# Patient Record
Sex: FEMALE | Race: WHITE | NOT HISPANIC OR LATINO | Employment: OTHER | URBAN - METROPOLITAN AREA
[De-identification: names, ages, dates, MRNs, and addresses within clinical notes are randomized per-mention and may not be internally consistent; named-entity substitution may affect disease eponyms.]

---

## 2018-03-19 ENCOUNTER — OFFICE VISIT (OUTPATIENT)
Dept: NEUROSURGERY | Facility: CLINIC | Age: 68
End: 2018-03-19

## 2018-03-19 VITALS
BODY MASS INDEX: 32.6 KG/M2 | HEIGHT: 63 IN | WEIGHT: 184 LBS | DIASTOLIC BLOOD PRESSURE: 70 MMHG | TEMPERATURE: 97.3 F | SYSTOLIC BLOOD PRESSURE: 110 MMHG

## 2018-03-19 DIAGNOSIS — M48.062 SPINAL STENOSIS, LUMBAR REGION, WITH NEUROGENIC CLAUDICATION: Primary | ICD-10-CM

## 2018-03-19 PROCEDURE — 99213 OFFICE O/P EST LOW 20 MIN: CPT | Performed by: PHYSICIAN ASSISTANT

## 2018-03-19 RX ORDER — METHYLPREDNISOLONE 4 MG/1
TABLET ORAL
Qty: 1 EACH | Refills: 0 | Status: SHIPPED | OUTPATIENT
Start: 2018-03-19 | End: 2018-05-15

## 2018-03-19 NOTE — PROGRESS NOTES
Patient: Liz Borjas  : 1950    Primary Care Provider: ANITHA Trevizo      Chief Complaint: Low back, left hip and buttock pain    History of Present Illness:       Patient is well-known to the neurosurgical practice for undergoing a left-sided L3-L4 lamina foraminotomies in 2016.  Patient has done very well up until the last few months.  Patient states that she went to get up off the couch turned to walk and felt a pop in her back and then had exquisite pain in her left lower back.  This began to develop pain in her buttock and down the proximal portion of her lateral thigh on the left.  This has been constant and hasn't gotten any better over the last 2 months.  Patient has not done physical therapy or conservative medicines for this most recent bout.    She most recently lost her , some month ago and the symptoms preceded this.  Psychological trauma.  An have been the same.  So do not think that this is stress increased pain.    MRI of the lumbar spine was reviewed with Dr. Chris Son.  She does have a very bad looking back.  I am interested in seeing what her previous MRI looks like.  This is done in [place] and he did not have the old disc with him.  It does look like she has a fresh L1-2 disc herniation that is leftward in nature and is cephalad extruded.     She'll try physical therapy for 6 weeks as well as a Medrol Dosepak in hopes that this will mitigate her symptoms.        Review of Systems   Constitutional: Negative for activity change, appetite change, chills, diaphoresis, fatigue, fever and unexpected weight change.   HENT: Negative for congestion, dental problem, drooling, ear discharge, ear pain, facial swelling, hearing loss, mouth sores, nosebleeds, postnasal drip, rhinorrhea, sinus pressure, sneezing, sore throat, tinnitus, trouble swallowing and voice change.    Eyes: Negative for photophobia, pain, discharge, redness, itching and visual disturbance.    Respiratory: Negative for apnea, cough, choking, chest tightness, shortness of breath, wheezing and stridor.    Cardiovascular: Negative for chest pain, palpitations and leg swelling.   Gastrointestinal: Negative for abdominal distention, abdominal pain, anal bleeding, blood in stool, constipation, diarrhea, nausea, rectal pain and vomiting.   Endocrine: Negative for cold intolerance, heat intolerance, polydipsia, polyphagia and polyuria.   Genitourinary: Negative for decreased urine volume, difficulty urinating, dysuria, enuresis, flank pain, frequency, genital sores, hematuria and urgency.   Musculoskeletal: Positive for back pain. Negative for arthralgias, gait problem, joint swelling, myalgias, neck pain and neck stiffness.   Skin: Negative for color change, pallor, rash and wound.   Allergic/Immunologic: Negative for environmental allergies, food allergies and immunocompromised state.   Neurological: Negative for dizziness, tremors, seizures, syncope, facial asymmetry, speech difficulty, weakness, light-headedness, numbness and headaches.   Hematological: Negative for adenopathy. Does not bruise/bleed easily.   Psychiatric/Behavioral: Negative for agitation, behavioral problems, confusion, decreased concentration, dysphoric mood, hallucinations, self-injury, sleep disturbance and suicidal ideas. The patient is not nervous/anxious and is not hyperactive.        Past Medical History:     Past Medical History:   Diagnosis Date   • Arthritis    • GERD (gastroesophageal reflux disease)    • Hyperlipidemia    • Hypertension    • Osteoporosis    • Rheumatoid arthritis    • Wears dentures     upper   • Wears glasses     reading       Family History:   No family history on file.    Social History:    reports that she has quit smoking. Her smoking use included Cigarettes. She has never used smokeless tobacco. She reports that she does not drink alcohol or use drugs.   SMOKING STATUS: Nonsmoker    Surgical  "History:     Past Surgical History:   Procedure Laterality Date   • CHOLECYSTECTOMY     • COLONOSCOPY      5 years ago   • LUMBAR DISCECTOMY Left 10/12/2016    Procedure: lumbar MICROdiscectomy LEFT L3-5;  Surgeon: Chris Son MD;  Location: [facility];  Service:    • TOE AMPUTATION      right baby toe, left second toe       Allergies:   Penicillins    Physical Exam:    Vital Signs:Ht 160 cm (62.99\")    BMI: There is no height or weight on file to calculate BMI.    GENEREAL:   The patient is in no acute distress, and is able to answer all questions appropriately.  Skin:  The incision is well-healed and well approximated.  No signs of infection, bleeding, or erythema.  Musculoskeletal: The patient’s strength is intact in upper and lower extremities upon direct testing.  Dorsiflexion and plantarflexion are equal bilaterally @ 5/5. Hip flexion against resistance.  The patient’s gait is normal without antalgia.  Neurologic: The patient is alert and oriented by 3.  Recent memory, language, attention span, and fund of knowledge are all with within normal limits.   Sensation is equal bilaterally with no deficit.    Reflexes are 2+ at the patellar and Achilles tendon bilaterally.      Medical Decision Making    Data Review:   Lumbar MRI was reviewed and didn't show what looks like a new L1-2 disc herniation and diffuse spinal stenosis throughout the lumbar spine.  Aggressive degeneration of the disks throughout.    Diagnosis:   Possible new left-sided disc herniation.    Treatment Options:   Patient will follow up with us following physical therapy and a Medrol Dosepak.  Patient needs to have a flexion extension x-ray prior to return visit and we will discuss further at that point.  Patient also needs to bring her old film from [place].    It has been a pleasure providing neurosurgical care.    Alonzo Marie PA-C      No diagnosis found.  "

## 2018-03-28 ENCOUNTER — TELEPHONE (OUTPATIENT)
Dept: NEUROSURGERY | Facility: CLINIC | Age: 68
End: 2018-03-28

## 2018-03-28 NOTE — TELEPHONE ENCOUNTER
Provider: Huy    Caller: Raquel (Daughter)  Time of call:   1005am   Phone #:  [phone]  Surgery:  Lumbar Disc L3-4 Left  Surgery Date:  10/12/2016  Last visit:   03/19/18  Next visit: 04/26/18    Reason for call:       Raquel-Pt's daughter left a  requesting her mother's insurance be updated, and a new PT order to reflect this.     Routing to [facility] to update pt's insurance (Medicare)-Please let me know when this is completed and I will send updated PT referral. Pt's information is under media tab

## 2018-04-24 ENCOUNTER — TELEPHONE (OUTPATIENT)
Dept: NEUROSURGERY | Facility: CLINIC | Age: 68
End: 2018-04-24

## 2018-04-24 DIAGNOSIS — M43.10 ACQUIRED SPONDYLOLISTHESIS: Primary | ICD-10-CM

## 2018-04-24 NOTE — TELEPHONE ENCOUNTER
Provider:  Huy/Alonzo  Caller: patient  Time of call:  11:40   Phone #:  [phone]  Surgery:  Lumbar microdiscectomy left L3-L5  Surgery Date:  10/12/16  Last visit: 11/07/16  Next visit: 03/19/18    ROSEY:         Reason for call:     Patient called to let Alonzo know that she has not been to any kind of rehab, and she is not really sure if she needs it or not.  She has been in the hospital in [place].  She states she had a viral illness and she has diverticulitis.   It is still pretty bad in the morning.  She wasn't sure if she needed to keep her apt on Wed.  I advised her to come in and Alonzo could help her make the decision on what her needs are at this point.

## 2018-04-24 NOTE — TELEPHONE ENCOUNTER
Why is patient coming in to see me in?  Patient sounds that she needs to follow up with primary care to sort out the diverticulitis.  Patient should keep that appointment.

## 2018-04-24 NOTE — TELEPHONE ENCOUNTER
Patient needs to have a flexion extension x-ray prior to return visit and we will discuss further at that point.  Patient also needs to bring her old film from [place].

## 2018-04-24 NOTE — TELEPHONE ENCOUNTER
Patient's last visit was actually on 03/19/18 with you and you have her following up with xrays after PT and steroid pack.

## 2018-04-26 ENCOUNTER — OFFICE VISIT (OUTPATIENT)
Dept: NEUROSURGERY | Facility: CLINIC | Age: 68
End: 2018-04-26

## 2018-04-26 ENCOUNTER — HOSPITAL ENCOUNTER (OUTPATIENT)
Dept: GENERAL RADIOLOGY | Facility: HOSPITAL | Age: 68
Discharge: HOME | End: 2018-04-26

## 2018-04-26 VITALS
TEMPERATURE: 96.8 F | DIASTOLIC BLOOD PRESSURE: 64 MMHG | SYSTOLIC BLOOD PRESSURE: 112 MMHG | WEIGHT: 174 LBS | BODY MASS INDEX: 30.83 KG/M2 | HEIGHT: 63 IN

## 2018-04-26 DIAGNOSIS — M48.062 SPINAL STENOSIS OF LUMBAR REGION WITH NEUROGENIC CLAUDICATION: Primary | ICD-10-CM

## 2018-04-26 DIAGNOSIS — M43.10 ACQUIRED SPONDYLOLISTHESIS: ICD-10-CM

## 2018-04-26 PROCEDURE — 99214 OFFICE O/P EST MOD 30 MIN: CPT | Performed by: PHYSICIAN ASSISTANT

## 2018-04-26 PROCEDURE — 72120 X-RAY BEND ONLY L-S SPINE: CPT

## 2018-04-26 NOTE — PROGRESS NOTES
Patient: Liz Borjas  : 1950    Primary Care Provider: ANITHA Trevizo      Chief Complaint: left hip and groin pain    History of Present Illness:        Patient is well-known to the neurosurgical practice for undergoing a left-sided L3-L4 lamina foraminotomies in 2016.  Patient has done very well up until the last few months.  Patient states that she went to get up off the couch turned to walk and felt a pop in her back and then had exquisite pain in her left lower back.  This began to develop pain in her buttock and down the proximal portion of her lateral thigh on the left.  This has been constant and hasn't gotten any better over the last 2 months.  Patient has not done physical therapy or conservative medicines for this most recent bout.     She most recently lost her , some month ago and the symptoms preceded this.  Psychological trauma.  An have been the same.  So do not think that this is stress increased pain.     MRI of the lumbar spine was reviewed with Dr. Chris Son.  She does have a very bad looking back.  I am interested in seeing what her previous MRI looks like.  This is done in [place] and he did not have the old disc with him.  It does look like she has a fresh L1-2 disc herniation that is leftward in nature and is cephalad extruded.     Patient was sent for physical therapy and tried a Medrol Dosepak.  Patient is also seen back today with flexion-extension x-rays.  There is no acute fractures or spondylolisthesis.  Patient seems to be making her way and a little bit better.  Patient is able to do activities of daily living and has more of her complaint with the neurogenic claudication.  These symptoms were reviewed with the patient and son and with serum detail.  With neurogenic claudication.  This would require much more extensive procedure if patient had majority of left hip and groin pain.  This will be much easier flex and I would like to refrain from any  surgical intervention.  I recommended the patient that they try pain management injections and giving this a little bit more time.  Patient and son were in full agreement send understand reasons of necessitate a referral back to us in a more urgent fashion.    Review of Systems   Constitutional: Negative for activity change, appetite change, chills, diaphoresis, fatigue, fever and unexpected weight change.   HENT: Negative for congestion, dental problem, drooling, ear discharge, ear pain, facial swelling, hearing loss, mouth sores, nosebleeds, postnasal drip, rhinorrhea, sinus pressure, sneezing, sore throat, tinnitus, trouble swallowing and voice change.    Eyes: Negative for photophobia, pain, discharge, redness, itching and visual disturbance.   Respiratory: Negative for apnea, cough, choking, chest tightness, shortness of breath, wheezing and stridor.    Cardiovascular: Negative for chest pain, palpitations and leg swelling.   Gastrointestinal: Negative for abdominal distention, abdominal pain, anal bleeding, blood in stool, constipation, diarrhea, nausea, rectal pain and vomiting.   Endocrine: Negative for cold intolerance, heat intolerance, polydipsia, polyphagia and polyuria.   Genitourinary: Negative for decreased urine volume, difficulty urinating, dysuria, enuresis, flank pain, frequency, genital sores, hematuria and urgency.   Musculoskeletal: Positive for back pain. Negative for arthralgias, gait problem, joint swelling, myalgias, neck pain and neck stiffness.   Skin: Negative for color change, pallor, rash and wound.   Allergic/Immunologic: Negative for environmental allergies, food allergies and immunocompromised state.   Neurological: Negative for dizziness, tremors, seizures, syncope, facial asymmetry, speech difficulty, weakness, light-headedness, numbness and headaches.   Hematological: Negative for adenopathy. Does not bruise/bleed easily.   Psychiatric/Behavioral: Negative for agitation,  behavioral problems, confusion, decreased concentration, dysphoric mood, hallucinations, self-injury, sleep disturbance and suicidal ideas. The patient is not nervous/anxious and is not hyperactive.        Past Medical History:     Past Medical History:   Diagnosis Date   • Arthritis    • GERD (gastroesophageal reflux disease)    • Hyperlipidemia    • Hypertension    • Osteoporosis    • Rheumatoid arthritis    • Wears dentures     upper   • Wears glasses     reading       Family History:   No family history on file.    Social History:    reports that she has quit smoking. Her smoking use included Cigarettes. She has never used smokeless tobacco. She reports that she does not drink alcohol or use drugs.   SMOKING STATUS: nonsmoker    Surgical History:     Past Surgical History:   Procedure Laterality Date   • CHOLECYSTECTOMY     • COLONOSCOPY      5 years ago   • LUMBAR DISCECTOMY Left 10/12/2016    Procedure: lumbar MICROdiscectomy LEFT L3-5;  Surgeon: Chris Son MD;  Location: [facility];  Service:    • TOE AMPUTATION      right baby toe, left second toe       Allergies:   Penicillins    Physical Exam:    Vital Signs:There were no vitals taken for this visit.   BMI: There is no height or weight on file to calculate BMI.    GENEREAL:   The patient is in no acute distress, and is able to answer all questions appropriately.  Skin:  The incision is well-healed and well approximated.  No signs of infection, bleeding, or erythema.  Musculoskeletal: The patient’s strength is intact in upper and lower extremities upon direct testing.  Dorsiflexion and plantarflexion are equal bilaterally @ 5/5. Hip flexion against resistance.  The patient’s gait is normal without antalgia.  Neurologic: The patient is alert and oriented by 3.  Recent memory, language, attention span, and fund of knowledge are all with within normal limits.   Sensation is equal bilaterally with no deficit.    Reflexes are 2+ at the patellar and  Achilles tendon bilaterally.      Medical Decision Making    Data Review:   Flexion-extension x-rays reviewed and showed no signs of spondylolisthesis or acute fractures.    Diagnosis:   Lumbar spinal stenosis   Status post left-sided L3-L5 lamina foraminotomies and discectomy  New L1-2 disc herniation leftward in nature    Treatment Options:   Patient may get some improvement of her hip pain from a small procedure that would be easily tolerated, however, the symptoms of neurogenic claudication would not likely be improved with just a simple discectomy.  We would like to try to postpone this type of procedure, for as long as possible.  We will send her to pain management for injections and she seems very happy with this plan.     I went over with her very explicitly signs and symptoms to look for for more urgent return back to us.  She is very pleasant and wonderful Lady doing well with her 's recent passing.    t has been a pleasure providing neurosurgical care.    Alonzo Marie PA-C    No diagnosis found.

## 2018-05-01 ENCOUNTER — TELEPHONE (OUTPATIENT)
Dept: PAIN MEDICINE | Facility: CLINIC | Age: 68
End: 2018-05-01

## 2018-05-11 PROBLEM — F41.9 ANXIETY AND DEPRESSION: Status: ACTIVE | Noted: 2018-05-11

## 2018-05-11 PROBLEM — M51.16 LUMBAR DISC HERNIATION WITH RADICULOPATHY: Status: ACTIVE | Noted: 2018-05-11

## 2018-05-11 PROBLEM — M48.062 SPINAL STENOSIS OF LUMBAR REGION WITH NEUROGENIC CLAUDICATION: Status: ACTIVE | Noted: 2018-05-11

## 2018-05-11 PROBLEM — F32.A ANXIETY AND DEPRESSION: Status: ACTIVE | Noted: 2018-05-11

## 2018-05-11 PROBLEM — M05.9 RHEUMATOID ARTHRITIS WITH POSITIVE RHEUMATOID FACTOR: Status: ACTIVE | Noted: 2018-05-11

## 2018-05-11 NOTE — PROGRESS NOTES
"Chief Complaint: \"Pain in my lower back, left hip and left groin.\"    History of Present Illness:   Patient: Ms. Liz Borjas, 68 y.o. female   Referring physician: Alonzo Marie PA-C   Reason for referral: Consultation for intractable chronic left hip and left inguinal pain.   Pain history: Patient has a Hx left-sided L3-L4 lamina foraminotomies in 2016. Patient has done very well up until the last few months.  Patient states that she went to get up off the couch turned to walk and felt a pop in her back and then had exquisite pain in her left lower back. Soon, she developed pain in her left buttock and down the proximal portion of her left lateral thigh. This has been constant and has gotten worse over the last 2 months. She has developed severe neurogenic claudication.     Pain description: constant pain, described as sharp, shooting and stabbing sensation.   Radiation of pain: The pain radiates from the left side of the lower back into the left hip and groin area and radiating down the lateral aspect of the left thigh stopping above the left knee level.   Pain intensity today: 9/10  Average pain intensity last week: 8/10  Pain intensity ranges from: 1/10 to 10/10  Aggravating factors: Pain increases with twisting, bending, sitting longer than 5 minutes, standing longer than 5 minutes and ambulating more than 5 minutes.  Alleviating factors: Pain decreases with lying down and analgesics.   Associated symptoms:   Patient denies numbness or weakness in the lower extremities.   Patient denies any new bladder or bowel problems.   Patient reports difficulties with her balance but denies falls.     Review of previous therapies and additional medical records:  Liz Borjas has already failed the following measures, including:   Conservative measures: oral analgesics, topical analgesics, massage, physical therapy, ice, heat and chiropractic therapy   Interventional measures: None  Surgical measures: Liz underwent left " sided lumbar discectomy and left laminectomy with bilateral decompression in 2016 and did extremely well until a few months ago, as referenced above  Liz Borjas underwent follow-up neurosurgical consultation with Alonzo Marie on 04/26/2018, and was found to be a potential surgical candidate.  Liz Borjas presents with significant comorbidities including anxiety and depression, hypertension, rheumatoid arthritis, GERD, osteoporosis,   engaged in treatment.  In terms of current analgesics, Liz Borjas takes: doxepin, Prozac, Robaxin, sulindac  I have reviewed [facility] Report #[id number] consistent to medication reconciliation.    Global Pain Scale 5-15-18          Pain 25          Feelings 0          Clinical outcomes 4          Activities 8          GPS Total: 37            Review of Diagnostic Studies:    MRI Lumbar spine, 03/06/2018: Vertebral heights are well-maintained.  The STIR images best demonstrated discogenic marrow edema surrounding the progressively narrowed L1-L2 disc space.  The conus ends at the T12 level.  T10-T11: Broad-based disc bulge and ligamentum flavum hypertrophy contribute to subtle cord contour irregularity.   T11-T12: Broad based disc bulges   T12-L1: circumflex ventral disc bulg suggest fissuring of the T12-L1 annulus.  L1-L2: new large central disc herniation produces mass effect on the ventral thecal sac. At the inferior margin of this herniated disc, additional bulging disc material or herniated disc material is seen in the left neural foramen. This large disc herniation deforms the thecal sac. Facet joints are normal.  L2-L3: left lateral disc bulge narrows the left neural foramen. Hypertrophic Facet and ligamentum flavum findings. Ligamentum flavum hypertrophy appears greater on the left.  L3-L4: previously documented large inferior disc extrusion left paracentral disc herniation and a smaller consistent with desiccation of the disc. No operative findings are seen at this level.   Facet hypertrophic findings and ligamentum flavum hypertrophic findings contribute to a triangular spinal canal. At this level the degree of spinal stenosis has improved.  L4-L5: broad-based disc bulge greater left of midline. The degree of canal narrowing appears decreased since the comparison study. Hypertrophic facet findings   L5-S1: facet hypertrophic findings.  The disc height is relatively preserved. The L5 vertebra is partially sacralized on the left.  No disc herniation, canal or foraminal stenosis is seen in this level.  XR SPINE LUMBAR FLEX AND EXT - 04/26/2018: Advanced multilevel degenerative disc disease now including new and extensive degenerative changes at L1-L2 not present on previous exam. No compression deformity or significant focal subluxation is seen. There are similar lower thoracic degenerative changes. There is no obvious increased subluxation from flexion to extension.        Review of Systems   Musculoskeletal: Positive for back pain.   All other systems reviewed and are negative.        Patient Active Problem List   Diagnosis   • DDD (degenerative disc disease), lumbar   • Spinal stenosis, lumbar region, with neurogenic claudication   • Spinal stenosis of lumbar region with neurogenic claudication   • Lumbar disc herniation with radiculopathy   • Rheumatoid arthritis with positive rheumatoid factor   • Anxiety and depression   • Pulmonary emphysema   • Mild obesity   • Physical deconditioning       Past Medical History:   Diagnosis Date   • Arthritis    • GERD (gastroesophageal reflux disease)    • Hyperlipidemia    • Hypertension    • Osteoporosis    • Rheumatoid arthritis    • Wears dentures     upper   • Wears glasses     reading         Past Surgical History:   Procedure Laterality Date   • CHOLECYSTECTOMY     • COLONOSCOPY      5 years ago   • LUMBAR DISCECTOMY Left 10/12/2016    Procedure: lumbar MICROdiscectomy LEFT L3-5;  Surgeon: Chris Son MD;  Location: [facility] OR;   Service:    • TOE AMPUTATION      right baby toe, left second toe      History reviewed. No pertinent family history.      Social History     Social History   • Marital status:      Social History Main Topics   • Smoking status: Former Smoker     Types: Cigarettes   • Smokeless tobacco: Never Used      Comment: quit 30 years ago   • Alcohol use No   • Drug use: No   • Sexual activity: Defer     Other Topics Concern   • Not on file         Current Outpatient Prescriptions:   •  allopurinol (ZYLOPRIM) 300 MG tablet, Take 300 mg by mouth Daily., Disp: , Rfl:   •  amLODIPine (NORVASC) 10 MG tablet, Take 10 mg by mouth daily., Disp: , Rfl:   •  atenolol (TENORMIN) 100 MG tablet, Take 100 mg by mouth daily., Disp: , Rfl:   •  docusate sodium (COLACE) 250 MG capsule, Take 1 capsule by mouth Daily., Disp: 30 capsule, Rfl: 0  •  doxepin (SINEquan) 10 MG capsule, Take 40 mg by mouth Every Night., Disp: , Rfl:   •  FLUoxetine (PROzac) 40 MG capsule, Take 40 mg by mouth daily., Disp: , Rfl:   •  folic acid (FOLVITE) 1 MG tablet, Take 1 mg by mouth daily., Disp: , Rfl:   •  glycopyrrolate (ROBINUL) 2 MG tablet, Take 2 mg by mouth Daily., Disp: , Rfl:   •  hydrochlorothiazide (HYDRODIURIL) 12.5 MG tablet, Take 12.5 mg by mouth daily., Disp: , Rfl:   •  leflunomide (ARAVA) 20 MG tablet, Take 20 mg by mouth daily., Disp: , Rfl:   •  losartan (COZAAR) 100 MG tablet, Take 100 mg by mouth daily., Disp: , Rfl:   •  methocarbamol (ROBAXIN) 750 MG tablet, Take 1 tablet by mouth 3 (Three) Times a Day., Disp: 60 tablet, Rfl: 1  •  RABEprazole (ACIPHEX) 20 MG EC tablet, Take 20 mg by mouth daily., Disp: , Rfl:   •  sulindac (CLINORIL) 200 MG tablet, Take 200 mg by mouth 2 (two) times a day., Disp: , Rfl:   •  Tofacitinib Citrate (XELJANZ XR PO), Take 11 mg by mouth Daily., Disp: , Rfl:       Allergies   Allergen Reactions   • Penicillins Hives       /70 (BP Location: Right arm)   Pulse 70   Temp 97.3 °F (36.3 °C) (Temporal  "Artery )   Resp 18   Ht 160 cm (63\")   Wt 81.7 kg (180 lb 3.2 oz)   SpO2 92%   BMI 31.92 kg/m²       Physical Exam:  Constitutional: Patient is oriented to person, place, and time. Vital signs are normal. Patient appears well-developed and well-nourished.   HEENT: Head: Normocephalic and atraumatic. Eyes: Conjunctivae and lids are normal. Pupils: Equal, round, reactive to light.   Neck: Trachea normal. Neck supple. No JVD present.   Pulmonary Respiratory effort: No increased work of breathing or signs of respiratory distress. Auscultation of lungs: Clear to auscultation.   Cardiovascular Auscultation of heart: Normal rate and rhythm, normal S1 and S2, no murmurs.   Musculoskeletal   Gait and station: Gait evaluation demonstrated limping. Walks with a cane  Lumbar spine: The range of motion of the lumbar spine is limited secondary to pain. Extension, flexion, lateral flexion, rotation of the lumbar spine increased and reproduced pain. Lumbar facet joint loading maneuvers are positive.  Rod and Gaenslen's tests are negative   Piriformis maneuvers are negative   Palpation of the bilateral ischial tuberosities, unrevealing   Palpation of the bilateral greater trochanter, unrevealing   Examination of the Iliotibial band: unrevealing   Hip joints: The range of motion of the hip joints is full and without pain   Neurological: Patient is alert and oriented to person, place, and time. Speech: speech is normal. Cortical function: Normal mental status.   Cranial nerves: Cranial nerves 2-12 intact.   Reflex Scores: Right patellar: 1+ Left patellar: 1+ Right achilles: 1+ Left achilles: 1+  Motor strength: 5/5  Motor Tone: normal tone.   Involuntary movements: none.   Superficial/Primitive Reflexes: primitive reflexes were absent.   Right Chawla: absent  Left Chawla: absent  Right ankle clonus: absent  Left ankle clonus: absent   Negative long tract signs. Straight leg raising test is negative. Femoral stretch sign is " negative.   Sensation: No sensory loss. Sensory exam: intact to light touch, intact pain and temperature sensation, intact vibration sensation and normal proprioception.   Coordination: Normal finger to nose and heel to shin. Normal balance and negative. Romberg's sign negative.   Skin and subcutaneous tissue: Skin is warm and intact. No rash noted. No cyanosis.   Psychiatric: Judgment and insight: Normal. Orientation to person, place and time: Normal. Recent and remote memory: Intact. Mood and affect: Normal.     ASSESSMENT:   1. Lumbar disc herniation with radiculopathy    2. DDD (degenerative disc disease), lumbar    3. Spinal stenosis of lumbar region with neurogenic claudication    4. Rheumatoid arthritis with positive rheumatoid factor, involving unspecified site    5. Pulmonary emphysema, unspecified emphysema type    6. Mild obesity    7. Anxiety and depression    8. Physical deconditioning      PLAN/MEDICAL DECISION MAKING:  I had a lengthy conversation with Ms. Liz Borjas regarding her chronic pain condition and potential therapeutic options including risks, benefits, alternative therapies, to name a few. Patient has failed to obtain pain relief with conservative measures, as referenced above. Patient has a history of successful left lumbar decompression L3-L5. She developed sudden onset of severe left lower back pain radiating into the left hip and left thigh. MRI of the lumbar spine revealed a new disc herniation at L1-L2 with herniated disc material compressing the left nerve roots. There is clinical and radiological correlation. Flexion-extension x-rays showed no instability. I have reviewed all available patient's medical records as well as previous therapies as referenced above.  Therefore, I have proposed the following plan:  1. Pharmacological measures: Reviewed. Discussed. Patient takes doxepin, Prozac, Robaxin, sulindac. Patient has declined additional pharmacological therapies at this  time  2. Interventional pain management measures: Patient will be scheduled for diagnostic and therapeutic left L1-L2 and L2-L3 transforaminal epidural steroid injections. We may repeat another epidural depending on patient's outcome.  Patient will follow-up with Dr. Son thereafter.  3. Long-term rehabilitation efforts:  A. The patient does not have a history of falls. I did complete a risk assessment for falls.   B. Patient will start a comprehensive physical therapy program for water therapy, core strengthening, gait and balance training, neurodynamics, myofascial release, cupping and dry needling   C. Start an exercise program such as water therapy  D. Contrast therapy: Apply ice-packs for 15-20 minutes, followed by heating pads for 15-20 minutes to affected area   E. Patient has declined a referral to Dr. Milagro Osman for cognitive behavioral therapy at this time.  F. Referral to [facility]. I have explained the patient the relationship between increased BMI and back pain  4. The patient has been instructed to contact my office with any questions or difficulties. The patient understands the plan and agrees to proceed accordingly.       Patient Care Team:  ANITHA Chapman as PCP - General (Family Medicine)  Alonzo Marie PA-C as Physician Assistant (Neurosurgery)  Carlos Eduardo Figueredo MD as Consulting Physician (Pain Medicine)  Chris Son MD as Consulting Physician (Neurosurgery)     No orders of the defined types were placed in this encounter.        No future appointments.      Carlos Eduardo Figueredo MD     EMR Dragon/Transcription disclaimer:  Much of this encounter note is an electronic transcription of spoken language to printed text. Electronic transcription of spoken language may permit erroneous, or at times, nonsensical words or phrases to be inadvertently transcribed. Although I have reviewed the note for such errors, some may still exist.

## 2018-05-15 ENCOUNTER — OFFICE VISIT (OUTPATIENT)
Dept: PAIN MEDICINE | Facility: CLINIC | Age: 68
End: 2018-05-15

## 2018-05-15 VITALS
HEART RATE: 70 BPM | BODY MASS INDEX: 31.93 KG/M2 | WEIGHT: 180.2 LBS | DIASTOLIC BLOOD PRESSURE: 70 MMHG | OXYGEN SATURATION: 92 % | TEMPERATURE: 97.3 F | SYSTOLIC BLOOD PRESSURE: 110 MMHG | HEIGHT: 63 IN | RESPIRATION RATE: 18 BRPM

## 2018-05-15 DIAGNOSIS — J43.9 PULMONARY EMPHYSEMA, UNSPECIFIED EMPHYSEMA TYPE (HCC): ICD-10-CM

## 2018-05-15 DIAGNOSIS — F41.9 ANXIETY AND DEPRESSION: ICD-10-CM

## 2018-05-15 DIAGNOSIS — F32.A ANXIETY AND DEPRESSION: ICD-10-CM

## 2018-05-15 DIAGNOSIS — M51.16 LUMBAR DISC HERNIATION WITH RADICULOPATHY: ICD-10-CM

## 2018-05-15 DIAGNOSIS — M48.062 SPINAL STENOSIS OF LUMBAR REGION WITH NEUROGENIC CLAUDICATION: ICD-10-CM

## 2018-05-15 DIAGNOSIS — E66.9 MILD OBESITY: Primary | ICD-10-CM

## 2018-05-15 DIAGNOSIS — R53.81 PHYSICAL DECONDITIONING: ICD-10-CM

## 2018-05-15 DIAGNOSIS — M05.9 RHEUMATOID ARTHRITIS WITH POSITIVE RHEUMATOID FACTOR, INVOLVING UNSPECIFIED SITE (HCC): ICD-10-CM

## 2018-05-15 DIAGNOSIS — E66.9 MILD OBESITY: ICD-10-CM

## 2018-05-15 DIAGNOSIS — M51.36 DDD (DEGENERATIVE DISC DISEASE), LUMBAR: ICD-10-CM

## 2018-05-15 PROCEDURE — 99203 OFFICE O/P NEW LOW 30 MIN: CPT | Performed by: ANESTHESIOLOGY

## 2018-05-21 ENCOUNTER — OUTSIDE FACILITY SERVICE (OUTPATIENT)
Dept: PAIN MEDICINE | Facility: CLINIC | Age: 68
End: 2018-05-21

## 2018-05-21 PROCEDURE — 64483 NJX AA&/STRD TFRM EPI L/S 1: CPT | Performed by: ANESTHESIOLOGY

## 2018-05-21 PROCEDURE — 99152 MOD SED SAME PHYS/QHP 5/>YRS: CPT | Performed by: ANESTHESIOLOGY

## 2018-05-21 PROCEDURE — 64484 NJX AA&/STRD TFRM EPI L/S EA: CPT | Performed by: ANESTHESIOLOGY

## 2018-08-23 ENCOUNTER — TELEPHONE (OUTPATIENT)
Dept: PAIN MEDICINE | Facility: CLINIC | Age: 68
End: 2018-08-23

## 2018-09-12 ENCOUNTER — TELEPHONE (OUTPATIENT)
Dept: PAIN MEDICINE | Facility: CLINIC | Age: 68
End: 2018-09-12

## 2018-09-12 NOTE — TELEPHONE ENCOUNTER
I have tried to contact the patient many times, the phone number on file rings 5-6 times then the phone hangs up. I have no other numbers on file for the patient and her daughters number is not on file in her chart.    ----- Message -----   From: Marycruz Conner   Sent: 9/10/2018   9:46 AM   To: Li Cabral MA, *   Subject: RE: patient requesting appt with Vascello         I have tried to contact the patient for scheduling many times but have not heard back.   I will call her daughter back to schedule.     ----- Message -----   From: Li Cabral MA   Sent: 9/10/2018   9:22 AM   To: Christine Childress RN   Subject: patient requesting appt with Vascello             Patient's daughter called in, said they never heard back about getting the patient another appt with Dr. Figueredo. I think I transferred the message to Marycruz but wanted to send a inbasket message in case I messed it up

## 2018-09-21 NOTE — PROGRESS NOTES
"Chief Complaint: \"Pain in my lower back, left hip and left groin.\"    History of Present Illness:   Patient: Ms. Liz Borjas, 68 y.o. female   Pain history: Patient seen today for evaluation of chronic lower back, left hip, and left inguinal pain.    Reported onset of pain was in February of this year, which began after feeling a pop in her back after getting off of the couch.  Soon after she developed pain in her left buttock and down the proximal portion of her left lateral thigh.  She has developed severe neurogenic claudication.     Patient recently underwent left L1-L2 and left L2-L3 transforaminal epidural steroid injections on 05/21/2018 and experienced 80% pain relief and functional improvement that lasted for 3 months.  Pain has progressively recurred.  She did not participate in physical therapy after her procedure, as medically advised.    Pain description: constant pain, described as sharp, shooting and stabbing sensation.   Radiation of pain: The pain radiates from the left side of the lower back into the left hip and groin area and goes down the lateral aspect of the left thigh stopping above the left knee level.  Patient denies right-sided symptoms.  Pain intensity today: 9/10  Average pain intensity last week: 9/10  Pain intensity ranges from: 8/10 to 10/10  Aggravating factors: Pain increases immediately with ambulation.  Alleviating factors: Pain decreases with sitting.   Associated symptoms:   Patient denies numbness or weakness in the lower extremities.   Patient denies any new bladder or bowel problems.   Patient reports difficulties with her balance but denies falls.     Review of previous therapies and additional medical records:  Liz Borjas has already failed the following measures, including:   Conservative measures: oral analgesics, topical analgesics, massage, physical therapy, ice, heat and chiropractic therapy   Interventional measures: As referenced above.  Surgical measures: Liz" underwent left sided lumbar discectomy and left laminectomy with bilateral decompression in 2016 and did extremely well until a few months ago.  Liz Borjas underwent follow-up neurosurgical consultation with Alonzo Marie on 04/26/2018, and was found to be a potential surgical candidate.  Patient reports that she is scheduled for right knee arthroplasty in November with Dr. Josue.  Liz Borjas presents with significant comorbidities including anxiety and depression, hypertension, rheumatoid arthritis, GERD, osteoporosis,   engaged in treatment.  In terms of current analgesics, Liz Borjas takes: Norco  I have reviewed Rolando Report #[id number] consistent to medication reconciliation.    Global Pain Scale 5-15-18 09-         Pain 25 22         Feelings 0 0         Clinical outcomes 4 16         Activities 8 20         GPS Total: 37 58           Review of Diagnostic Studies:    MRI Lumbar spine, 03/06/2018: Vertebral heights are well-maintained.  The STIR images best demonstrated discogenic marrow edema surrounding the progressively narrowed L1-L2 disc space.  The conus ends at the T12 level.  T10-T11: Broad-based disc bulge and ligamentum flavum hypertrophy contribute to subtle cord contour irregularity.   T11-T12: Broad based disc bulges   T12-L1: circumflex ventral disc bulg suggest fissuring of the T12-L1 annulus.  L1-L2: new large central disc herniation produces mass effect on the ventral thecal sac. At the inferior margin of this herniated disc, additional bulging disc material or herniated disc material is seen in the left neural foramen. This large disc herniation deforms the thecal sac. Facet joints are normal.  L2-L3: left lateral disc bulge narrows the left neural foramen. Hypertrophic Facet and ligamentum flavum findings. Ligamentum flavum hypertrophy appears greater on the left.  L3-L4: previously documented large inferior disc extrusion left paracentral disc herniation and a smaller  consistent with desiccation of the disc. No operative findings are seen at this level.  Facet hypertrophic findings and ligamentum flavum hypertrophic findings contribute to a triangular spinal canal. At this level the degree of spinal stenosis has improved.  L4-L5: broad-based disc bulge greater left of midline. The degree of canal narrowing appears decreased since the comparison study. Hypertrophic facet findings   L5-S1: facet hypertrophic findings.  The disc height is relatively preserved. The L5 vertebra is partially sacralized on the left.  No disc herniation, canal or foraminal stenosis is seen in this level.  XR SPINE LUMBAR FLEX AND EXT - 04/26/2018: Advanced multilevel degenerative disc disease now including new and extensive degenerative changes at L1-L2 not present on previous exam. No compression deformity or significant focal subluxation is seen. There are similar lower thoracic degenerative changes. There is no obvious increased subluxation from flexion to extension.        Review of Systems   Gastrointestinal: Positive for diarrhea and nausea.   Musculoskeletal: Positive for arthralgias and back pain.   All other systems reviewed and are negative.        Patient Active Problem List   Diagnosis   • DDD (degenerative disc disease), lumbar   • Spinal stenosis, lumbar region, with neurogenic claudication   • Spinal stenosis of lumbar region with neurogenic claudication   • Lumbar disc herniation with radiculopathy   • Rheumatoid arthritis with positive rheumatoid factor (CMS/HCC)   • Anxiety and depression   • Pulmonary emphysema (CMS/HCC)   • Mild obesity   • Physical deconditioning       Past Medical History:   Diagnosis Date   • Arthritis    • GERD (gastroesophageal reflux disease)    • Hyperlipidemia    • Hypertension    • Osteoporosis    • Rheumatoid arthritis (CMS/HCC)    • Wears dentures     upper   • Wears glasses     reading         Past Surgical History:   Procedure Laterality Date   •  CHOLECYSTECTOMY     • COLONOSCOPY      5 years ago   • LUMBAR DISCECTOMY Left 10/12/2016    Procedure: lumbar MICROdiscectomy LEFT L3-5;  Surgeon: Chris Son MD;  Location: [facility];  Service:    • TOE AMPUTATION      right baby toe, left second toe      History reviewed. No pertinent family history.      Social History     Social History   • Marital status:      Social History Main Topics   • Smoking status: Former Smoker     Types: Cigarettes   • Smokeless tobacco: Never Used      Comment: quit 30 years ago   • Alcohol use No   • Drug use: No   • Sexual activity: Defer     Other Topics Concern   • Not on file         Current Outpatient Prescriptions:   •  allopurinol (ZYLOPRIM) 300 MG tablet, Take 300 mg by mouth Daily., Disp: , Rfl:   •  amLODIPine (NORVASC) 10 MG tablet, Take 10 mg by mouth daily., Disp: , Rfl:   •  atenolol (TENORMIN) 100 MG tablet, Take 100 mg by mouth daily., Disp: , Rfl:   •  docusate sodium (COLACE) 250 MG capsule, Take 1 capsule by mouth Daily., Disp: 30 capsule, Rfl: 0  •  doxepin (SINEquan) 10 MG capsule, Take 40 mg by mouth Every Night., Disp: , Rfl:   •  FLUoxetine (PROzac) 40 MG capsule, Take 40 mg by mouth daily., Disp: , Rfl:   •  folic acid (FOLVITE) 1 MG tablet, Take 1 mg by mouth daily., Disp: , Rfl:   •  hydrochlorothiazide (HYDRODIURIL) 12.5 MG tablet, Take 12.5 mg by mouth daily., Disp: , Rfl:   •  HYDROcodone-acetaminophen (NORCO) 5-325 MG per tablet, Take 1 tablet by mouth Every 6 (Six) Hours As Needed., Disp: , Rfl:   •  leflunomide (ARAVA) 20 MG tablet, Take 20 mg by mouth daily., Disp: , Rfl:   •  losartan (COZAAR) 100 MG tablet, Take 100 mg by mouth daily., Disp: , Rfl:   •  RABEprazole (ACIPHEX) 20 MG EC tablet, Take 20 mg by mouth daily., Disp: , Rfl:   •  Tofacitinib Citrate (XELJANZ XR PO), Take 11 mg by mouth Daily., Disp: , Rfl:       Allergies   Allergen Reactions   • Penicillins Hives   • Sulfa Antibiotics Rash     Also affects kidney function     "   /80   Pulse 78   Temp 97.6 °F (36.4 °C) (Temporal Artery )   Resp 18   Ht 160 cm (63\")   Wt 78.7 kg (173 lb 9.6 oz)   SpO2 98%   BMI 30.75 kg/m²       Physical Exam:  Constitutional: Patient is oriented to person, place, and time.  Patient appears well-developed and well-nourished.   HEENT: Head: Normocephalic and atraumatic. Eyes: Conjunctivae and lids are normal. Pupils: Equal, round, reactive to light.   Pulmonary Respiratory effort: No increased work of breathing or signs of respiratory distress. Auscultation of lungs: Clear to auscultation.   Cardiovascular Auscultation of heart: Normal rate and rhythm, normal S1 and S2, no murmurs.   Musculoskeletal   Gait and station: Gait evaluation demonstrated limping.   Lumbar spine: The range of motion of the lumbar spine is limited secondary to pain. Extension, flexion, lateral flexion, rotation of the lumbar spine increased and reproduced pain. Lumbar facet joint loading maneuvers are positive.  Rod and Gaenslen's tests are negative   Piriformis maneuvers are negative   Palpation of the bilateral ischial tuberosities, unrevealing   Palpation of the bilateral greater trochanter, unrevealing   Examination of the Iliotibial band: unrevealing   Hip joints: The range of motion of the hip joints is full and without pain   Neurological: Patient is alert and oriented to person, place, and time. Speech: speech is normal. Cortical function: Normal mental status.   Cranial nerves: Cranial nerves 2-12 intact.   Reflex Scores: Right patellar: 1+ Left patellar: 1+ Right achilles: 1+ Left achilles: 1+  Motor strength: 5/5  Motor Tone: normal tone.   Involuntary movements: none.   Superficial/Primitive Reflexes: primitive reflexes were absent.   Right Chawla: absent  Left Chawla: absent  Right ankle clonus: absent  Left ankle clonus: absent   Negative long tract signs. Straight leg raising test is negative. Femoral stretch sign is negative.   Sensation: No " sensory loss. Sensory exam: intact to light touch, intact pain and temperature sensation, intact vibration sensation and normal proprioception.   Coordination: Normal finger to nose and heel to shin. Normal balance and negative. Romberg's sign negative.   Skin and subcutaneous tissue: Skin is warm and intact. No rash noted. No cyanosis.   Psychiatric: Judgment and insight: Normal. Orientation to person, place and time: Normal. Recent and remote memory: Intact. Mood and affect: Normal.     ASSESSMENT:   1. Lumbar disc herniation with radiculopathy    2. DDD (degenerative disc disease), lumbar    3. Spinal stenosis, lumbar region, with neurogenic claudication    4. Rheumatoid arthritis with positive rheumatoid factor, involving unspecified site (CMS/HCC)    5. Pulmonary emphysema, unspecified emphysema type (CMS/HCC)    6. Mild obesity    7. Anxiety and depression    8. Physical deconditioning      PLAN/MEDICAL DECISION MAKING:  I have reviewed all available patient's medical records as well as previous therapies as referenced above, and discussed the patient with Dr. Figueredo.  1. Interventional pain management measures: Patient will be scheduled for left L1-L2 and L2-L3 transforaminal epidural steroid injections. We may repeat another epidural depending on patient's outcome.  Patient will follow-up with Dr. Son thereafter.  2. Pharmacological measures: Reviewed. Discussed. Patient takes doxepin, Prozac. Patient has declined additional pharmacological therapies at this time  3. Long-term rehabilitation efforts:  A. The patient does not have a history of falls. I did complete a risk assessment for falls.   B. Patient will start a comprehensive physical therapy program for water therapy, core strengthening, gait and balance training, neurodynamics, myofascial release, cupping and dry needling   C. Start an exercise program such as water therapy  D. Contrast therapy: Apply ice-packs for 15-20 minutes, followed by  heating pads for 15-20 minutes to affected area   E. Patient has declined a referral to Dr. Milagro Osman for cognitive behavioral therapy at this time.  F. Patient declined a referral to [facility]. I have explained the patient the relationship between increased BMI and back pain  4. The patient has been instructed to contact my office with any questions or difficulties. The patient understands the plan and agrees to proceed accordingly.       Patient Care Team:  Valerie Sesay APRN as PCP - General (Family Medicine)  Alonzo Marie PA-C as Physician Assistant (Neurosurgery)  Carlos Eduardo Figueredo MD as Consulting Physician (Pain Medicine)  Chris Son MD as Consulting Physician (Neurosurgery)  Keegan Tellez PA-C as Physician Assistant (Physician Assistant)     No orders of the defined types were placed in this encounter.        Future Appointments  Date Time Provider Department Center   10/10/2018 9:00 AM Carlos Eduardo Figueredo MD [facility] None         Keegan Tellez PA-C     EMR Dragon/Transcription disclaimer:  Much of this encounter note is an electronic transcription of spoken language to printed text. Electronic transcription of spoken language may permit erroneous, or at times, nonsensical words or phrases to be inadvertently transcribed. Although I have reviewed the note for such errors, some may still exist.

## 2018-09-24 ENCOUNTER — TELEPHONE (OUTPATIENT)
Dept: PAIN MEDICINE | Facility: CLINIC | Age: 68
End: 2018-09-24

## 2018-09-25 ENCOUNTER — OFFICE VISIT (OUTPATIENT)
Dept: PAIN MEDICINE | Facility: CLINIC | Age: 68
End: 2018-09-25

## 2018-09-25 VITALS
OXYGEN SATURATION: 98 % | HEART RATE: 78 BPM | WEIGHT: 173.6 LBS | DIASTOLIC BLOOD PRESSURE: 80 MMHG | BODY MASS INDEX: 30.76 KG/M2 | HEIGHT: 63 IN | RESPIRATION RATE: 18 BRPM | TEMPERATURE: 97.6 F | SYSTOLIC BLOOD PRESSURE: 118 MMHG

## 2018-09-25 DIAGNOSIS — M48.062 SPINAL STENOSIS, LUMBAR REGION, WITH NEUROGENIC CLAUDICATION: ICD-10-CM

## 2018-09-25 DIAGNOSIS — F32.A ANXIETY AND DEPRESSION: ICD-10-CM

## 2018-09-25 DIAGNOSIS — F41.9 ANXIETY AND DEPRESSION: ICD-10-CM

## 2018-09-25 DIAGNOSIS — E66.9 MILD OBESITY: ICD-10-CM

## 2018-09-25 DIAGNOSIS — R53.81 PHYSICAL DECONDITIONING: ICD-10-CM

## 2018-09-25 DIAGNOSIS — M05.9 RHEUMATOID ARTHRITIS WITH POSITIVE RHEUMATOID FACTOR, INVOLVING UNSPECIFIED SITE (HCC): ICD-10-CM

## 2018-09-25 DIAGNOSIS — M51.16 LUMBAR DISC HERNIATION WITH RADICULOPATHY: Primary | ICD-10-CM

## 2018-09-25 DIAGNOSIS — J43.9 PULMONARY EMPHYSEMA, UNSPECIFIED EMPHYSEMA TYPE (HCC): ICD-10-CM

## 2018-09-25 DIAGNOSIS — M51.36 DDD (DEGENERATIVE DISC DISEASE), LUMBAR: ICD-10-CM

## 2018-09-25 PROCEDURE — 99213 OFFICE O/P EST LOW 20 MIN: CPT | Performed by: PHYSICIAN ASSISTANT

## 2018-09-25 RX ORDER — HYDROCODONE BITARTRATE AND ACETAMINOPHEN 5; 325 MG/1; MG/1
1 TABLET ORAL EVERY 6 HOURS PRN
Status: ON HOLD | COMMUNITY
End: 2022-01-15

## 2018-10-10 ENCOUNTER — OUTSIDE FACILITY SERVICE (OUTPATIENT)
Dept: PAIN MEDICINE | Facility: CLINIC | Age: 68
End: 2018-10-10

## 2018-10-10 PROCEDURE — 64484 NJX AA&/STRD TFRM EPI L/S EA: CPT | Performed by: ANESTHESIOLOGY

## 2018-10-10 PROCEDURE — 64483 NJX AA&/STRD TFRM EPI L/S 1: CPT | Performed by: ANESTHESIOLOGY

## 2018-10-10 PROCEDURE — 99152 MOD SED SAME PHYS/QHP 5/>YRS: CPT | Performed by: ANESTHESIOLOGY

## 2018-10-11 ENCOUNTER — TELEPHONE (OUTPATIENT)
Dept: PAIN MEDICINE | Facility: CLINIC | Age: 68
End: 2018-10-11

## 2018-10-11 NOTE — TELEPHONE ENCOUNTER
Patient had left L1-L2 and left L2-L3 TFESI on 10/10/2018. Called patient to f/u post procedure. Reached voicemail. Left message for return call

## 2018-11-05 ENCOUNTER — OFFICE VISIT (OUTPATIENT)
Dept: PAIN MEDICINE | Facility: CLINIC | Age: 68
End: 2018-11-05

## 2018-11-05 VITALS
HEART RATE: 110 BPM | HEIGHT: 63 IN | SYSTOLIC BLOOD PRESSURE: 122 MMHG | TEMPERATURE: 97.6 F | DIASTOLIC BLOOD PRESSURE: 70 MMHG | WEIGHT: 170.8 LBS | OXYGEN SATURATION: 90 % | BODY MASS INDEX: 30.26 KG/M2 | RESPIRATION RATE: 22 BRPM

## 2018-11-05 DIAGNOSIS — R53.81 PHYSICAL DECONDITIONING: ICD-10-CM

## 2018-11-05 DIAGNOSIS — M51.16 LUMBAR DISC HERNIATION WITH RADICULOPATHY: Primary | ICD-10-CM

## 2018-11-05 DIAGNOSIS — M48.062 SPINAL STENOSIS, LUMBAR REGION, WITH NEUROGENIC CLAUDICATION: ICD-10-CM

## 2018-11-05 DIAGNOSIS — M05.9 RHEUMATOID ARTHRITIS WITH POSITIVE RHEUMATOID FACTOR, INVOLVING UNSPECIFIED SITE (HCC): ICD-10-CM

## 2018-11-05 DIAGNOSIS — M51.36 DDD (DEGENERATIVE DISC DISEASE), LUMBAR: ICD-10-CM

## 2018-11-05 PROCEDURE — 99213 OFFICE O/P EST LOW 20 MIN: CPT | Performed by: PHYSICIAN ASSISTANT

## 2018-11-12 ENCOUNTER — OFFICE VISIT (OUTPATIENT)
Dept: NEUROSURGERY | Facility: CLINIC | Age: 68
End: 2018-11-12

## 2018-11-12 VITALS
TEMPERATURE: 97.1 F | BODY MASS INDEX: 30.3 KG/M2 | SYSTOLIC BLOOD PRESSURE: 138 MMHG | HEIGHT: 63 IN | WEIGHT: 171 LBS | DIASTOLIC BLOOD PRESSURE: 72 MMHG

## 2018-11-12 DIAGNOSIS — R53.81 PHYSICAL DECONDITIONING: ICD-10-CM

## 2018-11-12 DIAGNOSIS — M48.062 SPINAL STENOSIS OF LUMBAR REGION WITH NEUROGENIC CLAUDICATION: ICD-10-CM

## 2018-11-12 DIAGNOSIS — M48.062 SPINAL STENOSIS, LUMBAR REGION, WITH NEUROGENIC CLAUDICATION: Primary | ICD-10-CM

## 2018-11-12 PROCEDURE — 99214 OFFICE O/P EST MOD 30 MIN: CPT | Performed by: PHYSICIAN ASSISTANT

## 2018-11-12 NOTE — PROGRESS NOTES
Patient: Liz Borjas  : 1950    Primary Care Provider: Valerie Sesay APRN      Chief Complaint: back pain    History of Present Illness:       Patient is well-known to the neurosurgical practice for having a L3 4 and L4 5 laminectomy and discectomy left-sided approach in .  Patient did well until 2018.  Patient then developed left hip and groin pain.  Patient also started develop pain in her buttock and her left lateral thigh.  Patient was sent for injections to temporize pain.  Patient states that her sciatica type symptoms have improved but she now has back pain that is keeping her from doing her activities of daily living.    Patient had previously been able to keep up with housework and the extra duties that were left behind by her late .  Patient is now unable to continue with her activities and this is becoming a more pressing matter.  Patient is tried and failed therapy as well as lumbar steroid injections.  Patient is also on conservative medications that her rheumatologist is providing.    I discussed the symptoms that the patient is having and she does have low back pain but it does go into bilateral buttock and is more present whenever she is standing and/or twisting.    Review of Systems   Musculoskeletal: Positive for arthralgias and back pain.   All other systems reviewed and are negative.      Past Medical History:     Past Medical History:   Diagnosis Date   • Arthritis    • GERD (gastroesophageal reflux disease)    • Hyperlipidemia    • Hypertension    • Osteoporosis    • Rheumatoid arthritis (CMS/[facility])    • Wears dentures     upper   • Wears glasses     reading       Family History:   No family history on file.    Social History:    reports that she has quit smoking. Her smoking use included cigarettes. she has never used smokeless tobacco. She reports that she does not drink alcohol or use drugs.   SMOKING STATUS: Nonsmoker    Surgical History:     Past Surgical  "History:   Procedure Laterality Date   • CHOLECYSTECTOMY     • COLONOSCOPY      5 years ago   • TOE AMPUTATION      right baby toe, left second toe       Allergies:   Penicillins and Sulfa antibiotics    Physical Exam:    Vital Signs:/72   Temp 97.1 °F (36.2 °C) (Temporal)   Ht 160 cm (63\")   Wt 77.6 kg (171 lb)   BMI 30.29 kg/m²    BMI: Body mass index is 30.29 kg/m².    GENERAL:   The patient is in no acute distress, and is able to answer all questions appropriately.  Skin:  The incision is well-healed and well approximated.  No signs of infection, bleeding, or erythema.  Musculoskeletal: Right proximal hip strength is decreased as compared to left.  Patient is 4 out 5 on right and 5 out of 5 on left.  Dorsiflexion and plantarflexion are equal bilaterally @ 4/5. Hip flexion against resistance.  The patient’s gait is antalgic.    Patient has right knee tenderness.   Neurologic: The patient is alert and oriented by 3.  Recent memory, language, attention span, and fund of knowledge are all with within normal limits.   Sensation is equal bilaterally with no deficit.    Reflexes are 2+ at the patellar and Achilles tendon bilaterally.      Medical Decision Making    Data Review:   No new films reviewed this visit    Diagnosis:   Postlaminectomy syndrome  Neurogenic claudication    Treatment Options:   I have ordered a MRI of the lumbar spine with and without contrast.  I'll also order a flexion extension x-ray lumbar spine.  These will help delineate surgical pathology if there are any options.    Patient is also scheduled with Dr. Josue in [place] for a right total knee arthroplasty at the end of the month.    It has been a pleasure providing neurosurgical care.    Alonzo Marie PA-C      No diagnosis found.  "

## 2018-11-19 ENCOUNTER — HOSPITAL ENCOUNTER (OUTPATIENT)
Dept: GENERAL RADIOLOGY | Facility: HOSPITAL | Age: 68
Discharge: HOME | End: 2018-11-19

## 2018-11-19 ENCOUNTER — HOSPITAL ENCOUNTER (OUTPATIENT)
Dept: MRI IMAGING | Facility: HOSPITAL | Age: 68
Discharge: HOME | End: 2018-11-19
Admitting: PHYSICIAN ASSISTANT

## 2018-11-19 DIAGNOSIS — M48.062 SPINAL STENOSIS, LUMBAR REGION, WITH NEUROGENIC CLAUDICATION: ICD-10-CM

## 2018-11-19 PROCEDURE — 72120 X-RAY BEND ONLY L-S SPINE: CPT

## 2021-10-17 ENCOUNTER — APPOINTMENT (OUTPATIENT)
Dept: GENERAL RADIOLOGY | Facility: HOSPITAL | Age: 71
End: 2021-10-17

## 2021-10-17 ENCOUNTER — HOSPITAL ENCOUNTER (INPATIENT)
Facility: HOSPITAL | Age: 71
LOS: 7 days | Discharge: HOME HEALTH | End: 2021-10-24
Attending: EMERGENCY MEDICINE | Admitting: INTERNAL MEDICINE

## 2021-10-17 DIAGNOSIS — J18.9 PNEUMONIA OF BOTH LOWER LOBES DUE TO INFECTIOUS ORGANISM: ICD-10-CM

## 2021-10-17 DIAGNOSIS — J96.01 ACUTE RESPIRATORY FAILURE WITH HYPOXIA (HCC): Primary | ICD-10-CM

## 2021-10-17 PROBLEM — D72.829 LEUKOCYTOSIS: Status: ACTIVE | Noted: 2021-10-17

## 2021-10-17 PROBLEM — E87.20 LACTIC ACIDOSIS: Status: ACTIVE | Noted: 2021-10-17

## 2021-10-17 PROBLEM — J44.1 COPD WITH ACUTE EXACERBATION: Status: ACTIVE | Noted: 2021-10-17

## 2021-10-17 PROBLEM — E87.1 HYPONATREMIA: Status: ACTIVE | Noted: 2021-10-17

## 2021-10-17 PROBLEM — A41.9 SEPSIS: Status: ACTIVE | Noted: 2021-10-17

## 2021-10-17 LAB
ALBUMIN SERPL-MCNC: 2.8 G/DL (ref 3.5–5.2)
ALBUMIN/GLOB SERPL: 0.7 G/DL
ALP SERPL-CCNC: 554 U/L (ref 39–117)
ALT SERPL W P-5'-P-CCNC: 26 U/L (ref 1–33)
ANION GAP SERPL CALCULATED.3IONS-SCNC: 13 MMOL/L (ref 5–15)
ARTERIAL PATENCY WRIST A: ABNORMAL
AST SERPL-CCNC: 39 U/L (ref 1–32)
ATMOSPHERIC PRESS: ABNORMAL MM[HG]
BASE EXCESS BLDA CALC-SCNC: -0.9 MMOL/L (ref 0–2)
BASOPHILS # BLD AUTO: 0.05 10*3/MM3 (ref 0–0.2)
BASOPHILS NFR BLD AUTO: 0.3 % (ref 0–1.5)
BDY SITE: ABNORMAL
BILIRUB SERPL-MCNC: 0.7 MG/DL (ref 0–1.2)
BODY TEMPERATURE: 37 C
BUN SERPL-MCNC: 15 MG/DL (ref 8–23)
BUN/CREAT SERPL: 12.7 (ref 7–25)
CALCIUM SPEC-SCNC: 8.5 MG/DL (ref 8.6–10.5)
CHLORIDE SERPL-SCNC: 95 MMOL/L (ref 98–107)
CO2 BLDA-SCNC: 23.7 MMOL/L (ref 22–33)
CO2 SERPL-SCNC: 21 MMOL/L (ref 22–29)
COHGB MFR BLD: 1.4 % (ref 0–2)
CREAT SERPL-MCNC: 1.18 MG/DL (ref 0.57–1)
D-LACTATE SERPL-SCNC: 2.7 MMOL/L (ref 0.5–2)
DEPRECATED RDW RBC AUTO: 49.6 FL (ref 37–54)
EOSINOPHIL # BLD AUTO: 0.02 10*3/MM3 (ref 0–0.4)
EOSINOPHIL NFR BLD AUTO: 0.1 % (ref 0.3–6.2)
EPAP: 0
ERYTHROCYTE [DISTWIDTH] IN BLOOD BY AUTOMATED COUNT: 15.6 % (ref 12.3–15.4)
GFR SERPL CREATININE-BSD FRML MDRD: 45 ML/MIN/1.73
GLOBULIN UR ELPH-MCNC: 3.9 GM/DL
GLUCOSE SERPL-MCNC: 169 MG/DL (ref 65–99)
HCO3 BLDA-SCNC: 22.7 MMOL/L (ref 20–26)
HCT VFR BLD AUTO: 34.4 % (ref 34–46.6)
HCT VFR BLD CALC: 29.5 %
HGB BLD-MCNC: 10.5 G/DL (ref 12–15.9)
HGB BLDA-MCNC: 9.6 G/DL (ref 14–18)
HOLD SPECIMEN: NORMAL
HOLD SPECIMEN: NORMAL
IMM GRANULOCYTES # BLD AUTO: 0.18 10*3/MM3 (ref 0–0.05)
IMM GRANULOCYTES NFR BLD AUTO: 1 % (ref 0–0.5)
INHALED O2 CONCENTRATION: 40 %
IPAP: 0
LYMPHOCYTES # BLD AUTO: 0.44 10*3/MM3 (ref 0.7–3.1)
LYMPHOCYTES NFR BLD AUTO: 2.4 % (ref 19.6–45.3)
MCH RBC QN AUTO: 26.2 PG (ref 26.6–33)
MCHC RBC AUTO-ENTMCNC: 30.5 G/DL (ref 31.5–35.7)
MCV RBC AUTO: 85.8 FL (ref 79–97)
METHGB BLD QL: 0.1 % (ref 0–1.5)
MODALITY: ABNORMAL
MONOCYTES # BLD AUTO: 1.33 10*3/MM3 (ref 0.1–0.9)
MONOCYTES NFR BLD AUTO: 7.1 % (ref 5–12)
NEUTROPHILS NFR BLD AUTO: 16.61 10*3/MM3 (ref 1.7–7)
NEUTROPHILS NFR BLD AUTO: 89.1 % (ref 42.7–76)
NOTE: ABNORMAL
NRBC BLD AUTO-RTO: 0 /100 WBC (ref 0–0.2)
NT-PROBNP SERPL-MCNC: 529.7 PG/ML (ref 0–900)
OXYHGB MFR BLDV: 93 % (ref 94–99)
PAW @ PEAK INSP FLOW SETTING VENT: 0 CMH2O
PCO2 BLDA: 32.9 MM HG (ref 35–45)
PCO2 TEMP ADJ BLD: 32.9 MM HG (ref 35–45)
PH BLDA: 7.45 PH UNITS (ref 7.35–7.45)
PH, TEMP CORRECTED: 7.45 PH UNITS
PLATELET # BLD AUTO: 278 10*3/MM3 (ref 140–450)
PMV BLD AUTO: 11.9 FL (ref 6–12)
PO2 BLDA: 68.6 MM HG (ref 83–108)
PO2 TEMP ADJ BLD: 68.6 MM HG (ref 83–108)
POTASSIUM SERPL-SCNC: 4 MMOL/L (ref 3.5–5.2)
PROT SERPL-MCNC: 6.7 G/DL (ref 6–8.5)
QT INTERVAL: 348 MS
QTC INTERVAL: 453 MS
RBC # BLD AUTO: 4.01 10*6/MM3 (ref 3.77–5.28)
SARS-COV-2 RDRP RESP QL NAA+PROBE: NORMAL
SODIUM SERPL-SCNC: 129 MMOL/L (ref 136–145)
TOTAL RATE: 0 BREATHS/MINUTE
TROPONIN T SERPL-MCNC: <0.01 NG/ML (ref 0–0.03)
WBC # BLD AUTO: 18.63 10*3/MM3 (ref 3.4–10.8)
WHOLE BLOOD HOLD SPECIMEN: NORMAL
WHOLE BLOOD HOLD SPECIMEN: NORMAL

## 2021-10-17 PROCEDURE — 99284 EMERGENCY DEPT VISIT MOD MDM: CPT

## 2021-10-17 PROCEDURE — 93005 ELECTROCARDIOGRAM TRACING: CPT

## 2021-10-17 PROCEDURE — 36600 WITHDRAWAL OF ARTERIAL BLOOD: CPT

## 2021-10-17 PROCEDURE — 83930 ASSAY OF BLOOD OSMOLALITY: CPT | Performed by: NURSE PRACTITIONER

## 2021-10-17 PROCEDURE — 93005 ELECTROCARDIOGRAM TRACING: CPT | Performed by: EMERGENCY MEDICINE

## 2021-10-17 PROCEDURE — 5A09357 ASSISTANCE WITH RESPIRATORY VENTILATION, LESS THAN 24 CONSECUTIVE HOURS, CONTINUOUS POSITIVE AIRWAY PRESSURE: ICD-10-PCS | Performed by: EMERGENCY MEDICINE

## 2021-10-17 PROCEDURE — 82805 BLOOD GASES W/O2 SATURATION: CPT

## 2021-10-17 PROCEDURE — 83605 ASSAY OF LACTIC ACID: CPT | Performed by: EMERGENCY MEDICINE

## 2021-10-17 PROCEDURE — 83880 ASSAY OF NATRIURETIC PEPTIDE: CPT | Performed by: EMERGENCY MEDICINE

## 2021-10-17 PROCEDURE — 87040 BLOOD CULTURE FOR BACTERIA: CPT | Performed by: EMERGENCY MEDICINE

## 2021-10-17 PROCEDURE — 94799 UNLISTED PULMONARY SVC/PX: CPT

## 2021-10-17 PROCEDURE — 36415 COLL VENOUS BLD VENIPUNCTURE: CPT

## 2021-10-17 PROCEDURE — 94660 CPAP INITIATION&MGMT: CPT

## 2021-10-17 PROCEDURE — 83050 HGB METHEMOGLOBIN QUAN: CPT

## 2021-10-17 PROCEDURE — 84145 PROCALCITONIN (PCT): CPT | Performed by: EMERGENCY MEDICINE

## 2021-10-17 PROCEDURE — 84484 ASSAY OF TROPONIN QUANT: CPT | Performed by: EMERGENCY MEDICINE

## 2021-10-17 PROCEDURE — 82375 ASSAY CARBOXYHB QUANT: CPT

## 2021-10-17 PROCEDURE — 80053 COMPREHEN METABOLIC PANEL: CPT | Performed by: EMERGENCY MEDICINE

## 2021-10-17 PROCEDURE — 71045 X-RAY EXAM CHEST 1 VIEW: CPT

## 2021-10-17 PROCEDURE — 85025 COMPLETE CBC W/AUTO DIFF WBC: CPT | Performed by: EMERGENCY MEDICINE

## 2021-10-17 PROCEDURE — 87635 SARS-COV-2 COVID-19 AMP PRB: CPT | Performed by: EMERGENCY MEDICINE

## 2021-10-17 RX ORDER — DICYCLOMINE HYDROCHLORIDE 10 MG/1
10 CAPSULE ORAL 2 TIMES DAILY
COMMUNITY

## 2021-10-17 RX ORDER — DOXYCYCLINE HYCLATE 50 MG/1
324 CAPSULE, GELATIN COATED ORAL
Status: ON HOLD | COMMUNITY
End: 2022-01-15

## 2021-10-17 RX ORDER — TRAMADOL HYDROCHLORIDE 50 MG/1
50 TABLET ORAL EVERY 6 HOURS PRN
COMMUNITY

## 2021-10-17 RX ORDER — SODIUM CHLORIDE 0.9 % (FLUSH) 0.9 %
10 SYRINGE (ML) INJECTION AS NEEDED
Status: DISCONTINUED | OUTPATIENT
Start: 2021-10-17 | End: 2021-10-24 | Stop reason: HOSPADM

## 2021-10-17 RX ORDER — ABATACEPT 125 MG/ML
125 INJECTION, SOLUTION SUBCUTANEOUS WEEKLY
COMMUNITY
End: 2022-01-11 | Stop reason: ALTCHOICE

## 2021-10-17 RX ORDER — FLECAINIDE ACETATE 50 MG/1
50 TABLET ORAL 2 TIMES DAILY
COMMUNITY

## 2021-10-17 RX ORDER — PREDNISONE 1 MG/1
5 TABLET ORAL DAILY
COMMUNITY
End: 2021-11-29 | Stop reason: HOSPADM

## 2021-10-17 RX ADMIN — SODIUM CHLORIDE 2040 ML: 9 INJECTION, SOLUTION INTRAVENOUS at 23:26

## 2021-10-17 RX ADMIN — DOXYCYCLINE 100 MG: 100 INJECTION, POWDER, LYOPHILIZED, FOR SOLUTION INTRAVENOUS at 23:23

## 2021-10-18 ENCOUNTER — APPOINTMENT (OUTPATIENT)
Dept: CT IMAGING | Facility: HOSPITAL | Age: 71
End: 2021-10-18

## 2021-10-18 PROBLEM — J44.9 COPD (CHRONIC OBSTRUCTIVE PULMONARY DISEASE): Status: ACTIVE | Noted: 2018-05-15

## 2021-10-18 LAB
ANION GAP SERPL CALCULATED.3IONS-SCNC: 11 MMOL/L (ref 5–15)
ANION GAP SERPL CALCULATED.3IONS-SCNC: 13 MMOL/L (ref 5–15)
ANION GAP SERPL CALCULATED.3IONS-SCNC: 9 MMOL/L (ref 5–15)
B PARAPERT DNA SPEC QL NAA+PROBE: NOT DETECTED
B PERT DNA SPEC QL NAA+PROBE: NOT DETECTED
BASOPHILS # BLD AUTO: 0.03 10*3/MM3 (ref 0–0.2)
BASOPHILS NFR BLD AUTO: 0.2 % (ref 0–1.5)
BILIRUB UR QL STRIP: NEGATIVE
BUN SERPL-MCNC: 13 MG/DL (ref 8–23)
BUN SERPL-MCNC: 14 MG/DL (ref 8–23)
BUN SERPL-MCNC: 14 MG/DL (ref 8–23)
BUN/CREAT SERPL: 14.9 (ref 7–25)
BUN/CREAT SERPL: 15.1 (ref 7–25)
BUN/CREAT SERPL: 15.5 (ref 7–25)
C PNEUM DNA NPH QL NAA+NON-PROBE: NOT DETECTED
CALCIUM SPEC-SCNC: 7.6 MG/DL (ref 8.6–10.5)
CALCIUM SPEC-SCNC: 7.6 MG/DL (ref 8.6–10.5)
CALCIUM SPEC-SCNC: 7.9 MG/DL (ref 8.6–10.5)
CHLORIDE SERPL-SCNC: 101 MMOL/L (ref 98–107)
CHLORIDE SERPL-SCNC: 103 MMOL/L (ref 98–107)
CHLORIDE SERPL-SCNC: 93 MMOL/L (ref 98–107)
CLARITY UR: CLEAR
CO2 SERPL-SCNC: 18 MMOL/L (ref 22–29)
CO2 SERPL-SCNC: 19 MMOL/L (ref 22–29)
CO2 SERPL-SCNC: 20 MMOL/L (ref 22–29)
COLOR UR: YELLOW
CREAT SERPL-MCNC: 0.84 MG/DL (ref 0.57–1)
CREAT SERPL-MCNC: 0.93 MG/DL (ref 0.57–1)
CREAT SERPL-MCNC: 0.94 MG/DL (ref 0.57–1)
D-LACTATE SERPL-SCNC: 0.8 MMOL/L (ref 0.5–2)
DEPRECATED RDW RBC AUTO: 49.1 FL (ref 37–54)
EOSINOPHIL # BLD AUTO: 0.04 10*3/MM3 (ref 0–0.4)
EOSINOPHIL NFR BLD AUTO: 0.3 % (ref 0.3–6.2)
ERYTHROCYTE [DISTWIDTH] IN BLOOD BY AUTOMATED COUNT: 15.5 % (ref 12.3–15.4)
FLUAV SUBTYP SPEC NAA+PROBE: NOT DETECTED
FLUBV RNA ISLT QL NAA+PROBE: NOT DETECTED
GFR SERPL CREATININE-BSD FRML MDRD: 59 ML/MIN/1.73
GFR SERPL CREATININE-BSD FRML MDRD: 60 ML/MIN/1.73
GFR SERPL CREATININE-BSD FRML MDRD: 67 ML/MIN/1.73
GLUCOSE SERPL-MCNC: 106 MG/DL (ref 65–99)
GLUCOSE SERPL-MCNC: 137 MG/DL (ref 65–99)
GLUCOSE SERPL-MCNC: 139 MG/DL (ref 65–99)
GLUCOSE UR STRIP-MCNC: NEGATIVE MG/DL
HADV DNA SPEC NAA+PROBE: NOT DETECTED
HCOV 229E RNA SPEC QL NAA+PROBE: NOT DETECTED
HCOV HKU1 RNA SPEC QL NAA+PROBE: NOT DETECTED
HCOV NL63 RNA SPEC QL NAA+PROBE: NOT DETECTED
HCOV OC43 RNA SPEC QL NAA+PROBE: NOT DETECTED
HCT VFR BLD AUTO: 28 % (ref 34–46.6)
HGB BLD-MCNC: 8.5 G/DL (ref 12–15.9)
HGB UR QL STRIP.AUTO: NEGATIVE
HMPV RNA NPH QL NAA+NON-PROBE: NOT DETECTED
HOLD SPECIMEN: NORMAL
HPIV1 RNA SPEC QL NAA+PROBE: NOT DETECTED
HPIV2 RNA SPEC QL NAA+PROBE: NOT DETECTED
HPIV3 RNA NPH QL NAA+PROBE: NOT DETECTED
HPIV4 P GENE NPH QL NAA+PROBE: NOT DETECTED
IMM GRANULOCYTES # BLD AUTO: 0.13 10*3/MM3 (ref 0–0.05)
IMM GRANULOCYTES NFR BLD AUTO: 1 % (ref 0–0.5)
KETONES UR QL STRIP: NEGATIVE
L PNEUMO1 AG UR QL IA: NEGATIVE
LEUKOCYTE ESTERASE UR QL STRIP.AUTO: NEGATIVE
LYMPHOCYTES # BLD AUTO: 0.39 10*3/MM3 (ref 0.7–3.1)
LYMPHOCYTES NFR BLD AUTO: 2.9 % (ref 19.6–45.3)
M PNEUMO IGG SER IA-ACNC: NOT DETECTED
MAGNESIUM SERPL-MCNC: 1.6 MG/DL (ref 1.6–2.4)
MCH RBC QN AUTO: 26.2 PG (ref 26.6–33)
MCHC RBC AUTO-ENTMCNC: 30.4 G/DL (ref 31.5–35.7)
MCV RBC AUTO: 86.2 FL (ref 79–97)
MONOCYTES # BLD AUTO: 0.82 10*3/MM3 (ref 0.1–0.9)
MONOCYTES NFR BLD AUTO: 6.2 % (ref 5–12)
NEUTROPHILS NFR BLD AUTO: 11.86 10*3/MM3 (ref 1.7–7)
NEUTROPHILS NFR BLD AUTO: 89.4 % (ref 42.7–76)
NITRITE UR QL STRIP: NEGATIVE
NRBC BLD AUTO-RTO: 0 /100 WBC (ref 0–0.2)
OSMOLALITY SERPL: 278 MOSM/KG (ref 275–295)
PH UR STRIP.AUTO: 6 [PH] (ref 5–8)
PLATELET # BLD AUTO: 199 10*3/MM3 (ref 140–450)
PMV BLD AUTO: 12.8 FL (ref 6–12)
POTASSIUM SERPL-SCNC: 3.6 MMOL/L (ref 3.5–5.2)
POTASSIUM SERPL-SCNC: 3.6 MMOL/L (ref 3.5–5.2)
POTASSIUM SERPL-SCNC: 3.9 MMOL/L (ref 3.5–5.2)
PROCALCITONIN SERPL-MCNC: 0.34 NG/ML (ref 0–0.25)
PROT UR QL STRIP: NEGATIVE
RBC # BLD AUTO: 3.25 10*6/MM3 (ref 3.77–5.28)
RHINOVIRUS RNA SPEC NAA+PROBE: NOT DETECTED
RSV RNA NPH QL NAA+NON-PROBE: NOT DETECTED
S PNEUM AG SPEC QL LA: NEGATIVE
SARS-COV-2 RNA NPH QL NAA+NON-PROBE: NOT DETECTED
SODIUM SERPL-SCNC: 121 MMOL/L (ref 136–145)
SODIUM SERPL-SCNC: 132 MMOL/L (ref 136–145)
SODIUM SERPL-SCNC: 134 MMOL/L (ref 136–145)
SP GR UR STRIP: 1.01 (ref 1–1.03)
UROBILINOGEN UR QL STRIP: NORMAL
WBC # BLD AUTO: 13.27 10*3/MM3 (ref 3.4–10.8)

## 2021-10-18 PROCEDURE — 97166 OT EVAL MOD COMPLEX 45 MIN: CPT

## 2021-10-18 PROCEDURE — 80048 BASIC METABOLIC PNL TOTAL CA: CPT | Performed by: NURSE PRACTITIONER

## 2021-10-18 PROCEDURE — 81003 URINALYSIS AUTO W/O SCOPE: CPT | Performed by: NURSE PRACTITIONER

## 2021-10-18 PROCEDURE — 99223 1ST HOSP IP/OBS HIGH 75: CPT | Performed by: FAMILY MEDICINE

## 2021-10-18 PROCEDURE — 97535 SELF CARE MNGMENT TRAINING: CPT

## 2021-10-18 PROCEDURE — 97116 GAIT TRAINING THERAPY: CPT | Performed by: PHYSICAL THERAPIST

## 2021-10-18 PROCEDURE — 97162 PT EVAL MOD COMPLEX 30 MIN: CPT | Performed by: PHYSICAL THERAPIST

## 2021-10-18 PROCEDURE — 63710000001 PREDNISONE PER 5 MG: Performed by: NURSE PRACTITIONER

## 2021-10-18 PROCEDURE — 94660 CPAP INITIATION&MGMT: CPT

## 2021-10-18 PROCEDURE — 25010000002 CEFTRIAXONE PER 250 MG: Performed by: INTERNAL MEDICINE

## 2021-10-18 PROCEDURE — 25010000002 VANCOMYCIN 10 G RECONSTITUTED SOLUTION: Performed by: EMERGENCY MEDICINE

## 2021-10-18 PROCEDURE — 87899 AGENT NOS ASSAY W/OPTIC: CPT | Performed by: INTERNAL MEDICINE

## 2021-10-18 PROCEDURE — 85025 COMPLETE CBC W/AUTO DIFF WBC: CPT | Performed by: NURSE PRACTITIONER

## 2021-10-18 PROCEDURE — 83605 ASSAY OF LACTIC ACID: CPT | Performed by: EMERGENCY MEDICINE

## 2021-10-18 PROCEDURE — 71250 CT THORAX DX C-: CPT

## 2021-10-18 PROCEDURE — 94799 UNLISTED PULMONARY SVC/PX: CPT

## 2021-10-18 PROCEDURE — 0202U NFCT DS 22 TRGT SARS-COV-2: CPT | Performed by: INTERNAL MEDICINE

## 2021-10-18 PROCEDURE — 83735 ASSAY OF MAGNESIUM: CPT | Performed by: NURSE PRACTITIONER

## 2021-10-18 RX ORDER — FERROUS SULFATE 325(65) MG
325 TABLET ORAL
Status: DISCONTINUED | OUTPATIENT
Start: 2021-10-18 | End: 2021-10-24 | Stop reason: HOSPADM

## 2021-10-18 RX ORDER — SODIUM CHLORIDE 0.9 % (FLUSH) 0.9 %
10 SYRINGE (ML) INJECTION EVERY 12 HOURS SCHEDULED
Status: DISCONTINUED | OUTPATIENT
Start: 2021-10-18 | End: 2021-10-24 | Stop reason: HOSPADM

## 2021-10-18 RX ORDER — PREDNISONE 1 MG/1
5 TABLET ORAL DAILY
Status: DISCONTINUED | OUTPATIENT
Start: 2021-10-18 | End: 2021-10-19

## 2021-10-18 RX ORDER — TRAMADOL HYDROCHLORIDE 50 MG/1
50 TABLET ORAL EVERY 6 HOURS PRN
Status: DISCONTINUED | OUTPATIENT
Start: 2021-10-18 | End: 2021-10-24 | Stop reason: HOSPADM

## 2021-10-18 RX ORDER — DICYCLOMINE HYDROCHLORIDE 10 MG/1
10 CAPSULE ORAL 2 TIMES DAILY
Status: DISCONTINUED | OUTPATIENT
Start: 2021-10-18 | End: 2021-10-24 | Stop reason: HOSPADM

## 2021-10-18 RX ORDER — ACETAMINOPHEN 160 MG/5ML
650 SOLUTION ORAL EVERY 4 HOURS PRN
Status: DISCONTINUED | OUTPATIENT
Start: 2021-10-18 | End: 2021-10-24 | Stop reason: HOSPADM

## 2021-10-18 RX ORDER — FLECAINIDE ACETATE 50 MG/1
50 TABLET ORAL 2 TIMES DAILY
Status: DISCONTINUED | OUTPATIENT
Start: 2021-10-18 | End: 2021-10-24 | Stop reason: HOSPADM

## 2021-10-18 RX ORDER — SODIUM CHLORIDE 0.9 % (FLUSH) 0.9 %
10 SYRINGE (ML) INJECTION AS NEEDED
Status: DISCONTINUED | OUTPATIENT
Start: 2021-10-18 | End: 2021-10-24 | Stop reason: HOSPADM

## 2021-10-18 RX ORDER — CASTOR OIL AND BALSAM, PERU 788; 87 MG/G; MG/G
1 OINTMENT TOPICAL EVERY 12 HOURS SCHEDULED
Status: DISCONTINUED | OUTPATIENT
Start: 2021-10-18 | End: 2021-10-24 | Stop reason: HOSPADM

## 2021-10-18 RX ORDER — PANTOPRAZOLE SODIUM 40 MG/1
40 TABLET, DELAYED RELEASE ORAL DAILY
Status: DISCONTINUED | OUTPATIENT
Start: 2021-10-18 | End: 2021-10-24 | Stop reason: HOSPADM

## 2021-10-18 RX ORDER — ACETAMINOPHEN 650 MG/1
650 SUPPOSITORY RECTAL EVERY 4 HOURS PRN
Status: DISCONTINUED | OUTPATIENT
Start: 2021-10-18 | End: 2021-10-24 | Stop reason: HOSPADM

## 2021-10-18 RX ORDER — LOSARTAN POTASSIUM 50 MG/1
100 TABLET ORAL DAILY
Status: DISCONTINUED | OUTPATIENT
Start: 2021-10-18 | End: 2021-10-24 | Stop reason: HOSPADM

## 2021-10-18 RX ORDER — IPRATROPIUM BROMIDE AND ALBUTEROL SULFATE 2.5; .5 MG/3ML; MG/3ML
3 SOLUTION RESPIRATORY (INHALATION) EVERY 4 HOURS PRN
Status: DISCONTINUED | OUTPATIENT
Start: 2021-10-18 | End: 2021-10-24 | Stop reason: HOSPADM

## 2021-10-18 RX ORDER — ACETAMINOPHEN 325 MG/1
650 TABLET ORAL EVERY 4 HOURS PRN
Status: DISCONTINUED | OUTPATIENT
Start: 2021-10-18 | End: 2021-10-24 | Stop reason: HOSPADM

## 2021-10-18 RX ORDER — AMLODIPINE BESYLATE 10 MG/1
10 TABLET ORAL DAILY
Status: DISCONTINUED | OUTPATIENT
Start: 2021-10-18 | End: 2021-10-24 | Stop reason: HOSPADM

## 2021-10-18 RX ORDER — DOXEPIN HYDROCHLORIDE 10 MG/1
40 CAPSULE ORAL NIGHTLY
Status: DISCONTINUED | OUTPATIENT
Start: 2021-10-18 | End: 2021-10-24 | Stop reason: HOSPADM

## 2021-10-18 RX ORDER — SODIUM CHLORIDE 9 MG/ML
50 INJECTION, SOLUTION INTRAVENOUS CONTINUOUS
Status: ACTIVE | OUTPATIENT
Start: 2021-10-18 | End: 2021-10-19

## 2021-10-18 RX ORDER — FLUOXETINE HYDROCHLORIDE 20 MG/1
40 CAPSULE ORAL DAILY
Status: DISCONTINUED | OUTPATIENT
Start: 2021-10-18 | End: 2021-10-24 | Stop reason: HOSPADM

## 2021-10-18 RX ADMIN — RIVAROXABAN 20 MG: 20 TABLET, FILM COATED ORAL at 18:12

## 2021-10-18 RX ADMIN — FLECAINIDE ACETATE 50 MG: 50 TABLET ORAL at 10:28

## 2021-10-18 RX ADMIN — DOXYCYCLINE 100 MG: 100 INJECTION, POWDER, LYOPHILIZED, FOR SOLUTION INTRAVENOUS at 12:00

## 2021-10-18 RX ADMIN — DOXEPIN HYDROCHLORIDE 40 MG: 10 CAPSULE ORAL at 02:48

## 2021-10-18 RX ADMIN — LOSARTAN POTASSIUM 100 MG: 50 TABLET, FILM COATED ORAL at 08:13

## 2021-10-18 RX ADMIN — DOXEPIN HYDROCHLORIDE 40 MG: 10 CAPSULE ORAL at 20:22

## 2021-10-18 RX ADMIN — FERROUS SULFATE TAB 325 MG (65 MG ELEMENTAL FE) 325 MG: 325 (65 FE) TAB at 08:13

## 2021-10-18 RX ADMIN — VANCOMYCIN HYDROCHLORIDE 1250 MG: 10 INJECTION, POWDER, LYOPHILIZED, FOR SOLUTION INTRAVENOUS at 00:24

## 2021-10-18 RX ADMIN — AMLODIPINE BESYLATE 10 MG: 10 TABLET ORAL at 08:13

## 2021-10-18 RX ADMIN — PANTOPRAZOLE SODIUM 40 MG: 40 TABLET, DELAYED RELEASE ORAL at 08:13

## 2021-10-18 RX ADMIN — DICYCLOMINE HYDROCHLORIDE 10 MG: 10 CAPSULE ORAL at 20:21

## 2021-10-18 RX ADMIN — SODIUM CHLORIDE, PRESERVATIVE FREE 10 ML: 5 INJECTION INTRAVENOUS at 20:23

## 2021-10-18 RX ADMIN — FLECAINIDE ACETATE 50 MG: 50 TABLET ORAL at 06:31

## 2021-10-18 RX ADMIN — CASTOR OIL AND BALSAM, PERU 1 APPLICATION: 788; 87 OINTMENT TOPICAL at 21:18

## 2021-10-18 RX ADMIN — SODIUM CHLORIDE 1 G: 900 INJECTION INTRAVENOUS at 16:14

## 2021-10-18 RX ADMIN — DICYCLOMINE HYDROCHLORIDE 10 MG: 10 CAPSULE ORAL at 16:14

## 2021-10-18 RX ADMIN — PREDNISONE 5 MG: 5 TABLET ORAL at 08:13

## 2021-10-18 RX ADMIN — SODIUM CHLORIDE 50 ML/HR: 9 INJECTION, SOLUTION INTRAVENOUS at 02:48

## 2021-10-18 RX ADMIN — FLECAINIDE ACETATE 50 MG: 50 TABLET ORAL at 20:21

## 2021-10-18 RX ADMIN — SODIUM CHLORIDE, PRESERVATIVE FREE 10 ML: 5 INJECTION INTRAVENOUS at 14:16

## 2021-10-18 RX ADMIN — FLUOXETINE HYDROCHLORIDE 40 MG: 20 CAPSULE ORAL at 08:13

## 2021-10-18 RX ADMIN — DICYCLOMINE HYDROCHLORIDE 10 MG: 10 CAPSULE ORAL at 02:48

## 2021-10-19 LAB
ANION GAP SERPL CALCULATED.3IONS-SCNC: 10 MMOL/L (ref 5–15)
BUN SERPL-MCNC: 11 MG/DL (ref 8–23)
BUN/CREAT SERPL: 15.5 (ref 7–25)
CALCIUM SPEC-SCNC: 8.1 MG/DL (ref 8.6–10.5)
CHLORIDE SERPL-SCNC: 102 MMOL/L (ref 98–107)
CO2 SERPL-SCNC: 23 MMOL/L (ref 22–29)
CREAT SERPL-MCNC: 0.71 MG/DL (ref 0.57–1)
DEPRECATED RDW RBC AUTO: 46.6 FL (ref 37–54)
ERYTHROCYTE [DISTWIDTH] IN BLOOD BY AUTOMATED COUNT: 15.4 % (ref 12.3–15.4)
GFR SERPL CREATININE-BSD FRML MDRD: 82 ML/MIN/1.73
GLUCOSE SERPL-MCNC: 92 MG/DL (ref 65–99)
HCT VFR BLD AUTO: 29.4 % (ref 34–46.6)
HGB BLD-MCNC: 9.1 G/DL (ref 12–15.9)
MCH RBC QN AUTO: 26.1 PG (ref 26.6–33)
MCHC RBC AUTO-ENTMCNC: 31 G/DL (ref 31.5–35.7)
MCV RBC AUTO: 84.2 FL (ref 79–97)
PLATELET # BLD AUTO: 234 10*3/MM3 (ref 140–450)
PMV BLD AUTO: 12.4 FL (ref 6–12)
POTASSIUM SERPL-SCNC: 3.7 MMOL/L (ref 3.5–5.2)
RBC # BLD AUTO: 3.49 10*6/MM3 (ref 3.77–5.28)
SODIUM SERPL-SCNC: 135 MMOL/L (ref 136–145)
WBC # BLD AUTO: 10.61 10*3/MM3 (ref 3.4–10.8)

## 2021-10-19 PROCEDURE — 99233 SBSQ HOSP IP/OBS HIGH 50: CPT | Performed by: INTERNAL MEDICINE

## 2021-10-19 PROCEDURE — 80048 BASIC METABOLIC PNL TOTAL CA: CPT | Performed by: INTERNAL MEDICINE

## 2021-10-19 PROCEDURE — 25010000002 METHYLPREDNISOLONE PER 40 MG: Performed by: INTERNAL MEDICINE

## 2021-10-19 PROCEDURE — 25010000002 CEFTRIAXONE PER 250 MG: Performed by: INTERNAL MEDICINE

## 2021-10-19 PROCEDURE — 97116 GAIT TRAINING THERAPY: CPT | Performed by: PHYSICAL THERAPIST

## 2021-10-19 PROCEDURE — 94799 UNLISTED PULMONARY SVC/PX: CPT

## 2021-10-19 PROCEDURE — 85027 COMPLETE CBC AUTOMATED: CPT | Performed by: INTERNAL MEDICINE

## 2021-10-19 PROCEDURE — 97110 THERAPEUTIC EXERCISES: CPT

## 2021-10-19 PROCEDURE — 97110 THERAPEUTIC EXERCISES: CPT | Performed by: PHYSICAL THERAPIST

## 2021-10-19 PROCEDURE — 3E0333Z INTRODUCTION OF ANTI-INFLAMMATORY INTO PERIPHERAL VEIN, PERCUTANEOUS APPROACH: ICD-10-PCS | Performed by: INTERNAL MEDICINE

## 2021-10-19 RX ORDER — METHYLPREDNISOLONE SODIUM SUCCINATE 40 MG/ML
40 INJECTION, POWDER, LYOPHILIZED, FOR SOLUTION INTRAMUSCULAR; INTRAVENOUS EVERY 12 HOURS
Status: DISCONTINUED | OUTPATIENT
Start: 2021-10-19 | End: 2021-10-24 | Stop reason: HOSPADM

## 2021-10-19 RX ADMIN — DICYCLOMINE HYDROCHLORIDE 10 MG: 10 CAPSULE ORAL at 08:38

## 2021-10-19 RX ADMIN — METHYLPREDNISOLONE SODIUM SUCCINATE 40 MG: 40 INJECTION, POWDER, FOR SOLUTION INTRAMUSCULAR; INTRAVENOUS at 22:22

## 2021-10-19 RX ADMIN — SODIUM CHLORIDE, PRESERVATIVE FREE 10 ML: 5 INJECTION INTRAVENOUS at 08:38

## 2021-10-19 RX ADMIN — CASTOR OIL AND BALSAM, PERU 1 APPLICATION: 788; 87 OINTMENT TOPICAL at 08:38

## 2021-10-19 RX ADMIN — SODIUM CHLORIDE 1 G: 900 INJECTION INTRAVENOUS at 14:33

## 2021-10-19 RX ADMIN — DOXEPIN HYDROCHLORIDE 40 MG: 10 CAPSULE ORAL at 22:27

## 2021-10-19 RX ADMIN — FERROUS SULFATE TAB 325 MG (65 MG ELEMENTAL FE) 325 MG: 325 (65 FE) TAB at 08:38

## 2021-10-19 RX ADMIN — SODIUM CHLORIDE, PRESERVATIVE FREE 10 ML: 5 INJECTION INTRAVENOUS at 22:24

## 2021-10-19 RX ADMIN — RIVAROXABAN 20 MG: 20 TABLET, FILM COATED ORAL at 18:51

## 2021-10-19 RX ADMIN — PANTOPRAZOLE SODIUM 40 MG: 40 TABLET, DELAYED RELEASE ORAL at 08:38

## 2021-10-19 RX ADMIN — DICYCLOMINE HYDROCHLORIDE 10 MG: 10 CAPSULE ORAL at 22:23

## 2021-10-19 RX ADMIN — DOXYCYCLINE 100 MG: 100 INJECTION, POWDER, LYOPHILIZED, FOR SOLUTION INTRAVENOUS at 00:01

## 2021-10-19 RX ADMIN — LOSARTAN POTASSIUM 100 MG: 50 TABLET, FILM COATED ORAL at 08:38

## 2021-10-19 RX ADMIN — FLUOXETINE HYDROCHLORIDE 40 MG: 20 CAPSULE ORAL at 08:38

## 2021-10-19 RX ADMIN — DOXYCYCLINE 100 MG: 100 INJECTION, POWDER, LYOPHILIZED, FOR SOLUTION INTRAVENOUS at 12:43

## 2021-10-19 RX ADMIN — FLECAINIDE ACETATE 50 MG: 50 TABLET ORAL at 10:03

## 2021-10-19 RX ADMIN — AMLODIPINE BESYLATE 10 MG: 10 TABLET ORAL at 08:38

## 2021-10-19 RX ADMIN — METHYLPREDNISOLONE SODIUM SUCCINATE 40 MG: 40 INJECTION, POWDER, FOR SOLUTION INTRAMUSCULAR; INTRAVENOUS at 08:37

## 2021-10-19 RX ADMIN — CASTOR OIL AND BALSAM, PERU 1 APPLICATION: 788; 87 OINTMENT TOPICAL at 22:22

## 2021-10-19 RX ADMIN — FLECAINIDE ACETATE 50 MG: 50 TABLET ORAL at 22:22

## 2021-10-20 LAB
CRP SERPL-MCNC: 10.08 MG/DL (ref 0–0.5)
MRSA DNA SPEC QL NAA+PROBE: NEGATIVE
PROCALCITONIN SERPL-MCNC: 0.11 NG/ML (ref 0–0.25)

## 2021-10-20 PROCEDURE — 97110 THERAPEUTIC EXERCISES: CPT

## 2021-10-20 PROCEDURE — 99233 SBSQ HOSP IP/OBS HIGH 50: CPT | Performed by: INTERNAL MEDICINE

## 2021-10-20 PROCEDURE — 97530 THERAPEUTIC ACTIVITIES: CPT | Performed by: PHYSICAL THERAPIST

## 2021-10-20 PROCEDURE — 97530 THERAPEUTIC ACTIVITIES: CPT

## 2021-10-20 PROCEDURE — 84145 PROCALCITONIN (PCT): CPT | Performed by: INTERNAL MEDICINE

## 2021-10-20 PROCEDURE — 25010000002 VANCOMYCIN 10 G RECONSTITUTED SOLUTION: Performed by: INTERNAL MEDICINE

## 2021-10-20 PROCEDURE — 87641 MR-STAPH DNA AMP PROBE: CPT | Performed by: INTERNAL MEDICINE

## 2021-10-20 PROCEDURE — 25010000002 CEFEPIME PER 500 MG: Performed by: INTERNAL MEDICINE

## 2021-10-20 PROCEDURE — 25010000002 METHYLPREDNISOLONE PER 40 MG: Performed by: INTERNAL MEDICINE

## 2021-10-20 PROCEDURE — 86140 C-REACTIVE PROTEIN: CPT | Performed by: INTERNAL MEDICINE

## 2021-10-20 RX ADMIN — DICYCLOMINE HYDROCHLORIDE 10 MG: 10 CAPSULE ORAL at 21:03

## 2021-10-20 RX ADMIN — PANTOPRAZOLE SODIUM 40 MG: 40 TABLET, DELAYED RELEASE ORAL at 09:17

## 2021-10-20 RX ADMIN — VANCOMYCIN HYDROCHLORIDE 1250 MG: 10 INJECTION, POWDER, LYOPHILIZED, FOR SOLUTION INTRAVENOUS at 16:15

## 2021-10-20 RX ADMIN — LOSARTAN POTASSIUM 100 MG: 50 TABLET, FILM COATED ORAL at 09:17

## 2021-10-20 RX ADMIN — CASTOR OIL AND BALSAM, PERU 1 APPLICATION: 788; 87 OINTMENT TOPICAL at 21:04

## 2021-10-20 RX ADMIN — FLUOXETINE HYDROCHLORIDE 40 MG: 20 CAPSULE ORAL at 09:17

## 2021-10-20 RX ADMIN — AMLODIPINE BESYLATE 10 MG: 10 TABLET ORAL at 09:17

## 2021-10-20 RX ADMIN — DICYCLOMINE HYDROCHLORIDE 10 MG: 10 CAPSULE ORAL at 09:17

## 2021-10-20 RX ADMIN — DOXEPIN HYDROCHLORIDE 40 MG: 10 CAPSULE ORAL at 21:03

## 2021-10-20 RX ADMIN — METHYLPREDNISOLONE SODIUM SUCCINATE 40 MG: 40 INJECTION, POWDER, FOR SOLUTION INTRAMUSCULAR; INTRAVENOUS at 09:17

## 2021-10-20 RX ADMIN — DOXYCYCLINE 100 MG: 100 INJECTION, POWDER, LYOPHILIZED, FOR SOLUTION INTRAVENOUS at 00:30

## 2021-10-20 RX ADMIN — SODIUM CHLORIDE, PRESERVATIVE FREE 10 ML: 5 INJECTION INTRAVENOUS at 21:03

## 2021-10-20 RX ADMIN — RIVAROXABAN 20 MG: 20 TABLET, FILM COATED ORAL at 17:53

## 2021-10-20 RX ADMIN — METHYLPREDNISOLONE SODIUM SUCCINATE 40 MG: 40 INJECTION, POWDER, FOR SOLUTION INTRAMUSCULAR; INTRAVENOUS at 21:03

## 2021-10-20 RX ADMIN — SODIUM CHLORIDE, PRESERVATIVE FREE 10 ML: 5 INJECTION INTRAVENOUS at 09:18

## 2021-10-20 RX ADMIN — FLECAINIDE ACETATE 50 MG: 50 TABLET ORAL at 21:03

## 2021-10-20 RX ADMIN — CEFEPIME HYDROCHLORIDE 2 G: 2 INJECTION, POWDER, FOR SOLUTION INTRAVENOUS at 17:53

## 2021-10-20 RX ADMIN — DOXYCYCLINE 100 MG: 100 INJECTION, POWDER, LYOPHILIZED, FOR SOLUTION INTRAVENOUS at 12:46

## 2021-10-20 RX ADMIN — FERROUS SULFATE TAB 325 MG (65 MG ELEMENTAL FE) 325 MG: 325 (65 FE) TAB at 09:17

## 2021-10-20 RX ADMIN — CASTOR OIL AND BALSAM, PERU 1 APPLICATION: 788; 87 OINTMENT TOPICAL at 09:17

## 2021-10-20 RX ADMIN — FLECAINIDE ACETATE 50 MG: 50 TABLET ORAL at 09:24

## 2021-10-21 ENCOUNTER — APPOINTMENT (OUTPATIENT)
Dept: CT IMAGING | Facility: HOSPITAL | Age: 71
End: 2021-10-21

## 2021-10-21 ENCOUNTER — APPOINTMENT (OUTPATIENT)
Dept: GENERAL RADIOLOGY | Facility: HOSPITAL | Age: 71
End: 2021-10-21

## 2021-10-21 LAB
ALBUMIN SERPL-MCNC: 2.4 G/DL (ref 3.5–5.2)
ALBUMIN/GLOB SERPL: 0.7 G/DL
ALP SERPL-CCNC: 410 U/L (ref 39–117)
ALT SERPL W P-5'-P-CCNC: 21 U/L (ref 1–33)
ANION GAP SERPL CALCULATED.3IONS-SCNC: 10 MMOL/L (ref 5–15)
AST SERPL-CCNC: 27 U/L (ref 1–32)
BASOPHILS # BLD AUTO: 0 10*3/MM3 (ref 0–0.2)
BASOPHILS NFR BLD AUTO: 0 % (ref 0–1.5)
BILIRUB SERPL-MCNC: 0.3 MG/DL (ref 0–1.2)
BUN SERPL-MCNC: 18 MG/DL (ref 8–23)
BUN/CREAT SERPL: 23.7 (ref 7–25)
CALCIUM SPEC-SCNC: 8.4 MG/DL (ref 8.6–10.5)
CHLORIDE SERPL-SCNC: 107 MMOL/L (ref 98–107)
CO2 SERPL-SCNC: 23 MMOL/L (ref 22–29)
CREAT SERPL-MCNC: 0.76 MG/DL (ref 0.57–1)
CRP SERPL-MCNC: 6.12 MG/DL (ref 0–0.5)
DEPRECATED RDW RBC AUTO: 47.8 FL (ref 37–54)
EOSINOPHIL # BLD AUTO: 0 10*3/MM3 (ref 0–0.4)
EOSINOPHIL NFR BLD AUTO: 0 % (ref 0.3–6.2)
ERYTHROCYTE [DISTWIDTH] IN BLOOD BY AUTOMATED COUNT: 15.4 % (ref 12.3–15.4)
GFR SERPL CREATININE-BSD FRML MDRD: 75 ML/MIN/1.73
GLOBULIN UR ELPH-MCNC: 3.3 GM/DL
GLUCOSE SERPL-MCNC: 135 MG/DL (ref 65–99)
HCT VFR BLD AUTO: 29.5 % (ref 34–46.6)
HGB BLD-MCNC: 9 G/DL (ref 12–15.9)
IMM GRANULOCYTES # BLD AUTO: 0.04 10*3/MM3 (ref 0–0.05)
IMM GRANULOCYTES NFR BLD AUTO: 0.5 % (ref 0–0.5)
LYMPHOCYTES # BLD AUTO: 0.34 10*3/MM3 (ref 0.7–3.1)
LYMPHOCYTES NFR BLD AUTO: 4.2 % (ref 19.6–45.3)
MAGNESIUM SERPL-MCNC: 1.7 MG/DL (ref 1.6–2.4)
MCH RBC QN AUTO: 26 PG (ref 26.6–33)
MCHC RBC AUTO-ENTMCNC: 30.5 G/DL (ref 31.5–35.7)
MCV RBC AUTO: 85.3 FL (ref 79–97)
MONOCYTES # BLD AUTO: 0.31 10*3/MM3 (ref 0.1–0.9)
MONOCYTES NFR BLD AUTO: 3.9 % (ref 5–12)
NEUTROPHILS NFR BLD AUTO: 7.33 10*3/MM3 (ref 1.7–7)
NEUTROPHILS NFR BLD AUTO: 91.4 % (ref 42.7–76)
NRBC BLD AUTO-RTO: 0 /100 WBC (ref 0–0.2)
PHOSPHATE SERPL-MCNC: 3.7 MG/DL (ref 2.5–4.5)
PLATELET # BLD AUTO: 263 10*3/MM3 (ref 140–450)
PMV BLD AUTO: 12.1 FL (ref 6–12)
POTASSIUM SERPL-SCNC: 4.1 MMOL/L (ref 3.5–5.2)
PROCALCITONIN SERPL-MCNC: 0.12 NG/ML (ref 0–0.25)
PROT SERPL-MCNC: 5.7 G/DL (ref 6–8.5)
RBC # BLD AUTO: 3.46 10*6/MM3 (ref 3.77–5.28)
SODIUM SERPL-SCNC: 140 MMOL/L (ref 136–145)
WBC # BLD AUTO: 8.02 10*3/MM3 (ref 3.4–10.8)

## 2021-10-21 PROCEDURE — 25010000002 CEFEPIME PER 500 MG: Performed by: INTERNAL MEDICINE

## 2021-10-21 PROCEDURE — 84100 ASSAY OF PHOSPHORUS: CPT | Performed by: INTERNAL MEDICINE

## 2021-10-21 PROCEDURE — 25010000003 MAGNESIUM SULFATE 4 GM/100ML SOLUTION: Performed by: INTERNAL MEDICINE

## 2021-10-21 PROCEDURE — 0 IOPAMIDOL PER 1 ML: Performed by: INTERNAL MEDICINE

## 2021-10-21 PROCEDURE — 97530 THERAPEUTIC ACTIVITIES: CPT

## 2021-10-21 PROCEDURE — 25010000002 VANCOMYCIN PER 500 MG: Performed by: INTERNAL MEDICINE

## 2021-10-21 PROCEDURE — 25010000002 METHYLPREDNISOLONE PER 40 MG: Performed by: INTERNAL MEDICINE

## 2021-10-21 PROCEDURE — 83735 ASSAY OF MAGNESIUM: CPT | Performed by: INTERNAL MEDICINE

## 2021-10-21 PROCEDURE — 97116 GAIT TRAINING THERAPY: CPT

## 2021-10-21 PROCEDURE — 71045 X-RAY EXAM CHEST 1 VIEW: CPT

## 2021-10-21 PROCEDURE — 71275 CT ANGIOGRAPHY CHEST: CPT

## 2021-10-21 PROCEDURE — 86140 C-REACTIVE PROTEIN: CPT | Performed by: INTERNAL MEDICINE

## 2021-10-21 PROCEDURE — 85025 COMPLETE CBC W/AUTO DIFF WBC: CPT | Performed by: INTERNAL MEDICINE

## 2021-10-21 PROCEDURE — 80053 COMPREHEN METABOLIC PANEL: CPT | Performed by: INTERNAL MEDICINE

## 2021-10-21 PROCEDURE — 84145 PROCALCITONIN (PCT): CPT | Performed by: INTERNAL MEDICINE

## 2021-10-21 PROCEDURE — 97535 SELF CARE MNGMENT TRAINING: CPT

## 2021-10-21 PROCEDURE — 99232 SBSQ HOSP IP/OBS MODERATE 35: CPT | Performed by: INTERNAL MEDICINE

## 2021-10-21 RX ORDER — MAGNESIUM SULFATE HEPTAHYDRATE 40 MG/ML
2 INJECTION, SOLUTION INTRAVENOUS AS NEEDED
Status: DISCONTINUED | OUTPATIENT
Start: 2021-10-21 | End: 2021-10-24 | Stop reason: HOSPADM

## 2021-10-21 RX ORDER — MAGNESIUM SULFATE HEPTAHYDRATE 40 MG/ML
4 INJECTION, SOLUTION INTRAVENOUS AS NEEDED
Status: DISCONTINUED | OUTPATIENT
Start: 2021-10-21 | End: 2021-10-24 | Stop reason: HOSPADM

## 2021-10-21 RX ORDER — MAGNESIUM SULFATE HEPTAHYDRATE 40 MG/ML
4 INJECTION, SOLUTION INTRAVENOUS ONCE
Status: DISCONTINUED | OUTPATIENT
Start: 2021-10-21 | End: 2021-10-22

## 2021-10-21 RX ADMIN — DOXEPIN HYDROCHLORIDE 40 MG: 10 CAPSULE ORAL at 21:31

## 2021-10-21 RX ADMIN — CASTOR OIL AND BALSAM, PERU 1 APPLICATION: 788; 87 OINTMENT TOPICAL at 21:31

## 2021-10-21 RX ADMIN — VANCOMYCIN HYDROCHLORIDE 750 MG: 750 INJECTION, SOLUTION INTRAVENOUS at 17:34

## 2021-10-21 RX ADMIN — AMLODIPINE BESYLATE 10 MG: 10 TABLET ORAL at 09:38

## 2021-10-21 RX ADMIN — DOXYCYCLINE 100 MG: 100 INJECTION, POWDER, LYOPHILIZED, FOR SOLUTION INTRAVENOUS at 00:05

## 2021-10-21 RX ADMIN — METHYLPREDNISOLONE SODIUM SUCCINATE 40 MG: 40 INJECTION, POWDER, FOR SOLUTION INTRAMUSCULAR; INTRAVENOUS at 09:37

## 2021-10-21 RX ADMIN — RIVAROXABAN 20 MG: 20 TABLET, FILM COATED ORAL at 17:34

## 2021-10-21 RX ADMIN — CASTOR OIL AND BALSAM, PERU 1 APPLICATION: 788; 87 OINTMENT TOPICAL at 09:39

## 2021-10-21 RX ADMIN — MAGNESIUM SULFATE HEPTAHYDRATE 4 G: 40 INJECTION, SOLUTION INTRAVENOUS at 15:06

## 2021-10-21 RX ADMIN — FLECAINIDE ACETATE 50 MG: 50 TABLET ORAL at 09:39

## 2021-10-21 RX ADMIN — CEFEPIME HYDROCHLORIDE 2 G: 2 INJECTION, POWDER, FOR SOLUTION INTRAVENOUS at 18:38

## 2021-10-21 RX ADMIN — LOSARTAN POTASSIUM 100 MG: 50 TABLET, FILM COATED ORAL at 09:38

## 2021-10-21 RX ADMIN — FLUOXETINE HYDROCHLORIDE 40 MG: 20 CAPSULE ORAL at 09:38

## 2021-10-21 RX ADMIN — FLECAINIDE ACETATE 50 MG: 50 TABLET ORAL at 21:31

## 2021-10-21 RX ADMIN — CEFEPIME HYDROCHLORIDE 2 G: 2 INJECTION, POWDER, FOR SOLUTION INTRAVENOUS at 05:53

## 2021-10-21 RX ADMIN — IOPAMIDOL 95 ML: 755 INJECTION, SOLUTION INTRAVENOUS at 10:35

## 2021-10-21 RX ADMIN — FERROUS SULFATE TAB 325 MG (65 MG ELEMENTAL FE) 325 MG: 325 (65 FE) TAB at 09:38

## 2021-10-21 RX ADMIN — SODIUM CHLORIDE, PRESERVATIVE FREE 10 ML: 5 INJECTION INTRAVENOUS at 09:39

## 2021-10-21 RX ADMIN — METHYLPREDNISOLONE SODIUM SUCCINATE 40 MG: 40 INJECTION, POWDER, FOR SOLUTION INTRAMUSCULAR; INTRAVENOUS at 21:31

## 2021-10-21 RX ADMIN — DICYCLOMINE HYDROCHLORIDE 10 MG: 10 CAPSULE ORAL at 09:38

## 2021-10-21 RX ADMIN — SODIUM CHLORIDE, PRESERVATIVE FREE 10 ML: 5 INJECTION INTRAVENOUS at 21:31

## 2021-10-21 RX ADMIN — DOXYCYCLINE 100 MG: 100 INJECTION, POWDER, LYOPHILIZED, FOR SOLUTION INTRAVENOUS at 12:55

## 2021-10-21 RX ADMIN — DICYCLOMINE HYDROCHLORIDE 10 MG: 10 CAPSULE ORAL at 21:31

## 2021-10-21 RX ADMIN — PANTOPRAZOLE SODIUM 40 MG: 40 TABLET, DELAYED RELEASE ORAL at 09:38

## 2021-10-21 RX ADMIN — VANCOMYCIN HYDROCHLORIDE 750 MG: 750 INJECTION, SOLUTION INTRAVENOUS at 05:03

## 2021-10-22 LAB
ALBUMIN SERPL-MCNC: 2.5 G/DL (ref 3.5–5.2)
ALBUMIN/GLOB SERPL: 0.7 G/DL
ALP SERPL-CCNC: 406 U/L (ref 39–117)
ALT SERPL W P-5'-P-CCNC: 21 U/L (ref 1–33)
ANION GAP SERPL CALCULATED.3IONS-SCNC: 11 MMOL/L (ref 5–15)
AST SERPL-CCNC: 23 U/L (ref 1–32)
BACTERIA SPEC AEROBE CULT: NORMAL
BACTERIA SPEC AEROBE CULT: NORMAL
BASOPHILS # BLD AUTO: 0.01 10*3/MM3 (ref 0–0.2)
BASOPHILS NFR BLD AUTO: 0.1 % (ref 0–1.5)
BILIRUB SERPL-MCNC: 0.2 MG/DL (ref 0–1.2)
BUN SERPL-MCNC: 21 MG/DL (ref 8–23)
BUN/CREAT SERPL: 25.9 (ref 7–25)
CALCIUM SPEC-SCNC: 8.2 MG/DL (ref 8.6–10.5)
CHLORIDE SERPL-SCNC: 105 MMOL/L (ref 98–107)
CO2 SERPL-SCNC: 24 MMOL/L (ref 22–29)
CREAT SERPL-MCNC: 0.81 MG/DL (ref 0.57–1)
CRP SERPL-MCNC: 3.11 MG/DL (ref 0–0.5)
DEPRECATED RDW RBC AUTO: 50.5 FL (ref 37–54)
EOSINOPHIL # BLD AUTO: 0 10*3/MM3 (ref 0–0.4)
EOSINOPHIL NFR BLD AUTO: 0 % (ref 0.3–6.2)
ERYTHROCYTE [DISTWIDTH] IN BLOOD BY AUTOMATED COUNT: 15.2 % (ref 12.3–15.4)
GFR SERPL CREATININE-BSD FRML MDRD: 70 ML/MIN/1.73
GLOBULIN UR ELPH-MCNC: 3.4 GM/DL
GLUCOSE SERPL-MCNC: 145 MG/DL (ref 65–99)
HCT VFR BLD AUTO: 33.9 % (ref 34–46.6)
HGB BLD-MCNC: 9.7 G/DL (ref 12–15.9)
IMM GRANULOCYTES # BLD AUTO: 0.07 10*3/MM3 (ref 0–0.05)
IMM GRANULOCYTES NFR BLD AUTO: 0.9 % (ref 0–0.5)
LYMPHOCYTES # BLD AUTO: 0.32 10*3/MM3 (ref 0.7–3.1)
LYMPHOCYTES NFR BLD AUTO: 4.2 % (ref 19.6–45.3)
MAGNESIUM SERPL-MCNC: 2.5 MG/DL (ref 1.6–2.4)
MCH RBC QN AUTO: 25.7 PG (ref 26.6–33)
MCHC RBC AUTO-ENTMCNC: 28.6 G/DL (ref 31.5–35.7)
MCV RBC AUTO: 89.7 FL (ref 79–97)
MONOCYTES # BLD AUTO: 0.29 10*3/MM3 (ref 0.1–0.9)
MONOCYTES NFR BLD AUTO: 3.8 % (ref 5–12)
NEUTROPHILS NFR BLD AUTO: 6.92 10*3/MM3 (ref 1.7–7)
NEUTROPHILS NFR BLD AUTO: 91 % (ref 42.7–76)
NRBC BLD AUTO-RTO: 0 /100 WBC (ref 0–0.2)
PLATELET # BLD AUTO: 271 10*3/MM3 (ref 140–450)
PMV BLD AUTO: 12 FL (ref 6–12)
POTASSIUM SERPL-SCNC: 4.5 MMOL/L (ref 3.5–5.2)
PROCALCITONIN SERPL-MCNC: 0.11 NG/ML (ref 0–0.25)
PROT SERPL-MCNC: 5.9 G/DL (ref 6–8.5)
RBC # BLD AUTO: 3.78 10*6/MM3 (ref 3.77–5.28)
SODIUM SERPL-SCNC: 140 MMOL/L (ref 136–145)
VANCOMYCIN TROUGH SERPL-MCNC: 14.8 MCG/ML (ref 5–20)
WBC # BLD AUTO: 7.61 10*3/MM3 (ref 3.4–10.8)

## 2021-10-22 PROCEDURE — 85025 COMPLETE CBC W/AUTO DIFF WBC: CPT | Performed by: INTERNAL MEDICINE

## 2021-10-22 PROCEDURE — 80202 ASSAY OF VANCOMYCIN: CPT | Performed by: INTERNAL MEDICINE

## 2021-10-22 PROCEDURE — 83735 ASSAY OF MAGNESIUM: CPT | Performed by: INTERNAL MEDICINE

## 2021-10-22 PROCEDURE — 99232 SBSQ HOSP IP/OBS MODERATE 35: CPT | Performed by: INTERNAL MEDICINE

## 2021-10-22 PROCEDURE — 84145 PROCALCITONIN (PCT): CPT | Performed by: INTERNAL MEDICINE

## 2021-10-22 PROCEDURE — 97116 GAIT TRAINING THERAPY: CPT

## 2021-10-22 PROCEDURE — 25010000002 CEFEPIME PER 500 MG: Performed by: INTERNAL MEDICINE

## 2021-10-22 PROCEDURE — 25010000002 FUROSEMIDE PER 20 MG: Performed by: INTERNAL MEDICINE

## 2021-10-22 PROCEDURE — 97530 THERAPEUTIC ACTIVITIES: CPT

## 2021-10-22 PROCEDURE — 25010000002 METHYLPREDNISOLONE PER 40 MG: Performed by: INTERNAL MEDICINE

## 2021-10-22 PROCEDURE — 25010000002 VANCOMYCIN PER 500 MG: Performed by: INTERNAL MEDICINE

## 2021-10-22 PROCEDURE — 80053 COMPREHEN METABOLIC PANEL: CPT | Performed by: INTERNAL MEDICINE

## 2021-10-22 PROCEDURE — 97110 THERAPEUTIC EXERCISES: CPT

## 2021-10-22 PROCEDURE — 86140 C-REACTIVE PROTEIN: CPT | Performed by: INTERNAL MEDICINE

## 2021-10-22 RX ORDER — FUROSEMIDE 10 MG/ML
40 INJECTION INTRAMUSCULAR; INTRAVENOUS ONCE
Status: COMPLETED | OUTPATIENT
Start: 2021-10-22 | End: 2021-10-22

## 2021-10-22 RX ADMIN — FLUOXETINE HYDROCHLORIDE 40 MG: 20 CAPSULE ORAL at 11:21

## 2021-10-22 RX ADMIN — SODIUM CHLORIDE, PRESERVATIVE FREE 10 ML: 5 INJECTION INTRAVENOUS at 11:23

## 2021-10-22 RX ADMIN — DOXYCYCLINE 100 MG: 100 INJECTION, POWDER, LYOPHILIZED, FOR SOLUTION INTRAVENOUS at 00:18

## 2021-10-22 RX ADMIN — AMLODIPINE BESYLATE 10 MG: 10 TABLET ORAL at 11:21

## 2021-10-22 RX ADMIN — METHYLPREDNISOLONE SODIUM SUCCINATE 40 MG: 40 INJECTION, POWDER, FOR SOLUTION INTRAMUSCULAR; INTRAVENOUS at 20:25

## 2021-10-22 RX ADMIN — CEFEPIME HYDROCHLORIDE 2 G: 2 INJECTION, POWDER, FOR SOLUTION INTRAVENOUS at 05:11

## 2021-10-22 RX ADMIN — FLECAINIDE ACETATE 50 MG: 50 TABLET ORAL at 20:25

## 2021-10-22 RX ADMIN — VANCOMYCIN HYDROCHLORIDE 750 MG: 750 INJECTION, SOLUTION INTRAVENOUS at 07:15

## 2021-10-22 RX ADMIN — DOXYCYCLINE 100 MG: 100 INJECTION, POWDER, LYOPHILIZED, FOR SOLUTION INTRAVENOUS at 14:05

## 2021-10-22 RX ADMIN — PANTOPRAZOLE SODIUM 40 MG: 40 TABLET, DELAYED RELEASE ORAL at 11:21

## 2021-10-22 RX ADMIN — FERROUS SULFATE TAB 325 MG (65 MG ELEMENTAL FE) 325 MG: 325 (65 FE) TAB at 11:22

## 2021-10-22 RX ADMIN — METHYLPREDNISOLONE SODIUM SUCCINATE 40 MG: 40 INJECTION, POWDER, FOR SOLUTION INTRAMUSCULAR; INTRAVENOUS at 11:22

## 2021-10-22 RX ADMIN — RIVAROXABAN 20 MG: 20 TABLET, FILM COATED ORAL at 17:58

## 2021-10-22 RX ADMIN — SODIUM CHLORIDE, PRESERVATIVE FREE 10 ML: 5 INJECTION INTRAVENOUS at 20:25

## 2021-10-22 RX ADMIN — CASTOR OIL AND BALSAM, PERU 1 APPLICATION: 788; 87 OINTMENT TOPICAL at 11:24

## 2021-10-22 RX ADMIN — DICYCLOMINE HYDROCHLORIDE 10 MG: 10 CAPSULE ORAL at 20:25

## 2021-10-22 RX ADMIN — CASTOR OIL AND BALSAM, PERU 1 APPLICATION: 788; 87 OINTMENT TOPICAL at 20:26

## 2021-10-22 RX ADMIN — DICYCLOMINE HYDROCHLORIDE 10 MG: 10 CAPSULE ORAL at 11:23

## 2021-10-22 RX ADMIN — FUROSEMIDE 40 MG: 10 INJECTION, SOLUTION INTRAMUSCULAR; INTRAVENOUS at 11:22

## 2021-10-22 RX ADMIN — DOXEPIN HYDROCHLORIDE 40 MG: 10 CAPSULE ORAL at 20:25

## 2021-10-22 RX ADMIN — VANCOMYCIN HYDROCHLORIDE 750 MG: 750 INJECTION, SOLUTION INTRAVENOUS at 17:58

## 2021-10-22 RX ADMIN — FLECAINIDE ACETATE 50 MG: 50 TABLET ORAL at 11:24

## 2021-10-22 RX ADMIN — CEFEPIME HYDROCHLORIDE 2 G: 2 INJECTION, POWDER, FOR SOLUTION INTRAVENOUS at 19:04

## 2021-10-22 RX ADMIN — LOSARTAN POTASSIUM 100 MG: 50 TABLET, FILM COATED ORAL at 11:22

## 2021-10-23 LAB
ANION GAP SERPL CALCULATED.3IONS-SCNC: 10 MMOL/L (ref 5–15)
BASOPHILS # BLD AUTO: 0.01 10*3/MM3 (ref 0–0.2)
BASOPHILS NFR BLD AUTO: 0.1 % (ref 0–1.5)
BUN SERPL-MCNC: 25 MG/DL (ref 8–23)
BUN/CREAT SERPL: 26.3 (ref 7–25)
CALCIUM SPEC-SCNC: 8.2 MG/DL (ref 8.6–10.5)
CHLORIDE SERPL-SCNC: 101 MMOL/L (ref 98–107)
CO2 SERPL-SCNC: 26 MMOL/L (ref 22–29)
CREAT SERPL-MCNC: 0.95 MG/DL (ref 0.57–1)
CRP SERPL-MCNC: 1.81 MG/DL (ref 0–0.5)
DEPRECATED RDW RBC AUTO: 48 FL (ref 37–54)
EOSINOPHIL # BLD AUTO: 0 10*3/MM3 (ref 0–0.4)
EOSINOPHIL NFR BLD AUTO: 0 % (ref 0.3–6.2)
ERYTHROCYTE [DISTWIDTH] IN BLOOD BY AUTOMATED COUNT: 15.4 % (ref 12.3–15.4)
GFR SERPL CREATININE-BSD FRML MDRD: 58 ML/MIN/1.73
GLUCOSE SERPL-MCNC: 145 MG/DL (ref 65–99)
HCT VFR BLD AUTO: 31.2 % (ref 34–46.6)
HGB BLD-MCNC: 9.4 G/DL (ref 12–15.9)
IMM GRANULOCYTES # BLD AUTO: 0.12 10*3/MM3 (ref 0–0.05)
IMM GRANULOCYTES NFR BLD AUTO: 1.5 % (ref 0–0.5)
LYMPHOCYTES # BLD AUTO: 0.36 10*3/MM3 (ref 0.7–3.1)
LYMPHOCYTES NFR BLD AUTO: 4.5 % (ref 19.6–45.3)
MCH RBC QN AUTO: 25.8 PG (ref 26.6–33)
MCHC RBC AUTO-ENTMCNC: 30.1 G/DL (ref 31.5–35.7)
MCV RBC AUTO: 85.5 FL (ref 79–97)
MONOCYTES # BLD AUTO: 0.28 10*3/MM3 (ref 0.1–0.9)
MONOCYTES NFR BLD AUTO: 3.5 % (ref 5–12)
NEUTROPHILS NFR BLD AUTO: 7.28 10*3/MM3 (ref 1.7–7)
NEUTROPHILS NFR BLD AUTO: 90.4 % (ref 42.7–76)
NRBC BLD AUTO-RTO: 0 /100 WBC (ref 0–0.2)
PLATELET # BLD AUTO: 272 10*3/MM3 (ref 140–450)
PMV BLD AUTO: 12.3 FL (ref 6–12)
POTASSIUM SERPL-SCNC: 4.2 MMOL/L (ref 3.5–5.2)
RBC # BLD AUTO: 3.65 10*6/MM3 (ref 3.77–5.28)
SODIUM SERPL-SCNC: 137 MMOL/L (ref 136–145)
WBC # BLD AUTO: 8.05 10*3/MM3 (ref 3.4–10.8)

## 2021-10-23 PROCEDURE — 97530 THERAPEUTIC ACTIVITIES: CPT

## 2021-10-23 PROCEDURE — 25010000002 FUROSEMIDE PER 20 MG: Performed by: INTERNAL MEDICINE

## 2021-10-23 PROCEDURE — 97116 GAIT TRAINING THERAPY: CPT

## 2021-10-23 PROCEDURE — 97110 THERAPEUTIC EXERCISES: CPT

## 2021-10-23 PROCEDURE — 99232 SBSQ HOSP IP/OBS MODERATE 35: CPT | Performed by: INTERNAL MEDICINE

## 2021-10-23 PROCEDURE — 86140 C-REACTIVE PROTEIN: CPT | Performed by: INTERNAL MEDICINE

## 2021-10-23 PROCEDURE — 25010000002 METHYLPREDNISOLONE PER 40 MG: Performed by: INTERNAL MEDICINE

## 2021-10-23 PROCEDURE — 85025 COMPLETE CBC W/AUTO DIFF WBC: CPT | Performed by: INTERNAL MEDICINE

## 2021-10-23 PROCEDURE — 25010000002 CEFEPIME PER 500 MG: Performed by: INTERNAL MEDICINE

## 2021-10-23 PROCEDURE — 25010000002 VANCOMYCIN PER 500 MG: Performed by: INTERNAL MEDICINE

## 2021-10-23 PROCEDURE — 80048 BASIC METABOLIC PNL TOTAL CA: CPT | Performed by: INTERNAL MEDICINE

## 2021-10-23 RX ORDER — FUROSEMIDE 10 MG/ML
40 INJECTION INTRAMUSCULAR; INTRAVENOUS ONCE
Status: COMPLETED | OUTPATIENT
Start: 2021-10-23 | End: 2021-10-23

## 2021-10-23 RX ADMIN — CASTOR OIL AND BALSAM, PERU 1 APPLICATION: 788; 87 OINTMENT TOPICAL at 09:07

## 2021-10-23 RX ADMIN — METHYLPREDNISOLONE SODIUM SUCCINATE 40 MG: 40 INJECTION, POWDER, FOR SOLUTION INTRAMUSCULAR; INTRAVENOUS at 20:23

## 2021-10-23 RX ADMIN — SODIUM CHLORIDE, PRESERVATIVE FREE 10 ML: 5 INJECTION INTRAVENOUS at 09:08

## 2021-10-23 RX ADMIN — DOXYCYCLINE 100 MG: 100 INJECTION, POWDER, LYOPHILIZED, FOR SOLUTION INTRAVENOUS at 13:27

## 2021-10-23 RX ADMIN — RIVAROXABAN 20 MG: 20 TABLET, FILM COATED ORAL at 17:07

## 2021-10-23 RX ADMIN — METHYLPREDNISOLONE SODIUM SUCCINATE 40 MG: 40 INJECTION, POWDER, FOR SOLUTION INTRAMUSCULAR; INTRAVENOUS at 09:07

## 2021-10-23 RX ADMIN — FLECAINIDE ACETATE 50 MG: 50 TABLET ORAL at 20:24

## 2021-10-23 RX ADMIN — PANTOPRAZOLE SODIUM 40 MG: 40 TABLET, DELAYED RELEASE ORAL at 09:07

## 2021-10-23 RX ADMIN — FLUOXETINE HYDROCHLORIDE 40 MG: 20 CAPSULE ORAL at 09:07

## 2021-10-23 RX ADMIN — DOXEPIN HYDROCHLORIDE 40 MG: 10 CAPSULE ORAL at 20:23

## 2021-10-23 RX ADMIN — CEFEPIME HYDROCHLORIDE 2 G: 2 INJECTION, POWDER, FOR SOLUTION INTRAVENOUS at 17:07

## 2021-10-23 RX ADMIN — FERROUS SULFATE TAB 325 MG (65 MG ELEMENTAL FE) 325 MG: 325 (65 FE) TAB at 09:07

## 2021-10-23 RX ADMIN — CASTOR OIL AND BALSAM, PERU 1 APPLICATION: 788; 87 OINTMENT TOPICAL at 20:24

## 2021-10-23 RX ADMIN — LOSARTAN POTASSIUM 100 MG: 50 TABLET, FILM COATED ORAL at 09:07

## 2021-10-23 RX ADMIN — FUROSEMIDE 40 MG: 10 INJECTION, SOLUTION INTRAMUSCULAR; INTRAVENOUS at 09:07

## 2021-10-23 RX ADMIN — VANCOMYCIN HYDROCHLORIDE 750 MG: 750 INJECTION, SOLUTION INTRAVENOUS at 17:08

## 2021-10-23 RX ADMIN — VANCOMYCIN HYDROCHLORIDE 750 MG: 750 INJECTION, SOLUTION INTRAVENOUS at 05:00

## 2021-10-23 RX ADMIN — DOXYCYCLINE 100 MG: 100 INJECTION, POWDER, LYOPHILIZED, FOR SOLUTION INTRAVENOUS at 00:03

## 2021-10-23 RX ADMIN — CEFEPIME HYDROCHLORIDE 2 G: 2 INJECTION, POWDER, FOR SOLUTION INTRAVENOUS at 06:13

## 2021-10-23 RX ADMIN — FLECAINIDE ACETATE 50 MG: 50 TABLET ORAL at 09:07

## 2021-10-23 RX ADMIN — DICYCLOMINE HYDROCHLORIDE 10 MG: 10 CAPSULE ORAL at 20:23

## 2021-10-23 RX ADMIN — SODIUM CHLORIDE, PRESERVATIVE FREE 10 ML: 5 INJECTION INTRAVENOUS at 20:24

## 2021-10-23 RX ADMIN — AMLODIPINE BESYLATE 10 MG: 10 TABLET ORAL at 09:07

## 2021-10-23 RX ADMIN — DOXYCYCLINE 100 MG: 100 INJECTION, POWDER, LYOPHILIZED, FOR SOLUTION INTRAVENOUS at 23:54

## 2021-10-23 RX ADMIN — DICYCLOMINE HYDROCHLORIDE 10 MG: 10 CAPSULE ORAL at 09:07

## 2021-10-24 ENCOUNTER — READMISSION MANAGEMENT (OUTPATIENT)
Dept: CALL CENTER | Facility: HOSPITAL | Age: 71
End: 2021-10-24

## 2021-10-24 ENCOUNTER — APPOINTMENT (OUTPATIENT)
Dept: GENERAL RADIOLOGY | Facility: HOSPITAL | Age: 71
End: 2021-10-24

## 2021-10-24 VITALS
OXYGEN SATURATION: 86 % | HEIGHT: 61 IN | SYSTOLIC BLOOD PRESSURE: 124 MMHG | HEART RATE: 107 BPM | BODY MASS INDEX: 28.32 KG/M2 | DIASTOLIC BLOOD PRESSURE: 82 MMHG | WEIGHT: 150 LBS | TEMPERATURE: 97.5 F | RESPIRATION RATE: 18 BRPM

## 2021-10-24 PROCEDURE — 25010000002 CEFEPIME PER 500 MG: Performed by: INTERNAL MEDICINE

## 2021-10-24 PROCEDURE — 99239 HOSP IP/OBS DSCHRG MGMT >30: CPT | Performed by: INTERNAL MEDICINE

## 2021-10-24 PROCEDURE — 25010000002 VANCOMYCIN PER 500 MG: Performed by: INTERNAL MEDICINE

## 2021-10-24 PROCEDURE — 25010000002 METHYLPREDNISOLONE PER 40 MG: Performed by: INTERNAL MEDICINE

## 2021-10-24 PROCEDURE — 97116 GAIT TRAINING THERAPY: CPT

## 2021-10-24 PROCEDURE — 97530 THERAPEUTIC ACTIVITIES: CPT

## 2021-10-24 PROCEDURE — 71045 X-RAY EXAM CHEST 1 VIEW: CPT

## 2021-10-24 RX ORDER — ALBUTEROL SULFATE 90 UG/1
2 AEROSOL, METERED RESPIRATORY (INHALATION) EVERY 4 HOURS PRN
Qty: 8.5 G | Refills: 0 | Status: SHIPPED | OUTPATIENT
Start: 2021-10-24

## 2021-10-24 RX ORDER — PREDNISONE 20 MG/1
40 TABLET ORAL DAILY
Qty: 14 TABLET | Refills: 0 | Status: SHIPPED | OUTPATIENT
Start: 2021-10-24 | End: 2021-10-31

## 2021-10-24 RX ORDER — GUAIFENESIN DEXTROMETHORPHAN HYDROBROMIDE ORAL SOLUTION 10; 100 MG/5ML; MG/5ML
10 SOLUTION ORAL 3 TIMES DAILY PRN
Qty: 236 ML | Refills: 0 | Status: SHIPPED | OUTPATIENT
Start: 2021-10-24 | End: 2022-07-21

## 2021-10-24 RX ORDER — DOXYCYCLINE HYCLATE 100 MG/1
100 CAPSULE ORAL 2 TIMES DAILY
Qty: 20 CAPSULE | Refills: 0 | Status: SHIPPED | OUTPATIENT
Start: 2021-10-24 | End: 2021-11-03

## 2021-10-24 RX ORDER — LEVOFLOXACIN 750 MG/1
750 TABLET ORAL DAILY
Qty: 5 TABLET | Refills: 0 | Status: SHIPPED | OUTPATIENT
Start: 2021-10-24 | End: 2021-10-29

## 2021-10-24 RX ADMIN — DICYCLOMINE HYDROCHLORIDE 10 MG: 10 CAPSULE ORAL at 08:38

## 2021-10-24 RX ADMIN — FLUOXETINE HYDROCHLORIDE 40 MG: 20 CAPSULE ORAL at 08:38

## 2021-10-24 RX ADMIN — VANCOMYCIN HYDROCHLORIDE 750 MG: 750 INJECTION, SOLUTION INTRAVENOUS at 06:21

## 2021-10-24 RX ADMIN — PANTOPRAZOLE SODIUM 40 MG: 40 TABLET, DELAYED RELEASE ORAL at 08:38

## 2021-10-24 RX ADMIN — METHYLPREDNISOLONE SODIUM SUCCINATE 40 MG: 40 INJECTION, POWDER, FOR SOLUTION INTRAMUSCULAR; INTRAVENOUS at 08:38

## 2021-10-24 RX ADMIN — FERROUS SULFATE TAB 325 MG (65 MG ELEMENTAL FE) 325 MG: 325 (65 FE) TAB at 08:38

## 2021-10-24 RX ADMIN — CASTOR OIL AND BALSAM, PERU 1 APPLICATION: 788; 87 OINTMENT TOPICAL at 08:38

## 2021-10-24 RX ADMIN — FLECAINIDE ACETATE 50 MG: 50 TABLET ORAL at 08:38

## 2021-10-24 RX ADMIN — CEFEPIME HYDROCHLORIDE 2 G: 2 INJECTION, POWDER, FOR SOLUTION INTRAVENOUS at 05:00

## 2021-10-24 RX ADMIN — LOSARTAN POTASSIUM 100 MG: 50 TABLET, FILM COATED ORAL at 08:38

## 2021-10-24 RX ADMIN — AMLODIPINE BESYLATE 10 MG: 10 TABLET ORAL at 08:38

## 2021-10-25 NOTE — OUTREACH NOTE
Prep Survey      Responses   [facility] facility patient discharged from? [place]   Is LACE score < 7 ? No   Emergency Room discharge w/ pulse ox? No   Eligibility Readm Mgmt   Discharge diagnosis Pneumonia    Does the patient have one of the following disease processes/diagnoses(primary or secondary)? COPD/Pneumonia   Does the patient have Home health ordered? Yes   What is the Home health agency?  [facility]   Is there a DME ordered? Yes   What DME was ordered? [facility]   Prep survey completed? Yes          Dimple Samano RN

## 2021-10-27 ENCOUNTER — READMISSION MANAGEMENT (OUTPATIENT)
Dept: CALL CENTER | Facility: HOSPITAL | Age: 71
End: 2021-10-27

## 2021-10-27 NOTE — OUTREACH NOTE
COPD/PN Week 1 Survey      Responses   [facility] patient discharged from? [place]   Does the patient have one of the following disease processes/diagnoses(primary or secondary)? COPD/Pneumonia   Was the primary reason for admission: Pneumonia   Week 1 attempt successful? No   Unsuccessful attempts Attempt 1          Daysi Berry RN

## 2021-10-29 ENCOUNTER — READMISSION MANAGEMENT (OUTPATIENT)
Dept: CALL CENTER | Facility: HOSPITAL | Age: 71
End: 2021-10-29

## 2021-10-29 NOTE — OUTREACH NOTE
COPD/PN Week 1 Survey      Responses   [facility] patient discharged from? [place]   Does the patient have one of the following disease processes/diagnoses(primary or secondary)? COPD/Pneumonia   Was the primary reason for admission: Pneumonia   Week 1 attempt successful? No   Unsuccessful attempts Attempt 2          Zenia Sandoval RN

## 2021-11-03 ENCOUNTER — READMISSION MANAGEMENT (OUTPATIENT)
Dept: CALL CENTER | Facility: HOSPITAL | Age: 71
End: 2021-11-03

## 2021-11-03 NOTE — OUTREACH NOTE
COPD/PN Week 1 Survey      Responses   [facility] patient discharged from? [place]   Does the patient have one of the following disease processes/diagnoses(primary or secondary)? COPD/Pneumonia   Was the primary reason for admission: Pneumonia   Week 1 attempt successful? No   Unsuccessful attempts Attempt 3          Allyson Sullivan RN

## 2021-11-09 ENCOUNTER — READMISSION MANAGEMENT (OUTPATIENT)
Dept: CALL CENTER | Facility: HOSPITAL | Age: 71
End: 2021-11-09

## 2021-11-09 NOTE — OUTREACH NOTE
COPD/PN Week 2 Survey      Responses   [facility] patient discharged from? [place]   Does the patient have one of the following disease processes/diagnoses(primary or secondary)? COPD/Pneumonia   Was the primary reason for admission: Pneumonia   Week 2 attempt successful? No   Unsuccessful attempts Attempt 1          Radha Arias LPN

## 2021-11-11 ENCOUNTER — READMISSION MANAGEMENT (OUTPATIENT)
Dept: CALL CENTER | Facility: HOSPITAL | Age: 71
End: 2021-11-11

## 2021-11-11 NOTE — OUTREACH NOTE
Medical Week 2 Survey      Responses   [facility] patient discharged from? [place]   Does the patient have one of the following disease processes/diagnoses(primary or secondary)? COPD/Pneumonia   Call start time 1028   Discharge diagnosis Pneumonia    Call end time 1036   Is patient permission given to speak with other caregiver? No   List who call center can speak with Patient only   Meds reviewed with patient/caregiver? Yes   Is the patient having any side effects they believe may be caused by any medication additions or changes? No   Does the patient have all medications ordered at discharge? Yes   Is the patient taking all medications as directed (includes completed medication regime)? Yes   Medication comments Completed antibiotics   Does the patient have a primary care provider?  Yes   What is preventing the patient from scheduling follow up appointments within 7 days of discharge? Earlier appointment not available   Has the patient kept scheduled appointments due by today? N/A   What is the Home health agency?  [facility]   Has home health visited the patient within 72 hours of discharge? Yes   Home health comments SN 2xweek    PT   What DME was ordered? [facility]  @ 2lmin   Has all DME been delivered? Yes   DME comments Ambulates with cane   Psychosocial issues? No   Did the patient receive a copy of their discharge instructions? Yes   Nursing interventions Reviewed instructions with patient   What is the patient's perception of their health status since discharge? Improving   Is the patient/caregiver able to teach back the hierarchy of who to call/visit for symptoms/problems? PCP, Specialist, Home health nurse, Urgent Care, ED, 911 Yes          Polina Viramontes RN

## 2021-11-18 ENCOUNTER — READMISSION MANAGEMENT (OUTPATIENT)
Dept: CALL CENTER | Facility: HOSPITAL | Age: 71
End: 2021-11-18

## 2021-11-18 NOTE — OUTREACH NOTE
COPD/PN Week 3 Survey      Responses   [facility] patient discharged from? [place]   Does the patient have one of the following disease processes/diagnoses(primary or secondary)? COPD/Pneumonia   Was the primary reason for admission: Pneumonia   Week 3 attempt successful? No   Unsuccessful attempts Attempt 1          Dago Davis RN

## 2021-11-22 ENCOUNTER — READMISSION MANAGEMENT (OUTPATIENT)
Dept: CALL CENTER | Facility: HOSPITAL | Age: 71
End: 2021-11-22

## 2021-11-22 NOTE — OUTREACH NOTE
COPD/PN Week 3 Survey      Responses   [facility] [place] patient discharged from? [place]   Does the patient have one of the following disease processes/diagnoses(primary or secondary)? COPD/Pneumonia   Was the primary reason for admission: Pneumonia   Week 3 attempt successful? No   Unsuccessful attempts Attempt 2          Veronica Scott RN

## 2021-11-27 ENCOUNTER — HOSPITAL ENCOUNTER (INPATIENT)
Facility: HOSPITAL | Age: 71
LOS: 2 days | Discharge: HOME | End: 2021-11-29
Attending: EMERGENCY MEDICINE | Admitting: INTERNAL MEDICINE

## 2021-11-27 ENCOUNTER — APPOINTMENT (OUTPATIENT)
Dept: CT IMAGING | Facility: HOSPITAL | Age: 71
End: 2021-11-27

## 2021-11-27 ENCOUNTER — APPOINTMENT (OUTPATIENT)
Dept: GENERAL RADIOLOGY | Facility: HOSPITAL | Age: 71
End: 2021-11-27

## 2021-11-27 DIAGNOSIS — Z78.9 IMPAIRED MOBILITY AND ACTIVITIES OF DAILY LIVING: ICD-10-CM

## 2021-11-27 DIAGNOSIS — J18.9 HCAP (HEALTHCARE-ASSOCIATED PNEUMONIA): Primary | ICD-10-CM

## 2021-11-27 DIAGNOSIS — J96.21 ACUTE ON CHRONIC RESPIRATORY FAILURE WITH HYPOXIA (HCC): ICD-10-CM

## 2021-11-27 DIAGNOSIS — I48.0 PAF (PAROXYSMAL ATRIAL FIBRILLATION) (HCC): ICD-10-CM

## 2021-11-27 DIAGNOSIS — I10 HYPERTENSION, UNSPECIFIED TYPE: ICD-10-CM

## 2021-11-27 DIAGNOSIS — M05.9 RHEUMATOID ARTHRITIS WITH POSITIVE RHEUMATOID FACTOR, INVOLVING UNSPECIFIED SITE (HCC): ICD-10-CM

## 2021-11-27 DIAGNOSIS — Z74.09 IMPAIRED MOBILITY AND ACTIVITIES OF DAILY LIVING: ICD-10-CM

## 2021-11-27 PROBLEM — D84.9 IMMUNOCOMPROMISED (HCC): Status: ACTIVE | Noted: 2021-11-27

## 2021-11-27 PROBLEM — Z79.01 CHRONIC ANTICOAGULATION: Status: ACTIVE | Noted: 2021-11-27

## 2021-11-27 PROBLEM — A41.9 SEPSIS: Status: ACTIVE | Noted: 2021-11-27

## 2021-11-27 PROBLEM — R79.89 ELEVATED D-DIMER: Status: ACTIVE | Noted: 2021-11-27

## 2021-11-27 LAB
ALBUMIN SERPL-MCNC: 2.8 G/DL (ref 3.5–5.2)
ALBUMIN/GLOB SERPL: 0.8 G/DL
ALP SERPL-CCNC: 261 U/L (ref 39–117)
ALT SERPL W P-5'-P-CCNC: 15 U/L (ref 1–33)
ANION GAP SERPL CALCULATED.3IONS-SCNC: 11 MMOL/L (ref 5–15)
AST SERPL-CCNC: 25 U/L (ref 1–32)
BASOPHILS # BLD AUTO: 0.02 10*3/MM3 (ref 0–0.2)
BASOPHILS NFR BLD AUTO: 0.3 % (ref 0–1.5)
BILIRUB SERPL-MCNC: 0.4 MG/DL (ref 0–1.2)
BILIRUB UR QL STRIP: NEGATIVE
BUN SERPL-MCNC: 13 MG/DL (ref 8–23)
BUN/CREAT SERPL: 13.4 (ref 7–25)
CALCIUM SPEC-SCNC: 8.4 MG/DL (ref 8.6–10.5)
CHLORIDE SERPL-SCNC: 98 MMOL/L (ref 98–107)
CLARITY UR: CLEAR
CO2 SERPL-SCNC: 25 MMOL/L (ref 22–29)
COLOR UR: YELLOW
CREAT SERPL-MCNC: 0.97 MG/DL (ref 0.57–1)
CRP SERPL-MCNC: 11.62 MG/DL (ref 0–0.5)
D DIMER PPP FEU-MCNC: 3.99 MCGFEU/ML (ref 0–0.56)
D-LACTATE SERPL-SCNC: 1.4 MMOL/L (ref 0.5–2)
DEPRECATED RDW RBC AUTO: 48.3 FL (ref 37–54)
EOSINOPHIL # BLD AUTO: 0 10*3/MM3 (ref 0–0.4)
EOSINOPHIL NFR BLD AUTO: 0 % (ref 0.3–6.2)
ERYTHROCYTE [DISTWIDTH] IN BLOOD BY AUTOMATED COUNT: 15.5 % (ref 12.3–15.4)
ERYTHROCYTE [SEDIMENTATION RATE] IN BLOOD: 86 MM/HR (ref 0–30)
FLUAV RNA RESP QL NAA+PROBE: NOT DETECTED
FLUBV RNA RESP QL NAA+PROBE: NOT DETECTED
GFR SERPL CREATININE-BSD FRML MDRD: 57 ML/MIN/1.73
GLOBULIN UR ELPH-MCNC: 3.4 GM/DL
GLUCOSE SERPL-MCNC: 121 MG/DL (ref 65–99)
GLUCOSE UR STRIP-MCNC: NEGATIVE MG/DL
HCT VFR BLD AUTO: 36.8 % (ref 34–46.6)
HGB BLD-MCNC: 11.2 G/DL (ref 12–15.9)
HGB UR QL STRIP.AUTO: NEGATIVE
IMM GRANULOCYTES # BLD AUTO: 0.03 10*3/MM3 (ref 0–0.05)
IMM GRANULOCYTES NFR BLD AUTO: 0.4 % (ref 0–0.5)
INR PPP: 3.08 (ref 0.85–1.16)
KETONES UR QL STRIP: NEGATIVE
LDH SERPL-CCNC: 303 U/L (ref 135–214)
LEUKOCYTE ESTERASE UR QL STRIP.AUTO: NEGATIVE
LYMPHOCYTES # BLD AUTO: 0.38 10*3/MM3 (ref 0.7–3.1)
LYMPHOCYTES NFR BLD AUTO: 5.2 % (ref 19.6–45.3)
MCH RBC QN AUTO: 25.9 PG (ref 26.6–33)
MCHC RBC AUTO-ENTMCNC: 30.4 G/DL (ref 31.5–35.7)
MCV RBC AUTO: 85.2 FL (ref 79–97)
MONOCYTES # BLD AUTO: 0.71 10*3/MM3 (ref 0.1–0.9)
MONOCYTES NFR BLD AUTO: 9.7 % (ref 5–12)
NEUTROPHILS NFR BLD AUTO: 6.16 10*3/MM3 (ref 1.7–7)
NEUTROPHILS NFR BLD AUTO: 84.4 % (ref 42.7–76)
NITRITE UR QL STRIP: NEGATIVE
NRBC BLD AUTO-RTO: 0 /100 WBC (ref 0–0.2)
NT-PROBNP SERPL-MCNC: 1518 PG/ML (ref 0–900)
PH UR STRIP.AUTO: 5.5 [PH] (ref 5–8)
PLATELET # BLD AUTO: 208 10*3/MM3 (ref 140–450)
PMV BLD AUTO: 12.5 FL (ref 6–12)
POTASSIUM SERPL-SCNC: 3.7 MMOL/L (ref 3.5–5.2)
PROCALCITONIN SERPL-MCNC: 0.15 NG/ML (ref 0–0.25)
PROT SERPL-MCNC: 6.2 G/DL (ref 6–8.5)
PROT UR QL STRIP: ABNORMAL
PROTHROMBIN TIME: 30.4 SECONDS (ref 11.4–14.4)
RBC # BLD AUTO: 4.32 10*6/MM3 (ref 3.77–5.28)
SARS-COV-2 RNA RESP QL NAA+PROBE: NOT DETECTED
SODIUM SERPL-SCNC: 134 MMOL/L (ref 136–145)
SP GR UR STRIP: 1.03 (ref 1–1.03)
TROPONIN T SERPL-MCNC: <0.01 NG/ML (ref 0–0.03)
UROBILINOGEN UR QL STRIP: ABNORMAL
WBC NRBC COR # BLD: 7.3 10*3/MM3 (ref 3.4–10.8)

## 2021-11-27 PROCEDURE — 85379 FIBRIN DEGRADATION QUANT: CPT | Performed by: EMERGENCY MEDICINE

## 2021-11-27 PROCEDURE — 80053 COMPREHEN METABOLIC PANEL: CPT | Performed by: EMERGENCY MEDICINE

## 2021-11-27 PROCEDURE — 85025 COMPLETE CBC W/AUTO DIFF WBC: CPT | Performed by: EMERGENCY MEDICINE

## 2021-11-27 PROCEDURE — 71045 X-RAY EXAM CHEST 1 VIEW: CPT

## 2021-11-27 PROCEDURE — 83880 ASSAY OF NATRIURETIC PEPTIDE: CPT | Performed by: EMERGENCY MEDICINE

## 2021-11-27 PROCEDURE — 84145 PROCALCITONIN (PCT): CPT | Performed by: EMERGENCY MEDICINE

## 2021-11-27 PROCEDURE — 87641 MR-STAPH DNA AMP PROBE: CPT | Performed by: NURSE PRACTITIONER

## 2021-11-27 PROCEDURE — 86140 C-REACTIVE PROTEIN: CPT | Performed by: NURSE PRACTITIONER

## 2021-11-27 PROCEDURE — 0 IOPAMIDOL PER 1 ML: Performed by: INTERNAL MEDICINE

## 2021-11-27 PROCEDURE — 85610 PROTHROMBIN TIME: CPT | Performed by: EMERGENCY MEDICINE

## 2021-11-27 PROCEDURE — 99223 1ST HOSP IP/OBS HIGH 75: CPT | Performed by: INTERNAL MEDICINE

## 2021-11-27 PROCEDURE — 87636 SARSCOV2 & INF A&B AMP PRB: CPT | Performed by: EMERGENCY MEDICINE

## 2021-11-27 PROCEDURE — 81003 URINALYSIS AUTO W/O SCOPE: CPT | Performed by: EMERGENCY MEDICINE

## 2021-11-27 PROCEDURE — 83615 LACTATE (LD) (LDH) ENZYME: CPT | Performed by: INTERNAL MEDICINE

## 2021-11-27 PROCEDURE — 25010000002 VANCOMYCIN 10 G RECONSTITUTED SOLUTION: Performed by: EMERGENCY MEDICINE

## 2021-11-27 PROCEDURE — 36415 COLL VENOUS BLD VENIPUNCTURE: CPT

## 2021-11-27 PROCEDURE — 99284 EMERGENCY DEPT VISIT MOD MDM: CPT

## 2021-11-27 PROCEDURE — 25010000002 PIPERACILLIN SOD-TAZOBACTAM PER 1 G: Performed by: EMERGENCY MEDICINE

## 2021-11-27 PROCEDURE — 84484 ASSAY OF TROPONIN QUANT: CPT | Performed by: EMERGENCY MEDICINE

## 2021-11-27 PROCEDURE — 71275 CT ANGIOGRAPHY CHEST: CPT

## 2021-11-27 PROCEDURE — 85652 RBC SED RATE AUTOMATED: CPT | Performed by: NURSE PRACTITIONER

## 2021-11-27 PROCEDURE — 87040 BLOOD CULTURE FOR BACTERIA: CPT | Performed by: EMERGENCY MEDICINE

## 2021-11-27 PROCEDURE — 87899 AGENT NOS ASSAY W/OPTIC: CPT | Performed by: NURSE PRACTITIONER

## 2021-11-27 PROCEDURE — 83605 ASSAY OF LACTIC ACID: CPT | Performed by: EMERGENCY MEDICINE

## 2021-11-27 RX ORDER — FLECAINIDE ACETATE 50 MG/1
50 TABLET ORAL 2 TIMES DAILY
Status: DISCONTINUED | OUTPATIENT
Start: 2021-11-27 | End: 2021-11-29 | Stop reason: HOSPADM

## 2021-11-27 RX ORDER — AMLODIPINE BESYLATE 10 MG/1
10 TABLET ORAL DAILY
Status: DISCONTINUED | OUTPATIENT
Start: 2021-11-28 | End: 2021-11-29 | Stop reason: HOSPADM

## 2021-11-27 RX ORDER — SODIUM CHLORIDE 0.9 % (FLUSH) 0.9 %
10 SYRINGE (ML) INJECTION EVERY 12 HOURS SCHEDULED
Status: DISCONTINUED | OUTPATIENT
Start: 2021-11-27 | End: 2021-11-29 | Stop reason: HOSPADM

## 2021-11-27 RX ORDER — IPRATROPIUM BROMIDE AND ALBUTEROL SULFATE 2.5; .5 MG/3ML; MG/3ML
3 SOLUTION RESPIRATORY (INHALATION)
Status: DISCONTINUED | OUTPATIENT
Start: 2021-11-27 | End: 2021-11-29 | Stop reason: HOSPADM

## 2021-11-27 RX ORDER — FLUOXETINE HYDROCHLORIDE 20 MG/1
40 CAPSULE ORAL DAILY
Status: DISCONTINUED | OUTPATIENT
Start: 2021-11-28 | End: 2021-11-29 | Stop reason: HOSPADM

## 2021-11-27 RX ORDER — HYDROCODONE BITARTRATE AND ACETAMINOPHEN 5; 325 MG/1; MG/1
1 TABLET ORAL EVERY 6 HOURS PRN
Status: DISCONTINUED | OUTPATIENT
Start: 2021-11-27 | End: 2021-11-29 | Stop reason: HOSPADM

## 2021-11-27 RX ORDER — ACETAMINOPHEN 325 MG/1
650 TABLET ORAL EVERY 4 HOURS PRN
Status: DISCONTINUED | OUTPATIENT
Start: 2021-11-27 | End: 2021-11-29 | Stop reason: HOSPADM

## 2021-11-27 RX ORDER — IPRATROPIUM BROMIDE AND ALBUTEROL SULFATE 2.5; .5 MG/3ML; MG/3ML
3 SOLUTION RESPIRATORY (INHALATION) EVERY 4 HOURS PRN
Status: DISCONTINUED | OUTPATIENT
Start: 2021-11-27 | End: 2021-11-29 | Stop reason: HOSPADM

## 2021-11-27 RX ORDER — FERROUS SULFATE 325(65) MG
325 TABLET ORAL
Status: DISCONTINUED | OUTPATIENT
Start: 2021-11-28 | End: 2021-11-29 | Stop reason: HOSPADM

## 2021-11-27 RX ORDER — ALLOPURINOL 300 MG/1
300 TABLET ORAL DAILY
Status: DISCONTINUED | OUTPATIENT
Start: 2021-11-28 | End: 2021-11-29 | Stop reason: HOSPADM

## 2021-11-27 RX ORDER — DOXEPIN HYDROCHLORIDE 10 MG/1
40 CAPSULE ORAL NIGHTLY
Status: DISCONTINUED | OUTPATIENT
Start: 2021-11-27 | End: 2021-11-29 | Stop reason: HOSPADM

## 2021-11-27 RX ORDER — CHOLECALCIFEROL (VITAMIN D3) 125 MCG
5 CAPSULE ORAL NIGHTLY PRN
Status: DISCONTINUED | OUTPATIENT
Start: 2021-11-27 | End: 2021-11-29 | Stop reason: HOSPADM

## 2021-11-27 RX ORDER — SODIUM CHLORIDE 0.9 % (FLUSH) 0.9 %
10 SYRINGE (ML) INJECTION AS NEEDED
Status: DISCONTINUED | OUTPATIENT
Start: 2021-11-27 | End: 2021-11-29 | Stop reason: HOSPADM

## 2021-11-27 RX ORDER — PANTOPRAZOLE SODIUM 40 MG/1
40 TABLET, DELAYED RELEASE ORAL DAILY
Status: DISCONTINUED | OUTPATIENT
Start: 2021-11-28 | End: 2021-11-29 | Stop reason: HOSPADM

## 2021-11-27 RX ORDER — PREDNISONE 1 MG/1
5 TABLET ORAL DAILY
Status: DISCONTINUED | OUTPATIENT
Start: 2021-11-28 | End: 2021-11-28

## 2021-11-27 RX ORDER — ATENOLOL 50 MG/1
100 TABLET ORAL DAILY
Status: DISCONTINUED | OUTPATIENT
Start: 2021-11-28 | End: 2021-11-29 | Stop reason: HOSPADM

## 2021-11-27 RX ORDER — DICYCLOMINE HYDROCHLORIDE 10 MG/1
10 CAPSULE ORAL 2 TIMES DAILY
Status: DISCONTINUED | OUTPATIENT
Start: 2021-11-27 | End: 2021-11-29 | Stop reason: HOSPADM

## 2021-11-27 RX ADMIN — VANCOMYCIN HYDROCHLORIDE 1250 MG: 10 INJECTION, POWDER, LYOPHILIZED, FOR SOLUTION INTRAVENOUS at 20:57

## 2021-11-27 RX ADMIN — FLECAINIDE ACETATE 50 MG: 50 TABLET ORAL at 22:17

## 2021-11-27 RX ADMIN — DICYCLOMINE HYDROCHLORIDE 10 MG: 10 CAPSULE ORAL at 22:17

## 2021-11-27 RX ADMIN — DOXEPIN HYDROCHLORIDE 40 MG: 10 CAPSULE ORAL at 22:19

## 2021-11-27 RX ADMIN — IOPAMIDOL 54 ML: 755 INJECTION, SOLUTION INTRAVENOUS at 21:55

## 2021-11-27 RX ADMIN — TAZOBACTAM SODIUM AND PIPERACILLIN SODIUM 3.38 G: 375; 3 INJECTION, SOLUTION INTRAVENOUS at 18:53

## 2021-11-27 RX ADMIN — SODIUM CHLORIDE, PRESERVATIVE FREE 10 ML: 5 INJECTION INTRAVENOUS at 22:19

## 2021-11-28 PROBLEM — J84.9 INTERSTITIAL LUNG DISEASE (HCC): Status: ACTIVE | Noted: 2021-11-28

## 2021-11-28 LAB
ANION GAP SERPL CALCULATED.3IONS-SCNC: 12 MMOL/L (ref 5–15)
BASOPHILS # BLD AUTO: 0.02 10*3/MM3 (ref 0–0.2)
BASOPHILS NFR BLD AUTO: 0.3 % (ref 0–1.5)
BUN SERPL-MCNC: 9 MG/DL (ref 8–23)
BUN/CREAT SERPL: 9.8 (ref 7–25)
CA-I SERPL ISE-MCNC: 1.27 MMOL/L (ref 1.12–1.32)
CALCIUM SPEC-SCNC: 7.8 MG/DL (ref 8.6–10.5)
CHLORIDE SERPL-SCNC: 100 MMOL/L (ref 98–107)
CO2 SERPL-SCNC: 24 MMOL/L (ref 22–29)
CREAT SERPL-MCNC: 0.92 MG/DL (ref 0.57–1)
CRYPTOC AG CSF QL: NEGATIVE
DEPRECATED RDW RBC AUTO: 46.7 FL (ref 37–54)
EOSINOPHIL # BLD AUTO: 0.15 10*3/MM3 (ref 0–0.4)
EOSINOPHIL NFR BLD AUTO: 2.3 % (ref 0.3–6.2)
ERYTHROCYTE [DISTWIDTH] IN BLOOD BY AUTOMATED COUNT: 15.4 % (ref 12.3–15.4)
GFR SERPL CREATININE-BSD FRML MDRD: 61 ML/MIN/1.73
GLUCOSE SERPL-MCNC: 138 MG/DL (ref 65–99)
HBA1C MFR BLD: 5.4 % (ref 4.8–5.6)
HCT VFR BLD AUTO: 33 % (ref 34–46.6)
HGB BLD-MCNC: 10.2 G/DL (ref 12–15.9)
IMM GRANULOCYTES # BLD AUTO: 0.02 10*3/MM3 (ref 0–0.05)
IMM GRANULOCYTES NFR BLD AUTO: 0.3 % (ref 0–0.5)
L PNEUMO1 AG UR QL IA: NEGATIVE
LYMPHOCYTES # BLD AUTO: 0.46 10*3/MM3 (ref 0.7–3.1)
LYMPHOCYTES NFR BLD AUTO: 6.9 % (ref 19.6–45.3)
MAGNESIUM SERPL-MCNC: 2.2 MG/DL (ref 1.6–2.4)
MCH RBC QN AUTO: 25.6 PG (ref 26.6–33)
MCHC RBC AUTO-ENTMCNC: 30.9 G/DL (ref 31.5–35.7)
MCV RBC AUTO: 82.7 FL (ref 79–97)
MONOCYTES # BLD AUTO: 0.63 10*3/MM3 (ref 0.1–0.9)
MONOCYTES NFR BLD AUTO: 9.5 % (ref 5–12)
MRSA DNA SPEC QL NAA+PROBE: NEGATIVE
NEUTROPHILS NFR BLD AUTO: 5.37 10*3/MM3 (ref 1.7–7)
NEUTROPHILS NFR BLD AUTO: 80.7 % (ref 42.7–76)
NRBC BLD AUTO-RTO: 0 /100 WBC (ref 0–0.2)
PLATELET # BLD AUTO: 199 10*3/MM3 (ref 140–450)
PMV BLD AUTO: 12.8 FL (ref 6–12)
POTASSIUM SERPL-SCNC: 3.4 MMOL/L (ref 3.5–5.2)
RBC # BLD AUTO: 3.99 10*6/MM3 (ref 3.77–5.28)
S PNEUM AG SPEC QL LA: NEGATIVE
SODIUM SERPL-SCNC: 136 MMOL/L (ref 136–145)
WBC NRBC COR # BLD: 6.65 10*3/MM3 (ref 3.4–10.8)

## 2021-11-28 PROCEDURE — 87205 SMEAR GRAM STAIN: CPT | Performed by: NURSE PRACTITIONER

## 2021-11-28 PROCEDURE — 82330 ASSAY OF CALCIUM: CPT | Performed by: NURSE PRACTITIONER

## 2021-11-28 PROCEDURE — 97162 PT EVAL MOD COMPLEX 30 MIN: CPT

## 2021-11-28 PROCEDURE — 85025 COMPLETE CBC W/AUTO DIFF WBC: CPT | Performed by: NURSE PRACTITIONER

## 2021-11-28 PROCEDURE — 97530 THERAPEUTIC ACTIVITIES: CPT

## 2021-11-28 PROCEDURE — 83735 ASSAY OF MAGNESIUM: CPT | Performed by: NURSE PRACTITIONER

## 2021-11-28 PROCEDURE — 86606 ASPERGILLUS ANTIBODY: CPT | Performed by: INTERNAL MEDICINE

## 2021-11-28 PROCEDURE — 99233 SBSQ HOSP IP/OBS HIGH 50: CPT | Performed by: INTERNAL MEDICINE

## 2021-11-28 PROCEDURE — 82784 ASSAY IGA/IGD/IGG/IGM EACH: CPT | Performed by: INTERNAL MEDICINE

## 2021-11-28 PROCEDURE — 63710000001 PREDNISONE PER 5 MG: Performed by: NURSE PRACTITIONER

## 2021-11-28 PROCEDURE — 99221 1ST HOSP IP/OBS SF/LOW 40: CPT | Performed by: INTERNAL MEDICINE

## 2021-11-28 PROCEDURE — 86612 BLASTOMYCES ANTIBODY: CPT | Performed by: INTERNAL MEDICINE

## 2021-11-28 PROCEDURE — 97166 OT EVAL MOD COMPLEX 45 MIN: CPT

## 2021-11-28 PROCEDURE — 87899 AGENT NOS ASSAY W/OPTIC: CPT | Performed by: NURSE PRACTITIONER

## 2021-11-28 PROCEDURE — 82785 ASSAY OF IGE: CPT | Performed by: INTERNAL MEDICINE

## 2021-11-28 PROCEDURE — 25010000002 PIPERACILLIN SOD-TAZOBACTAM PER 1 G: Performed by: NURSE PRACTITIONER

## 2021-11-28 PROCEDURE — 92610 EVALUATE SWALLOWING FUNCTION: CPT | Performed by: SPEECH-LANGUAGE PATHOLOGIST

## 2021-11-28 PROCEDURE — 80048 BASIC METABOLIC PNL TOTAL CA: CPT | Performed by: NURSE PRACTITIONER

## 2021-11-28 PROCEDURE — 94799 UNLISTED PULMONARY SVC/PX: CPT

## 2021-11-28 PROCEDURE — 87070 CULTURE OTHR SPECIMN AEROBIC: CPT | Performed by: NURSE PRACTITIONER

## 2021-11-28 PROCEDURE — 63710000001 PREDNISONE PER 1 MG: Performed by: INTERNAL MEDICINE

## 2021-11-28 PROCEDURE — 83036 HEMOGLOBIN GLYCOSYLATED A1C: CPT | Performed by: NURSE PRACTITIONER

## 2021-11-28 RX ORDER — PREDNISONE 20 MG/1
40 TABLET ORAL
Status: DISCONTINUED | OUTPATIENT
Start: 2021-11-28 | End: 2021-11-29 | Stop reason: HOSPADM

## 2021-11-28 RX ADMIN — TAZOBACTAM SODIUM AND PIPERACILLIN SODIUM 3.38 G: 375; 3 INJECTION, SOLUTION INTRAVENOUS at 11:35

## 2021-11-28 RX ADMIN — IPRATROPIUM BROMIDE AND ALBUTEROL SULFATE 3 ML: .5; 3 SOLUTION RESPIRATORY (INHALATION) at 19:26

## 2021-11-28 RX ADMIN — DOXEPIN HYDROCHLORIDE 40 MG: 10 CAPSULE ORAL at 21:34

## 2021-11-28 RX ADMIN — FLECAINIDE ACETATE 50 MG: 50 TABLET ORAL at 08:44

## 2021-11-28 RX ADMIN — PREDNISONE 5 MG: 5 TABLET ORAL at 08:45

## 2021-11-28 RX ADMIN — TAZOBACTAM SODIUM AND PIPERACILLIN SODIUM 3.38 G: 375; 3 INJECTION, SOLUTION INTRAVENOUS at 17:29

## 2021-11-28 RX ADMIN — FLECAINIDE ACETATE 50 MG: 50 TABLET ORAL at 21:34

## 2021-11-28 RX ADMIN — FLUOXETINE HYDROCHLORIDE 40 MG: 20 CAPSULE ORAL at 08:44

## 2021-11-28 RX ADMIN — ATENOLOL 100 MG: 50 TABLET ORAL at 08:44

## 2021-11-28 RX ADMIN — ALLOPURINOL 300 MG: 300 TABLET ORAL at 08:44

## 2021-11-28 RX ADMIN — PREDNISONE 40 MG: 20 TABLET ORAL at 15:12

## 2021-11-28 RX ADMIN — FERROUS SULFATE TAB 325 MG (65 MG ELEMENTAL FE) 325 MG: 325 (65 FE) TAB at 08:44

## 2021-11-28 RX ADMIN — AMLODIPINE BESYLATE 10 MG: 10 TABLET ORAL at 08:45

## 2021-11-28 RX ADMIN — IPRATROPIUM BROMIDE AND ALBUTEROL SULFATE 3 ML: .5; 3 SOLUTION RESPIRATORY (INHALATION) at 15:45

## 2021-11-28 RX ADMIN — TAZOBACTAM SODIUM AND PIPERACILLIN SODIUM 3.38 G: 375; 3 INJECTION, SOLUTION INTRAVENOUS at 01:25

## 2021-11-28 RX ADMIN — SODIUM CHLORIDE, PRESERVATIVE FREE 10 ML: 5 INJECTION INTRAVENOUS at 21:33

## 2021-11-28 RX ADMIN — IPRATROPIUM BROMIDE AND ALBUTEROL SULFATE 3 ML: .5; 3 SOLUTION RESPIRATORY (INHALATION) at 13:07

## 2021-11-28 RX ADMIN — PANTOPRAZOLE SODIUM 40 MG: 40 TABLET, DELAYED RELEASE ORAL at 08:45

## 2021-11-28 RX ADMIN — RIVAROXABAN 20 MG: 20 TABLET, FILM COATED ORAL at 17:29

## 2021-11-28 RX ADMIN — DICYCLOMINE HYDROCHLORIDE 10 MG: 10 CAPSULE ORAL at 21:33

## 2021-11-28 RX ADMIN — IPRATROPIUM BROMIDE AND ALBUTEROL SULFATE 3 ML: .5; 3 SOLUTION RESPIRATORY (INHALATION) at 07:39

## 2021-11-28 RX ADMIN — DICYCLOMINE HYDROCHLORIDE 10 MG: 10 CAPSULE ORAL at 08:45

## 2021-11-29 ENCOUNTER — APPOINTMENT (OUTPATIENT)
Dept: GENERAL RADIOLOGY | Facility: HOSPITAL | Age: 71
End: 2021-11-29

## 2021-11-29 ENCOUNTER — READMISSION MANAGEMENT (OUTPATIENT)
Dept: CALL CENTER | Facility: HOSPITAL | Age: 71
End: 2021-11-29

## 2021-11-29 VITALS
RESPIRATION RATE: 16 BRPM | BODY MASS INDEX: 26.53 KG/M2 | WEIGHT: 140.5 LBS | TEMPERATURE: 98.3 F | HEART RATE: 67 BPM | HEIGHT: 61 IN | SYSTOLIC BLOOD PRESSURE: 103 MMHG | DIASTOLIC BLOOD PRESSURE: 73 MMHG | OXYGEN SATURATION: 90 %

## 2021-11-29 PROBLEM — A41.9 SEPSIS: Status: RESOLVED | Noted: 2021-11-27 | Resolved: 2021-11-29

## 2021-11-29 LAB
ANION GAP SERPL CALCULATED.3IONS-SCNC: 13 MMOL/L (ref 5–15)
BASOPHILS # BLD AUTO: 0.01 10*3/MM3 (ref 0–0.2)
BASOPHILS NFR BLD AUTO: 0.1 % (ref 0–1.5)
BUN SERPL-MCNC: 17 MG/DL (ref 8–23)
BUN/CREAT SERPL: 15.9 (ref 7–25)
CALCIUM SPEC-SCNC: 8.3 MG/DL (ref 8.6–10.5)
CHLORIDE SERPL-SCNC: 100 MMOL/L (ref 98–107)
CO2 SERPL-SCNC: 23 MMOL/L (ref 22–29)
CREAT SERPL-MCNC: 1.07 MG/DL (ref 0.57–1)
DEPRECATED RDW RBC AUTO: 47.7 FL (ref 37–54)
EOSINOPHIL # BLD AUTO: 0 10*3/MM3 (ref 0–0.4)
EOSINOPHIL NFR BLD AUTO: 0 % (ref 0.3–6.2)
ERYTHROCYTE [DISTWIDTH] IN BLOOD BY AUTOMATED COUNT: 15.3 % (ref 12.3–15.4)
GFR SERPL CREATININE-BSD FRML MDRD: 51 ML/MIN/1.73
GLUCOSE SERPL-MCNC: 101 MG/DL (ref 65–99)
HCT VFR BLD AUTO: 33.7 % (ref 34–46.6)
HGB BLD-MCNC: 10.2 G/DL (ref 12–15.9)
IMM GRANULOCYTES # BLD AUTO: 0.05 10*3/MM3 (ref 0–0.05)
IMM GRANULOCYTES NFR BLD AUTO: 0.7 % (ref 0–0.5)
LYMPHOCYTES # BLD AUTO: 0.55 10*3/MM3 (ref 0.7–3.1)
LYMPHOCYTES NFR BLD AUTO: 7.8 % (ref 19.6–45.3)
MCH RBC QN AUTO: 25.8 PG (ref 26.6–33)
MCHC RBC AUTO-ENTMCNC: 30.3 G/DL (ref 31.5–35.7)
MCV RBC AUTO: 85.1 FL (ref 79–97)
MONOCYTES # BLD AUTO: 0.56 10*3/MM3 (ref 0.1–0.9)
MONOCYTES NFR BLD AUTO: 8 % (ref 5–12)
NEUTROPHILS NFR BLD AUTO: 5.87 10*3/MM3 (ref 1.7–7)
NEUTROPHILS NFR BLD AUTO: 83.4 % (ref 42.7–76)
NRBC BLD AUTO-RTO: 0 /100 WBC (ref 0–0.2)
PLATELET # BLD AUTO: 223 10*3/MM3 (ref 140–450)
PMV BLD AUTO: 13 FL (ref 6–12)
POTASSIUM SERPL-SCNC: 3.3 MMOL/L (ref 3.5–5.2)
RBC # BLD AUTO: 3.96 10*6/MM3 (ref 3.77–5.28)
SODIUM SERPL-SCNC: 136 MMOL/L (ref 136–145)
WBC NRBC COR # BLD: 7.04 10*3/MM3 (ref 3.4–10.8)

## 2021-11-29 PROCEDURE — 86256 FLUORESCENT ANTIBODY TITER: CPT | Performed by: INTERNAL MEDICINE

## 2021-11-29 PROCEDURE — 94799 UNLISTED PULMONARY SVC/PX: CPT

## 2021-11-29 PROCEDURE — 86606 ASPERGILLUS ANTIBODY: CPT | Performed by: INTERNAL MEDICINE

## 2021-11-29 PROCEDURE — 86331 IMMUNODIFFUSION OUCHTERLONY: CPT | Performed by: INTERNAL MEDICINE

## 2021-11-29 PROCEDURE — 80048 BASIC METABOLIC PNL TOTAL CA: CPT | Performed by: INTERNAL MEDICINE

## 2021-11-29 PROCEDURE — 63710000001 PREDNISONE PER 1 MG: Performed by: INTERNAL MEDICINE

## 2021-11-29 PROCEDURE — 83520 IMMUNOASSAY QUANT NOS NONAB: CPT | Performed by: INTERNAL MEDICINE

## 2021-11-29 PROCEDURE — 86671 FUNGUS NES ANTIBODY: CPT | Performed by: INTERNAL MEDICINE

## 2021-11-29 PROCEDURE — 86609 BACTERIUM ANTIBODY: CPT | Performed by: INTERNAL MEDICINE

## 2021-11-29 PROCEDURE — 99232 SBSQ HOSP IP/OBS MODERATE 35: CPT | Performed by: INTERNAL MEDICINE

## 2021-11-29 PROCEDURE — 74230 X-RAY XM SWLNG FUNCJ C+: CPT

## 2021-11-29 PROCEDURE — 85025 COMPLETE CBC W/AUTO DIFF WBC: CPT | Performed by: INTERNAL MEDICINE

## 2021-11-29 PROCEDURE — 82785 ASSAY OF IGE: CPT | Performed by: INTERNAL MEDICINE

## 2021-11-29 PROCEDURE — 86602 ANTINOMYCES ANTIBODY: CPT | Performed by: INTERNAL MEDICINE

## 2021-11-29 PROCEDURE — 25010000002 PIPERACILLIN SOD-TAZOBACTAM PER 1 G: Performed by: NURSE PRACTITIONER

## 2021-11-29 PROCEDURE — 87385 HISTOPLASMA CAPSUL AG IA: CPT | Performed by: NURSE PRACTITIONER

## 2021-11-29 PROCEDURE — 92611 MOTION FLUOROSCOPY/SWALLOW: CPT

## 2021-11-29 PROCEDURE — 87449 NOS EACH ORGANISM AG IA: CPT | Performed by: NURSE PRACTITIONER

## 2021-11-29 PROCEDURE — 99239 HOSP IP/OBS DSCHRG MGMT >30: CPT | Performed by: INTERNAL MEDICINE

## 2021-11-29 PROCEDURE — 86480 TB TEST CELL IMMUN MEASURE: CPT | Performed by: NURSE PRACTITIONER

## 2021-11-29 PROCEDURE — 82784 ASSAY IGA/IGD/IGG/IGM EACH: CPT | Performed by: INTERNAL MEDICINE

## 2021-11-29 RX ORDER — POTASSIUM CHLORIDE 7.45 MG/ML
10 INJECTION INTRAVENOUS
Status: DISCONTINUED | OUTPATIENT
Start: 2021-11-29 | End: 2021-11-29 | Stop reason: HOSPADM

## 2021-11-29 RX ORDER — POTASSIUM CHLORIDE 1.5 G/1.77G
40 POWDER, FOR SOLUTION ORAL AS NEEDED
Status: DISCONTINUED | OUTPATIENT
Start: 2021-11-29 | End: 2021-11-29 | Stop reason: HOSPADM

## 2021-11-29 RX ORDER — PREDNISONE 20 MG/1
20 TABLET ORAL
Qty: 30 TABLET | Refills: 0 | Status: SHIPPED | OUTPATIENT
Start: 2021-11-30 | End: 2021-12-06 | Stop reason: SDUPTHER

## 2021-11-29 RX ORDER — POTASSIUM CHLORIDE 750 MG/1
40 CAPSULE, EXTENDED RELEASE ORAL AS NEEDED
Status: DISCONTINUED | OUTPATIENT
Start: 2021-11-29 | End: 2021-11-29 | Stop reason: HOSPADM

## 2021-11-29 RX ADMIN — PANTOPRAZOLE SODIUM 40 MG: 40 TABLET, DELAYED RELEASE ORAL at 08:35

## 2021-11-29 RX ADMIN — ALLOPURINOL 300 MG: 300 TABLET ORAL at 08:35

## 2021-11-29 RX ADMIN — TAZOBACTAM SODIUM AND PIPERACILLIN SODIUM 3.38 G: 375; 3 INJECTION, SOLUTION INTRAVENOUS at 01:00

## 2021-11-29 RX ADMIN — BARIUM SULFATE 100 ML: 0.81 POWDER, FOR SUSPENSION ORAL at 12:27

## 2021-11-29 RX ADMIN — FLUOXETINE HYDROCHLORIDE 40 MG: 20 CAPSULE ORAL at 08:36

## 2021-11-29 RX ADMIN — DICYCLOMINE HYDROCHLORIDE 10 MG: 10 CAPSULE ORAL at 08:35

## 2021-11-29 RX ADMIN — IPRATROPIUM BROMIDE AND ALBUTEROL SULFATE 3 ML: .5; 3 SOLUTION RESPIRATORY (INHALATION) at 07:32

## 2021-11-29 RX ADMIN — TAZOBACTAM SODIUM AND PIPERACILLIN SODIUM 3.38 G: 375; 3 INJECTION, SOLUTION INTRAVENOUS at 09:45

## 2021-11-29 RX ADMIN — PREDNISONE 40 MG: 20 TABLET ORAL at 08:35

## 2021-11-29 RX ADMIN — FLECAINIDE ACETATE 50 MG: 50 TABLET ORAL at 08:36

## 2021-11-29 RX ADMIN — BARIUM SULFATE 20 ML: 400 PASTE ORAL at 12:27

## 2021-11-29 RX ADMIN — FERROUS SULFATE TAB 325 MG (65 MG ELEMENTAL FE) 325 MG: 325 (65 FE) TAB at 08:36

## 2021-11-30 LAB
BACTERIA SPEC RESP CULT: NORMAL
GRAM STN SPEC: NORMAL
M TB IFN-G BLD-IMP: NORMAL
QUANTIFERON INCUBATION: NORMAL

## 2021-11-30 NOTE — OUTREACH NOTE
Prep Survey      Responses   [facility] facility patient discharged from? [place]   Is LACE score < 7 ? No   Emergency Room discharge w/ pulse ox? No   Eligibility Readm Mgmt   Discharge diagnosis A/C hypoxic resp failure, PNA, COPD, Immunocompromised on RA meds   Does the patient have one of the following disease processes/diagnoses(primary or secondary)? COPD/Pneumonia   Does the patient have Home health ordered? Yes   What is the Home health agency?  [facility]   Is there a DME ordered? No   Prep survey completed? Yes          Tali Perez RN

## 2021-12-01 ENCOUNTER — READMISSION MANAGEMENT (OUTPATIENT)
Dept: CALL CENTER | Facility: HOSPITAL | Age: 71
End: 2021-12-01

## 2021-12-01 LAB
C-ANCA TITR SER IF: ABNORMAL TITER
H CAPSUL AG UR QL IA: <0.5
IGA SERPL-MCNC: 538 MG/DL (ref 87–352)
IGA SERPL-MCNC: 540 MG/DL (ref 87–352)
IGE SERPL-ACNC: 11 IU/ML (ref 6–495)
IGE SERPL-ACNC: 8 IU/ML (ref 6–495)
IGG SERPL-MCNC: 669 MG/DL (ref 586–1602)
IGG SERPL-MCNC: 677 MG/DL (ref 586–1602)
IGM SERPL-MCNC: 178 MG/DL (ref 26–217)
IGM SERPL-MCNC: 183 MG/DL (ref 26–217)
Lab: NORMAL
MYELOPEROXIDASE AB SER IA-ACNC: <9 U/ML (ref 0–9)
P-ANCA ATYPICAL TITR SER IF: ABNORMAL TITER
P-ANCA TITR SER IF: ABNORMAL TITER
PROTEINASE3 AB SER IA-ACNC: <3.5 U/ML (ref 0–3.5)

## 2021-12-01 NOTE — OUTREACH NOTE
COPD/PN Week 1 Survey      Responses   [facility] patient discharged from? [place]   Does the patient have one of the following disease processes/diagnoses(primary or secondary)? COPD/Pneumonia   Was the primary reason for admission: Pneumonia   Week 1 attempt successful? No   Unsuccessful attempts Attempt 1          Gita Antony RN

## 2021-12-02 LAB
A FLAVUS AB SER QL ID: NEGATIVE
A FUMIGATUS AB SER QL ID: NEGATIVE
A NIGER AB SER QL ID: NEGATIVE
B DERMAT AB TITR SER: NEGATIVE {TITER}
BACTERIA SPEC AEROBE CULT: NORMAL
BACTERIA SPEC AEROBE CULT: NORMAL

## 2021-12-03 LAB
A FUMIGATUS IGG SER QL: NEGATIVE
A PULLULANS AB SER QL: NEGATIVE
LACEYELLA SACCHARI AB SER QL: NEGATIVE
MVISTA(R) BLASTOMYCES AG: NORMAL NG/ML
PIGEON SERUM AB QL ID: NEGATIVE
S RECTIVIRGULA AB SER QL ID: NEGATIVE
SPECIMEN SOURCE: NORMAL
T VULGARIS AB SER QL ID: NEGATIVE

## 2021-12-06 ENCOUNTER — READMISSION MANAGEMENT (OUTPATIENT)
Dept: CALL CENTER | Facility: HOSPITAL | Age: 71
End: 2021-12-06

## 2021-12-06 ENCOUNTER — OFFICE VISIT (OUTPATIENT)
Dept: PULMONOLOGY | Facility: CLINIC | Age: 71
End: 2021-12-06

## 2021-12-06 VITALS
BODY MASS INDEX: 25.45 KG/M2 | HEART RATE: 93 BPM | DIASTOLIC BLOOD PRESSURE: 72 MMHG | TEMPERATURE: 97.7 F | WEIGHT: 134.8 LBS | SYSTOLIC BLOOD PRESSURE: 136 MMHG | RESPIRATION RATE: 16 BRPM | OXYGEN SATURATION: 80 % | HEIGHT: 61 IN

## 2021-12-06 DIAGNOSIS — J84.9 INTERSTITIAL LUNG DISEASE (HCC): ICD-10-CM

## 2021-12-06 DIAGNOSIS — J96.21 ACUTE AND CHRONIC RESPIRATORY FAILURE WITH HYPOXIA (HCC): Primary | ICD-10-CM

## 2021-12-06 DIAGNOSIS — J43.9 PULMONARY EMPHYSEMA, UNSPECIFIED EMPHYSEMA TYPE (HCC): ICD-10-CM

## 2021-12-06 DIAGNOSIS — M05.9 RHEUMATOID ARTHRITIS WITH POSITIVE RHEUMATOID FACTOR, INVOLVING UNSPECIFIED SITE (HCC): ICD-10-CM

## 2021-12-06 PROCEDURE — 99215 OFFICE O/P EST HI 40 MIN: CPT | Performed by: NURSE PRACTITIONER

## 2021-12-06 RX ORDER — PREDNISONE 20 MG/1
TABLET ORAL
Qty: 36 TABLET | Refills: 1 | Status: SHIPPED | OUTPATIENT
Start: 2021-12-06 | End: 2022-01-11

## 2021-12-06 RX ORDER — CETIRIZINE HYDROCHLORIDE 10 MG/1
10 CAPSULE, LIQUID FILLED ORAL DAILY PRN
COMMUNITY

## 2021-12-06 NOTE — PROGRESS NOTES
[facility] Pulmonary Follow up      Chief Complaint  Follow-up    Subjective          Liz Borjas presents to [facility] for evaluation after her hospital stay.  She was seen by Dr. Herrera as well as Dr. Camejo while during her most recent hospitalization in November.  It appears she does have some chronic interstitial lung disease with pulmonary fibrosis likely multifactorial given her history of rheumatoid arthritis as well as possibly related to some medication use with a history of methotrexate as well as flecainide use and a history of ordered around 13 years ago.      She also has a history of COPD as well as a history of tobacco use, she quit over 25 years ago. 1 PPD for 20 years.  She had no prior inhaler use.  No significant shortness of breath, cough or congestion, or hypoxemia prior to her recent hospitalizations.      She also had COVID-19 in August of this year.  Did well, very little symtpoms. Had monoclonal antibody infusion.     The end of September she fell and broke her hip, started coughing about a week or two after surgery.  A week later went to [facility] initially she did have a low oxygen that recovered quickly and a chest x-ray that did not show any pneumonia.  She was placed on a prednisone taper.  She ended up in the emergency department in October.  During that hospital stay at [facility] she was found to have pneumonia and completed a round of antibiotics and steroids.    She was readmitted in November with worsening hypoxic respiratory failure and a possible right lower lobe pneumonia.  She was seen by infectious disease and infectious work-up was negative.  She completed empiric antibiotics.    She also has rheumatoid arthritis and has been on Orencia.  This is a new agent for her.  She has been on methotrexate and Humira in the past with poor tolerance.  She is also on chronic prednisone of 5 mg daily.  She had her last shot on  "November 19th.    When she was discharged from the hospital this past Monday she was quite hypoxic and required 8 L to keep her oxygen saturations around 88.    Currently she has been on 40 mg grams of prednisone since Friday, on Saturday she did do a bit better.  She is still coughing up thick cloudy secretions.  She had been trying to do an albuterol nebulizers but it caused her mouth to be sore.      Objective     Vital Signs:   /72   Pulse 93   Temp 97.7 °F (36.5 °C)   Resp 16   Ht 154.9 cm (61\")   Wt 61.1 kg (134 lb 12.8 oz)   SpO2 (!) 80% Comment: 4L today, at home on 8L  BMI 25.47 kg/m²       There is no immunization history for the selected administration types on file for this patient.    Physical Exam  Vitals reviewed.   Constitutional:       General: She is not in acute distress.     Appearance: She is well-developed. She is not ill-appearing.      Comments: In a wheelchair   HENT:      Head: Normocephalic and atraumatic.   Eyes:      Pupils: Pupils are equal, round, and reactive to light.   Cardiovascular:      Rate and Rhythm: Normal rate and regular rhythm.      Heart sounds: No murmur heard.      Pulmonary:      Effort: Pulmonary effort is normal. No respiratory distress.      Breath sounds: Wheezing and rales present.   Abdominal:      General: Bowel sounds are normal. There is no distension.      Palpations: Abdomen is soft.   Musculoskeletal:         General: Normal range of motion.      Cervical back: Normal range of motion and neck supple.      Right lower leg: No edema.      Left lower leg: No edema.   Skin:     General: Skin is warm and dry.      Findings: No erythema.   Neurological:      Mental Status: She is alert and oriented to person, place, and time.   Psychiatric:         Behavior: Behavior normal.          Result Review :       Data reviewed: Radiologic studies CT scans from her hospital stay                  Assessment and Plan    Problem List Items Addressed This Visit  "       Musculoskeletal and Injuries    Rheumatoid arthritis with positive rheumatoid factor (HCC)       Pulmonary and Pneumonias    COPD (chronic obstructive pulmonary disease) (HCC)    Relevant Medications    Cetirizine HCl (ZyrTEC Allergy) 10 MG capsule    Acute and chronic respiratory failure with hypoxia (HCC) - Primary    Interstitial lung disease (HCC)    Relevant Medications    Cetirizine HCl (ZyrTEC Allergy) 10 MG capsule          I spoke with her primary care provider Ms. Valerie Sesay last Friday due to her worsening hypoxemia.  At this time I do believe her hypoxemia is multifactorial including her worsening interstitial lung disease following her pneumonia as well as an acute inflammatory response with a pulmonary fibrosis exacerbation.    She certainly has chronic underlying pulmonary fibrosis again likely multifactorial from her RA, medication use, and history of allergies 10+ years ago.  However she had been doing very well up into her pneumonia episode after breaking her hip.  She likely continues to have quite a bit of inflammatory response with worsening groundglass changes on her CT scan.    I would like to keep her on a prolonged prednisone taper with 40 mg for the next 2 weeks and then decreasing slowly.  I also gave them my card with my direct extension to call me if she is not improving or if she has a decline with any decrease in her prednisone.    We also discussed continuing on her oxygen as needed for her hypoxic respiratory failure, especially turning up with any activity.  She does have a 10 flow concentrator at home now.    She does have a remote history of tobacco use, and a diagnosis of COPD, she does have some emphysema on her CT scan.  However it does not appeared that she had any chronic issues prior to these acute issues in the last several months.  I do not feel like adding on a inhaler at this point would be much benefit.      She does need full work-up for her interstitial lung  disease including a full PFTs and a 6-minute walk test.  She had a noncontrast CT in the hospital that showed multiple areas of groundglass disease as well as some chronic changes with fibrotic changes and bronchiectasis.  Ultimately she will need a high-resolution CT scan after improvement in her groundglass disease, or her acute changes to further delineate her interstitial lung disease.    I will see if we can get some of her prior CT scans for comparison, from  and [facility].    Follow Up     Return in about 2 weeks (around 12/20/2021).  Patient was given instructions and counseling regarding her condition or for health maintenance advice. Please see specific information pulled into the AVS if appropriate.     I spent 45 minutes caring for Liz on this date of service. This time includes time spent by me in the following activities:preparing for the visit, reviewing tests, obtaining and/or reviewing a separately obtained history, performing a medically appropriate examination and/or evaluation , counseling and educating the patient/family/caregiver, ordering medications, tests, or procedures, referring and communicating with other health care professionals , documenting information in the medical record, independently interpreting results and communicating that information with the patient/family/caregiver and care coordination      ANITHA Arredondo, ACNP  [facility]  Electronically signed

## 2021-12-06 NOTE — OUTREACH NOTE
COPD/PN Week 1 Survey      Responses   [facility] patient discharged from? [place]   Does the patient have one of the following disease processes/diagnoses(primary or secondary)? COPD/Pneumonia   Was the primary reason for admission: Pneumonia   Week 1 attempt successful? No   Unsuccessful attempts Attempt 2          Neela Cooper LPN

## 2021-12-09 ENCOUNTER — READMISSION MANAGEMENT (OUTPATIENT)
Dept: CALL CENTER | Facility: HOSPITAL | Age: 71
End: 2021-12-09

## 2021-12-09 NOTE — OUTREACH NOTE
COPD/PN Week 2 Survey      Responses   [facility] patient discharged from? [place]   Does the patient have one of the following disease processes/diagnoses(primary or secondary)? COPD/Pneumonia   Was the primary reason for admission: Pneumonia   Week 2 attempt successful? No   Unsuccessful attempts Attempt 2          Neela Cooper LPN

## 2021-12-20 ENCOUNTER — OFFICE VISIT (OUTPATIENT)
Dept: PULMONOLOGY | Facility: CLINIC | Age: 71
End: 2021-12-20

## 2021-12-20 ENCOUNTER — READMISSION MANAGEMENT (OUTPATIENT)
Dept: CALL CENTER | Facility: HOSPITAL | Age: 71
End: 2021-12-20

## 2021-12-20 VITALS
HEART RATE: 95 BPM | RESPIRATION RATE: 18 BRPM | DIASTOLIC BLOOD PRESSURE: 90 MMHG | SYSTOLIC BLOOD PRESSURE: 96 MMHG | HEIGHT: 61 IN | BODY MASS INDEX: 26.43 KG/M2 | WEIGHT: 140 LBS | OXYGEN SATURATION: 95 %

## 2021-12-20 DIAGNOSIS — J96.21 ACUTE AND CHRONIC RESPIRATORY FAILURE WITH HYPOXIA (HCC): ICD-10-CM

## 2021-12-20 DIAGNOSIS — J43.9 PULMONARY EMPHYSEMA, UNSPECIFIED EMPHYSEMA TYPE (HCC): ICD-10-CM

## 2021-12-20 DIAGNOSIS — J84.9 INTERSTITIAL LUNG DISEASE (HCC): ICD-10-CM

## 2021-12-20 DIAGNOSIS — J18.9 PNEUMONIA DUE TO INFECTIOUS ORGANISM, UNSPECIFIED LATERALITY, UNSPECIFIED PART OF LUNG: Primary | ICD-10-CM

## 2021-12-20 PROCEDURE — 99215 OFFICE O/P EST HI 40 MIN: CPT | Performed by: NURSE PRACTITIONER

## 2021-12-20 RX ORDER — FERROUS SULFATE 325(65) MG
1 TABLET ORAL DAILY
COMMUNITY
Start: 2021-09-23

## 2021-12-20 RX ORDER — PREDNISONE 1 MG/1
TABLET ORAL
COMMUNITY
Start: 2021-09-02 | End: 2021-12-20 | Stop reason: SDUPTHER

## 2021-12-20 RX ORDER — TRAMADOL HYDROCHLORIDE 50 MG/1
TABLET ORAL
COMMUNITY
Start: 2021-10-15 | End: 2021-12-20 | Stop reason: SDUPTHER

## 2021-12-20 RX ORDER — DOXEPIN HYDROCHLORIDE 50 MG/1
50 CAPSULE ORAL
COMMUNITY
Start: 2021-11-11 | End: 2021-12-20 | Stop reason: SDUPTHER

## 2021-12-20 NOTE — PROGRESS NOTES
"[facility] Pulmonary Follow up      Chief Complaint  Breathing Problem (follow up )    Subjective          Liz Borjas presents to [facility] for follow-up on her hypoxemia.  She was in the hospital the end of November with worsening hypoxic respiratory failure with underlying interstitial lung disease.  It appears she does have some chronic interstitial lung disease with history of rheumatoid arthritis as well as possibly related to medication with history of methotrexate and flecainide use as well as a history of cards in 2008.    She also carries a history of COPD with tobacco use she quit over 25 years ago but had a 1 pack/day for 20-year history.    Prior to her recent hospitalizations, the end of September she fell and broke her hip and she was readmitted to the hospital in October with a pneumonia and again in November with worsening hypoxemia, she had no real issues with shortness of breath cough or congestion or hypoxemia.  Since her hospitalization and during her hospitalization she was having quite a bit of hypoxemia and required supplemental oxygen.    She is also being treated for rheumatoid arthritis and had been on some biologic agents, unfortunately she been having these worsening pneumonias in between treatment of her RA.  At this time her treatment is on hold.  She is also on chronic prednisone at 5 mg daily.    I saw her last on December 6, at that time she was having issues with her oxygen at home.  She was placed on a prednisone taper due to her worsening hypoxemia from her pulmonary fibrosis as well as underlying COPD.  She is currently continued on her prolonged prednisone taper of 40 mg, she will decrease to 30 mg tomorrow.  She is currently on 7 L flow oxygen at home but able to turn her tanks down to 4 to 5 L with activity.      Objective     Vital Signs:   BP 96/90   Pulse 95   Resp 18   Ht 154.9 cm (61\")   Wt 63.5 kg (140 lb)   SpO2 " 95%   BMI 26.45 kg/m²       There is no immunization history for the selected administration types on file for this patient.    Physical Exam  Vitals reviewed.   Constitutional:       General: She is not in acute distress.     Appearance: She is well-developed. She is not ill-appearing.   HENT:      Head: Normocephalic and atraumatic.   Eyes:      Pupils: Pupils are equal, round, and reactive to light.   Cardiovascular:      Rate and Rhythm: Normal rate and regular rhythm.      Heart sounds: No murmur heard.      Pulmonary:      Effort: Pulmonary effort is normal. No respiratory distress.      Breath sounds: Rales present. No wheezing.   Abdominal:      General: Bowel sounds are normal. There is no distension.      Palpations: Abdomen is soft.   Musculoskeletal:         General: Normal range of motion.      Cervical back: Normal range of motion and neck supple.   Skin:     General: Skin is warm and dry.      Findings: No erythema.   Neurological:      Mental Status: She is alert and oriented to person, place, and time.   Psychiatric:         Behavior: Behavior normal.          Result Review :              PA and lateral chest x-ray done the office today reviewed by me  Awaiting final MD interpretation                 Assessment and Plan    Problem List Items Addressed This Visit        Pulmonary and Pneumonias    COPD (chronic obstructive pulmonary disease) (HCC)    Pneumonia - Primary    Relevant Orders    XR Chest PA & Lateral    Acute and chronic respiratory failure with hypoxia (HCC)    Interstitial lung disease (HCC)        Today I had a prolonged discussion with Mrs. Borjas as well as her son in the room.  Mrs. Borjas was very active prior to her recent hospitalizations, but she likely did have underlying lung disease including COPD and interstitial lung disease from her history.    We did discuss with his recent pneumonia and exacerbations is likely she will not return fully to baseline but we will continue to  get her level of functionality improved.  Like for her to continue on her prednisone at this time as well as continue follow-up with rheumatology for treatment of her underlying autoimmune disorder.    We did discuss the importance of continuing with her activity at home.  She will continue to monitor her oxygen saturations at home and wean her oxygen as tolerated keeping her sats greater than or equal to 88 to 90% with activity.    On her next follow-up I would like to get full PFTs as well as 6-minute walk test to further evaluate her underlying interstitial lung disease.  When she is closer to baseline we will need a high-resolution CT scan as well for further information regarding her underlying interstitial disease.  I will can follow-up and see if we get the scans for comparison from [facility] from her arts in 2008 as well as her recent CT scan from [facility].        Follow Up     Return in about 4 weeks (around 1/17/2022).  Patient was given instructions and counseling regarding her condition or for health maintenance advice. Please see specific information pulled into the AVS if appropriate.     I spent 45 minutes caring for Liz on this date of service. This time includes time spent by me in the following activities:preparing for the visit, reviewing tests, obtaining and/or reviewing a separately obtained history, performing a medically appropriate examination and/or evaluation , counseling and educating the patient/family/caregiver, ordering medications, tests, or procedures, referring and communicating with other health care professionals , documenting information in the medical record, independently interpreting results and communicating that information with the patient/family/caregiver and care coordination        Kristyn Haro APRN, ACNP  [facility]  Electronically signed

## 2021-12-20 NOTE — OUTREACH NOTE
COPD/PN Week 3 Survey      Responses   [facility] patient discharged from? [place]   Does the patient have one of the following disease processes/diagnoses(primary or secondary)? COPD/Pneumonia   Was the primary reason for admission: Pneumonia   Week 3 attempt successful? No   Unsuccessful attempts Attempt 1          Neela Cooper LPN

## 2021-12-21 DIAGNOSIS — Z00.6 EXAMINATION FOR NORMAL COMPARISON OR CONTROL IN CLINICAL RESEARCH: Primary | ICD-10-CM

## 2021-12-23 DIAGNOSIS — Z00.6 EXAMINATION FOR NORMAL COMPARISON OR CONTROL IN CLINICAL RESEARCH: Primary | ICD-10-CM

## 2022-01-11 ENCOUNTER — OFFICE VISIT (OUTPATIENT)
Dept: PULMONOLOGY | Facility: CLINIC | Age: 72
End: 2022-01-11

## 2022-01-11 VITALS
OXYGEN SATURATION: 91 % | HEART RATE: 110 BPM | TEMPERATURE: 97.6 F | DIASTOLIC BLOOD PRESSURE: 62 MMHG | SYSTOLIC BLOOD PRESSURE: 124 MMHG | BODY MASS INDEX: 26.45 KG/M2 | HEIGHT: 61 IN

## 2022-01-11 DIAGNOSIS — J96.21 ACUTE AND CHRONIC RESPIRATORY FAILURE WITH HYPOXIA: ICD-10-CM

## 2022-01-11 DIAGNOSIS — J84.9 INTERSTITIAL LUNG DISEASE: Primary | ICD-10-CM

## 2022-01-11 DIAGNOSIS — R53.81 PHYSICAL DECONDITIONING: ICD-10-CM

## 2022-01-11 DIAGNOSIS — M05.9 RHEUMATOID ARTHRITIS WITH POSITIVE RHEUMATOID FACTOR, INVOLVING UNSPECIFIED SITE: ICD-10-CM

## 2022-01-11 PROCEDURE — 99214 OFFICE O/P EST MOD 30 MIN: CPT | Performed by: NURSE PRACTITIONER

## 2022-01-11 RX ORDER — DOXYCYCLINE HYCLATE 100 MG/1
100 CAPSULE ORAL 2 TIMES DAILY
Qty: 20 CAPSULE | Refills: 0 | Status: SHIPPED | OUTPATIENT
Start: 2022-01-11 | End: 2022-01-23 | Stop reason: HOSPADM

## 2022-01-11 RX ORDER — ALBUTEROL SULFATE 1.25 MG/3ML
1 SOLUTION RESPIRATORY (INHALATION) EVERY 6 HOURS PRN
Qty: 120 EACH | Refills: 0 | Status: SHIPPED | OUTPATIENT
Start: 2022-01-11

## 2022-01-11 RX ORDER — PREDNISONE 10 MG/1
10 TABLET ORAL DAILY
Qty: 30 TABLET | Refills: 0 | Status: ON HOLD | OUTPATIENT
Start: 2022-01-11 | End: 2022-01-23 | Stop reason: SDUPTHER

## 2022-01-11 NOTE — PROGRESS NOTES
"[facility] Pulmonary Follow up      Chief Complaint  Follow-up and Shortness of Breath    Subjective          Liz Borjas presents to [facility] for follow-up after her initial visit.  She does have chronic respiratory failure that is multifactorial.  She does have a history of cards and chronic interstitial changes.  She then had some acute illnesses with COVID in October and then a fall with postoperative pneumonia.    She also has rheumatoid arthritis.  She is awaiting new biologic therapy with her rheumatologist.    She has been on a prolonged prednisone taper due to worsening hypoxemia.  Currently she is down to 10 mg.  She did have some issues with anxiety and mood when she was dropping her doses from the taper.    She feels like her breathing is doing pretty well.  She is currently on 4 to 6 L with activity.  She is maintaining an oxygen saturation around 90%.  But will desaturate easily with activity.  She does have a chronic cough with chronic sputum production.  She is currently using her albuterol nebulizers, she is only able to use about half a vial because it irritates her mouth.  Her sputum has become a bit yellow the last few days with a bit more congestion.      Objective     Vital Signs:   /62   Pulse 110   Temp 97.6 °F (36.4 °C)   Ht 154.9 cm (61\")   SpO2 91% Comment: resting, 8 liters cont  BMI 26.45 kg/m²       Immunization History   Administered Date(s) Administered   • COVID-19 (MODERNA) 1st, 2nd, 3rd Dose Only 03/03/2021, 03/31/2021   • FLUAD TRI 65YR+ 12/27/2021   • Flu Vaccine Intradermal Quad 18-64YR 11/08/2007, 09/25/2009   • Flu Vaccine Split Quad 11/22/2013   • FluLaval/Fluarix/Fluzone >6 11/17/2016   • Hep A / Hep B 12/05/2019   • Hepatitis B 02/09/2017, 03/09/2017, 01/09/2020   • Influenza LAIV (Nasal) 10/25/2019   • Influenza Quad Vaccine (Inpatient) 10/26/2011   • Influenza, Unspecified 10/27/2013, 11/15/2014   • " Pneumococcal Conjugate 13-Valent (PCV13) 11/17/2016   • Pneumococcal Polysaccharide (PPSV23) 04/05/2018       Physical Exam  Vitals reviewed.   Constitutional:       Appearance: She is well-developed.   HENT:      Head: Normocephalic and atraumatic.   Eyes:      Pupils: Pupils are equal, round, and reactive to light.   Cardiovascular:      Rate and Rhythm: Normal rate and regular rhythm.      Heart sounds: No murmur heard.      Pulmonary:      Effort: Pulmonary effort is normal. No respiratory distress.      Breath sounds: Rhonchi and rales present. No wheezing.   Abdominal:      General: Bowel sounds are normal. There is no distension.      Palpations: Abdomen is soft.   Musculoskeletal:         General: Normal range of motion.      Cervical back: Normal range of motion and neck supple.      Right lower leg: No edema.      Left lower leg: No edema.   Skin:     General: Skin is warm and dry.      Findings: No erythema.   Neurological:      Mental Status: She is alert and oriented to person, place, and time.   Psychiatric:         Behavior: Behavior normal.          Result Review :       Data reviewed: Radiologic studies CT scan of the chest                    Assessment and Plan    Problem List Items Addressed This Visit        Musculoskeletal and Injuries    Rheumatoid arthritis with positive rheumatoid factor (HCC)    Relevant Medications    predniSONE (DELTASONE) 10 MG tablet       Pulmonary and Pneumonias    Acute and chronic respiratory failure with hypoxia (HCC)    Interstitial lung disease (HCC) - Primary    Relevant Medications    albuterol (ACCUNEB) 1.25 MG/3ML nebulizer solution    Other Relevant Orders    CT Chest Hi Resolution Diagnostic       Symptoms and Signs    Physical deconditioning        She does need a follow-up high-resolution CT scan to see if she continues to have worsening interstitial changes following her COVID-19 and pneumonia.    With her interstitial changes and bronchiectasis I would  like to start her on a percussion vest with her nebulizers.  She does have difficulty with chronic sputum production, and this recent bacterial pneumonia following her surgery.    I will go ahead and call in a round of doxycycline and prednisone taper since her sputum has changed as well as her congestion.    We also discussed the importance of continuing with her activity at home.  She has been hesitant to move around much secondary to her oxygen saturations.  She will continue on 4 to 6 L as needed with activity to keep her saturations around 88 to 89%.         Follow Up     No follow-ups on file.  Patient was given instructions and counseling regarding her condition or for health maintenance advice. Please see specific information pulled into the AVS if appropriate.       Moderate level of Medical Decision Making complexity based on 2 or more chronic stable illnesses and prescription drug management.    ANITHA Arredondo, ACNP  [facility] Pulmonary Critical Care Medicine  Electronically signed

## 2022-01-14 ENCOUNTER — APPOINTMENT (OUTPATIENT)
Dept: GENERAL RADIOLOGY | Facility: HOSPITAL | Age: 72
End: 2022-01-14

## 2022-01-14 ENCOUNTER — HOSPITAL ENCOUNTER (INPATIENT)
Facility: HOSPITAL | Age: 72
LOS: 9 days | Discharge: HOME HEALTH | End: 2022-01-23
Attending: EMERGENCY MEDICINE | Admitting: INTERNAL MEDICINE

## 2022-01-14 DIAGNOSIS — J96.21 ACUTE AND CHRONIC RESPIRATORY FAILURE WITH HYPOXIA: Primary | ICD-10-CM

## 2022-01-14 DIAGNOSIS — J84.10 PULMONARY FIBROSIS: ICD-10-CM

## 2022-01-14 PROBLEM — N18.31 STAGE 3A CHRONIC KIDNEY DISEASE: Status: ACTIVE | Noted: 2022-01-14

## 2022-01-14 LAB
ALBUMIN SERPL-MCNC: 2.7 G/DL (ref 3.5–5.2)
ALBUMIN/GLOB SERPL: 0.7 G/DL
ALP SERPL-CCNC: 200 U/L (ref 39–117)
ALT SERPL W P-5'-P-CCNC: 10 U/L (ref 1–33)
ANION GAP SERPL CALCULATED.3IONS-SCNC: 13 MMOL/L (ref 5–15)
ARTERIAL PATENCY WRIST A: ABNORMAL
ARTERIAL PATENCY WRIST A: POSITIVE
AST SERPL-CCNC: 13 U/L (ref 1–32)
ATMOSPHERIC PRESS: ABNORMAL MM[HG]
ATMOSPHERIC PRESS: ABNORMAL MM[HG]
BASE EXCESS BLDA CALC-SCNC: 5.7 MMOL/L (ref 0–2)
BASE EXCESS BLDA CALC-SCNC: 6.5 MMOL/L (ref 0–2)
BASOPHILS # BLD AUTO: 0.03 10*3/MM3 (ref 0–0.2)
BASOPHILS NFR BLD AUTO: 0.2 % (ref 0–1.5)
BDY SITE: ABNORMAL
BDY SITE: ABNORMAL
BILIRUB SERPL-MCNC: 0.7 MG/DL (ref 0–1.2)
BODY TEMPERATURE: 37 C
BODY TEMPERATURE: 37 C
BUN SERPL-MCNC: 13 MG/DL (ref 8–23)
BUN/CREAT SERPL: 11.3 (ref 7–25)
CALCIUM SPEC-SCNC: 8.6 MG/DL (ref 8.6–10.5)
CHLORIDE SERPL-SCNC: 98 MMOL/L (ref 98–107)
CO2 BLDA-SCNC: 31.5 MMOL/L (ref 22–33)
CO2 BLDA-SCNC: 32.1 MMOL/L (ref 22–33)
CO2 SERPL-SCNC: 28 MMOL/L (ref 22–29)
COHGB MFR BLD: 1.5 % (ref 0–2)
COHGB MFR BLD: 1.5 % (ref 0–2)
CREAT SERPL-MCNC: 1.15 MG/DL (ref 0.57–1)
CRP SERPL-MCNC: 6.6 MG/DL (ref 0–0.5)
D-LACTATE SERPL-SCNC: 1.9 MMOL/L (ref 0.5–2)
DEPRECATED RDW RBC AUTO: 52.9 FL (ref 37–54)
EOSINOPHIL # BLD AUTO: 0.15 10*3/MM3 (ref 0–0.4)
EOSINOPHIL NFR BLD AUTO: 1.2 % (ref 0.3–6.2)
EPAP: 0
ERYTHROCYTE [DISTWIDTH] IN BLOOD BY AUTOMATED COUNT: 17.2 % (ref 12.3–15.4)
FERRITIN SERPL-MCNC: 164.8 NG/ML (ref 13–150)
FLUAV RNA RESP QL NAA+PROBE: NOT DETECTED
FLUBV RNA RESP QL NAA+PROBE: NOT DETECTED
GFR SERPL CREATININE-BSD FRML MDRD: 47 ML/MIN/1.73
GLOBULIN UR ELPH-MCNC: 3.8 GM/DL
GLUCOSE SERPL-MCNC: 109 MG/DL (ref 65–99)
HCO3 BLDA-SCNC: 30.2 MMOL/L (ref 20–26)
HCO3 BLDA-SCNC: 30.8 MMOL/L (ref 20–26)
HCT VFR BLD AUTO: 37.9 % (ref 34–46.6)
HCT VFR BLD CALC: 31.9 % (ref 38–51)
HCT VFR BLD CALC: 34.3 % (ref 38–51)
HGB BLD-MCNC: 11.4 G/DL (ref 12–15.9)
HGB BLDA-MCNC: 10.4 G/DL (ref 14–18)
HGB BLDA-MCNC: 11.2 G/DL (ref 14–18)
HOLD SPECIMEN: NORMAL
IMM GRANULOCYTES # BLD AUTO: 0.07 10*3/MM3 (ref 0–0.05)
IMM GRANULOCYTES NFR BLD AUTO: 0.5 % (ref 0–0.5)
INHALED O2 CONCENTRATION: 40 %
INHALED O2 CONCENTRATION: 48 %
IPAP: 0
LDH SERPL-CCNC: 322 U/L (ref 135–214)
LYMPHOCYTES # BLD AUTO: 0.18 10*3/MM3 (ref 0.7–3.1)
LYMPHOCYTES NFR BLD AUTO: 1.4 % (ref 19.6–45.3)
MCH RBC QN AUTO: 25.3 PG (ref 26.6–33)
MCHC RBC AUTO-ENTMCNC: 30.1 G/DL (ref 31.5–35.7)
MCV RBC AUTO: 84 FL (ref 79–97)
METHGB BLD QL: 0.3 % (ref 0–1.5)
METHGB BLD QL: ABNORMAL
MODALITY: ABNORMAL
MODALITY: ABNORMAL
MONOCYTES # BLD AUTO: 0.48 10*3/MM3 (ref 0.1–0.9)
MONOCYTES NFR BLD AUTO: 3.7 % (ref 5–12)
NEUTROPHILS NFR BLD AUTO: 11.9 10*3/MM3 (ref 1.7–7)
NEUTROPHILS NFR BLD AUTO: 93 % (ref 42.7–76)
NOTE: ABNORMAL
NOTE: ABNORMAL
NRBC BLD AUTO-RTO: 0 /100 WBC (ref 0–0.2)
NT-PROBNP SERPL-MCNC: 584 PG/ML (ref 0–900)
OXYHGB MFR BLDV: 80.9 % (ref 94–99)
OXYHGB MFR BLDV: 89.1 % (ref 94–99)
PAW @ PEAK INSP FLOW SETTING VENT: 0 CMH2O
PCO2 BLDA: 42.4 MM HG (ref 35–45)
PCO2 BLDA: 42.8 MM HG (ref 35–45)
PCO2 TEMP ADJ BLD: 42.4 MM HG (ref 35–45)
PCO2 TEMP ADJ BLD: 42.8 MM HG (ref 35–45)
PH BLDA: 7.46 PH UNITS (ref 7.35–7.45)
PH BLDA: 7.47 PH UNITS (ref 7.35–7.45)
PH, TEMP CORRECTED: 7.46 PH UNITS
PH, TEMP CORRECTED: 7.47 PH UNITS
PLATELET # BLD AUTO: 218 10*3/MM3 (ref 140–450)
PMV BLD AUTO: 11.7 FL (ref 6–12)
PO2 BLDA: 45.8 MM HG (ref 83–108)
PO2 BLDA: 57 MM HG (ref 83–108)
PO2 TEMP ADJ BLD: 45.8 MM HG (ref 83–108)
PO2 TEMP ADJ BLD: 57 MM HG (ref 83–108)
POTASSIUM SERPL-SCNC: 3.5 MMOL/L (ref 3.5–5.2)
PROCALCITONIN SERPL-MCNC: 0.15 NG/ML (ref 0–0.25)
PROT SERPL-MCNC: 6.5 G/DL (ref 6–8.5)
QT INTERVAL: 358 MS
QTC INTERVAL: 470 MS
RBC # BLD AUTO: 4.51 10*6/MM3 (ref 3.77–5.28)
RSV RNA NPH QL NAA+NON-PROBE: NOT DETECTED
SARS-COV-2 RNA RESP QL NAA+PROBE: NOT DETECTED
SODIUM SERPL-SCNC: 139 MMOL/L (ref 136–145)
TOTAL RATE: 0 BREATHS/MINUTE
TOTAL RATE: 24 BREATHS/MINUTE
TROPONIN T SERPL-MCNC: <0.01 NG/ML (ref 0–0.03)
WBC NRBC COR # BLD: 12.81 10*3/MM3 (ref 3.4–10.8)
WHOLE BLOOD HOLD SPECIMEN: NORMAL
WHOLE BLOOD HOLD SPECIMEN: NORMAL

## 2022-01-14 PROCEDURE — 94761 N-INVAS EAR/PLS OXIMETRY MLT: CPT

## 2022-01-14 PROCEDURE — 71045 X-RAY EXAM CHEST 1 VIEW: CPT

## 2022-01-14 PROCEDURE — 25010000002 METHYLPREDNISOLONE PER 40 MG: Performed by: NURSE PRACTITIONER

## 2022-01-14 PROCEDURE — 5A09457 ASSISTANCE WITH RESPIRATORY VENTILATION, 24-96 CONSECUTIVE HOURS, CONTINUOUS POSITIVE AIRWAY PRESSURE: ICD-10-PCS | Performed by: INTERNAL MEDICINE

## 2022-01-14 PROCEDURE — 94660 CPAP INITIATION&MGMT: CPT

## 2022-01-14 PROCEDURE — 25010000002 PIPERACILLIN SOD-TAZOBACTAM PER 1 G

## 2022-01-14 PROCEDURE — 94799 UNLISTED PULMONARY SVC/PX: CPT

## 2022-01-14 PROCEDURE — 82728 ASSAY OF FERRITIN: CPT | Performed by: EMERGENCY MEDICINE

## 2022-01-14 PROCEDURE — 25010000002 AZITHROMYCIN PER 500 MG: Performed by: EMERGENCY MEDICINE

## 2022-01-14 PROCEDURE — 83605 ASSAY OF LACTIC ACID: CPT | Performed by: EMERGENCY MEDICINE

## 2022-01-14 PROCEDURE — 83050 HGB METHEMOGLOBIN QUAN: CPT

## 2022-01-14 PROCEDURE — 25010000002 METHYLPREDNISOLONE PER 125 MG: Performed by: EMERGENCY MEDICINE

## 2022-01-14 PROCEDURE — 36600 WITHDRAWAL OF ARTERIAL BLOOD: CPT

## 2022-01-14 PROCEDURE — 99284 EMERGENCY DEPT VISIT MOD MDM: CPT

## 2022-01-14 PROCEDURE — 85025 COMPLETE CBC W/AUTO DIFF WBC: CPT | Performed by: EMERGENCY MEDICINE

## 2022-01-14 PROCEDURE — 87636 SARSCOV2 & INF A&B AMP PRB: CPT | Performed by: EMERGENCY MEDICINE

## 2022-01-14 PROCEDURE — 87637 SARSCOV2&INF A&B&RSV AMP PRB: CPT | Performed by: EMERGENCY MEDICINE

## 2022-01-14 PROCEDURE — 82375 ASSAY CARBOXYHB QUANT: CPT

## 2022-01-14 PROCEDURE — 94640 AIRWAY INHALATION TREATMENT: CPT

## 2022-01-14 PROCEDURE — 80053 COMPREHEN METABOLIC PANEL: CPT | Performed by: EMERGENCY MEDICINE

## 2022-01-14 PROCEDURE — 82805 BLOOD GASES W/O2 SATURATION: CPT

## 2022-01-14 PROCEDURE — 93005 ELECTROCARDIOGRAM TRACING: CPT | Performed by: EMERGENCY MEDICINE

## 2022-01-14 PROCEDURE — 87040 BLOOD CULTURE FOR BACTERIA: CPT | Performed by: EMERGENCY MEDICINE

## 2022-01-14 PROCEDURE — 84145 PROCALCITONIN (PCT): CPT | Performed by: EMERGENCY MEDICINE

## 2022-01-14 PROCEDURE — 83615 LACTATE (LD) (LDH) ENZYME: CPT | Performed by: EMERGENCY MEDICINE

## 2022-01-14 PROCEDURE — 84484 ASSAY OF TROPONIN QUANT: CPT | Performed by: EMERGENCY MEDICINE

## 2022-01-14 PROCEDURE — 94664 DEMO&/EVAL PT USE INHALER: CPT

## 2022-01-14 PROCEDURE — 99223 1ST HOSP IP/OBS HIGH 75: CPT | Performed by: INTERNAL MEDICINE

## 2022-01-14 PROCEDURE — 86140 C-REACTIVE PROTEIN: CPT | Performed by: EMERGENCY MEDICINE

## 2022-01-14 PROCEDURE — 83880 ASSAY OF NATRIURETIC PEPTIDE: CPT | Performed by: EMERGENCY MEDICINE

## 2022-01-14 RX ORDER — ACETAMINOPHEN 325 MG/1
650 TABLET ORAL EVERY 4 HOURS PRN
Status: DISCONTINUED | OUTPATIENT
Start: 2022-01-14 | End: 2022-01-23 | Stop reason: HOSPADM

## 2022-01-14 RX ORDER — CETIRIZINE HYDROCHLORIDE 10 MG/1
5 TABLET ORAL DAILY
Status: DISCONTINUED | OUTPATIENT
Start: 2022-01-14 | End: 2022-01-23 | Stop reason: HOSPADM

## 2022-01-14 RX ORDER — PANTOPRAZOLE SODIUM 40 MG/1
40 TABLET, DELAYED RELEASE ORAL
Status: DISCONTINUED | OUTPATIENT
Start: 2022-01-14 | End: 2022-01-23 | Stop reason: HOSPADM

## 2022-01-14 RX ORDER — AMLODIPINE BESYLATE 10 MG/1
10 TABLET ORAL DAILY
Status: DISCONTINUED | OUTPATIENT
Start: 2022-01-15 | End: 2022-01-19

## 2022-01-14 RX ORDER — ALBUTEROL SULFATE 90 UG/1
6 AEROSOL, METERED RESPIRATORY (INHALATION) ONCE
Status: COMPLETED | OUTPATIENT
Start: 2022-01-14 | End: 2022-01-14

## 2022-01-14 RX ORDER — ONDANSETRON 2 MG/ML
4 INJECTION INTRAMUSCULAR; INTRAVENOUS EVERY 6 HOURS PRN
Status: DISCONTINUED | OUTPATIENT
Start: 2022-01-14 | End: 2022-01-23 | Stop reason: HOSPADM

## 2022-01-14 RX ORDER — ALLOPURINOL 300 MG/1
300 TABLET ORAL DAILY
Status: DISCONTINUED | OUTPATIENT
Start: 2022-01-14 | End: 2022-01-23 | Stop reason: HOSPADM

## 2022-01-14 RX ORDER — SODIUM CHLORIDE 0.9 % (FLUSH) 0.9 %
10 SYRINGE (ML) INJECTION AS NEEDED
Status: DISCONTINUED | OUTPATIENT
Start: 2022-01-14 | End: 2022-01-23 | Stop reason: HOSPADM

## 2022-01-14 RX ORDER — FLUOXETINE HYDROCHLORIDE 20 MG/1
40 CAPSULE ORAL DAILY
Status: DISCONTINUED | OUTPATIENT
Start: 2022-01-14 | End: 2022-01-23 | Stop reason: HOSPADM

## 2022-01-14 RX ORDER — DOXEPIN HYDROCHLORIDE 10 MG/1
40 CAPSULE ORAL NIGHTLY
Status: DISCONTINUED | OUTPATIENT
Start: 2022-01-14 | End: 2022-01-23 | Stop reason: HOSPADM

## 2022-01-14 RX ORDER — FLECAINIDE ACETATE 50 MG/1
50 TABLET ORAL 2 TIMES DAILY
Status: DISCONTINUED | OUTPATIENT
Start: 2022-01-14 | End: 2022-01-23 | Stop reason: HOSPADM

## 2022-01-14 RX ORDER — METHYLPREDNISOLONE SODIUM SUCCINATE 40 MG/ML
40 INJECTION, POWDER, LYOPHILIZED, FOR SOLUTION INTRAMUSCULAR; INTRAVENOUS EVERY 12 HOURS SCHEDULED
Status: DISCONTINUED | OUTPATIENT
Start: 2022-01-14 | End: 2022-01-15

## 2022-01-14 RX ORDER — IPRATROPIUM BROMIDE AND ALBUTEROL SULFATE 2.5; .5 MG/3ML; MG/3ML
3 SOLUTION RESPIRATORY (INHALATION)
Status: DISCONTINUED | OUTPATIENT
Start: 2022-01-14 | End: 2022-01-22

## 2022-01-14 RX ORDER — ONDANSETRON 4 MG/1
4 TABLET, FILM COATED ORAL EVERY 6 HOURS PRN
Status: DISCONTINUED | OUTPATIENT
Start: 2022-01-14 | End: 2022-01-23 | Stop reason: HOSPADM

## 2022-01-14 RX ORDER — SODIUM CHLORIDE 0.9 % (FLUSH) 0.9 %
10 SYRINGE (ML) INJECTION EVERY 12 HOURS SCHEDULED
Status: DISCONTINUED | OUTPATIENT
Start: 2022-01-14 | End: 2022-01-23 | Stop reason: HOSPADM

## 2022-01-14 RX ORDER — ALBUTEROL SULFATE 2.5 MG/3ML
2.5 SOLUTION RESPIRATORY (INHALATION) EVERY 4 HOURS PRN
Status: DISCONTINUED | OUTPATIENT
Start: 2022-01-14 | End: 2022-01-23 | Stop reason: HOSPADM

## 2022-01-14 RX ORDER — LOSARTAN POTASSIUM 50 MG/1
100 TABLET ORAL DAILY
COMMUNITY
End: 2022-01-23 | Stop reason: HOSPADM

## 2022-01-14 RX ORDER — HYDROXYZINE HYDROCHLORIDE 25 MG/1
50 TABLET, FILM COATED ORAL 3 TIMES DAILY PRN
Status: DISCONTINUED | OUTPATIENT
Start: 2022-01-14 | End: 2022-01-14

## 2022-01-14 RX ORDER — HYDROXYZINE HYDROCHLORIDE 25 MG/1
50 TABLET, FILM COATED ORAL 3 TIMES DAILY PRN
Status: DISCONTINUED | OUTPATIENT
Start: 2022-01-14 | End: 2022-01-23 | Stop reason: HOSPADM

## 2022-01-14 RX ORDER — FERROUS SULFATE 325(65) MG
325 TABLET ORAL DAILY
Status: DISCONTINUED | OUTPATIENT
Start: 2022-01-14 | End: 2022-01-23 | Stop reason: HOSPADM

## 2022-01-14 RX ORDER — LOSARTAN POTASSIUM 50 MG/1
50 TABLET ORAL DAILY
Status: DISCONTINUED | OUTPATIENT
Start: 2022-01-15 | End: 2022-01-20

## 2022-01-14 RX ORDER — DICYCLOMINE HYDROCHLORIDE 10 MG/1
10 CAPSULE ORAL 2 TIMES DAILY
Status: DISCONTINUED | OUTPATIENT
Start: 2022-01-14 | End: 2022-01-23 | Stop reason: HOSPADM

## 2022-01-14 RX ORDER — GUAIFENESIN 600 MG/1
1200 TABLET, EXTENDED RELEASE ORAL EVERY 12 HOURS SCHEDULED
Status: DISCONTINUED | OUTPATIENT
Start: 2022-01-14 | End: 2022-01-15 | Stop reason: SDUPTHER

## 2022-01-14 RX ORDER — TRAMADOL HYDROCHLORIDE 50 MG/1
50 TABLET ORAL EVERY 6 HOURS PRN
Status: DISCONTINUED | OUTPATIENT
Start: 2022-01-14 | End: 2022-01-23 | Stop reason: HOSPADM

## 2022-01-14 RX ORDER — ATENOLOL 50 MG/1
100 TABLET ORAL DAILY
Status: DISCONTINUED | OUTPATIENT
Start: 2022-01-15 | End: 2022-01-23 | Stop reason: HOSPADM

## 2022-01-14 RX ORDER — METHYLPREDNISOLONE SODIUM SUCCINATE 125 MG/2ML
125 INJECTION, POWDER, LYOPHILIZED, FOR SOLUTION INTRAMUSCULAR; INTRAVENOUS ONCE
Status: COMPLETED | OUTPATIENT
Start: 2022-01-14 | End: 2022-01-14

## 2022-01-14 RX ADMIN — FLECAINIDE ACETATE 50 MG: 50 TABLET ORAL at 20:28

## 2022-01-14 RX ADMIN — ALLOPURINOL 300 MG: 300 TABLET ORAL at 18:18

## 2022-01-14 RX ADMIN — PANTOPRAZOLE SODIUM 40 MG: 40 TABLET, DELAYED RELEASE ORAL at 18:18

## 2022-01-14 RX ADMIN — DOXYCYCLINE 100 MG: 100 INJECTION, POWDER, LYOPHILIZED, FOR SOLUTION INTRAVENOUS at 19:49

## 2022-01-14 RX ADMIN — IPRATROPIUM BROMIDE AND ALBUTEROL SULFATE 3 ML: .5; 2.5 SOLUTION RESPIRATORY (INHALATION) at 19:24

## 2022-01-14 RX ADMIN — AZITHROMYCIN DIHYDRATE 500 MG: 500 INJECTION, POWDER, LYOPHILIZED, FOR SOLUTION INTRAVENOUS at 16:36

## 2022-01-14 RX ADMIN — HYDROXYZINE HYDROCHLORIDE 50 MG: 25 TABLET ORAL at 21:29

## 2022-01-14 RX ADMIN — TAZOBACTAM SODIUM AND PIPERACILLIN SODIUM 4.5 G: 500; 4 INJECTION, SOLUTION INTRAVENOUS at 18:30

## 2022-01-14 RX ADMIN — METHYLPREDNISOLONE SODIUM SUCCINATE 125 MG: 125 INJECTION, POWDER, FOR SOLUTION INTRAMUSCULAR; INTRAVENOUS at 13:25

## 2022-01-14 RX ADMIN — DICYCLOMINE HYDROCHLORIDE 10 MG: 10 CAPSULE ORAL at 20:27

## 2022-01-14 RX ADMIN — ALBUTEROL SULFATE 6 PUFF: 90 AEROSOL, METERED RESPIRATORY (INHALATION) at 13:18

## 2022-01-14 RX ADMIN — CETIRIZINE HYDROCHLORIDE 5 MG: 10 TABLET, FILM COATED ORAL at 18:18

## 2022-01-14 RX ADMIN — GUAIFENESIN 1200 MG: 600 TABLET, EXTENDED RELEASE ORAL at 20:28

## 2022-01-14 RX ADMIN — METHYLPREDNISOLONE SODIUM SUCCINATE 40 MG: 40 INJECTION, POWDER, FOR SOLUTION INTRAMUSCULAR; INTRAVENOUS at 20:28

## 2022-01-14 RX ADMIN — SODIUM CHLORIDE, PRESERVATIVE FREE 10 ML: 5 INJECTION INTRAVENOUS at 20:47

## 2022-01-14 RX ADMIN — TRAMADOL HYDROCHLORIDE 50 MG: 50 TABLET, COATED ORAL at 21:29

## 2022-01-14 RX ADMIN — DOXEPIN HYDROCHLORIDE 40 MG: 10 CAPSULE ORAL at 20:28

## 2022-01-14 NOTE — H&P
[facility] Medicine Services  HISTORY AND PHYSICAL    Patient Name: Liz Borjas  : 1950  MRN: [id number]  Primary Care Physician: Valerie Sesay, APRN  Date of admission: 2022    Subjective   Subjective     Chief Complaint:  Shortness of air, dyspnea on exertion    HPI:  Liz Borjas is a 71 y.o. female with past medical history of COPD, chronic oxygen dependent, pulmonary fibrosis, RA, hypertension, atrial fibrillation, cirrhosis, CKD presented to [facility]  ED with complaints of worsening dyspnea x1 week.  Patient daughter-in-law helps provide history.  States that since patient's last hospitalization (discharged 2021) her respiratory status has been doing well as she had been tapering her oral steroids.  This week the patient tapered down to prednisone 10 mg daily and has been starting to experience more coughing, more shortness of breath at rest, worsening dyspnea on exertion.  Patient has been coming more week and has stopped eating and drinking well.  Has become more dizzy and lightheaded with ambulation.  No fever or chills, no nausea vomiting diarrhea, no palpitations or chest pain.  She began coughing up thick sputum with more color where she normally has clear to white sputum typically every morning.  [facility] on [facility] and was started on doxycycline and increased oral steroids.  This has not improved her symptoms and today the patient decided that she was tired of feeling terrible and wanted to be seen at the hospital.  She additionally has had to go up on her oxygen, she typically is able to tolerate 4 to 6 L by nasal cannula with ambulation and has not been able to wean her oxygen over the last week, keeping her oxygen at 6 to 7 L nasal cannula consistently.  In ED, labs are mostly stable with LDH of 322, creatinine 1.15, WBC 12.8, lactate 1.9, Pro-Khoa 0.15, CRP 6.6.  COVID-19 swab negative.  Chest x-ray with redemonstration  extensive fibrotic appearance of the lung fields with appearance suggestive of some superimposed opacities, edema or pneumonia in both upper lobes.  Patient is very tachypneic with her respiratory rate in the 30s and 40s, and oxygen saturations in the 80s and she has now been placed on BiPAP.  Patient will be admitted to hospital medicine for further evaluation and treatment.      COVID Details:        Symptoms: [] NONE [] Fever [x]  Cough [x] Shortness of breath [] Change in taste or smell  The patient has started, but not completed, their COVID-19 vaccination series.    Review of Systems   Constitutional: Positive for activity change, appetite change and fatigue. Negative for chills, diaphoresis, fever and unexpected weight change.   HENT: Positive for congestion. Negative for trouble swallowing and voice change.    Eyes: Negative for photophobia, redness and visual disturbance.   Respiratory: Positive for cough and shortness of breath. Negative for chest tightness and wheezing.    Cardiovascular: Negative for chest pain, palpitations and leg swelling.   Gastrointestinal: Negative for abdominal distention, abdominal pain, nausea and vomiting.   Endocrine: Negative for cold intolerance and heat intolerance.   Genitourinary: Negative for difficulty urinating and dysuria.   Musculoskeletal: Negative for arthralgias, back pain and gait problem.   Skin: Negative for color change, pallor, rash and wound.   Neurological: Positive for dizziness and weakness. Negative for facial asymmetry and speech difficulty.   Hematological: Negative for adenopathy. Does not bruise/bleed easily.   Psychiatric/Behavioral: Negative for agitation, behavioral problems and confusion.        All other systems reviewed and are negative.     Personal History     Past Medical History:   Diagnosis Date   • Arthritis    • Atrial fibrillation (HCC)    • Closed right hip fracture (HCC)    • GERD (gastroesophageal reflux disease)    • Gout    •  Hyperlipidemia    • Hypertension    • Osteoporosis    • Rheumatoid arthritis (HCC)    • Wears dentures     upper   • Wears glasses     reading       Past Surgical History:   Procedure Laterality Date   • ANKLE SURGERY Right    • CHOLECYSTECTOMY     • COLONOSCOPY      5 years ago   • HIP FRACTURE SURGERY      Right Hip with Pins   • LUMBAR DISCECTOMY Left 10/12/2016    Procedure: lumbar MICROdiscectomy LEFT L3-5;  Surgeon: Chris Son MD;  Location: [facility];  Service:    • REPLACEMENT TOTAL KNEE      Right Knee   • TOE AMPUTATION      right baby toe, left second toe       Family History: family history includes Cancer in her father; Heart disease in her mother. Otherwise pertinent FHx was reviewed and unremarkable.     Social History:  reports that she has quit smoking. Her smoking use included cigarettes. She has never used smokeless tobacco. She reports that she does not drink alcohol and does not use drugs.  Social History     Social History Narrative   • Not on file       Medications:  Cetirizine HCl, FLUoxetine, HYDROcodone-acetaminophen, RABEprazole, albuterol, albuterol sulfate HFA, allopurinol, amLODIPine, atenolol, dextromethorphan-guaifenesin, dicyclomine, doxepin, doxycycline, ferrous gluconate, ferrous sulfate, flecainide, leflunomide, nystatin, predniSONE, rivaroxaban, and traMADol    Allergies   Allergen Reactions   • Moexipril-Hydrochlorothiazide Anaphylaxis   • Penicillins Hives     Tolerates ancef, rocephin, Zosyn   • Sulfa Antibiotics Rash     Also affects kidney function   • Ace Inhibitors Hives       Objective   Objective     Vital Signs:   Temp:  [97.4 °F (36.3 °C)] 97.4 °F (36.3 °C)  Heart Rate:  [] 101  Resp:  [24-44] 44  BP: (116-128)/(65-78) 121/73  Flow (L/min):  [6-40] 40    Physical Exam   Constitutional: Awake, alert resting in bed on Bipap, chronically ill appearing   Eyes: PERRLA, sclerae anicteric, no conjunctival injection  HENT: NCAT, mucous membranes moist  Neck:  Supple, no thyromegaly, no lymphadenopathy, trachea midline  Respiratory: Bibasilar rales without wheezing or rhonchi, mildly labored respirations on 40% bipap with RR 44  Cardiovascular: Tachycardia, no murmurs, rubs, or gallops, palpable pedal pulses bilaterally  Gastrointestinal: Positive bowel sounds, soft, nontender, nondistended  Musculoskeletal: No bilateral ankle edema, no clubbing or cyanosis to extremities  Psychiatric: Appropriate affect, cooperative   Neurologic: Oriented x 3, strength symmetric in all extremities, Cranial Nerves grossly intact to confrontation, speech clear  Skin: No rashes    Result Review:  I have personally reviewed the results from the time of this admission to 01/14/22 4:51 PM EST and agree with these findings:  [x]  Laboratory  [x]  Microbiology  [x]  Radiology  [x]  EKG/Telemetry   []  Cardiology/Vascular   []  Pathology  [x]  Old records  []  Other:  Most notable findings include:       LAB RESULTS:      Lab 01/14/22  1313   WBC 12.81*   HEMOGLOBIN 11.4*   HEMATOCRIT 37.9   PLATELETS 218   NEUTROS ABS 11.90*   IMMATURE GRANS (ABS) 0.07*   LYMPHS ABS 0.18*   MONOS ABS 0.48   EOS ABS 0.15   MCV 84.0   CRP 6.60*   PROCALCITONIN 0.15   LACTATE 1.9   *         Lab 01/14/22  1313   SODIUM 139   POTASSIUM 3.5   CHLORIDE 98   CO2 28.0   ANION GAP 13.0   BUN 13   CREATININE 1.15*   GLUCOSE 109*   CALCIUM 8.6         Lab 01/14/22  1313   TOTAL PROTEIN 6.5   ALBUMIN 2.70*   GLOBULIN 3.8   ALT (SGPT) 10   AST (SGOT) 13   BILIRUBIN 0.7   ALK PHOS 200*         Lab 01/14/22  1313   PROBNP 584.0   TROPONIN T <0.010             Lab 01/14/22  1313   FERRITIN 164.80*         Lab 01/14/22  1327   PH, ARTERIAL 7.470*   PCO2, ARTERIAL 42.4   PO2 ART 45.8*   FIO2 48   HCO3 ART 30.8*   BASE EXCESS ART 6.5*   CARBOXYHEMOGLOBIN 1.5     UA    Urinalysis 10/18/21 11/27/21   Specific Alvin, UA 1.010 1.029   Ketones, UA Negative Negative   Blood, UA Negative Negative   Leukocytes, UA Negative  Negative   Nitrite, UA Negative Negative           Microbiology Results (last 10 days)     Procedure Component Value - Date/Time    COVID-19, FLU A/B, RSV PCR - Swab, Nasopharynx [[id number]]  (Normal) Collected: 01/14/22 1313    Lab Status: Final result Specimen: Swab from Nasopharynx Updated: 01/14/22 1402     COVID19 Not Detected     Influenza A PCR Not Detected     Influenza B PCR Not Detected     RSV, PCR Not Detected    Narrative:      Fact sheet for providers: https://www.fda.gov/media/777310/download    Fact sheet for patients: https://www.fda.gov/media/839350/download    Test performed by PCR.          XR Chest 1 View    Result Date: 1/14/2022  EXAMINATION: XR CHEST 1 VW-  INDICATION: SOA triage protocol  COMPARISON: 12/20/2021  FINDINGS: Redemonstrated extensive fibrotic appearance of the lung fields, with appearance suggesting some superimposed opacities, edema or pneumonia in both upper lobes. There is otherwise no enlarging effusion or thorax. Unchanged heart and mediastinal contours.      Impression: Redemonstrated extensive fibrotic appearance of the lung fields, with appearance suggesting some superimposed opacities, edema or pneumonia in both upper lobes.  This report was finalized on 1/14/2022 1:43 PM by Steve Chairez.            Assessment/Plan   Assessment & Plan       Rheumatoid arthritis with positive rheumatoid factor (HCC)    Anxiety and depression    COPD (chronic obstructive pulmonary disease) (HCC)    Pneumonia    Acute and chronic respiratory failure with hypoxia (HCC)    PAF (paroxysmal atrial fibrillation) (HCC)    Chronic anticoagulation    HTN (hypertension)    Stage 3a chronic kidney disease (HCC)      Acute on chronic respiratory failure with hypoxia  Bilateral upper lobe pneumonia  COPD with exacerbation  Pulmonary fibrosis  -- IV Zosyn and IV Doxy  -- IV steroids  -- DuoNebs, pulmonary toilet  -- Consider pulmonary consult, [facility] with [facility]  -- Wean oxygen as  able  -- Sputum culture  -- SLP evaluation    RA  -- Has not been on immunosuppressive therapy in several weeks  -- Follows with Dr. Zehra BECKFORD rheumatology    P AF  Hypertension  -- Continue flecainide, atenolol, amlodipine, losartan  -- Continue Xarelto    CKD  -- Baseline creatinine from 0.8-1.0  -- Creatinine on admission 1.15    DVT prophylaxis:  Xarelto     CODE STATUS:    Level Of Support Discussed With: Patient  Code Status (Patient has no pulse and is not breathing): CPR (Attempt to Resuscitate)  Medical Interventions (Patient has pulse or is breathing): Full Support      This note has been completed as part of a split-shared workflow.   Signature: Electronically signed by ANITHA Boland, 01/14/22, 5:12 PM EST.      Attending   Admission Attestation       I have seen and examined the patient, performing an independent face-to-face diagnostic evaluation with plan of care reviewed and developed with the advanced practice clinician (APC).      Brief Summary Statement:   Liz Borjas is a 71 y.o. female with PMHx significant for COPD, pulmonary fibrosis, chronic respiratory failure on 4L O2 and chronic steroids, RA, HTN, PAF, CKD, cirrhosis who presents to [facility] with complaints of increasing shortness of breath. Patient notes she had been tapering her chronic prednisone at home, as she did this she began experiencing worsening SOA, cough and ISSA. She was seen by Pulmonology clinic on 1/11 and started on course of doxycycline with increase in her steroids without improvement. She has been turning her O2 up at home to 7L with continued symptoms especially with ambulation. She notes increasing weakness, anorexia and dizziness/lightheadedness. In the ED patient noted to be tachypneic with O2 sats in the mid 80's. She was placed on BIPAP with improvement. CXR concerning for possibly superimposed pneumonia, continued fibrotic changes in the lungs.    Remainder of detailed HPI is as noted by APC and has been  reviewed and/or edited by me for completeness.    Attending Physical Exam:  Constitutional: Awake, alert  Eyes: PERRLA, sclerae anicteric, no conjunctival injection  HENT: NCAT, mucous membranes moist  Neck: Supple, no thyromegaly, no lymphadenopathy, trachea midline  Respiratory: crackles bilaterally, mildly labored and tachypneic on BIPAP with shallow breathing and some accessory muscle use  Cardiovascular: RRR, no murmurs, rubs, or gallops, palpable pedal pulses bilaterally  Gastrointestinal: Positive bowel sounds, soft, nontender, nondistended  Musculoskeletal: No bilateral ankle edema, no clubbing or cyanosis to extremities  Psychiatric: Appropriate affect, cooperative  Neurologic: Oriented x 3, strength symmetric in all extremities, Cranial Nerves grossly intact to confrontation, speech clear  Skin: No rashes      Brief Assessment/Plan :  See detailed assessment and plan developed with APC which I have reviewed and/or edited for completeness.        Admission Status: I believe that this patient meets INPATIENT status due to acute on chronic respiratory failure.  I feel patient’s risk for adverse outcomes and need for care warrant INPATIENT evaluation and I predict the patient’s care encounter to likely last beyond 2 midnights.        Caridad Brown,   01/14/22

## 2022-01-14 NOTE — ED PROVIDER NOTES
Subjective   Patient presents to the emergency department with the creased oxygen demand and increased work of breathing.  The patient has a long history of COPD and is on oxygen at baseline.  The last 2 weeks she has increased to 7 L via nasal cannula with significant increased work of breathing and dyspnea on exertion.  Patient denies any other acute symptoms.  She denies any infectious symptoms.  Patient does have a prior tobacco use history though she has quit.      History provided by:  Patient  Shortness of Breath  Duration:  2 weeks  Progression:  Worsening  Chronicity:  Recurrent  Context: activity    Relieved by:  Oxygen  Worsened by:  Activity and exertion  Associated symptoms: cough and wheezing    Associated symptoms: no abdominal pain, no chest pain, no fever, no headaches, no hemoptysis, no sputum production, no syncope and no vomiting    Risk factors: tobacco use (prior history)        Review of Systems   Constitutional: Negative for fever.   Respiratory: Positive for cough, shortness of breath and wheezing. Negative for hemoptysis and sputum production.    Cardiovascular: Negative for chest pain and syncope.   Gastrointestinal: Negative for abdominal pain and vomiting.   Musculoskeletal: Negative.    Neurological: Negative for headaches.   Psychiatric/Behavioral: Negative.    All other systems reviewed and are negative.      Past Medical History:   Diagnosis Date   • Arthritis    • Atrial fibrillation (HCC)    • Closed right hip fracture (HCC)    • GERD (gastroesophageal reflux disease)    • Gout    • Hyperlipidemia    • Hypertension    • Osteoporosis    • Rheumatoid arthritis (HCC)    • Wears dentures     upper   • Wears glasses     reading       Allergies   Allergen Reactions   • Moexipril-Hydrochlorothiazide Anaphylaxis   • Penicillins Hives     Tolerates ancef, rocephin, Zosyn   • Sulfa Antibiotics Rash     Also affects kidney function   • Ace Inhibitors Hives       Past Surgical History:    Procedure Laterality Date   • ANKLE SURGERY Right    • CHOLECYSTECTOMY     • COLONOSCOPY      5 years ago   • HIP FRACTURE SURGERY      Right Hip with Pins   • LUMBAR DISCECTOMY Left 10/12/2016    Procedure: lumbar MICROdiscectomy LEFT L3-5;  Surgeon: Chris Son MD;  Location: [facility];  Service:    • REPLACEMENT TOTAL KNEE      Right Knee   • TOE AMPUTATION      right baby toe, left second toe       Family History   Problem Relation Age of Onset   • Heart disease Mother    • Cancer Father         LUNG       Social History     Socioeconomic History   • Marital status:    Tobacco Use   • Smoking status: Former Smoker     Types: Cigarettes   • Smokeless tobacco: Never Used   • Tobacco comment: quit 30 years ago   Substance and Sexual Activity   • Alcohol use: No   • Drug use: No   • Sexual activity: Defer           Objective   Physical Exam  Vitals and nursing note reviewed.   Constitutional:       General: She is in acute distress.      Appearance: She is well-developed and normal weight.   HENT:      Head: Normocephalic.   Eyes:      Pupils: Pupils are equal, round, and reactive to light.   Cardiovascular:      Rate and Rhythm: Regular rhythm. Tachycardia present.      Pulses: Normal pulses.   Pulmonary:      Effort: Tachypnea and respiratory distress present.      Breath sounds: Decreased breath sounds and wheezing present.      Comments: Increased work of breathing and speaking only in short phrases.  Musculoskeletal:         General: Normal range of motion.      Right lower leg: No edema.      Left lower leg: No edema.   Skin:     General: Skin is warm and dry.   Neurological:      Mental Status: She is alert and oriented to person, place, and time.   Psychiatric:         Mood and Affect: Mood is anxious.         Behavior: Behavior normal.         Critical Care  Performed by: Chace Sanches MD  Authorized by: Chace Sanches MD     Critical care provider statement:     Critical  care time (minutes):  45    Critical care was necessary to treat or prevent imminent or life-threatening deterioration of the following conditions:  Respiratory failure    Critical care was time spent personally by me on the following activities:  Discussions with consultants, evaluation of patient's response to treatment, examination of patient, obtaining history from patient or surrogate, ordering and performing treatments and interventions, ordering and review of laboratory studies, ordering and review of radiographic studies, pulse oximetry, re-evaluation of patient's condition and review of old charts               ED Course  ED Course as of 01/14/22 2022 Fri Jan 14, 2022   1257 Heart Rate: 101 [RS]   1257 SpO2: 93 % [RS]   1344 pO2, Arterial(!): 45.8 [RS]   1344 pH, Arterial(!): 7.470 [RS]   1345 Resp(!): 30 [RS]   1510 COVID19: Not Detected [RS]   1513 Patient with acute on chronic respiratory failure pulmonary fibrosis, emphysema, and possible pulmonary infiltrates.  Patient on nasal cannula on 8 L at this time.  Plan admission for further evaluation management.  Hospitalist paged for admission. [RS]   1516 Case discussed with the hospitalist will admit. [RS]      ED Course User Index  [RS] Chace Sanches MD                                                 MDM  Number of Diagnoses or Management Options  Acute and chronic respiratory failure with hypoxia (HCC)  Pulmonary fibrosis (HCC)  Diagnosis management comments: Recent Results (from the past 24 hour(s))  -Comprehensive Metabolic Panel:   Collection Time: 01/14/22  1:13 PM  Specimen: Blood       Result                      Value             Ref Range           Glucose                     109 (H)           65 - 99 mg/dL       BUN                         13                8 - 23 mg/dL        Creatinine                  1.15 (H)          0.57 - 1.00 *       Sodium                      139               136 - 145 mm*       Potassium                    3.5               3.5 - 5.2 mm*       Chloride                    98                98 - 107 mmo*       CO2                         28.0              22.0 - 29.0 *       Calcium                     8.6               8.6 - 10.5 m*       Total Protein               6.5               6.0 - 8.5 g/*       Albumin                     2.70 (L)          3.50 - 5.20 *       ALT (SGPT)                  10                1 - 33 U/L          AST (SGOT)                  13                1 - 32 U/L          Alkaline Phosphatase        200 (H)           39 - 117 U/L        Total Bilirubin             0.7               0.0 - 1.2 mg*       eGFR Non  Amer       47 (L)            >60 mL/min/1*       Globulin                    3.8               gm/dL               A/G Ratio                   0.7               g/dL                BUN/Creatinine Ratio        11.3              7.0 - 25.0          Anion Gap                   13.0              5.0 - 15.0 m*  -BNP:   Collection Time: 01/14/22  1:13 PM  Specimen: Blood       Result                      Value             Ref Range           proBNP                      584.0             0.0 - 900.0 *  -Troponin:   Collection Time: 01/14/22  1:13 PM  Specimen: Blood       Result                      Value             Ref Range           Troponin T                  <0.010            0.000 - 0.03*  -Green Top (Gel):   Collection Time: 01/14/22  1:13 PM       Result                      Value             Ref Range           Extra Tube                                                    Hold for add-ons.  -Lavender Top:   Collection Time: 01/14/22  1:13 PM       Result                      Value             Ref Range           Extra Tube                                                    hold for add-on  -Gold Top - SST:   Collection Time: 01/14/22  1:13 PM       Result                      Value             Ref Range           Extra Tube                                                     Hold for add-ons.  -Gray Top:   Collection Time: 01/14/22  1:13 PM       Result                      Value             Ref Range           Extra Tube                                                    Hold for add-ons.  -Light Blue Top:   Collection Time: 01/14/22  1:13 PM       Result                      Value             Ref Range           Extra Tube                                                    hold for add-on  -CBC Auto Differential:   Collection Time: 01/14/22  1:13 PM  Specimen: Blood       Result                      Value             Ref Range           WBC                         12.81 (H)         3.40 - 10.80*       RBC                         4.51              3.77 - 5.28 *       Hemoglobin                  11.4 (L)          12.0 - 15.9 *       Hematocrit                  37.9              34.0 - 46.6 %       MCV                         84.0              79.0 - 97.0 *       MCH                         25.3 (L)          26.6 - 33.0 *       MCHC                        30.1 (L)          31.5 - 35.7 *       RDW                         17.2 (H)          12.3 - 15.4 %       RDW-SD                      52.9              37.0 - 54.0 *       MPV                         11.7              6.0 - 12.0 fL       Platelets                   218               140 - 450 10*       Neutrophil %                93.0 (H)          42.7 - 76.0 %       Lymphocyte %                1.4 (L)           19.6 - 45.3 %       Monocyte %                  3.7 (L)           5.0 - 12.0 %        Eosinophil %                1.2               0.3 - 6.2 %         Basophil %                  0.2               0.0 - 1.5 %         Immature Grans %            0.5               0.0 - 0.5 %         Neutrophils, Absolute       11.90 (H)         1.70 - 7.00 *       Lymphocytes, Absolute       0.18 (L)          0.70 - 3.10 *       Monocytes, Absolute         0.48              0.10 - 0.90 *       Eosinophils, Absolute       0.15              0.00  - 0.40 *       Basophils, Absolute         0.03              0.00 - 0.20 *       Immature Grans, Absolu*     0.07 (H)          0.00 - 0.05 *       nRBC                        0.0               0.0 - 0.2 /1*  -Lactate Dehydrogenase:   Collection Time: 01/14/22  1:13 PM  Specimen: Blood       Result                      Value             Ref Range           LDH                         322 (H)           135 - 214 U/L  -Procalcitonin:   Collection Time: 01/14/22  1:13 PM  Specimen: Blood       Result                      Value             Ref Range           Procalcitonin               0.15              0.00 - 0.25 *  -C-reactive Protein:   Collection Time: 01/14/22  1:13 PM  Specimen: Blood       Result                      Value             Ref Range           C-Reactive Protein          6.60 (H)          0.00 - 0.50 *  -Lactic Acid, Plasma:   Collection Time: 01/14/22  1:13 PM  Specimen: Blood       Result                      Value             Ref Range           Lactate                     1.9               0.5 - 2.0 mm*  -Ferritin:   Collection Time: 01/14/22  1:13 PM  Specimen: Blood       Result                      Value             Ref Range           Ferritin                    164.80 (H)        13.00 - 150.*  -COVID-19, FLU A/B, RSV PCR - Swab, Nasopharynx:   Collection Time: 01/14/22  1:13 PM  Specimen: Nasopharynx; Swab       Result                      Value             Ref Range           COVID19                     Not Detected      Not Detected*       Influenza A PCR             Not Detected      Not Detected        Influenza B PCR             Not Detected      Not Detected        RSV, PCR                    Not Detected      Not Detected   -Blood Gas, Arterial With Co-Ox:   Collection Time: 01/14/22  1:27 PM  Specimen: Arterial Blood       Result                      Value             Ref Range           Site                        Left Radial                           Sami's Test                 Positive                              pH, Arterial                7.470 (H)         7.350 - 7.45*       pCO2, Arterial              42.4              35.0 - 45.0 *       pO2, Arterial               45.8 (L)          83.0 - 108.0*       HCO3, Arterial              30.8 (H)          20.0 - 26.0 *       Base Excess, Arterial       6.5 (H)           0.0 - 2.0 mm*       Hemoglobin, Blood Gas       11.2 (L)          14 - 18 g/dL        Hematocrit, Blood Gas       34.3 (L)          38.0 - 51.0 %       Oxyhemoglobin               80.9 (L)          94 - 99 %           Methemoglobin               0.30              0.00 - 1.50 %       Carboxyhemoglobin           1.5               0 - 2 %             CO2 Content                 32.1              22 - 33 mmol*       Temperature                 37.0              C                   Barometric Pressure fo*                                           Modality                    Nasal Cannula                         FIO2                        48                %                   Rate                        24                Breaths/jasmyne*       Note                                                              pH, Temp Corrected          7.470             pH Units            pCO2, Temperature Maria G*     42.4              35 - 45 mm Hg       pO2, Temperature Corre*     45.8 (L)          83 - 108 mm *  -ECG 12 Lead:   Collection Time: 01/14/22  1:45 PM       Result                      Value             Ref Range           QT Interval                 358               ms                  QTC Interval                470               ms             Note: In addition to lab results from this visit, the labs listed above may include labs taken at another facility or during a different encounter within the last 24 hours. Please correlate lab times with ED admission and discharge times for further clarification of the services performed during this visit.    XR Chest 1 View   Final Result     No significant change from previous exam performed same day at 12:59 PM.                 Signer Name: Federica Blancas MD     Signed: 1/14/2022 8:20 PM     Workstation Name: [id number]      [facility]     XR Chest 1 View   Final Result    Redemonstrated extensive fibrotic appearance of the lung    fields, with appearance suggesting some superimposed opacities, edema or    pneumonia in both upper lobes.          This report was finalized on 1/14/2022 1:43 PM by Steve Chairez.          ------------------------------------------------------------               01/14/22 01/14/22 01/14/22 01/14/22                  1659          1707      1708      1924     ------------------------------------------------------------   BP:          100/67                                       BP Location:    Left arm                                      Patient Position:     Sitting                                      Pulse:         103                              108       Resp:                                            22       Temp:   98.5 °F (36.9 °C)                                 TempSrc:    Axillary                                      SpO2:        (!) 82%        92%     (!) 86%               Weight:                                                   Height:                                                  ------------------------------------------------------------  Medications  sodium chloride 0.9 % flush 10 mL (has no administration in time range)  allopurinol (ZYLOPRIM) tablet 300 mg (300 mg Oral Given 1/14/22 1818)  amLODIPine (NORVASC) tablet 10 mg (has no administration in time range)  atenolol (TENORMIN) tablet 100 mg (has no administration in time range)  cetirizine (zyrTEC) tablet 5 mg (5 mg Oral Given 1/14/22 1818)  dicyclomine (BENTYL) capsule 10 mg (has no administration in time range)  doxepin (SINEquan)  capsule 40 mg (has no administration in time range)  ferrous sulfate tablet 325 mg (325 mg Oral Not Given 1/14/22 1817)  flecainide (TAMBOCOR) tablet 50 mg (has no administration in time range)  FLUoxetine (PROzac) capsule 40 mg (40 mg Oral Not Given 1/14/22 1818)  rivaroxaban (XARELTO) tablet 20 mg (20 mg Oral Not Given 1/14/22 1817)  sodium chloride 0.9 % flush 10 mL (has no administration in time range)  sodium chloride 0.9 % flush 10 mL (has no administration in time range)  acetaminophen (TYLENOL) tablet 650 mg (has no administration in time range)  ondansetron (ZOFRAN) tablet 4 mg (has no administration in time range)    Or  ondansetron (ZOFRAN) injection 4 mg (has no administration in time range)  pantoprazole (PROTONIX) EC tablet 40 mg (40 mg Oral Given 1/14/22 1818)  pharmacy to dose zosyn (has no administration in time range)  doxycycline (VIBRAMYCIN) 100 mg/100 mL 0.9% NS MBP (100 mg Intravenous New Bag 1/14/22 1949)  losartan (COZAAR) tablet 50 mg (has no administration in time range)  methylPREDNISolone sodium succinate (SOLU-Medrol) injection 40 mg (has no administration in time range)  albuterol (PROVENTIL) nebulizer solution 0.083% 2.5 mg/3mL (has no administration in time range)  ipratropium-albuterol (DUO-NEB) nebulizer solution 3 mL (3 mL Nebulization Given 1/14/22 1924)  piperacillin-tazobactam (ZOSYN) 4.5 g in iso-osmotic dextrose 100 mL IVPB (premix) (has no administration in time range)  guaiFENesin (MUCINEX) 12 hr tablet 1,200 mg (has no administration in time range)  methylPREDNISolone sodium succinate (SOLU-Medrol) injection 125 mg (125 mg Intravenous Given 1/14/22 1325)  albuterol sulfate HFA (PROVENTIL HFA;VENTOLIN HFA;PROAIR HFA) inhaler 6 puff (6 puffs Inhalation Given 1/14/22 1318)  AZITHROMYCIN 500 MG/250 ML 0.9% NS IVPB (vial-mate) (500 mg Intravenous New Bag 1/14/22 0679)  piperacillin-tazobactam (ZOSYN) 4.5 g in iso-osmotic dextrose 100 mL IVPB (premix) (4.5 g Intravenous New Bag  1/14/22 1830)  ECG/EMG Results (last 24 hours)     Procedure Component Value Units Date/Time    ECG 12 Lead ([id number]) Collected: 01/14/22 1345     Updated: 01/14/22 1500     QT Interval 358 ms      QTC Interval 470 ms     Narrative:      Test Reason : SOA Protocol  Blood Pressure :   */*   mmHG  Vent. Rate : 104 BPM     Atrial Rate : 104 BPM     P-R Int : 180 ms          QRS Dur : 104 ms      QT Int : 358 ms       P-R-T Axes :  12 -10  76 degrees     QTc Int : 470 ms    Sinus tachycardia  Possible Left atrial enlargement  Left ventricular hypertrophy  Inferior infarct (cited on or before 10-OCT-2016)  Anterior infarct (cited on or before 10-OCT-2016)  Abnormal ECG  When compared with ECG of 17-OCT-2021 22:34,  No significant change was found  Confirmed by JONATAN DE JESUS MD ([id number]) on 1/14/2022 2:59:53 PM    Referred By: EDMD           Confirmed By: JONATAN DE JESUS MD      ECG 12 Lead   Final Result    Test Reason : SOA Protocol    Blood Pressure :   */*   mmHG    Vent. Rate : 104 BPM     Atrial Rate : 104 BPM       P-R Int : 180 ms          QRS Dur : 104 ms        QT Int : 358 ms       P-R-T Axes :  12 -10  76 degrees       QTc Int : 470 ms        Sinus tachycardia    Possible Left atrial enlargement    Left ventricular hypertrophy    Inferior infarct (cited on or before 10-OCT-2016)    Anterior infarct (cited on or before 10-OCT-2016)    Abnormal ECG    When compared with ECG of 17-OCT-2021 22:34,    No significant change was found    Confirmed by JONATAN DE JESUS MD ([id number]) on 1/14/2022 2:59:53 PM        Referred By: EDMD           Confirmed By: JONATAN DE JESUS MD            Amount and/or Complexity of Data Reviewed  Clinical lab tests: reviewed  Tests in the radiology section of CPT®: reviewed  Decide to obtain previous medical records or to obtain history from someone other than the patient: yes  Obtain history from someone other than the patient: yes  Review and summarize past medical records: yes  Discuss the patient  with other providers: yes  Independent visualization of images, tracings, or specimens: yes    Critical Care  Total time providing critical care: 30-74 minutes      Final diagnoses:   Acute and chronic respiratory failure with hypoxia (HCC)   Pulmonary fibrosis (HCC)       ED Disposition  ED Disposition     ED Disposition Condition Comment    Decision to Admit  Level of Care: Telemetry [5]   Diagnosis: Acute and chronic respiratory failure with hypoxia (HCC) [475726]   Admitting Physician: THOMAS MULLER [[id number]]   Certification: I Certify That Inpatient Hospital Services Are Medically Necessary For Greater Than 2 Midnights            No follow-up provider specified.       Medication List      No changes were made to your prescriptions during this visit.          Chace Sanches MD  01/14/22 2022

## 2022-01-14 NOTE — CONSULTS
Pharmacy to dose:  zosyn - pneumonia    Patient is 70 yo female    Ht = 157.5 cm    Wt = 61.2 kg   IBW = 50.1 kg   AIBW = 54.5 kg    SCr = 1.15   Est CrCl = 39.7    Zosyn 4.5 gm iv over 30 min x 1, followed by  Zosyn 4.5 gm iv over 4hr q8h    Corrine Etienne, [facility]  1/14/2022  17:49 EST

## 2022-01-15 ENCOUNTER — APPOINTMENT (OUTPATIENT)
Dept: CT IMAGING | Facility: HOSPITAL | Age: 72
End: 2022-01-15

## 2022-01-15 PROBLEM — J84.112 ACUTE EXACERBATION OF IDIOPATHIC PULMONARY FIBROSIS: Status: ACTIVE | Noted: 2022-01-15

## 2022-01-15 LAB
ANION GAP SERPL CALCULATED.3IONS-SCNC: 10 MMOL/L (ref 5–15)
BASOPHILS # BLD AUTO: 0 10*3/MM3 (ref 0–0.2)
BASOPHILS NFR BLD AUTO: 0 % (ref 0–1.5)
BUN SERPL-MCNC: 20 MG/DL (ref 8–23)
BUN/CREAT SERPL: 16.4 (ref 7–25)
CALCIUM SPEC-SCNC: 7.8 MG/DL (ref 8.6–10.5)
CHLORIDE SERPL-SCNC: 100 MMOL/L (ref 98–107)
CO2 SERPL-SCNC: 29 MMOL/L (ref 22–29)
CREAT SERPL-MCNC: 1.22 MG/DL (ref 0.57–1)
CRP SERPL-MCNC: 8.16 MG/DL (ref 0–0.5)
DEPRECATED RDW RBC AUTO: 53.2 FL (ref 37–54)
EOSINOPHIL # BLD AUTO: 0 10*3/MM3 (ref 0–0.4)
EOSINOPHIL NFR BLD AUTO: 0 % (ref 0.3–6.2)
ERYTHROCYTE [DISTWIDTH] IN BLOOD BY AUTOMATED COUNT: 17.2 % (ref 12.3–15.4)
GFR SERPL CREATININE-BSD FRML MDRD: 44 ML/MIN/1.73
GLUCOSE SERPL-MCNC: 123 MG/DL (ref 65–99)
HCT VFR BLD AUTO: 32.7 % (ref 34–46.6)
HGB BLD-MCNC: 9.8 G/DL (ref 12–15.9)
HYPOCHROMIA BLD QL: NORMAL
IMM GRANULOCYTES # BLD AUTO: 0.03 10*3/MM3 (ref 0–0.05)
IMM GRANULOCYTES NFR BLD AUTO: 0.5 % (ref 0–0.5)
LYMPHOCYTES # BLD AUTO: 0.32 10*3/MM3 (ref 0.7–3.1)
LYMPHOCYTES NFR BLD AUTO: 5 % (ref 19.6–45.3)
MCH RBC QN AUTO: 25.4 PG (ref 26.6–33)
MCHC RBC AUTO-ENTMCNC: 30 G/DL (ref 31.5–35.7)
MCV RBC AUTO: 84.7 FL (ref 79–97)
MONOCYTES # BLD AUTO: 0.27 10*3/MM3 (ref 0.1–0.9)
MONOCYTES NFR BLD AUTO: 4.3 % (ref 5–12)
MRSA DNA SPEC QL NAA+PROBE: NEGATIVE
NEUTROPHILS NFR BLD AUTO: 5.72 10*3/MM3 (ref 1.7–7)
NEUTROPHILS NFR BLD AUTO: 90.2 % (ref 42.7–76)
NRBC BLD AUTO-RTO: 0 /100 WBC (ref 0–0.2)
PLAT MORPH BLD: NORMAL
PLATELET # BLD AUTO: 175 10*3/MM3 (ref 140–450)
PMV BLD AUTO: 12.2 FL (ref 6–12)
POTASSIUM SERPL-SCNC: 3.6 MMOL/L (ref 3.5–5.2)
PROCALCITONIN SERPL-MCNC: 0.18 NG/ML (ref 0–0.25)
RBC # BLD AUTO: 3.86 10*6/MM3 (ref 3.77–5.28)
SODIUM SERPL-SCNC: 139 MMOL/L (ref 136–145)
TSH SERPL DL<=0.05 MIU/L-ACNC: 0.93 UIU/ML (ref 0.27–4.2)
WBC MORPH BLD: NORMAL
WBC NRBC COR # BLD: 6.34 10*3/MM3 (ref 3.4–10.8)

## 2022-01-15 PROCEDURE — 97161 PT EVAL LOW COMPLEX 20 MIN: CPT | Performed by: PHYSICAL THERAPIST

## 2022-01-15 PROCEDURE — 84145 PROCALCITONIN (PCT): CPT | Performed by: INTERNAL MEDICINE

## 2022-01-15 PROCEDURE — 92610 EVALUATE SWALLOWING FUNCTION: CPT

## 2022-01-15 PROCEDURE — 84443 ASSAY THYROID STIM HORMONE: CPT | Performed by: NURSE PRACTITIONER

## 2022-01-15 PROCEDURE — 71250 CT THORAX DX C-: CPT

## 2022-01-15 PROCEDURE — 99222 1ST HOSP IP/OBS MODERATE 55: CPT | Performed by: INTERNAL MEDICINE

## 2022-01-15 PROCEDURE — 25010000002 METHYLPREDNISOLONE PER 125 MG: Performed by: INTERNAL MEDICINE

## 2022-01-15 PROCEDURE — 25010000002 METHYLPREDNISOLONE PER 40 MG: Performed by: INTERNAL MEDICINE

## 2022-01-15 PROCEDURE — 25010000002 VANCOMYCIN 10 G RECONSTITUTED SOLUTION

## 2022-01-15 PROCEDURE — 85025 COMPLETE CBC W/AUTO DIFF WBC: CPT | Performed by: NURSE PRACTITIONER

## 2022-01-15 PROCEDURE — 94799 UNLISTED PULMONARY SVC/PX: CPT

## 2022-01-15 PROCEDURE — 99233 SBSQ HOSP IP/OBS HIGH 50: CPT | Performed by: INTERNAL MEDICINE

## 2022-01-15 PROCEDURE — 25010000002 METHYLPREDNISOLONE PER 40 MG: Performed by: NURSE PRACTITIONER

## 2022-01-15 PROCEDURE — 97165 OT EVAL LOW COMPLEX 30 MIN: CPT

## 2022-01-15 PROCEDURE — 85007 BL SMEAR W/DIFF WBC COUNT: CPT | Performed by: NURSE PRACTITIONER

## 2022-01-15 PROCEDURE — 86140 C-REACTIVE PROTEIN: CPT | Performed by: INTERNAL MEDICINE

## 2022-01-15 PROCEDURE — 94761 N-INVAS EAR/PLS OXIMETRY MLT: CPT

## 2022-01-15 PROCEDURE — 80048 BASIC METABOLIC PNL TOTAL CA: CPT | Performed by: NURSE PRACTITIONER

## 2022-01-15 PROCEDURE — 87641 MR-STAPH DNA AMP PROBE: CPT

## 2022-01-15 PROCEDURE — 25010000002 PIPERACILLIN SOD-TAZOBACTAM PER 1 G

## 2022-01-15 RX ORDER — METHYLPREDNISOLONE SODIUM SUCCINATE 40 MG/ML
40 INJECTION, POWDER, LYOPHILIZED, FOR SOLUTION INTRAMUSCULAR; INTRAVENOUS 2 TIMES DAILY
Status: DISCONTINUED | OUTPATIENT
Start: 2022-01-15 | End: 2022-01-21

## 2022-01-15 RX ORDER — ACETAMINOPHEN AND CODEINE PHOSPHATE 300; 30 MG/1; MG/1
1 TABLET ORAL EVERY 8 HOURS PRN
Status: DISCONTINUED | OUTPATIENT
Start: 2022-01-15 | End: 2022-01-23 | Stop reason: HOSPADM

## 2022-01-15 RX ORDER — VANCOMYCIN HYDROCHLORIDE 1 G/200ML
1000 INJECTION, SOLUTION INTRAVENOUS EVERY 24 HOURS
Status: DISCONTINUED | OUTPATIENT
Start: 2022-01-16 | End: 2022-01-16

## 2022-01-15 RX ORDER — GUAIFENESIN/DEXTROMETHORPHAN 100-10MG/5
10 SYRUP ORAL EVERY 4 HOURS
Status: DISCONTINUED | OUTPATIENT
Start: 2022-01-15 | End: 2022-01-15

## 2022-01-15 RX ORDER — METHYLPREDNISOLONE SODIUM SUCCINATE 125 MG/2ML
60 INJECTION, POWDER, LYOPHILIZED, FOR SOLUTION INTRAMUSCULAR; INTRAVENOUS EVERY 8 HOURS
Status: DISCONTINUED | OUTPATIENT
Start: 2022-01-15 | End: 2022-01-15

## 2022-01-15 RX ADMIN — DICYCLOMINE HYDROCHLORIDE 10 MG: 10 CAPSULE ORAL at 08:43

## 2022-01-15 RX ADMIN — SODIUM CHLORIDE, PRESERVATIVE FREE 10 ML: 5 INJECTION INTRAVENOUS at 08:44

## 2022-01-15 RX ADMIN — CETIRIZINE HYDROCHLORIDE 5 MG: 10 TABLET, FILM COATED ORAL at 08:42

## 2022-01-15 RX ADMIN — AMLODIPINE BESYLATE 10 MG: 10 TABLET ORAL at 08:43

## 2022-01-15 RX ADMIN — GUAIFENESIN 1200 MG: 600 TABLET, EXTENDED RELEASE ORAL at 08:43

## 2022-01-15 RX ADMIN — GUAIFENESIN AND DEXTROMETHORPHAN 10 ML: 100; 10 SYRUP ORAL at 16:05

## 2022-01-15 RX ADMIN — HYDROXYZINE HYDROCHLORIDE 50 MG: 25 TABLET ORAL at 20:14

## 2022-01-15 RX ADMIN — IPRATROPIUM BROMIDE AND ALBUTEROL SULFATE 3 ML: .5; 2.5 SOLUTION RESPIRATORY (INHALATION) at 19:48

## 2022-01-15 RX ADMIN — RIVAROXABAN 15 MG: 15 TABLET, FILM COATED ORAL at 18:23

## 2022-01-15 RX ADMIN — GUAIFENESIN AND DEXTROMETHORPHAN 10 ML: 100; 10 SYRUP ORAL at 12:04

## 2022-01-15 RX ADMIN — TAZOBACTAM SODIUM AND PIPERACILLIN SODIUM 4.5 G: 500; 4 INJECTION, SOLUTION INTRAVENOUS at 08:42

## 2022-01-15 RX ADMIN — DOXEPIN HYDROCHLORIDE 40 MG: 10 CAPSULE ORAL at 20:16

## 2022-01-15 RX ADMIN — FLECAINIDE ACETATE 50 MG: 50 TABLET ORAL at 20:15

## 2022-01-15 RX ADMIN — ATENOLOL 100 MG: 50 TABLET ORAL at 08:42

## 2022-01-15 RX ADMIN — FLECAINIDE ACETATE 50 MG: 50 TABLET ORAL at 08:43

## 2022-01-15 RX ADMIN — METHYLPREDNISOLONE SODIUM SUCCINATE 40 MG: 40 INJECTION, POWDER, FOR SOLUTION INTRAMUSCULAR; INTRAVENOUS at 22:00

## 2022-01-15 RX ADMIN — IPRATROPIUM BROMIDE AND ALBUTEROL SULFATE 3 ML: .5; 2.5 SOLUTION RESPIRATORY (INHALATION) at 00:12

## 2022-01-15 RX ADMIN — FERROUS SULFATE TAB 325 MG (65 MG ELEMENTAL FE) 325 MG: 325 (65 FE) TAB at 08:42

## 2022-01-15 RX ADMIN — ACETAMINOPHEN AND CODEINE PHOSPHATE 1 TABLET: 300; 30 TABLET ORAL at 20:15

## 2022-01-15 RX ADMIN — GUAIFENESIN AND DEXTROMETHORPHAN 10 ML: 100; 10 SYRUP ORAL at 20:15

## 2022-01-15 RX ADMIN — VANCOMYCIN HYDROCHLORIDE 1250 MG: 10 INJECTION, POWDER, LYOPHILIZED, FOR SOLUTION INTRAVENOUS at 16:05

## 2022-01-15 RX ADMIN — DOXYCYCLINE 100 MG: 100 INJECTION, POWDER, LYOPHILIZED, FOR SOLUTION INTRAVENOUS at 05:38

## 2022-01-15 RX ADMIN — METHYLPREDNISOLONE SODIUM SUCCINATE 60 MG: 125 INJECTION, POWDER, FOR SOLUTION INTRAMUSCULAR; INTRAVENOUS at 16:05

## 2022-01-15 RX ADMIN — METHYLPREDNISOLONE SODIUM SUCCINATE 40 MG: 40 INJECTION, POWDER, FOR SOLUTION INTRAMUSCULAR; INTRAVENOUS at 08:42

## 2022-01-15 RX ADMIN — FLUOXETINE HYDROCHLORIDE 40 MG: 20 CAPSULE ORAL at 08:43

## 2022-01-15 RX ADMIN — IPRATROPIUM BROMIDE AND ALBUTEROL SULFATE 3 ML: .5; 2.5 SOLUTION RESPIRATORY (INHALATION) at 07:25

## 2022-01-15 RX ADMIN — TAZOBACTAM SODIUM AND PIPERACILLIN SODIUM 4.5 G: 500; 4 INJECTION, SOLUTION INTRAVENOUS at 17:11

## 2022-01-15 RX ADMIN — LOSARTAN POTASSIUM 50 MG: 50 TABLET, FILM COATED ORAL at 08:43

## 2022-01-15 RX ADMIN — IPRATROPIUM BROMIDE AND ALBUTEROL SULFATE 3 ML: .5; 2.5 SOLUTION RESPIRATORY (INHALATION) at 12:30

## 2022-01-15 RX ADMIN — PANTOPRAZOLE SODIUM 40 MG: 40 TABLET, DELAYED RELEASE ORAL at 05:38

## 2022-01-15 RX ADMIN — ALLOPURINOL 300 MG: 300 TABLET ORAL at 08:42

## 2022-01-15 RX ADMIN — TAZOBACTAM SODIUM AND PIPERACILLIN SODIUM 4.5 G: 500; 4 INJECTION, SOLUTION INTRAVENOUS at 23:57

## 2022-01-15 RX ADMIN — DOXYCYCLINE 100 MG: 100 INJECTION, POWDER, LYOPHILIZED, FOR SOLUTION INTRAVENOUS at 17:12

## 2022-01-15 RX ADMIN — DICYCLOMINE HYDROCHLORIDE 10 MG: 10 CAPSULE ORAL at 20:15

## 2022-01-15 RX ADMIN — TAZOBACTAM SODIUM AND PIPERACILLIN SODIUM 4.5 G: 500; 4 INJECTION, SOLUTION INTRAVENOUS at 00:00

## 2022-01-15 RX ADMIN — SODIUM CHLORIDE, PRESERVATIVE FREE 10 ML: 5 INJECTION INTRAVENOUS at 20:16

## 2022-01-15 NOTE — PLAN OF CARE
Goal Outcome Evaluation:  Plan of Care Reviewed With: patient     Weaned off BiPAP at start of shift, on HFNC at 60%.VSS. NSR. Alert and oriented. Frequent cough. Given atarax for anxiety when BiPAP was on and tramadol for pain from arthritis. Slept well since HFNC was placed.     Problem: Adult Inpatient Plan of Care  Goal: Plan of Care Review  Outcome: Ongoing, Progressing  Flowsheets (Taken 1/15/2022 0400)  Plan of Care Reviewed With: patient  Goal: Patient-Specific Goal (Individualized)  Outcome: Ongoing, Progressing  Goal: Absence of Hospital-Acquired Illness or Injury  Outcome: Ongoing, Progressing  Intervention: Identify and Manage Fall Risk  Recent Flowsheet Documentation  Taken 1/15/2022 0200 by Aylin Bella RN  Safety Promotion/Fall Prevention:   assistive device/personal items within reach   activity supervised   clutter free environment maintained   lighting adjusted   safety round/check completed   room organization consistent  Taken 1/15/2022 0000 by Aylin Bella RN  Safety Promotion/Fall Prevention:   activity supervised   assistive device/personal items within reach   clutter free environment maintained   lighting adjusted   room organization consistent   safety round/check completed  Taken 1/14/2022 2200 by Aylin Bella RN  Safety Promotion/Fall Prevention:   activity supervised   assistive device/personal items within reach   clutter free environment maintained   lighting adjusted   safety round/check completed   room organization consistent  Taken 1/14/2022 2000 by Aylin Bella RN  Safety Promotion/Fall Prevention:   activity supervised   assistive device/personal items within reach   clutter free environment maintained   lighting adjusted   room organization consistent   safety round/check completed  Intervention: Prevent Skin Injury  Recent Flowsheet Documentation  Taken 1/15/2022 0200 by Aylin Bella RN  Body Position: position changed independently  Skin  Protection:   adhesive use limited   skin-to-device areas padded   skin-to-skin areas padded   tubing/devices free from skin contact  Taken 1/15/2022 0000 by Aylin Bella RN  Body Position: position changed independently  Skin Protection: adhesive use limited  Taken 1/14/2022 2200 by Aylin Bella RN  Body Position: position changed independently  Skin Protection:   adhesive use limited   skin-to-device areas padded   skin-to-skin areas padded   tubing/devices free from skin contact   pulse oximeter probe site changed  Taken 1/14/2022 2000 by Aylin Bella RN  Body Position: position changed independently  Skin Protection: adhesive use limited  Intervention: Prevent and Manage VTE (venous thromboembolism) Risk  Recent Flowsheet Documentation  Taken 1/14/2022 2000 by Aylin Bella RN  VTE Prevention/Management: (xarelto) bleeding risk factor(s) identified  Intervention: Prevent Infection  Recent Flowsheet Documentation  Taken 1/14/2022 2000 by Aylin Bella RN  Infection Prevention:   visitors restricted/screened   single patient room provided   hand hygiene promoted  Goal: Optimal Comfort and Wellbeing  Outcome: Ongoing, Progressing  Intervention: Provide Person-Centered Care  Recent Flowsheet Documentation  Taken 1/14/2022 2000 by Aylin Bella RN  Trust Relationship/Rapport:   care explained   choices provided   questions answered   questions encouraged   reassurance provided  Goal: Readiness for Transition of Care  Outcome: Ongoing, Progressing     Problem: COPD Comorbidity  Goal: Maintenance of COPD Symptom Control  Outcome: Ongoing, Progressing     Problem: Hypertension Comorbidity  Goal: Blood Pressure in Desired Range  Outcome: Ongoing, Progressing     Problem: Skin Injury Risk Increased  Goal: Skin Health and Integrity  Outcome: Ongoing, Progressing  Intervention: Optimize Skin Protection  Recent Flowsheet Documentation  Taken 1/15/2022 0200 by Aylin Bella RN  Pressure  Reduction Techniques: weight shift assistance provided  Pressure Reduction Devices:   positioning supports utilized   pressure-redistributing mattress utilized  Skin Protection:   adhesive use limited   skin-to-device areas padded   skin-to-skin areas padded   tubing/devices free from skin contact  Taken 1/15/2022 0000 by Aylin Bella RN  Pressure Reduction Techniques: weight shift assistance provided  Pressure Reduction Devices: positioning supports utilized  Skin Protection: adhesive use limited  Taken 1/14/2022 2200 by Aylin Bella RN  Pressure Reduction Techniques: weight shift assistance provided  Pressure Reduction Devices: positioning supports utilized  Skin Protection:   adhesive use limited   skin-to-device areas padded   skin-to-skin areas padded   tubing/devices free from skin contact   pulse oximeter probe site changed  Taken 1/14/2022 2000 by Aylin Bella RN  Pressure Reduction Techniques: weight shift assistance provided  Pressure Reduction Devices: positioning supports utilized  Skin Protection: adhesive use limited

## 2022-01-15 NOTE — PROGRESS NOTES
[facility] Medicine Services  PROGRESS NOTE    Patient Name: Liz Borjas  : 1950  MRN: [id number]    Date of Admission: 2022  Primary Care Physician: Valerie Sesay APRN    Subjective   Subjective     CC:  Shortness of breath    HPI:  I have seen and evaluated patient this morning. Feeling much better compared to yesterday evening. Her breathing is much improved and currently oxygen requirement is at FiO2 of 80% on high flow nasal cannula. Currently has no acute particular complaint other than shortness of breath. I spoke to her son Ej over the phone and explained the plan of care to him    ROS:  10 point review of systems is negative except for what is mentioned in HPI    Objective   Objective     Vital Signs:   Temp:  [97.4 °F (36.3 °C)-98.5 °F (36.9 °C)] 97.7 °F (36.5 °C)  Heart Rate:  [] 105  Resp:  [22-54] 30  BP: ()/(60-78) 112/60  Flow (L/min):  [6-45] 45     Physical Exam:  General: In mild to moderate respiratory distress, not in any acute distress, appears stated age, conversant and cooperative  Head: Atraumatic and normocephalic, without obvious abnormality  Eyes:   Conjunctivae and sclerae normal, no Icterus. No pallor  Ears:  Ears appear intact with no abnormalities noted  Throat: No oral lesions, no thrush, oral mucosa moist  Neck: Supple, trachea midline, no thyromegaly  Back:   No kyphoscoliosis present. No tenderness to palpation,   no sacral edema  Lungs: In mild respiratory distress, bilateral coarse crackles in both lung fields   Heart:  Normal S1 and S2, no murmur, no gallop, No JVD, no lower extremity swelling  Abdomen:  Soft, no tenderness, no organomegaly, normal bowel sounds  Normal bowel sounds, no masses, no organomegaly. Soft, nontender, nondistended, no guarding, no rebound tenderness.  Extremities: No gross abnormalities, no clubbing, pulses palpable and equal bilaterally  Skin: No bleeding, bruising or rash, normal skin turgor  and elasticity  Neurologic: Cranial nerves appear intact with no evidence of facial asymmetry, normal motor and sensory functions in all 4 extremities  Psych: Alert and oriented x 3, normal mood    Results Reviewed:  LAB RESULTS:      Lab 01/14/22  1313   WBC 12.81*   HEMOGLOBIN 11.4*   HEMATOCRIT 37.9   PLATELETS 218   NEUTROS ABS 11.90*   IMMATURE GRANS (ABS) 0.07*   LYMPHS ABS 0.18*   MONOS ABS 0.48   EOS ABS 0.15   MCV 84.0   CRP 6.60*   PROCALCITONIN 0.15   LACTATE 1.9   *         Lab 01/14/22  1313   SODIUM 139   POTASSIUM 3.5   CHLORIDE 98   CO2 28.0   ANION GAP 13.0   BUN 13   CREATININE 1.15*   GLUCOSE 109*   CALCIUM 8.6         Lab 01/14/22  1313   TOTAL PROTEIN 6.5   ALBUMIN 2.70*   GLOBULIN 3.8   ALT (SGPT) 10   AST (SGOT) 13   BILIRUBIN 0.7   ALK PHOS 200*         Lab 01/14/22  1313   PROBNP 584.0   TROPONIN T <0.010             Lab 01/14/22  1313   FERRITIN 164.80*         Lab 01/14/22  2146 01/14/22  1327   PH, ARTERIAL 7.457* 7.470*   PCO2, ARTERIAL 42.8 42.4   PO2 ART 57.0* 45.8*   FIO2 40 48   HCO3 ART 30.2* 30.8*   BASE EXCESS ART 5.7* 6.5*   CARBOXYHEMOGLOBIN 1.5 1.5     Brief Urine Lab Results  (Last result in the past 365 days)      Color   Clarity   Blood   Leuk Est   Nitrite   Protein   CREAT   Urine HCG        11/27/21 2334 Yellow   Clear   Negative   Negative   Negative   Trace                 Microbiology Results Abnormal     Procedure Component Value - Date/Time    COVID-19, FLU A/B, RSV PCR - Swab, Nasopharynx [[id number]]  (Normal) Collected: 01/14/22 1313    Lab Status: Final result Specimen: Swab from Nasopharynx Updated: 01/14/22 1402     COVID19 Not Detected     Influenza A PCR Not Detected     Influenza B PCR Not Detected     RSV, PCR Not Detected    Narrative:      Fact sheet for providers: https://www.fda.gov/media/156716/download    Fact sheet for patients: https://www.fda.gov/media/521474/download    Test performed by PCR.          XR Chest 1 View    Result Date:  1/14/2022  PROCEDURE: CR Chest 1 Vw COMPARISON:  January 14 12:59 PM INDICATIONS: resp failure; Acute and chronic respiratory failure with hypoxia; Pulmonary fibrosis, unspecified TECHNIQUE: Single AP  view of the chest FINDINGS:  Cardiomediastinal silhouette is stable. Lung volumes remain low with continued bilateral infiltrates with interstitial component. According to prior reports patient has underlying fibrosis. No acute osseous abnormality.     Impression: No significant change from previous exam performed same day at 12:59 PM.  Signer Name: Federica Blancas MD  Signed: 1/14/2022 8:20 PM  Workstation Name: [facility]-  [facility]    XR Chest 1 View    Result Date: 1/14/2022  EXAMINATION: XR CHEST 1 VW-  INDICATION: SOA triage protocol  COMPARISON: 12/20/2021  FINDINGS: Redemonstrated extensive fibrotic appearance of the lung fields, with appearance suggesting some superimposed opacities, edema or pneumonia in both upper lobes. There is otherwise no enlarging effusion or thorax. Unchanged heart and mediastinal contours.      Impression: Redemonstrated extensive fibrotic appearance of the lung fields, with appearance suggesting some superimposed opacities, edema or pneumonia in both upper lobes.  This report was finalized on 1/14/2022 1:43 PM by Steve Chairez.            I have reviewed the medications:  Scheduled Meds:allopurinol, 300 mg, Oral, Daily  amLODIPine, 10 mg, Oral, Daily  atenolol, 100 mg, Oral, Daily  cetirizine, 5 mg, Oral, Daily  dicyclomine, 10 mg, Oral, BID  doxepin, 40 mg, Oral, Nightly  doxycycline, 100 mg, Intravenous, Q12H  ferrous sulfate, 325 mg, Oral, Daily  flecainide, 50 mg, Oral, BID  FLUoxetine, 40 mg, Oral, Daily  guaiFENesin, 1,200 mg, Oral, Q12H  ipratropium-albuterol, 3 mL, Nebulization, Q6H - RT  losartan, 50 mg, Oral, Daily  methylPREDNISolone sodium succinate, 40 mg, Intravenous, Q12H  pantoprazole, 40 mg, Oral, Q AM  piperacillin-tazobactam, 4.5 g,  Intravenous, Q8H  rivaroxaban, 20 mg, Oral, Daily With Dinner  sodium chloride, 10 mL, Intravenous, Q12H      Continuous Infusions:Pharmacy Consult - Pharmacy to dose,       PRN Meds:.•  acetaminophen  •  albuterol  •  hydrOXYzine  •  ondansetron **OR** ondansetron  •  Pharmacy Consult - Pharmacy to dose  •  sodium chloride  •  sodium chloride  •  traMADol    Assessment/Plan   Assessment & Plan     Active Hospital Problems    Diagnosis  POA   • Stage 3a chronic kidney disease (HCC) [N18.31]  Unknown   • Acute on chronic respiratory failure with hypoxia ([facility]) [J96.21]  Yes   • Acute and chronic respiratory failure with hypoxia (HCC) [J96.21]  Yes   • Chronic anticoagulation [Z79.01]  Not Applicable   • HTN (hypertension) [I10]  Yes   • PAF (paroxysmal atrial fibrillation) ([facility]) [I48.0]  Yes   • Pneumonia [J18.9]  Yes   • COPD (chronic obstructive pulmonary disease) ([facility]) [J44.9]  Yes   • Rheumatoid arthritis with positive rheumatoid factor ([facility]) [M05.9]  Yes   • Anxiety and depression [F41.9, F32.A]  Yes      Resolved Hospital Problems   No resolved problems to display.       Brief Hospital Course to date:  Liz Borjas is a 71 y.o. female with past medical history of COPD, chronic hypoxic respiratory failure on home oxygen, pulmonary fibrosis, rheumatoid arthritis, essential hypertension, A. fib, liver cirrhosis, CKD who presented to the hospital with worsening shortness of breath for 1 week after steroid taper started and home dose went down to 10 mg daily. She was found to be in respiratory failure requiring high oxygen supplementation at 80% FiO2 through high flow nasal cannula with concern for superimposed bacterial pneumonia    Assessment and plan:  Acute on chronic hypoxic respiratory failure  Bilateral upper lobe pneumonia, present on admission  Severe COPD with exacerbation  Severe pulmonary fibrosis  · Continue IV Zosyn and doxycycline. Add vancomycin  · High-dose IV steroids with Solu-Medrol 60 mg 3 times  daily  · Continue duo nebs and pulmonary toilet  · Pulmonary consultation (patient follows with pulmonary associates)  · Oxygen supplementation and wean off to target oxygen saturation above 88 to 92%  · Follow sputum culture and MRSA swab  · Speech eval     Rheumatoid arthritis  · Has not been on immunosuppressive therapy in several weeks because of recurrent pneumonia  · Has been missing her appointment with dermatology service at  because of her recurrent hospitalization. Son requested rheumatology consultation while hospitalized but explained to him that with the rheumatology service and that we can consult rheumatology over the phone as they need to see and examine the patient themselves. He was understanding     Paroxysmal atrial fibrillation  Hypertension  · Continue flecainide, atenolol, amlodipine, losartan  · Continue Xarelto     CKD stage III     DVT prophylaxis:  Xarelto        DVT prophylaxis:  Medical DVT prophylaxis orders are present.       AM-PAC 6 Clicks Score (PT): 19 (01/14/22 2000)    Disposition: I expect the patient to be discharged to be determined pending on her clinical response and decreased oxygen requirement to reasonable level     CODE STATUS:   Code Status and Medical Interventions:   Ordered at: 01/14/22 1421     Level Of Support Discussed With:    Patient     Code Status (Patient has no pulse and is not breathing):    CPR (Attempt to Resuscitate)     Medical Interventions (Patient has pulse or is breathing):    Full Support       Palma Mcfadden MD  01/15/22

## 2022-01-15 NOTE — PLAN OF CARE
Goal Outcome Evaluation:  Plan of Care Reviewed With: patient           Outcome Summary: PT evaluation completed.  Pt transferred supine<-->sit modified independently and stood with SBAx1.  Pt on hi-flow throughout - O2 noted to be 83% after standing for ~1 minute.  ~2 minutes needed to recover to 90%.  Skilled PT services warranted to improve mobility and safety.  Recommend home with HH PT at d/c.

## 2022-01-15 NOTE — CONSULTS
NN has received consult and completed a chart review.  Patient was not available for interview this date as she was SIM.  She remains on HFNC.  NN will continue to follow for interview and education as appropriate.

## 2022-01-15 NOTE — PAYOR COMM NOTE
"Notification only  Lorena Jimenez RN CM  Phone [phone]  Fax [phone]    Liz Borjas (71 y.o. Female)             Date of Birth Social Security Number Address Home Phone MRN    1950  [address] [place] [address] [phone] [id number]    Quaker Marital Status             Gnosticism        Admission Date Admission Type Admitting Provider Attending Provider Department, Room/Bed    1/14/22 Emergency Palma Mcfadden MD Younis, Mohamed Ahmed, MD [facility] 5G, S555/1    Discharge Date Discharge Disposition Discharge Destination                         Attending Provider: Palma Mcfadden MD    Allergies: Moexipril-hydrochlorothiazide, Penicillins, Sulfa Antibiotics, Ace Inhibitors    Isolation: None   Infection: COVID (rule out) (01/14/22)   Code Status: CPR   Advance Care Planning Activity    Ht: 157.5 cm (62\")   Wt: 61.2 kg (135 lb)    Admission Cmt: None   Principal Problem: None                Active Insurance as of 1/14/2022     Primary Coverage     Payor Plan Insurance Group Employer/Plan Group    MEDICARE MEDICARE B ONLY      Payor Plan Address Payor Plan Phone Number Payor Plan Fax Number Effective Dates    [address] [address] [phone]  2/1/2017 - None Entered    [facility] [id number]       Subscriber Name Subscriber Birth Date Member ID       LIZ BORJAS 1950 [id number]           Secondary Coverage     Payor Plan Insurance Group Employer/Plan Group    [place]      Payor Plan Address Payor Plan Phone Number Payor Plan Fax Number Effective Dates    [address]   1/1/2014 - None Entered    [facility] [phone]       Subscriber Name Subscriber Birth Date Member ID       LIZ BORJAS 1950 [id number]                 Emergency Contacts      (Rel.) Home Phone Work Phone Mobile Phone    KotlikEj moncada (Son) -- -- [phone]    CHARLENEJOSE (Daughter) -- -- [phone]             "   History & Physical      Caridad Brown DO at 22 1651              [facility] Medicine Services  HISTORY AND PHYSICAL    Patient Name: Liz Borjas  : 1950  MRN: [id number]  Primary Care Physician: Valerie Sesay APRN  Date of admission: 2022    Subjective   Subjective     Chief Complaint:  Shortness of air, dyspnea on exertion    HPI:  Liz Borjas is a 71 y.o. female with past medical history of COPD, chronic oxygen dependent, pulmonary fibrosis, RA, hypertension, atrial fibrillation, cirrhosis, CKD presented to [facility]  ED with complaints of worsening dyspnea x1 week.  Patient daughter-in-law helps provide history.  States that since patient's last hospitalization (discharged 2021) her respiratory status has been doing well as she had been tapering her oral steroids.  This week the patient tapered down to prednisone 10 mg daily and has been starting to experience more coughing, more shortness of breath at rest, worsening dyspnea on exertion.  Patient has been coming more week and has stopped eating and drinking well.  Has become more dizzy and lightheaded with ambulation.  No fever or chills, no nausea vomiting diarrhea, no palpitations or chest pain.  She began coughing up thick sputum with more color where she normally has clear to white sputum typically every morning.  [facility] on [facility] and was started on doxycycline and increased oral steroids.  This has not improved her symptoms and today the patient decided that she was tired of feeling terrible and wanted to be seen at the hospital.  She additionally has had to go up on her oxygen, she typically is able to tolerate 4 to 6 L by nasal cannula with ambulation and has not been able to wean her oxygen over the last week, keeping her oxygen at 6 to 7 L nasal cannula consistently.  In ED, labs are mostly stable with LDH of 322, creatinine 1.15, WBC 12.8, lactate 1.9, Pro-Khoa  0.15, CRP 6.6.  COVID-19 swab negative.  Chest x-ray with redemonstration extensive fibrotic appearance of the lung fields with appearance suggestive of some superimposed opacities, edema or pneumonia in both upper lobes.  Patient is very tachypneic with her respiratory rate in the 30s and 40s, and oxygen saturations in the 80s and she has now been placed on BiPAP.  Patient will be admitted to hospital medicine for further evaluation and treatment.      COVID Details:        Symptoms: [] NONE [] Fever [x]  Cough [x] Shortness of breath [] Change in taste or smell  The patient has started, but not completed, their COVID-19 vaccination series.    Review of Systems   Constitutional: Positive for activity change, appetite change and fatigue. Negative for chills, diaphoresis, fever and unexpected weight change.   HENT: Positive for congestion. Negative for trouble swallowing and voice change.    Eyes: Negative for photophobia, redness and visual disturbance.   Respiratory: Positive for cough and shortness of breath. Negative for chest tightness and wheezing.    Cardiovascular: Negative for chest pain, palpitations and leg swelling.   Gastrointestinal: Negative for abdominal distention, abdominal pain, nausea and vomiting.   Endocrine: Negative for cold intolerance and heat intolerance.   Genitourinary: Negative for difficulty urinating and dysuria.   Musculoskeletal: Negative for arthralgias, back pain and gait problem.   Skin: Negative for color change, pallor, rash and wound.   Neurological: Positive for dizziness and weakness. Negative for facial asymmetry and speech difficulty.   Hematological: Negative for adenopathy. Does not bruise/bleed easily.   Psychiatric/Behavioral: Negative for agitation, behavioral problems and confusion.        All other systems reviewed and are negative.     Personal History     Past Medical History:   Diagnosis Date   • Arthritis    • Atrial fibrillation (HCC)    • Closed right hip  fracture (HCC)    • GERD (gastroesophageal reflux disease)    • Gout    • Hyperlipidemia    • Hypertension    • Osteoporosis    • Rheumatoid arthritis (HCC)    • Wears dentures     upper   • Wears glasses     reading       Past Surgical History:   Procedure Laterality Date   • ANKLE SURGERY Right    • CHOLECYSTECTOMY     • COLONOSCOPY      5 years ago   • HIP FRACTURE SURGERY      Right Hip with Pins   • LUMBAR DISCECTOMY Left 10/12/2016    Procedure: lumbar MICROdiscectomy LEFT L3-5;  Surgeon: Chris Son MD;  Location: [facility];  Service:    • REPLACEMENT TOTAL KNEE      Right Knee   • TOE AMPUTATION      right baby toe, left second toe       Family History: family history includes Cancer in her father; Heart disease in her mother. Otherwise pertinent FHx was reviewed and unremarkable.     Social History:  reports that she has quit smoking. Her smoking use included cigarettes. She has never used smokeless tobacco. She reports that she does not drink alcohol and does not use drugs.  Social History     Social History Narrative   • Not on file       Medications:  Cetirizine HCl, FLUoxetine, HYDROcodone-acetaminophen, RABEprazole, albuterol, albuterol sulfate HFA, allopurinol, amLODIPine, atenolol, dextromethorphan-guaifenesin, dicyclomine, doxepin, doxycycline, ferrous gluconate, ferrous sulfate, flecainide, leflunomide, nystatin, predniSONE, rivaroxaban, and traMADol    Allergies   Allergen Reactions   • Moexipril-Hydrochlorothiazide Anaphylaxis   • Penicillins Hives     Tolerates ancef, rocephin, Zosyn   • Sulfa Antibiotics Rash     Also affects kidney function   • Ace Inhibitors Hives       Objective   Objective     Vital Signs:   Temp:  [97.4 °F (36.3 °C)] 97.4 °F (36.3 °C)  Heart Rate:  [] 101  Resp:  [24-44] 44  BP: (116-128)/(65-78) 121/73  Flow (L/min):  [6-40] 40    Physical Exam   Constitutional: Awake, alert resting in bed on Bipap, chronically ill appearing   Eyes: PERRLA, sclerae  anicteric, no conjunctival injection  HENT: NCAT, mucous membranes moist  Neck: Supple, no thyromegaly, no lymphadenopathy, trachea midline  Respiratory: Bibasilar rales without wheezing or rhonchi, mildly labored respirations on 40% bipap with RR 44  Cardiovascular: Tachycardia, no murmurs, rubs, or gallops, palpable pedal pulses bilaterally  Gastrointestinal: Positive bowel sounds, soft, nontender, nondistended  Musculoskeletal: No bilateral ankle edema, no clubbing or cyanosis to extremities  Psychiatric: Appropriate affect, cooperative   Neurologic: Oriented x 3, strength symmetric in all extremities, Cranial Nerves grossly intact to confrontation, speech clear  Skin: No rashes    Result Review:  I have personally reviewed the results from the time of this admission to 01/14/22 4:51 PM EST and agree with these findings:  [x]  Laboratory  [x]  Microbiology  [x]  Radiology  [x]  EKG/Telemetry   []  Cardiology/Vascular   []  Pathology  [x]  Old records  []  Other:  Most notable findings include:       LAB RESULTS:      Lab 01/14/22  1313   WBC 12.81*   HEMOGLOBIN 11.4*   HEMATOCRIT 37.9   PLATELETS 218   NEUTROS ABS 11.90*   IMMATURE GRANS (ABS) 0.07*   LYMPHS ABS 0.18*   MONOS ABS 0.48   EOS ABS 0.15   MCV 84.0   CRP 6.60*   PROCALCITONIN 0.15   LACTATE 1.9   *         Lab 01/14/22  1313   SODIUM 139   POTASSIUM 3.5   CHLORIDE 98   CO2 28.0   ANION GAP 13.0   BUN 13   CREATININE 1.15*   GLUCOSE 109*   CALCIUM 8.6         Lab 01/14/22  1313   TOTAL PROTEIN 6.5   ALBUMIN 2.70*   GLOBULIN 3.8   ALT (SGPT) 10   AST (SGOT) 13   BILIRUBIN 0.7   ALK PHOS 200*         Lab 01/14/22  1313   PROBNP 584.0   TROPONIN T <0.010             Lab 01/14/22  1313   FERRITIN 164.80*         Lab 01/14/22  1327   PH, ARTERIAL 7.470*   PCO2, ARTERIAL 42.4   PO2 ART 45.8*   FIO2 48   HCO3 ART 30.8*   BASE EXCESS ART 6.5*   CARBOXYHEMOGLOBIN 1.5     UA    Urinalysis 10/18/21 11/27/21   Specific [place], UA 1.010 1.029   Ketones, UA  Negative Negative   Blood, UA Negative Negative   Leukocytes, UA Negative Negative   Nitrite, UA Negative Negative           Microbiology Results (last 10 days)     Procedure Component Value - Date/Time    COVID-19, FLU A/B, RSV PCR - Swab, Nasopharynx [[id number]]  (Normal) Collected: 01/14/22 1313    Lab Status: Final result Specimen: Swab from Nasopharynx Updated: 01/14/22 1402     COVID19 Not Detected     Influenza A PCR Not Detected     Influenza B PCR Not Detected     RSV, PCR Not Detected    Narrative:      Fact sheet for providers: https://www.fda.gov/media/254921/download    Fact sheet for patients: https://www.fda.gov/media/390679/download    Test performed by PCR.          XR Chest 1 View    Result Date: 1/14/2022  EXAMINATION: XR CHEST 1 VW-  INDICATION: SOA triage protocol  COMPARISON: 12/20/2021  FINDINGS: Redemonstrated extensive fibrotic appearance of the lung fields, with appearance suggesting some superimposed opacities, edema or pneumonia in both upper lobes. There is otherwise no enlarging effusion or thorax. Unchanged heart and mediastinal contours.      Impression: Redemonstrated extensive fibrotic appearance of the lung fields, with appearance suggesting some superimposed opacities, edema or pneumonia in both upper lobes.  This report was finalized on 1/14/2022 1:43 PM by Steve Chairez.            Assessment/Plan   Assessment & Plan       Rheumatoid arthritis with positive rheumatoid factor (HCC)    Anxiety and depression    COPD (chronic obstructive pulmonary disease) (HCC)    Pneumonia    Acute and chronic respiratory failure with hypoxia (HCC)    PAF (paroxysmal atrial fibrillation) ([facility])    Chronic anticoagulation    HTN (hypertension)    Stage 3a chronic kidney disease (HCC)      Acute on chronic respiratory failure with hypoxia  Bilateral upper lobe pneumonia  COPD with exacerbation  Pulmonary fibrosis  -- IV Zosyn and IV Doxy  -- IV steroids  -- DuoNebs, pulmonary toilet  -- Consider  pulmonary consult, [facility] with [facility]  -- Wean oxygen as able  -- Sputum culture  -- SLP evaluation    RA  -- Has not been on immunosuppressive therapy in several weeks  -- Follows with Dr. Zehra BECKFORD rheumatology    P AF  Hypertension  -- Continue flecainide, atenolol, amlodipine, losartan  -- Continue Xarelto    CKD  -- Baseline creatinine from 0.8-1.0  -- Creatinine on admission 1.15    DVT prophylaxis:  Xarelto     CODE STATUS:    Level Of Support Discussed With: Patient  Code Status (Patient has no pulse and is not breathing): CPR (Attempt to Resuscitate)  Medical Interventions (Patient has pulse or is breathing): Full Support      This note has been completed as part of a split-shared workflow.   Signature: Electronically signed by ANITHA Boland, 01/14/22, 5:12 PM EST.      Attending   Admission Attestation       I have seen and examined the patient, performing an independent face-to-face diagnostic evaluation with plan of care reviewed and developed with the advanced practice clinician (APC).      Brief Summary Statement:   Liz Borjas is a 71 y.o. female with PMHx significant for COPD, pulmonary fibrosis, chronic respiratory failure on 4L O2 and chronic steroids, RA, HTN, PAF, CKD, cirrhosis who presents to [facility] with complaints of increasing shortness of breath. Patient notes she had been tapering her chronic prednisone at home, as she did this she began experiencing worsening SOA, cough and ISSA. She was seen by Pulmonology clinic on 1/11 and started on course of doxycycline with increase in her steroids without improvement. She has been turning her O2 up at home to 7L with continued symptoms especially with ambulation. She notes increasing weakness, anorexia and dizziness/lightheadedness. In the ED patient noted to be tachypneic with O2 sats in the mid 80's. She was placed on BIPAP with improvement. CXR concerning for possibly superimposed pneumonia, continued fibrotic changes in the  lungs.    Remainder of detailed HPI is as noted by APC and has been reviewed and/or edited by me for completeness.    Attending Physical Exam:  Constitutional: Awake, alert  Eyes: PERRLA, sclerae anicteric, no conjunctival injection  HENT: NCAT, mucous membranes moist  Neck: Supple, no thyromegaly, no lymphadenopathy, trachea midline  Respiratory: crackles bilaterally, mildly labored and tachypneic on BIPAP with shallow breathing and some accessory muscle use  Cardiovascular: RRR, no murmurs, rubs, or gallops, palpable pedal pulses bilaterally  Gastrointestinal: Positive bowel sounds, soft, nontender, nondistended  Musculoskeletal: No bilateral ankle edema, no clubbing or cyanosis to extremities  Psychiatric: Appropriate affect, cooperative  Neurologic: Oriented x 3, strength symmetric in all extremities, Cranial Nerves grossly intact to confrontation, speech clear  Skin: No rashes      Brief Assessment/Plan :  See detailed assessment and plan developed with APC which I have reviewed and/or edited for completeness.        Admission Status: I believe that this patient meets INPATIENT status due to acute on chronic respiratory failure.  I feel patient’s risk for adverse outcomes and need for care warrant INPATIENT evaluation and I predict the patient’s care encounter to likely last beyond 2 midnights.        Caridad Brown DO  01/14/22                        Electronically signed by Caridad Brown DO at 01/14/22 [id number]

## 2022-01-15 NOTE — PLAN OF CARE
Problem: Adult Inpatient Plan of Care  Goal: Plan of Care Review  Outcome: Ongoing, Progressing   Goal Outcome Evaluation:            SLP evaluation completed. Will sign-off dysphagia. Please see note for further details and recommendations.

## 2022-01-15 NOTE — THERAPY EVALUATION
Patient Name: Liz Borjas  : 1950    MRN: [id number]                              Today's Date: 1/15/2022       Admit Date: 2022    Visit Dx:     ICD-10-CM ICD-9-CM   1. Acute and chronic respiratory failure with hypoxia (HCC)  J96.21 518.84     799.02   2. 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)   • Anxiety and depression   • COPD (chronic obstructive pulmonary disease) (HCC)   • Mild obesity   • Physical deconditioning   • Sepsis (HCC)   • Pneumonia   • Leukocytosis   • Lactic acidosis   • Hyponatremia   • Acute respiratory failure with hypoxia (HCC)   • Acute and chronic respiratory failure with hypoxia (HCC)   • Elevated d-dimer   • Immunocompromised (HCC)   • PAF (paroxysmal atrial fibrillation) (HCC)   • Chronic anticoagulation   • HTN (hypertension)   • Interstitial lung disease (HCC)   • Stage 3a chronic kidney disease (HCC)   • Acute on chronic respiratory failure with hypoxia (HCC)     Past Medical History:   Diagnosis Date   • Arthritis    • Atrial fibrillation (HCC)    • Closed right hip fracture (HCC)    • GERD (gastroesophageal reflux disease)    • Gout    • Hyperlipidemia    • Hypertension    • Osteoporosis    • Rheumatoid arthritis (HCC)    • Wears dentures     upper   • Wears glasses     reading     Past Surgical History:   Procedure Laterality Date   • ANKLE SURGERY Right    • CHOLECYSTECTOMY     • COLONOSCOPY      5 years ago   • HIP FRACTURE SURGERY      Right Hip with Pins   • LUMBAR DISCECTOMY Left 10/12/2016    Procedure: lumbar MICROdiscectomy LEFT L3-5;  Surgeon: Chris Son MD;  Location: [facility];  Service:    • REPLACEMENT TOTAL KNEE      Right  Knee   • TOE AMPUTATION      right baby toe, left second toe      General Information     Row Name 01/15/22 1140          OT Time and Intention    Document Type evaluation  -     Mode of Treatment occupational therapy  -     Row Name 01/15/22 1140          General Information    Patient Profile Reviewed yes  -SW     Prior Level of Function independent:; all household mobility; community mobility; home management; ADL's  -     Existing Precautions/Restrictions fall; oxygen therapy device and L/min  -     Barriers to Rehab medically complex  -     Row Name 01/15/22 1140          Occupational Profile    Environmental Supports and Barriers (Occupational Profile) Pt has a RW and a cane.  -[facility] Name 01/15/22 1140          Living Environment    Lives With alone  -[facility] Name 01/15/22 1140          Home Main Entrance    Number of Stairs, Main Entrance one  -[facility] Name 01/15/22 1140          Cognition    Orientation Status (Cognition) oriented x 4  -[facility] Name 01/15/22 1140          Safety Issues, Functional Mobility    Safety Issues Affecting Function (Mobility) safety precautions follow-through/compliance; safety precaution awareness; friction/shear risk  -     Impairments Affecting Function (Mobility) endurance/activity tolerance; balance; strength; shortness of breath  -           User Key  (r) = Recorded By, (t) = Taken By, (c) = Cosigned By    Initials Name Provider Type    SW Zenaida Figueroa OT Occupational Therapist                 Mobility/ADL's     [facility] Name 01/15/22 1120          Bed Mobility    Bed Mobility bed mobility (all) activities  Simultaneous filing. User may be unaware of other data.  -     All Activities, [place] (Bed Mobility) supervision; verbal cues  -     Assistive Device (Bed Mobility) bed rails; head of bed elevated  Simultaneous filing. User may be unaware of other data.  -     Row Name 01/15/22 1120          Transfers    Transfers sit-stand transfer  -      Sit-Stand [place] (Transfers) contact guard  Simultaneous filing. User may be unaware of other data.  -[facility] Name 01/15/22 1120          Sit-Stand Transfer    Assistive Device (Sit-Stand Transfers) other (see comments)  no device Simultaneous filing. User may be unaware of other data.  -[facility] Name 01/15/22 1120          Functional Mobility    Functional Mobility- Ind. Level contact guard assist  -     Functional Mobility- Device other (see comments)  UE support  -     Functional Mobility-Distance (Feet) 2  -     Functional Mobility- Safety Issues supplemental O2  -           User Key  (r) = Recorded By, (t) = Taken By, (c) = Cosigned By    Initials Name Provider Type     Zenaida Figueroa OT Occupational Therapist               Obj/Interventions     [facility] Name 01/15/22 1120          Sensory Assessment (Somatosensory)    Sensory Assessment (Somatosensory) UE sensation intact  -SW     Row Name 01/15/22 1120          Vision Assessment/Intervention    Visual Impairment/Limitations WFL  -SW     Row Name 01/15/22 1120          Range of Motion Comprehensive    General Range of Motion bilateral upper extremity ROM WNL  -SW     Row Name 01/15/22 1120          Strength Comprehensive (MMT)    General Manual Muscle Testing (MMT) Assessment upper extremity strength deficits identified  -     Comment, General Manual Muscle Testing (MMT) Assessment BUEs 4-/5  -SW     Row Name 01/15/22 1120          Balance    Balance Assessment sitting static balance; sitting dynamic balance; sit to stand dynamic balance; standing static balance; standing dynamic balance  -     Static Sitting Balance WNL; unsupported; sitting, edge of bed  -SW     Dynamic Sitting Balance WFL; unsupported; sitting, edge of bed  -SW     Sit to Stand Dynamic Balance mild impairment  -     Static Standing Balance WFL; supported; standing  -SW     Dynamic Standing Balance mild impairment; supported; standing  -SW     Balance Interventions  sitting; standing; sit to stand; supported; static; dynamic; minimal challenge; occupation based/functional task  -           User Key  (r) = Recorded By, (t) = Taken By, (c) = Cosigned By    Initials Name Provider Type    Zenaida Garvey OT Occupational Therapist               Goals/Plan     Row Name 01/15/22 1120          Bed Mobility Goal 1 (OT)    Activity/Assistive Device (Bed Mobility Goal 1, OT) bed mobility activities, all  -SW     [place] Level/Cues Needed (Bed Mobility Goal 1, OT) independent  -SW     Time Frame (Bed Mobility Goal 1, OT) long term goal (LTG); by discharge  -SW     Progress/Outcomes (Bed Mobility Goal 1, OT) goal ongoing  -     Row Name 01/15/22 1120          Transfer Goal 1 (OT)    Activity/Assistive Device (Transfer Goal 1, OT) transfers, all  -SW     [place] Level/Cues Needed (Transfer Goal 1, OT) independent  -SW     Time Frame (Transfer Goal 1, OT) long term goal (LTG); by discharge  -SW     Progress/Outcome (Transfer Goal 1, OT) goal ongoing  -[facility] Name 01/15/22 1120          Dressing Goal 1 (OT)    Activity/Device (Dressing Goal 1, OT) lower body dressing  -SW     [place]/Cues Needed (Dressing Goal 1, OT) set-up required  -SW     Time Frame (Dressing Goal 1, OT) long term goal (LTG); by discharge  -SW     Progress/Outcome (Dressing Goal 1, OT) goal ongoing  -[facility] Name 01/15/22 1120          Therapy Assessment/Plan (OT)    Planned Therapy Interventions (OT) activity tolerance training; adaptive equipment training; BADL retraining; functional balance retraining; transfer/mobility retraining; strengthening exercise; patient/caregiver education/training  -           User Key  (r) = Recorded By, (t) = Taken By, (c) = Cosigned By    Initials Name Provider Type    Zenaida Garvey OT Occupational Therapist               Clinical Impression     Row Name 01/15/22 1120          Pain Assessment    Additional Documentation Pain Scale: Numbers Pre/Post-Treatment  (Group)  -[facility] Name 01/15/22 1120          Pain Scale: Numbers Pre/Post-Treatment    Pretreatment Pain Rating 0/10 - no pain  -SW     Posttreatment Pain Rating 0/10 - no pain  -[facility] Name 01/15/22 1120          Plan of Care Review    Plan of Care Reviewed With patient  -SW     Outcome Summary OT evaluation complete.  Pt Ox4 and rates pain 0/10.  Pt has SOA with activity and stats monitored throughout evaluation.  Pt completed bed mob with sba and STS with cga.  Pt able to stand for 1-2 minutes but required several minutes to rebound with O2 back to 90%.  Pt able to recall breathing strategies from memory.  Recommend IPOT and HHOT at d/c to promote endurance and safety in home.  -[facility] Name 01/15/22 1120          Therapy Assessment/Plan (OT)    Rehab Potential (OT) good, to achieve stated therapy goals  -     Criteria for Skilled Therapeutic Interventions Met (OT) yes; meets criteria; skilled treatment is necessary  -     Therapy Frequency (OT) daily  -[facility] Name 01/15/22 1120          Therapy Plan Review/Discharge Plan (OT)    Anticipated Discharge Disposition (OT) home with home health  -[facility] Name 01/15/22 1120          Vital Signs    Pre Systolic BP Rehab 108  -SW     Pre Treatment Diastolic BP 52  -SW     Pretreatment Heart Rate (beats/min) 66  -SW     Pre SpO2 (%) 97  -SW     O2 Delivery Pre Treatment hi-flow  -SW     Intra SpO2 (%) 84  -SW     O2 Delivery Intra Treatment hi-flow  -SW     Post SpO2 (%) 90  -SW     O2 Delivery Post Treatment hi-flow  -SW     Pre Patient Position Supine  -SW     Intra Patient Position Standing  -SW     Post Patient Position Supine  -[facility] Name 01/15/22 1120          Positioning and Restraints    Pre-Treatment Position in bed  -SW     Post Treatment Position bed  -SW     In Bed notified nsg; fowlers; call light within reach; encouraged to call for assist; exit alarm on; with family/caregiver; side rails up x2  -SW           User Key  (r) =  Recorded By, (t) = Taken By, (c) = Cosigned By    Initials Name Provider Type    Zenaida Garvey OT Occupational Therapist               Outcome Measures     Row Name 01/15/22 1153          How much help from another is currently needed...    Putting on and taking off regular lower body clothing? 3  -SW     Bathing (including washing, rinsing, and drying) 3  -SW     Toileting (which includes using toilet bed pan or urinal) 3  -SW     Putting on and taking off regular upper body clothing 3  -SW     Taking care of personal grooming (such as brushing teeth) 4  -SW     Eating meals 4  -SW     AM-PAC 6 Clicks Score (OT) 20  -SW     Row Name 01/15/22 1121 01/15/22 0835       How much help from another person do you currently need...    Turning from your back to your side while in flat bed without using bedrails? 4  -LM 3  -TG    Moving from lying on back to sitting on the side of a flat bed without bedrails? 4  -LM 3  -TG    Moving to and from a bed to a chair (including a wheelchair)? 3  -LM 3  -TG    Standing up from a chair using your arms (e.g., wheelchair, bedside chair)? 3  -LM 3  -TG    Climbing 3-5 steps with a railing? 2  -LM 2  -TG    To walk in hospital room? 3  -LM 3  -TG    AM-PAC 6 Clicks Score (PT) 19  -LM 17  -TG    Row Name 01/15/22 1153 01/15/22 1121       Functional Assessment    Outcome Measure Options AM-PAC 6 Clicks Daily Activity (OT)  -SW AM-PAC 6 Clicks Basic Mobility (PT)  -LM          User Key  (r) = Recorded By, (t) = Taken By, (c) = Cosigned By    Initials Name Provider Type    Liana Marcelo, PT Physical Therapist    TG Mouna Montiel, RN Registered Nurse    Zenaida Garvey OT Occupational Therapist                Occupational Therapy Education                 Title: PT OT SLP Therapies (In Progress)     Topic: Occupational Therapy (In Progress)     Point: ADL training (Done)     Description:   Instruct learner(s) on proper safety adaptation and remediation techniques during self care or  transfers.   Instruct in proper use of assistive devices.              Learning Progress Summary           Patient Acceptance, E, VU by  at 1/15/2022 1154   Family Acceptance, E, VU by  at 1/15/2022 1154                   Point: Home exercise program (Not Started)     Description:   Instruct learner(s) on appropriate technique for monitoring, assisting and/or progressing therapeutic exercises/activities.              Learner Progress:  Not documented in this visit.          Point: Precautions (Done)     Description:   Instruct learner(s) on prescribed precautions during self-care and functional transfers.              Learning Progress Summary           Patient Acceptance, E, VU by  at 1/15/2022 1154   Family Acceptance, E, VU by  at 1/15/2022 1154                   Point: Body mechanics (Not Started)     Description:   Instruct learner(s) on proper positioning and spine alignment during self-care, functional mobility activities and/or exercises.              Learner Progress:  Not documented in this visit.                      User Key     Initials Effective Dates Name Provider Type Discipline     06/16/21 -  Zenaida Figueroa OT Occupational Therapist OT              OT Recommendation and Plan  Planned Therapy Interventions (OT): activity tolerance training, adaptive equipment training, BADL retraining, functional balance retraining, transfer/mobility retraining, strengthening exercise, patient/caregiver education/training  Therapy Frequency (OT): daily  Plan of Care Review  Plan of Care Reviewed With: patient  Outcome Summary: OT evaluation complete.  Pt Ox4 and rates pain 0/10.  Pt has SOA with activity and stats monitored throughout evaluation.  Pt completed bed mob with sba and STS with cga.  Pt able to stand for 1-2 minutes but required several minutes to rebound with O2 back to 90%.  Pt able to recall breathing strategies from memory.  Recommend IPOT and HHOT at d/c to promote endurance and safety in  home.     Time Calculation:    Time Calculation- OT     Row Name 01/15/22 1120             Time Calculation- OT    OT Start Time 1120  -SW      OT Received On 01/15/22  -SW      OT Goal Re-Cert Due Date 01/25/22  -SW              Untimed Charges    OT Eval/Re-eval Minutes 50  -SW              Total Minutes    Untimed Charges Total Minutes 50  -SW       Total Minutes 50  -SW            User Key  (r) = Recorded By, (t) = Taken By, (c) = Cosigned By    Initials Name Provider Type     Zenaida Figueroa OT Occupational Therapist              Therapy Charges for Today     Code Description Service Date Service Provider Modifiers Qty    [id number] HC OT EVAL LOW COMPLEXITY 4 1/15/2022 Zenaida Figueroa OT GO 1               Zenaida Figueroa OT  1/15/2022

## 2022-01-15 NOTE — PLAN OF CARE
VSS. NSR on tele. High viviana nasal cannula. No complaints of pain or nausea. Patient has been pleasant and awake for entirety of shift. Minimal complaints of shortness of breath- only after coughing episodes. Education provided on incentive spirometry and encouraged during shift. Will continue to monitor closely.     Problem: Adult Inpatient Plan of Care  Goal: Plan of Care Review  Outcome: Ongoing, Progressing  Flowsheets  Taken 1/15/2022 1725 by Mouna Montiel RN  Progress: no change  Taken 1/15/2022 1121 by Liana Mcneal PT  Plan of Care Reviewed With: patient  Goal: Patient-Specific Goal (Individualized)  Outcome: Ongoing, Progressing  Goal: Absence of Hospital-Acquired Illness or Injury  Outcome: Ongoing, Progressing  Intervention: Identify and Manage Fall Risk  Recent Flowsheet Documentation  Taken 1/15/2022 1600 by Mouna Montiel RN  Safety Promotion/Fall Prevention:   activity supervised   assistive device/personal items within reach   clutter free environment maintained   nonskid shoes/slippers when out of bed   safety round/check completed   room organization consistent  Taken 1/15/2022 1400 by Mouna Montiel RN  Safety Promotion/Fall Prevention:   activity supervised   assistive device/personal items within reach   clutter free environment maintained   nonskid shoes/slippers when out of bed   room organization consistent   safety round/check completed  Taken 1/15/2022 1211 by Mouna Montiel RN  Safety Promotion/Fall Prevention:   activity supervised   assistive device/personal items within reach   clutter free environment maintained   nonskid shoes/slippers when out of bed   room organization consistent   safety round/check completed  Taken 1/15/2022 1000 by Mouna Montiel RN  Safety Promotion/Fall Prevention: patient off unit  Taken 1/15/2022 0835 by Mouna Montiel RN  Safety Promotion/Fall Prevention:   activity supervised   assistive device/personal items within reach   clutter free environment  maintained   nonskid shoes/slippers when out of bed   safety round/check completed   room organization consistent  Intervention: Prevent Skin Injury  Recent Flowsheet Documentation  Taken 1/15/2022 1600 by Mouna Montiel RN  Body Position: sitting up in bed  Skin Protection:   adhesive use limited   incontinence pads utilized   transparent dressing maintained   tubing/devices free from skin contact  Taken 1/15/2022 1400 by Mouna Montiel RN  Body Position: weight shift assistance provided  Skin Protection:   adhesive use limited   incontinence pads utilized   transparent dressing maintained   tubing/devices free from skin contact  Taken 1/15/2022 1211 by Mouna Montiel RN  Body Position:   weight shift assistance provided   sitting up in bed  Skin Protection:   adhesive use limited   incontinence pads utilized   transparent dressing maintained   tubing/devices free from skin contact  Taken 1/15/2022 0835 by Mouna Montiel RN  Body Position: position changed independently  Skin Protection:   adhesive use limited   incontinence pads utilized   transparent dressing maintained   tubing/devices free from skin contact  Intervention: Prevent and Manage VTE (venous thromboembolism) Risk  Recent Flowsheet Documentation  Taken 1/15/2022 0835 by Mouna Montiel RN  VTE Prevention/Management:   bilateral   dorsiflexion/plantar flexion performed   bleeding risk factor(s) identified  Intervention: Prevent Infection  Recent Flowsheet Documentation  Taken 1/15/2022 1600 by Mouna Montiel RN  Infection Prevention:   visitors restricted/screened   single patient room provided   rest/sleep promoted   personal protective equipment utilized   hand hygiene promoted   environmental surveillance performed  Taken 1/15/2022 1400 by Mouna Montiel RN  Infection Prevention:   visitors restricted/screened   single patient room provided   rest/sleep promoted   personal protective equipment utilized   hand hygiene promoted   environmental  surveillance performed  Taken 1/15/2022 1211 by Mouna Montiel RN  Infection Prevention:   visitors restricted/screened   rest/sleep promoted   single patient room provided   personal protective equipment utilized   hand hygiene promoted   environmental surveillance performed  Taken 1/15/2022 0835 by Mouna Montiel RN  Infection Prevention:   visitors restricted/screened   single patient room provided   rest/sleep promoted   personal protective equipment utilized   hand hygiene promoted   environmental surveillance performed  Goal: Optimal Comfort and Wellbeing  Outcome: Ongoing, Progressing  Intervention: Provide Person-Centered Care  Recent Flowsheet Documentation  Taken 1/15/2022 0835 by Mouna Montiel RN  Trust Relationship/Rapport:   choices provided   care explained   questions answered   questions encouraged   thoughts/feelings acknowledged  Goal: Readiness for Transition of Care  Outcome: Ongoing, Progressing     Problem: COPD Comorbidity  Goal: Maintenance of COPD Symptom Control  Outcome: Ongoing, Progressing  Intervention: Maintain COPD-Symptom Control  Recent Flowsheet Documentation  Taken 1/15/2022 1600 by Mouna Montiel RN  Medication Review/Management: medications reviewed  Taken 1/15/2022 1400 by Mouna Montiel RN  Medication Review/Management: medications reviewed  Taken 1/15/2022 1211 by Mouna Montiel RN  Medication Review/Management: medications reviewed  Taken 1/15/2022 0835 by Mouna Montiel RN  Medication Review/Management: medications reviewed     Problem: Hypertension Comorbidity  Goal: Blood Pressure in Desired Range  Outcome: Ongoing, Progressing  Intervention: Maintain Hypertension-Management Strategies  Recent Flowsheet Documentation  Taken 1/15/2022 1600 by Mouna Montiel RN  Medication Review/Management: medications reviewed  Taken 1/15/2022 1400 by Mouna Montiel RN  Medication Review/Management: medications reviewed  Taken 1/15/2022 1211 by Mouna Montiel RN  Medication  Review/Management: medications reviewed  Taken 1/15/2022 0835 by Mouna Montiel RN  Medication Review/Management: medications reviewed     Problem: Skin Injury Risk Increased  Goal: Skin Health and Integrity  Outcome: Ongoing, Progressing  Intervention: Optimize Skin Protection  Recent Flowsheet Documentation  Taken 1/15/2022 1600 by Mouna Montiel RN  Pressure Reduction Techniques:   weight shift assistance provided   frequent weight shift encouraged   pressure points protected  Head of Bed (HOB): HOB at 60 degrees  Pressure Reduction Devices:   positioning supports utilized   chair cushion utilized  Skin Protection:   adhesive use limited   incontinence pads utilized   transparent dressing maintained   tubing/devices free from skin contact  Taken 1/15/2022 1400 by Mouna Montiel RN  Pressure Reduction Techniques:   frequent weight shift encouraged   weight shift assistance provided   pressure points protected   positioned off wounds  Head of Bed (HOB): HOB at 45 degrees  Pressure Reduction Devices: (chair cushion underneath patient)   chair cushion utilized   other (see comments)  Skin Protection:   adhesive use limited   incontinence pads utilized   transparent dressing maintained   tubing/devices free from skin contact  Taken 1/15/2022 1211 by Mouna Montiel RN  Pressure Reduction Techniques:   weight shift assistance provided   frequent weight shift encouraged   pressure points protected  Head of Bed (HOB): HOB at 60 degrees  Pressure Reduction Devices:   pressure-redistributing mattress utilized   positioning supports utilized  Skin Protection:   adhesive use limited   incontinence pads utilized   transparent dressing maintained   tubing/devices free from skin contact  Taken 1/15/2022 0835 by Mouna Montiel RN  Pressure Reduction Techniques:   weight shift assistance provided   frequent weight shift encouraged   pressure points protected  Head of Bed (HOB): HOB at 60 degrees  Pressure Reduction Devices:    pressure-redistributing mattress utilized   positioning supports utilized  Skin Protection:   adhesive use limited   incontinence pads utilized   transparent dressing maintained   tubing/devices free from skin contact     Problem: Adjustment to Illness COPD (Chronic Obstructive Pulmonary Disease)  Goal: Optimal Chronic Illness Coping  Outcome: Ongoing, Progressing  Intervention: Support and Optimize Psychosocial Response  Recent Flowsheet Documentation  Taken 1/15/2022 0835 by Mouna Montiel RN  Supportive Measures:   active listening utilized   verbalization of feelings encouraged  Family/Support System Care:   self-care encouraged   support provided     Problem: Functional Ability Impaired COPD (Chronic Obstructive Pulmonary Disease)  Goal: Optimal Level of Functional [place]  Outcome: Ongoing, Progressing  Intervention: Optimize Functional Ability  Recent Flowsheet Documentation  Taken 1/15/2022 1600 by Mouna Montiel RN  Activity Management: activity adjusted per tolerance  Taken 1/15/2022 1400 by Mouna Montiel RN  Activity Management: activity adjusted per tolerance  Taken 1/15/2022 1211 by Mouna Montiel RN  Activity Management: activity adjusted per tolerance  Taken 1/15/2022 0835 by Mouna Montiel RN  Activity Management: activity adjusted per tolerance  Environmental Support:   calm environment promoted   rest periods encouraged     Problem: Infection COPD (Chronic Obstructive Pulmonary Disease)  Goal: Absence of Infection Signs and Symptoms  Outcome: Ongoing, Progressing     Problem: Oral Intake Inadequate COPD (Chronic Obstructive Pulmonary Disease)  Goal: Improved Nutrition Intake  Outcome: Ongoing, Progressing     Problem: Respiratory Compromise COPD (Chronic Obstructive Pulmonary Disease)  Goal: Effective Oxygenation and Ventilation  Outcome: Ongoing, Progressing  Intervention: Promote Airway Secretion Clearance  Recent Flowsheet Documentation  Taken 1/15/2022 1600 by Mouna Montiel  RN  Activity Management: activity adjusted per tolerance  Taken 1/15/2022 1400 by Mouna Montiel RN  Activity Management: activity adjusted per tolerance  Taken 1/15/2022 1211 by Mouna Montiel RN  Activity Management: activity adjusted per tolerance  Taken 1/15/2022 0835 by Muona Montiel RN  Activity Management: activity adjusted per tolerance  Cough And Deep Breathing: done with encouragement  Intervention: Optimize Oxygenation and Ventilation  Recent Flowsheet Documentation  Taken 1/15/2022 1600 by Mouna Montiel RN  Head of Bed (HOB): HOB at 60 degrees  Taken 1/15/2022 1400 by Mouna Montiel RN  Head of Bed (HOB): HOB at 45 degrees  Taken 1/15/2022 1211 by Mouna Montiel RN  Head of Bed (HOB): HOB at 60 degrees  Taken 1/15/2022 0835 by Monua Montiel RN  Fluid/Electrolyte Management: fluids provided  Head of Bed (HOB): HOB at 60 degrees   Goal Outcome Evaluation:           Progress: no change

## 2022-01-15 NOTE — PLAN OF CARE
Goal Outcome Evaluation:  Plan of Care Reviewed With: patient           Outcome Summary: OT evaluation complete.  Pt Ox4 and rates pain 0/10.  Pt has SOA with activity and stats monitored throughout evaluation.  Pt completed bed mob with sba and STS with cga.  Pt able to stand for 1-2 minutes but required several minutes to rebound with O2 back to 90%.  Pt able to recall breathing strategies from memory.  Recommend IPOT and HHOT at d/c to promote endurance and safety in home.

## 2022-01-15 NOTE — NURSING NOTE
WOC consult:     Bottom     Patient presents with an old friction area to her left medial gluteal.   Area is intact and crusted.   Coccyx is red and intact.     Nothing needed from WOC.    Good general skin care, barrier cream.     See order for care needs.     Will sign off.     Thanks

## 2022-01-15 NOTE — THERAPY EVALUATION
Patient Name: Liz Borjas  : 1950    MRN: [id number]                              Today's Date: 1/15/2022       Admit Date: 2022    Visit Dx:     ICD-10-CM ICD-9-CM   1. Acute and chronic respiratory failure with hypoxia (HCC)  J96.21 518.84     799.02   2. 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)   • Anxiety and depression   • COPD (chronic obstructive pulmonary disease) (HCC)   • Mild obesity   • Physical deconditioning   • Sepsis (HCC)   • Pneumonia   • Leukocytosis   • Lactic acidosis   • Hyponatremia   • Acute respiratory failure with hypoxia (HCC)   • Acute and chronic respiratory failure with hypoxia (HCC)   • Elevated d-dimer   • Immunocompromised (HCC)   • PAF (paroxysmal atrial fibrillation) (HCC)   • Chronic anticoagulation   • HTN (hypertension)   • Interstitial lung disease (HCC)   • Stage 3a chronic kidney disease (HCC)   • Acute on chronic respiratory failure with hypoxia (HCC)     Past Medical History:   Diagnosis Date   • Arthritis    • Atrial fibrillation (HCC)    • Closed right hip fracture (HCC)    • GERD (gastroesophageal reflux disease)    • Gout    • Hyperlipidemia    • Hypertension    • Osteoporosis    • Rheumatoid arthritis (HCC)    • Wears dentures     upper   • Wears glasses     reading     Past Surgical History:   Procedure Laterality Date   • ANKLE SURGERY Right    • CHOLECYSTECTOMY     • COLONOSCOPY      5 years ago   • HIP FRACTURE SURGERY      Right Hip with Pins   • LUMBAR DISCECTOMY Left 10/12/2016    Procedure: lumbar MICROdiscectomy LEFT L3-5;  Surgeon: Chris Son MD;  Location: [facility];  Service:    • REPLACEMENT TOTAL KNEE      Right  Knee   • TOE AMPUTATION      right baby toe, left second toe      General Information     Row Name 01/15/22 1120          Physical Therapy Time and Intention    Document Type evaluation  -LM     Mode of Treatment physical therapy  -LM     Row Name 01/15/22 [facility]0          General Information    Patient Profile Reviewed yes  -LM     Prior Level of Function independent:; all household mobility; gait; ADL's  Switches between using QC and rw  -LM     Existing Precautions/Restrictions fall; oxygen therapy device and L/min  -LM     Barriers to Rehab none identified  -LM     Row Name 01/15/22 1120          Living Environment    Lives With alone  -LM     Row Name 01/15/22 [facility]0          Home Main Entrance    Number of Stairs, Main Entrance one  One small threshold step  -LM     Stair Railings, Main Entrance none  -LM     Row Name 01/15/22 1120          Stairs Within Home, Primary    Number of Stairs, Within Home, Primary none  -LM     Row Name 01/15/22 [facility]          Cognition    Orientation Status (Cognition) oriented x 4  -LM     Row Name 01/15/22 [facility]          Safety Issues, Functional Mobility    Impairments Affecting Function (Mobility) endurance/activity tolerance; shortness of breath; strength  -LM           User Key  (r) = Recorded By, (t) = Taken By, (c) = Cosigned By    Initials Name Provider Type    LM Liana Mcneal, PT Physical Therapist               Mobility     Row Name 01/15/22 1120          Bed Mobility    Supine-Sit [place] (Bed Mobility) modified independence  -LM     Sit-Supine [place] (Bed Mobility) modified independence  -LM     Comment (Bed Mobility) No issues noted with bed mobility.  -LM     Row Name 01/15/22 1120          Transfers    Comment (Transfers) Stood from EOB while wound RN assessed buttocks and PT straightened out drawsheet/chucks.  Pt on hi-flow throughout - O2 noted to be 83% once sitting back down.  ~2 minutes needed to recover to 90s.  -LM           User Key  (r) = Recorded By,  (t) = Taken By, (c) = Cosigned By    Initials Name Provider Type    Liana Marcelo, PT Physical Therapist               Obj/Interventions     Row Name 01/15/22 1121          Range of Motion Comprehensive    General Range of Motion bilateral lower extremity ROM WFL  -LM     Row Name 01/15/22 1121          Strength Comprehensive (MMT)    General Manual Muscle Testing (MMT) Assessment lower extremity strength deficits identified  -LM     Comment, General Manual Muscle Testing (MMT) Assessment BLEs grossly 4-/5 throughout  -LM     Row Name 01/15/22 1121          Balance    Balance Assessment sitting static balance; standing static balance; standing dynamic balance  -LM     Static Sitting Balance WFL; unsupported; sitting, edge of bed  -LM     Static Standing Balance WFL  -LM     Dynamic Standing Balance mild impairment; supported  -LM     Row Name 01/15/22 1121          Sensory Assessment (Somatosensory)    Sensory Assessment (Somatosensory) LE sensation intact  -LM           User Key  (r) = Recorded By, (t) = Taken By, (c) = Cosigned By    Initials Name Provider Type    LM Liana Mcneal, PT Physical Therapist               Goals/Plan     Row Name 01/15/22 1120          Transfer Goal 1 (PT)    Activity/Assistive Device (Transfer Goal 1, PT) bed-to-chair/chair-to-bed  -LM     [place] Level/Cues Needed (Transfer Goal 1, PT) modified independence  -LM     Time Frame (Transfer Goal 1, PT) long term goal (LTG); 2 weeks  -LM     Row Name 01/15/22 1120          Gait Training Goal 1 (PT)    Activity/Assistive Device (Gait Training Goal 1, PT) gait (walking locomotion); assistive device use  -LM     [place] Level (Gait Training Goal 1, PT) modified independence  -LM     Distance (Gait Training Goal 1, PT) 150 feet  -LM     Time Frame (Gait Training Goal 1, PT) long term goal (LTG); 2 weeks  -LM           User Key  (r) = Recorded By, (t) = Taken By, (c) = Cosigned By    Initials Name Provider Type    LM Liana Mcneal,  PT Physical Therapist               Clinical Impression     Row Name 01/15/22 1121          Pain    Additional Documentation Pain Scale: Numbers Pre/Post-Treatment (Group)  -LM     Row Name 01/15/22 1121          Pain Scale: Numbers Pre/Post-Treatment    Pretreatment Pain Rating 0/10 - no pain  -LM     Posttreatment Pain Rating 0/10 - no pain  -LM     Row Name 01/15/22 1121          Plan of Care Review    Plan of Care Reviewed With patient  -LM     Outcome Summary PT evaluation completed.  Pt transferred supine<-->sit modified independently and stood with SBAx1.  Pt on hi-flow throughout - O2 noted to be 83% after standing for ~1 minute.  ~2 minutes needed to recover to 90%.  Skilled PT services warranted to improve mobility and safety.  Recommend home with  PT at d/c.  -     Row Name 01/15/22 1121          Therapy Assessment/Plan (PT)    Rehab Potential (PT) good, to achieve stated therapy goals  -     Criteria for Skilled Interventions Met (PT) yes; meets criteria; skilled treatment is necessary  -     Row Name 01/15/22 1121          Vital Signs    Pre Systolic BP Rehab 108  -LM     Pre Treatment Diastolic BP 52  -LM     Pretreatment Heart Rate (beats/min) 66  -LM     Posttreatment Heart Rate (beats/min) 65  -LM     Pre SpO2 (%) 93  -LM     O2 Delivery Pre Treatment hi-flow  -LM     Intra SpO2 (%) 83   -LM     O2 Delivery Intra Treatment hi-flow  -LM     Post SpO2 (%) 92  -LM     O2 Delivery Post Treatment hi-flow  -LM     Pre Patient Position Supine  -LM     Intra Patient Position Standing  -LM     Post Patient Position Supine  -LM     Row Name 01/15/22 1121          Positioning and Restraints    Pre-Treatment Position in bed  -LM     Post Treatment Position bed  -LM     In Bed supine; call light within reach; encouraged to call for assist; exit alarm on; with family/caregiver; notified nsg  -LM           User Key  (r) = Recorded By, (t) = Taken By, (c) = Cosigned By    Initials Name Provider Type    LM  Liana Mcneal, PT Physical Therapist               Outcome Measures     Row Name 01/15/22 1121 01/15/22 0835       How much help from another person do you currently need...    Turning from your back to your side while in flat bed without using bedrails? 4  -LM 3  -TG    Moving from lying on back to sitting on the side of a flat bed without bedrails? 4  -LM 3  -TG    Moving to and from a bed to a chair (including a wheelchair)? 3  -LM 3  -TG    Standing up from a chair using your arms (e.g., wheelchair, bedside chair)? 3  -LM 3  -TG    Climbing 3-5 steps with a railing? 2  -LM 2  -TG    To walk in hospital room? 3  -LM 3  -TG    AM-PAC 6 Clicks Score (PT) 19  -LM 17  -TG    Row Name 01/15/22 1121          Functional Assessment    Outcome Measure Options AM-PAC 6 Clicks Basic Mobility (PT)  -           User Key  (r) = Recorded By, (t) = Taken By, (c) = Cosigned By    Initials Name Provider Type    LM Liana Mcneal, PT Physical Therapist    Mouna Melton RN Registered Nurse                             Physical Therapy Education                 Title: PT OT SLP Therapies (In Progress)     Topic: Physical Therapy (In Progress)     Point: Mobility training (Done)     Learning Progress Summary           Patient Acceptance, E, VU,DU by  at 1/15/2022 1152                   Point: Home exercise program (Not Started)     Learner Progress:  Not documented in this visit.          Point: Precautions (Done)     Learning Progress Summary           Patient Acceptance, E, VU,DU by  at 1/15/2022 1152                               User Key     Initials Effective Dates Name Provider Type Discipline     06/16/21 -  Liana Mcneal, PT Physical Therapist PT              PT Recommendation and Plan  Planned Therapy Interventions (PT): balance training, bed mobility training, gait training, home exercise program, motor coordination training, neuromuscular re-education, patient/family education, postural re-education, ROM (range  of motion), stair training, strengthening, stretching, transfer training  Plan of Care Reviewed With: patient  Outcome Summary: PT evaluation completed.  Pt transferred supine<-->sit modified independently and stood with SBAx1.  Pt on hi-flow throughout - O2 noted to be 83% after standing for ~1 minute.  ~2 minutes needed to recover to 90%.  Skilled PT services warranted to improve mobility and safety.  Recommend home with HH PT at d/c.     Time Calculation:    PT Charges     Row Name 01/15/22 1120             Time Calculation    Start Time 1120  -LM      PT Received On 01/15/22  -LM      PT Goal Re-Cert Due Date 01/25/22  -LM              Untimed Charges    PT Eval/Re-eval Minutes 46  -LM              Total Minutes    Untimed Charges Total Minutes 46  -LM       Total Minutes 46  -LM            User Key  (r) = Recorded By, (t) = Taken By, (c) = Cosigned By    Initials Name Provider Type    LM Liana Mcneal, PT Physical Therapist              Therapy Charges for Today     Code Description Service Date Service Provider Modifiers Qty    [id number] HC PT EVAL LOW COMPLEXITY 4 1/15/2022 Liana Mcneal, PT GP 1          PT G-Codes  Outcome Measure Options: AM-PAC 6 Clicks Basic Mobility (PT)  AM-PAC 6 Clicks Score (PT): 19    Liana Mcneal PT  1/15/2022

## 2022-01-15 NOTE — PROGRESS NOTES
"Pharmacy Consult-Vancomycin Dosing  Liz Borjas is a  71 y.o. female receiving vancomycin therapy.     Indication: PNA  Consulting Provider: Hospitalist  ID Consult: N    Goal AUC: 400 - 600 mg/L*hr    Current Antimicrobial Therapy  Vancomycin - pharmacy to dose  Zosyn 4.5g IV q8h  Doxycycline 100mg q12h    Allergies  Allergies as of 01/14/2022 - Reviewed 01/14/2022   Allergen Reaction Noted    Moexipril-hydrochlorothiazide Anaphylaxis 09/02/2010    Penicillins Hives 09/02/2010    Sulfa antibiotics Rash 09/25/2018    Ace inhibitors Hives 09/02/2010       Labs    Results from last 7 days   Lab Units 01/15/22  0701 01/14/22  1313   BUN mg/dL 20 13   CREATININE mg/dL 1.22* 1.15*       Results from last 7 days   Lab Units 01/15/22  0701 01/14/22  1313   WBC 10*3/mm3 6.34 12.81*       Evaluation of Dosing     Last Dose Received in the ED/Outside Facility: no  Is Patient on Dialysis or Renal Replacement: no    Ht - 157.5 cm (62\")  Wt - 61.2 kg (135 lb)    Estimated Creatinine Clearance: 37.4 mL/min (A) (by C-G formula based on SCr of 1.22 mg/dL (H)).    Intake & Output (last 3 days)         01/12 0701  01/13 0700 01/13 0701  01/14 0700 01/14 0701  01/15 0700 01/15 0701  01/16 0700    P.O.   240 480    Total Intake(mL/kg)   240 (3.9) 480 (7.8)    Urine (mL/kg/hr)   325     Total Output   325     Net   -85 +480            Urine Unmeasured Occurrence   1 x             Microbiology and Radiology  Microbiology Results (last 10 days)       Procedure Component Value - Date/Time    COVID-19, FLU A/B, RSV PCR - Swab, Nasopharynx [[id number]]  (Normal) Collected: 01/14/22 1313    Lab Status: Final result Specimen: Swab from Nasopharynx Updated: 01/14/22 1402     COVID19 Not Detected     Influenza A PCR Not Detected     Influenza B PCR Not Detected     RSV, PCR Not Detected    Narrative:      Fact sheet for providers: https://www.fda.gov/media/807410/download    Fact sheet for patients: " https://www.fda.gov/media/131082/download    Test performed by PCR.            Reported Vancomycin Levels                   InsightRX AUC Calculation:    Current AUC:    mg/L*hr    Predicted Steady State AUC on Current Dose:  mg/L*hr  _________________________________    Predicted Steady State AUC on New Dose: 449 mg/L*hr    Assessment/Plan:    Pharmacy dosing Vancomycin for PNA. Goal -600 mg/L*hr  Vancomycin initiated with 1250mg (~20mg/kg) followed by 1000mg q24hr.  Obtain trough 1/17 AM.  Monitor renal function, culture results, clinical status and adjust as necessary.  Pharmacy will continue to follow.     Kory Maravilla, PharmD  1/15/2022  14:56 EST

## 2022-01-15 NOTE — THERAPY EVALUATION
Acute Care - Speech Language Pathology   Swallow Initial Evaluation [facility]   Clinical Swallow Evaluation       Patient Name: Liz Borjas  : 1950  MRN: [id number]  Today's Date: 1/15/2022               Admit Date: 2022    Visit Dx:     ICD-10-CM ICD-9-CM   1. Acute and chronic respiratory failure with hypoxia (HCC)  J96.21 518.84     799.02   2. 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)   • Anxiety and depression   • COPD (chronic obstructive pulmonary disease) (HCC)   • Mild obesity   • Physical deconditioning   • Sepsis (HCC)   • Pneumonia   • Leukocytosis   • Lactic acidosis   • Hyponatremia   • Acute respiratory failure with hypoxia (HCC)   • Acute and chronic respiratory failure with hypoxia (HCC)   • Elevated d-dimer   • Immunocompromised (HCC)   • PAF (paroxysmal atrial fibrillation) (HCC)   • Chronic anticoagulation   • HTN (hypertension)   • Interstitial lung disease (HCC)   • Stage 3a chronic kidney disease (HCC)   • Acute on chronic respiratory failure with hypoxia (HCC)     Past Medical History:   Diagnosis Date   • Arthritis    • Atrial fibrillation (HCC)    • Closed right hip fracture (HCC)    • GERD (gastroesophageal reflux disease)    • Gout    • Hyperlipidemia    • Hypertension    • Osteoporosis    • Rheumatoid arthritis (HCC)    • Wears dentures     upper   • Wears glasses     reading     Past Surgical History:   Procedure Laterality Date   • ANKLE SURGERY Right    • CHOLECYSTECTOMY     • COLONOSCOPY      5 years ago   • HIP FRACTURE SURGERY      Right Hip with Pins   • LUMBAR DISCECTOMY Left 10/12/2016    Procedure: lumbar MICROdiscectomy LEFT L3-5;  Surgeon:  Chris Son MD;  [facility]: [facility] OR;  Service:    • REPLACEMENT TOTAL KNEE      Right Knee   • TOE AMPUTATION      right baby toe, left second toe       SLP Recommendation and Plan  SLP Swallowing Diagnosis: swallow WFL (01/15/22 1330)  SLP Diet Recommendation: regular textures, thin liquids (01/15/22 1330)  Recommended Precautions and Strategies: upright posture during/after eating, small bites of food and sips of liquid, general aspiration precautions (01/15/22 1330)  SLP Rec. for Method of Medication Administration: meds whole, with thin liquids (01/15/22 1330)     Monitor for Signs of Aspiration: yes, notify SLP if any concerns (01/15/22 1330)  Recommended Diagnostics: No further SLP services recommended (01/15/22 1330)  Swallow Criteria for Skilled Therapeutic Interventions Met: baseline status, no problems identified which require skilled intervention (01/15/22 1330)  Anticipated Discharge Disposition (SLP): unknown, anticipate therapy at next level of care (01/15/22 1330)     Therapy Frequency (Swallow): evaluation only (01/15/22 1330)                                            SWALLOW EVALUATION (last 72 hours)     SLP Adult Swallow Evaluation     Row Name 01/15/22 1330                   Rehab Evaluation    Document Type evaluation  -SG        Subjective Information no complaints  -SG        Patient Observations alert; cooperative  -SG        Patient/Family/Caregiver Comments/Observations dtr present for evaluation  -SG        Patient Effort good  -SG        Symptoms Noted During/After Treatment none  -SG                  General Information    Patient Profile Reviewed yes  -SG        Pertinent History Of Current Problem adm w/ COVID r/o, resp issues, SOB, dyspnea  -SG        Current Method of Nutrition regular textures; thin liquids  -SG        Precautions/Limitations, Vision WFL; for purposes of eval  -SG        Precautions/Limitations, Hearing WFL; for purposes of eval  -SG        Prior Level of  Function-Communication WFL  -SG        Prior Level of Function-Swallowing no diet consistency restrictions; safe, efficient swallowing in all situations  MBS WFL here on 11/29/2021  -SG        Plans/Goals Discussed with patient and family; agreed upon  -SG        Barriers to Rehab none identified  -SG        Patient's Goals for Discharge patient did not state  -SG                  Pain Scale: Numbers Pre/Post-Treatment    Pretreatment Pain Rating 0/10 - no pain  -SG        Posttreatment Pain Rating 0/10 - no pain  -SG                  Oral Motor Structure and Function    Dentition Assessment missing teeth; other (see comments)  upper dentures out for evaluation 2' HFNC  -SG        Secretion Management WNL/WFL  -SG        Mucosal Quality moist, healthy  -SG        Gag Response WFL  -SG        Volitional Swallow WFL  -SG        Volitional Cough WFL  -SG                  Oral Musculature and Cranial Nerve Assessment    Oral Motor General Assessment WFL  -SG                  General Eating/Swallowing Observations    Respiratory Support Currently in Use high-flow nasal cannula  -SG        Eating/Swallowing Skills self-fed; fed by SLP; appropriate self-feeding skills observed  -SG        Positioning During Eating upright 90 degree; upright in bed  -SG        Utensils Used spoon; cup; straw  -SG        Consistencies Trialed regular textures; thin liquids  -SG                  Respiratory    Respiratory Status WFL  -SG                  Clinical Swallow Eval    Oral Prep Phase WFL  -SG        Oral Transit WFL  -SG        Oral Residue WFL  -SG        Pharyngeal Phase no overt signs/symptoms of pharyngeal impairment  -SG        Clinical Swallow Evaluation Summary CSE completed this date. Pt alert and cooperative, resting comfortably on HFNC during evaluation. No s/s w/ any PO even when pushed. Timing and elevation adequate per palpation w/ all PO trials. Pt w/ most recent MBS here on 11/20/2021 which showed WFL swallowing  performance. No sign of any change or decline in swallowing skills. Edu provided regarding general aspiration precautions including watching for fatigue and frequent breaks PRN. Pt and family expressed understanding. Rec: Con't reg and thins w/ aspiration precautions.  -SG                  SLP Evaluation Clinical Impression    SLP Swallowing Diagnosis swallow WFL  -SG        Functional Impact no impact on function  -SG        Swallow Criteria for Skilled Therapeutic Interventions Met baseline status; no problems identified which require skilled intervention  -SG                  Recommendations    Therapy Frequency (Swallow) evaluation only  -SG        SLP Diet Recommendation regular textures; thin liquids  -SG        Recommended Diagnostics No further SLP services recommended  -SG        Recommended Precautions and Strategies upright posture during/after eating; small bites of food and sips of liquid; general aspiration precautions  -SG        Oral Care Recommendations Oral Care BID/PRN  -SG        SLP Rec. for Method of Medication Administration meds whole; with thin liquids  -SG        Monitor for Signs of Aspiration yes; notify SLP if any concerns  -SG        Anticipated Discharge Disposition (SLP) unknown; anticipate therapy at next level of care  -SG              User Key  (r) = Recorded By, (t) = Taken By, (c) = Cosigned By    Initials Name Effective Dates    Renee Milton MS CCC-SLP 06/16/21 -                 EDUCATION  The patient has been educated in the following areas:   Dysphagia (Swallowing Impairment) Oral Care/Hydration Modified Diet Instruction.              Time Calculation:    Time Calculation- SLP     Row Name 01/15/22 1401             Time Calculation- SLP    SLP Start Time 1330  -SG      SLP Received On 01/15/22  -            User Key  (r) = Recorded By, (t) = Taken By, (c) = Cosigned By    Initials Name Provider Type    Renee Milton MS CCC-SLP Speech  and Language Pathologist                Therapy Charges for Today     Code Description Service Date Service Provider Modifiers Qty    [id number] HC ST EVAL ORAL PHARYNG SWALLOW 3 1/15/2022 Renee Pacheco, MS CCC-SLP GN 1        Patient was not wearing a face mask and did not exhibit coughing during this therapy encounter.  Procedure performed was not aerosolizing, involved close contact (within 6 feet for at least 15 minutes or longer), and involved contact with infectious secretions or specimens.  Therapist used appropriate personal protective equipment including gloves, standard procedure mask and eye protection.  Appropriate PPE was worn during the entire therapy session.  Hand hygiene was completed before and after therapy session.          Renee Pacheco MS CCC-SLP  1/15/2022

## 2022-01-15 NOTE — CONSULTS
"Cindi Brandt, APRN Student   APRN NOTE    Liz Borjas is a 71 y.o. female who presents to [facility] on 1/16/22 with a one-week history of increasing dyspnea at rest and on exertion, cough, weakness, and inability to tolerate PO intake. She has a history of COPD, pulmonary fibrosis, RA (on prednisone and Orencia), PAF on Xarelto, chronic respiratory failure on 2L NC at home, HTN, recent COVID-19 infection, and remote tobacco use.     She is followed by [facility] for chronic interstitial lung disease with pulmonary fibrosis. On her last visit on 1/11/22, she was started on doxycycline and her steroids were increased; however, her symptoms worsened and she was admitted to [facility] on 11/27/21 and treated for PNA with ABX and steroids. She has tapered her steroid dose to 10 mg daily and, since then, her home oxygen requirement went from 2L to 6-7L NC consistently.     She was admitted to the hospitalist service on 1/14/22 with initial labs showing WBC 12.8, lactate 1.9, PCT 0.15, CRP 6.6. Negative COVID-19 swab. CXR demonstrates  extensive fibrosis with superimposed opacities, edema, or PNA in both upper lobes.    Patient has been vaccinated against COVID-19 with two doses of Moderna series.    [x]   Information obtained by review of electronic health records.(non-face-to-face)  []   Information obtained by face-to-face contact with the patient.    INPATIENT PULMONARY SERVICE   HOSPITAL VISIT       Hospital:  LOS: 1 day      S     Ms. Liz Borjas is a 71 y.o. 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    She is on HFNC 75% and 45 LPM.    (+) \"Coughing fits\"    Not much sputum production.     See above note for details.      PMH: She  has a past medical history of Arthritis, Atrial fibrillation (HCC), " Closed right hip fracture (HCC), GERD (gastroesophageal reflux disease), Gout, Hyperlipidemia, Hypertension, Osteoporosis, Rheumatoid arthritis (HCC), Wears dentures, and Wears glasses.   PSxH: She  has a past surgical history that includes Cholecystectomy; Toe amputation; Colonoscopy; Lumbar discectomy (Left, 10/12/2016); Replacement total knee; Hip fracture surgery; and Ankle surgery (Right).     Medications:  No current facility-administered medications on file prior to encounter.     Current Outpatient Medications on File Prior to Encounter   Medication Sig   • albuterol (ACCUNEB) 1.25 MG/3ML nebulizer solution Take 3 mL by nebulization Every 6 (Six) Hours As Needed for Wheezing.   • albuterol sulfate  (90 Base) MCG/ACT inhaler Inhale 2 puffs Every 4 (Four) Hours As Needed for Wheezing or Shortness of Air.   • amLODIPine (NORVASC) 10 MG tablet Take 10 mg by mouth daily.   • Cetirizine HCl (ZyrTEC Allergy) 10 MG capsule Take 10 mg by mouth Daily As Needed.   • dextromethorphan-guaifenesin (ROBITUSSIN-DM)  MG/5ML liquid Take 10 mL by mouth 3 (Three) Times a Day As Needed for Cough.   • dicyclomine (BENTYL) 10 MG capsule Take 10 mg by mouth 2 (Two) Times a Day.   • doxepin (SINEquan) 10 MG capsule Take 40 mg by mouth Every Night.   • doxycycline (VIBRAMYCIN) 100 MG capsule Take 1 capsule by mouth 2 (Two) Times a Day.   • FeroSul 325 (65 Fe) MG tablet Take 1 tablet by mouth Daily.   • flecainide (TAMBOCOR) 50 MG tablet Take 50 mg by mouth 2 (Two) Times a Day.   • FLUoxetine (PROzac) 40 MG capsule Take 40 mg by mouth daily.   • losartan (COZAAR) 50 MG tablet Take 100 mg by mouth Daily.   • nystatin (MYCOSTATIN) 100,000 unit/mL suspension Take 5 mL by mouth 4 (Four) Times a Day. Swish and Swallow   • predniSONE (DELTASONE) 10 MG tablet Take 1 tablet by mouth Daily.   • RABEprazole (ACIPHEX) 20 MG EC tablet Take 20 mg by mouth 2 (Two) Times a Day.   • rivaroxaban (XARELTO) 20 MG tablet Take 20 mg by mouth  Daily.   • traMADol (ULTRAM) 50 MG tablet Take 50 mg by mouth Every 6 (Six) Hours As Needed for Moderate Pain .   • [DISCONTINUED] ferrous gluconate (FERGON) 324 MG tablet Take 324 mg by mouth Daily With Breakfast.   • [DISCONTINUED] HYDROcodone-acetaminophen (NORCO) 5-325 MG per tablet Take 1 tablet by mouth Every 6 (Six) Hours As Needed.   • allopurinol (ZYLOPRIM) 300 MG tablet Take 300 mg by mouth Daily. (Patient not taking: Reported on 1/14/2022)   • atenolol (TENORMIN) 100 MG tablet Take 100 mg by mouth Daily. (Patient not taking: Reported on 1/14/2022)   • leflunomide (ARAVA) 20 MG tablet Take 20 mg by mouth Daily. (Patient not taking: Reported on 1/14/2022)        Allergies: She is allergic to moexipril-hydrochlorothiazide, penicillins, sulfa antibiotics, and ace inhibitors.   FH: Her family history includes Cancer in her father; Heart disease in her mother.   SH: She  reports that she has quit smoking. Her smoking use included cigarettes. She has never used smokeless tobacco. She reports that she does not drink alcohol and does not use drugs.     The patient's relevant past medical, surgical and social history were reviewed and updated in Epic as appropriate.        History     Last Reviewed by Gerardo Robertson MD on 1/15/2022 at  8:12 PM    Sections Reviewed    Medical, Family, Surgical, Tobacco, Alcohol, Drug Use, Sexual Activity,   Social Documentation      Problem list reviewed by Gerardo Robertson MD on 1/15/2022 at  8:12 PM  Medicines reviewed by Gerardo Robertson MD on 1/15/2022 at  8:12 PM  Allergies reviewed by Gerardo Robertson MD on 1/15/2022 at  8:12 PM      Review of Systems  As described in the HPI. All other systems reviewed and are negative.     Objective   O     Vitals:  Temp: 98 °F (36.7 °C) (01/15/22 1500) Temp  Min: 97.7 °F (36.5 °C)  Max: 98 °F (36.7 °C)   BP: 100/54 (01/15/22 1611) BP  Min: 93/62  Max: 113/73   Pulse: 64 (01/15/22 1948) Pulse  Min: 63  Max: 105   Resp: 24 (01/15/22 1500) Resp   Min: 22  Max: 32   SpO2: 96 % (01/15/22 1948) SpO2  Min: 90 %  Max: 98 %   Device: high-flow nasal cannula (01/15/22 1948)    Flow Rate: 45 (01/15/22 1948) Flow (L/min)  Min: 45  Max: 45     Medications (drips):  Pharmacy to dose vancomycin  Pharmacy Consult - Pharmacy to dose        Telemetry:  Rhythm: normal sinus rhythm (01/15/22 1800)         Constitutional:  No acute distress.   Cardiovascular: RRR.   Normal heart sounds.  No murmurs, gallop or rub.   Respiratory: Normal breath sounds  Rhonchi.  Crackles.   Abdominal:  Soft with no tenderness.  No distension.   No HSM.   Extremities: Warm.  Dry.  No cyanosis.  Trace Edema   Neurological:   Alert, Oriented, Cooperative.  Best Eye Response: 4-->(E4) spontaneous (01/15/22 1800)  Best Motor Response: 6-->(M6) obeys commands (01/15/22 1800)  Best Verbal Response: 5-->(V5) oriented (01/15/22 1800)  [place] Coma Scale Score: 15 (01/15/22 1800)       Results Reviewed:  Laboratory  Microbiology  Radiology  Pathology    Hematology:  Results from last 7 days   Lab Units 01/15/22  0701 01/14/22  1313   WBC 10*3/mm3 6.34 12.81*   HEMOGLOBIN g/dL 9.8* 11.4*   PLATELETS 10*3/mm3 175 218   NEUTROS ABS 10*3/mm3 5.72 11.90*   LYMPHS ABS 10*3/mm3 0.32* 0.18*     Chemistry:  Results from last 7 days   Lab Units 01/15/22  0701 01/14/22  1313   SODIUM mmol/L 139 139   POTASSIUM mmol/L 3.6 3.5   CHLORIDE mmol/L 100 98   CO2 mmol/L 29.0 28.0   BUN mg/dL 20 13   CREATININE mg/dL 1.22* 1.15*   GLUCOSE mg/dL 123* 109*     Results from last 7 days   Lab Units 01/15/22  0701 01/14/22  1313   CALCIUM mg/dL 7.8* 8.6     Results from last 7 days   Lab Units 01/14/22  1313   AST (SGOT) U/L 13   ALT (SGPT) U/L 10   BILIRUBIN mg/dL 0.7   ALK PHOS U/L 200*       Cardiac Labs:  Results from last 7 days   Lab Units 01/14/22  1313   PROBNP pg/mL 584.0   TROPONIN T ng/mL <0.010     Biomarkers:  Results from last 7 days   Lab Units 01/15/22  0701 01/14/22  1313   CRP mg/dL 8.16* 6.60*   PROCALCITONIN  ng/mL 0.18 0.15   LACTATE mmol/L  --  1.9   LDH U/L  --  322*   FERRITIN ng/mL  --  164.80*       COVID-19  Lab Results   Lab Value Date/Time    COVID19 Not Detected 01/14/2022 1313    COVID19 Not Detected 11/27/2021 1654    COVID19 Not Detected 10/18/2021 1347    COVID19 Presumptive Negative 10/17/2021 2242       Arterial Blood Gases:  Results from last 7 days   Lab Units 01/14/22  2146 01/14/22  1327   PH, ARTERIAL pH units 7.457* 7.470*   PCO2, ARTERIAL mm Hg 42.8 42.4   PO2 ART mm Hg 57.0* 45.8*   FIO2 % 40 48       Images:  CT Chest Without Contrast Diagnostic    Result Date: 1/15/2022  Redemonstrated extensive fibrotic changes, with increased intervening diffuse alveolar groundglass opacity most consistent with superimposed pneumonia.   This report was finalized on 1/15/2022 1:31 PM by Steve Chairez.      XR Chest 1 View    Result Date: 1/14/2022  No significant change from previous exam performed same day at 12:59 PM.  Signer Name: Federica Blancas MD  Signed: 1/14/2022 8:20 PM  Workstation Name: [facility]  [facility]    XR Chest 1 View    Result Date: 1/14/2022  Redemonstrated extensive fibrotic appearance of the lung fields, with appearance suggesting some superimposed opacities, edema or pneumonia in both upper lobes.  This report was finalized on 1/14/2022 1:43 PM by Steve Chairez.      I reviewed the patient's new clinical results.  I independently reviewed the patient's new imaging results.    Assessment/Plan   A / P     11/27/21     01/15/22      Assessment:    71 y.o.female, admitted on 1/14/2022 with Acute and chronic respiratory failure with hypoxia (HCC) [J96.21]:       1.   Pulmonary Fibrosis  1. CT chest showed significant progression of GGO and fibrosis.  1. R/O Acute exacerbation  2. R/O Pulmonary edema, cardiogenic (unlikely as proBNP is WNL)  3. R/O Secondary infection (PCT is WNL)  4. R/O others  2. COPD  3. Non-severe COVID-19 AUG 2021. She received  monoclonal antibodies.   4. Previous Mechanical Ventilation 13 years ago, she was liberated from the ventilator after 17 days.  5. HTN  6. P A Fib on anticoagulation with RIVaroxaban   7. Rheumatoid Arthritis She has been on Orencia in the past. Chronic steroids.  8. Cirrhosis (R/O Drug induced: MTX).  9. CKD III  10. No h/o DM    Advance Directives: Code Status and Medical Interventions:   Ordered at: 01/14/22 1651     Level Of Support Discussed With:    Patient     Code Status (Patient has no pulse and is not breathing):    CPR (Attempt to Resuscitate)     Medical Interventions (Patient has pulse or is breathing):    Full Support        Plan:    1. Prognosis very poor if this an exacerbation of her Pulmonary fibrosis  1. Continue IV steroids.  2. Discussed with patient need to consider Goals of Care and advance directives including Intubation and Invasive Mechanical Ventilation, Resuscitation, and a Palliative Medicine Consult. She wants to think about these issues and talk to her family  3. Codeine for cough suppression.  4. Continue IV steroids  5. Continue Antibiotics - But PCT is WNL and may discontinue them soon.  6. We will be available as needed.      Plan of care and goals reviewed during interdisciplinary rounds.  I discussed the patient's findings and my recommendations with patient    Level of Risk is High due to:  illness with threat to life or bodily function.     Thank you very much for allowing to participate in the care of Ms. Liz Borjas

## 2022-01-16 LAB
ALBUMIN SERPL-MCNC: 2.5 G/DL (ref 3.5–5.2)
ALBUMIN/GLOB SERPL: 0.9 G/DL
ALP SERPL-CCNC: 154 U/L (ref 39–117)
ALT SERPL W P-5'-P-CCNC: 7 U/L (ref 1–33)
ANION GAP SERPL CALCULATED.3IONS-SCNC: 12 MMOL/L (ref 5–15)
AST SERPL-CCNC: 11 U/L (ref 1–32)
BASOPHILS # BLD AUTO: 0 10*3/MM3 (ref 0–0.2)
BASOPHILS NFR BLD AUTO: 0 % (ref 0–1.5)
BILIRUB SERPL-MCNC: 0.3 MG/DL (ref 0–1.2)
BUN SERPL-MCNC: 27 MG/DL (ref 8–23)
BUN/CREAT SERPL: 20.1 (ref 7–25)
CALCIUM SPEC-SCNC: 8 MG/DL (ref 8.6–10.5)
CHLORIDE SERPL-SCNC: 101 MMOL/L (ref 98–107)
CO2 SERPL-SCNC: 28 MMOL/L (ref 22–29)
CREAT SERPL-MCNC: 1.34 MG/DL (ref 0.57–1)
CRP SERPL-MCNC: 4.29 MG/DL (ref 0–0.5)
DEPRECATED RDW RBC AUTO: 53.1 FL (ref 37–54)
EOSINOPHIL # BLD AUTO: 0 10*3/MM3 (ref 0–0.4)
EOSINOPHIL NFR BLD AUTO: 0 % (ref 0.3–6.2)
ERYTHROCYTE [DISTWIDTH] IN BLOOD BY AUTOMATED COUNT: 17 % (ref 12.3–15.4)
GFR SERPL CREATININE-BSD FRML MDRD: 39 ML/MIN/1.73
GLOBULIN UR ELPH-MCNC: 2.7 GM/DL
GLUCOSE SERPL-MCNC: 103 MG/DL (ref 65–99)
HCT VFR BLD AUTO: 33.8 % (ref 34–46.6)
HGB BLD-MCNC: 10.1 G/DL (ref 12–15.9)
IMM GRANULOCYTES # BLD AUTO: 0.03 10*3/MM3 (ref 0–0.05)
IMM GRANULOCYTES NFR BLD AUTO: 0.3 % (ref 0–0.5)
LYMPHOCYTES # BLD AUTO: 0.31 10*3/MM3 (ref 0.7–3.1)
LYMPHOCYTES NFR BLD AUTO: 3.5 % (ref 19.6–45.3)
MAGNESIUM SERPL-MCNC: 1.8 MG/DL (ref 1.6–2.4)
MCH RBC QN AUTO: 25.2 PG (ref 26.6–33)
MCHC RBC AUTO-ENTMCNC: 29.9 G/DL (ref 31.5–35.7)
MCV RBC AUTO: 84.3 FL (ref 79–97)
MONOCYTES # BLD AUTO: 0.35 10*3/MM3 (ref 0.1–0.9)
MONOCYTES NFR BLD AUTO: 3.9 % (ref 5–12)
NEUTROPHILS NFR BLD AUTO: 8.21 10*3/MM3 (ref 1.7–7)
NEUTROPHILS NFR BLD AUTO: 92.3 % (ref 42.7–76)
NRBC BLD AUTO-RTO: 0 /100 WBC (ref 0–0.2)
PHOSPHATE SERPL-MCNC: 4.7 MG/DL (ref 2.5–4.5)
PLAT MORPH BLD: NORMAL
PLATELET # BLD AUTO: 216 10*3/MM3 (ref 140–450)
PMV BLD AUTO: 11.6 FL (ref 6–12)
POTASSIUM SERPL-SCNC: 3.7 MMOL/L (ref 3.5–5.2)
PROCALCITONIN SERPL-MCNC: 0.09 NG/ML (ref 0–0.25)
PROT SERPL-MCNC: 5.2 G/DL (ref 6–8.5)
RBC # BLD AUTO: 4.01 10*6/MM3 (ref 3.77–5.28)
RBC MORPH BLD: NORMAL
SODIUM SERPL-SCNC: 141 MMOL/L (ref 136–145)
WBC MORPH BLD: NORMAL
WBC NRBC COR # BLD: 8.9 10*3/MM3 (ref 3.4–10.8)

## 2022-01-16 PROCEDURE — 80053 COMPREHEN METABOLIC PANEL: CPT | Performed by: INTERNAL MEDICINE

## 2022-01-16 PROCEDURE — 25010000002 METHYLPREDNISOLONE PER 40 MG: Performed by: INTERNAL MEDICINE

## 2022-01-16 PROCEDURE — 87070 CULTURE OTHR SPECIMN AEROBIC: CPT | Performed by: NURSE PRACTITIONER

## 2022-01-16 PROCEDURE — 94799 UNLISTED PULMONARY SVC/PX: CPT

## 2022-01-16 PROCEDURE — 99233 SBSQ HOSP IP/OBS HIGH 50: CPT | Performed by: INTERNAL MEDICINE

## 2022-01-16 PROCEDURE — 85007 BL SMEAR W/DIFF WBC COUNT: CPT | Performed by: INTERNAL MEDICINE

## 2022-01-16 PROCEDURE — 83735 ASSAY OF MAGNESIUM: CPT | Performed by: INTERNAL MEDICINE

## 2022-01-16 PROCEDURE — 86140 C-REACTIVE PROTEIN: CPT | Performed by: INTERNAL MEDICINE

## 2022-01-16 PROCEDURE — 25010000002 PIPERACILLIN SOD-TAZOBACTAM PER 1 G

## 2022-01-16 PROCEDURE — 87205 SMEAR GRAM STAIN: CPT | Performed by: NURSE PRACTITIONER

## 2022-01-16 PROCEDURE — 94761 N-INVAS EAR/PLS OXIMETRY MLT: CPT

## 2022-01-16 PROCEDURE — 84100 ASSAY OF PHOSPHORUS: CPT | Performed by: INTERNAL MEDICINE

## 2022-01-16 PROCEDURE — 0 MAGNESIUM SULFATE 4 GM/100ML SOLUTION: Performed by: INTERNAL MEDICINE

## 2022-01-16 PROCEDURE — 85025 COMPLETE CBC W/AUTO DIFF WBC: CPT | Performed by: INTERNAL MEDICINE

## 2022-01-16 PROCEDURE — 84145 PROCALCITONIN (PCT): CPT | Performed by: INTERNAL MEDICINE

## 2022-01-16 RX ORDER — SODIUM CHLORIDE/ALOE VERA
1 GEL (GRAM) NASAL
Status: CANCELLED | OUTPATIENT
Start: 2022-01-15

## 2022-01-16 RX ORDER — MAGNESIUM SULFATE HEPTAHYDRATE 40 MG/ML
4 INJECTION, SOLUTION INTRAVENOUS ONCE
Status: COMPLETED | OUTPATIENT
Start: 2022-01-16 | End: 2022-01-16

## 2022-01-16 RX ORDER — DOXYCYCLINE 100 MG/1
100 CAPSULE ORAL EVERY 12 HOURS SCHEDULED
Status: COMPLETED | OUTPATIENT
Start: 2022-01-16 | End: 2022-01-19

## 2022-01-16 RX ADMIN — DOXYCYCLINE 100 MG: 100 CAPSULE ORAL at 21:16

## 2022-01-16 RX ADMIN — FLECAINIDE ACETATE 50 MG: 50 TABLET ORAL at 21:20

## 2022-01-16 RX ADMIN — FERROUS SULFATE TAB 325 MG (65 MG ELEMENTAL FE) 325 MG: 325 (65 FE) TAB at 09:10

## 2022-01-16 RX ADMIN — DICYCLOMINE HYDROCHLORIDE 10 MG: 10 CAPSULE ORAL at 21:15

## 2022-01-16 RX ADMIN — CETIRIZINE HYDROCHLORIDE 5 MG: 10 TABLET, FILM COATED ORAL at 09:10

## 2022-01-16 RX ADMIN — IPRATROPIUM BROMIDE AND ALBUTEROL SULFATE 3 ML: .5; 2.5 SOLUTION RESPIRATORY (INHALATION) at 19:18

## 2022-01-16 RX ADMIN — ALLOPURINOL 300 MG: 300 TABLET ORAL at 09:10

## 2022-01-16 RX ADMIN — SODIUM CHLORIDE, PRESERVATIVE FREE 10 ML: 5 INJECTION INTRAVENOUS at 09:09

## 2022-01-16 RX ADMIN — SODIUM CHLORIDE, PRESERVATIVE FREE 10 ML: 5 INJECTION INTRAVENOUS at 21:17

## 2022-01-16 RX ADMIN — ATENOLOL 100 MG: 50 TABLET ORAL at 09:12

## 2022-01-16 RX ADMIN — METHYLPREDNISOLONE SODIUM SUCCINATE 40 MG: 40 INJECTION, POWDER, FOR SOLUTION INTRAMUSCULAR; INTRAVENOUS at 09:09

## 2022-01-16 RX ADMIN — DOXYCYCLINE 100 MG: 100 INJECTION, POWDER, LYOPHILIZED, FOR SOLUTION INTRAVENOUS at 05:26

## 2022-01-16 RX ADMIN — MAGNESIUM SULFATE HEPTAHYDRATE 4 G: 40 INJECTION, SOLUTION INTRAVENOUS at 15:50

## 2022-01-16 RX ADMIN — IPRATROPIUM BROMIDE AND ALBUTEROL SULFATE 3 ML: .5; 2.5 SOLUTION RESPIRATORY (INHALATION) at 11:55

## 2022-01-16 RX ADMIN — TAZOBACTAM SODIUM AND PIPERACILLIN SODIUM 4.5 G: 500; 4 INJECTION, SOLUTION INTRAVENOUS at 15:50

## 2022-01-16 RX ADMIN — IPRATROPIUM BROMIDE AND ALBUTEROL SULFATE 3 ML: .5; 2.5 SOLUTION RESPIRATORY (INHALATION) at 07:20

## 2022-01-16 RX ADMIN — IPRATROPIUM BROMIDE AND ALBUTEROL SULFATE 3 ML: .5; 2.5 SOLUTION RESPIRATORY (INHALATION) at 00:00

## 2022-01-16 RX ADMIN — DICYCLOMINE HYDROCHLORIDE 10 MG: 10 CAPSULE ORAL at 09:10

## 2022-01-16 RX ADMIN — DOXEPIN HYDROCHLORIDE 40 MG: 10 CAPSULE ORAL at 21:18

## 2022-01-16 RX ADMIN — ACETAMINOPHEN AND CODEINE PHOSPHATE 1 TABLET: 300; 30 TABLET ORAL at 14:07

## 2022-01-16 RX ADMIN — RIVAROXABAN 15 MG: 15 TABLET, FILM COATED ORAL at 17:26

## 2022-01-16 RX ADMIN — PANTOPRAZOLE SODIUM 40 MG: 40 TABLET, DELAYED RELEASE ORAL at 05:26

## 2022-01-16 RX ADMIN — TAZOBACTAM SODIUM AND PIPERACILLIN SODIUM 4.5 G: 500; 4 INJECTION, SOLUTION INTRAVENOUS at 09:08

## 2022-01-16 RX ADMIN — FLECAINIDE ACETATE 50 MG: 50 TABLET ORAL at 09:10

## 2022-01-16 RX ADMIN — ACETAMINOPHEN AND CODEINE PHOSPHATE 1 TABLET: 300; 30 TABLET ORAL at 05:32

## 2022-01-16 RX ADMIN — METHYLPREDNISOLONE SODIUM SUCCINATE 40 MG: 40 INJECTION, POWDER, FOR SOLUTION INTRAMUSCULAR; INTRAVENOUS at 21:17

## 2022-01-16 RX ADMIN — LOSARTAN POTASSIUM 50 MG: 50 TABLET, FILM COATED ORAL at 09:10

## 2022-01-16 RX ADMIN — FLUOXETINE HYDROCHLORIDE 40 MG: 20 CAPSULE ORAL at 09:10

## 2022-01-16 RX ADMIN — ACETAMINOPHEN AND CODEINE PHOSPHATE 1 TABLET: 300; 30 TABLET ORAL at 22:32

## 2022-01-16 RX ADMIN — AMLODIPINE BESYLATE 10 MG: 10 TABLET ORAL at 09:09

## 2022-01-16 NOTE — PROGRESS NOTES
[facility] Medicine Services  PROGRESS NOTE    Patient Name: Liz Borjas  : 1950  MRN: [id number]    Date of Admission: 2022  Primary Care Physician: Valerie Sesay APRN    Subjective   Subjective     CC:  Shortness of breath    HPI:  I have seen and evaluated patient this morning. She is feeling slightly better today.  Cough is better controlled after adding codeine to her regimen.  Currently requiring oxygen supplementation through high flow nasal cannula with FiO2 of 75%.  Son at bedside and goals of care was discussed with the patient and her son.  The patient and the son asking for more time to think about CODE STATUS and intubation if needed.    ROS:  10 point review of systems is negative except for what is mentioned in HPI    Objective   Objective     Vital Signs:   Temp:  [97.4 °F (36.3 °C)-98 °F (36.7 °C)] 97.8 °F (36.6 °C)  Heart Rate:  [59-76] 61  Resp:  [24-30] 28  BP: ()/(52-73) 111/62  Flow (L/min):  [40-45] 40     Physical Exam:  General: In mild to moderate respiratory distress, not in any acute distress, appears stated age, conversant and cooperative  Head: Atraumatic and normocephalic, without obvious abnormality  Eyes:   Conjunctivae and sclerae normal, no Icterus. No pallor  Ears:  Ears appear intact with no abnormalities noted  Throat: No oral lesions, no thrush, oral mucosa moist  Neck: Supple, trachea midline, no thyromegaly  Back:   No kyphoscoliosis present. No tenderness to palpation,   no sacral edema  Lungs: In mild respiratory distress, bilateral coarse crackles in both lung fields   Heart:  Normal S1 and S2, no murmur, no gallop, No JVD, no lower extremity swelling  Abdomen:  Soft, no tenderness, no organomegaly, normal bowel sounds  Normal bowel sounds, no masses, no organomegaly. Soft, nontender, nondistended, no guarding, no rebound tenderness.  Extremities: No gross abnormalities, no clubbing, pulses palpable and equal  bilaterally  Skin: No bleeding, bruising or rash, normal skin turgor and elasticity  Neurologic: Cranial nerves appear intact with no evidence of facial asymmetry, normal motor and sensory functions in all 4 extremities  Psych: Alert and oriented x 3, normal mood    Results Reviewed:  LAB RESULTS:      Lab 01/16/22  0609 01/15/22  0701 01/14/22  1313   WBC 8.90 6.34 12.81*   HEMOGLOBIN 10.1* 9.8* 11.4*   HEMATOCRIT 33.8* 32.7* 37.9   PLATELETS 216 175 218   NEUTROS ABS 8.21* 5.72 11.90*   IMMATURE GRANS (ABS) 0.03 0.03 0.07*   LYMPHS ABS 0.31* 0.32* 0.18*   MONOS ABS 0.35 0.27 0.48   EOS ABS 0.00 0.00 0.15   MCV 84.3 84.7 84.0   CRP 4.29* 8.16* 6.60*   PROCALCITONIN 0.09 0.18 0.15   LACTATE  --   --  1.9   LDH  --   --  322*         Lab 01/16/22  0609 01/15/22  0701 01/14/22  1313   SODIUM 141 139 139   POTASSIUM 3.7 3.6 3.5   CHLORIDE 101 100 98   CO2 28.0 29.0 28.0   ANION GAP 12.0 10.0 13.0   BUN 27* 20 13   CREATININE 1.34* 1.22* 1.15*   GLUCOSE 103* 123* 109*   CALCIUM 8.0* 7.8* 8.6   MAGNESIUM 1.8  --   --    PHOSPHORUS 4.7*  --   --    TSH  --  0.930  --          Lab 01/16/22  0609 01/14/22  1313   TOTAL PROTEIN 5.2* 6.5   ALBUMIN 2.50* 2.70*   GLOBULIN 2.7 3.8   ALT (SGPT) 7 10   AST (SGOT) 11 13   BILIRUBIN 0.3 0.7   ALK PHOS 154* 200*         Lab 01/14/22  1313   PROBNP 584.0   TROPONIN T <0.010             Lab 01/14/22  1313   FERRITIN 164.80*         Lab 01/14/22  2146 01/14/22  1327   PH, ARTERIAL 7.457* 7.470*   PCO2, ARTERIAL 42.8 42.4   PO2 ART 57.0* 45.8*   FIO2 40 48   HCO3 ART 30.2* 30.8*   BASE EXCESS ART 5.7* 6.5*   CARBOXYHEMOGLOBIN 1.5 1.5     Brief Urine Lab Results  (Last result in the past 365 days)      Color   Clarity   Blood   Leuk Est   Nitrite   Protein   CREAT   Urine HCG        11/27/21 2334 Yellow   Clear   Negative   Negative   Negative   Trace                 Microbiology Results Abnormal     Procedure Component Value - Date/Time    MRSA Screen, PCR (Inpatient) - Swab, Nares  [[id number]]  (Normal) Collected: 01/15/22 1920    Lab Status: Final result Specimen: Swab from Nares Updated: 01/15/22 2030     MRSA PCR Negative    Narrative:      MRSA Negative    Blood Culture - Blood, Arm, Left [[id number]]  (Normal) Collected: 01/14/22 1545    Lab Status: Preliminary result Specimen: Blood from Arm, Left Updated: 01/15/22 1601     Blood Culture No growth at 24 hours    Blood Culture - Blood, Arm, Right [[id number]]  (Normal) Collected: 01/14/22 1540    Lab Status: Preliminary result Specimen: Blood from Arm, Right Updated: 01/15/22 1601     Blood Culture No growth at 24 hours    COVID-19, FLU A/B, RSV PCR - Swab, Nasopharynx [[id number]]  (Normal) Collected: 01/14/22 1313    Lab Status: Final result Specimen: Swab from Nasopharynx Updated: 01/14/22 1402     COVID19 Not Detected     Influenza A PCR Not Detected     Influenza B PCR Not Detected     RSV, PCR Not Detected    Narrative:      Fact sheet for providers: https://www.fda.gov/media/014638/download    Fact sheet for patients: https://www.fda.gov/media/934437/download    Test performed by PCR.          CT Chest Without Contrast Diagnostic    Result Date: 1/15/2022  EXAMINATION: CT CHEST WO CONTRAST DIAGNOSTIC-  INDICATION: Respiratory failure  TECHNIQUE: Axial noncontrast CT of the chest with multiplanar reconstruction  The radiation dose reduction device was turned on for each scan per the ALARA (As Low as Reasonably Achievable) protocol.  COMPARISON: 11/27/2021  FINDINGS: No pathologic axillary adenopathy or other worrisome body wall soft tissue finding in the chest. No acute findings in the partially imaged upper abdomen. No pleural or pericardial effusion. No pathologic mediastinal or hilar lymphadenopathy. Mildly atherosclerotic nonaneurysmal thoracic aorta. Evaluation of the osseous structures demonstrates no evidence of acute fracture or aggressive osseous lesion. Evaluation of the lung fields redemonstrates extensive findings of  pulmonary fibrosis, with superimposed areas of alveolar groundglass opacity, most suspicious for superimposed pneumonia.      Impression: Redemonstrated extensive fibrotic changes, with increased intervening diffuse alveolar groundglass opacity most consistent with superimposed pneumonia.   This report was finalized on 1/15/2022 1:31 PM by Steve Chairez.      XR Chest 1 View    Result Date: 1/14/2022  PROCEDURE: CR Chest 1 Vw COMPARISON:  January 14 12:59 PM INDICATIONS: resp failure; Acute and chronic respiratory failure with hypoxia; Pulmonary fibrosis, unspecified TECHNIQUE: Single AP  view of the chest FINDINGS:  Cardiomediastinal silhouette is stable. Lung volumes remain low with continued bilateral infiltrates with interstitial component. According to prior reports patient has underlying fibrosis. No acute osseous abnormality.     Impression: No significant change from previous exam performed same day at 12:59 PM.  Signer Name: Federica Blancas MD  Signed: 1/14/2022 8:20 PM  Workstation Name: [facility]-  [facility]    XR Chest 1 View    Result Date: 1/14/2022  EXAMINATION: XR CHEST 1 VW-  INDICATION: SOA triage protocol  COMPARISON: 12/20/2021  FINDINGS: Redemonstrated extensive fibrotic appearance of the lung fields, with appearance suggesting some superimposed opacities, edema or pneumonia in both upper lobes. There is otherwise no enlarging effusion or thorax. Unchanged heart and mediastinal contours.      Impression: Redemonstrated extensive fibrotic appearance of the lung fields, with appearance suggesting some superimposed opacities, edema or pneumonia in both upper lobes.  This report was finalized on 1/14/2022 1:43 PM by Steve Chairez.            I have reviewed the medications:  Scheduled Meds:allopurinol, 300 mg, Oral, Daily  amLODIPine, 10 mg, Oral, Daily  atenolol, 100 mg, Oral, Daily  cetirizine, 5 mg, Oral, Daily  dicyclomine, 10 mg, Oral, BID  doxepin, 40 mg, Oral,  Nightly  doxycycline, 100 mg, Oral, Q12H  ferrous sulfate, 325 mg, Oral, Daily  flecainide, 50 mg, Oral, BID  FLUoxetine, 40 mg, Oral, Daily  ipratropium-albuterol, 3 mL, Nebulization, Q6H - RT  losartan, 50 mg, Oral, Daily  methylPREDNISolone sodium succinate, 40 mg, Intravenous, BID  pantoprazole, 40 mg, Oral, Q AM  pharmacy consult - MTM, , Does not apply, Daily  piperacillin-tazobactam, 4.5 g, Intravenous, Q8H  rivaroxaban, 15 mg, Oral, Daily With Dinner  sodium chloride, 10 mL, Intravenous, Q12H  vancomycin, 1,000 mg, Intravenous, Q24H      Continuous Infusions:Pharmacy to dose vancomycin,   Pharmacy Consult - Pharmacy to dose,       PRN Meds:.•  acetaminophen  •  acetaminophen-codeine  •  albuterol  •  hydrOXYzine  •  ondansetron **OR** ondansetron  •  Pharmacy to dose vancomycin  •  Pharmacy Consult - Pharmacy to dose  •  sodium chloride  •  sodium chloride  •  traMADol    Assessment/Plan   Assessment & Plan     Active Hospital Problems    Diagnosis  POA   • **Acute exacerbation of idiopathic pulmonary fibrosis (HCC) [J84.112]  Yes   • Stage 3a chronic kidney disease ([facility]) [N18.31]  Yes   • Acute and chronic respiratory failure with hypoxia ([facility]) [J96.21]  Yes   • Chronic anticoagulation [Z79.01]  Not Applicable   • HTN (hypertension) [I10]  Yes   • PAF (paroxysmal atrial fibrillation) ([facility]) [I48.0]  Yes   • COPD (chronic obstructive pulmonary disease) ([facility]) [J44.9]  Yes   • Rheumatoid arthritis with positive rheumatoid factor ([facility]) [M05.9]  Yes   • Anxiety and depression [F41.9, F32.A]  Yes      Resolved Hospital Problems   No resolved problems to display.       Brief Hospital Course to date:  Liz Borjas is a 71 y.o. female with past medical history of COPD, chronic hypoxic respiratory failure on home oxygen, pulmonary fibrosis, rheumatoid arthritis, essential hypertension, A. fib, liver cirrhosis, CKD who presented to the hospital with worsening shortness of breath for 1 week after steroid taper started  and home dose went down to 10 mg daily. She was found to be in respiratory failure requiring high oxygen supplementation at 80% FiO2 through high flow nasal cannula with concern for superimposed bacterial pneumonia    Assessment and plan:  Acute on chronic hypoxic respiratory failure  Bilateral upper lobe pneumonia, present on admission  Severe COPD with exacerbation  Severe pulmonary fibrosis  · Continue IV Zosyn and doxycycline  · MRSA swab is negative.  Discontinue vancomycin  · Follow sputum culture  · Continue high-dose IV steroids with Solu-Medrol 60 mg 3 times daily  · Continue duo nebs and pulmonary toilet  · Pulmonary consultation (patient follows with pulmonary associates) --> per pulmonary recommendations: Prognosis is very poor, continue IV steroids, discussed goals of care and advanced directives with the patient, discontinue tabetic soon given normal procalcitonin  · Oxygen supplementation and wean off to target oxygen saturation above 88 to 92%  · Goals of care discussed with the patient by pulmonary team yesterday.  Again I rediscussed the goals of care today and explained to her how severe her diseases.  Her son was at bedside at the time of discussion.  She will think about the CODE STATUS and intubation if needed and will let us know over the next few days.  Meanwhile she was to continue current treatment plan.  · Speech eval     Rheumatoid arthritis  · Has not been on immunosuppressive therapy in several weeks because of recurrent pneumonia  · Has been missing her appointment with dermatology service at  because of her recurrent hospitalization. Son requested rheumatology consultation while hospitalized but explained to him that with the rheumatology service and that we can consult rheumatology over the phone as they need to see and examine the patient themselves. He was understanding     Paroxysmal atrial fibrillation  Hypertension  · Continue flecainide, atenolol, amlodipine,  losartan  · Continue Xarelto     CKD stage III     DVT prophylaxis:  Xarelto        DVT prophylaxis:  Medical DVT prophylaxis orders are present.       AM-PAC 6 Clicks Score (PT): 19 (01/15/22 2000)    Disposition: I expect the patient to be discharged to be determined pending on her clinical response and decreased oxygen requirement to reasonable level     CODE STATUS:   Code Status and Medical Interventions:   Ordered at: 01/14/22 1651     Level Of Support Discussed With:    Patient     Code Status (Patient has no pulse and is not breathing):    CPR (Attempt to Resuscitate)     Medical Interventions (Patient has pulse or is breathing):    Full Support       Palma Mcfadden MD  01/16/22

## 2022-01-16 NOTE — PLAN OF CARE
Goal Outcome Evaluation:  Plan of Care Reviewed With: patient        Inpatient RN   Plan of Care Update  ________________________________________________    Patient Name:  Liz Borjas  YOB: 1950  MRN: [id number]  Admit Date:  1/14/2022      Assessment Date:  1/16/2022    Vital Signs stable, On Telemetry, Pt is Normal Sinus Rhythm, Pt currently on high flow nasal cannula, 45L and 50% FiO2. Pt has been sleeping well with a Pain status of no pain and finding no nausea and no vomiting.    Pt orientated to person, place, time and situation with LOC of alert.    UOP adequate.    Pt has a frequent cough and was given Robitussin and Tylenol #3. Pt stated it helped. Pt also requested atarax for anxiety at bedtime. Also given Tylenol #3 in the morning with AM meds.    Pt was a bit upset after conversation with the doctor about code status and about intubation. Pt states she wants to discuss it with her son and daughter in law but she does see the reason for that conversation since she is on HFNC.      Electronically signed by:  Aylin Bella RN  01/16/22 00:43 EST          Progress: no change     Problem: Adult Inpatient Plan of Care  Goal: Plan of Care Review  Outcome: Ongoing, Progressing  Flowsheets  Taken 1/16/2022 0043 by Aylin Bella RN  Progress: no change  Taken 1/15/2022 1121 by Liana Mcneal PT  Plan of Care Reviewed With: patient  Goal: Patient-Specific Goal (Individualized)  Outcome: Ongoing, Progressing  Goal: Absence of Hospital-Acquired Illness or Injury  Outcome: Ongoing, Progressing  Intervention: Identify and Manage Fall Risk  Recent Flowsheet Documentation  Taken 1/16/2022 0000 by Aylin Bella RN  Safety Promotion/Fall Prevention:  • activity supervised  • assistive device/personal items within reach  • clutter free environment maintained  • lighting adjusted  • room organization consistent  • safety round/check completed  Taken 1/15/2022 2200 by Aylin Bella  RN  Safety Promotion/Fall Prevention:  • activity supervised  • assistive device/personal items within reach  • clutter free environment maintained  • mobility aid in reach  • room organization consistent  • safety round/check completed  Taken 1/15/2022 2000 by Aylin Bella RN  Safety Promotion/Fall Prevention:  • activity supervised  • assistive device/personal items within reach  • clutter free environment maintained  • lighting adjusted  • safety round/check completed  • room organization consistent  Intervention: Prevent Skin Injury  Recent Flowsheet Documentation  Taken 1/16/2022 0000 by Aylin Bella RN  Body Position: position changed independently  Skin Protection: adhesive use limited  Taken 1/15/2022 2200 by Aylin Bella RN  Body Position: position changed independently  Skin Protection: adhesive use limited  Taken 1/15/2022 2000 by Aylin Bella RN  Body Position: weight shift assistance provided  Skin Protection:  • adhesive use limited  • skin-to-skin areas padded  • skin-to-device areas padded  • transparent dressing maintained  • tubing/devices free from skin contact  Intervention: Prevent Infection  Recent Flowsheet Documentation  Taken 1/15/2022 2200 by Aylin Bella RN  Infection Prevention: rest/sleep promoted  Taken 1/15/2022 2000 by Aylin Bella RN  Infection Prevention:  • rest/sleep promoted  • personal protective equipment utilized  Goal: Optimal Comfort and Wellbeing  Outcome: Ongoing, Progressing  Intervention: Provide Person-Centered Care  Recent Flowsheet Documentation  Taken 1/15/2022 2000 by Aylin Bella RN  Trust Relationship/Rapport:  • care explained  • choices provided  Goal: Readiness for Transition of Care  Outcome: Ongoing, Progressing     Problem: COPD Comorbidity  Goal: Maintenance of COPD Symptom Control  Outcome: Ongoing, Progressing     Problem: Hypertension Comorbidity  Goal: Blood Pressure in Desired Range  Outcome: Ongoing,  Progressing     Problem: Skin Injury Risk Increased  Goal: Skin Health and Integrity  Outcome: Ongoing, Progressing  Intervention: Optimize Skin Protection  Recent Flowsheet Documentation  Taken 1/16/2022 0000 by Aylin Bella RN  Pressure Reduction Techniques: weight shift assistance provided  Pressure Reduction Devices: positioning supports utilized  Skin Protection: adhesive use limited  Taken 1/15/2022 2200 by Aylin Bella RN  Pressure Reduction Techniques: weight shift assistance provided  Pressure Reduction Devices: positioning supports utilized  Skin Protection: adhesive use limited  Taken 1/15/2022 2000 by Aylin Bella RN  Pressure Reduction Techniques: weight shift assistance provided  Pressure Reduction Devices: positioning supports utilized  Skin Protection:  • adhesive use limited  • skin-to-skin areas padded  • skin-to-device areas padded  • transparent dressing maintained  • tubing/devices free from skin contact     Problem: Adjustment to Illness COPD (Chronic Obstructive Pulmonary Disease)  Goal: Optimal Chronic Illness Coping  Outcome: Ongoing, Progressing     Problem: Functional Ability Impaired COPD (Chronic Obstructive Pulmonary Disease)  Goal: Optimal Level of Functional [place]  Outcome: Ongoing, Progressing  Intervention: Optimize Functional Ability  Recent Flowsheet Documentation  Taken 1/16/2022 0000 by Aylin Bella RN  Activity Management:  • activity adjusted per tolerance  • activity encouraged  Taken 1/15/2022 2200 by Aylin Bella RN  Activity Management: activity encouraged  Taken 1/15/2022 2000 by Aylin Bella RN  Activity Management:  • activity adjusted per tolerance  • activity encouraged     Problem: Infection COPD (Chronic Obstructive Pulmonary Disease)  Goal: Absence of Infection Signs and Symptoms  Outcome: Ongoing, Progressing     Problem: Oral Intake Inadequate COPD (Chronic Obstructive Pulmonary Disease)  Goal: Improved Nutrition  Intake  Outcome: Ongoing, Progressing     Problem: Respiratory Compromise COPD (Chronic Obstructive Pulmonary Disease)  Goal: Effective Oxygenation and Ventilation  Outcome: Ongoing, Progressing  Intervention: Promote Airway Secretion Clearance  Recent Flowsheet Documentation  Taken 1/16/2022 0000 by Aylin Belal, RN  Activity Management:  • activity adjusted per tolerance  • activity encouraged  Taken 1/15/2022 2200 by Aylin Bella, RN  Activity Management: activity encouraged  Taken 1/15/2022 2000 by Aylin Bella, RN  Activity Management:  • activity adjusted per tolerance  • activity encouraged

## 2022-01-16 NOTE — PLAN OF CARE
VSS. NSR on monitor. Patient has still had a productive cough. PRN tylenol #3 given after coughing spells. No complaints of pain or nausea. Shortness of breath with exertion and after coughing. Use of incentive spirometer encouraged multiple times during the shift. Better oral intake today. Good urine output. Will continue to monitor.     Problem: Adult Inpatient Plan of Care  Goal: Plan of Care Review  Outcome: Ongoing, Progressing  Flowsheets  Taken 1/16/2022 1641 by Mouna Montiel RN  Progress: improving  Taken 1/15/2022 1121 by Liana Mcneal PT  Plan of Care Reviewed With: patient  Goal: Patient-Specific Goal (Individualized)  Outcome: Ongoing, Progressing  Goal: Absence of Hospital-Acquired Illness or Injury  Outcome: Ongoing, Progressing  Intervention: Identify and Manage Fall Risk  Recent Flowsheet Documentation  Taken 1/16/2022 1600 by Mouna Montiel RN  Safety Promotion/Fall Prevention:   activity supervised   assistive device/personal items within reach   clutter free environment maintained   nonskid shoes/slippers when out of bed   room organization consistent   safety round/check completed  Taken 1/16/2022 1400 by Mouna Montiel RN  Safety Promotion/Fall Prevention:   activity supervised   assistive device/personal items within reach   clutter free environment maintained   nonskid shoes/slippers when out of bed   room organization consistent   safety round/check completed  Taken 1/16/2022 1200 by Mouna Montiel RN  Safety Promotion/Fall Prevention:   activity supervised   assistive device/personal items within reach   clutter free environment maintained   nonskid shoes/slippers when out of bed   room organization consistent   safety round/check completed  Taken 1/16/2022 1015 by Mouna Montiel RN  Safety Promotion/Fall Prevention:   activity supervised   assistive device/personal items within reach   clutter free environment maintained   nonskid shoes/slippers when out of bed   room organization  consistent   safety round/check completed  Taken 1/16/2022 0852 by Mouna Montiel RN  Safety Promotion/Fall Prevention:   activity supervised   assistive device/personal items within reach   clutter free environment maintained   nonskid shoes/slippers when out of bed   room organization consistent   safety round/check completed  Intervention: Prevent Skin Injury  Recent Flowsheet Documentation  Taken 1/16/2022 1600 by Mouna Montiel RN  Body Position:   weight shift assistance provided   semi-prone, right  Skin Protection:   adhesive use limited   incontinence pads utilized   transparent dressing maintained   tubing/devices free from skin contact  Taken 1/16/2022 1400 by Mouna Montiel RN  Body Position: weight shift assistance provided  Skin Protection:   adhesive use limited   incontinence pads utilized   transparent dressing maintained   tubing/devices free from skin contact  Taken 1/16/2022 1200 by Mouna Montiel RN  Body Position: weight shift assistance provided  Skin Protection:   adhesive use limited   incontinence pads utilized   transparent dressing maintained   tubing/devices free from skin contact  Taken 1/16/2022 1015 by Mouna Montiel RN  Body Position: weight shift assistance provided  Skin Protection:   adhesive use limited   incontinence pads utilized   transparent dressing maintained   tubing/devices free from skin contact  Taken 1/16/2022 0852 by Mouna Montiel RN  Body Position:   weight shift assistance provided   sitting up in bed  Skin Protection:   adhesive use limited   incontinence pads utilized   transparent dressing maintained   tubing/devices free from skin contact  Intervention: Prevent and Manage VTE (venous thromboembolism) Risk  Recent Flowsheet Documentation  Taken 1/16/2022 0852 by Mouna Montiel RN  VTE Prevention/Management:   bilateral   dorsiflexion/plantar flexion performed   bleeding risk factor(s) identified  Intervention: Prevent Infection  Recent Flowsheet  Documentation  Taken 1/16/2022 1600 by Mouna Montiel RN  Infection Prevention:   visitors restricted/screened   single patient room provided   rest/sleep promoted   personal protective equipment utilized   hand hygiene promoted   environmental surveillance performed  Taken 1/16/2022 1400 by Mouna Montiel RN  Infection Prevention:   visitors restricted/screened   single patient room provided   rest/sleep promoted   personal protective equipment utilized   hand hygiene promoted   environmental surveillance performed  Taken 1/16/2022 1200 by Mouna Montiel RN  Infection Prevention:   visitors restricted/screened   single patient room provided   rest/sleep promoted   personal protective equipment utilized   hand hygiene promoted   environmental surveillance performed  Taken 1/16/2022 1015 by Mouna Montiel RN  Infection Prevention:   visitors restricted/screened   single patient room provided   personal protective equipment utilized   rest/sleep promoted   hand hygiene promoted   environmental surveillance performed  Taken 1/16/2022 0852 by Mouna Montiel RN  Infection Prevention:   visitors restricted/screened   single patient room provided   rest/sleep promoted   personal protective equipment utilized   hand hygiene promoted   environmental surveillance performed  Goal: Optimal Comfort and Wellbeing  Outcome: Ongoing, Progressing  Intervention: Provide Person-Centered Care  Recent Flowsheet Documentation  Taken 1/16/2022 0852 by Mouna Montiel RN  Trust Relationship/Rapport:   care explained   choices provided   questions answered   questions encouraged   thoughts/feelings acknowledged  Goal: Readiness for Transition of Care  Outcome: Ongoing, Progressing     Problem: COPD Comorbidity  Goal: Maintenance of COPD Symptom Control  Outcome: Ongoing, Progressing  Intervention: Maintain COPD-Symptom Control  Recent Flowsheet Documentation  Taken 1/16/2022 1600 by Mouna Montiel RN  Medication Review/Management:  medications reviewed  Taken 1/16/2022 1400 by Mouna Montiel RN  Medication Review/Management: medications reviewed  Taken 1/16/2022 1200 by Mouna Montiel RN  Medication Review/Management: medications reviewed  Taken 1/16/2022 1015 by Mouna Montiel RN  Medication Review/Management: medications reviewed  Taken 1/16/2022 0852 by Mouna Montiel RN  Medication Review/Management: medications reviewed     Problem: Hypertension Comorbidity  Goal: Blood Pressure in Desired Range  Outcome: Ongoing, Progressing  Intervention: Maintain Hypertension-Management Strategies  Recent Flowsheet Documentation  Taken 1/16/2022 1600 by Mouna Montiel RN  Medication Review/Management: medications reviewed  Taken 1/16/2022 1400 by Mouna Montiel RN  Medication Review/Management: medications reviewed  Taken 1/16/2022 1200 by Mouna Montiel RN  Medication Review/Management: medications reviewed  Taken 1/16/2022 1015 by Mouna Montiel RN  Medication Review/Management: medications reviewed  Taken 1/16/2022 0852 by Mouna Montiel RN  Medication Review/Management: medications reviewed     Problem: Skin Injury Risk Increased  Goal: Skin Health and Integrity  Outcome: Ongoing, Progressing  Intervention: Optimize Skin Protection  Recent Flowsheet Documentation  Taken 1/16/2022 1600 by Mouna Montiel RN  Pressure Reduction Techniques:   weight shift assistance provided   frequent weight shift encouraged   pressure points protected   positioned off wounds  Head of Bed (HOB): HOB at 60-90 degrees  Pressure Reduction Devices:   pressure-redistributing mattress utilized   positioning supports utilized  Skin Protection:   adhesive use limited   incontinence pads utilized   transparent dressing maintained   tubing/devices free from skin contact  Taken 1/16/2022 1400 by Mouna Montiel RN  Pressure Reduction Techniques:   weight shift assistance provided   frequent weight shift encouraged   pressure points protected  Head of Bed (HOB): HOB at  60 degrees  Pressure Reduction Devices:   pressure-redistributing mattress utilized   positioning supports utilized  Skin Protection:   adhesive use limited   incontinence pads utilized   transparent dressing maintained   tubing/devices free from skin contact  Taken 1/16/2022 1200 by Mouna Montiel RN  Pressure Reduction Techniques:   weight shift assistance provided   frequent weight shift encouraged   pressure points protected  Head of Bed (HOB): HOB at 60 degrees  Pressure Reduction Devices:   pressure-redistributing mattress utilized   positioning supports utilized  Skin Protection:   adhesive use limited   incontinence pads utilized   transparent dressing maintained   tubing/devices free from skin contact  Taken 1/16/2022 1015 by Mouna Montiel RN  Pressure Reduction Techniques:   weight shift assistance provided   frequent weight shift encouraged   pressure points protected  Head of Bed (HOB): HOB at 60-90 degrees  Pressure Reduction Devices:   pressure-redistributing mattress utilized   positioning supports utilized  Skin Protection:   adhesive use limited   incontinence pads utilized   transparent dressing maintained   tubing/devices free from skin contact  Taken 1/16/2022 0852 by Mouna Montiel RN  Pressure Reduction Techniques:   weight shift assistance provided   frequent weight shift encouraged   pressure points protected  Head of Bed (HOB): HOB at 60 degrees  Pressure Reduction Devices:   pressure-redistributing mattress utilized   positioning supports utilized  Skin Protection:   adhesive use limited   incontinence pads utilized   transparent dressing maintained   tubing/devices free from skin contact     Problem: Adjustment to Illness COPD (Chronic Obstructive Pulmonary Disease)  Goal: Optimal Chronic Illness Coping  Outcome: Ongoing, Progressing  Intervention: Support and Optimize Psychosocial Response  Recent Flowsheet Documentation  Taken 1/16/2022 0852 by Mouna Montiel RN  Supportive  Measures:   active listening utilized   self-care encouraged   verbalization of feelings encouraged  Family/Support System Care:   self-care encouraged   support provided     Problem: Functional Ability Impaired COPD (Chronic Obstructive Pulmonary Disease)  Goal: Optimal Level of Functional [place]  Outcome: Ongoing, Progressing  Intervention: Optimize Functional Ability  Recent Flowsheet Documentation  Taken 1/16/2022 1600 by Mouna Montiel RN  Activity Management: activity adjusted per tolerance  Taken 1/16/2022 1400 by Mouna Montiel RN  Activity Management: activity adjusted per tolerance  Taken 1/16/2022 1200 by Mouna Montiel RN  Activity Management: activity adjusted per tolerance  Taken 1/16/2022 1015 by Mouna Montiel RN  Activity Management: activity adjusted per tolerance  Taken 1/16/2022 0852 by Mouna Montiel RN  Activity Management: activity adjusted per tolerance  Environmental Support:   calm environment promoted   rest periods encouraged     Problem: Infection COPD (Chronic Obstructive Pulmonary Disease)  Goal: Absence of Infection Signs and Symptoms  Outcome: Ongoing, Progressing     Problem: Oral Intake Inadequate COPD (Chronic Obstructive Pulmonary Disease)  Goal: Improved Nutrition Intake  Outcome: Ongoing, Progressing     Problem: Respiratory Compromise COPD (Chronic Obstructive Pulmonary Disease)  Goal: Effective Oxygenation and Ventilation  Outcome: Ongoing, Progressing  Intervention: Promote Airway Secretion Clearance  Recent Flowsheet Documentation  Taken 1/16/2022 1600 by Mouna Montiel RN  Activity Management: activity adjusted per tolerance  Taken 1/16/2022 1400 by Mouna Montiel RN  Activity Management: activity adjusted per tolerance  Cough And Deep Breathing: done with encouragement  Taken 1/16/2022 1200 by Mouna Montiel, RN  Activity Management: activity adjusted per tolerance  Taken 1/16/2022 1015 by Mouna Montiel RN  Activity Management: activity adjusted per  tolerance  Taken 1/16/2022 0852 by Mouna Montiel, RN  Activity Management: activity adjusted per tolerance  Cough And Deep Breathing: done with encouragement  Intervention: Optimize Oxygenation and Ventilation  Recent Flowsheet Documentation  Taken 1/16/2022 1600 by Mouna Montiel RN  Head of Bed (HOB): HOB at 60-90 degrees  Taken 1/16/2022 1400 by Mouna Montiel RN  Head of Bed (HOB): HOB at 60 degrees  Taken 1/16/2022 1200 by Mouna Montiel RN  Head of Bed (HOB): HOB at 60 degrees  Taken 1/16/2022 1015 by Mouna Montiel RN  Head of Bed (HOB): HOB at 60-90 degrees  Taken 1/16/2022 0852 by Mouna Montiel RN  Fluid/Electrolyte Management: fluids provided  Head of Bed (HOB): HOB at 60 degrees   Goal Outcome Evaluation:           Progress: improving

## 2022-01-17 LAB
ALBUMIN SERPL-MCNC: 2.4 G/DL (ref 3.5–5.2)
ALBUMIN/GLOB SERPL: 0.7 G/DL
ALP SERPL-CCNC: 171 U/L (ref 39–117)
ALT SERPL W P-5'-P-CCNC: 10 U/L (ref 1–33)
ANION GAP SERPL CALCULATED.3IONS-SCNC: 9 MMOL/L (ref 5–15)
AST SERPL-CCNC: 15 U/L (ref 1–32)
BASOPHILS # BLD AUTO: 0.01 10*3/MM3 (ref 0–0.2)
BASOPHILS NFR BLD AUTO: 0.1 % (ref 0–1.5)
BILIRUB SERPL-MCNC: 0.3 MG/DL (ref 0–1.2)
BUN SERPL-MCNC: 27 MG/DL (ref 8–23)
BUN/CREAT SERPL: 19.7 (ref 7–25)
CALCIUM SPEC-SCNC: 8.3 MG/DL (ref 8.6–10.5)
CHLORIDE SERPL-SCNC: 101 MMOL/L (ref 98–107)
CO2 SERPL-SCNC: 31 MMOL/L (ref 22–29)
CORTIS SERPL-MCNC: 7.19 MCG/DL
CREAT SERPL-MCNC: 1.37 MG/DL (ref 0.57–1)
DEPRECATED RDW RBC AUTO: 51.8 FL (ref 37–54)
EOSINOPHIL # BLD AUTO: 0 10*3/MM3 (ref 0–0.4)
EOSINOPHIL NFR BLD AUTO: 0 % (ref 0.3–6.2)
ERYTHROCYTE [DISTWIDTH] IN BLOOD BY AUTOMATED COUNT: 16.8 % (ref 12.3–15.4)
GFR SERPL CREATININE-BSD FRML MDRD: 38 ML/MIN/1.73
GLOBULIN UR ELPH-MCNC: 3.5 GM/DL
GLUCOSE SERPL-MCNC: 121 MG/DL (ref 65–99)
HCT VFR BLD AUTO: 39.3 % (ref 34–46.6)
HGB BLD-MCNC: 11.6 G/DL (ref 12–15.9)
IMM GRANULOCYTES # BLD AUTO: 0.03 10*3/MM3 (ref 0–0.05)
IMM GRANULOCYTES NFR BLD AUTO: 0.4 % (ref 0–0.5)
LYMPHOCYTES # BLD AUTO: 0.26 10*3/MM3 (ref 0.7–3.1)
LYMPHOCYTES NFR BLD AUTO: 3.5 % (ref 19.6–45.3)
MAGNESIUM SERPL-MCNC: 3 MG/DL (ref 1.6–2.4)
MCH RBC QN AUTO: 25.2 PG (ref 26.6–33)
MCHC RBC AUTO-ENTMCNC: 29.5 G/DL (ref 31.5–35.7)
MCV RBC AUTO: 85.4 FL (ref 79–97)
MONOCYTES # BLD AUTO: 0.22 10*3/MM3 (ref 0.1–0.9)
MONOCYTES NFR BLD AUTO: 2.9 % (ref 5–12)
NEUTROPHILS NFR BLD AUTO: 6.99 10*3/MM3 (ref 1.7–7)
NEUTROPHILS NFR BLD AUTO: 93.1 % (ref 42.7–76)
NRBC BLD AUTO-RTO: 0 /100 WBC (ref 0–0.2)
PHOSPHATE SERPL-MCNC: 4.2 MG/DL (ref 2.5–4.5)
PLAT MORPH BLD: NORMAL
PLATELET # BLD AUTO: 231 10*3/MM3 (ref 140–450)
PMV BLD AUTO: 11.4 FL (ref 6–12)
POTASSIUM SERPL-SCNC: 4.4 MMOL/L (ref 3.5–5.2)
PROCALCITONIN SERPL-MCNC: 0.07 NG/ML (ref 0–0.25)
PROT SERPL-MCNC: 5.9 G/DL (ref 6–8.5)
RBC # BLD AUTO: 4.6 10*6/MM3 (ref 3.77–5.28)
RBC MORPH BLD: NORMAL
SODIUM SERPL-SCNC: 141 MMOL/L (ref 136–145)
WBC MORPH BLD: NORMAL
WBC NRBC COR # BLD: 7.51 10*3/MM3 (ref 3.4–10.8)

## 2022-01-17 PROCEDURE — 25010000002 METHYLPREDNISOLONE PER 40 MG: Performed by: INTERNAL MEDICINE

## 2022-01-17 PROCEDURE — 94799 UNLISTED PULMONARY SVC/PX: CPT

## 2022-01-17 PROCEDURE — 84145 PROCALCITONIN (PCT): CPT | Performed by: INTERNAL MEDICINE

## 2022-01-17 PROCEDURE — 80053 COMPREHEN METABOLIC PANEL: CPT | Performed by: INTERNAL MEDICINE

## 2022-01-17 PROCEDURE — 99232 SBSQ HOSP IP/OBS MODERATE 35: CPT | Performed by: INTERNAL MEDICINE

## 2022-01-17 PROCEDURE — 82533 TOTAL CORTISOL: CPT | Performed by: INTERNAL MEDICINE

## 2022-01-17 PROCEDURE — 85007 BL SMEAR W/DIFF WBC COUNT: CPT | Performed by: INTERNAL MEDICINE

## 2022-01-17 PROCEDURE — 97530 THERAPEUTIC ACTIVITIES: CPT | Performed by: PHYSICAL THERAPIST

## 2022-01-17 PROCEDURE — 97110 THERAPEUTIC EXERCISES: CPT | Performed by: PHYSICAL THERAPIST

## 2022-01-17 PROCEDURE — 94760 N-INVAS EAR/PLS OXIMETRY 1: CPT

## 2022-01-17 PROCEDURE — 99233 SBSQ HOSP IP/OBS HIGH 50: CPT | Performed by: INTERNAL MEDICINE

## 2022-01-17 PROCEDURE — 84100 ASSAY OF PHOSPHORUS: CPT | Performed by: INTERNAL MEDICINE

## 2022-01-17 PROCEDURE — 94761 N-INVAS EAR/PLS OXIMETRY MLT: CPT

## 2022-01-17 PROCEDURE — 25010000002 PIPERACILLIN SOD-TAZOBACTAM PER 1 G

## 2022-01-17 PROCEDURE — 85025 COMPLETE CBC W/AUTO DIFF WBC: CPT | Performed by: INTERNAL MEDICINE

## 2022-01-17 PROCEDURE — 83735 ASSAY OF MAGNESIUM: CPT | Performed by: INTERNAL MEDICINE

## 2022-01-17 RX ADMIN — METHYLPREDNISOLONE SODIUM SUCCINATE 40 MG: 40 INJECTION, POWDER, FOR SOLUTION INTRAMUSCULAR; INTRAVENOUS at 20:45

## 2022-01-17 RX ADMIN — FLECAINIDE ACETATE 50 MG: 50 TABLET ORAL at 20:45

## 2022-01-17 RX ADMIN — FLUOXETINE HYDROCHLORIDE 40 MG: 20 CAPSULE ORAL at 08:46

## 2022-01-17 RX ADMIN — DOXYCYCLINE 100 MG: 100 CAPSULE ORAL at 08:46

## 2022-01-17 RX ADMIN — ALLOPURINOL 300 MG: 300 TABLET ORAL at 08:46

## 2022-01-17 RX ADMIN — SODIUM CHLORIDE, PRESERVATIVE FREE 10 ML: 5 INJECTION INTRAVENOUS at 20:45

## 2022-01-17 RX ADMIN — METHYLPREDNISOLONE SODIUM SUCCINATE 40 MG: 40 INJECTION, POWDER, FOR SOLUTION INTRAMUSCULAR; INTRAVENOUS at 08:46

## 2022-01-17 RX ADMIN — DICYCLOMINE HYDROCHLORIDE 10 MG: 10 CAPSULE ORAL at 20:44

## 2022-01-17 RX ADMIN — IPRATROPIUM BROMIDE AND ALBUTEROL SULFATE 3 ML: .5; 2.5 SOLUTION RESPIRATORY (INHALATION) at 08:09

## 2022-01-17 RX ADMIN — FERROUS SULFATE TAB 325 MG (65 MG ELEMENTAL FE) 325 MG: 325 (65 FE) TAB at 08:46

## 2022-01-17 RX ADMIN — DOXYCYCLINE 100 MG: 100 CAPSULE ORAL at 20:45

## 2022-01-17 RX ADMIN — IPRATROPIUM BROMIDE AND ALBUTEROL SULFATE 3 ML: .5; 2.5 SOLUTION RESPIRATORY (INHALATION) at 18:26

## 2022-01-17 RX ADMIN — TAZOBACTAM SODIUM AND PIPERACILLIN SODIUM 4.5 G: 500; 4 INJECTION, SOLUTION INTRAVENOUS at 01:06

## 2022-01-17 RX ADMIN — SODIUM CHLORIDE, PRESERVATIVE FREE 10 ML: 5 INJECTION INTRAVENOUS at 08:46

## 2022-01-17 RX ADMIN — FLECAINIDE ACETATE 50 MG: 50 TABLET ORAL at 08:46

## 2022-01-17 RX ADMIN — PANTOPRAZOLE SODIUM 40 MG: 40 TABLET, DELAYED RELEASE ORAL at 05:27

## 2022-01-17 RX ADMIN — DICYCLOMINE HYDROCHLORIDE 10 MG: 10 CAPSULE ORAL at 08:46

## 2022-01-17 RX ADMIN — DOXEPIN HYDROCHLORIDE 40 MG: 10 CAPSULE ORAL at 20:45

## 2022-01-17 RX ADMIN — RIVAROXABAN 15 MG: 15 TABLET, FILM COATED ORAL at 17:09

## 2022-01-17 RX ADMIN — TAZOBACTAM SODIUM AND PIPERACILLIN SODIUM 4.5 G: 500; 4 INJECTION, SOLUTION INTRAVENOUS at 08:47

## 2022-01-17 RX ADMIN — IPRATROPIUM BROMIDE AND ALBUTEROL SULFATE 3 ML: .5; 2.5 SOLUTION RESPIRATORY (INHALATION) at 11:34

## 2022-01-17 RX ADMIN — ACETAMINOPHEN AND CODEINE PHOSPHATE 1 TABLET: 300; 30 TABLET ORAL at 20:45

## 2022-01-17 RX ADMIN — CETIRIZINE HYDROCHLORIDE 5 MG: 10 TABLET, FILM COATED ORAL at 08:46

## 2022-01-17 RX ADMIN — ACETAMINOPHEN AND CODEINE PHOSPHATE 1 TABLET: 300; 30 TABLET ORAL at 08:46

## 2022-01-17 RX ADMIN — TAZOBACTAM SODIUM AND PIPERACILLIN SODIUM 4.5 G: 500; 4 INJECTION, SOLUTION INTRAVENOUS at 16:06

## 2022-01-17 NOTE — PROGRESS NOTES
Nutrition Services    Patient Name:  Liz Borjas  YOB: 1950  MRN: [id number]  Admit Date:  1/14/2022     Pt identified per screen for potential malnutrition. Pt rpt stable wt and intake PTA, no risk identified at this time. Follow per protocol.     Electronically signed by:  Janee Alvarado RD  01/16/22 20:31 EST

## 2022-01-17 NOTE — PROGRESS NOTES
[facility] Medicine Services  PROGRESS NOTE    Patient Name: Liz Borjas  : 1950  MRN: [id number]    Date of Admission: 2022  Primary Care Physician: Valerie Sesay APRN    Subjective   Subjective     CC:  Shortness of breath    HPI:  States that she feels ok.  States that the IS causes her to cough    ROS:  Gen- No fevers, chills  CV- No chest pain, palpitations  Resp- +cough, +dyspnea  GI- No N/V/D, abd pain        Objective   Objective     Vital Signs:   Temp:  [97.2 °F (36.2 °C)-97.9 °F (36.6 °C)] 97.9 °F (36.6 °C)  Heart Rate:  [52-62] 59  Resp:  [18-22] 22  BP: ()/(54-81) 103/58  Flow (L/min):  [40-45] 45     Physical Exam:  General: In mild to moderate respiratory distress, not in any acute distress, appears stated age, conversant and cooperative  Head: Atraumatic and normocephalic, without obvious abnormality  Eyes:   Conjunctivae and sclerae normal, no Icterus. No pallor  Ears:  Ears appear intact with no abnormalities noted  Throat: No oral lesions, no thrush, oral mucosa moist  Neck: Supple, trachea midline, no thyromegaly  Back:   No kyphoscoliosis present. No tenderness to palpation,   no sacral edema  Lungs: In mild respiratory distress, decreased breath sounds bilateral inferior 2/3, on 45L HFNC 65%  Heart:  Normal S1 and S2, no murmur, no gallop, No JVD, no lower extremity swelling  Abdomen:  Soft, no tenderness, no organomegaly, normal bowel sounds  Normal bowel sounds, no masses, no organomegaly. Soft, nontender, nondistended, no guarding, no rebound tenderness.  Extremities: No gross abnormalities, no clubbing, pulses palpable and equal bilaterally  Skin: No bleeding, bruising or rash, normal skin turgor and elasticity  Neurologic: Cranial nerves appear intact with no evidence of facial asymmetry, normal motor and sensory functions in all 4 extremities  Psych: Alert and oriented x 3, normal mood    Results Reviewed:  LAB RESULTS:      Lab  01/17/22  0557 01/16/22  0609 01/15/22  0701 01/14/22  1313   WBC 7.51 8.90 6.34 12.81*   HEMOGLOBIN 11.6* 10.1* 9.8* 11.4*   HEMATOCRIT 39.3 33.8* 32.7* 37.9   PLATELETS 231 216 175 218   NEUTROS ABS 6.99 8.21* 5.72 11.90*   IMMATURE GRANS (ABS) 0.03 0.03 0.03 0.07*   LYMPHS ABS 0.26* 0.31* 0.32* 0.18*   MONOS ABS 0.22 0.35 0.27 0.48   EOS ABS 0.00 0.00 0.00 0.15   MCV 85.4 84.3 84.7 84.0   CRP  --  4.29* 8.16* 6.60*   PROCALCITONIN 0.07 0.09 0.18 0.15   LACTATE  --   --   --  1.9   LDH  --   --   --  322*         Lab 01/17/22  0557 01/16/22  0609 01/15/22  0701 01/14/22  1313   SODIUM 141 141 139 139   POTASSIUM 4.4 3.7 3.6 3.5   CHLORIDE 101 101 100 98   CO2 31.0* 28.0 29.0 28.0   ANION GAP 9.0 12.0 10.0 13.0   BUN 27* 27* 20 13   CREATININE 1.37* 1.34* 1.22* 1.15*   GLUCOSE 121* 103* 123* 109*   CALCIUM 8.3* 8.0* 7.8* 8.6   MAGNESIUM 3.0* 1.8  --   --    PHOSPHORUS 4.2 4.7*  --   --    TSH  --   --  0.930  --          Lab 01/17/22  0557 01/16/22  0609 01/14/22  1313   TOTAL PROTEIN 5.9* 5.2* 6.5   ALBUMIN 2.40* 2.50* 2.70*   GLOBULIN 3.5 2.7 3.8   ALT (SGPT) 10 7 10   AST (SGOT) 15 11 13   BILIRUBIN 0.3 0.3 0.7   ALK PHOS 171* 154* 200*         Lab 01/14/22  1313   PROBNP 584.0   TROPONIN T <0.010             Lab 01/14/22  1313   FERRITIN 164.80*         Lab 01/14/22  2146 01/14/22  1327   PH, ARTERIAL 7.457* 7.470*   PCO2, ARTERIAL 42.8 42.4   PO2 ART 57.0* 45.8*   FIO2 40 48   HCO3 ART 30.2* 30.8*   BASE EXCESS ART 5.7* 6.5*   CARBOXYHEMOGLOBIN 1.5 1.5     Brief Urine Lab Results  (Last result in the past 365 days)      Color   Clarity   Blood   Leuk Est   Nitrite   Protein   CREAT   Urine HCG        11/27/21 2334 Yellow   Clear   Negative   Negative   Negative   Trace                 Microbiology Results Abnormal     Procedure Component Value - Date/Time    Blood Culture - Blood, Arm, Right [[id number]]  (Normal) Collected: 01/14/22 1540    Lab Status: Preliminary result Specimen: Blood from Arm, Right  Updated: 01/16/22 1600     Blood Culture No growth at 2 days    Blood Culture - Blood, Arm, Left [[id number]]  (Normal) Collected: 01/14/22 1545    Lab Status: Preliminary result Specimen: Blood from Arm, Left Updated: 01/16/22 1600     Blood Culture No growth at 2 days    Respiratory Culture - Sputum, Cough [[id number]] Collected: 01/16/22 0931    Lab Status: Preliminary result Specimen: Sputum from Cough Updated: 01/16/22 1538     Gram Stain No WBCs per low power field      Rare (1+) Epithelial cells per low power field      Few (2+) Gram negative bacilli      Moderate (3+) Budding yeast    MRSA Screen, PCR (Inpatient) - Swab, Nares [[id number]]  (Normal) Collected: 01/15/22 1920    Lab Status: Final result Specimen: Swab from Nares Updated: 01/15/22 2030     MRSA PCR Negative    Narrative:      MRSA Negative    COVID-19, FLU A/B, RSV PCR - Swab, Nasopharynx [[id number]]  (Normal) Collected: 01/14/22 1313    Lab Status: Final result Specimen: Swab from Nasopharynx Updated: 01/14/22 1402     COVID19 Not Detected     Influenza A PCR Not Detected     Influenza B PCR Not Detected     RSV, PCR Not Detected    Narrative:      Fact sheet for providers: https://www.fda.gov/media/178907/download    Fact sheet for patients: https://www.fda.gov/media/894209/download    Test performed by PCR.          CT Chest Without Contrast Diagnostic    Result Date: 1/15/2022  EXAMINATION: CT CHEST WO CONTRAST DIAGNOSTIC-  INDICATION: Respiratory failure  TECHNIQUE: Axial noncontrast CT of the chest with multiplanar reconstruction  The radiation dose reduction device was turned on for each scan per the ALARA (As Low as Reasonably Achievable) protocol.  COMPARISON: 11/27/2021  FINDINGS: No pathologic axillary adenopathy or other worrisome body wall soft tissue finding in the chest. No acute findings in the partially imaged upper abdomen. No pleural or pericardial effusion. No pathologic mediastinal or hilar lymphadenopathy. Mildly  atherosclerotic nonaneurysmal thoracic aorta. Evaluation of the osseous structures demonstrates no evidence of acute fracture or aggressive osseous lesion. Evaluation of the lung fields redemonstrates extensive findings of pulmonary fibrosis, with superimposed areas of alveolar groundglass opacity, most suspicious for superimposed pneumonia.      Impression: Redemonstrated extensive fibrotic changes, with increased intervening diffuse alveolar groundglass opacity most consistent with superimposed pneumonia.   This report was finalized on 1/15/2022 1:31 PM by Steve Chairez.            I have reviewed the medications:  Scheduled Meds:allopurinol, 300 mg, Oral, Daily  amLODIPine, 10 mg, Oral, Daily  atenolol, 100 mg, Oral, Daily  cetirizine, 5 mg, Oral, Daily  dicyclomine, 10 mg, Oral, BID  doxepin, 40 mg, Oral, Nightly  doxycycline, 100 mg, Oral, Q12H  ferrous sulfate, 325 mg, Oral, Daily  flecainide, 50 mg, Oral, BID  FLUoxetine, 40 mg, Oral, Daily  ipratropium-albuterol, 3 mL, Nebulization, Q6H - RT  losartan, 50 mg, Oral, Daily  methylPREDNISolone sodium succinate, 40 mg, Intravenous, BID  pantoprazole, 40 mg, Oral, Q AM  pharmacy consult - MTM, , Does not apply, Daily  piperacillin-tazobactam, 4.5 g, Intravenous, Q8H  rivaroxaban, 15 mg, Oral, Daily With Dinner  sodium chloride, 10 mL, Intravenous, Q12H      Continuous Infusions:   PRN Meds:.•  acetaminophen  •  acetaminophen-codeine  •  albuterol  •  hydrOXYzine  •  ondansetron **OR** ondansetron  •  sodium chloride  •  sodium chloride  •  traMADol    Assessment/Plan   Assessment & Plan     Active Hospital Problems    Diagnosis  POA   • **Acute exacerbation of idiopathic pulmonary fibrosis (HCC) [J84.112]  Yes   • Stage 3a chronic kidney disease (HCC) [N18.31]  Yes   • Acute and chronic respiratory failure with hypoxia (HCC) [J96.21]  Yes   • Chronic anticoagulation [Z79.01]  Not Applicable   • HTN (hypertension) [I10]  Yes   • PAF (paroxysmal atrial  fibrillation) ([facility]) [I48.0]  Yes   • COPD (chronic obstructive pulmonary disease) ([facility]) [J44.9]  Yes   • Rheumatoid arthritis with positive rheumatoid factor ([facility]) [M05.9]  Yes   • Anxiety and depression [F41.9, F32.A]  Yes      Resolved Hospital Problems   No resolved problems to display.       Brief Hospital Course to date:  Liz Borjas is a 71 y.o. female with past medical history of COPD, chronic hypoxic respiratory failure on home oxygen, pulmonary fibrosis, rheumatoid arthritis, essential hypertension, A. fib, liver cirrhosis, CKD who presented to the hospital with worsening shortness of breath for 1 week after steroid taper started and home dose went down to 10 mg daily. She was found to be in respiratory failure requiring high oxygen supplementation at 80% FiO2 through high flow nasal cannula with concern for superimposed bacterial pneumonia    Assessment and plan:  Acute on chronic hypoxic respiratory failure  Bilateral upper lobe pneumonia, present on admission  Severe COPD with exacerbation  Severe pulmonary fibrosis  · Continue IV Zosyn and doxycycline  · MRSA swab is negative.  Discontinue vancomycin  · sputum culture shows few GNR and budding yeast.  Blood cultures no growth at 2 days  · Continue high-dose IV steroids with Solu-Medrol 60 mg 3 times daily  · Continue duo nebs and pulmonary toilet  · Pulmonary consultation (patient follows with pulmonary associates) --> per pulmonary recommendations: Prognosis is very poor, continue IV steroids, discussed goals of care and advanced directives with the patient, discontinue tabetic soon given normal procalcitonin  · Oxygen supplementation and wean off to target oxygen saturation above 88 to 92%  · Goals of care discussed with the patient by pulmonary team yesterday.  Partner discussed goals of care and severity of her disease with patient and her son.  She will think about the CODE STATUS and intubation if needed and will let us know over the next few  days.  Meanwhile she wishes to continue current treatment plan.  · Speech eval     Rheumatoid arthritis  · Has not been on immunosuppressive therapy in several weeks because of recurrent pneumonia  Has been missing her appointment with dermatology service at  because of her recurrent hospitalization.      Paroxysmal atrial fibrillation  Hypertension  · Continue flecainide, atenolol, amlodipine, losartan  · Continue Xarelto     CKD stage III     DVT prophylaxis:  Xarelto        DVT prophylaxis:  Medical DVT prophylaxis orders are present.       AM-PAC 6 Clicks Score (PT): 15 (01/16/22 2000)    Disposition: I expect the patient to be discharged to be determined pending on her clinical response and decreased oxygen requirement to reasonable level     CODE STATUS:   Code Status and Medical Interventions:   Ordered at: 01/14/22 1651     Level Of Support Discussed With:    Patient     Code Status (Patient has no pulse and is not breathing):    CPR (Attempt to Resuscitate)     Medical Interventions (Patient has pulse or is breathing):    Full Support       Edmar Morales MD  01/17/22

## 2022-01-17 NOTE — CONSULTS
"  Referring Provider: MD Bogdan  Reason for Consultation: AoCRF    Subjective .   Education:  NN spoke with pt at BS.  Pt alert and able to answer questions appropriately.  Pt O2 sat 91% on 55% HFNC currently, no home O2 use.  Pt reports the ability to ambulate without issue at baseline before experiencing SOB.  Pt states no real use of rescue inhaler.  Patient and son state that when she does use rescue medication she suffers mouth irritation but it does help breathing some.  Patient is up to date on COVID, flu and PNA vaccines.  Former smoker, quit date 1997.  Pt reports no issues at this time with medications or transportation for appointments.  Pt reports no previous hx of formal COPD teaching, no understanding of action plan, or [place].  Stop light report, NN contact information, instructions for accessing [facility] and list of educational videos given to pt.  \"A Patient's Guide to COPD\" booklet left at BS. 1800QUITNOW reference sheet discussed and given to patient at BS.  Benefits and various levels of pulmonary rehab explained.   COPD education completed in the form of explanation, handouts, and videos. No new concerns or questions voiced at this time.  NN will continue to follow as needed.     Age: 71 y.o.  Sex: female  Smoker Status: former, ~ 60 pack years  Pulmonologist: TEGAN  FEV1 (PFT): NA  Home O2: RA    Objective     SpO2 SpO2: 91 % (01/17/22 1134)  Device Device (Oxygen Therapy): high-flow nasal cannula (01/17/22 1200)  Flow Flow (L/min): 45 (01/17/22 1200)  Incentive Spirometer    IS Predicted Level (mL)     Number of Repetitions     Level Incentive Spirometer (mL)    Patient Tolerance Patient Tolerance (IS): fair (01/16/22 2000)   Inhaler Treatment Status Respiratory Treatment Status (Inhaler): given (01/14/22 1318)  Treatment Route Respiratory Treatment Status (Inhaler): given (01/14/22 1318)      Home Medications:  Medications Prior to Admission   Medication Sig Dispense Refill Last Dose   • albuterol " (ACCUNEB) 1.25 MG/3ML nebulizer solution Take 3 mL by nebulization Every 6 (Six) Hours As Needed for Wheezing. 120 each 0    • albuterol sulfate  (90 Base) MCG/ACT inhaler Inhale 2 puffs Every 4 (Four) Hours As Needed for Wheezing or Shortness of Air. 8.5 g 0    • amLODIPine (NORVASC) 10 MG tablet Take 10 mg by mouth daily.      • Cetirizine HCl (ZyrTEC Allergy) 10 MG capsule Take 10 mg by mouth Daily As Needed.      • dextromethorphan-guaifenesin (ROBITUSSIN-DM)  MG/5ML liquid Take 10 mL by mouth 3 (Three) Times a Day As Needed for Cough. 236 mL 0    • dicyclomine (BENTYL) 10 MG capsule Take 10 mg by mouth 2 (Two) Times a Day.      • doxepin (SINEquan) 10 MG capsule Take 40 mg by mouth Every Night.      • doxycycline (VIBRAMYCIN) 100 MG capsule Take 1 capsule by mouth 2 (Two) Times a Day. 20 capsule 0    • FeroSul 325 (65 Fe) MG tablet Take 1 tablet by mouth Daily.      • flecainide (TAMBOCOR) 50 MG tablet Take 50 mg by mouth 2 (Two) Times a Day.      • FLUoxetine (PROzac) 40 MG capsule Take 40 mg by mouth daily.      • losartan (COZAAR) 50 MG tablet Take 100 mg by mouth Daily.      • nystatin (MYCOSTATIN) 100,000 unit/mL suspension Take 5 mL by mouth 4 (Four) Times a Day. Swish and Swallow      • predniSONE (DELTASONE) 10 MG tablet Take 1 tablet by mouth Daily. 30 tablet 0    • RABEprazole (ACIPHEX) 20 MG EC tablet Take 20 mg by mouth 2 (Two) Times a Day.   Patient Taking Differently at Unknown time   • rivaroxaban (XARELTO) 20 MG tablet Take 20 mg by mouth Daily.      • traMADol (ULTRAM) 50 MG tablet Take 50 mg by mouth Every 6 (Six) Hours As Needed for Moderate Pain .      • allopurinol (ZYLOPRIM) 300 MG tablet Take 300 mg by mouth Daily. (Patient not taking: Reported on 1/14/2022)   Not Taking at Unknown time   • atenolol (TENORMIN) 100 MG tablet Take 100 mg by mouth Daily. (Patient not taking: Reported on 1/14/2022)   Not Taking at Unknown time   • leflunomide (ARAVA) 20 MG tablet Take 20 mg by  mouth Daily. (Patient not taking: Reported on 1/14/2022)   Not Taking at Unknown time       Discussion: Per current GOLD Standards, please consider: No LAMA/LABA/ICS in place, Outpatient PFT, Rehab as appropriate      Cathy Farmer RN

## 2022-01-17 NOTE — THERAPY TREATMENT NOTE
Patient Name: Liz Borjas  : 1950    MRN: [id number]                              Today's Date: 2022       Admit Date: 2022    Visit Dx:     ICD-10-CM ICD-9-CM   1. Acute and chronic respiratory failure with hypoxia (HCC)  J96.21 518.84     799.02   2. 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)   • Anxiety and depression   • COPD (chronic obstructive pulmonary disease) (HCC)   • Mild obesity   • Physical deconditioning   • Sepsis (HCC)   • Leukocytosis   • Lactic acidosis   • Hyponatremia   • Acute respiratory failure with hypoxia (HCC)   • Acute and chronic respiratory failure with hypoxia (HCC)   • Elevated d-dimer   • Immunocompromised (HCC)   • PAF (paroxysmal atrial fibrillation) (HCC)   • Chronic anticoagulation   • HTN (hypertension)   • Interstitial lung disease (HCC)   • Stage 3a chronic kidney disease (HCC)   • Acute exacerbation of idiopathic pulmonary fibrosis (HCC)     Past Medical History:   Diagnosis Date   • Arthritis    • Atrial fibrillation (HCC)    • Closed right hip fracture (HCC)    • GERD (gastroesophageal reflux disease)    • Gout    • Hyperlipidemia    • Hypertension    • Osteoporosis    • Rheumatoid arthritis (HCC)    • Wears dentures     upper   • Wears glasses     reading     Past Surgical History:   Procedure Laterality Date   • ANKLE SURGERY Right    • CHOLECYSTECTOMY     • COLONOSCOPY      5 years ago   • HIP FRACTURE SURGERY      Right Hip with Pins   • LUMBAR DISCECTOMY Left 10/12/2016    Procedure: lumbar MICROdiscectomy LEFT L3-5;  Surgeon: Chris Son MD;  Location: [facility];  Service:    • REPLACEMENT TOTAL KNEE      Right Knee   • TOE  AMPUTATION      right baby toe, left second toe      General Information     Row Name 01/17/22 1310          Physical Therapy Time and Intention    Document Type therapy note (daily note)  -     Mode of Treatment individual therapy; physical therapy  -     Row Name 01/17/22 1310          General Information    Patient Profile Reviewed yes  -LM     Existing Precautions/Restrictions fall; oxygen therapy device and L/min  -LM     Row Name 01/17/22 1310          Cognition    Orientation Status (Cognition) oriented x 4  -LM     Row Name 01/17/22 1310          Safety Issues, Functional Mobility    Safety Issues Affecting Function (Mobility) safety precaution awareness; safety precautions follow-through/compliance  -     Impairments Affecting Function (Mobility) balance; endurance/activity tolerance; shortness of breath; strength  -LM           User Key  (r) = Recorded By, (t) = Taken By, (c) = Cosigned By    Initials Name Provider Type    LM Liana Mcneal, PT Physical Therapist               Mobility     Row Name 01/17/22 1310          Bed Mobility    Bed Mobility supine-sit; sit-supine  -LM     Supine-Sit [place] (Bed Mobility) standby assist  -LM     Sit-Supine [place] (Bed Mobility) standby assist  -LM     Assistive Device (Bed Mobility) bed rails; head of bed elevated  -     Comment (Bed Mobility) No issues noted with bed mobility.  SBA provided d/t monitoring O2 saturation.  Pt first completed LE ther ex with O2 sat ranging from 85-91% - frequent rest breaks needed.  -     Row Name 01/17/22 1310          Transfers    Comment (Transfers) Pt sat EOB ~10 minutes.  Pt stood x 1 rep and took ~2-3 sidesteps towards the HOB.  Once sitting back down O2 noted to be 79-83%.  Significant time (~15 minutes) and frequent cueing needed to return O2 sat above 90%.  RN present.  -     Row Name 01/17/22 1310          Sit-Stand Transfer    Sit-Stand [place] (Transfers) contact guard; 1 person assist  -LM      Assistive Device (Sit-Stand Transfers) --  No AD  -LM     Row Name 01/17/22 1310          Gait/Stairs (Locomotion)    [place] Level (Gait) contact guard; 1 person assist; verbal cues  -LM     Assistive Device (Gait) --  No AD  -LM     Distance in Feet (Gait) 2  -LM     Comment (Gait/Stairs) Pt took ~2-3 sidesteps towards the HOB.  Educated on PLB and air conservation.  -LM           User Key  (r) = Recorded By, (t) = Taken By, (c) = Cosigned By    Initials Name Provider Type    LM Liana Mcneal PT Physical Therapist               Obj/Interventions     Row Name 01/17/22 1310          Motor Skills    Therapeutic Exercise hip; knee; ankle  -     Row Name 01/17/22 1310          Hip (Therapeutic Exercise)    Hip (Therapeutic Exercise) AROM (active range of motion)  -     Hip AROM (Therapeutic Exercise) bilateral; aBduction; aDduction; supine; 10 repetitions  -     Row Name 01/17/22 1310          Knee (Therapeutic Exercise)    Knee (Therapeutic Exercise) AROM (active range of motion); isometric exercises  -     Knee AROM (Therapeutic Exercise) bilateral; heel slides; supine; 10 repetitions  -     Knee Isometrics (Therapeutic Exercise) bilateral; quad sets; supine; 10 repetitions  -     Row Name 01/17/22 1310          Ankle (Therapeutic Exercise)    Ankle (Therapeutic Exercise) AROM (active range of motion)  -     Ankle AROM (Therapeutic Exercise) bilateral; dorsiflexion; plantarflexion; supine  20 reps  -     Row Name 01/17/22 1310          Balance    Static Sitting Balance WFL; unsupported; sitting, edge of bed  -LM     Static Standing Balance WFL; unsupported  -LM     Dynamic Standing Balance mild impairment; unsupported  -LM           User Key  (r) = Recorded By, (t) = Taken By, (c) = Cosigned By    Initials Name Provider Type    Liana Marcelo PT Physical Therapist               Goals/Plan    No documentation.                Clinical Impression     Row Name 01/17/22 1310          Pain     Additional Documentation Pain Scale: Numbers Pre/Post-Treatment (Group)  -LM     Row Name 01/17/22 1310          Pain Scale: Numbers Pre/Post-Treatment    Pretreatment Pain Rating 0/10 - no pain  -LM     Posttreatment Pain Rating 0/10 - no pain  -LM     Row Name 01/17/22 1310          Plan of Care Review    Plan of Care Reviewed With patient  -LM     Progress no change  -LM     Outcome Summary Pt gave excellent effort during tx but continues to be limited by O2 saturation.  Hi-flow on throughout tx.  Pt first completed BLE ther ex in supine with O2 ranging from 85-91%.  Pt then transferred supine<-->sit with SBA, stood with CGA, and took ~2-3 sidesteps towards the HOB with CGAx1.  O2 noted to be 79-83%.  ~15 minutes needed to recover back to 90%.  Continue with skilled PT to improve mobility and safety.  -LM     Row Name 01/17/22 1310          Vital Signs    Pre Systolic BP Rehab 96  -LM     Pre Treatment Diastolic BP 53  -LM     Pretreatment Heart Rate (beats/min) 60  -LM     Posttreatment Heart Rate (beats/min) 63  -LM     Pre SpO2 (%) 99  -LM     O2 Delivery Pre Treatment hi-flow  -LM     Intra SpO2 (%) 79   -LM     O2 Delivery Intra Treatment hi-flow  -LM     Post SpO2 (%) 97  -LM     O2 Delivery Post Treatment hi-flow  -LM     Pre Patient Position Supine  -LM     Intra Patient Position Sitting  -LM     Post Patient Position Supine  -LM     Row Name 01/17/22 1310          Positioning and Restraints    Pre-Treatment Position in bed  -LM     Post Treatment Position bed  -LM     In Bed fowlers; call light within reach; encouraged to call for assist; exit alarm on; with nsg  -LM           User Key  (r) = Recorded By, (t) = Taken By, (c) = Cosigned By    Initials Name Provider Type    LM Liana Mcneal, PT Physical Therapist               Outcome Measures     Row Name 01/17/22 1310 01/17/22 0829       How much help from another person do you currently need...    Turning from your back to your side while in flat bed  without using bedrails? 3  -LM 3  -TG    Moving from lying on back to sitting on the side of a flat bed without bedrails? 3  -LM 3  -TG    Moving to and from a bed to a chair (including a wheelchair)? 3  -LM 2  -TG    Standing up from a chair using your arms (e.g., wheelchair, bedside chair)? 3  -LM 2  -TG    Climbing 3-5 steps with a railing? 2  -LM 2  -TG    To walk in hospital room? 2  -LM 2  -TG    AM-PAC 6 Clicks Score (PT) 16  -LM 14  -TG    Row Name 01/17/22 1310          Functional Assessment    Outcome Measure Options AM-PAC 6 Clicks Basic Mobility (PT)  -           User Key  (r) = Recorded By, (t) = Taken By, (c) = Cosigned By    Initials Name Provider Type    LM Liana Mcneal, PT Physical Therapist    TG Mouna Montiel, RN Registered Nurse                             Physical Therapy Education                 Title: PT OT SLP Therapies (In Progress)     Topic: Physical Therapy (In Progress)     Point: Mobility training (Done)     Learning Progress Summary           Patient Acceptance, E, VU,DU by  at 1/15/2022 1152                   Point: Home exercise program (Not Started)     Learner Progress:  Not documented in this visit.          Point: Precautions (Done)     Learning Progress Summary           Patient Acceptance, E, VU,DU by  at 1/15/2022 1152                               User Key     Initials Effective Dates Name Provider Type Discipline     06/16/21 -  Liana Mcneal, PT Physical Therapist PT              PT Recommendation and Plan  Planned Therapy Interventions (PT): balance training, bed mobility training, gait training, home exercise program, motor coordination training, neuromuscular re-education, patient/family education, postural re-education, ROM (range of motion), stair training, strengthening, stretching, transfer training  Plan of Care Reviewed With: patient  Progress: no change  Outcome Summary: Pt gave excellent effort during tx but continues to be limited by O2 saturation.   Hi-flow on throughout tx.  Pt first completed BLE ther ex in supine with O2 ranging from 85-91%.  Pt then transferred supine<-->sit with SBA, stood with CGA, and took ~2-3 sidesteps towards the HOB with CGAx1.  O2 noted to be 79-83%.  ~15 minutes needed to recover back to 90%.  Continue with skilled PT to improve mobility and safety.     Time Calculation:    PT Charges     Row Name 01/17/22 1310             Time Calculation    Start Time 1310  -LM      PT Received On 01/17/22  -LM      PT Goal Re-Cert Due Date 01/25/22  -LM              Timed Charges    36092 - PT Therapeutic Exercise Minutes 10  -LM      64151 - PT Therapeutic Activity Minutes 32  -LM              Total Minutes    Timed Charges Total Minutes 42  -LM       Total Minutes 42  -LM            User Key  (r) = Recorded By, (t) = Taken By, (c) = Cosigned By    Initials Name Provider Type    LM Liana Mcneal, PT Physical Therapist              Therapy Charges for Today     Code Description Service Date Service Provider Modifiers Qty    49887573480 HC PT THERAPEUTIC ACT EA 15 MIN 1/17/2022 Liana Mcneal, PT GP 2    15021399084 HC PT THER PROC EA 15 MIN 1/17/2022 Liana Mcneal, PT GP 1          PT G-Codes  Outcome Measure Options: AM-PAC 6 Clicks Basic Mobility (PT)  AM-PAC 6 Clicks Score (PT): 16  AM-PAC 6 Clicks Score (OT): 20    Liana Mcneal PT  1/17/2022

## 2022-01-17 NOTE — PROGRESS NOTES
Intensive Care Follow-up     Hospital:  LOS: 3 days   Ms. Liz Borjas, 71 y.o. female is followed for:   Acute exacerbation of idiopathic pulmonary fibrosis (HCC)            History of present illness:   Liz Borjas is a 71 y.o. female who presents to [facility] on 1/16/22 with a one-week history of increasing dyspnea at rest and on exertion, cough, weakness, and inability to tolerate PO intake. She has a history of COPD, pulmonary fibrosis, RA (on prednisone and Orencia), PAF on Xarelto, chronic respiratory failure on 2L NC at home, HTN, recent COVID-19 infection, and remote tobacco use.      She is followed by [facility] for chronic interstitial lung disease with pulmonary fibrosis. On her last visit on 1/11/22, she was started on doxycycline and her steroids were increased; however, her symptoms worsened and she was admitted to [facility] on 11/27/21 and treated for PNA with ABX and steroids. She has tapered her steroid dose to 10 mg daily and, since then, her home oxygen requirement went from 2L to 6-7L NC consistently.      She was admitted to the hospitalist service on 1/14/22 with initial labs showing WBC 12.8, lactate 1.9, PCT 0.15, CRP 6.6. Negative COVID-19 swab. CXR demonstrates  extensive fibrosis with superimposed opacities, edema, or PNA in both upper lobes.      Subjective   Interval History:  Patient stable this morning.  Continues on high flow nasal cannula.  Getting antibiotics steroids.  States her breathing is little bit better than when she got here.  She has no other acute complaints.             The patient's past medical, surgical and social history were reviewed and updated in Epic as appropriate.       Objective     Infusions:     Medications:  allopurinol, 300 mg, Oral, Daily  amLODIPine, 10 mg, Oral, Daily  atenolol, 100 mg, Oral, Daily  cetirizine, 5 mg, Oral, Daily  dicyclomine, 10 mg, Oral, BID  doxepin, 40 mg, Oral, Nightly  doxycycline, 100 mg, Oral,  Q12H  ferrous sulfate, 325 mg, Oral, Daily  flecainide, 50 mg, Oral, BID  FLUoxetine, 40 mg, Oral, Daily  ipratropium-albuterol, 3 mL, Nebulization, Q6H - RT  losartan, 50 mg, Oral, Daily  methylPREDNISolone sodium succinate, 40 mg, Intravenous, BID  pantoprazole, 40 mg, Oral, Q AM  pharmacy consult - MTM, , Does not apply, Daily  piperacillin-tazobactam, 4.5 g, Intravenous, Q8H  rivaroxaban, 15 mg, Oral, Daily With Dinner  sodium chloride, 10 mL, Intravenous, Q12H      I reviewed the patient's medications.    Vital Sign Min/Max for last 24 hours  Temp  Min: 97.2 °F (36.2 °C)  Max: 97.9 °F (36.6 °C)   BP  Min: 92/81  Max: 112/61   Pulse  Min: 52  Max: 62   Resp  Min: 18  Max: 24   SpO2  Min: 90 %  Max: 94 %   Flow (L/min)  Min: 45  Max: 45       Input/Output for last 24 hour shift  01/16 0701 - 01/17 0700  In: 640 [P.O.:640]  Out: 300 [Urine:300]      GENERAL : NAD, conversant  RESPIRATORY/THORAX : normal respiratory effort and no intercostal retractions, Coarse crackles bilaterally  CARDIOVASCULAR : Normal S1/S2, RRR.  No lower ext edema.  GASTROINTESTINAL : Soft, NT/ND. BS x 4 normoactive. No hepatosplenomegaly.  MUSCULOSKELETAL : No cyanosis, clubbing, or ischemia  NEUROLOGICAL: alert and oriented to person, place and time  PSYCHOLOGICAL : Appropriate affect    Results from last 7 days   Lab Units 01/17/22  0557 01/16/22  0609 01/15/22  0701   WBC 10*3/mm3 7.51 8.90 6.34   HEMOGLOBIN g/dL 11.6* 10.1* 9.8*   PLATELETS 10*3/mm3 231 216 175     Results from last 7 days   Lab Units 01/17/22  0557 01/16/22  0609 01/15/22  0701   SODIUM mmol/L 141 141 139   POTASSIUM mmol/L 4.4 3.7 3.6   CO2 mmol/L 31.0* 28.0 29.0   BUN mg/dL 27* 27* 20   CREATININE mg/dL 1.37* 1.34* 1.22*   MAGNESIUM mg/dL 3.0* 1.8  --    PHOSPHORUS mg/dL 4.2 4.7*  --    GLUCOSE mg/dL 121* 103* 123*     Estimated Creatinine Clearance: 33.3 mL/min (A) (by C-G formula based on SCr of 1.37 mg/dL (H)).    Results from last 7 days   Lab Units  01/14/22 2146   PH, ARTERIAL pH units 7.457*   PCO2, ARTERIAL mm Hg 42.8   PO2 ART mm Hg 57.0*       I reviewed the patient's new clinical results.  I reviewed the patient's new imaging results/reports including actual images and agree with reports.         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  Who presented to [facility] with an acute exacerbation of her IPF with acute on chronic hypoxic respiratory failure.    -Continue on high flow nasal cannula wean to saturation greater than 88%  -Assess daily diuretic use, goal to keep her euvolemic status  -Continue Solu-Medrol with broad-spectrum antibiotics  -I did start to approach CODE STATUS with the patient in addition to recommending palliative care.  I will continue to work on this over the next few days.  Although it seems at this point time the patient is not ready to make any further decisions regarding her long-term medical care.  Unfortunately her IPF is quite advanced and at some point time this will not be reversible.  I will continue to try to stress this to her on a daily basis and hopefully by the end of the week she will make some improvements we will also work on long-term goals of care.  -Pulmonary will continue to follow      Plan of care and goals reviewed with multidisciplinary/antibiotic stewardship team  during rounds.   I discussed the patient's findings and my recommendations with patient and nursing staff       Clara Daniels DO  Pulmonary, Critical care and Sleep Medicine

## 2022-01-17 NOTE — DISCHARGE PLACEMENT REQUEST
"Liz Borjas (71 y.o. Female)     Case Management  [phone]              Date of Birth Social Security Number Address Home Phone MRN    1950  [address] [place] [address] [phone] [id number]    Church Marital Status             Mormon        Admission Date Admission Type Admitting Provider Attending Provider Department, Room/Bed    1/14/22 Emergency Edmar Morales MD Lyons, Andrea L, MD [facility] 5G, S555/1    Discharge Date Discharge Disposition Discharge Destination                         Attending Provider: Edmar Morales MD    Allergies: Moexipril-hydrochlorothiazide, Penicillins, Sulfa Antibiotics, Ace Inhibitors    Isolation: None   Infection: None   Code Status: CPR   Advance Care Planning Activity    Ht: 157.5 cm (62\")   Wt: 61.2 kg (135 lb)    Admission Cmt: None   Principal Problem: Acute exacerbation of idiopathic pulmonary fibrosis (HCC) [J84.112]                 Active Insurance as of 1/14/2022     Primary Coverage     Payor Plan Insurance Group Employer/Plan Group    MEDICARE MEDICARE B ONLY      Payor Plan Address Payor Plan Phone Number Payor Plan Fax Number Effective Dates    [address] [address] [phone]  2/1/2017 - None Entered    [facility] [id number]       Subscriber Name Subscriber Birth Date Member ID       LIZ BORJAS 1950 [id number]           Secondary Coverage     Payor Plan Insurance Group Employer/Plan Group    [place]      Payor Plan Address Payor Plan Phone Number Payor Plan Fax Number Effective Dates    [address]   1/1/2014 - None Entered    [facility] [phone]       Subscriber Name Subscriber Birth Date Member ID       LIZ BORJAS 1950 [id number]                 Emergency Contacts      (Rel.) Home Phone Work Phone Mobile Phone    Ej Borjas (Son) -- -- [phone]    CHARLENEJOSE RAGSDALE (Daughter) -- -- [phone]          [facility] " 5G  [address] [facility] [address]  Phone:  [phone]  Fax:  [phone] Date: 2022      Ambulatory Referral to Home Health     Patient:  Liz Borjas MRN:  [id number]   [address] [place] [address] :  1950  SSN:    Phone: [phone] Sex:  F      INSURANCE PAYOR PLAN GROUP # SUBSCRIBER ID   Primary:  Secondary:    MEDICARE  [employer] [place] [id number]  [id number]      [id number]  [id number]      Referring Provider Information:  PAMELA STUBBS Phone: [phone] Fax: [phone]      Referral Information:   # Visits:  999 Referral Type: Home Health [42]   Urgency:  Routine Referral Reason: Specialty Services Required   Start Date: 2022 End Date:  To be determined by Insurer   Diagnosis: Acute and chronic respiratory failure with hypoxia (HCC) (J96.21 [ICD-10-CM] 518.84,799.02 [ICD-9-CM])      Refer to Dept:   Refer to Provider:   Refer to Facility:       Face to Face Visit Date: 2022  Follow-up provider for Plan of Care? I treated the patient in an acute care facility and will not continue treatment after discharge.  Follow-up provider: ZACH AGUILAR [[id number]]  Reason/Clinical Findings: pulmonary fibrosis  Describe mobility limitations that make leaving home difficult: impaireed functional mobility, balance, gait and endurance  Nursing/Therapeutic Services Requested: Skilled Nursing  Nursing/Therapeutic Services Requested: Physical Therapy  Skilled nursing orders: Other (resume home health)  PT orders: Other (add comment) (resume home health)  Frequency: 1 Week 1     This document serves as a request of services and does not constitute Insurance authorization or approval of services.  To determine eligibility, please contact the members Insurance carrier to verify and review coverage.     If you have medical questions regarding this request for services. Please contact [address] at [phone] during normal business  hours.        Verbal Order Mode: Telephone with readback   Authorizing Provider: Edmar Morales MD  Authorizing Provider's NPI: [id number]     Order Entered By: Katie Kellogg RN 2022 10:45 AM     Electronically signed by:  Edmar Morales MD            History & Physical      Caridad Brown DO at [address] [facility]              [facility]  HISTORY AND PHYSICAL    Patient Name: Liz Borjas  : 1950  MRN: [id number]  Primary Care Physician: Valerie Sesay APRN  Date of admission: 2022    Subjective   Subjective     Chief Complaint:  Shortness of air, dyspnea on exertion    HPI:  Liz Borjas is a 71 y.o. female with past medical history of COPD, chronic oxygen dependent, pulmonary fibrosis, RA, hypertension, atrial fibrillation, cirrhosis, CKD presented to [facility]  ED with complaints of worsening dyspnea x1 week.  Patient daughter-in-law helps provide history.  States that since patient's last hospitalization (discharged 2021) her respiratory status has been doing well as she had been tapering her oral steroids.  This week the patient tapered down to prednisone 10 mg daily and has been starting to experience more coughing, more shortness of breath at rest, worsening dyspnea on exertion.  Patient has been coming more week and has stopped eating and drinking well.  Has become more dizzy and lightheaded with ambulation.  No fever or chills, no nausea vomiting diarrhea, no palpitations or chest pain.  She began coughing up thick sputum with more color where she normally has clear to white sputum typically every morning.  [facility] on [facility] and was started on doxycycline and increased oral steroids.  This has not improved her symptoms and today the patient decided that she was tired of feeling terrible and wanted to be seen at the hospital.  She additionally has had to go up on her oxygen, she typically is able to  tolerate 4 to 6 L by nasal cannula with ambulation and has not been able to wean her oxygen over the last week, keeping her oxygen at 6 to 7 L nasal cannula consistently.  In ED, labs are mostly stable with LDH of 322, creatinine 1.15, WBC 12.8, lactate 1.9, Pro-Khoa 0.15, CRP 6.6.  COVID-19 swab negative.  Chest x-ray with redemonstration extensive fibrotic appearance of the lung fields with appearance suggestive of some superimposed opacities, edema or pneumonia in both upper lobes.  Patient is very tachypneic with her respiratory rate in the 30s and 40s, and oxygen saturations in the 80s and she has now been placed on BiPAP.  Patient will be admitted to hospital medicine for further evaluation and treatment.      COVID Details:        Symptoms: [] NONE [] Fever [x]  Cough [x] Shortness of breath [] Change in taste or smell  The patient has started, but not completed, their COVID-19 vaccination series.    Review of Systems   Constitutional: Positive for activity change, appetite change and fatigue. Negative for chills, diaphoresis, fever and unexpected weight change.   HENT: Positive for congestion. Negative for trouble swallowing and voice change.    Eyes: Negative for photophobia, redness and visual disturbance.   Respiratory: Positive for cough and shortness of breath. Negative for chest tightness and wheezing.    Cardiovascular: Negative for chest pain, palpitations and leg swelling.   Gastrointestinal: Negative for abdominal distention, abdominal pain, nausea and vomiting.   Endocrine: Negative for cold intolerance and heat intolerance.   Genitourinary: Negative for difficulty urinating and dysuria.   Musculoskeletal: Negative for arthralgias, back pain and gait problem.   Skin: Negative for color change, pallor, rash and wound.   Neurological: Positive for dizziness and weakness. Negative for facial asymmetry and speech difficulty.   Hematological: Negative for adenopathy. Does not bruise/bleed easily.    Psychiatric/Behavioral: Negative for agitation, behavioral problems and confusion.        All other systems reviewed and are negative.     Personal History     Past Medical History:   Diagnosis Date   • Arthritis    • Atrial fibrillation (HCC)    • Closed right hip fracture (HCC)    • GERD (gastroesophageal reflux disease)    • Gout    • Hyperlipidemia    • Hypertension    • Osteoporosis    • Rheumatoid arthritis (HCC)    • Wears dentures     upper   • Wears glasses     reading       Past Surgical History:   Procedure Laterality Date   • ANKLE SURGERY Right    • CHOLECYSTECTOMY     • COLONOSCOPY      5 years ago   • HIP FRACTURE SURGERY      Right Hip with Pins   • LUMBAR DISCECTOMY Left 10/12/2016    Procedure: lumbar MICROdiscectomy LEFT L3-5;  Surgeon: Chris Son MD;  [facility]: [facility];  Service:    • REPLACEMENT TOTAL KNEE      Right Knee   • TOE AMPUTATION      right baby toe, left second toe       Family History: family history includes Cancer in her father; Heart disease in her mother. Otherwise pertinent FHx was reviewed and unremarkable.     Social History:  reports that she has quit smoking. Her smoking use included cigarettes. She has never used smokeless tobacco. She reports that she does not drink alcohol and does not use drugs.  Social History     Social History Narrative   • Not on file       Medications:  Cetirizine HCl, FLUoxetine, HYDROcodone-acetaminophen, RABEprazole, albuterol, albuterol sulfate HFA, allopurinol, amLODIPine, atenolol, dextromethorphan-guaifenesin, dicyclomine, doxepin, doxycycline, ferrous gluconate, ferrous sulfate, flecainide, leflunomide, nystatin, predniSONE, rivaroxaban, and traMADol    Allergies   Allergen Reactions   • Moexipril-Hydrochlorothiazide Anaphylaxis   • Penicillins Hives     Tolerates ancef, rocephin, Zosyn   • Sulfa Antibiotics Rash     Also affects kidney function   • Ace Inhibitors Hives       Objective   Objective     Vital Signs:   Temp:   [97.4 °F (36.3 °C)] 97.4 °F (36.3 °C)  Heart Rate:  [] 101  Resp:  [24-44] 44  BP: (116-128)/(65-78) 121/73  Flow (L/min):  [6-40] 40    Physical Exam   Constitutional: Awake, alert resting in bed on Bipap, chronically ill appearing   Eyes: PERRLA, sclerae anicteric, no conjunctival injection  HENT: NCAT, mucous membranes moist  Neck: Supple, no thyromegaly, no lymphadenopathy, trachea midline  Respiratory: Bibasilar rales without wheezing or rhonchi, mildly labored respirations on 40% bipap with RR 44  Cardiovascular: Tachycardia, no murmurs, rubs, or gallops, palpable pedal pulses bilaterally  Gastrointestinal: Positive bowel sounds, soft, nontender, nondistended  Musculoskeletal: No bilateral ankle edema, no clubbing or cyanosis to extremities  Psychiatric: Appropriate affect, cooperative   Neurologic: Oriented x 3, strength symmetric in all extremities, Cranial Nerves grossly intact to confrontation, speech clear  Skin: No rashes    Result Review:  I have personally reviewed the results from the time of this admission to 01/14/22 4:51 PM EST and agree with these findings:  [x]  Laboratory  [x]  Microbiology  [x]  Radiology  [x]  EKG/Telemetry   []  Cardiology/Vascular   []  Pathology  [x]  Old records  []  Other:  Most notable findings include:       LAB RESULTS:      Lab 01/14/22  1313   WBC 12.81*   HEMOGLOBIN 11.4*   HEMATOCRIT 37.9   PLATELETS 218   NEUTROS ABS 11.90*   IMMATURE GRANS (ABS) 0.07*   LYMPHS ABS 0.18*   MONOS ABS 0.48   EOS ABS 0.15   MCV 84.0   CRP 6.60*   PROCALCITONIN 0.15   LACTATE 1.9   *         Lab 01/14/22  1313   SODIUM 139   POTASSIUM 3.5   CHLORIDE 98   CO2 28.0   ANION GAP 13.0   BUN 13   CREATININE 1.15*   GLUCOSE 109*   CALCIUM 8.6         Lab 01/14/22  1313   TOTAL PROTEIN 6.5   ALBUMIN 2.70*   GLOBULIN 3.8   ALT (SGPT) 10   AST (SGOT) 13   BILIRUBIN 0.7   ALK PHOS 200*         Lab 01/14/22  1313   PROBNP 584.0   TROPONIN T <0.010             Lab 01/14/22  1313    FERRITIN 164.80*         Lab 01/14/22  1327   PH, ARTERIAL 7.470*   PCO2, ARTERIAL 42.4   PO2 ART 45.8*   FIO2 48   HCO3 ART 30.8*   BASE EXCESS ART 6.5*   CARBOXYHEMOGLOBIN 1.5     UA    Urinalysis 10/18/21 11/27/21   Specific [place], UA 1.010 1.029   Ketones, UA Negative Negative   Blood, UA Negative Negative   Leukocytes, UA Negative Negative   Nitrite, UA Negative Negative           Microbiology Results (last 10 days)     Procedure Component Value - Date/Time    COVID-19, FLU A/B, RSV PCR - Swab, Nasopharynx [[id number]]  (Normal) Collected: 01/14/22 1313    Lab Status: Final result Specimen: Swab from Nasopharynx Updated: 01/14/22 1402     COVID19 Not Detected     Influenza A PCR Not Detected     Influenza B PCR Not Detected     RSV, PCR Not Detected    Narrative:      Fact sheet for providers: https://www.fda.gov/media/828866/download    Fact sheet for patients: https://www.fda.gov/media/463992/download    Test performed by PCR.          XR Chest 1 View    Result Date: 1/14/2022  EXAMINATION: XR CHEST 1 VW-  INDICATION: SOA triage protocol  COMPARISON: 12/20/2021  FINDINGS: Redemonstrated extensive fibrotic appearance of the lung fields, with appearance suggesting some superimposed opacities, edema or pneumonia in both upper lobes. There is otherwise no enlarging effusion or thorax. Unchanged heart and mediastinal contours.      Impression: Redemonstrated extensive fibrotic appearance of the lung fields, with appearance suggesting some superimposed opacities, edema or pneumonia in both upper lobes.  This report was finalized on 1/14/2022 1:43 PM by Steve Chairez.            Assessment/Plan   Assessment & Plan       Rheumatoid arthritis with positive rheumatoid factor (HCC)    Anxiety and depression    COPD (chronic obstructive pulmonary disease) (HCC)    Pneumonia    Acute and chronic respiratory failure with hypoxia (HCC)    PAF (paroxysmal atrial fibrillation) (HCC)    Chronic anticoagulation    HTN  (hypertension)    Stage 3a chronic kidney disease (HCC)      Acute on chronic respiratory failure with hypoxia  Bilateral upper lobe pneumonia  COPD with exacerbation  Pulmonary fibrosis  -- IV Zosyn and IV Doxy  -- IV steroids  -- DuoNebs, pulmonary toilet  -- Consider pulmonary consult, [facility] with [facility]  -- Wean oxygen as able  -- Sputum culture  -- SLP evaluation    RA  -- Has not been on immunosuppressive therapy in several weeks  -- Follows with Dr. Zehra BECKFORD rheumatology    P AF  Hypertension  -- Continue flecainide, atenolol, amlodipine, losartan  -- Continue Xarelto    CKD  -- Baseline creatinine from 0.8-1.0  -- Creatinine on admission 1.15    DVT prophylaxis:  Xarelto     CODE STATUS:    Level Of Support Discussed With: Patient  Code Status (Patient has no pulse and is not breathing): CPR (Attempt to Resuscitate)  Medical Interventions (Patient has pulse or is breathing): Full Support      This note has been completed as part of a split-shared workflow.   Signature: Electronically signed by ANITHA Boland, 01/14/22, 5:12 PM EST.      Attending   Admission Attestation       I have seen and examined the patient, performing an independent face-to-face diagnostic evaluation with plan of care reviewed and developed with the advanced practice clinician (APC).      Brief Summary Statement:   Liz Borjas is a 71 y.o. female with PMHx significant for COPD, pulmonary fibrosis, chronic respiratory failure on 4L O2 and chronic steroids, RA, HTN, PAF, CKD, cirrhosis who presents to [facility] with complaints of increasing shortness of breath. Patient notes she had been tapering her chronic prednisone at home, as she did this she began experiencing worsening SOA, cough and ISSA. She was seen by Pulmonology clinic on 1/11 and started on course of doxycycline with increase in her steroids without improvement. She has been turning her O2 up at home to 7L with continued symptoms especially with ambulation.  She notes increasing weakness, anorexia and dizziness/lightheadedness. In the ED patient noted to be tachypneic with O2 sats in the mid 80's. She was placed on BIPAP with improvement. CXR concerning for possibly superimposed pneumonia, continued fibrotic changes in the lungs.    Remainder of detailed HPI is as noted by APC and has been reviewed and/or edited by me for completeness.    Attending Physical Exam:  Constitutional: Awake, alert  Eyes: PERRLA, sclerae anicteric, no conjunctival injection  HENT: NCAT, mucous membranes moist  Neck: Supple, no thyromegaly, no lymphadenopathy, trachea midline  Respiratory: crackles bilaterally, mildly labored and tachypneic on BIPAP with shallow breathing and some accessory muscle use  Cardiovascular: RRR, no murmurs, rubs, or gallops, palpable pedal pulses bilaterally  Gastrointestinal: Positive bowel sounds, soft, nontender, nondistended  Musculoskeletal: No bilateral ankle edema, no clubbing or cyanosis to extremities  Psychiatric: Appropriate affect, cooperative  Neurologic: Oriented x 3, strength symmetric in all extremities, Cranial Nerves grossly intact to confrontation, speech clear  Skin: No rashes      Brief Assessment/Plan :  See detailed assessment and plan developed with APC which I have reviewed and/or edited for completeness.        Admission Status: I believe that this patient meets INPATIENT status due to acute on chronic respiratory failure.  I feel patient’s risk for adverse outcomes and need for care warrant INPATIENT evaluation and I predict the patient’s care encounter to likely last beyond 2 midnights.        Caridad Brown DO  01/14/22                        Electronically signed by Caridad Brown DO at 01/14/22 7268

## 2022-01-17 NOTE — PLAN OF CARE
Goal Outcome Evaluation:      Pt pleasant and cooperative, rested well overnight. Pt remains on Hi-flow O2 at 45L/60%. Pt does desat very quickly w/ any kind of exertion or when she coughs. Turned O2 up just a little w/ desats to help her recover, but was able to turn it back down. Pt A&O, VSS on Hi-flow, will cont to monitor.

## 2022-01-17 NOTE — CASE MANAGEMENT/SOCIAL WORK
Discharge Planning Assessment  [facility]     Patient Name: Liz Borjas  MRN: [id number]  Today's Date: 1/17/2022    Admit Date: 1/14/2022     Discharge Needs Assessment     Row Name 01/17/22 1033       Living Environment    Lives With alone    Current Living Arrangements home/apartment/condo    Primary Care Provided by self    Provides Primary Care For no one, unable/limited ability to care for self    Family Caregiver if Needed child(brian), adult    Quality of Family Relationships involved; supportive    Able to Return to Prior Arrangements yes       Resource/Environmental Concerns    Resource/Environmental Concerns none       Transition Planning    Patient/Family Anticipates Transition to home with help/services    Patient/Family Anticipated Services at Transition ; rehabilitation services    Transportation Anticipated family or friend will provide       Discharge Needs Assessment    Readmission Within the Last 30 Days no previous admission in last 30 days    Current Outpatient/Agency/Support Group homecare agency    Equipment Currently Used at Home cane, straight; commode; shower chair; oxygen; walker, rolling    Concerns to be Addressed discharge planning    Anticipated Changes Related to Illness none    Equipment Needed After Discharge none               Discharge Plan     Row Name 01/17/22 1036       Plan    Plan Home w/Home Health    Patient/Family in Agreement with Plan yes    Plan Comments Spoke with patient in room to initiate discharge planning.  She lives alone in [place].  Son and DIL live close by and check on her daily.  Prior to admission, she used a rolling walker and DIL assisted with ADL's as needed.  She also has a straight cane, shower chair, bedside commode and continuous home O2@2L through [facility].  She is current with [facility] for PT and skilled nursing.  Her PCP is Valerie Sesay.  She does not have an advanced directive.  Ms. Borjas has RX coverage  and [facility].  Her plan is to return home with home health at discharge.  Resume orders faxed to [facility].  CM will continue to follow and notify home health when patient discharges.  Katie Kellogg RN [phone]    Final Discharge Disposition Code 06 - home with home health care              Continued Care and Services - Admitted Since 1/14/2022     Home Medical Care     Service Provider Request Status Selected Services Address Phone Fax Patient Preferred    [facility]  Pending - No Request Sent N/A [address], [facility] [address] [phone] --            Selected Continued Care - Prior Encounters Includes selections from prior encounters from 10/16/2021 to 1/17/2022    Discharged on 11/29/2021 Admission date: 11/27/2021 - Discharge disposition: Home or Self Care    Home Medical Care     Service Provider Selected Services Address Phone Fax Patient Preferred    [facility]  Home Health Services [address], [facility] [address] [phone] --                Discharged on 10/24/2021 Admission date: 10/17/2021 - Discharge disposition: Home-Health Care Svc    Durable Medical Equipment     Service Provider Selected Services Address Phone Fax Patient Preferred    [facility]  Durable Medical Equipment [address], [facility] [address] [phone] --          Home Medical Care     Service Provider Selected Services Address Phone Fax Patient Preferred    [facility]  Home Health Services [address], [facility] [address] [phone] --                    Expected Discharge Date and Time     Expected Discharge Date Expected Discharge Time    Jan 21, 2022          Demographic Summary     Row Name 01/17/22 1033       General Information    Admission Type inpatient    Arrived From emergency department    Referral Source admission list    Reason for Consult  discharge planning    Preferred Language English     Used During This Interaction no               Functional Status     Row Name 01/17/22 1033       Functional Status    Usual Activity Tolerance fair    Current Activity Tolerance fair       Functional Status, IADL    Medications independent    Meal Preparation assistive person    Housekeeping assistive person    Laundry assistive person    Shopping assistive person               Psychosocial    No documentation.                Abuse/Neglect    No documentation.                Legal    No documentation.                Substance Abuse    No documentation.                Patient Forms    No documentation.                   Katie Kellogg RN

## 2022-01-17 NOTE — PLAN OF CARE
Goal Outcome Evaluation:  Plan of Care Reviewed With: patient        Progress: no change  Outcome Summary: Pt gave excellent effort during tx but continues to be limited by O2 saturation.  Hi-flow on throughout tx.  Pt first completed BLE ther ex in supine with O2 ranging from 85-91%.  Pt then transferred supine<-->sit with SBA, stood with CGA, and took ~2-3 sidesteps towards the HOB with CGAx1.  O2 noted to be 79-83%.  ~15 minutes needed to recover back to 90%.  Continue with skilled PT to improve mobility and safety.

## 2022-01-18 LAB
ANION GAP SERPL CALCULATED.3IONS-SCNC: 7 MMOL/L (ref 5–15)
ARTERIAL PATENCY WRIST A: ABNORMAL
ATMOSPHERIC PRESS: ABNORMAL MM[HG]
BACTERIA SPEC RESP CULT: NORMAL
BASE EXCESS BLDA CALC-SCNC: 8.3 MMOL/L (ref 0–2)
BDY SITE: ABNORMAL
BODY TEMPERATURE: 37 C
BUN SERPL-MCNC: 22 MG/DL (ref 8–23)
BUN/CREAT SERPL: 17.7 (ref 7–25)
CALCIUM SPEC-SCNC: 8.3 MG/DL (ref 8.6–10.5)
CHLORIDE SERPL-SCNC: 103 MMOL/L (ref 98–107)
CO2 BLDA-SCNC: 34 MMOL/L (ref 22–33)
CO2 SERPL-SCNC: 33 MMOL/L (ref 22–29)
COHGB MFR BLD: 1.2 % (ref 0–2)
CREAT SERPL-MCNC: 1.24 MG/DL (ref 0.57–1)
DEPRECATED RDW RBC AUTO: 52.6 FL (ref 37–54)
EPAP: 0
ERYTHROCYTE [DISTWIDTH] IN BLOOD BY AUTOMATED COUNT: 17 % (ref 12.3–15.4)
GFR SERPL CREATININE-BSD FRML MDRD: 43 ML/MIN/1.73
GLUCOSE SERPL-MCNC: 118 MG/DL (ref 65–99)
GRAM STN SPEC: NORMAL
HCO3 BLDA-SCNC: 32.7 MMOL/L (ref 20–26)
HCT VFR BLD AUTO: 34.9 % (ref 34–46.6)
HCT VFR BLD CALC: 33.7 % (ref 38–51)
HGB BLD-MCNC: 10.3 G/DL (ref 12–15.9)
HGB BLDA-MCNC: 11 G/DL (ref 14–18)
INHALED O2 CONCENTRATION: 48 %
IPAP: 0
MCH RBC QN AUTO: 24.8 PG (ref 26.6–33)
MCHC RBC AUTO-ENTMCNC: 29.5 G/DL (ref 31.5–35.7)
MCV RBC AUTO: 83.9 FL (ref 79–97)
METHGB BLD QL: 0.2 % (ref 0–1.5)
MODALITY: ABNORMAL
NOTE: ABNORMAL
OXYHGB MFR BLDV: 89.2 % (ref 94–99)
PAW @ PEAK INSP FLOW SETTING VENT: 0 CMH2O
PCO2 BLDA: 43.5 MM HG (ref 35–45)
PCO2 TEMP ADJ BLD: 43.5 MM HG (ref 35–45)
PH BLDA: 7.48 PH UNITS (ref 7.35–7.45)
PH, TEMP CORRECTED: 7.48 PH UNITS
PLATELET # BLD AUTO: 184 10*3/MM3 (ref 140–450)
PMV BLD AUTO: 11.4 FL (ref 6–12)
PO2 BLDA: 57.1 MM HG (ref 83–108)
PO2 TEMP ADJ BLD: 57.1 MM HG (ref 83–108)
POTASSIUM SERPL-SCNC: 3.6 MMOL/L (ref 3.5–5.2)
RBC # BLD AUTO: 4.16 10*6/MM3 (ref 3.77–5.28)
SODIUM SERPL-SCNC: 143 MMOL/L (ref 136–145)
TOTAL RATE: 0 BREATHS/MINUTE
WBC NRBC COR # BLD: 6.94 10*3/MM3 (ref 3.4–10.8)

## 2022-01-18 PROCEDURE — 36600 WITHDRAWAL OF ARTERIAL BLOOD: CPT

## 2022-01-18 PROCEDURE — 94660 CPAP INITIATION&MGMT: CPT

## 2022-01-18 PROCEDURE — 83050 HGB METHEMOGLOBIN QUAN: CPT

## 2022-01-18 PROCEDURE — 94799 UNLISTED PULMONARY SVC/PX: CPT

## 2022-01-18 PROCEDURE — 82805 BLOOD GASES W/O2 SATURATION: CPT

## 2022-01-18 PROCEDURE — 80048 BASIC METABOLIC PNL TOTAL CA: CPT | Performed by: INTERNAL MEDICINE

## 2022-01-18 PROCEDURE — 25010000002 PIPERACILLIN SOD-TAZOBACTAM PER 1 G

## 2022-01-18 PROCEDURE — 25010000002 METHYLPREDNISOLONE PER 40 MG: Performed by: INTERNAL MEDICINE

## 2022-01-18 PROCEDURE — 99232 SBSQ HOSP IP/OBS MODERATE 35: CPT | Performed by: INTERNAL MEDICINE

## 2022-01-18 PROCEDURE — 82375 ASSAY CARBOXYHB QUANT: CPT

## 2022-01-18 PROCEDURE — 85027 COMPLETE CBC AUTOMATED: CPT | Performed by: INTERNAL MEDICINE

## 2022-01-18 PROCEDURE — 94760 N-INVAS EAR/PLS OXIMETRY 1: CPT

## 2022-01-18 PROCEDURE — 94761 N-INVAS EAR/PLS OXIMETRY MLT: CPT

## 2022-01-18 RX ORDER — GUAIFENESIN 600 MG/1
1200 TABLET, EXTENDED RELEASE ORAL EVERY 12 HOURS SCHEDULED
Status: DISCONTINUED | OUTPATIENT
Start: 2022-01-18 | End: 2022-01-23 | Stop reason: HOSPADM

## 2022-01-18 RX ORDER — LOPERAMIDE HYDROCHLORIDE 2 MG/1
2 CAPSULE ORAL ONCE
Status: COMPLETED | OUTPATIENT
Start: 2022-01-18 | End: 2022-01-18

## 2022-01-18 RX ADMIN — DOXYCYCLINE 100 MG: 100 CAPSULE ORAL at 09:20

## 2022-01-18 RX ADMIN — CETIRIZINE HYDROCHLORIDE 5 MG: 10 TABLET, FILM COATED ORAL at 09:20

## 2022-01-18 RX ADMIN — ATENOLOL 100 MG: 50 TABLET ORAL at 09:20

## 2022-01-18 RX ADMIN — IPRATROPIUM BROMIDE AND ALBUTEROL SULFATE 3 ML: .5; 2.5 SOLUTION RESPIRATORY (INHALATION) at 18:57

## 2022-01-18 RX ADMIN — AMLODIPINE BESYLATE 10 MG: 10 TABLET ORAL at 09:19

## 2022-01-18 RX ADMIN — IPRATROPIUM BROMIDE AND ALBUTEROL SULFATE 3 ML: .5; 2.5 SOLUTION RESPIRATORY (INHALATION) at 07:21

## 2022-01-18 RX ADMIN — DICYCLOMINE HYDROCHLORIDE 10 MG: 10 CAPSULE ORAL at 20:01

## 2022-01-18 RX ADMIN — FERROUS SULFATE TAB 325 MG (65 MG ELEMENTAL FE) 325 MG: 325 (65 FE) TAB at 09:20

## 2022-01-18 RX ADMIN — TAZOBACTAM SODIUM AND PIPERACILLIN SODIUM 4.5 G: 500; 4 INJECTION, SOLUTION INTRAVENOUS at 01:08

## 2022-01-18 RX ADMIN — DICYCLOMINE HYDROCHLORIDE 10 MG: 10 CAPSULE ORAL at 09:20

## 2022-01-18 RX ADMIN — GUAIFENESIN 1200 MG: 600 TABLET, EXTENDED RELEASE ORAL at 20:01

## 2022-01-18 RX ADMIN — FLECAINIDE ACETATE 50 MG: 50 TABLET ORAL at 20:00

## 2022-01-18 RX ADMIN — DOXEPIN HYDROCHLORIDE 40 MG: 10 CAPSULE ORAL at 20:03

## 2022-01-18 RX ADMIN — FLUOXETINE HYDROCHLORIDE 40 MG: 20 CAPSULE ORAL at 09:20

## 2022-01-18 RX ADMIN — TAZOBACTAM SODIUM AND PIPERACILLIN SODIUM 4.5 G: 500; 4 INJECTION, SOLUTION INTRAVENOUS at 09:31

## 2022-01-18 RX ADMIN — DOXYCYCLINE 100 MG: 100 CAPSULE ORAL at 20:01

## 2022-01-18 RX ADMIN — IPRATROPIUM BROMIDE AND ALBUTEROL SULFATE 3 ML: .5; 2.5 SOLUTION RESPIRATORY (INHALATION) at 12:09

## 2022-01-18 RX ADMIN — METHYLPREDNISOLONE SODIUM SUCCINATE 40 MG: 40 INJECTION, POWDER, FOR SOLUTION INTRAMUSCULAR; INTRAVENOUS at 20:01

## 2022-01-18 RX ADMIN — LOPERAMIDE HYDROCHLORIDE 2 MG: 2 CAPSULE ORAL at 14:54

## 2022-01-18 RX ADMIN — SODIUM CHLORIDE, PRESERVATIVE FREE 10 ML: 5 INJECTION INTRAVENOUS at 20:01

## 2022-01-18 RX ADMIN — RIVAROXABAN 15 MG: 15 TABLET, FILM COATED ORAL at 17:12

## 2022-01-18 RX ADMIN — IPRATROPIUM BROMIDE AND ALBUTEROL SULFATE 3 ML: .5; 2.5 SOLUTION RESPIRATORY (INHALATION) at 00:14

## 2022-01-18 RX ADMIN — FLECAINIDE ACETATE 50 MG: 50 TABLET ORAL at 09:20

## 2022-01-18 RX ADMIN — PANTOPRAZOLE SODIUM 40 MG: 40 TABLET, DELAYED RELEASE ORAL at 05:24

## 2022-01-18 RX ADMIN — GUAIFENESIN 1200 MG: 600 TABLET, EXTENDED RELEASE ORAL at 14:54

## 2022-01-18 RX ADMIN — TAZOBACTAM SODIUM AND PIPERACILLIN SODIUM 4.5 G: 500; 4 INJECTION, SOLUTION INTRAVENOUS at 23:50

## 2022-01-18 RX ADMIN — TAZOBACTAM SODIUM AND PIPERACILLIN SODIUM 4.5 G: 500; 4 INJECTION, SOLUTION INTRAVENOUS at 17:13

## 2022-01-18 NOTE — PROGRESS NOTES
NN spoke with pt at BS.  Pt alert and able to answer questions appropriately. Pt O2 sat 88% on 53% HFNC currently, no home O2 use.  Deep breathing exercises encouraged. No new concerns or questions voiced at this time.  NN will continue to follow as needed.       Per current GOLD Standards, please consider: No LAMA/LABA/ICS in place, Outpatient PFT, Rehab as appropriate

## 2022-01-18 NOTE — PROGRESS NOTES
[facility]  PROGRESS NOTE    Patient Name: Liz Borjas  : 1950  MRN: [id number]    Date of Admission: 2022  Primary Care Physician: Valerie Sesay APRN    Subjective   Subjective     CC:  Shortness of breath    HPI:  Nurse notes that patient having lots of loose stools.  Son at bedside and states that patient has IBS and will sometimes go days without BM and then will have multiple like this.  She would like some imodium.      ROS:  Gen- No fevers, chills  CV- No chest pain, palpitations  Resp- +cough, +dyspnea  GI- + diarrhea.          Objective   Objective     Vital Signs:   Temp:  [97.3 °F (36.3 °C)-97.6 °F (36.4 °C)] 97.5 °F (36.4 °C)  Heart Rate:  [58-74] 74  Resp:  [18-24] 18  BP: ()/(53-78) 134/78  Flow (L/min):  [6-45] 40     Physical Exam:  General: In mild to moderate respiratory distress, not in any acute distress, appears stated age, conversant and cooperative, son at bedside today  Head: Atraumatic and normocephalic, without obvious abnormality  Eyes:   Conjunctivae and sclerae normal, no Icterus. No pallor  Ears:  Ears appear intact with no abnormalities noted  Throat: No oral lesions, no thrush, oral mucosa moist  Neck: Supple, trachea midline, no thyromegaly  Back:   No kyphoscoliosis present. No tenderness to palpation,   no sacral edema  Lungs: In mild respiratory distress, decreased breath sounds bilateral inferior 2/3, on 40L HFNC   Heart:  Normal S1 and S2, no murmur, no gallop, No JVD, no lower extremity swelling  Abdomen:  Soft, no tenderness, no organomegaly, normal bowel sounds  Normal bowel sounds, no masses, no organomegaly. Soft, nontender, nondistended, no guarding, no rebound tenderness.  Extremities: No gross abnormalities, no clubbing, pulses palpable and equal bilaterally  Skin: No bleeding, bruising or rash, normal skin turgor and elasticity  Neurologic: Cranial nerves appear intact with no evidence of facial  asymmetry, normal motor and sensory functions in all 4 extremities  Psych: Alert and oriented x 3, normal mood    Results Reviewed:  LAB RESULTS:      Lab 01/18/22  0623 01/17/22  0557 01/16/22  0609 01/15/22  0701 01/14/22  1313   WBC 6.94 7.51 8.90 6.34 12.81*   HEMOGLOBIN 10.3* 11.6* 10.1* 9.8* 11.4*   HEMATOCRIT 34.9 39.3 33.8* 32.7* 37.9   PLATELETS 184 231 216 175 218   NEUTROS ABS  --  6.99 8.21* 5.72 11.90*   IMMATURE GRANS (ABS)  --  0.03 0.03 0.03 0.07*   LYMPHS ABS  --  0.26* 0.31* 0.32* 0.18*   MONOS ABS  --  0.22 0.35 0.27 0.48   EOS ABS  --  0.00 0.00 0.00 0.15   MCV 83.9 85.4 84.3 84.7 84.0   CRP  --   --  4.29* 8.16* 6.60*   PROCALCITONIN  --  0.07 0.09 0.18 0.15   LACTATE  --   --   --   --  1.9   LDH  --   --   --   --  322*         Lab 01/18/22  0623 01/17/22  0557 01/16/22  0609 01/15/22  0701 01/14/22  1313   SODIUM 143 141 141 139 139   POTASSIUM 3.6 4.4 3.7 3.6 3.5   CHLORIDE 103 101 101 100 98   CO2 33.0* 31.0* 28.0 29.0 28.0   ANION GAP 7.0 9.0 12.0 10.0 13.0   BUN 22 27* 27* 20 13   CREATININE 1.24* 1.37* 1.34* 1.22* 1.15*   GLUCOSE 118* 121* 103* 123* 109*   CALCIUM 8.3* 8.3* 8.0* 7.8* 8.6   MAGNESIUM  --  3.0* 1.8  --   --    PHOSPHORUS  --  4.2 4.7*  --   --    TSH  --   --   --  0.930  --          Lab 01/17/22  0557 01/16/22  0609 01/14/22  1313   TOTAL PROTEIN 5.9* 5.2* 6.5   ALBUMIN 2.40* 2.50* 2.70*   GLOBULIN 3.5 2.7 3.8   ALT (SGPT) 10 7 10   AST (SGOT) 15 11 13   BILIRUBIN 0.3 0.3 0.7   ALK PHOS 171* 154* 200*         Lab 01/14/22  1313   PROBNP 584.0   TROPONIN T <0.010             Lab 01/14/22  1313   FERRITIN 164.80*         Lab 01/14/22  2146 01/14/22  1327   PH, ARTERIAL 7.457* 7.470*   PCO2, ARTERIAL 42.8 42.4   PO2 ART 57.0* 45.8*   FIO2 40 48   HCO3 ART 30.2* 30.8*   BASE EXCESS ART 5.7* 6.5*   CARBOXYHEMOGLOBIN 1.5 1.5     Brief Urine Lab Results  (Last result in the past 365 days)      Color   Clarity   Blood   Leuk Est   Nitrite   Protein   CREAT   Urine HCG         11/27/21 2334 Yellow   Clear   Negative   Negative   Negative   Trace                 Microbiology Results Abnormal     Procedure Component Value - Date/Time    Blood Culture - Blood, Arm, Right [[id number]]  (Normal) Collected: 01/14/22 1540    Lab Status: Preliminary result Specimen: Blood from Arm, Right Updated: 01/17/22 1600     Blood Culture No growth at 3 days    Blood Culture - Blood, Arm, Left [[id number]]  (Normal) Collected: 01/14/22 1545    Lab Status: Preliminary result Specimen: Blood from Arm, Left Updated: 01/17/22 1600     Blood Culture No growth at 3 days    Respiratory Culture - Sputum, Cough [[id number]] Collected: 01/16/22 0931    Lab Status: Preliminary result Specimen: Sputum from Cough Updated: 01/17/22 1119     Respiratory Culture Light growth (2+) The culture consists of normal respiratory jeancarlos. This is a preliminary report; final report to follow.     Gram Stain No WBCs per low power field      Rare (1+) Epithelial cells per low power field      Few (2+) Gram negative bacilli      Moderate (3+) Budding yeast    MRSA Screen, PCR (Inpatient) - Swab, Nares [[id number]]  (Normal) Collected: 01/15/22 1920    Lab Status: Final result Specimen: Swab from Nares Updated: 01/15/22 2030     MRSA PCR Negative    Narrative:      MRSA Negative    COVID-19, FLU A/B, RSV PCR - Swab, Nasopharynx [[id number]]  (Normal) Collected: 01/14/22 1313    Lab Status: Final result Specimen: Swab from Nasopharynx Updated: 01/14/22 1402     COVID19 Not Detected     Influenza A PCR Not Detected     Influenza B PCR Not Detected     RSV, PCR Not Detected    Narrative:      Fact sheet for providers: https://www.fda.gov/media/682054/download    Fact sheet for patients: https://www.fda.gov/media/069099/download    Test performed by PCR.          No radiology results from the last 24 hrs        I have reviewed the medications:  Scheduled Meds:allopurinol, 300 mg, Oral, Daily  amLODIPine, 10 mg, Oral, Daily  atenolol, 100 mg,  Oral, Daily  cetirizine, 5 mg, Oral, Daily  dicyclomine, 10 mg, Oral, BID  doxepin, 40 mg, Oral, Nightly  doxycycline, 100 mg, Oral, Q12H  ferrous sulfate, 325 mg, Oral, Daily  flecainide, 50 mg, Oral, BID  FLUoxetine, 40 mg, Oral, Daily  ipratropium-albuterol, 3 mL, Nebulization, Q6H - RT  losartan, 50 mg, Oral, Daily  methylPREDNISolone sodium succinate, 40 mg, Intravenous, BID  pantoprazole, 40 mg, Oral, Q AM  pharmacy consult - MTM, , Does not apply, Daily  piperacillin-tazobactam, 4.5 g, Intravenous, Q8H  rivaroxaban, 15 mg, Oral, Daily With Dinner  sodium chloride, 10 mL, Intravenous, Q12H      Continuous Infusions:   PRN Meds:.•  acetaminophen  •  acetaminophen-codeine  •  albuterol  •  hydrOXYzine  •  ondansetron **OR** ondansetron  •  sodium chloride  •  sodium chloride  •  traMADol    Assessment/Plan   Assessment & Plan     Active Hospital Problems    Diagnosis  POA   • **Acute exacerbation of idiopathic pulmonary fibrosis (HCC) [J84.112]  Yes   • Stage 3a chronic kidney disease ([facility]) [N18.31]  Yes   • Acute and chronic respiratory failure with hypoxia ([facility]) [J96.21]  Yes   • Chronic anticoagulation [Z79.01]  Not Applicable   • HTN (hypertension) [I10]  Yes   • PAF (paroxysmal atrial fibrillation) ([facility]) [I48.0]  Yes   • COPD (chronic obstructive pulmonary disease) ([facility]) [J44.9]  Yes   • Rheumatoid arthritis with positive rheumatoid factor ([facility]) [M05.9]  Yes   • Anxiety and depression [F41.9, F32.A]  Yes      Resolved Hospital Problems   No resolved problems to display.       Brief Hospital Course to date:  Liz Borjas is a 71 y.o. female with past medical history of COPD, chronic hypoxic respiratory failure on home oxygen, pulmonary fibrosis, rheumatoid arthritis, essential hypertension, A. fib, liver cirrhosis, CKD who presented to the hospital with worsening shortness of breath for 1 week after steroid taper started and home dose went down to 10 mg daily. She was found to be in respiratory failure  requiring high oxygen supplementation at 80% FiO2 through high flow nasal cannula with concern for superimposed bacterial pneumonia    Assessment and plan:  Acute on chronic hypoxic respiratory failure  Bilateral upper lobe pneumonia, present on admission  Severe COPD with exacerbation  Severe pulmonary fibrosis  · Continue IV Zosyn and doxycycline  · MRSA swab is negative.    · sputum culture shows few GNR and budding yeast.  Blood cultures no growth at 2 days  · Continue high-dose IV steroids with Solu-Medrol 60 mg 3 times daily  · Continue duo nebs and pulmonary toilet  · Pulmonary consultation (patient follows with pulmonary associates) --> per pulmonary recommendations: Prognosis is very poor, continue IV steroids, discussed goals of care and advanced directives with the patient, discontinue tabetic soon given normal procalcitonin  · Oxygen supplementation and wean off to target oxygen saturation above 88 to 92%  · Goals of care discussed with the patient by pulmonary team.  Partner discussed goals of care and severity of her disease with patient and her son.  She will think about the CODE STATUS and intubation if needed and will let us know over the next few days.  Meanwhile she wishes to continue current treatment plan.  · Speech eval     Rheumatoid arthritis  · Has not been on immunosuppressive therapy in several weeks because of recurrent pneumonia  · Has been missing her appointment with dermatology service at  because of her recurrent hospitalization.      Paroxysmal atrial fibrillation  Hypertension  · Continue flecainide, atenolol, amlodipine, losartan  · Continue Xarelto     CKD stage III    IBS  --diarrhea has with her IBS at home.  WBC wnl.  Will give a dose of imodium         DVT prophylaxis:  Medical DVT prophylaxis orders are present.  xarelto      AM-PAC 6 Clicks Score (PT): 16 (01/17/22 2038)    Disposition: I expect the patient to be discharged to be determined pending on her clinical  response and decreased oxygen requirement to reasonable level     CODE STATUS:   Code Status and Medical Interventions:   Ordered at: 01/14/22 1651     Level Of Support Discussed With:    Patient     Code Status (Patient has no pulse and is not breathing):    CPR (Attempt to Resuscitate)     Medical Interventions (Patient has pulse or is breathing):    Full Support       Edmar Morales MD  01/18/22

## 2022-01-18 NOTE — PROGRESS NOTES
Intensive Care Follow-up     Hospital:  LOS: 4 days   Ms. Liz Borjas, 71 y.o. female is followed for:   Acute exacerbation of idiopathic pulmonary fibrosis (HCC)            History of present illness:   Liz Borjas is a 71 y.o. female who presents to [facility] on 1/16/22 with a one-week history of increasing dyspnea at rest and on exertion, cough, weakness, and inability to tolerate PO intake. She has a history of COPD, pulmonary fibrosis, RA (on prednisone and Orencia), PAF on Xarelto, chronic respiratory failure on 2L NC at home, HTN, recent COVID-19 infection, and remote tobacco use.      She is followed by [facility] for chronic interstitial lung disease with pulmonary fibrosis. On her last visit on 1/11/22, she was started on doxycycline and her steroids were increased; however, her symptoms worsened and she was admitted to [facility] on 11/27/21 and treated for PNA with ABX and steroids. She has tapered her steroid dose to 10 mg daily and, since then, her home oxygen requirement went from 2L to 6-7L NC consistently.      She was admitted to the hospitalist service on 1/14/22 with initial labs showing WBC 12.8, lactate 1.9, PCT 0.15, CRP 6.6. Negative COVID-19 swab. CXR demonstrates  extensive fibrosis with superimposed opacities, edema, or PNA in both upper lobes.      Subjective   Interval History:  Patient stable this morning.  She was on 6 L the majority of the evening but was back on high flow nasal cannula this morning.  Continues on steroids antibiotics.  States her breathing overall feels fairly stable.  She has no acute complaints.  Blood pressure well controlled.             The patient's past medical, surgical and social history were reviewed and updated in Epic as appropriate.       Objective     Infusions:     Medications:  allopurinol, 300 mg, Oral, Daily  amLODIPine, 10 mg, Oral, Daily  atenolol, 100 mg, Oral, Daily  cetirizine, 5 mg, Oral, Daily  dicyclomine, 10 mg, Oral,  BID  doxepin, 40 mg, Oral, Nightly  doxycycline, 100 mg, Oral, Q12H  ferrous sulfate, 325 mg, Oral, Daily  flecainide, 50 mg, Oral, BID  FLUoxetine, 40 mg, Oral, Daily  ipratropium-albuterol, 3 mL, Nebulization, Q6H - RT  losartan, 50 mg, Oral, Daily  methylPREDNISolone sodium succinate, 40 mg, Intravenous, BID  pantoprazole, 40 mg, Oral, Q AM  pharmacy consult - [place], , Does not apply, Daily  piperacillin-tazobactam, 4.5 g, Intravenous, Q8H  rivaroxaban, 15 mg, Oral, Daily With Dinner  sodium chloride, 10 mL, Intravenous, Q12H      I reviewed the patient's medications.    Vital Sign Min/Max for last 24 hours  Temp  Min: 97.3 °F (36.3 °C)  Max: 97.6 °F (36.4 °C)   BP  Min: 112/64  Max: 134/78   Pulse  Min: 58  Max: 74   Resp  Min: 18  Max: 24   SpO2  Min: 80 %  Max: 97 %   Flow (L/min)  Min: 6  Max: 45       Input/Output for last 24 hour shift  01/17 0701 - 01/18 0700  In: 480 [P.O.:480]  Out: 650 [Urine:650]      GENERAL : NAD, conversant  RESPIRATORY/THORAX : normal respiratory effort and no intercostal retractions, fine crackles bilaterally  CARDIOVASCULAR : Normal S1/S2, RRR.  No lower ext edema.  GASTROINTESTINAL : Soft, NT/ND. BS x 4 normoactive. No hepatosplenomegaly.  MUSCULOSKELETAL : No cyanosis, clubbing, or ischemia  NEUROLOGICAL: alert and oriented to person, place and time  PSYCHOLOGICAL : Appropriate affect    Results from last 7 days   Lab Units 01/18/22  0623 01/17/22  0557 01/16/22  0609   WBC 10*3/mm3 6.94 7.51 8.90   HEMOGLOBIN g/dL 10.3* 11.6* 10.1*   PLATELETS 10*3/mm3 184 231 216     Results from last 7 days   Lab Units 01/18/22  0623 01/17/22  0557 01/16/22  0609   SODIUM mmol/L 143 141 141   POTASSIUM mmol/L 3.6 4.4 3.7   CO2 mmol/L 33.0* 31.0* 28.0   BUN mg/dL 22 27* 27*   CREATININE mg/dL 1.24* 1.37* 1.34*   MAGNESIUM mg/dL  --  3.0* 1.8   PHOSPHORUS mg/dL  --  4.2 4.7*   GLUCOSE mg/dL 118* 121* 103*     Estimated Creatinine Clearance: 36.8 mL/min (A) (by C-G formula based on SCr of 1.24  mg/dL (H)).    Results from last 7 days   Lab Units 01/14/22  2146   PH, ARTERIAL pH units 7.457*   PCO2, ARTERIAL mm Hg 42.8   PO2 ART mm Hg 57.0*       I reviewed the patient's new clinical results.  I reviewed the patient's new imaging results/reports including actual images and agree with reports.         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  Who presented to [facility] with an acute exacerbation of her IPF with acute on chronic hypoxic respiratory failure.     -Continue on high flow nasal cannula wean to saturation greater than 88-92%  -Assess daily diuretic use, goal to keep her euvolemic   -Continue Solu-Medrol with broad-spectrum antibiotics  -Aggressive pulmonary toilet, recommend Mucinex, flutter valve and incentive spirometer use  -Mobilize patient  -Pulmonary will continue to follow    Plan of care and goals reviewed with multidisciplinary/antibiotic stewardship team during rounds.   I discussed the patient's findings and my recommendations with patient and nursing staff       Clara Daniels,   Pulmonary, Critical care and Sleep Medicine

## 2022-01-19 LAB
ANION GAP SERPL CALCULATED.3IONS-SCNC: 9 MMOL/L (ref 5–15)
ARTERIAL PATENCY WRIST A: ABNORMAL
ATMOSPHERIC PRESS: ABNORMAL MM[HG]
BACTERIA SPEC AEROBE CULT: NORMAL
BACTERIA SPEC AEROBE CULT: NORMAL
BASE EXCESS BLDA CALC-SCNC: 8.8 MMOL/L (ref 0–2)
BDY SITE: ABNORMAL
BODY TEMPERATURE: 37 C
BUN SERPL-MCNC: 16 MG/DL (ref 8–23)
BUN/CREAT SERPL: 16.2 (ref 7–25)
CA-I SERPL ISE-MCNC: 1.2 MMOL/L (ref 1.12–1.32)
CALCIUM SPEC-SCNC: 8.4 MG/DL (ref 8.6–10.5)
CHLORIDE SERPL-SCNC: 102 MMOL/L (ref 98–107)
CO2 BLDA-SCNC: 35.4 MMOL/L (ref 22–33)
CO2 SERPL-SCNC: 31 MMOL/L (ref 22–29)
COHGB MFR BLD: 1.3 % (ref 0–2)
CREAT SERPL-MCNC: 0.99 MG/DL (ref 0.57–1)
DEPRECATED RDW RBC AUTO: 53.9 FL (ref 37–54)
EPAP: 0
ERYTHROCYTE [DISTWIDTH] IN BLOOD BY AUTOMATED COUNT: 16.9 % (ref 12.3–15.4)
GFR SERPL CREATININE-BSD FRML MDRD: 56 ML/MIN/1.73
GLUCOSE SERPL-MCNC: 95 MG/DL (ref 65–99)
HCO3 BLDA-SCNC: 33.9 MMOL/L (ref 20–26)
HCT VFR BLD AUTO: 37.5 % (ref 34–46.6)
HCT VFR BLD CALC: 33.3 % (ref 38–51)
HGB BLD-MCNC: 11.2 G/DL (ref 12–15.9)
HGB BLDA-MCNC: 10.9 G/DL (ref 14–18)
INHALED O2 CONCENTRATION: 50 %
IPAP: 0
MAGNESIUM SERPL-MCNC: 2 MG/DL (ref 1.6–2.4)
MCH RBC QN AUTO: 25.8 PG (ref 26.6–33)
MCHC RBC AUTO-ENTMCNC: 29.9 G/DL (ref 31.5–35.7)
MCV RBC AUTO: 86.4 FL (ref 79–97)
METHGB BLD QL: 0.3 % (ref 0–1.5)
MODALITY: ABNORMAL
NOTE: ABNORMAL
OXYHGB MFR BLDV: 94.7 % (ref 94–99)
PAW @ PEAK INSP FLOW SETTING VENT: 0 CMH2O
PCO2 BLDA: 48.3 MM HG (ref 35–45)
PCO2 TEMP ADJ BLD: 48.3 MM HG (ref 35–45)
PH BLDA: 7.46 PH UNITS (ref 7.35–7.45)
PH, TEMP CORRECTED: 7.46 PH UNITS
PHOSPHATE SERPL-MCNC: 3 MG/DL (ref 2.5–4.5)
PLATELET # BLD AUTO: 197 10*3/MM3 (ref 140–450)
PMV BLD AUTO: 12.3 FL (ref 6–12)
PO2 BLDA: 79.1 MM HG (ref 83–108)
PO2 TEMP ADJ BLD: 79.1 MM HG (ref 83–108)
POTASSIUM SERPL-SCNC: 4 MMOL/L (ref 3.5–5.2)
RBC # BLD AUTO: 4.34 10*6/MM3 (ref 3.77–5.28)
SODIUM SERPL-SCNC: 142 MMOL/L (ref 136–145)
TOTAL RATE: 0 BREATHS/MINUTE
WBC NRBC COR # BLD: 8.3 10*3/MM3 (ref 3.4–10.8)

## 2022-01-19 PROCEDURE — 80048 BASIC METABOLIC PNL TOTAL CA: CPT | Performed by: INTERNAL MEDICINE

## 2022-01-19 PROCEDURE — 97110 THERAPEUTIC EXERCISES: CPT

## 2022-01-19 PROCEDURE — 94660 CPAP INITIATION&MGMT: CPT

## 2022-01-19 PROCEDURE — 97535 SELF CARE MNGMENT TRAINING: CPT

## 2022-01-19 PROCEDURE — 94761 N-INVAS EAR/PLS OXIMETRY MLT: CPT

## 2022-01-19 PROCEDURE — 36600 WITHDRAWAL OF ARTERIAL BLOOD: CPT

## 2022-01-19 PROCEDURE — 25010000002 METHYLPREDNISOLONE PER 40 MG: Performed by: INTERNAL MEDICINE

## 2022-01-19 PROCEDURE — 82330 ASSAY OF CALCIUM: CPT | Performed by: INTERNAL MEDICINE

## 2022-01-19 PROCEDURE — 94799 UNLISTED PULMONARY SVC/PX: CPT

## 2022-01-19 PROCEDURE — 99233 SBSQ HOSP IP/OBS HIGH 50: CPT | Performed by: INTERNAL MEDICINE

## 2022-01-19 PROCEDURE — 85027 COMPLETE CBC AUTOMATED: CPT | Performed by: INTERNAL MEDICINE

## 2022-01-19 PROCEDURE — 99232 SBSQ HOSP IP/OBS MODERATE 35: CPT | Performed by: INTERNAL MEDICINE

## 2022-01-19 PROCEDURE — 83735 ASSAY OF MAGNESIUM: CPT | Performed by: INTERNAL MEDICINE

## 2022-01-19 PROCEDURE — 82375 ASSAY CARBOXYHB QUANT: CPT

## 2022-01-19 PROCEDURE — 25010000002 PIPERACILLIN SOD-TAZOBACTAM PER 1 G: Performed by: INTERNAL MEDICINE

## 2022-01-19 PROCEDURE — 94760 N-INVAS EAR/PLS OXIMETRY 1: CPT

## 2022-01-19 PROCEDURE — 25010000002 PIPERACILLIN SOD-TAZOBACTAM PER 1 G

## 2022-01-19 PROCEDURE — 84100 ASSAY OF PHOSPHORUS: CPT | Performed by: INTERNAL MEDICINE

## 2022-01-19 PROCEDURE — 82805 BLOOD GASES W/O2 SATURATION: CPT

## 2022-01-19 PROCEDURE — 83050 HGB METHEMOGLOBIN QUAN: CPT

## 2022-01-19 RX ORDER — DOXYCYCLINE 100 MG/1
100 CAPSULE ORAL EVERY 12 HOURS SCHEDULED
Status: COMPLETED | OUTPATIENT
Start: 2022-01-19 | End: 2022-01-22

## 2022-01-19 RX ADMIN — DICYCLOMINE HYDROCHLORIDE 10 MG: 10 CAPSULE ORAL at 22:05

## 2022-01-19 RX ADMIN — SODIUM CHLORIDE, PRESERVATIVE FREE 10 ML: 5 INJECTION INTRAVENOUS at 22:08

## 2022-01-19 RX ADMIN — GUAIFENESIN 1200 MG: 600 TABLET, EXTENDED RELEASE ORAL at 08:45

## 2022-01-19 RX ADMIN — IPRATROPIUM BROMIDE AND ALBUTEROL SULFATE 3 ML: .5; 2.5 SOLUTION RESPIRATORY (INHALATION) at 07:39

## 2022-01-19 RX ADMIN — FERROUS SULFATE TAB 325 MG (65 MG ELEMENTAL FE) 325 MG: 325 (65 FE) TAB at 08:45

## 2022-01-19 RX ADMIN — IPRATROPIUM BROMIDE AND ALBUTEROL SULFATE 3 ML: .5; 2.5 SOLUTION RESPIRATORY (INHALATION) at 12:37

## 2022-01-19 RX ADMIN — IPRATROPIUM BROMIDE AND ALBUTEROL SULFATE 3 ML: .5; 2.5 SOLUTION RESPIRATORY (INHALATION) at 18:28

## 2022-01-19 RX ADMIN — RIVAROXABAN 15 MG: 15 TABLET, FILM COATED ORAL at 15:55

## 2022-01-19 RX ADMIN — TAZOBACTAM SODIUM AND PIPERACILLIN SODIUM 4.5 G: 500; 4 INJECTION, SOLUTION INTRAVENOUS at 08:46

## 2022-01-19 RX ADMIN — CETIRIZINE HYDROCHLORIDE 5 MG: 10 TABLET, FILM COATED ORAL at 08:45

## 2022-01-19 RX ADMIN — ALLOPURINOL 300 MG: 300 TABLET ORAL at 08:45

## 2022-01-19 RX ADMIN — AMLODIPINE BESYLATE 10 MG: 10 TABLET ORAL at 08:46

## 2022-01-19 RX ADMIN — FLUOXETINE HYDROCHLORIDE 40 MG: 20 CAPSULE ORAL at 08:45

## 2022-01-19 RX ADMIN — DOXEPIN HYDROCHLORIDE 40 MG: 10 CAPSULE ORAL at 22:05

## 2022-01-19 RX ADMIN — TAZOBACTAM SODIUM AND PIPERACILLIN SODIUM 4.5 G: 500; 4 INJECTION, SOLUTION INTRAVENOUS at 15:54

## 2022-01-19 RX ADMIN — LOSARTAN POTASSIUM 50 MG: 50 TABLET, FILM COATED ORAL at 08:45

## 2022-01-19 RX ADMIN — FLECAINIDE ACETATE 50 MG: 50 TABLET ORAL at 22:04

## 2022-01-19 RX ADMIN — GUAIFENESIN 1200 MG: 600 TABLET, EXTENDED RELEASE ORAL at 22:04

## 2022-01-19 RX ADMIN — METHYLPREDNISOLONE SODIUM SUCCINATE 40 MG: 40 INJECTION, POWDER, FOR SOLUTION INTRAMUSCULAR; INTRAVENOUS at 08:46

## 2022-01-19 RX ADMIN — ATENOLOL 100 MG: 50 TABLET ORAL at 08:46

## 2022-01-19 RX ADMIN — DOXYCYCLINE 100 MG: 100 CAPSULE ORAL at 08:46

## 2022-01-19 RX ADMIN — METHYLPREDNISOLONE SODIUM SUCCINATE 40 MG: 40 INJECTION, POWDER, FOR SOLUTION INTRAMUSCULAR; INTRAVENOUS at 22:04

## 2022-01-19 RX ADMIN — DOXYCYCLINE 100 MG: 100 CAPSULE ORAL at 22:04

## 2022-01-19 RX ADMIN — PANTOPRAZOLE SODIUM 40 MG: 40 TABLET, DELAYED RELEASE ORAL at 05:29

## 2022-01-19 NOTE — PROGRESS NOTES
[facility] Medicine Services  PROGRESS NOTE    Patient Name: Liz Borjas  : 1950  MRN: [id number]    Date of Admission: 2022  Primary Care Physician: Valerie Sesay APRN    Subjective   Subjective     CC:  Shortness of breath    HPI:  Diarrhea slowed down after imodium.  Feels ok, feels like breathing ok. Son at bedside.      ROS:  Gen- No fevers, chills  CV- No chest pain, palpitations  Resp- +cough, +dyspnea  GI- + diarrhea better.          Objective   Objective     Vital Signs:   Temp:  [97.5 °F (36.4 °C)-97.9 °F (36.6 °C)] 97.9 °F (36.6 °C)  Heart Rate:  [55-74] 71  Resp:  [18-33] 20  BP: (112-134)/(63-78) 128/64  Flow (L/min):  [40-50] 50     Physical Exam:  General: In mild to moderate respiratory distress, not in any acute distress, appears stated age, conversant and cooperative, son at bedside today  Head: Atraumatic and normocephalic, without obvious abnormality  Eyes:   Conjunctivae and sclerae normal, no Icterus. No pallor  Ears:  Ears appear intact with no abnormalities noted  Throat: No oral lesions, no thrush, oral mucosa moist  Neck: Supple, trachea midline, no thyromegaly  Back:   No kyphoscoliosis present. No tenderness to palpation,   no sacral edema  Lungs: , decreased breath sounds bilateral inferior 2/3, on 50L HFNC   Heart:  Normal S1 and S2, no murmur, no gallop, No JVD, no lower extremity swelling  Abdomen:  Soft, no tenderness, no organomegaly, normal bowel sounds  Normal bowel sounds, no masses, no organomegaly. Soft, nontender, nondistended, no guarding, no rebound tenderness.  Extremities: No gross abnormalities, no clubbing, pulses palpable and equal bilaterally  Skin: No bleeding, bruising or rash, normal skin turgor and elasticity  Neurologic: Cranial nerves appear intact with no evidence of facial asymmetry, normal motor and sensory functions in all 4 extremities  Psych: Alert and oriented x 3, normal mood    Results Reviewed:  LAB  RESULTS:      Lab 01/18/22  0623 01/17/22  0557 01/16/22  0609 01/15/22  0701 01/14/22  1313   WBC 6.94 7.51 8.90 6.34 12.81*   HEMOGLOBIN 10.3* 11.6* 10.1* 9.8* 11.4*   HEMATOCRIT 34.9 39.3 33.8* 32.7* 37.9   PLATELETS 184 231 216 175 218   NEUTROS ABS  --  6.99 8.21* 5.72 11.90*   IMMATURE GRANS (ABS)  --  0.03 0.03 0.03 0.07*   LYMPHS ABS  --  0.26* 0.31* 0.32* 0.18*   MONOS ABS  --  0.22 0.35 0.27 0.48   EOS ABS  --  0.00 0.00 0.00 0.15   MCV 83.9 85.4 84.3 84.7 84.0   CRP  --   --  4.29* 8.16* 6.60*   PROCALCITONIN  --  0.07 0.09 0.18 0.15   LACTATE  --   --   --   --  1.9   LDH  --   --   --   --  322*         Lab 01/18/22  0623 01/17/22  0557 01/16/22  0609 01/15/22  0701 01/14/22  1313   SODIUM 143 141 141 139 139   POTASSIUM 3.6 4.4 3.7 3.6 3.5   CHLORIDE 103 101 101 100 98   CO2 33.0* 31.0* 28.0 29.0 28.0   ANION GAP 7.0 9.0 12.0 10.0 13.0   BUN 22 27* 27* 20 13   CREATININE 1.24* 1.37* 1.34* 1.22* 1.15*   GLUCOSE 118* 121* 103* 123* 109*   CALCIUM 8.3* 8.3* 8.0* 7.8* 8.6   MAGNESIUM  --  3.0* 1.8  --   --    PHOSPHORUS  --  4.2 4.7*  --   --    TSH  --   --   --  0.930  --          Lab 01/17/22  0557 01/16/22  0609 01/14/22  1313   TOTAL PROTEIN 5.9* 5.2* 6.5   ALBUMIN 2.40* 2.50* 2.70*   GLOBULIN 3.5 2.7 3.8   ALT (SGPT) 10 7 10   AST (SGOT) 15 11 13   BILIRUBIN 0.3 0.3 0.7   ALK PHOS 171* 154* 200*         Lab 01/14/22  1313   PROBNP 584.0   TROPONIN T <0.010             Lab 01/14/22  1313   FERRITIN 164.80*         Lab 01/19/22  0354 01/18/22  1602 01/14/22  2146   PH, ARTERIAL 7.455* 7.484* 7.457*   PCO2, ARTERIAL 48.3* 43.5 42.8   PO2 ART 79.1* 57.1* 57.0*   FIO2 50 48 40   HCO3 ART 33.9* 32.7* 30.2*   BASE EXCESS ART 8.8* 8.3* 5.7*   CARBOXYHEMOGLOBIN 1.3 1.2 1.5     Brief Urine Lab Results  (Last result in the past 365 days)      Color   Clarity   Blood   Leuk Est   Nitrite   Protein   CREAT   Urine HCG        11/27/21 2334 Yellow   Clear   Negative   Negative   Negative   Trace                  Microbiology Results Abnormal     Procedure Component Value - Date/Time    Blood Culture - Blood, Arm, Right [[id number]]  (Normal) Collected: 01/14/22 1540    Lab Status: Preliminary result Specimen: Blood from Arm, Right Updated: 01/18/22 1600     Blood Culture No growth at 4 days    Blood Culture - Blood, Arm, Left [[id number]]  (Normal) Collected: 01/14/22 1545    Lab Status: Preliminary result Specimen: Blood from Arm, Left Updated: 01/18/22 1600     Blood Culture No growth at 4 days    Respiratory Culture - Sputum, Cough [[id number]] Collected: 01/16/22 0931    Lab Status: Final result Specimen: Sputum from Cough Updated: 01/18/22 1128     Respiratory Culture Light growth (2+) Normal respiratory jeancarlos. No S. aureus or Pseudomonas aeruginosa detected. Final report.     Gram Stain No WBCs per low power field      Rare (1+) Epithelial cells per low power field      Few (2+) Gram negative bacilli      Moderate (3+) Budding yeast    MRSA Screen, PCR (Inpatient) - Swab, Nares [[id number]]  (Normal) Collected: 01/15/22 1920    Lab Status: Final result Specimen: Swab from Nares Updated: 01/15/22 2030     MRSA PCR Negative    Narrative:      MRSA Negative    COVID-19, FLU A/B, RSV PCR - Swab, Nasopharynx [[id number]]  (Normal) Collected: 01/14/22 1313    Lab Status: Final result Specimen: Swab from Nasopharynx Updated: 01/14/22 1402     COVID19 Not Detected     Influenza A PCR Not Detected     Influenza B PCR Not Detected     RSV, PCR Not Detected    Narrative:      Fact sheet for providers: https://www.fda.gov/media/541697/download    Fact sheet for patients: https://www.fda.gov/media/594017/download    Test performed by PCR.          No radiology results from the last 24 hrs        I have reviewed the medications:  Scheduled Meds:allopurinol, 300 mg, Oral, Daily  amLODIPine, 10 mg, Oral, Daily  atenolol, 100 mg, Oral, Daily  cetirizine, 5 mg, Oral, Daily  dicyclomine, 10 mg, Oral, BID  doxepin, 40 mg, Oral,  Nightly  ferrous sulfate, 325 mg, Oral, Daily  flecainide, 50 mg, Oral, BID  FLUoxetine, 40 mg, Oral, Daily  guaiFENesin, 1,200 mg, Oral, Q12H  ipratropium-albuterol, 3 mL, Nebulization, Q6H - RT  losartan, 50 mg, Oral, Daily  methylPREDNISolone sodium succinate, 40 mg, Intravenous, BID  pantoprazole, 40 mg, Oral, Q AM  pharmacy consult - [place], , Does not apply, Daily  piperacillin-tazobactam, 4.5 g, Intravenous, Q8H  rivaroxaban, 15 mg, Oral, Daily With Dinner  sodium chloride, 10 mL, Intravenous, Q12H      Continuous Infusions:   PRN Meds:.•  acetaminophen  •  acetaminophen-codeine  •  albuterol  •  hydrOXYzine  •  ondansetron **OR** ondansetron  •  sodium chloride  •  sodium chloride  •  traMADol    Assessment/Plan   Assessment & Plan     Active Hospital Problems    Diagnosis  POA   • **Acute exacerbation of idiopathic pulmonary fibrosis (HCC) [J84.112]  Yes   • Stage 3a chronic kidney disease ([facility]) [N18.31]  Yes   • Acute and chronic respiratory failure with hypoxia ([facility]) [J96.21]  Yes   • Chronic anticoagulation [Z79.01]  Not Applicable   • HTN (hypertension) [I10]  Yes   • PAF (paroxysmal atrial fibrillation) ([facility]) [I48.0]  Yes   • COPD (chronic obstructive pulmonary disease) ([facility]) [J44.9]  Yes   • Rheumatoid arthritis with positive rheumatoid factor ([facility]) [M05.9]  Yes   • Anxiety and depression [F41.9, F32.A]  Yes      Resolved Hospital Problems   No resolved problems to display.       Brief Hospital Course to date:  Liz Borjas is a 71 y.o. female with past medical history of COPD, chronic hypoxic respiratory failure on home oxygen, pulmonary fibrosis, rheumatoid arthritis, essential hypertension, A. fib, liver cirrhosis, CKD who presented to the hospital with worsening shortness of breath for 1 week after steroid taper started and home dose went down to 10 mg daily. She was found to be in respiratory failure requiring high oxygen supplementation at 80% FiO2 through high flow nasal cannula with concern for  superimposed bacterial pneumonia    Assessment and plan:  Acute on chronic hypoxic respiratory failure  Bilateral upper lobe pneumonia, present on admission  Severe COPD with exacerbation  Severe pulmonary fibrosis  · Continue IV Zosyn and doxycycline  · MRSA swab is negative.    · sputum culture shows few GNR and budding yeast.  Blood cultures no growth at 2 days  · Continue high-dose IV steroids with Solu-Medrol 60 mg 3 times daily  · Continue duo nebs and pulmonary toilet  · Pulmonary consultation (patient follows with pulmonary associates) --> per pulmonary recommendations: Prognosis is very poor, continue IV steroids, discussed goals of care and advanced directives with the patient, Oxygen supplementation and wean off to target oxygen saturation above 88 to 92%  · Goals of care discussed with the patient by pulmonary team.  Partner discussed goals of care and severity of her disease with patient and her son.  She will think about the CODE STATUS and intubation if needed and will let us know over the next few days.  Meanwhile she wishes to continue current treatment plan.  · Speech eval  · Too hypotensive today for lasix, but will stop amlodipine today to try to give room in BP to consider lasix tomorrow.      Rheumatoid arthritis  · Has not been on immunosuppressive therapy in several weeks because of recurrent pneumonia  · Has been missing her appointment with dermatology service at  because of her recurrent hospitalization.      Paroxysmal atrial fibrillation  Hypertension  · Continue flecainide, atenolol, amlodipine, losartan.  Hypotension today, held atenolol earlier as son states that has not been taking.  Stop amlodipine.   · Continue Xarelto     CKD stage III    IBS  --diarrhea has with her IBS at home.  WBC wnl.  S/p 1 dose imodium 1/18.  improved         DVT prophylaxis:  Medical DVT prophylaxis orders are present.  xarelto      AM-PAC 6 Clicks Score (PT): 14 (01/19/22 5063)    Disposition: I expect  the patient to be discharged to be determined pending on her clinical response and decreased oxygen requirement to reasonable level     D/w son at bedside again on 1/19/22    CODE STATUS:   Code Status and Medical Interventions:   Ordered at: 01/14/22 1651     Level Of Support Discussed With:    Patient     Code Status (Patient has no pulse and is not breathing):    CPR (Attempt to Resuscitate)     Medical Interventions (Patient has pulse or is breathing):    Full Support       Edmar Morales MD  01/19/22

## 2022-01-19 NOTE — PLAN OF CARE
Goal Outcome Evaluation:  Plan of Care Reviewed With: patient      VSS. HFNC at 50 L and 50%. On BiPAP around 3 AM. Multiple bowel movements. No complaints.      Put on room air as per order until O2 reached 88% and it took about 1 minute and 30 seconds. It is charted.    Progress: no change     Problem: Adult Inpatient Plan of Care  Goal: Plan of Care Review  Outcome: Ongoing, Progressing  Flowsheets  Taken 1/19/2022 0354 by Aylin Bella RN  Progress: no change  Taken 1/17/2022 1310 by Liana Mcneal PT  Plan of Care Reviewed With: patient  Goal: Patient-Specific Goal (Individualized)  Outcome: Ongoing, Progressing  Goal: Absence of Hospital-Acquired Illness or Injury  Outcome: Ongoing, Progressing  Intervention: Identify and Manage Fall Risk  Recent Flowsheet Documentation  Taken 1/19/2022 0200 by Aylin Bella RN  Safety Promotion/Fall Prevention:  • activity supervised  • assistive device/personal items within reach  • clutter free environment maintained  • room organization consistent  • safety round/check completed  Taken 1/19/2022 0000 by Aylin Bella RN  Safety Promotion/Fall Prevention:  • activity supervised  • assistive device/personal items within reach  • clutter free environment maintained  Taken 1/18/2022 2200 by Aylin Bella RN  Safety Promotion/Fall Prevention:  • activity supervised  • assistive device/personal items within reach  • clutter free environment maintained  Taken 1/18/2022 2000 by Aylin Bella, RN  Safety Promotion/Fall Prevention:  • activity supervised  • assistive device/personal items within reach  • clutter free environment maintained  • lighting adjusted  • safety round/check completed  • room organization consistent  Intervention: Prevent Skin Injury  Recent Flowsheet Documentation  Taken 1/19/2022 0200 by Aylin Bella, RN  Body Position: weight shift assistance provided  Skin Protection:  • adhesive use limited  • skin-to-skin areas padded  •  tubing/devices free from skin contact  Taken 1/19/2022 0000 by Aylin Bella RN  Body Position:  • turned  • supine, legs elevated  Skin Protection: adhesive use limited  Taken 1/18/2022 2200 by Aylin Bella RN  Body Position: weight shift assistance provided  Skin Protection: adhesive use limited  Taken 1/18/2022 2000 by Aylin Bella RN  Body Position: position changed independently  Skin Protection: adhesive use limited  Intervention: Prevent Infection  Recent Flowsheet Documentation  Taken 1/19/2022 0000 by Aylin Bella RN  Infection Prevention: rest/sleep promoted  Goal: Optimal Comfort and Wellbeing  Outcome: Ongoing, Progressing  Intervention: Provide Person-Centered Care  Recent Flowsheet Documentation  Taken 1/18/2022 2000 by Aylin Bella RN  Trust Relationship/Rapport:  • choices provided  • care explained  Goal: Readiness for Transition of Care  Outcome: Ongoing, Progressing     Problem: COPD Comorbidity  Goal: Maintenance of COPD Symptom Control  Outcome: Ongoing, Progressing     Problem: Hypertension Comorbidity  Goal: Blood Pressure in Desired Range  Outcome: Ongoing, Progressing     Problem: Skin Injury Risk Increased  Goal: Skin Health and Integrity  Outcome: Ongoing, Progressing  Intervention: Optimize Skin Protection  Recent Flowsheet Documentation  Taken 1/19/2022 0200 by Aylin Bella RN  Pressure Reduction Techniques: weight shift assistance provided  Pressure Reduction Devices: positioning supports utilized  Skin Protection:  • adhesive use limited  • skin-to-skin areas padded  • tubing/devices free from skin contact  Taken 1/19/2022 0000 by Aylin Bella RN  Pressure Reduction Techniques: weight shift assistance provided  Pressure Reduction Devices: positioning supports utilized  Skin Protection: adhesive use limited  Taken 1/18/2022 2200 by Aylin Bella RN  Pressure Reduction Techniques: weight shift assistance provided  Pressure Reduction Devices:  positioning supports utilized  Skin Protection: adhesive use limited  Taken 1/18/2022 2000 by Aylin Bella RN  Pressure Reduction Techniques: weight shift assistance provided  Pressure Reduction Devices: positioning supports utilized  Skin Protection: adhesive use limited     Problem: Adjustment to Illness COPD (Chronic Obstructive Pulmonary Disease)  Goal: Optimal Chronic Illness Coping  Outcome: Ongoing, Progressing  Intervention: Support and Optimize Psychosocial Response  Recent Flowsheet Documentation  Taken 1/18/2022 2000 by Aylin Bella RN  Family/Support System Care: support provided     Problem: Functional Ability Impaired COPD (Chronic Obstructive Pulmonary Disease)  Goal: Optimal Level of Functional [place]  Outcome: Ongoing, Progressing  Intervention: Optimize Functional Ability  Recent Flowsheet Documentation  Taken 1/19/2022 0200 by Aylin Bella RN  Activity Management:  • activity adjusted per tolerance  • activity encouraged  Taken 1/19/2022 0000 by Aylin Bella RN  Activity Management:  • activity adjusted per tolerance  • activity encouraged  Taken 1/18/2022 2200 by Aylin Bella RN  Activity Management:  • activity adjusted per tolerance  • activity encouraged  Taken 1/18/2022 2000 by Aylin Bella RN  Activity Management:  • activity adjusted per tolerance  • activity encouraged     Problem: Infection COPD (Chronic Obstructive Pulmonary Disease)  Goal: Absence of Infection Signs and Symptoms  Outcome: Ongoing, Progressing     Problem: Oral Intake Inadequate COPD (Chronic Obstructive Pulmonary Disease)  Goal: Improved Nutrition Intake  Outcome: Ongoing, Progressing     Problem: Respiratory Compromise COPD (Chronic Obstructive Pulmonary Disease)  Goal: Effective Oxygenation and Ventilation  Outcome: Ongoing, Progressing  Intervention: Promote Airway Secretion Clearance  Recent Flowsheet Documentation  Taken 1/19/2022 0200 by Aylin Bella RN  Activity  Management:  • activity adjusted per tolerance  • activity encouraged  Taken 1/19/2022 0000 by Aylin Bella, RN  Activity Management:  • activity adjusted per tolerance  • activity encouraged  Taken 1/18/2022 2200 by Aylin Bella, RN  Activity Management:  • activity adjusted per tolerance  • activity encouraged  Cough And Deep Breathing: done independently per patient  Taken 1/18/2022 2000 by Aylin Bella, RN  Activity Management:  • activity adjusted per tolerance  • activity encouraged

## 2022-01-19 NOTE — THERAPY TREATMENT NOTE
Patient Name: Liz Borjas  : 1950    MRN: [id number]                              Today's Date: 2022       Admit Date: 2022    Visit Dx:     ICD-10-CM ICD-9-CM   1. Acute and chronic respiratory failure with hypoxia (HCC)  J96.21 518.84     799.02   2. 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)   • Anxiety and depression   • COPD (chronic obstructive pulmonary disease) (HCC)   • Mild obesity   • Physical deconditioning   • Sepsis (HCC)   • Leukocytosis   • Lactic acidosis   • Hyponatremia   • Acute respiratory failure with hypoxia (HCC)   • Acute and chronic respiratory failure with hypoxia (HCC)   • Elevated d-dimer   • Immunocompromised (HCC)   • PAF (paroxysmal atrial fibrillation) (HCC)   • Chronic anticoagulation   • HTN (hypertension)   • Interstitial lung disease (HCC)   • Stage 3a chronic kidney disease (HCC)   • Acute exacerbation of idiopathic pulmonary fibrosis (HCC)     Past Medical History:   Diagnosis Date   • Arthritis    • Atrial fibrillation (HCC)    • Closed right hip fracture (HCC)    • GERD (gastroesophageal reflux disease)    • Gout    • Hyperlipidemia    • Hypertension    • Osteoporosis    • Rheumatoid arthritis (HCC)    • Wears dentures     upper   • Wears glasses     reading     Past Surgical History:   Procedure Laterality Date   • ANKLE SURGERY Right    • CHOLECYSTECTOMY     • COLONOSCOPY      5 years ago   • HIP FRACTURE SURGERY      Right Hip with Pins   • LUMBAR DISCECTOMY Left 10/12/2016    Procedure: lumbar MICROdiscectomy LEFT L3-5;  Surgeon: Chris Son MD;  Location: [facility];  Service:    • REPLACEMENT TOTAL KNEE      Right Knee   • TOE  AMPUTATION      right baby toe, left second toe      General Information     Row Name 01/19/22 1510          OT Time and Intention    Document Type therapy note (daily note)  -     Mode of Treatment occupational therapy  -     Row Name 01/19/22 1510          General Information    Patient Profile Reviewed yes  -     Prior Level of Function independent:; all household mobility; transfer; ADL's  -     Existing Precautions/Restrictions fall; oxygen therapy device and L/min  -     Barriers to Rehab medically complex  -     Row Name 01/19/22 1510          Cognition    Orientation Status (Cognition) oriented x 4  -     Row Name 01/19/22 1510          Safety Issues, Functional Mobility    Safety Issues Affecting Function (Mobility) insight into deficits/self-awareness; safety precaution awareness; safety precautions follow-through/compliance  -     Impairments Affecting Function (Mobility) balance; endurance/activity tolerance; shortness of breath; strength  -           User Key  (r) = Recorded By, (t) = Taken By, (c) = Cosigned By    Initials Name Provider Type     Camelia Gibson OT Occupational Therapist                 Mobility/ADL's     Row Name 01/19/22 1518          Bed Mobility    Comment (Bed Mobility) Deferred, Pt. declined OOB activity secondary to fatigue/SOA  -     Row Name 01/19/22 1518          Transfers    Comment (Transfers) Deferred  -[facility] Name 01/19/22 1518          Activities of Daily Living    BADL Assessment/Intervention grooming  -[facility] Name 01/19/22 1518          Grooming Assessment/Training    [place] Level (Grooming) wash face, hands; hair care, combing/brushing; verbal cues; set up  -     Position (Grooming) edge of bed sitting  -     Comment (Grooming) Increased time to perform grooming tasks.  -           User Key  (r) = Recorded By, (t) = Taken By, (c) = Cosigned By    Initials Name Provider Type    Camelia Mcdowell OT Occupational Therapist                Obj/Interventions     [facility] Name 01/19/22 1526          Elbow/Forearm (Therapeutic Exercise)    Elbow/Forearm (Therapeutic Exercise) AROM (active range of motion)  -     Elbow/Forearm AROM (Therapeutic Exercise) bilateral; flexion; extension; 10 repetitions  -[facility] Name 01/19/22 1526          Therapeutic Exercise    Therapeutic Exercise elbow/forearm  -           User Key  (r) = Recorded By, (t) = Taken By, (c) = Cosigned By    Initials Name Provider Type     Camelia Gibson OT Occupational Therapist               Goals/Plan    No documentation.                Clinical Impression     [facility] Name 01/19/22 1528          Pain Assessment    Additional Documentation Pain Scale: Numbers Pre/Post-Treatment (Group)  -[facility] Name 01/19/22 1528          Pain Scale: Numbers Pre/Post-Treatment    Pretreatment Pain Rating 0/10 - no pain  -     Posttreatment Pain Rating 0/10 - no pain  -     Pain Intervention(s) Repositioned; Ambulation/increased activity  -[facility] Name 01/19/22 1528          Plan of Care Review    Plan of Care Reviewed With patient  -     Progress no change  -     Outcome Summary Pt. completed UE therex to increase activity tolerance needed for ADLs. Performed grooming tasks with SUA and increased time. Will continue to monitor progress.  -[facility] Name 01/19/22 1528          Vital Signs    Pre Systolic BP Rehab --  VSS  -     Pre SpO2 (%) 93  -     O2 Delivery Pre Treatment hi-flow  -LC     Post SpO2 (%) 91  -     O2 Delivery Post Treatment hi-flow  -LC     Pre Patient Position Supine  -     Intra Patient Position Supine  -     Post Patient Position Supine  -[facility] Name 01/19/22 1528          Positioning and Restraints    Pre-Treatment Position in bed  -     Post Treatment Position bed  -     In Bed notified nsg; fowlers; call light within reach; encouraged to call for assist  -           User Key  (r) = Recorded By, (t) = Taken By, (c) = Cosigned By     Initials Name Provider Type    Camelia Mcdowell OT Occupational Therapist               Outcome Measures     Row Name 01/19/22 1533          How much help from another is currently needed...    Putting on and taking off regular lower body clothing? 3  -LC     Bathing (including washing, rinsing, and drying) 3  -LC     Toileting (which includes using toilet bed pan or urinal) 3  -LC     Putting on and taking off regular upper body clothing 3  -LC     Taking care of personal grooming (such as brushing teeth) 4  -LC     Eating meals 4  -LC     AM-PAC 6 Clicks Score (OT) 20  -LC     Row Name 01/19/22 1010 01/19/22 0710       How much help from another person do you currently need...    Turning from your back to your side while in flat bed without using bedrails? 3  -AM 3  -AM    Moving from lying on back to sitting on the side of a flat bed without bedrails? 3  -AM 3  -AM    Moving to and from a bed to a chair (including a wheelchair)? 2  -AM 2  -AM    Standing up from a chair using your arms (e.g., wheelchair, bedside chair)? 2  -AM 2  -AM    Climbing 3-5 steps with a railing? 2  -AM 2  -AM    To walk in hospital room? 2  -AM 2  -AM    AM-PAC 6 Clicks Score (PT) 14  -AM 14  -AM    Row Name 01/19/22 1533          Functional Assessment    Outcome Measure Options AM-PAC 6 Clicks Daily Activity (OT)  -           User Key  (r) = Recorded By, (t) = Taken By, (c) = Cosigned By    Initials Name Provider Type    Carlos Mckee RN Registered Nurse    Camelia Mcdowell OT Occupational Therapist                Occupational Therapy Education                 Title: PT OT SLP Therapies (In Progress)     Topic: Occupational Therapy (In Progress)     Point: ADL training (Done)     Description:   Instruct learner(s) on proper safety adaptation and remediation techniques during self care or transfers.   Instruct in proper use of assistive devices.              Learning Progress Summary           Patient Acceptance, E, VU by ALMAZ at  1/15/2022 1154   Family Acceptance, E, VU by  at 1/15/2022 1154                   Point: Home exercise program (Not Started)     Description:   Instruct learner(s) on appropriate technique for monitoring, assisting and/or progressing therapeutic exercises/activities.              Learner Progress:  Not documented in this visit.          Point: Precautions (Done)     Description:   Instruct learner(s) on prescribed precautions during self-care and functional transfers.              Learning Progress Summary           Patient Acceptance, E, VU by  at 1/15/2022 1154   Family Acceptance, E, VU by  at 1/15/2022 1154                   Point: Body mechanics (Not Started)     Description:   Instruct learner(s) on proper positioning and spine alignment during self-care, functional mobility activities and/or exercises.              Learner Progress:  Not documented in this visit.                      User Key     Initials Effective Dates Name Provider Type Damon     06/16/21 -  Zenaida Figueroa OT Occupational Therapist OT              OT Recommendation and Plan     Plan of Care Review  Plan of Care Reviewed With: patient  Progress: no change  Outcome Summary: Pt. completed UE therex to increase activity tolerance needed for ADLs. Performed grooming tasks with SUA and increased time. Will continue to monitor progress.     Time Calculation:    Time Calculation- OT     Row Name 01/19/22 1534             Time Calculation- OT    OT Start Time 1339  -      OT Received On 01/19/22  -      OT Goal Re-Cert Due Date 01/25/22  -              Timed Charges    55063 - OT Self Care/Mgmt Minutes 15  -LC              Total Minutes    Timed Charges Total Minutes 15  -LC       Total Minutes 15  -LC            User Key  (r) = Recorded By, (t) = Taken By, (c) = Cosigned By    Initials Name Provider Type    Camelia Mcdowell OT Occupational Therapist              Therapy Charges for Today     Code Description Service Date Service  Provider Modifiers Qty    [id number] HC OT SELF CARE/MGMT/TRAIN EA 15 MIN 1/19/2022 Camelia Gibson, OT GO 1               Camelia Gibson OT  1/19/2022

## 2022-01-19 NOTE — PROGRESS NOTES
Intensive Care Follow-up     Hospital:  LOS: 5 days   Ms. Liz Borjas, 71 y.o. female is followed for:   Acute exacerbation of idiopathic pulmonary fibrosis (HCC)            History of present illness:   Liz Borjas is a 71 y.o. female who presents to [facility] on 1/16/22 with a one-week history of increasing dyspnea at rest and on exertion, cough, weakness, and inability to tolerate PO intake. She has a history of COPD, pulmonary fibrosis, RA (on prednisone and Orencia), PAF on Xarelto, chronic respiratory failure on 2L NC at home, HTN, recent COVID-19 infection, and remote tobacco use.      She is followed by [facility] for chronic interstitial lung disease with pulmonary fibrosis. On her last visit on 1/11/22, she was started on doxycycline and her steroids were increased; however, her symptoms worsened and she was admitted to [facility] on 11/27/21 and treated for PNA with ABX and steroids. She has tapered her steroid dose to 10 mg daily and, since then, her home oxygen requirement went from 2L to 6-7L NC consistently.      She was admitted to the hospitalist service on 1/14/22 with initial labs showing WBC 12.8, lactate 1.9, PCT 0.15, CRP 6.6. Negative COVID-19 swab. CXR demonstrates  extensive fibrosis with superimposed opacities, edema, or PNA in both upper lobes.      Subjective   Interval History:  Patient is stable this morning.  Currently back on high flow nasal cannula.  But states that her breathing is overall stable.  She has no acute complaints.  She denies any chest pain, nausea, fever, or chills             The patient's past medical, surgical and social history were reviewed and updated in Epic as appropriate.       Objective     Infusions:     Medications:  allopurinol, 300 mg, Oral, Daily  atenolol, 100 mg, Oral, Daily  cetirizine, 5 mg, Oral, Daily  dicyclomine, 10 mg, Oral, BID  doxepin, 40 mg, Oral, Nightly  doxycycline, 100 mg, Oral, Q12H  ferrous sulfate, 325 mg, Oral,  Daily  flecainide, 50 mg, Oral, BID  FLUoxetine, 40 mg, Oral, Daily  guaiFENesin, 1,200 mg, Oral, Q12H  ipratropium-albuterol, 3 mL, Nebulization, Q6H - RT  losartan, 50 mg, Oral, Daily  methylPREDNISolone sodium succinate, 40 mg, Intravenous, BID  pantoprazole, 40 mg, Oral, Q AM  pharmacy consult - [place], , Does not apply, Daily  piperacillin-tazobactam, 4.5 g, Intravenous, Q8H  rivaroxaban, 15 mg, Oral, Daily With Dinner  sodium chloride, 10 mL, Intravenous, Q12H      I reviewed the patient's medications.    Vital Sign Min/Max for last 24 hours  Temp  Min: 97.5 °F (36.4 °C)  Max: 98.1 °F (36.7 °C)   BP  Min: 94/44  Max: 128/64   Pulse  Min: 55  Max: 71   Resp  Min: 20  Max: 33   SpO2  Min: 88 %  Max: 100 %   Flow (L/min)  Min: 50  Max: 50       Input/Output for last 24 hour shift  01/18 0701 - 01/19 0700  In: 890 [P.O.:890]  Out: 750 [Urine:750]      GENERAL : NAD, conversant  RESPIRATORY/THORAX : normal respiratory effort and no intercostal retractions, fine crackles bilaterally  CARDIOVASCULAR : Normal S1/S2, RRR.  No lower ext edema.  GASTROINTESTINAL : Soft, NT/ND. BS x 4 normoactive. No hepatosplenomegaly.  MUSCULOSKELETAL : No cyanosis, clubbing, or ischemia  NEUROLOGICAL: alert and oriented to person, place and time  PSYCHOLOGICAL : Appropriate affect    Results from last 7 days   Lab Units 01/19/22  0806 01/18/22  0623 01/17/22  0557   WBC 10*3/mm3 8.30 6.94 7.51   HEMOGLOBIN g/dL 11.2* 10.3* 11.6*   PLATELETS 10*3/mm3 197 184 231     Results from last 7 days   Lab Units 01/19/22  0806 01/18/22  0623 01/17/22  0557 01/16/22  0609 01/16/22  0609   SODIUM mmol/L 142 143 141   < > 141   POTASSIUM mmol/L 4.0 3.6 4.4   < > 3.7   CO2 mmol/L 31.0* 33.0* 31.0*   < > 28.0   BUN mg/dL 16 22 27*   < > 27*   CREATININE mg/dL 0.99 1.24* 1.37*   < > 1.34*   MAGNESIUM mg/dL 2.0  --  3.0*  --  1.8   PHOSPHORUS mg/dL 3.0  --  4.2  --  4.7*   GLUCOSE mg/dL 95 118* 121*   < > 103*    < > = values in this interval not  displayed.     Estimated Creatinine Clearance: 46.1 mL/min (by C-G formula based on SCr of 0.99 mg/dL).    Results from last 7 days   Lab Units 01/19/22  0354   PH, ARTERIAL pH units 7.455*   PCO2, ARTERIAL mm Hg 48.3*   PO2 ART mm Hg 79.1*       I reviewed the patient's new clinical results.  I reviewed the patient's new imaging results/reports including actual images and agree with reports.         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  Who presented to [facility] with an acute exacerbation of her IPF with acute on chronic hypoxic respiratory failure.     -Continue on high flow nasal cannula wean to saturation greater than 88-92%  -Continue Solu-Medrol with Zosyn and doxycycline  -Aggressive pulmonary toilet, recommend Mucinex, flutter valve and incentive spirometer use  -Mobilize patient  -Pulmonary will continue to follow    Plan of care and goals reviewed with multidisciplinary/antibiotic stewardship team during rounds.   I discussed the patient's findings and my recommendations with patient and nursing staff       Clara Daniels,   Pulmonary, Critical care and Sleep Medicine

## 2022-01-19 NOTE — THERAPY TREATMENT NOTE
Patient Name: Liz Borjas  : 1950    MRN: [id number]                              Today's Date: 2022       Admit Date: 2022    Visit Dx:     ICD-10-CM ICD-9-CM   1. Acute and chronic respiratory failure with hypoxia (HCC)  J96.21 518.84     799.02   2. 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)   • Anxiety and depression   • COPD (chronic obstructive pulmonary disease) (HCC)   • Mild obesity   • Physical deconditioning   • Sepsis (HCC)   • Leukocytosis   • Lactic acidosis   • Hyponatremia   • Acute respiratory failure with hypoxia (HCC)   • Acute and chronic respiratory failure with hypoxia (HCC)   • Elevated d-dimer   • Immunocompromised (HCC)   • PAF (paroxysmal atrial fibrillation) (HCC)   • Chronic anticoagulation   • HTN (hypertension)   • Interstitial lung disease (HCC)   • Stage 3a chronic kidney disease (HCC)   • Acute exacerbation of idiopathic pulmonary fibrosis (HCC)     Past Medical History:   Diagnosis Date   • Arthritis    • Atrial fibrillation (HCC)    • Closed right hip fracture (HCC)    • GERD (gastroesophageal reflux disease)    • Gout    • Hyperlipidemia    • Hypertension    • Osteoporosis    • Rheumatoid arthritis (HCC)    • Wears dentures     upper   • Wears glasses     reading     Past Surgical History:   Procedure Laterality Date   • ANKLE SURGERY Right    • CHOLECYSTECTOMY     • COLONOSCOPY      5 years ago   • HIP FRACTURE SURGERY      Right Hip with Pins   • LUMBAR DISCECTOMY Left 10/12/2016    Procedure: lumbar MICROdiscectomy LEFT L3-5;  Surgeon: Chris Son MD;  Location: [facility];  Service:    • REPLACEMENT TOTAL KNEE      Right Knee   • TOE  AMPUTATION      right baby toe, left second toe      General Information     Row Name 01/19/22 1637          Physical Therapy Time and Intention    Document Type therapy note (daily note)  -DM     Mode of Treatment physical therapy  -DM     Row Name 01/19/22 1637          General Information    Existing Precautions/Restrictions fall; oxygen therapy device and L/min; other (see comments)  A/D; pulm fibrosis  -DM           User Key  (r) = Recorded By, (t) = Taken By, (c) = Cosigned By    Initials Name Provider Type    DM Eufemia Machado, PT Physical Therapist               Mobility     Row Name 01/19/22 1637          Bed Mobility    Comment (Bed Mobility) decl. d/t fatigue/SOB  -DM     Row Name 01/19/22 1637          Transfers    Comment (Transfers) Decl.  -DM     Row Name 01/19/22 1637          Gait/Stairs (Locomotion)    [place] (Gait) unable to assess  -DM     Comment (Gait/Stairs) decl.  -DM           User Key  (r) = Recorded By, (t) = Taken By, (c) = Cosigned By    Initials Name Provider Type    DM Eufemia Machado, PT Physical Therapist               Obj/Interventions     Row Name 01/19/22 1637          Motor Skills    Therapeutic Exercise shoulder; hip; knee; ankle  issued HEP & instructed  -DM     Row Name 01/19/22 1637          Shoulder (Therapeutic Exercise)    Shoulder AROM (Therapeutic Exercise) bilateral; 10 repetitions; supine  biceps curls  -DM     Shoulder AAROM (Therapeutic Exercise) bilateral; flexion; extension; aBduction; aDduction; horizontal aBduction/aDduction; supine; 10 repetitions  -DM     Row Name 01/19/22 1637          Hip (Therapeutic Exercise)    Hip (Therapeutic Exercise) AROM (active range of motion); AAROM (active assistive range of motion); isometric exercises  -DM     Hip AROM (Therapeutic Exercise) bilateral; aDduction  -DM     Hip AAROM (Therapeutic Exercise) bilateral; extension; flexion; aBduction; external rotation; internal rotation; supine; 10 repetitions  -DM      Hip Isometrics (Therapeutic Exercise) bilateral; gluteal sets; 10 repetitions; supine  -DM     Row Name 01/19/22 1637          Knee (Therapeutic Exercise)    Knee (Therapeutic Exercise) AROM (active range of motion); AAROM (active assistive range of motion); isometric exercises  -DM     Knee AROM (Therapeutic Exercise) bilateral; flexion; extension; SAQ (short arc quad); heel slides; supine; 10 repetitions  -DM     Knee Isometrics (Therapeutic Exercise) bilateral; hamstring sets; quad sets; supine; 10 repetitions  -DM     Row Name 01/19/22 1637          Ankle (Therapeutic Exercise)    Ankle (Therapeutic Exercise) AROM (active range of motion); AAROM (active assistive range of motion)  -DM     Ankle AROM (Therapeutic Exercise) bilateral; dorsiflexion; plantarflexion; supine; 10 repetitions  -DM     Ankle AAROM (Therapeutic Exercise) bilateral; 10 repetitions; supine  AC  -DM           User Key  (r) = Recorded By, (t) = Taken By, (c) = Cosigned By    Initials Name Provider Type    DM Eufemia Machado, PT Physical Therapist               Goals/Plan    No documentation.                Clinical Impression     Row Name 01/19/22 1637          Pain    Additional Documentation Pain Scale: Numbers Pre/Post-Treatment (Group)  -DM     Row Name 01/19/22 1637          Pain Scale: Numbers Pre/Post-Treatment    Pretreatment Pain Rating 0/10 - no pain  -DM     Posttreatment Pain Rating 0/10 - no pain  -DM     Row Name 01/19/22 1637          Plan of Care Review    Plan of Care Reviewed With patient  -DM     Progress no change  -DM     Outcome Summary Decl. mobility d/t fatigue & incr SOB, but issued HEP & instructed/performed sup. ther exer all extrem. Noted o2 sat 94% on HFNC.  -DM     Row Name 01/19/22 1637          Vital Signs    Pre Systolic BP Rehab 94  -DM     Pre Treatment Diastolic BP 44  -DM     Pretreatment Heart Rate (beats/min) 59  -DM     Intratreatment Heart Rate (beats/min) 63  -DM     Posttreatment Heart Rate  (beats/min) 60  -DM     Pre SpO2 (%) 94  -DM     O2 Delivery Pre Treatment hi-flow  -DM     Intra SpO2 (%) 90  -DM     O2 Delivery Intra Treatment hi-flow  -DM     Post SpO2 (%) 93  -DM     O2 Delivery Post Treatment hi-flow  -DM     Pre Patient Position Supine  -DM     Intra Patient Position Supine  -DM     Post Patient Position Supine  -DM     Row Name 01/19/22 1637          Positioning and Restraints    Pre-Treatment Position in bed  -DM     Post Treatment Position bed  -DM     In Bed notified nsg; fowlers; call light within reach; encouraged to call for assist; heels elevated  -DM           User Key  (r) = Recorded By, (t) = Taken By, (c) = Cosigned By    Initials Name Provider Type    DM Eufemia Machado, PT Physical Therapist               Outcome Measures     Row Name 01/19/22 1637 01/19/22 1430       How much help from another person do you currently need...    Turning from your back to your side while in flat bed without using bedrails? 3  -DM 3  -AM    Moving from lying on back to sitting on the side of a flat bed without bedrails? 3  -DM 3  -AM    Moving to and from a bed to a chair (including a wheelchair)? 2  -DM 2  -AM    Standing up from a chair using your arms (e.g., wheelchair, bedside chair)? 2  -DM 2  -AM    Climbing 3-5 steps with a railing? 1  -DM 2  -AM    To walk in hospital room? 2  -DM 2  -AM    AM-PAC 6 Clicks Score (PT) 13  -DM 14  -AM    Row Name 01/19/22 1010 01/19/22 0710       How much help from another person do you currently need...    Turning from your back to your side while in flat bed without using bedrails? 3  -AM 3  -AM    Moving from lying on back to sitting on the side of a flat bed without bedrails? 3  -AM 3  -AM    Moving to and from a bed to a chair (including a wheelchair)? 2  -AM 2  -AM    Standing up from a chair using your arms (e.g., wheelchair, bedside chair)? 2  -AM 2  -AM    Climbing 3-5 steps with a railing? 2  -AM 2  -AM    To walk in hospital room? 2  -AM 2  -AM     AM-PAC 6 Clicks Score (PT) 14  -AM 14  -AM    Row Name 01/19/22 1637 01/19/22 1533       Functional Assessment    Outcome Measure Options AM-PAC 6 Clicks Basic Mobility (PT)  -DM AM-PAC 6 Clicks Daily Activity (OT)  -          User Key  (r) = Recorded By, (t) = Taken By, (c) = Cosigned By    Initials Name Provider Type    DM Eufemia Machado, PT Physical Therapist    AM Carlos Ponce, RN Registered Nurse    Camelia Mcdowell, OT Occupational Therapist                             Physical Therapy Education                 Title: PT OT SLP Therapies (In Progress)     Topic: Physical Therapy (In Progress)     Point: Mobility training (In Progress)     Learning Progress Summary           Patient Acceptance, E,D,H, NR by DM at 1/19/2022 1652    Acceptance, E, VU,DU by  at 1/15/2022 1152                   Point: Home exercise program (In Progress)     Learning Progress Summary           Patient Acceptance, E,D,H, NR by DM at 1/19/2022 1652                   Point: Precautions (In Progress)     Learning Progress Summary           Patient Acceptance, E,D,H, NR by DM at 1/19/2022 1652    Acceptance, E, VU,DU by  at 1/15/2022 1152                               User Key     Initials Effective Dates Name Provider Type Discipline     06/16/21 -  Eufemia Machado, PT Physical Therapist PT    KATIANA 06/16/21 -  Liana Mcneal PT Physical Therapist PT              PT Recommendation and Plan     Plan of Care Reviewed With: patient  Progress: no change  Outcome Summary: Decl. mobility d/t fatigue & incr SOB, but issued HEP & instructed/performed sup. ther exer all extrem. Noted o2 sat 94% on HFNC.     Time Calculation:    PT Charges     Row Name 01/19/22 1652             Time Calculation    Start Time 1637  -DM      PT Received On 01/19/22  -DM      PT Goal Re-Cert Due Date 01/25/22  -DM              Time Calculation- PT    Total Timed Code Minutes- PT 8 minute(s)  -DM              Timed Charges    48037 - PT Therapeutic  Exercise Minutes 8  -DM              Total Minutes    Timed Charges Total Minutes 8  -DM       Total Minutes 8  -DM            User Key  (r) = Recorded By, (t) = Taken By, (c) = Cosigned By    Initials Name Provider Type    DM Eufemia Machado, PT Physical Therapist              Therapy Charges for Today     Code Description Service Date Service Provider Modifiers Qty    [id number]  PT THER PROC EA 15 MIN 1/19/2022 Eufemia Machado PT GP 1          PT G-Codes  Outcome Measure Options: AM-PAC 6 Clicks Basic Mobility (PT)  AM-PAC 6 Clicks Score (PT): 13  AM-PAC 6 Clicks Score (OT): 20    Eufemia Machado, PT  1/19/2022

## 2022-01-19 NOTE — CASE MANAGEMENT/SOCIAL WORK
Continued Stay Note  [facility]     Patient Name: Liz Borjas  MRN: [id number]  Today's Date: 1/19/2022    Admit Date: 1/14/2022     Discharge Plan     Row Name 01/19/22 1233       Plan    Plan Home with Home Health    Patient/Family in Agreement with Plan yes    Plan Comments I have met with Ms. Borjas and her son at the bedside to discuss the discharge plan. She states that she is planning to return home at discharge and will resume her home health services with Nurse On Call.  I have contacted [facility] regarding her request for a Rollator however they state they have recently supplied her with a rolling walker and she would have an option to add a rollator for the cost of 150.00.  CM will cont to follow the plan of care, notify Nurse on call of discharge and assist with any other discharge needs as recommendations become available.    Final Discharge Disposition Code 06 - home with home health care               Discharge Codes    No documentation.               Expected Discharge Date and Time     Expected Discharge Date Expected Discharge Time    Jan 21, 2022             Lina Zuñiga RN

## 2022-01-19 NOTE — PLAN OF CARE
Goal Outcome Evaluation:  Plan of Care Reviewed With: patient        Progress: no change  Outcome Summary: Pt. completed UE therex to increase activity tolerance needed for ADLs. Performed grooming tasks with SUA and increased time. Will continue to monitor progress.

## 2022-01-19 NOTE — PLAN OF CARE
Goal Outcome Evaluation:  Plan of Care Reviewed With: patient        Progress: no change  Outcome Summary: Decl. mobility d/t fatigue & incr SOB, but issued HEP & instructed/performed sup. ther exer all extrem. Noted o2 sat 94% on HFNC.

## 2022-01-19 NOTE — PROGRESS NOTES
NN spoke with pt at BS.  Pt alert and able to answer questions appropriately. Pt O2 sat  93% on 54% HFNC currently, no home O2 use.  Deep breathing exercises encouraged. No new concerns or questions voiced at this time.  NN will continue to follow as needed.         Per current GOLD Standards, please consider: No LAMA/LABA/ICS in place, Outpatient PFT, Rehab as appropriate

## 2022-01-20 LAB
ANION GAP SERPL CALCULATED.3IONS-SCNC: 9 MMOL/L (ref 5–15)
BUN SERPL-MCNC: 17 MG/DL (ref 8–23)
BUN/CREAT SERPL: 20.2 (ref 7–25)
CALCIUM SPEC-SCNC: 8.1 MG/DL (ref 8.6–10.5)
CHLORIDE SERPL-SCNC: 100 MMOL/L (ref 98–107)
CO2 SERPL-SCNC: 28 MMOL/L (ref 22–29)
CREAT SERPL-MCNC: 0.84 MG/DL (ref 0.57–1)
DEPRECATED RDW RBC AUTO: 52.2 FL (ref 37–54)
ERYTHROCYTE [DISTWIDTH] IN BLOOD BY AUTOMATED COUNT: 17.2 % (ref 12.3–15.4)
GFR SERPL CREATININE-BSD FRML MDRD: 67 ML/MIN/1.73
GLUCOSE SERPL-MCNC: 136 MG/DL (ref 65–99)
HCT VFR BLD AUTO: 36.2 % (ref 34–46.6)
HGB BLD-MCNC: 10.9 G/DL (ref 12–15.9)
MCH RBC QN AUTO: 25.1 PG (ref 26.6–33)
MCHC RBC AUTO-ENTMCNC: 30.1 G/DL (ref 31.5–35.7)
MCV RBC AUTO: 83.4 FL (ref 79–97)
PLATELET # BLD AUTO: 178 10*3/MM3 (ref 140–450)
PMV BLD AUTO: 11.6 FL (ref 6–12)
POTASSIUM SERPL-SCNC: 3.6 MMOL/L (ref 3.5–5.2)
RBC # BLD AUTO: 4.34 10*6/MM3 (ref 3.77–5.28)
SODIUM SERPL-SCNC: 137 MMOL/L (ref 136–145)
WBC NRBC COR # BLD: 7.88 10*3/MM3 (ref 3.4–10.8)

## 2022-01-20 PROCEDURE — 99232 SBSQ HOSP IP/OBS MODERATE 35: CPT | Performed by: INTERNAL MEDICINE

## 2022-01-20 PROCEDURE — 25010000002 METHYLPREDNISOLONE PER 40 MG: Performed by: INTERNAL MEDICINE

## 2022-01-20 PROCEDURE — 80048 BASIC METABOLIC PNL TOTAL CA: CPT | Performed by: INTERNAL MEDICINE

## 2022-01-20 PROCEDURE — 94799 UNLISTED PULMONARY SVC/PX: CPT

## 2022-01-20 PROCEDURE — 25010000002 PIPERACILLIN SOD-TAZOBACTAM PER 1 G: Performed by: INTERNAL MEDICINE

## 2022-01-20 PROCEDURE — 85027 COMPLETE CBC AUTOMATED: CPT | Performed by: INTERNAL MEDICINE

## 2022-01-20 PROCEDURE — 25010000002 FUROSEMIDE PER 20 MG: Performed by: INTERNAL MEDICINE

## 2022-01-20 PROCEDURE — 99233 SBSQ HOSP IP/OBS HIGH 50: CPT | Performed by: INTERNAL MEDICINE

## 2022-01-20 RX ORDER — FUROSEMIDE 10 MG/ML
20 INJECTION INTRAMUSCULAR; INTRAVENOUS ONCE
Status: COMPLETED | OUTPATIENT
Start: 2022-01-20 | End: 2022-01-20

## 2022-01-20 RX ADMIN — ALLOPURINOL 300 MG: 300 TABLET ORAL at 08:59

## 2022-01-20 RX ADMIN — TAZOBACTAM SODIUM AND PIPERACILLIN SODIUM 4.5 G: 500; 4 INJECTION, SOLUTION INTRAVENOUS at 08:57

## 2022-01-20 RX ADMIN — METHYLPREDNISOLONE SODIUM SUCCINATE 40 MG: 40 INJECTION, POWDER, FOR SOLUTION INTRAMUSCULAR; INTRAVENOUS at 19:32

## 2022-01-20 RX ADMIN — IPRATROPIUM BROMIDE AND ALBUTEROL SULFATE 3 ML: .5; 2.5 SOLUTION RESPIRATORY (INHALATION) at 18:35

## 2022-01-20 RX ADMIN — LOSARTAN POTASSIUM 50 MG: 50 TABLET, FILM COATED ORAL at 08:58

## 2022-01-20 RX ADMIN — FERROUS SULFATE TAB 325 MG (65 MG ELEMENTAL FE) 325 MG: 325 (65 FE) TAB at 08:59

## 2022-01-20 RX ADMIN — PANTOPRAZOLE SODIUM 40 MG: 40 TABLET, DELAYED RELEASE ORAL at 04:56

## 2022-01-20 RX ADMIN — TAZOBACTAM SODIUM AND PIPERACILLIN SODIUM 4.5 G: 500; 4 INJECTION, SOLUTION INTRAVENOUS at 00:03

## 2022-01-20 RX ADMIN — FLECAINIDE ACETATE 50 MG: 50 TABLET ORAL at 08:58

## 2022-01-20 RX ADMIN — GUAIFENESIN 1200 MG: 600 TABLET, EXTENDED RELEASE ORAL at 08:59

## 2022-01-20 RX ADMIN — METHYLPREDNISOLONE SODIUM SUCCINATE 40 MG: 40 INJECTION, POWDER, FOR SOLUTION INTRAMUSCULAR; INTRAVENOUS at 08:57

## 2022-01-20 RX ADMIN — RIVAROXABAN 15 MG: 15 TABLET, FILM COATED ORAL at 18:42

## 2022-01-20 RX ADMIN — FUROSEMIDE 20 MG: 10 INJECTION INTRAMUSCULAR; INTRAVENOUS at 19:51

## 2022-01-20 RX ADMIN — DICYCLOMINE HYDROCHLORIDE 10 MG: 10 CAPSULE ORAL at 19:32

## 2022-01-20 RX ADMIN — DICYCLOMINE HYDROCHLORIDE 10 MG: 10 CAPSULE ORAL at 08:59

## 2022-01-20 RX ADMIN — GUAIFENESIN 1200 MG: 600 TABLET, EXTENDED RELEASE ORAL at 19:31

## 2022-01-20 RX ADMIN — DOXYCYCLINE 100 MG: 100 CAPSULE ORAL at 08:58

## 2022-01-20 RX ADMIN — FLECAINIDE ACETATE 50 MG: 50 TABLET ORAL at 19:30

## 2022-01-20 RX ADMIN — TAZOBACTAM SODIUM AND PIPERACILLIN SODIUM 4.5 G: 500; 4 INJECTION, SOLUTION INTRAVENOUS at 15:44

## 2022-01-20 RX ADMIN — DOXEPIN HYDROCHLORIDE 40 MG: 10 CAPSULE ORAL at 19:52

## 2022-01-20 RX ADMIN — CETIRIZINE HYDROCHLORIDE 5 MG: 10 TABLET, FILM COATED ORAL at 08:59

## 2022-01-20 RX ADMIN — FLUOXETINE HYDROCHLORIDE 40 MG: 20 CAPSULE ORAL at 08:58

## 2022-01-20 RX ADMIN — HYDROXYZINE HYDROCHLORIDE 50 MG: 25 TABLET ORAL at 19:51

## 2022-01-20 RX ADMIN — SODIUM CHLORIDE, PRESERVATIVE FREE 10 ML: 5 INJECTION INTRAVENOUS at 19:53

## 2022-01-20 RX ADMIN — DOXYCYCLINE 100 MG: 100 CAPSULE ORAL at 19:31

## 2022-01-20 RX ADMIN — ATENOLOL 100 MG: 50 TABLET ORAL at 09:03

## 2022-01-20 NOTE — PROGRESS NOTES
Intensive Care Follow-up     Hospital:  LOS: 6 days   Ms. Liz Borjas, 71 y.o. female is followed for:   Acute exacerbation of idiopathic pulmonary fibrosis (HCC)            History of present illness:   Liz Borjas is a 71 y.o. female who presents to [facility] on 1/16/22 with a one-week history of increasing dyspnea at rest and on exertion, cough, weakness, and inability to tolerate PO intake. She has a history of COPD, pulmonary fibrosis, RA (on prednisone and Orencia), PAF on Xarelto, chronic respiratory failure on 2L NC at home, HTN, recent COVID-19 infection, and remote tobacco use.      She is followed by [facility] for chronic interstitial lung disease with pulmonary fibrosis. On her last visit on 1/11/22, she was started on doxycycline and her steroids were increased; however, her symptoms worsened and she was admitted to [facility] on 11/27/21 and treated for PNA with ABX and steroids. She has tapered her steroid dose to 10 mg daily and, since then, her home oxygen requirement went from 2L to 6-7L NC consistently.      She was admitted to the hospitalist service on 1/14/22 with initial labs showing WBC 12.8, lactate 1.9, PCT 0.15, CRP 6.6. Negative COVID-19 swab. CXR demonstrates  extensive fibrosis with superimposed opacities, edema, or PNA in both upper lobes.      Subjective   Interval History:  Patient is stable this morning.  Denies any chest pain, nausea, fever, chills.  She also feels that her breathing is almost at baseline for her.  But her oxygen continues to be quite poor.  In more conversations with her appears that she spends most of her time on roughly 6 L nasal cannula even though she has documented times a minute 2 L of cannula.  She does understand that this is a progressive disease and that ultimately at some point she will not recover from 1 of these exacerbations, but she is not ready to change her CODE STATUS as of yet.  No other acute issues.             The  patient's past medical, surgical and social history were reviewed and updated in Epic as appropriate.       Objective     Infusions:     Medications:  allopurinol, 300 mg, Oral, Daily  atenolol, 100 mg, Oral, Daily  cetirizine, 5 mg, Oral, Daily  dicyclomine, 10 mg, Oral, BID  doxepin, 40 mg, Oral, Nightly  doxycycline, 100 mg, Oral, Q12H  ferrous sulfate, 325 mg, Oral, Daily  flecainide, 50 mg, Oral, BID  FLUoxetine, 40 mg, Oral, Daily  guaiFENesin, 1,200 mg, Oral, Q12H  ipratropium-albuterol, 3 mL, Nebulization, Q6H - RT  losartan, 50 mg, Oral, Daily  methylPREDNISolone sodium succinate, 40 mg, Intravenous, BID  pantoprazole, 40 mg, Oral, Q AM  pharmacy consult - MTM, , Does not apply, Daily  piperacillin-tazobactam, 4.5 g, Intravenous, Q8H  rivaroxaban, 15 mg, Oral, Daily With Dinner  sodium chloride, 10 mL, Intravenous, Q12H      I reviewed the patient's medications.    Vital Sign Min/Max for last 24 hours  Temp  Min: 97.4 °F (36.3 °C)  Max: 97.8 °F (36.6 °C)   BP  Min: 104/84  Max: 133/78   Pulse  Min: 51  Max: 63   Resp  Min: 18  Max: 26   SpO2  Min: 88 %  Max: 96 %   Flow (L/min)  Min: 50  Max: 55       Input/Output for last 24 hour shift  01/19 0701 - 01/20 0700  In: 700 [P.O.:600]  Out: 350 [Urine:350]      GENERAL : NAD, conversant  RESPIRATORY/THORAX : normal respiratory effort and no intercostal retractions, fine crackles bilaterally  CARDIOVASCULAR : Normal S1/S2, RRR.  No lower ext edema.  GASTROINTESTINAL : Soft, NT/ND. BS x 4 normoactive. No hepatosplenomegaly.  MUSCULOSKELETAL : No cyanosis, clubbing, or ischemia  NEUROLOGICAL: alert and oriented to person, place and time  PSYCHOLOGICAL : Appropriate affect    Results from last 7 days   Lab Units 01/20/22  0433 01/19/22  0806 01/18/22  0623   WBC 10*3/mm3 7.88 8.30 6.94   HEMOGLOBIN g/dL 10.9* 11.2* 10.3*   PLATELETS 10*3/mm3 178 197 184     Results from last 7 days   Lab Units 01/20/22  0433 01/19/22  0806 01/18/22  0623 01/17/22  0557  01/17/22  0557 01/16/22  0609 01/16/22  0609   SODIUM mmol/L 137 142 143   < > 141   < > 141   POTASSIUM mmol/L 3.6 4.0 3.6   < > 4.4   < > 3.7   CO2 mmol/L 28.0 31.0* 33.0*   < > 31.0*   < > 28.0   BUN mg/dL 17 16 22   < > 27*   < > 27*   CREATININE mg/dL 0.84 0.99 1.24*   < > 1.37*   < > 1.34*   MAGNESIUM mg/dL  --  2.0  --   --  3.0*  --  1.8   PHOSPHORUS mg/dL  --  3.0  --   --  4.2  --  4.7*   GLUCOSE mg/dL 136* 95 118*   < > 121*   < > 103*    < > = values in this interval not displayed.     Estimated Creatinine Clearance: 54.4 mL/min (by C-G formula based on SCr of 0.84 mg/dL).    Results from last 7 days   Lab Units 01/19/22  0354   PH, ARTERIAL pH units 7.455*   PCO2, ARTERIAL mm Hg 48.3*   PO2 ART mm Hg 79.1*       I reviewed the patient's new clinical results.  I reviewed the patient's new imaging results/reports including actual images and agree with reports.         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  Who presented to [facility] with an acute exacerbation of her IPF with acute on chronic hypoxic respiratory failure.     -Continue on supplemental oxygen, I did place her on 6 L nasal cannula today, wean to saturation greater than 88-92%  -Continue Solu-Medrol with Zosyn and doxycycline  -Aggressive pulmonary toilet, recommend Mucinex, flutter valve and  incentive spirometer use  -Mobilize patient  -I plan to continue to work on the patient's goals of care ultimately this is a progressive disease with her underlying pulm fibrosis and that it will ultimately be fatal.  Hopefully we can at least get her to a DNR/DNI at minimum prior to discharge from the hospital.  -Pulmonary will continue to follow    Plan of care and goals reviewed with multidisciplinary/antibiotic stewardship team during rounds.   I discussed the patient's findings and my recommendations with patient and nursing staff       Clara Daniels,   Pulmonary, Critical care and Sleep Medicine

## 2022-01-20 NOTE — PLAN OF CARE
VSS. 55ml 02, 50%. No complaints of pain.     Problem: Adult Inpatient Plan of Care  Goal: Plan of Care Review  Outcome: Ongoing, Progressing  Goal: Patient-Specific Goal (Individualized)  Outcome: Ongoing, Progressing  Goal: Absence of Hospital-Acquired Illness or Injury  Outcome: Ongoing, Progressing  Intervention: Identify and Manage Fall Risk  Recent Flowsheet Documentation  Taken 1/20/2022 0200 by Kory Mascorro, RN  Safety Promotion/Fall Prevention:   activity supervised   assistive device/personal items within reach   clutter free environment maintained   fall prevention program maintained   lighting adjusted   nonskid shoes/slippers when out of bed   room organization consistent   safety round/check completed  Taken 1/20/2022 0000 by Kory Mascorro, RN  Safety Promotion/Fall Prevention:   activity supervised   assistive device/personal items within reach   clutter free environment maintained   fall prevention program maintained   lighting adjusted   nonskid shoes/slippers when out of bed   room organization consistent   safety round/check completed  Taken 1/19/2022 2200 by Kory Mascorro, RN  Safety Promotion/Fall Prevention:   activity supervised   assistive device/personal items within reach   clutter free environment maintained   fall prevention program maintained   lighting adjusted   nonskid shoes/slippers when out of bed   room organization consistent   safety round/check completed  Taken 1/19/2022 2000 by Kory Mascorro, RN  Safety Promotion/Fall Prevention:   activity supervised   assistive device/personal items within reach   clutter free environment maintained   fall prevention program maintained   lighting adjusted   nonskid shoes/slippers when out of bed   room organization consistent   safety round/check completed  Intervention: Prevent Skin Injury  Recent Flowsheet Documentation  Taken 1/20/2022 0200 by Kory Mascorro, RN  Body Position: weight shift assistance  provided  Taken 1/20/2022 0000 by Kory Mascorro, RN  Body Position: weight shift assistance provided  Skin Protection:   adhesive use limited   incontinence pads utilized   transparent dressing maintained  Taken 1/19/2022 2300 by Kory Mascorro, RN  Skin Protection:   adhesive use limited   incontinence pads utilized   transparent dressing maintained  Taken 1/19/2022 2200 by Kory Mascorro, RN  Body Position: weight shift assistance provided  Skin Protection:   adhesive use limited   incontinence pads utilized   transparent dressing maintained  Taken 1/19/2022 2000 by Kory Mascorro RN  Body Position: weight shift assistance provided  Skin Protection:   adhesive use limited   incontinence pads utilized   transparent dressing maintained  Goal: Optimal Comfort and Wellbeing  Outcome: Ongoing, Progressing  Intervention: Provide Person-Centered Care  Recent Flowsheet Documentation  Taken 1/19/2022 2000 by Kory Mascorro RN  Trust Relationship/Rapport:   care explained   choices provided   emotional support provided  Goal: Readiness for Transition of Care  Outcome: Ongoing, Progressing     Problem: COPD Comorbidity  Goal: Maintenance of COPD Symptom Control  Outcome: Ongoing, Progressing  Intervention: Maintain COPD-Symptom Control  Recent Flowsheet Documentation  Taken 1/20/2022 0200 by Kory Mascorro, RN  Medication Review/Management: medications reviewed  Taken 1/19/2022 2000 by Kory Mascorro, RN  Medication Review/Management: medications reviewed     Problem: Hypertension Comorbidity  Goal: Blood Pressure in Desired Range  Outcome: Ongoing, Progressing  Intervention: Maintain Hypertension-Management Strategies  Recent Flowsheet Documentation  Taken 1/20/2022 0200 by Kory Mascorro, RN  Medication Review/Management: medications reviewed  Taken 1/19/2022 2000 by Kory Mascorro, RN  Medication Review/Management: medications reviewed     Problem: Skin Injury Risk  Increased  Goal: Skin Health and Integrity  Outcome: Ongoing, Progressing  Intervention: Optimize Skin Protection  Recent Flowsheet Documentation  Taken 1/20/2022 0200 by Kory Mascorro, RN  Head of Bed ([facility]): [facility] elevated  Taken 1/20/2022 0000 by Kory Mascorro, RN  Pressure Reduction Techniques: weight shift assistance provided  Head of Bed ([facility]): [facility] elevated  Pressure Reduction Devices: specialty bed utilized  Skin Protection:   adhesive use limited   incontinence pads utilized   transparent dressing maintained  Taken 1/19/2022 2300 by Kory Mascorro, RN  Pressure Reduction Techniques: weight shift assistance provided  Pressure Reduction Devices: specialty bed utilized  Skin Protection:   adhesive use limited   incontinence pads utilized   transparent dressing maintained  Taken 1/19/2022 2200 by Kory Mascorro, RN  Pressure Reduction Techniques: weight shift assistance provided  Head of Bed ([facility]): [facility] elevated  Pressure Reduction Devices: positioning supports utilized  Skin Protection:   adhesive use limited   incontinence pads utilized   transparent dressing maintained  Taken 1/19/2022 2000 by Kory Mascorro, RN  Pressure Reduction Techniques: weight shift assistance provided  Head of Bed ([facility]): [facility] elevated  Pressure Reduction Devices: (waffle mattres) other (see comments)  Skin Protection:   adhesive use limited   incontinence pads utilized   transparent dressing maintained     Problem: Adjustment to Illness COPD (Chronic Obstructive Pulmonary Disease)  Goal: Optimal Chronic Illness Coping  Outcome: Ongoing, Progressing     Problem: Functional Ability Impaired COPD (Chronic Obstructive Pulmonary Disease)  Goal: Optimal Level of Functional [place]  Outcome: Ongoing, Progressing  Intervention: Optimize Functional Ability  Recent Flowsheet Documentation  Taken 1/20/2022 0200 by Kory Mascorro, RN  Activity Management: activity adjusted per tolerance  Taken 1/20/2022 0000  by Kory Mascorro, RN  Activity Management: activity adjusted per tolerance  Taken 1/19/2022 2200 by Kory Mascorro, RN  Activity Management: activity adjusted per tolerance  Taken 1/19/2022 2000 by Kory Mascorro, RN  Activity Management: activity adjusted per tolerance     Problem: Infection COPD (Chronic Obstructive Pulmonary Disease)  Goal: Absence of Infection Signs and Symptoms  Outcome: Ongoing, Progressing     Problem: Oral Intake Inadequate COPD (Chronic Obstructive Pulmonary Disease)  Goal: Improved Nutrition Intake  Outcome: Ongoing, Progressing     Problem: Respiratory Compromise COPD (Chronic Obstructive Pulmonary Disease)  Goal: Effective Oxygenation and Ventilation  Outcome: Ongoing, Progressing  Intervention: Promote Airway Secretion Clearance  Recent Flowsheet Documentation  Taken 1/20/2022 0200 by Kory Mascorro, RN  Activity Management: activity adjusted per tolerance  Taken 1/20/2022 0000 by Kory Mascorro, RN  Activity Management: activity adjusted per tolerance  Taken 1/19/2022 2200 by Kory Mascorro, RN  Activity Management: activity adjusted per tolerance  Taken 1/19/2022 2000 by Kory Mascorro, RN  Activity Management: activity adjusted per tolerance  Cough And Deep Breathing: done independently per patient  Intervention: Optimize Oxygenation and Ventilation  Recent Flowsheet Documentation  Taken 1/20/2022 0200 by Kory Mascorro, RN  Head of Bed (HOB): HOB elevated  Taken 1/20/2022 0000 by Kory Mascorro, RN  Head of Bed (HOB): HOB elevated  Taken 1/19/2022 2200 by Kory Mascorro, RN  Head of Bed (HOB): HOB elevated  Taken 1/19/2022 2000 by Kory Mascorro, RN  Head of Bed (HOB): HOB elevated   Goal Outcome Evaluation:

## 2022-01-20 NOTE — PROGRESS NOTES
[facility] Medicine Services  PROGRESS NOTE    Patient Name: Liz Borjas  : 1950  MRN: [id number]    Date of Admission: 2022  Primary Care Physician: Valerie Sesay APRN    Subjective   Subjective     CC:  Shortness of breath    HPI:  Feels better.  Reports no further diarrhea.    Pulm switched her off of HFNC to 6LNC but seems tenuous as was 83-84% on 6LNC when I walked in room.  Says she feels good and thinks her sats are normally this low but nurse states if she moves she drops easily.      ROS:  Gen- No fevers, chills  CV- No chest pain, palpitations  Resp- +cough, +dyspnea  GI- no n/v/diarrhea         Objective   Objective     Vital Signs:   Temp:  [97.4 °F (36.3 °C)-98.1 °F (36.7 °C)] 97.4 °F (36.3 °C)  Heart Rate:  [51-63] 57  Resp:  [20-26] 24  BP: ()/(44-84) 104/84  Flow (L/min):  [50-55] 55     Physical Exam:  General: In mild to moderate respiratory distress, not in any acute distress, appears stated age, conversant and cooperative, color looks better today  Head: Atraumatic and normocephalic, without obvious abnormality  Eyes:   Conjunctivae and sclerae normal, no Icterus. No pallor  Ears:  Ears appear intact with no abnormalities noted  Throat: No oral lesions, no thrush, oral mucosa moist  Neck: Supple, trachea midline, no thyromegaly  Back:   No kyphoscoliosis present. No tenderness to palpation,   no sacral edema  Lungs: decreased breath sounds bilateral inferior 2/3, sats 83-84% on 6LNC  Heart:  Normal S1 and S2, no murmur, no gallop, No JVD, no lower extremity swelling  Abdomen:  Soft, no tenderness, no organomegaly, normal bowel sounds  Normal bowel sounds, no masses, no organomegaly. Soft, nontender, nondistended, no guarding, no rebound tenderness.  Extremities: No gross abnormalities, no clubbing, pulses palpable and equal bilaterally  Skin: No bleeding, bruising or rash, normal skin turgor and elasticity  Neurologic: Cranial nerves appear  intact with no evidence of facial asymmetry, normal motor and sensory functions in all 4 extremities  Psych: Alert and oriented x 3, normal mood    Results Reviewed:  LAB RESULTS:      Lab 01/20/22 0433 01/19/22  0806 01/18/22  0623 01/17/22  0557 01/16/22  0609 01/15/22  0701 01/15/22  0701 01/14/22  1313 01/14/22  1313   WBC 7.88 8.30 6.94 7.51 8.90   < > 6.34   < > 12.81*   HEMOGLOBIN 10.9* 11.2* 10.3* 11.6* 10.1*   < > 9.8*   < > 11.4*   HEMATOCRIT 36.2 37.5 34.9 39.3 33.8*   < > 32.7*   < > 37.9   PLATELETS 178 197 184 231 216   < > 175   < > 218   NEUTROS ABS  --   --   --  6.99 8.21*  --  5.72  --  11.90*   IMMATURE GRANS (ABS)  --   --   --  0.03 0.03  --  0.03  --  0.07*   LYMPHS ABS  --   --   --  0.26* 0.31*  --  0.32*  --  0.18*   MONOS ABS  --   --   --  0.22 0.35  --  0.27  --  0.48   EOS ABS  --   --   --  0.00 0.00  --  0.00  --  0.15   MCV 83.4 86.4 83.9 85.4 84.3   < > 84.7   < > 84.0   CRP  --   --   --   --  4.29*  --  8.16*  --  6.60*   PROCALCITONIN  --   --   --  0.07 0.09  --  0.18  --  0.15   LACTATE  --   --   --   --   --   --   --   --  1.9   LDH  --   --   --   --   --   --   --   --  322*    < > = values in this interval not displayed.         Lab 01/20/22 0433 01/19/22  0806 01/18/22 0623 01/17/22  0557 01/16/22  0609 01/15/22  0701 01/15/22  0701   SODIUM 137 142 143 141 141   < > 139   POTASSIUM 3.6 4.0 3.6 4.4 3.7   < > 3.6   CHLORIDE 100 102 103 101 101   < > 100   CO2 28.0 31.0* 33.0* 31.0* 28.0   < > 29.0   ANION GAP 9.0 9.0 7.0 9.0 12.0   < > 10.0   BUN 17 16 22 27* 27*   < > 20   CREATININE 0.84 0.99 1.24* 1.37* 1.34*   < > 1.22*   GLUCOSE 136* 95 118* 121* 103*   < > 123*   CALCIUM 8.1* 8.4* 8.3* 8.3* 8.0*   < > 7.8*   IONIZED CALCIUM  --  1.20  --   --   --   --   --    MAGNESIUM  --  2.0  --  3.0* 1.8  --   --    PHOSPHORUS  --  3.0  --  4.2 4.7*  --   --    TSH  --   --   --   --   --   --  0.930    < > = values in this interval not displayed.         Lab 01/17/22  0557  01/16/22  0609 01/14/22  1313   TOTAL PROTEIN 5.9* 5.2* 6.5   ALBUMIN 2.40* 2.50* 2.70*   GLOBULIN 3.5 2.7 3.8   ALT (SGPT) 10 7 10   AST (SGOT) 15 11 13   BILIRUBIN 0.3 0.3 0.7   ALK PHOS 171* 154* 200*         Lab 01/14/22  1313   PROBNP 584.0   TROPONIN T <0.010             Lab 01/14/22  1313   FERRITIN 164.80*         Lab 01/19/22  0354 01/18/22  1602 01/14/22  2146   PH, ARTERIAL 7.455* 7.484* 7.457*   PCO2, ARTERIAL 48.3* 43.5 42.8   PO2 ART 79.1* 57.1* 57.0*   FIO2 50 48 40   HCO3 ART 33.9* 32.7* 30.2*   BASE EXCESS ART 8.8* 8.3* 5.7*   CARBOXYHEMOGLOBIN 1.3 1.2 1.5     Brief Urine Lab Results  (Last result in the past 365 days)      Color   Clarity   Blood   Leuk Est   Nitrite   Protein   CREAT   Urine HCG        11/27/21 2334 Yellow   Clear   Negative   Negative   Negative   Trace                 Microbiology Results Abnormal     Procedure Component Value - Date/Time    Blood Culture - Blood, Arm, Left [[id number]]  (Normal) Collected: 01/14/22 1545    Lab Status: Final result Specimen: Blood from Arm, Left Updated: 01/19/22 1600     Blood Culture No growth at 5 days    Blood Culture - Blood, Arm, Right [[id number]]  (Normal) Collected: 01/14/22 1540    Lab Status: Final result Specimen: Blood from Arm, Right Updated: 01/19/22 1600     Blood Culture No growth at 5 days    Respiratory Culture - Sputum, Cough [[id number]] Collected: 01/16/22 0931    Lab Status: Final result Specimen: Sputum from Cough Updated: 01/18/22 1128     Respiratory Culture Light growth (2+) Normal respiratory jeancarlos. No S. aureus or Pseudomonas aeruginosa detected. Final report.     Gram Stain No WBCs per low power field      Rare (1+) Epithelial cells per low power field      Few (2+) Gram negative bacilli      Moderate (3+) Budding yeast    MRSA Screen, PCR (Inpatient) - Swab, Nares [[id number]]  (Normal) Collected: 01/15/22 1920    Lab Status: Final result Specimen: Swab from Nares Updated: 01/15/22 2030     MRSA PCR Negative     Narrative:      MRSA Negative    COVID-19, FLU A/B, RSV PCR - Swab, Nasopharynx [[id number]]  (Normal) Collected: 01/14/22 1313    Lab Status: Final result Specimen: Swab from Nasopharynx Updated: 01/14/22 1402     COVID19 Not Detected     Influenza A PCR Not Detected     Influenza B PCR Not Detected     RSV, PCR Not Detected    Narrative:      Fact sheet for providers: https://www.fda.gov/media/335945/download    Fact sheet for patients: https://www.fda.gov/media/450514/download    Test performed by PCR.          No radiology results from the last 24 hrs        I have reviewed the medications:  Scheduled Meds:allopurinol, 300 mg, Oral, Daily  atenolol, 100 mg, Oral, Daily  cetirizine, 5 mg, Oral, Daily  dicyclomine, 10 mg, Oral, BID  doxepin, 40 mg, Oral, Nightly  doxycycline, 100 mg, Oral, Q12H  ferrous sulfate, 325 mg, Oral, Daily  flecainide, 50 mg, Oral, BID  FLUoxetine, 40 mg, Oral, Daily  guaiFENesin, 1,200 mg, Oral, Q12H  ipratropium-albuterol, 3 mL, Nebulization, Q6H - RT  losartan, 50 mg, Oral, Daily  methylPREDNISolone sodium succinate, 40 mg, Intravenous, BID  pantoprazole, 40 mg, Oral, Q AM  pharmacy consult - MTM, , Does not apply, Daily  piperacillin-tazobactam, 4.5 g, Intravenous, Q8H  rivaroxaban, 15 mg, Oral, Daily With Dinner  sodium chloride, 10 mL, Intravenous, Q12H      Continuous Infusions:   PRN Meds:.•  acetaminophen  •  acetaminophen-codeine  •  albuterol  •  hydrOXYzine  •  ondansetron **OR** ondansetron  •  sodium chloride  •  sodium chloride  •  traMADol    Assessment/Plan   Assessment & Plan     Active Hospital Problems    Diagnosis  POA   • **Acute exacerbation of idiopathic pulmonary fibrosis (HCC) [J84.112]  Yes   • Stage 3a chronic kidney disease (HCC) [N18.31]  Yes   • Acute and chronic respiratory failure with hypoxia (HCC) [J96.21]  Yes   • Chronic anticoagulation [Z79.01]  Not Applicable   • HTN (hypertension) [I10]  Yes   • PAF (paroxysmal atrial fibrillation) (HCC) [I48.0]  Yes    • COPD (chronic obstructive pulmonary disease) ([facility]) [J44.9]  Yes   • Rheumatoid arthritis with positive rheumatoid factor ([facility]) [M05.9]  Yes   • Anxiety and depression [F41.9, F32.A]  Yes      Resolved Hospital Problems   No resolved problems to display.       Brief Hospital Course to date:  Liz Borjas is a 71 y.o. female with past medical history of COPD, chronic hypoxic respiratory failure on home oxygen, pulmonary fibrosis, rheumatoid arthritis, essential hypertension, A. fib, liver cirrhosis, CKD who presented to the hospital with worsening shortness of breath for 1 week after steroid taper started and home dose went down to 10 mg daily. She was found to be in respiratory failure requiring high oxygen supplementation at 80% FiO2 through high flow nasal cannula with concern for superimposed bacterial pneumonia    Assessment and plan:  Acute on chronic hypoxic respiratory failure  Bilateral upper lobe pneumonia, present on admission  Severe COPD with exacerbation  Severe pulmonary fibrosis  · Continue IV Zosyn and doxycycline  · MRSA swab is negative.    · sputum culture shows few GNR and budding yeast.  Blood cultures no growth at 2 days  · Continue high-dose IV steroids with Solu-Medrol 60 mg 3 times daily  · Continue duo nebs and pulmonary toilet  · Pulmonary consultation (patient follows with pulmonary associates) --> per pulmonary recommendations: Prognosis is very poor, continue IV steroids, discussed goals of care and advanced directives with the patient, Oxygen supplementation and wean off to target oxygen saturation above 88 to 92%.  Pulm tried to get her weaned down to NC today but sats are way too low running anywhere from 78% to 84% on 6LNC.  I discussed with nurse to keep sats above 88%.  Respiratory to come back to put back on HFNC  · Goals of care discussed with the patient by pulmonary team.  Partner discussed goals of care and severity of her disease with patient and her son.  Per pulmonary  this is a progressive disease with her underlying pulmonary fibrosis that will eventually be fatal.  She will think about the CODE STATUS and intubation if needed and will let us know over the next few days.  Meanwhile she wishes to continue current treatment plan.  · Speech eval  · Will give lasix 20mg IV once     Rheumatoid arthritis  · Has not been on immunosuppressive therapy in several weeks because of recurrent pneumonia  · Has been missing her appointment with dermatology service at  because of her recurrent hospitalization.      Paroxysmal atrial fibrillation  Hypertension  · Continue flecainide, atenolol, amlodipine, losartan.  Has had some hypotension.  Stopped amlodipine. Will stop losartan as well.    · Continue Xarelto     CKD stage III    IBS  --diarrhea has with her IBS at home.  WBC wnl.  S/p 1 dose imodium 1/18.  improved         DVT prophylaxis:  Medical DVT prophylaxis orders are present.  xarelto      AM-PAC 6 Clicks Score (PT): 13 (01/19/22 2000)    Disposition: I expect the patient to be discharged to be determined pending on her clinical response and decreased oxygen requirement to reasonable level     D/w son at bedside again on 1/19/22    CODE STATUS:   Code Status and Medical Interventions:   Ordered at: 01/14/22 1651     Level Of Support Discussed With:    Patient     Code Status (Patient has no pulse and is not breathing):    CPR (Attempt to Resuscitate)     Medical Interventions (Patient has pulse or is breathing):    Full Support       Edmar Morales MD  01/20/22

## 2022-01-20 NOTE — PROGRESS NOTES
NN spoke with pt at BS.  Pt alert and able to answer questions appropriately. Pt O2 on 6  L currently. Deep breathing exercises encouraged. No new concerns or questions voiced at this time.  NN will continue to follow as needed.       Per current GOLD Standards, please consider: No LAMA/LABA/ICS in place, Outpatient PFT, Rehab as appropriate                Patient has been scheduled for a follow up with [facility] for 2/18/2022 @ 11:30 am with ANITHA Balbuena.  This appointment was made prior to admission.

## 2022-01-21 LAB
ANION GAP SERPL CALCULATED.3IONS-SCNC: 11 MMOL/L (ref 5–15)
BUN SERPL-MCNC: 22 MG/DL (ref 8–23)
BUN/CREAT SERPL: 20.8 (ref 7–25)
CALCIUM SPEC-SCNC: 8.4 MG/DL (ref 8.6–10.5)
CHLORIDE SERPL-SCNC: 102 MMOL/L (ref 98–107)
CO2 SERPL-SCNC: 29 MMOL/L (ref 22–29)
CREAT SERPL-MCNC: 1.06 MG/DL (ref 0.57–1)
DEPRECATED RDW RBC AUTO: 51.5 FL (ref 37–54)
ERYTHROCYTE [DISTWIDTH] IN BLOOD BY AUTOMATED COUNT: 17.1 % (ref 12.3–15.4)
GFR SERPL CREATININE-BSD FRML MDRD: 51 ML/MIN/1.73
GLUCOSE SERPL-MCNC: 119 MG/DL (ref 65–99)
HCT VFR BLD AUTO: 37.7 % (ref 34–46.6)
HGB BLD-MCNC: 11.5 G/DL (ref 12–15.9)
MAGNESIUM SERPL-MCNC: 1.8 MG/DL (ref 1.6–2.4)
MCH RBC QN AUTO: 25.4 PG (ref 26.6–33)
MCHC RBC AUTO-ENTMCNC: 30.5 G/DL (ref 31.5–35.7)
MCV RBC AUTO: 83.2 FL (ref 79–97)
PLATELET # BLD AUTO: 222 10*3/MM3 (ref 140–450)
PMV BLD AUTO: 11.7 FL (ref 6–12)
POTASSIUM SERPL-SCNC: 3.3 MMOL/L (ref 3.5–5.2)
POTASSIUM SERPL-SCNC: 4.3 MMOL/L (ref 3.5–5.2)
RBC # BLD AUTO: 4.53 10*6/MM3 (ref 3.77–5.28)
SODIUM SERPL-SCNC: 142 MMOL/L (ref 136–145)
WBC NRBC COR # BLD: 11.78 10*3/MM3 (ref 3.4–10.8)

## 2022-01-21 PROCEDURE — 94799 UNLISTED PULMONARY SVC/PX: CPT

## 2022-01-21 PROCEDURE — 97530 THERAPEUTIC ACTIVITIES: CPT

## 2022-01-21 PROCEDURE — 25010000002 METHYLPREDNISOLONE PER 40 MG: Performed by: INTERNAL MEDICINE

## 2022-01-21 PROCEDURE — 80048 BASIC METABOLIC PNL TOTAL CA: CPT | Performed by: INTERNAL MEDICINE

## 2022-01-21 PROCEDURE — 83735 ASSAY OF MAGNESIUM: CPT | Performed by: INTERNAL MEDICINE

## 2022-01-21 PROCEDURE — 84132 ASSAY OF SERUM POTASSIUM: CPT | Performed by: INTERNAL MEDICINE

## 2022-01-21 PROCEDURE — 25010000002 PIPERACILLIN SOD-TAZOBACTAM PER 1 G: Performed by: INTERNAL MEDICINE

## 2022-01-21 PROCEDURE — 99233 SBSQ HOSP IP/OBS HIGH 50: CPT | Performed by: INTERNAL MEDICINE

## 2022-01-21 PROCEDURE — 99232 SBSQ HOSP IP/OBS MODERATE 35: CPT | Performed by: INTERNAL MEDICINE

## 2022-01-21 PROCEDURE — 94761 N-INVAS EAR/PLS OXIMETRY MLT: CPT

## 2022-01-21 PROCEDURE — 25010000002 FUROSEMIDE PER 20 MG: Performed by: INTERNAL MEDICINE

## 2022-01-21 PROCEDURE — 85027 COMPLETE CBC AUTOMATED: CPT | Performed by: INTERNAL MEDICINE

## 2022-01-21 PROCEDURE — 25010000002 MAGNESIUM SULFATE 2 GM/50ML SOLUTION: Performed by: INTERNAL MEDICINE

## 2022-01-21 RX ORDER — MAGNESIUM SULFATE HEPTAHYDRATE 40 MG/ML
4 INJECTION, SOLUTION INTRAVENOUS AS NEEDED
Status: DISCONTINUED | OUTPATIENT
Start: 2022-01-21 | End: 2022-01-23 | Stop reason: HOSPADM

## 2022-01-21 RX ORDER — METHYLPREDNISOLONE SODIUM SUCCINATE 125 MG/2ML
60 INJECTION, POWDER, LYOPHILIZED, FOR SOLUTION INTRAMUSCULAR; INTRAVENOUS 2 TIMES DAILY
Status: DISCONTINUED | OUTPATIENT
Start: 2022-01-21 | End: 2022-01-21

## 2022-01-21 RX ORDER — MAGNESIUM SULFATE HEPTAHYDRATE 40 MG/ML
2 INJECTION, SOLUTION INTRAVENOUS AS NEEDED
Status: DISCONTINUED | OUTPATIENT
Start: 2022-01-21 | End: 2022-01-23 | Stop reason: HOSPADM

## 2022-01-21 RX ORDER — L.ACID,PARA/B.BIFIDUM/S.THERM 8B CELL
1 CAPSULE ORAL DAILY
Status: DISCONTINUED | OUTPATIENT
Start: 2022-01-21 | End: 2022-01-23 | Stop reason: HOSPADM

## 2022-01-21 RX ORDER — FUROSEMIDE 10 MG/ML
20 INJECTION INTRAMUSCULAR; INTRAVENOUS ONCE
Status: COMPLETED | OUTPATIENT
Start: 2022-01-21 | End: 2022-01-21

## 2022-01-21 RX ORDER — POTASSIUM CHLORIDE 750 MG/1
40 CAPSULE, EXTENDED RELEASE ORAL AS NEEDED
Status: DISCONTINUED | OUTPATIENT
Start: 2022-01-21 | End: 2022-01-23 | Stop reason: HOSPADM

## 2022-01-21 RX ORDER — NYSTATIN 100000 [USP'U]/G
POWDER TOPICAL EVERY 12 HOURS SCHEDULED
Status: DISCONTINUED | OUTPATIENT
Start: 2022-01-21 | End: 2022-01-23 | Stop reason: HOSPADM

## 2022-01-21 RX ORDER — MAGNESIUM SULFATE 1 G/100ML
1 INJECTION INTRAVENOUS AS NEEDED
Status: DISCONTINUED | OUTPATIENT
Start: 2022-01-21 | End: 2022-01-23 | Stop reason: HOSPADM

## 2022-01-21 RX ORDER — POTASSIUM CHLORIDE 1.5 G/1.77G
40 POWDER, FOR SOLUTION ORAL AS NEEDED
Status: DISCONTINUED | OUTPATIENT
Start: 2022-01-21 | End: 2022-01-23 | Stop reason: HOSPADM

## 2022-01-21 RX ORDER — METHYLPREDNISOLONE SODIUM SUCCINATE 40 MG/ML
40 INJECTION, POWDER, LYOPHILIZED, FOR SOLUTION INTRAMUSCULAR; INTRAVENOUS 2 TIMES DAILY
Status: DISCONTINUED | OUTPATIENT
Start: 2022-01-21 | End: 2022-01-23 | Stop reason: HOSPADM

## 2022-01-21 RX ORDER — POTASSIUM CHLORIDE 7.45 MG/ML
10 INJECTION INTRAVENOUS
Status: DISCONTINUED | OUTPATIENT
Start: 2022-01-21 | End: 2022-01-23 | Stop reason: HOSPADM

## 2022-01-21 RX ORDER — SACCHAROMYCES BOULARDII 250 MG
250 CAPSULE ORAL 2 TIMES DAILY
Status: DISCONTINUED | OUTPATIENT
Start: 2022-01-21 | End: 2022-01-21

## 2022-01-21 RX ADMIN — DOXEPIN HYDROCHLORIDE 40 MG: 10 CAPSULE ORAL at 20:01

## 2022-01-21 RX ADMIN — DICYCLOMINE HYDROCHLORIDE 10 MG: 10 CAPSULE ORAL at 19:59

## 2022-01-21 RX ADMIN — IPRATROPIUM BROMIDE AND ALBUTEROL SULFATE 3 ML: .5; 2.5 SOLUTION RESPIRATORY (INHALATION) at 19:32

## 2022-01-21 RX ADMIN — IPRATROPIUM BROMIDE AND ALBUTEROL SULFATE 3 ML: .5; 2.5 SOLUTION RESPIRATORY (INHALATION) at 00:02

## 2022-01-21 RX ADMIN — POTASSIUM CHLORIDE 40 MEQ: 10 CAPSULE, COATED, EXTENDED RELEASE ORAL at 11:03

## 2022-01-21 RX ADMIN — TAZOBACTAM SODIUM AND PIPERACILLIN SODIUM 4.5 G: 500; 4 INJECTION, SOLUTION INTRAVENOUS at 08:52

## 2022-01-21 RX ADMIN — PANTOPRAZOLE SODIUM 40 MG: 40 TABLET, DELAYED RELEASE ORAL at 05:06

## 2022-01-21 RX ADMIN — CETIRIZINE HYDROCHLORIDE 5 MG: 10 TABLET, FILM COATED ORAL at 09:05

## 2022-01-21 RX ADMIN — IPRATROPIUM BROMIDE AND ALBUTEROL SULFATE 3 ML: .5; 2.5 SOLUTION RESPIRATORY (INHALATION) at 07:38

## 2022-01-21 RX ADMIN — TAZOBACTAM SODIUM AND PIPERACILLIN SODIUM 4.5 G: 500; 4 INJECTION, SOLUTION INTRAVENOUS at 00:17

## 2022-01-21 RX ADMIN — FLECAINIDE ACETATE 50 MG: 50 TABLET ORAL at 09:04

## 2022-01-21 RX ADMIN — FUROSEMIDE 20 MG: 10 INJECTION INTRAMUSCULAR; INTRAVENOUS at 19:59

## 2022-01-21 RX ADMIN — SODIUM CHLORIDE, PRESERVATIVE FREE 10 ML: 5 INJECTION INTRAVENOUS at 09:09

## 2022-01-21 RX ADMIN — FLECAINIDE ACETATE 50 MG: 50 TABLET ORAL at 19:59

## 2022-01-21 RX ADMIN — FLUOXETINE HYDROCHLORIDE 40 MG: 20 CAPSULE ORAL at 09:05

## 2022-01-21 RX ADMIN — DOXYCYCLINE 100 MG: 100 CAPSULE ORAL at 09:05

## 2022-01-21 RX ADMIN — Medication 1 CAPSULE: at 11:15

## 2022-01-21 RX ADMIN — GUAIFENESIN 1200 MG: 600 TABLET, EXTENDED RELEASE ORAL at 09:05

## 2022-01-21 RX ADMIN — DICYCLOMINE HYDROCHLORIDE 10 MG: 10 CAPSULE ORAL at 08:52

## 2022-01-21 RX ADMIN — RIVAROXABAN 15 MG: 15 TABLET, FILM COATED ORAL at 17:16

## 2022-01-21 RX ADMIN — ATENOLOL 100 MG: 50 TABLET ORAL at 09:05

## 2022-01-21 RX ADMIN — MAGNESIUM SULFATE HEPTAHYDRATE 2 G: 2 INJECTION, SOLUTION INTRAVENOUS at 11:09

## 2022-01-21 RX ADMIN — DOXYCYCLINE 100 MG: 100 CAPSULE ORAL at 20:00

## 2022-01-21 RX ADMIN — IPRATROPIUM BROMIDE AND ALBUTEROL SULFATE 3 ML: .5; 2.5 SOLUTION RESPIRATORY (INHALATION) at 11:55

## 2022-01-21 RX ADMIN — FERROUS SULFATE TAB 325 MG (65 MG ELEMENTAL FE) 325 MG: 325 (65 FE) TAB at 08:53

## 2022-01-21 RX ADMIN — NYSTATIN 1 APPLICATION: 100000 POWDER TOPICAL at 19:55

## 2022-01-21 RX ADMIN — METHYLPREDNISOLONE SODIUM SUCCINATE 40 MG: 40 INJECTION, POWDER, FOR SOLUTION INTRAMUSCULAR; INTRAVENOUS at 09:04

## 2022-01-21 RX ADMIN — ALLOPURINOL 300 MG: 300 TABLET ORAL at 09:05

## 2022-01-21 RX ADMIN — POTASSIUM CHLORIDE 40 MEQ: 10 CAPSULE, COATED, EXTENDED RELEASE ORAL at 15:35

## 2022-01-21 RX ADMIN — GUAIFENESIN 1200 MG: 600 TABLET, EXTENDED RELEASE ORAL at 20:01

## 2022-01-21 RX ADMIN — METHYLPREDNISOLONE SODIUM SUCCINATE 40 MG: 40 INJECTION, POWDER, FOR SOLUTION INTRAMUSCULAR; INTRAVENOUS at 19:55

## 2022-01-21 RX ADMIN — TAZOBACTAM SODIUM AND PIPERACILLIN SODIUM 4.5 G: 500; 4 INJECTION, SOLUTION INTRAVENOUS at 15:35

## 2022-01-21 NOTE — THERAPY TREATMENT NOTE
Patient Name: Liz Borjas  : 1950    MRN: [id number]                              Today's Date: 2022       Admit Date: 2022    Visit Dx:     ICD-10-CM ICD-9-CM   1. Acute and chronic respiratory failure with hypoxia (HCC)  J96.21 518.84     799.02   2. 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)   • Anxiety and depression   • COPD (chronic obstructive pulmonary disease) (HCC)   • Mild obesity   • Physical deconditioning   • Sepsis (HCC)   • Leukocytosis   • Lactic acidosis   • Hyponatremia   • Acute respiratory failure with hypoxia (HCC)   • Acute and chronic respiratory failure with hypoxia (HCC)   • Elevated d-dimer   • Immunocompromised (HCC)   • PAF (paroxysmal atrial fibrillation) (HCC)   • Chronic anticoagulation   • HTN (hypertension)   • Interstitial lung disease (HCC)   • Stage 3a chronic kidney disease (HCC)   • Acute exacerbation of idiopathic pulmonary fibrosis (HCC)     Past Medical History:   Diagnosis Date   • Arthritis    • Atrial fibrillation (HCC)    • Closed right hip fracture (HCC)    • GERD (gastroesophageal reflux disease)    • Gout    • Hyperlipidemia    • Hypertension    • Osteoporosis    • Rheumatoid arthritis (HCC)    • Wears dentures     upper   • Wears glasses     reading     Past Surgical History:   Procedure Laterality Date   • ANKLE SURGERY Right    • CHOLECYSTECTOMY     • COLONOSCOPY      5 years ago   • HIP FRACTURE SURGERY      Right Hip with Pins   • LUMBAR DISCECTOMY Left 10/12/2016    Procedure: lumbar MICROdiscectomy LEFT L3-5;  Surgeon: Chris Son MD;  Location: [facility];  Service:    • REPLACEMENT TOTAL KNEE      Right Knee   • TOE  AMPUTATION      right baby toe, left second toe      General Information     Row Name 01/21/22 1148          Physical Therapy Time and Intention    Document Type therapy note (daily note)  -     Mode of Treatment physical therapy  -     Row Name 01/21/22 1148          General Information    Patient Profile Reviewed yes  -LO     Existing Precautions/Restrictions fall; oxygen therapy device and L/min; other (see comments)  pulmonary fibrosis  -LO     Row Name 01/21/22 1148          Cognition    Orientation Status (Cognition) oriented x 4  -LO     [facility] Name 01/21/22 1148          Safety Issues, Functional Mobility    Impairments Affecting Function (Mobility) balance; endurance/activity tolerance; shortness of breath; strength  -LO     Comment, Safety Issues/Impairments (Mobility) SpO2 levels desat quickly to lower 80' in sitting on 6 liters of O2  -LO           User Key  (r) = Recorded By, (t) = Taken By, (c) = Cosigned By    Initials Name Provider Type    Zehra Magaña, PT Physical Therapist               Mobility     Row Name 01/21/22 1149          Bed Mobility    Bed Mobility supine-sit; sit-supine  -LO     All Activities, [place] (Bed Mobility) contact guard assist  -LO     Supine-Sit [place] (Bed Mobility) contact guard  -LO     Sit-Supine [place] (Bed Mobility) contact guard  -LO     Assistive Device (Bed Mobility) bed rails; head of bed elevated  -LO     Comment (Bed Mobility) supine<>sit EOB vc for sequencing  -     Row Name 01/21/22 1149          Transfers    Comment (Transfers) deferred due to O2 levels  -[facility] Name 01/21/22 1149          Bed-Chair Transfer    Bed-Chair [place] (Transfers) not tested  -     Row Name 01/21/22 1149          Sit-Stand Transfer    Sit-Stand [place] (Transfers) not tested  -[facility] Name 01/21/22 1149          Gait/Stairs (Locomotion)    [place] Level (Gait) not tested  -     Comment (Gait/Stairs) deferred due to O2 levels  -            User Key  (r) = Recorded By, (t) = Taken By, (c) = Cosigned By    Initials Name Provider Type    Zehra Magaña, LUDY Physical Therapist               Obj/Interventions     Row Name 01/21/22 1151          Motor Skills    Motor Skills functional endurance  -LO     Functional Endurance tolerates 1-2 min of activity with SpO2 desat to lower 80's on 6 liters of O2  -LO     Row Name 01/21/22 1151          Balance    Balance Assessment sitting static balance; sitting dynamic balance  -LO     Static Sitting Balance WFL; supported; sitting, edge of bed  -LO     Dynamic Sitting Balance WFL; supported; sitting, edge of bed  -LO     Comment, Balance independent with sitting EOB  -LO           User Key  (r) = Recorded By, (t) = Taken By, (c) = Cosigned By    Initials Name Provider Type    Zehra Magaña, LUDY Physical Therapist               Goals/Plan    No documentation.                Clinical Impression     Row Name 01/21/22 1154          Pain Scale: Numbers Pre/Post-Treatment    Pretreatment Pain Rating 0/10 - no pain  -LO     Posttreatment Pain Rating 0/10 - no pain  -LO     Row Name 01/21/22 1154          Plan of Care Review    Progress no change  -LO     Outcome Summary Patient able to sit EOB this date with CGA for 2-3 min while performing breathing exercises, however SpO2 level desat to lower 80's on 6 liters of O2 and take 6-8 min to recover to > 90% in supine. Cont IP PT POC.  -     Row Name 01/21/22 1154          Therapy Assessment/Plan (PT)    Rehab Potential (PT) good, to achieve stated therapy goals  -LO     Criteria for Skilled Interventions Met (PT) yes; meets criteria; skilled treatment is necessary  -     Row Name 01/21/22 1154          Vital Signs    Pre SpO2 (%) 91  -LO     O2 Delivery Pre Treatment supplemental O2  -LO     Intra SpO2 (%) 81  -LO     O2 Delivery Intra Treatment supplemental O2  -LO     Post SpO2 (%) 90  -LO     O2 Delivery Post Treatment supplemental O2  -LO     Pre Patient  Position Supine  -LO     Intra Patient Position Sitting  -LO     Post Patient Position Supine  -LO     Row Name 01/21/22 1154          Positioning and Restraints    Pre-Treatment Position in bed  -LO     Post Treatment Position bed  -LO     In Bed notified nsg; supine; exit alarm on; with family/caregiver; fowlers; encouraged to call for assist; call light within reach  -LO           User Key  (r) = Recorded By, (t) = Taken By, (c) = Cosigned By    Initials Name Provider Type    Zehra Magaña PT Physical Therapist               Outcome Measures     Row Name 01/21/22 1157          How much help from another person do you currently need...    Turning from your back to your side while in flat bed without using bedrails? 3  -LO     Moving from lying on back to sitting on the side of a flat bed without bedrails? 3  -LO     Moving to and from a bed to a chair (including a wheelchair)? 2  -LO     Standing up from a chair using your arms (e.g., wheelchair, bedside chair)? 2  -LO     Climbing 3-5 steps with a railing? 1  -LO     To walk in hospital room? 2  -LO     AM-PAC 6 Clicks Score (PT) 13  -LO     Row Name 01/21/22 1157          Functional Assessment    Outcome Measure Options AM-PAC 6 Clicks Basic Mobility (PT)  -LO           User Key  (r) = Recorded By, (t) = Taken By, (c) = Cosigned By    Initials Name Provider Type    Zehra Magaña, LUDY Physical Therapist                             Physical Therapy Education                 Title: PT OT SLP Therapies (In Progress)     Topic: Physical Therapy (Done)     Point: Mobility training (Done)     Learning Progress Summary           Patient Acceptance, E, VU,NR by GARY at 1/21/2022 1045    Comment: Patient education regarding sequencing for bed mobility and breathing techniques in sitting.    Acceptance, E,D,H, NR by FEDERICO at 1/19/2022 1652    Acceptance, E, VU,DU by KATIANA at 1/15/2022 1152                   Point: Home exercise program (Done)     Learning Progress Summary            Patient Acceptance, E, VU,NR by  at 1/21/2022 1045    Comment: Patient education regarding sequencing for bed mobility and breathing techniques in sitting.    Acceptance, E,D,H, NR by DM at 1/19/2022 1652                   Point: Precautions (Done)     Learning Progress Summary           Patient Acceptance, E, VU,NR by LO at 1/21/2022 1045    Comment: Patient education regarding sequencing for bed mobility and breathing techniques in sitting.    Acceptance, E,D,H, NR by DM at 1/19/2022 1652    Acceptance, E, VU,DU by  at 1/15/2022 1152                               User Key     Initials Effective Dates Name Provider Type Discipline    DM 06/16/21 -  Eufemia Machado, PT Physical Therapist PT     06/16/21 -  Liana Mcneal, PT Physical Therapist PT     06/16/21 -  Zehra Chau, PT Physical Therapist PT              PT Recommendation and Plan     Progress: no change  Outcome Summary: Patient able to sit EOB this date with CGA for 2-3 min while performing breathing exercises, however SpO2 level desat to lower 80's on 6 liters of O2 and take 6-8 min to recover to > 90% in supine. Cont IP PT POC.     Time Calculation:    PT Charges     Row Name 01/21/22 1045             Time Calculation    Start Time 1045  -LO      PT Received On 01/21/22  -      PT Goal Re-Cert Due Date 01/25/22  -              Timed Charges    09320 - PT Therapeutic Activity Minutes 24  -LO              Total Minutes    Timed Charges Total Minutes 24  -LO       Total Minutes 24  -LO            User Key  (r) = Recorded By, (t) = Taken By, (c) = Cosigned By    Initials Name Provider Type     Zehra Chau, PT Physical Therapist              Therapy Charges for Today     Code Description Service Date Service Provider Modifiers Qty    [id number] HC PT THERAPEUTIC ACT EA 15 MIN 1/21/2022 Zehra Chau, PT GP 2          PT G-Codes  Outcome Measure Options: AM-PAC 6 Clicks Basic Mobility (PT)  AM-PAC 6 Clicks Score (PT): 13  AM-PAC 6 Clicks  Score (OT): 20    Zehra Chau, PT  1/21/2022

## 2022-01-21 NOTE — PROGRESS NOTES
NN spoke with pt at BS.  Pt alert and able to answer questions appropriately. Pt O2 sat 90% on 6  L currently, home O2 use  2L PRN.   Patient states that she will be going home with HH services when she discharges.  Benefits of rehab reiterated.  She confirms that her home concentrator goes up to 10L.  COPD action plan reviewed. Deep breathing exercises encouraged. No new concerns or questions voiced at this time.  NN will continue to follow as needed.       Per current [employer] Standards, please consider: No LAMA/LABA/ICS in place, Outpatient PFT, Rehab as appropriate    Patient has been scheduled for a follow up with [facility] for 2/7/2022 @ 11:30 am with ANITHA Balbuena.

## 2022-01-21 NOTE — CASE MANAGEMENT/SOCIAL WORK
Continued Stay Note  [facility]     Patient Name: Liz Borjas  MRN: [id number]  Today's Date: 1/21/2022    Admit Date: 1/14/2022     Discharge Plan     Row Name 01/21/22 1200       Plan    Plan Home with Nurse On Call Home Health    Patient/Family in Agreement with Plan yes    Plan Comments I have met with Ej Borjas, Ms. Borjas's son at the bedside.  Ms. Borjas was receiving nursing care at the time of the visit.  Ej states that his mom may be ready for discharge soon and that he will provide transportation.  They plan to resume Home Health Care with Nurse on Call and they currently have home oxygen set-up.  He denies having any other discharge needs at this time. CM will cont to assist with discharge planning as recommendations are available and notify Nurse on Call when discharged.    Final Discharge Disposition Code 06 - home with home health care               Discharge Codes    No documentation.               Expected Discharge Date and Time     Expected Discharge Date Expected Discharge Time    Jan 22, 2022             Lina Zuñiga RN

## 2022-01-21 NOTE — PROGRESS NOTES
Intensive Care Follow-up     Hospital:  LOS: 7 days   Ms. Liz Borjas, 71 y.o. female is followed for:   Acute exacerbation of idiopathic pulmonary fibrosis (HCC)            History of present illness:   Liz Borjas is a 71 y.o. female who presents to [facility] on 1/16/22 with a one-week history of increasing dyspnea at rest and on exertion, cough, weakness, and inability to tolerate PO intake. She has a history of COPD, pulmonary fibrosis, RA (on prednisone and Orencia), PAF on Xarelto, chronic respiratory failure on 2L NC at home, HTN, recent COVID-19 infection, and remote tobacco use.      She is followed by [facility] for chronic interstitial lung disease with pulmonary fibrosis. On her last visit on 1/11/22, she was started on doxycycline and her steroids were increased; however, her symptoms worsened and she was admitted to [facility] on 11/27/21 and treated for PNA with ABX and steroids. She has tapered her steroid dose to 10 mg daily and, since then, her home oxygen requirement went from 2L to 6-7L NC consistently.      She was admitted to the hospitalist service on 1/14/22 with initial labs showing WBC 12.8, lactate 1.9, PCT 0.15, CRP 6.6. Negative COVID-19 swab. CXR demonstrates  extensive fibrosis with superimposed opacities, edema, or PNA in both upper lobes.      Subjective   Interval History:  Patient is doing well this morning.  Currently on 6 L nasal cannula has been stable on this over the course the afternoon.  Slowly trying to wean back down.  She states that her breathing is doing much better.  She continues on antibiotics.  She denies any fever, chills, nausea, or vomiting.  She has no acute complaints.             The patient's past medical, surgical and social history were reviewed and updated in Epic as appropriate.       Objective     Infusions:     Medications:  allopurinol, 300 mg, Oral, Daily  atenolol, 100 mg, Oral, Daily  cetirizine, 5 mg, Oral, Daily  dicyclomine,  10 mg, Oral, BID  doxepin, 40 mg, Oral, Nightly  doxycycline, 100 mg, Oral, Q12H  ferrous sulfate, 325 mg, Oral, Daily  flecainide, 50 mg, Oral, BID  FLUoxetine, 40 mg, Oral, Daily  guaiFENesin, 1,200 mg, Oral, Q12H  ipratropium-albuterol, 3 mL, Nebulization, Q6H - RT  methylPREDNISolone sodium succinate, 40 mg, Intravenous, BID  pantoprazole, 40 mg, Oral, Q AM  pharmacy consult - MTM, , Does not apply, Daily  piperacillin-tazobactam, 4.5 g, Intravenous, Q8H  rivaroxaban, 15 mg, Oral, Daily With Dinner  sodium chloride, 10 mL, Intravenous, Q12H      I reviewed the patient's medications.    Vital Sign Min/Max for last 24 hours  Temp  Min: 97.5 °F (36.4 °C)  Max: 97.8 °F (36.6 °C)   BP  Min: 115/48  Max: 141/74   Pulse  Min: 53  Max: 69   Resp  Min: 16  Max: 20   SpO2  Min: 93 %  Max: 97 %   Flow (L/min)  Min: 4  Max: 6       Input/Output for last 24 hour shift  01/20 0701 - 01/21 0700  In: 580 [P.O.:480]  Out: 1150 [Urine:1150]      GENERAL : NAD, conversant  RESPIRATORY/THORAX : normal respiratory effort and no intercostal retractions, fine crackles bilaterally  CARDIOVASCULAR : Normal S1/S2, RRR.  No lower ext edema.  GASTROINTESTINAL : Soft, NT/ND. BS x 4 normoactive. No hepatosplenomegaly.  MUSCULOSKELETAL : No cyanosis, clubbing, or ischemia  NEUROLOGICAL: alert and oriented to person, place and time  PSYCHOLOGICAL : Appropriate affect    Results from last 7 days   Lab Units 01/21/22  0507 01/20/22  0433 01/19/22  0806   WBC 10*3/mm3 11.78* 7.88 8.30   HEMOGLOBIN g/dL 11.5* 10.9* 11.2*   PLATELETS 10*3/mm3 222 178 197     Results from last 7 days   Lab Units 01/21/22  0507 01/20/22  0433 01/19/22  0806 01/18/22  0623 01/17/22  0557 01/16/22  0609 01/16/22  0609   SODIUM mmol/L 142 137 142   < > 141   < > 141   POTASSIUM mmol/L 3.3* 3.6 4.0   < > 4.4   < > 3.7   CO2 mmol/L 29.0 28.0 31.0*   < > 31.0*   < > 28.0   BUN mg/dL 22 17 16   < > 27*   < > 27*   CREATININE mg/dL 1.06* 0.84 0.99   < > 1.37*   < > 1.34*    MAGNESIUM mg/dL 1.8  --  2.0  --  3.0*   < > 1.8   PHOSPHORUS mg/dL  --   --  3.0  --  4.2  --  4.7*   GLUCOSE mg/dL 119* 136* 95   < > 121*   < > 103*    < > = values in this interval not displayed.     Estimated Creatinine Clearance: 43.1 mL/min (A) (by C-G formula based on SCr of 1.06 mg/dL (H)).    Results from last 7 days   Lab Units 01/19/22  0354   PH, ARTERIAL pH units 7.455*   PCO2, ARTERIAL mm Hg 48.3*   PO2 ART mm Hg 79.1*       I reviewed the patient's new clinical results.  I reviewed the patient's new imaging results/reports including actual images and agree with reports.         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  Who presented to [facility] with an acute exacerbation of her IPF with acute on chronic hypoxic respiratory failure.     -Continue on supplemental oxygen, I did place her on 6 L nasal cannula today, wean to saturation greater than 88-92%  -Continue Solu-Medrol, will decrease dose to 60 mg daily  -Plan to stop the Zosyn and doxycycline after today  -Aggressive pulmonary toilet, recommend Mucinex, flutter valve and incentive spirometer use  -Mobilize patient  -Discussed with the patient the possibility of going home, I still think she is a little too unstable on the 6 that is nasal cannula.  Would like to see her more closer to 4-5 that when  she could have room to go up on her home oxygen if she needed to.  I know she does spend the majority of her time on 6 L, so I did inform her that she is getting very close.  Ultimately if she were to stabilize out over the course the next 24 hours she very likely could be discharged over the course the weekend.  She has completed all the antibiotics, and I would ultimately recommend for her to be on a steroid taper over the course the next month starting on 60 mg for 5 days, then 40 mg x 5 days, then 30 mg x 5 days, then 20 mg x 5 days, and then 10 mg x 5 days.  The patient already has an appointment to see Kristyn Haro on 2/7/2022 at 11:30 AM which is appropriate follow-up timing.    Plan of care and goals reviewed with multidisciplinary/antibiotic stewardship team during rounds.   I discussed the patient's findings and my recommendations with patient and nursing staff       Clara Daniels, DO  Pulmonary, Critical care and Sleep Medicine

## 2022-01-21 NOTE — PLAN OF CARE
Goal Outcome Evaluation:           Progress: no change  Outcome Summary: Patient able to sit EOB this date with CGA for 2-3 min while performing breathing exercises, however SpO2 level desat to lower 80's on 6 liters of O2 and take 6-8 min to recover to > 90% in supine. Cont IP PT POC.

## 2022-01-21 NOTE — PLAN OF CARE
Pt on 6L O2. When she coughs a lot her O2 drops to low 80s and slowly comes back up. No complaints of pain. VSS. Butt looks good-Pressure injury.     Problem: Adult Inpatient Plan of Care  Goal: Plan of Care Review  Outcome: Ongoing, Progressing  Goal: Patient-Specific Goal (Individualized)  Outcome: Ongoing, Progressing  Goal: Absence of Hospital-Acquired Illness or Injury  Outcome: Ongoing, Progressing  Intervention: Identify and Manage Fall Risk  Recent Flowsheet Documentation  Taken 1/21/2022 0200 by Kory Mascorro, RN  Safety Promotion/Fall Prevention:   activity supervised   assistive device/personal items within reach   clutter free environment maintained   fall prevention program maintained   lighting adjusted   nonskid shoes/slippers when out of bed   room organization consistent   safety round/check completed  Taken 1/21/2022 0000 by Kory Mascorro, RN  Safety Promotion/Fall Prevention:   activity supervised   assistive device/personal items within reach   clutter free environment maintained   fall prevention program maintained   lighting adjusted   nonskid shoes/slippers when out of bed   room organization consistent   safety round/check completed  Taken 1/20/2022 2200 by Kory Mascorro, RN  Safety Promotion/Fall Prevention:   activity supervised   assistive device/personal items within reach   clutter free environment maintained   fall prevention program maintained   lighting adjusted   nonskid shoes/slippers when out of bed   room organization consistent   safety round/check completed  Taken 1/20/2022 2000 by Kory Mascorro, RN  Safety Promotion/Fall Prevention:   activity supervised   assistive device/personal items within reach   clutter free environment maintained   fall prevention program maintained   lighting adjusted   nonskid shoes/slippers when out of bed   room organization consistent   safety round/check completed  Intervention: Prevent Skin Injury  Recent Flowsheet  Documentation  Taken 1/21/2022 0200 by Kory Mascoror, RN  Body Position: position changed independently  Taken 1/21/2022 0000 by Kory Mascorro RN  Body Position: position changed independently  Taken 1/20/2022 2200 by Kory Mascorro RN  Body Position: weight shift assistance provided  Skin Protection:   adhesive use limited   incontinence pads utilized   transparent dressing maintained  Taken 1/20/2022 2000 by Kory Mascorro, RN  Body Position: weight shift assistance provided  Goal: Optimal Comfort and Wellbeing  Outcome: Ongoing, Progressing  Goal: Readiness for Transition of Care  Outcome: Ongoing, Progressing     Problem: COPD Comorbidity  Goal: Maintenance of COPD Symptom Control  Outcome: Ongoing, Progressing  Intervention: Maintain COPD-Symptom Control  Recent Flowsheet Documentation  Taken 1/20/2022 2200 by Kory Mascorro, RN  Medication Review/Management: medications reviewed     Problem: Hypertension Comorbidity  Goal: Blood Pressure in Desired Range  Outcome: Ongoing, Progressing  Intervention: Maintain Hypertension-Management Strategies  Recent Flowsheet Documentation  Taken 1/20/2022 2200 by Kory Mascorro, RN  Medication Review/Management: medications reviewed     Problem: Skin Injury Risk Increased  Goal: Skin Health and Integrity  Outcome: Ongoing, Progressing  Intervention: Optimize Skin Protection  Recent Flowsheet Documentation  Taken 1/21/2022 0200 by Kory Mascorro, RN  Head of Bed (HOB): HOB elevated  Taken 1/21/2022 0000 by Kory Mascorro RN  Head of Bed (HOB): HOB elevated  Taken 1/20/2022 2200 by Kory Mascorro, RN  Pressure Reduction Techniques: weight shift assistance provided  Head of Bed (HOB): HOB elevated  Pressure Reduction Devices: specialty bed utilized  Skin Protection:   adhesive use limited   incontinence pads utilized   transparent dressing maintained  Taken 1/20/2022 2000 by Kory Mascorro, RN  Head of Bed (HOB):  HOB elevated     Problem: Adjustment to Illness COPD (Chronic Obstructive Pulmonary Disease)  Goal: Optimal Chronic Illness Coping  Outcome: Ongoing, Progressing     Problem: Functional Ability Impaired COPD (Chronic Obstructive Pulmonary Disease)  Goal: Optimal Level of Functional Centre  Outcome: Ongoing, Progressing  Intervention: Optimize Functional Ability  Recent Flowsheet Documentation  Taken 1/21/2022 0200 by Kory Mascorro, RN  Activity Management: activity adjusted per tolerance  Taken 1/21/2022 0000 by Kory Mascorro, RN  Activity Management: activity adjusted per tolerance  Taken 1/20/2022 2200 by Kory Mascorro, RN  Activity Management: activity adjusted per tolerance  Taken 1/20/2022 2000 by Kory Mascorro, RN  Activity Management: activity adjusted per tolerance     Problem: Infection COPD (Chronic Obstructive Pulmonary Disease)  Goal: Absence of Infection Signs and Symptoms  Outcome: Ongoing, Progressing     Problem: Oral Intake Inadequate COPD (Chronic Obstructive Pulmonary Disease)  Goal: Improved Nutrition Intake  Outcome: Ongoing, Progressing     Problem: Respiratory Compromise COPD (Chronic Obstructive Pulmonary Disease)  Goal: Effective Oxygenation and Ventilation  Outcome: Ongoing, Progressing  Intervention: Promote Airway Secretion Clearance  Recent Flowsheet Documentation  Taken 1/21/2022 0200 by Kory Mascorro, RN  Activity Management: activity adjusted per tolerance  Taken 1/21/2022 0000 by Kroy Mascorro, RN  Activity Management: activity adjusted per tolerance  Taken 1/20/2022 2200 by Kory Mascorro, RN  Activity Management: activity adjusted per tolerance  Cough And Deep Breathing: done independently per patient  Taken 1/20/2022 2000 by Kory Mascorro, RN  Activity Management: activity adjusted per tolerance  Intervention: Optimize Oxygenation and Ventilation  Recent Flowsheet Documentation  Taken 1/21/2022 0200 by Kory Mascorro  ELDA RN  Head of Bed (HOB): HOB elevated  Taken 1/21/2022 0000 by Kory Mascorro, RN  Head of Bed (HOB): HOB elevated  Taken 1/20/2022 2200 by Kory Mascorro, RN  Head of Bed ([facility]): HOB elevated  Taken 1/20/2022 2000 by Kory Mascorro, RN  Head of Bed ([facility]): HOB elevated   Goal Outcome Evaluation:

## 2022-01-21 NOTE — PROGRESS NOTES
[facility] Medicine Services  PROGRESS NOTE    Patient Name: Liz Borjas  : 1950  MRN: [id number]    Date of Admission: 2022  Primary Care Physician: Valerie Sesay APRN    Subjective   Subjective     CC:  Shortness of breath    HPI:  Overall feeling better. No sputum today. No chest pain. No palpitations. No n/v/d    ROS:  Gen- No fevers, chills  CV- No chest pain, palpitations  Resp- dry cough, dyspnea w/ exertion  GI- no n/v/diarrhea         Objective   Objective     Vital Signs:   Temp:  [97.5 °F (36.4 °C)-98.2 °F (36.8 °C)] 98.2 °F (36.8 °C)  Heart Rate:  [52-62] 54  Resp:  [16-20] 18  BP: (114-141)/(60-82) 114/60  Flow (L/min):  [4-6] 6     Physical Exam:  Constitutional:Alert, oriented x 3, frail appearing but nontoxic, pleasant today, no resp distress at rest  Psych:Normal/appropriate affect  HEENT:NCAT, oropharynx clear, nasal canula in place  Neck: neck supple, full range of motion  Neuro: Face symmetric, speech clear, equal , moves all extremities  Cardiac: RRR; No pretibial pitting edema  Resp: faint scattered bilateral mid-insp crackles; no overt wheezes  GI: abd soft, nontender  Skin: No extremity rash  Musculoskeletal/extremities: no cyanosis of extremities; no significant ankle edema        Results Reviewed:  LAB RESULTS:      Lab 22  0507 22  0433 22  0806 22  0623 22  0557 22  0609 22  0609 01/15/22  0701 01/15/22  0701   WBC 11.78* 7.88 8.30 6.94 7.51   < > 8.90   < > 6.34   HEMOGLOBIN 11.5* 10.9* 11.2* 10.3* 11.6*   < > 10.1*   < > 9.8*   HEMATOCRIT 37.7 36.2 37.5 34.9 39.3   < > 33.8*   < > 32.7*   PLATELETS 222 178 197 184 231   < > 216   < > 175   NEUTROS ABS  --   --   --   --  6.99  --  8.21*  --  5.72   IMMATURE GRANS (ABS)  --   --   --   --  0.03  --  0.03  --  0.03   LYMPHS ABS  --   --   --   --  0.26*  --  0.31*  --  0.32*   MONOS ABS  --   --   --   --  0.22  --  0.35  --  0.27   EOS ABS  --    --   --   --  0.00  --  0.00  --  0.00   MCV 83.2 83.4 86.4 83.9 85.4   < > 84.3   < > 84.7   CRP  --   --   --   --   --   --  4.29*  --  8.16*   PROCALCITONIN  --   --   --   --  0.07  --  0.09  --  0.18    < > = values in this interval not displayed.         Lab 01/21/22  0507 01/20/22  0433 01/19/22  0806 01/18/22  0623 01/17/22  0557 01/16/22  0609 01/16/22  0609 01/15/22  0701 01/15/22  0701   SODIUM 142 137 142 143 141   < > 141   < > 139   POTASSIUM 3.3* 3.6 4.0 3.6 4.4   < > 3.7   < > 3.6   CHLORIDE 102 100 102 103 101   < > 101   < > 100   CO2 29.0 28.0 31.0* 33.0* 31.0*   < > 28.0   < > 29.0   ANION GAP 11.0 9.0 9.0 7.0 9.0   < > 12.0   < > 10.0   BUN 22 17 16 22 27*   < > 27*   < > 20   CREATININE 1.06* 0.84 0.99 1.24* 1.37*   < > 1.34*   < > 1.22*   GLUCOSE 119* 136* 95 118* 121*   < > 103*   < > 123*   CALCIUM 8.4* 8.1* 8.4* 8.3* 8.3*   < > 8.0*   < > 7.8*   IONIZED CALCIUM  --   --  1.20  --   --   --   --   --   --    MAGNESIUM 1.8  --  2.0  --  3.0*  --  1.8  --   --    PHOSPHORUS  --   --  3.0  --  4.2  --  4.7*  --   --    TSH  --   --   --   --   --   --   --   --  0.930    < > = values in this interval not displayed.         Lab 01/17/22  0557 01/16/22  0609   TOTAL PROTEIN 5.9* 5.2*   ALBUMIN 2.40* 2.50*   GLOBULIN 3.5 2.7   ALT (SGPT) 10 7   AST (SGOT) 15 11   BILIRUBIN 0.3 0.3   ALK PHOS 171* 154*                     Lab 01/19/22  0354 01/18/22  1602 01/14/22  2146   PH, ARTERIAL 7.455* 7.484* 7.457*   PCO2, ARTERIAL 48.3* 43.5 42.8   PO2 ART 79.1* 57.1* 57.0*   FIO2 50 48 40   HCO3 ART 33.9* 32.7* 30.2*   BASE EXCESS ART 8.8* 8.3* 5.7*   CARBOXYHEMOGLOBIN 1.3 1.2 1.5     Brief Urine Lab Results  (Last result in the past 365 days)      Color   Clarity   Blood   Leuk Est   Nitrite   Protein   CREAT   Urine HCG        11/27/21 2334 Yellow   Clear   Negative   Negative   Negative   Trace                 Microbiology Results Abnormal     Procedure Component Value - Date/Time    Blood Culture -  Blood, Arm, Left [[id number]]  (Normal) Collected: 01/14/22 1545    Lab Status: Final result Specimen: Blood from Arm, Left Updated: 01/19/22 1600     Blood Culture No growth at 5 days    Blood Culture - Blood, Arm, Right [[id number]]  (Normal) Collected: 01/14/22 1540    Lab Status: Final result Specimen: Blood from Arm, Right Updated: 01/19/22 1600     Blood Culture No growth at 5 days    Respiratory Culture - Sputum, Cough [[id number]] Collected: 01/16/22 0931    Lab Status: Final result Specimen: Sputum from Cough Updated: 01/18/22 1128     Respiratory Culture Light growth (2+) Normal respiratory jeancarlos. No S. aureus or Pseudomonas aeruginosa detected. Final report.     Gram Stain No WBCs per low power field      Rare (1+) Epithelial cells per low power field      Few (2+) Gram negative bacilli      Moderate (3+) Budding yeast    MRSA Screen, PCR (Inpatient) - Swab, Nares [[id number]]  (Normal) Collected: 01/15/22 1920    Lab Status: Final result Specimen: Swab from Nares Updated: 01/15/22 2030     MRSA PCR Negative    Narrative:      MRSA Negative    COVID-19, FLU A/B, RSV PCR - Swab, Nasopharynx [[id number]]  (Normal) Collected: 01/14/22 1313    Lab Status: Final result Specimen: Swab from Nasopharynx Updated: 01/14/22 1402     COVID19 Not Detected     Influenza A PCR Not Detected     Influenza B PCR Not Detected     RSV, PCR Not Detected    Narrative:      Fact sheet for providers: https://www.fda.gov/media/084061/download    Fact sheet for patients: https://www.fda.gov/media/717124/download    Test performed by PCR.          No radiology results from the last 24 hrs        I have reviewed the medications:  Scheduled Meds:allopurinol, 300 mg, Oral, Daily  atenolol, 100 mg, Oral, Daily  cetirizine, 5 mg, Oral, Daily  dicyclomine, 10 mg, Oral, BID  doxepin, 40 mg, Oral, Nightly  doxycycline, 100 mg, Oral, Q12H  ferrous sulfate, 325 mg, Oral, Daily  flecainide, 50 mg, Oral, BID  FLUoxetine, 40 mg, Oral,  Daily  furosemide, 20 mg, Intravenous, Once  guaiFENesin, 1,200 mg, Oral, Q12H  ipratropium-albuterol, 3 mL, Nebulization, Q6H - RT  lactobacillus acidophilus, 1 capsule, Oral, Daily  methylPREDNISolone sodium succinate, 40 mg, Intravenous, BID  pantoprazole, 40 mg, Oral, Q AM  pharmacy consult - MTM, , Does not apply, Daily  piperacillin-tazobactam, 4.5 g, Intravenous, Q8H  rivaroxaban, 15 mg, Oral, Daily With Dinner  sodium chloride, 10 mL, Intravenous, Q12H      Continuous Infusions:   PRN Meds:.•  acetaminophen  •  acetaminophen-codeine  •  albuterol  •  hydrOXYzine  •  magnesium sulfate **OR** magnesium sulfate in D5W 1g/100mL (PREMIX) **OR** magnesium sulfate  •  ondansetron **OR** ondansetron  •  potassium chloride **OR** potassium chloride **OR** potassium chloride  •  sodium chloride  •  sodium chloride  •  traMADol    Assessment/Plan   Assessment & Plan     Active Hospital Problems    Diagnosis  POA   • **Acute exacerbation of idiopathic pulmonary fibrosis (HCC) [J84.112]  Yes   • Stage 3a chronic kidney disease ([facility]) [N18.31]  Yes   • Acute and chronic respiratory failure with hypoxia ([facility]) [J96.21]  Yes   • Chronic anticoagulation [Z79.01]  Not Applicable   • HTN (hypertension) [I10]  Yes   • PAF (paroxysmal atrial fibrillation) ([facility]) [I48.0]  Yes   • COPD (chronic obstructive pulmonary disease) ([facility]) [J44.9]  Yes   • Rheumatoid arthritis with positive rheumatoid factor ([facility]) [M05.9]  Yes   • Anxiety and depression [F41.9, F32.A]  Yes      Resolved Hospital Problems   No resolved problems to display.       Brief Hospital Course to date:  Liz Borjas is a 71 y.o. female with past medical history of COPD, chronic hypoxic respiratory failure on home oxygen, pulmonary fibrosis, rheumatoid arthritis, essential hypertension, A. fib, liver cirrhosis, CKD who presented to the hospital with worsening shortness of breath for 1 week after steroid taper started and home dose went down to 10 mg daily. She was found  to be in respiratory failure requiring high oxygen supplementation at 80% FiO2 through high flow nasal cannula with concern for superimposed bacterial pneumonia    Assessment and plan:  Acute on chronic hypoxic respiratory failure  Bilateral upper lobe pneumonia, present on admission  Severe COPD with exacerbation  Severe pulmonary fibrosis  · Continue IV Zosyn and doxycycline  · MRSA swab is negative.    · sputum culture shows few GNR and budding yeast.  Blood cultures no growth at 2 days  · Continue high-dose IV steroids with Solu-Medrol 60 mg 3 times daily  · Continue duo nebs and pulmonary toilet  · Pulmonary follows: (patient follows with pulmonary associates) --> obviously w/ poor prognosis with baseline oxygen requirements of ~6Lnc recently per patient.. although DNR/DNI (confirmed w/ patient & son 1/21/22) she is full support at this point; weaned Speech eval  · Will repeat lasix 20mg IV once today (received 20mg iv x 1 on 1/20/22 as well)  · Day #7 empiric antibiotics (zosyn & doxy) stopping abx after 1/21/22  · Continue solumedrol 40mg iv bid for now; plan to transition soon to prednisone soon (60mg daily x 5 days, 40mg x 5 days, 30mg x 5 days, 20mg x 5 days, 10mg x 5 days, then stop)  · Home in 1-2 days if respiratory status stable <6L (would prefer 4-5L at discharge)  · Discussed w/ patient and son (via phone) 1/21/22  · Already has f/u appointment w/ Kristyn haedley 2/7/22 at 11:30    Rheumatoid arthritis  · Has not been on immunosuppressive therapy in several weeks because of recurrent pneumonia  · Has been missing her appointment with dermatology service at  because of her recurrent hospitalization.      Hx Paroxysmal atrial fibrillation (currently sinus)  Hypertension  · Continue flecainide, atenolol, xarelto  · Holding amlodipine & losartan (borderline low bp)     CKD stage III (baseline cr ~1-1.1)    IBS  --diarrhea has with her IBS at home.  WBC wnl.  S/p 1 dose imodium 1/18.  improved     am labs:  cbc,bmp,mag (lasix 20mg x 1 today)    DVT prophylaxis:  Medical DVT prophylaxis orders are present.  xarelto      AM-PAC 6 Clicks Score (PT): 13 (01/21/22 1157)    Disposition: I expect the patient to be discharged to be determined pending on her clinical response and decreased oxygen requirement to reasonable level     D/w son at bedside again on 1/19/22    CODE STATUS:   Code Status and Medical Interventions:   Ordered at: 01/21/22 1806     Medical Intervention Limits:    NO intubation (DNI)     Code Status (Patient has no pulse and is not breathing):    No CPR (Do Not Attempt to Resuscitate)     Medical Interventions (Patient has pulse or is breathing):    Limited Support       Logan Street MD  01/21/22

## 2022-01-22 LAB
ANION GAP SERPL CALCULATED.3IONS-SCNC: 9 MMOL/L (ref 5–15)
BUN SERPL-MCNC: 25 MG/DL (ref 8–23)
BUN/CREAT SERPL: 22.7 (ref 7–25)
CALCIUM SPEC-SCNC: 8.4 MG/DL (ref 8.6–10.5)
CHLORIDE SERPL-SCNC: 104 MMOL/L (ref 98–107)
CO2 SERPL-SCNC: 31 MMOL/L (ref 22–29)
CREAT SERPL-MCNC: 1.1 MG/DL (ref 0.57–1)
DEPRECATED RDW RBC AUTO: 53 FL (ref 37–54)
ERYTHROCYTE [DISTWIDTH] IN BLOOD BY AUTOMATED COUNT: 17.2 % (ref 12.3–15.4)
GFR SERPL CREATININE-BSD FRML MDRD: 49 ML/MIN/1.73
GLUCOSE BLDC GLUCOMTR-MCNC: 119 MG/DL (ref 70–130)
GLUCOSE SERPL-MCNC: 125 MG/DL (ref 65–99)
HCT VFR BLD AUTO: 36.1 % (ref 34–46.6)
HGB BLD-MCNC: 10.9 G/DL (ref 12–15.9)
MAGNESIUM SERPL-MCNC: 2 MG/DL (ref 1.6–2.4)
MCH RBC QN AUTO: 25.8 PG (ref 26.6–33)
MCHC RBC AUTO-ENTMCNC: 30.2 G/DL (ref 31.5–35.7)
MCV RBC AUTO: 85.5 FL (ref 79–97)
PLATELET # BLD AUTO: 205 10*3/MM3 (ref 140–450)
PMV BLD AUTO: 12.1 FL (ref 6–12)
POTASSIUM SERPL-SCNC: 4 MMOL/L (ref 3.5–5.2)
RBC # BLD AUTO: 4.22 10*6/MM3 (ref 3.77–5.28)
SODIUM SERPL-SCNC: 144 MMOL/L (ref 136–145)
WBC NRBC COR # BLD: 9.95 10*3/MM3 (ref 3.4–10.8)

## 2022-01-22 PROCEDURE — 25010000002 PIPERACILLIN SOD-TAZOBACTAM PER 1 G: Performed by: INTERNAL MEDICINE

## 2022-01-22 PROCEDURE — 80048 BASIC METABOLIC PNL TOTAL CA: CPT | Performed by: INTERNAL MEDICINE

## 2022-01-22 PROCEDURE — 99233 SBSQ HOSP IP/OBS HIGH 50: CPT | Performed by: INTERNAL MEDICINE

## 2022-01-22 PROCEDURE — 83735 ASSAY OF MAGNESIUM: CPT | Performed by: INTERNAL MEDICINE

## 2022-01-22 PROCEDURE — 97110 THERAPEUTIC EXERCISES: CPT

## 2022-01-22 PROCEDURE — 97530 THERAPEUTIC ACTIVITIES: CPT

## 2022-01-22 PROCEDURE — 25010000002 METHYLPREDNISOLONE PER 40 MG: Performed by: INTERNAL MEDICINE

## 2022-01-22 PROCEDURE — 85027 COMPLETE CBC AUTOMATED: CPT | Performed by: INTERNAL MEDICINE

## 2022-01-22 PROCEDURE — 25010000002 FUROSEMIDE PER 20 MG: Performed by: INTERNAL MEDICINE

## 2022-01-22 PROCEDURE — 82962 GLUCOSE BLOOD TEST: CPT

## 2022-01-22 RX ORDER — IPRATROPIUM BROMIDE AND ALBUTEROL SULFATE 2.5; .5 MG/3ML; MG/3ML
3 SOLUTION RESPIRATORY (INHALATION) EVERY 4 HOURS PRN
Status: DISCONTINUED | OUTPATIENT
Start: 2022-01-22 | End: 2022-01-23 | Stop reason: HOSPADM

## 2022-01-22 RX ORDER — FUROSEMIDE 10 MG/ML
20 INJECTION INTRAMUSCULAR; INTRAVENOUS ONCE
Status: COMPLETED | OUTPATIENT
Start: 2022-01-22 | End: 2022-01-22

## 2022-01-22 RX ADMIN — FLECAINIDE ACETATE 50 MG: 50 TABLET ORAL at 21:28

## 2022-01-22 RX ADMIN — FUROSEMIDE 20 MG: 10 INJECTION INTRAMUSCULAR; INTRAVENOUS at 13:17

## 2022-01-22 RX ADMIN — ALLOPURINOL 300 MG: 300 TABLET ORAL at 09:19

## 2022-01-22 RX ADMIN — FERROUS SULFATE TAB 325 MG (65 MG ELEMENTAL FE) 325 MG: 325 (65 FE) TAB at 09:18

## 2022-01-22 RX ADMIN — GUAIFENESIN 1200 MG: 600 TABLET, EXTENDED RELEASE ORAL at 09:18

## 2022-01-22 RX ADMIN — DICYCLOMINE HYDROCHLORIDE 10 MG: 10 CAPSULE ORAL at 21:28

## 2022-01-22 RX ADMIN — PANTOPRAZOLE SODIUM 40 MG: 40 TABLET, DELAYED RELEASE ORAL at 06:15

## 2022-01-22 RX ADMIN — ATENOLOL 100 MG: 50 TABLET ORAL at 09:18

## 2022-01-22 RX ADMIN — CETIRIZINE HYDROCHLORIDE 5 MG: 10 TABLET, FILM COATED ORAL at 09:18

## 2022-01-22 RX ADMIN — HYDROXYZINE HYDROCHLORIDE 50 MG: 25 TABLET ORAL at 21:28

## 2022-01-22 RX ADMIN — TAZOBACTAM SODIUM AND PIPERACILLIN SODIUM 4.5 G: 500; 4 INJECTION, SOLUTION INTRAVENOUS at 03:29

## 2022-01-22 RX ADMIN — GUAIFENESIN 1200 MG: 600 TABLET, EXTENDED RELEASE ORAL at 21:28

## 2022-01-22 RX ADMIN — DOXYCYCLINE 100 MG: 100 CAPSULE ORAL at 09:18

## 2022-01-22 RX ADMIN — METHYLPREDNISOLONE SODIUM SUCCINATE 40 MG: 40 INJECTION, POWDER, FOR SOLUTION INTRAMUSCULAR; INTRAVENOUS at 09:19

## 2022-01-22 RX ADMIN — TAZOBACTAM SODIUM AND PIPERACILLIN SODIUM 4.5 G: 500; 4 INJECTION, SOLUTION INTRAVENOUS at 09:19

## 2022-01-22 RX ADMIN — NYSTATIN: 100000 POWDER TOPICAL at 21:30

## 2022-01-22 RX ADMIN — Medication 1 CAPSULE: at 09:18

## 2022-01-22 RX ADMIN — FLUOXETINE HYDROCHLORIDE 40 MG: 20 CAPSULE ORAL at 09:18

## 2022-01-22 RX ADMIN — RIVAROXABAN 15 MG: 15 TABLET, FILM COATED ORAL at 18:21

## 2022-01-22 RX ADMIN — NYSTATIN: 100000 POWDER TOPICAL at 09:19

## 2022-01-22 RX ADMIN — SODIUM CHLORIDE, PRESERVATIVE FREE 10 ML: 5 INJECTION INTRAVENOUS at 09:19

## 2022-01-22 RX ADMIN — SODIUM CHLORIDE, PRESERVATIVE FREE 10 ML: 5 INJECTION INTRAVENOUS at 21:28

## 2022-01-22 RX ADMIN — DOXEPIN HYDROCHLORIDE 40 MG: 10 CAPSULE ORAL at 23:10

## 2022-01-22 RX ADMIN — FLECAINIDE ACETATE 50 MG: 50 TABLET ORAL at 09:18

## 2022-01-22 RX ADMIN — DICYCLOMINE HYDROCHLORIDE 10 MG: 10 CAPSULE ORAL at 09:19

## 2022-01-22 RX ADMIN — TAZOBACTAM SODIUM AND PIPERACILLIN SODIUM 4.5 G: 500; 4 INJECTION, SOLUTION INTRAVENOUS at 15:29

## 2022-01-22 RX ADMIN — TAZOBACTAM SODIUM AND PIPERACILLIN SODIUM 4.5 G: 500; 4 INJECTION, SOLUTION INTRAVENOUS at 23:10

## 2022-01-22 RX ADMIN — ACETAMINOPHEN AND CODEINE PHOSPHATE 1 TABLET: 300; 30 TABLET ORAL at 00:56

## 2022-01-22 RX ADMIN — METHYLPREDNISOLONE SODIUM SUCCINATE 40 MG: 40 INJECTION, POWDER, FOR SOLUTION INTRAMUSCULAR; INTRAVENOUS at 21:28

## 2022-01-22 NOTE — PROGRESS NOTES
"    [facility]  PROGRESS NOTE    Patient Name: Liz Borjas  : 1950  MRN: [id number]    Date of Admission: 2022  Primary Care Physician: Valerie Sesay APRN    Subjective   Subjective     CC:  Shortness of breath    HPI:  Breathing is \"fine\" at rest, w/ exertion dyspnea worsens but overall better today. No fever. No sputum    ROS:  Gen- No fevers, chills  CV- No chest pain, palpitations  Resp- dry cough, dyspnea w/ exertion  GI- no n/v/diarrhea         Objective   Objective     Vital Signs:   Temp:  [97.5 °F (36.4 °C)-97.9 °F (36.6 °C)] 97.8 °F (36.6 °C)  Heart Rate:  [52-61] 56  Resp:  [18] 18  BP: (106-136)/(62-80) 113/63  Flow (L/min):  [6] 6     Physical Exam:  Constitutional:Alert, oriented x 3, frail appearing but nontoxic, pleasant today, no resp distress at rest  Psych:Normal/appropriate affect  HEENT:NCAT, oropharynx clear, nasal canula in place (5Lnc during my visit)  Neck: neck supple, full range of motion  Neuro: Face symmetric, speech clear, equal , moves all extremities  Cardiac: RRR; No pretibial pitting edema  Resp:faint bilateral mid-insp crackles scattered, no overt wheezes, normal effort at rest  GI: abd soft, nontender to palpation, +bs  Skin: No extremity rash  Musculoskeletal/extremities: no cyanosis of extremities; no significant ankle edema        Results Reviewed:  LAB RESULTS:      Lab 22  0514 22  0507 22  0433 22  0806 22  0623 22  0557 22  0557 22  0609 22  0609   WBC 9.95 11.78* 7.88 8.30 6.94   < > 7.51   < > 8.90   HEMOGLOBIN 10.9* 11.5* 10.9* 11.2* 10.3*   < > 11.6*   < > 10.1*   HEMATOCRIT 36.1 37.7 36.2 37.5 34.9   < > 39.3   < > 33.8*   PLATELETS 205 222 178 197 184   < > 231   < > 216   NEUTROS ABS  --   --   --   --   --   --  6.99  --  8.21*   IMMATURE GRANS (ABS)  --   --   --   --   --   --  0.03  --  0.03   LYMPHS ABS  --   --   --   --   --   --  0.26*  --  " 0.31*   MONOS ABS  --   --   --   --   --   --  0.22  --  0.35   EOS ABS  --   --   --   --   --   --  0.00  --  0.00   MCV 85.5 83.2 83.4 86.4 83.9   < > 85.4   < > 84.3   CRP  --   --   --   --   --   --   --   --  4.29*   PROCALCITONIN  --   --   --   --   --   --  0.07  --  0.09    < > = values in this interval not displayed.         Lab 01/22/22  0514 01/21/22  1951 01/21/22  0507 01/20/22  0433 01/19/22  0806 01/18/22  0623 01/18/22  0623 01/17/22  0557 01/17/22  0557 01/16/22  0609 01/16/22  0609   SODIUM 144  --  142 137 142  --  143   < > 141   < > 141   POTASSIUM 4.0 4.3 3.3* 3.6 4.0   < > 3.6   < > 4.4   < > 3.7   CHLORIDE 104  --  102 100 102  --  103   < > 101   < > 101   CO2 31.0*  --  29.0 28.0 31.0*  --  33.0*   < > 31.0*   < > 28.0   ANION GAP 9.0  --  11.0 9.0 9.0  --  7.0   < > 9.0   < > 12.0   BUN 25*  --  22 17 16  --  22   < > 27*   < > 27*   CREATININE 1.10*  --  1.06* 0.84 0.99  --  1.24*   < > 1.37*   < > 1.34*   GLUCOSE 125*  --  119* 136* 95  --  118*   < > 121*   < > 103*   CALCIUM 8.4*  --  8.4* 8.1* 8.4*  --  8.3*   < > 8.3*   < > 8.0*   IONIZED CALCIUM  --   --   --   --  1.20  --   --   --   --   --   --    MAGNESIUM 2.0  --  1.8  --  2.0  --   --   --  3.0*  --  1.8   PHOSPHORUS  --   --   --   --  3.0  --   --   --  4.2  --  4.7*    < > = values in this interval not displayed.         Lab 01/17/22  0557 01/16/22  0609   TOTAL PROTEIN 5.9* 5.2*   ALBUMIN 2.40* 2.50*   GLOBULIN 3.5 2.7   ALT (SGPT) 10 7   AST (SGOT) 15 11   BILIRUBIN 0.3 0.3   ALK PHOS 171* 154*                     Lab 01/19/22  0354 01/18/22  1602   PH, ARTERIAL 7.455* 7.484*   PCO2, ARTERIAL 48.3* 43.5   PO2 ART 79.1* 57.1*   FIO2 50 48   HCO3 ART 33.9* 32.7*   BASE EXCESS ART 8.8* 8.3*   CARBOXYHEMOGLOBIN 1.3 1.2     Brief Urine Lab Results  (Last result in the past 365 days)      Color   Clarity   Blood   Leuk Est   Nitrite   Protein   CREAT   Urine HCG        11/27/21 2334 Yellow   Clear   Negative   Negative    Negative   Trace                 Microbiology Results Abnormal     Procedure Component Value - Date/Time    Blood Culture - Blood, Arm, Left [[id number]]  (Normal) Collected: 01/14/22 1545    Lab Status: Final result Specimen: Blood from Arm, Left Updated: 01/19/22 1600     Blood Culture No growth at 5 days    Blood Culture - Blood, Arm, Right [[id number]]  (Normal) Collected: 01/14/22 1540    Lab Status: Final result Specimen: Blood from Arm, Right Updated: 01/19/22 1600     Blood Culture No growth at 5 days    Respiratory Culture - Sputum, Cough [[id number]] Collected: 01/16/22 0931    Lab Status: Final result Specimen: Sputum from Cough Updated: 01/18/22 1128     Respiratory Culture Light growth (2+) Normal respiratory jeancarlos. No S. aureus or Pseudomonas aeruginosa detected. Final report.     Gram Stain No WBCs per low power field      Rare (1+) Epithelial cells per low power field      Few (2+) Gram negative bacilli      Moderate (3+) Budding yeast    MRSA Screen, PCR (Inpatient) - Swab, Nares [[id number]]  (Normal) Collected: 01/15/22 1920    Lab Status: Final result Specimen: Swab from Nares Updated: 01/15/22 2030     MRSA PCR Negative    Narrative:      MRSA Negative    COVID-19, FLU A/B, RSV PCR - Swab, Nasopharynx [[id number]]  (Normal) Collected: 01/14/22 1313    Lab Status: Final result Specimen: Swab from Nasopharynx Updated: 01/14/22 1402     COVID19 Not Detected     Influenza A PCR Not Detected     Influenza B PCR Not Detected     RSV, PCR Not Detected    Narrative:      Fact sheet for providers: https://www.fda.gov/media/447324/download    Fact sheet for patients: https://www.fda.gov/media/156698/download    Test performed by PCR.          No radiology results from the last 24 hrs        I have reviewed the medications:  Scheduled Meds:allopurinol, 300 mg, Oral, Daily  atenolol, 100 mg, Oral, Daily  cetirizine, 5 mg, Oral, Daily  dicyclomine, 10 mg, Oral, BID  doxepin, 40 mg, Oral, Nightly  ferrous sulfate,  325 mg, Oral, Daily  flecainide, 50 mg, Oral, BID  FLUoxetine, 40 mg, Oral, Daily  guaiFENesin, 1,200 mg, Oral, Q12H  lactobacillus acidophilus, 1 capsule, Oral, Daily  methylPREDNISolone sodium succinate, 40 mg, Intravenous, BID  nystatin, , Topical, Q12H  pantoprazole, 40 mg, Oral, Q AM  pharmacy consult - [place], , Does not apply, Daily  Pharmacy Meds to Bed Consult, , Does not apply, Daily  piperacillin-tazobactam, 4.5 g, Intravenous, Q8H  rivaroxaban, 15 mg, Oral, Daily With Dinner  sodium chloride, 10 mL, Intravenous, Q12H      Continuous Infusions:   PRN Meds:.•  acetaminophen  •  acetaminophen-codeine  •  albuterol  •  hydrOXYzine  •  ipratropium-albuterol  •  magnesium sulfate **OR** magnesium sulfate in D5W 1g/100mL (PREMIX) **OR** magnesium sulfate  •  ondansetron **OR** ondansetron  •  potassium chloride **OR** potassium chloride **OR** potassium chloride  •  sodium chloride  •  sodium chloride  •  traMADol    Assessment/Plan   Assessment & Plan     Active Hospital Problems    Diagnosis  POA   • **Acute exacerbation of idiopathic pulmonary fibrosis (HCC) [J84.112]  Yes   • Stage 3a chronic kidney disease ([facility]) [N18.31]  Yes   • Acute and chronic respiratory failure with hypoxia ([facility]) [J96.21]  Yes   • Chronic anticoagulation [Z79.01]  Not Applicable   • HTN (hypertension) [I10]  Yes   • PAF (paroxysmal atrial fibrillation) ([facility]) [I48.0]  Yes   • COPD (chronic obstructive pulmonary disease) ([facility]) [J44.9]  Yes   • Rheumatoid arthritis with positive rheumatoid factor ([facility]) [M05.9]  Yes   • Anxiety and depression [F41.9, F32.A]  Yes      Resolved Hospital Problems   No resolved problems to display.       Brief Hospital Course to date:  Liz Borjas is a 71 y.o. female with past medical history of COPD, chronic hypoxic respiratory failure on home oxygen, pulmonary fibrosis, rheumatoid arthritis, essential hypertension, A. fib, liver cirrhosis, CKD who presented to the hospital with worsening shortness of breath  for 1 week after steroid taper started and home dose went down to 10 mg daily. She was found to be in respiratory failure requiring high oxygen supplementation at 80% FiO2 through high flow nasal cannula with concern for superimposed bacterial pneumonia    Assessment and plan:  Acute on chronic hypoxic respiratory failure  Bilateral upper lobe pneumonia, present on admission  Severe COPD with exacerbation  Severe pulmonary fibrosis  · Continue IV Zosyn and doxycycline  · MRSA swab is negative.    · sputum culture shows few GNR and budding yeast.  Blood cultures no growth at 2 days  · Continue high-dose IV steroids with Solu-Medrol 60 mg 3 times daily  · Continue duo nebs and pulmonary toilet  · Pulmonary follows: (patient follows with pulmonary associates) --> obviously w/ poor prognosis with baseline oxygen requirements of ~6Lnc recently per patient.. although DNR/DNI (confirmed w/ patient & son 1/21/22) she is full support at this point; weaned Speech eval  · Completed 7 days empiric empiric antibiotics (zosyn & doxy) on 1/21/22  · Repeat lasix 20mg iv x 1 today  · Continue solumedrol 40mg iv bid for now; plan to transition soon to prednisone soon (60mg daily x 5 days, 40mg x 5 days, 30mg x 5 days, 20mg x 5 days, 10mg x 5 days, then stop)  · Home in 1-2 days if respiratory status stable <6L (would prefer 4-5L at discharge), currently on 5Lnc  · Discussed w/ patient and son (via phone) 1/21/22  · Already has f/u appointment w/ Kristyn headley 2/7/22 at 11:30    Rheumatoid arthritis  · Has not been on immunosuppressive therapy in several weeks because of recurrent pneumonia  · Has been missing her appointment with dermatology service at  because of her recurrent hospitalization.      Hx Paroxysmal atrial fibrillation (currently sinus)  Hypertension  · Continue flecainide, atenolol, xarelto  · Holding amlodipine & losartan (borderline low bp), bp acceptable off these meds     CKD stage III (baseline cr  ~1-1.1)    IBS  --diarrhea has with her IBS at home.  WBC wnl.  S/p 1 dose imodium 1/18.  improved     am labs: bmp,mg (lasix 20mg x 1 today)    DVT prophylaxis:  Medical DVT prophylaxis orders are present.  xarelto      AM-PAC 6 Clicks Score (PT): 16 (01/22/22 0800)    Disposition: I expect the patient to be discharged home w/ son (w/ home health) at discharge possibly Sunday 1/23/22 if respiratory status stable/improved, already has home oxygen    D/w son at bedside again on 1/19/22    CODE STATUS:   Code Status and Medical Interventions:   Ordered at: 01/21/22 1805     Medical Intervention Limits:    NO intubation (DNI)     Code Status (Patient has no pulse and is not breathing):    No CPR (Do Not Attempt to Resuscitate)     Medical Interventions (Patient has pulse or is breathing):    Limited Support       Logan Street MD  01/22/22

## 2022-01-22 NOTE — PLAN OF CARE
Goal Outcome Evaluation:  Plan of Care Reviewed With: patient, son        Progress: improving  Outcome Summary: Pt completed 10 reps pulmonary TE given VCs to incorporate adaptive breathing.  Pt independent to don L sock, mod A R sitting up in bed, supervision supine to sit, setup wash face and comb hair,  CGA STS , CGA to take 2 sidesteps toward HOB with RW. Pt's O2 sat dropped from 96%  to 86% on 5 LO2 and recovered to 92% with adaptive breathing.

## 2022-01-22 NOTE — NURSING NOTE
SpO2 reading on 5lpm 95%. No SOB, no distress noted. Patient tolerating well, In bed resting with call light within reach.

## 2022-01-22 NOTE — THERAPY TREATMENT NOTE
Patient Name: Liz Borjas  : 1950    MRN: [id number]                              Today's Date: 2022       Admit Date: 2022    Visit Dx:     ICD-10-CM ICD-9-CM   1. Acute and chronic respiratory failure with hypoxia (HCC)  J96.21 518.84     799.02   2. 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)   • Anxiety and depression   • COPD (chronic obstructive pulmonary disease) (HCC)   • Mild obesity   • Physical deconditioning   • Sepsis (HCC)   • Leukocytosis   • Lactic acidosis   • Hyponatremia   • Acute respiratory failure with hypoxia (HCC)   • Acute and chronic respiratory failure with hypoxia (HCC)   • Elevated d-dimer   • Immunocompromised (HCC)   • PAF (paroxysmal atrial fibrillation) (HCC)   • Chronic anticoagulation   • HTN (hypertension)   • Interstitial lung disease (HCC)   • Stage 3a chronic kidney disease (HCC)   • Acute exacerbation of idiopathic pulmonary fibrosis (HCC)     Past Medical History:   Diagnosis Date   • Arthritis    • Atrial fibrillation (HCC)    • Closed right hip fracture (HCC)    • GERD (gastroesophageal reflux disease)    • Gout    • Hyperlipidemia    • Hypertension    • Osteoporosis    • Rheumatoid arthritis (HCC)    • Wears dentures     upper   • Wears glasses     reading     Past Surgical History:   Procedure Laterality Date   • ANKLE SURGERY Right    • CHOLECYSTECTOMY     • COLONOSCOPY      5 years ago   • HIP FRACTURE SURGERY      Right Hip with Pins   • LUMBAR DISCECTOMY Left 10/12/2016    Procedure: lumbar MICROdiscectomy LEFT L3-5;  Surgeon: Chris Son MD;  Location: [facility];  Service:    • REPLACEMENT TOTAL KNEE      Right Knee   • TOE  AMPUTATION      right baby toe, left second toe      General Information     Row Name 01/22/22 1407          OT Time and Intention    Document Type therapy note (daily note)  -     Mode of Treatment occupational therapy  -     Row Name 01/22/22 1407          General Information    Patient Profile Reviewed yes  -     Existing Precautions/Restrictions fall; oxygen therapy device and L/min  desats easily  -     Row Name 01/22/22 1407          Cognition    Orientation Status (Cognition) oriented x 4  -     Row Name 01/22/22 1407          Safety Issues, Functional Mobility    Impairments Affecting Function (Mobility) balance; endurance/activity tolerance; shortness of breath; strength  -           User Key  (r) = Recorded By, (t) = Taken By, (c) = Cosigned By    Initials Name Provider Type     Chelo Amador, OT Occupational Therapist                 Mobility/ADL's     Row Name 01/22/22 1407          Bed Mobility    Bed Mobility supine-sit; sit-supine  -     All Activities, [place] (Bed Mobility) supervision  -     Supine-Sit [place] (Bed Mobility) contact guard  -     Assistive Device (Bed Mobility) bed rails; head of bed elevated  -     Comment (Bed Mobility) increased time and effort  -     Row Name 01/22/22 1407          Transfers    Transfers sit-stand transfer  -     Sit-Stand [place] (Transfers) contact guard  -[facility] Name 01/22/22 1407          Sit-Stand Transfer    Assistive Device (Sit-Stand Transfers) walker, front-wheeled  -[facility] Name 01/22/22 1407          Functional Mobility    Functional Mobility- Ind. Level contact guard assist  -     Functional Mobility- Device rolling walker  -     Functional Mobility-Distance (Feet) 2  2 sidesteps toward HOB  -     Row Name 01/22/22 1407          Activities of Daily Living    BADL Assessment/Intervention lower body dressing; grooming  -[facility] Name 01/22/22 1407          Grooming Assessment/Training     [place] Level (Grooming) hair care, combing/brushing; wash face, hands; set up  -AC     Position (Grooming) edge of bed sitting  -     Row Name 01/22/22 1407          Lower Body Dressing Assessment/Training    [place] Level (Lower Body Dressing) doff; don; socks  -AC     Position (Lower Body Dressing) long sitting  -AC     Comment (Lower Body Dressing) independent with L sock,  mod A R  -AC           User Key  (r) = Recorded By, (t) = Taken By, (c) = Cosigned By    Initials Name Provider Type     Chelo Amador, OT Occupational Therapist               Obj/Interventions     Row Name 01/22/22 1533          Shoulder (Therapeutic Exercise)    Shoulder AROM (Therapeutic Exercise) bilateral; flexion; extension; horizontal aBduction/aDduction; 10 repetitions  -     Row Name 01/22/22 1533          Elbow/Forearm (Therapeutic Exercise)    Elbow/Forearm (Therapeutic Exercise) AROM (active range of motion)  -     Elbow/Forearm AROM (Therapeutic Exercise) bilateral; flexion; extension  -AC           User Key  (r) = Recorded By, (t) = Taken By, (c) = Cosigned By    Initials Name Provider Type     Chelo Amador, OT Occupational Therapist               Goals/Plan    No documentation.                Clinical Impression     Row Name 01/22/22 1407          Pain Scale: Numbers Pre/Post-Treatment    Pretreatment Pain Rating 0/10 - no pain  -     Posttreatment Pain Rating 0/10 - no pain  -     Row Name 01/22/22 1407          Plan of Care Review    Plan of Care Reviewed With patient; son  -     Progress improving  -     Outcome Summary Pt completed 10 reps pulmonary TE given VCs to incorporate adaptive breathing.  Pt independent to don L sock, mod A R sitting up in bed, supervision supine to sit, setup wash face and comb hair,  CGA STS , CGA to take 2 sidesteps toward HOB with RW. Pt's O2 sat dropped from 96%  to 86% on 5 LO2 and recovered to 92% with adaptive breathing.  -     Row Name 01/22/22 1403           Vital Signs    Pre Systolic BP Rehab 136  -AC     Pre Treatment Diastolic BP 80  -AC     Pretreatment Heart Rate (beats/min) 64  -AC     Posttreatment Heart Rate (beats/min) 57  -AC     Pre SpO2 (%) 96  -AC     O2 Delivery Pre Treatment supplemental O2  -AC     Intra SpO2 (%) 86  -AC     O2 Delivery Intra Treatment supplemental O2  -AC     Post SpO2 (%) 92  -AC     O2 Delivery Post Treatment supplemental O2  -AC     Pre Patient Position Supine  -AC     Intra Patient Position Standing  -AC     Post Patient Position Supine  -AC     Row Name 01/22/22 1407          Positioning and Restraints    Pre-Treatment Position in bed  -AC     Post Treatment Position bed  -AC     In Bed notified nsg; supine; call light within reach; encouraged to call for assist; exit alarm on; with family/caregiver  -AC           User Key  (r) = Recorded By, (t) = Taken By, (c) = Cosigned By    Initials Name Provider Type    AC Chelo Amador, OT Occupational Therapist               Outcome Measures     Row Name 01/22/22 1407          How much help from another is currently needed...    Putting on and taking off regular lower body clothing? 3  -AC     Bathing (including washing, rinsing, and drying) 3  -AC     Toileting (which includes using toilet bed pan or urinal) 3  -AC     Putting on and taking off regular upper body clothing 3  -AC     Taking care of personal grooming (such as brushing teeth) 4  -AC     Eating meals 4  -AC     AM-PAC 6 Clicks Score (OT) 20  -AC     Row Name 01/22/22 0800          How much help from another person do you currently need...    Turning from your back to your side while in flat bed without using bedrails? 3  -DC     Moving from lying on back to sitting on the side of a flat bed without bedrails? 3  -DC     Moving to and from a bed to a chair (including a wheelchair)? 3  -DC     Standing up from a chair using your arms (e.g., wheelchair, bedside chair)? 3  -DC     Climbing 3-5 steps with a railing? 2  -DC      To walk in hospital room? 2  -DC     AM-PAC 6 Clicks Score (PT) 16  -DC           User Key  (r) = Recorded By, (t) = Taken By, (c) = Cosigned By    Initials Name Provider Type     Chelo Amador, OT Occupational Therapist    Shea Reno, RN Registered Nurse                Occupational Therapy Education                 Title: PT OT SLP Therapies (In Progress)     Topic: Occupational Therapy (In Progress)     Point: ADL training (Done)     Description:   Instruct learner(s) on proper safety adaptation and remediation techniques during self care or transfers.   Instruct in proper use of assistive devices.              Learning Progress Summary           Patient Acceptance, E, VU,NR by  at 1/22/2022 1407    Acceptance, E, VU by  at 1/15/2022 1154   Family Acceptance, E, VU by  at 1/15/2022 1154                   Point: Home exercise program (Not Started)     Description:   Instruct learner(s) on appropriate technique for monitoring, assisting and/or progressing therapeutic exercises/activities.              Learner Progress:  Not documented in this visit.          Point: Precautions (Done)     Description:   Instruct learner(s) on prescribed precautions during self-care and functional transfers.              Learning Progress Summary           Patient Acceptance, E, VU by  at 1/15/2022 1154   Family Acceptance, E, VU by  at 1/15/2022 1154                   Point: Body mechanics (Not Started)     Description:   Instruct learner(s) on proper positioning and spine alignment during self-care, functional mobility activities and/or exercises.              Learner Progress:  Not documented in this visit.                      User Key     Initials Effective Dates Name Provider Type Discipline     06/16/21 -  Chelo Amador, OT Occupational Therapist OT     06/16/21 -  Zenaida Figueroa OT Occupational Therapist OT              OT Recommendation and Plan     Plan of Care Review  Plan of Care Reviewed With:  patient, son  Progress: improving  Outcome Summary: Pt completed 10 reps pulmonary TE given VCs to incorporate adaptive breathing.  Pt independent to don L sock, mod A R sitting up in bed, supervision supine to sit, setup wash face and comb hair,  CGA STS , CGA to take 2 sidesteps toward HOB with RW. Pt's O2 sat dropped from 96%  to 86% on 5 LO2 and recovered to 92% with adaptive breathing.     Time Calculation:    Time Calculation- OT     Row Name 01/22/22 1407             Time Calculation- OT    OT Start Time 1407  -AC      OT Received On 01/22/22  -AC      OT Goal Re-Cert Due Date 01/25/22  -AC              Timed Charges    69389 - OT Therapeutic Exercise Minutes 8  -AC      81327 - OT Therapeutic Activity Minutes 12  -AC      28740 - OT Self Care/Mgmt Minutes 5  -AC              Total Minutes    Timed Charges Total Minutes 25  -AC       Total Minutes 25  -AC            User Key  (r) = Recorded By, (t) = Taken By, (c) = Cosigned By    Initials Name Provider Type    AC Chelo Amador, OT Occupational Therapist              Therapy Charges for Today     Code Description Service Date Service Provider Modifiers Qty    09242526841  OT THER PROC EA 15 MIN 1/22/2022 Chelo Amador OT GO 1    [id number]  OT THERAPEUTIC ACT EA 15 MIN 1/22/2022 Chelo Amador OT GO 1               Chelo Amador OT  1/22/2022

## 2022-01-22 NOTE — PLAN OF CARE
VSS. Sinus Romero. No complaints of pain. 6L all night    Problem: Adult Inpatient Plan of Care  Goal: Plan of Care Review  Outcome: Ongoing, Progressing  Goal: Patient-Specific Goal (Individualized)  Outcome: Ongoing, Progressing  Goal: Absence of Hospital-Acquired Illness or Injury  Outcome: Ongoing, Progressing  Intervention: Identify and Manage Fall Risk  Recent Flowsheet Documentation  Taken 1/22/2022 0400 by Kory Mascorro, RN  Safety Promotion/Fall Prevention:   activity supervised   assistive device/personal items within reach   clutter free environment maintained   fall prevention program maintained   nonskid shoes/slippers when out of bed   room organization consistent   safety round/check completed  Taken 1/22/2022 0000 by Kory Mascorro, RN  Safety Promotion/Fall Prevention:   activity supervised   assistive device/personal items within reach   clutter free environment maintained   fall prevention program maintained   lighting adjusted   nonskid shoes/slippers when out of bed   room organization consistent   safety round/check completed  Intervention: Prevent Skin Injury  Recent Flowsheet Documentation  Taken 1/22/2022 0400 by Kory Mascorro, RN  Body Position: position changed independently  Taken 1/22/2022 0000 by Kory Mascorro, RN  Body Position: position changed independently  Taken 1/21/2022 2000 by Kory Mascorro, RN  Body Position: position changed independently  Skin Protection:   adhesive use limited   incontinence pads utilized   transparent dressing maintained  Goal: Optimal Comfort and Wellbeing  Outcome: Ongoing, Progressing  Goal: Readiness for Transition of Care  Outcome: Ongoing, Progressing     Problem: COPD Comorbidity  Goal: Maintenance of COPD Symptom Control  Outcome: Ongoing, Progressing  Intervention: Maintain COPD-Symptom Control  Recent Flowsheet Documentation  Taken 1/21/2022 2000 by Kory Mascorro, RN  Medication Review/Management:  medications reviewed     Problem: Hypertension Comorbidity  Goal: Blood Pressure in Desired Range  Outcome: Ongoing, Progressing  Intervention: Maintain Hypertension-Management Strategies  Recent Flowsheet Documentation  Taken 1/21/2022 2000 by Kory Mascorro, RN  Medication Review/Management: medications reviewed     Problem: Skin Injury Risk Increased  Goal: Skin Health and Integrity  Outcome: Ongoing, Progressing  Intervention: Optimize Skin Protection  Recent Flowsheet Documentation  Taken 1/22/2022 0400 by Kory Mascorro, RN  Head of Bed (HOB): HOB elevated  Taken 1/22/2022 0000 by Kory Mascorro, RN  Head of Bed (HOB): HOB elevated  Taken 1/21/2022 2000 by Kory Mascorro, RN  Pressure Reduction Techniques: weight shift assistance provided  Head of Bed (HOB): HOB elevated  Pressure Reduction Devices: specialty bed utilized  Skin Protection:   adhesive use limited   incontinence pads utilized   transparent dressing maintained     Problem: Adjustment to Illness COPD (Chronic Obstructive Pulmonary Disease)  Goal: Optimal Chronic Illness Coping  Outcome: Ongoing, Progressing     Problem: Functional Ability Impaired COPD (Chronic Obstructive Pulmonary Disease)  Goal: Optimal Level of Functional [place]  Outcome: Ongoing, Progressing  Intervention: Optimize Functional Ability  Recent Flowsheet Documentation  Taken 1/22/2022 0400 by Kory Mascorro, RN  Activity Management: activity adjusted per tolerance  Taken 1/22/2022 0000 by Kory Mascorro, RN  Activity Management: activity adjusted per tolerance     Problem: Infection COPD (Chronic Obstructive Pulmonary Disease)  Goal: Absence of Infection Signs and Symptoms  Outcome: Ongoing, Progressing     Problem: Oral Intake Inadequate COPD (Chronic Obstructive Pulmonary Disease)  Goal: Improved Nutrition Intake  Outcome: Ongoing, Progressing     Problem: Respiratory Compromise COPD (Chronic Obstructive Pulmonary Disease)  Goal:  Effective Oxygenation and Ventilation  Outcome: Ongoing, Progressing  Intervention: Promote Airway Secretion Clearance  Recent Flowsheet Documentation  Taken 1/22/2022 0400 by Kory Mascorro, RN  Activity Management: activity adjusted per tolerance  Taken 1/22/2022 0000 by Kory Mascorro, RN  Activity Management: activity adjusted per tolerance  Intervention: Optimize Oxygenation and Ventilation  Recent Flowsheet Documentation  Taken 1/22/2022 0400 by Kory Mascorro, RN  Head of Bed (HOB): HOB elevated  Taken 1/22/2022 0000 by Kory Mascorro, RN  Head of Bed (HOB): HOB elevated  Taken 1/21/2022 2000 by Kory Mascorro, RN  Head of Bed (HOB): HOB elevated   Goal Outcome Evaluation:

## 2022-01-23 ENCOUNTER — READMISSION MANAGEMENT (OUTPATIENT)
Dept: CALL CENTER | Facility: HOSPITAL | Age: 72
End: 2022-01-23

## 2022-01-23 VITALS
SYSTOLIC BLOOD PRESSURE: 126 MMHG | BODY MASS INDEX: 24.84 KG/M2 | HEIGHT: 62 IN | OXYGEN SATURATION: 90 % | TEMPERATURE: 97.4 F | RESPIRATION RATE: 18 BRPM | HEART RATE: 56 BPM | DIASTOLIC BLOOD PRESSURE: 72 MMHG | WEIGHT: 135 LBS

## 2022-01-23 LAB
ANION GAP SERPL CALCULATED.3IONS-SCNC: 8 MMOL/L (ref 5–15)
BUN SERPL-MCNC: 25 MG/DL (ref 8–23)
BUN/CREAT SERPL: 25.3 (ref 7–25)
CALCIUM SPEC-SCNC: 8.7 MG/DL (ref 8.6–10.5)
CHLORIDE SERPL-SCNC: 100 MMOL/L (ref 98–107)
CO2 SERPL-SCNC: 31 MMOL/L (ref 22–29)
CREAT SERPL-MCNC: 0.99 MG/DL (ref 0.57–1)
GFR SERPL CREATININE-BSD FRML MDRD: 56 ML/MIN/1.73
GLUCOSE SERPL-MCNC: 129 MG/DL (ref 65–99)
MAGNESIUM SERPL-MCNC: 2 MG/DL (ref 1.6–2.4)
POTASSIUM SERPL-SCNC: 3.9 MMOL/L (ref 3.5–5.2)
SODIUM SERPL-SCNC: 139 MMOL/L (ref 136–145)

## 2022-01-23 PROCEDURE — 25010000002 PIPERACILLIN SOD-TAZOBACTAM PER 1 G: Performed by: INTERNAL MEDICINE

## 2022-01-23 PROCEDURE — 99239 HOSP IP/OBS DSCHRG MGMT >30: CPT | Performed by: NURSE PRACTITIONER

## 2022-01-23 PROCEDURE — 80048 BASIC METABOLIC PNL TOTAL CA: CPT | Performed by: INTERNAL MEDICINE

## 2022-01-23 PROCEDURE — 83735 ASSAY OF MAGNESIUM: CPT | Performed by: INTERNAL MEDICINE

## 2022-01-23 PROCEDURE — 25010000002 METHYLPREDNISOLONE PER 40 MG: Performed by: INTERNAL MEDICINE

## 2022-01-23 RX ORDER — PREDNISONE 10 MG/1
TABLET ORAL
Qty: 75 TABLET | Refills: 0 | Status: SHIPPED | OUTPATIENT
Start: 2022-01-23 | End: 2022-02-12

## 2022-01-23 RX ORDER — L.ACID,PARA/B.BIFIDUM/S.THERM 8B CELL
1 CAPSULE ORAL DAILY
Qty: 14 CAPSULE | Refills: 0 | Status: SHIPPED | OUTPATIENT
Start: 2022-01-24 | End: 2022-02-07

## 2022-01-23 RX ORDER — GUAIFENESIN 600 MG/1
1200 TABLET, EXTENDED RELEASE ORAL EVERY 12 HOURS SCHEDULED
Qty: 56 TABLET | Refills: 0 | Status: SHIPPED | OUTPATIENT
Start: 2022-01-23 | End: 2022-02-06

## 2022-01-23 RX ORDER — PREDNISONE 10 MG/1
10 TABLET ORAL DAILY
Qty: 30 TABLET | Refills: 0 | Status: SHIPPED | OUTPATIENT
Start: 2022-01-23 | End: 2022-02-21 | Stop reason: ALTCHOICE

## 2022-01-23 RX ADMIN — DICYCLOMINE HYDROCHLORIDE 10 MG: 10 CAPSULE ORAL at 09:57

## 2022-01-23 RX ADMIN — GUAIFENESIN 1200 MG: 600 TABLET, EXTENDED RELEASE ORAL at 09:56

## 2022-01-23 RX ADMIN — FERROUS SULFATE TAB 325 MG (65 MG ELEMENTAL FE) 325 MG: 325 (65 FE) TAB at 09:57

## 2022-01-23 RX ADMIN — CETIRIZINE HYDROCHLORIDE 5 MG: 10 TABLET, FILM COATED ORAL at 09:57

## 2022-01-23 RX ADMIN — NYSTATIN: 100000 POWDER TOPICAL at 09:56

## 2022-01-23 RX ADMIN — FLUOXETINE HYDROCHLORIDE 40 MG: 20 CAPSULE ORAL at 09:56

## 2022-01-23 RX ADMIN — SODIUM CHLORIDE, PRESERVATIVE FREE 10 ML: 5 INJECTION INTRAVENOUS at 09:57

## 2022-01-23 RX ADMIN — METHYLPREDNISOLONE SODIUM SUCCINATE 40 MG: 40 INJECTION, POWDER, FOR SOLUTION INTRAMUSCULAR; INTRAVENOUS at 09:56

## 2022-01-23 RX ADMIN — PANTOPRAZOLE SODIUM 40 MG: 40 TABLET, DELAYED RELEASE ORAL at 05:42

## 2022-01-23 RX ADMIN — TAZOBACTAM SODIUM AND PIPERACILLIN SODIUM 4.5 G: 500; 4 INJECTION, SOLUTION INTRAVENOUS at 09:53

## 2022-01-23 RX ADMIN — Medication 1 CAPSULE: at 09:56

## 2022-01-23 RX ADMIN — ALLOPURINOL 300 MG: 300 TABLET ORAL at 09:56

## 2022-01-23 RX ADMIN — FLECAINIDE ACETATE 50 MG: 50 TABLET ORAL at 09:56

## 2022-01-23 NOTE — PLAN OF CARE
Goal Outcome Evaluation:  Plan of Care Reviewed With: patient        Progress: improving  Outcome Summary: SB on monitor; A&O; LS diminished; nonproductive cough;breathing is shallow with abdominal muscles; remains on 5liters of 02; resting with eyes closed in no apparent distress; bed alarm activated for pt safety, call light within her reach; will continue to monitor

## 2022-01-23 NOTE — CASE MANAGEMENT/SOCIAL WORK
Case Management Discharge Note      Final Note: I notified Nurse on Call that Ms. Borjas is discharging today. I have faxed the DCS to them. Son to transport home. No other discharge needs identified.         Selected Continued Care - Admitted Since 1/14/2022     Destination    No services have been selected for the patient.              Durable Medical Equipment    No services have been selected for the patient.              Dialysis/Infusion    No services have been selected for the patient.              Home Medical Care Coordination complete.    Service Provider Selected Services Address Phone Fax Patient Preferred    [facility] [address] [facility] [address] [phone] --          Therapy    No services have been selected for the patient.              Community Resources    No services have been selected for the patient.              Community & DME    No services have been selected for the patient.                          Final Discharge Disposition Code: 06 - home with home health care

## 2022-01-23 NOTE — DISCHARGE PLACEMENT REQUEST
"Liz Borjas (71 y.o. Female)             Date of Birth Social Security Number Address Home Phone MRN    1950  [address] [place] [address] [phone] [id number]    Yazidi Marital Status             Restoration        Admission Date Admission Type Admitting Provider Attending Provider Department, Room/Bed    1/14/22 Emergency [address] MD West, Christopher R, MD [address], S[address]/1    Discharge Date Discharge Disposition Discharge Destination           Home-Health Care [facility]              Attending Provider: [address] MD    Allergies: Moexipril-hydrochlorothiazide, Penicillins, Sulfa Antibiotics, Ace Inhibitors    Isolation: None   Infection: None   Code Status: No CPR   Advance Care Planning Activity    Ht: 157.5 cm (62\")   Wt: 61.2 kg (135 lb)    Admission Cmt: None   Principal Problem: Acute exacerbation of idiopathic pulmonary fibrosis (HCC) [J84.112]                 Active Insurance as of 1/14/2022     Primary Coverage     Payor Plan Insurance Group Employer/Plan Group    MEDICARE MEDICARE B ONLY      Payor Plan Address Payor Plan Phone Number Payor Plan Fax Number Effective Dates    [address] [address] [phone]  2/1/2017 - None Entered    [facility] [address]       Subscriber Name Subscriber Birth Date Member ID       LIZ BORJAS 1950 [id number]           Secondary Coverage     Payor Plan Insurance Group Employer/Plan Group    [place]      Payor Plan Address Payor Plan Phone Number Payor Plan Fax Number Effective Dates    [address]   1/1/2014 - None Entered    [facility] [id number]       Subscriber Name Subscriber Birth Date Member ID       LIZ BORJAS 1950 [id number]                 Emergency Contacts      (Rel.) Home Phone Work Phone Mobile Phone    Ej Borjas (Son) -- -- [phone]    JOSE BORJAS (Daughter) -- -- [phone]               Discharge Summary    "   Evelia Freedman APRN at [phone]              Psychiatric Medicine Services  DISCHARGE SUMMARY    Patient Name: Liz Borjas  : 1950  MRN: [id number]    Date of Admission: 2022 12:48 PM  Date of Discharge:  2022  Primary Care Physician: Valerie Sesay APRN    Consults     Date and Time Order Name Status Description    1/15/2022  9:02 AM Inpatient Pulmonology Consult Completed           Hospital Course     Presenting Problem:   Acute and chronic respiratory failure with hypoxia (HCC) [J96.21]    Active Hospital Problems    Diagnosis  POA   • **Acute exacerbation of idiopathic pulmonary fibrosis (HCC) [J84.112]  Yes   • Stage 3a chronic kidney disease (HCC) [N18.31]  Yes   • Acute and chronic respiratory failure with hypoxia (HCC) [J96.21]  Yes   • Chronic anticoagulation [Z79.01]  Not Applicable   • HTN (hypertension) [I10]  Yes   • PAF (paroxysmal atrial fibrillation) (HCC) [I48.0]  Yes   • COPD (chronic obstructive pulmonary disease) (HCC) [J44.9]  Yes   • Rheumatoid arthritis with positive rheumatoid factor (HCC) [M05.9]  Yes   • Anxiety and depression [F41.9, F32.A]  Yes      Resolved Hospital Problems   No resolved problems to display.          Hospital Course:  Liz Borjas is a 71 y.o. female with past medical history of COPD, chronic hypoxic respiratory failure on home oxygen, pulmonary fibrosis, rheumatoid arthritis, essential hypertension, A. fib, liver cirrhosis, CKD who presented to the hospital with worsening shortness of breath for 1 week after steroid taper started and home dose went down to 10 mg daily. She was found to be in respiratory failure requiring high oxygen supplementation at 80% FiO2 through high flow nasal cannula with concern for superimposed bacterial pneumonia     Assessment and plan:  Acute on chronic hypoxic respiratory failure  Bilateral upper lobe pneumonia, present on admission  Severe COPD with exacerbation  Severe  pulmonary fibrosis  · MRSA swab is negative.    · sputum culture shows few GNR and budding yeast.  Blood cultures NGTD  · s/p high-dose IV steroids with Solu-Medrol 60 mg 3 times daily  · Continue duo nebs and pulmonary toilet  · Pulmonary follows: (patient follows with pulmonary associates) --> obviously w/ poor prognosis with baseline oxygen requirements of ~6Lnc recently per patient.. although DNR/DNI  she is full support at this point; weaned Speech eval  · Completed 7 days empiric empiric antibiotics (zosyn & doxy) on 1/21/22  · s/p lasix 20mg iv 1/22  · -- Pulm recommends sat 22-92%  · Continue solumedrol 40mg iv bid for now; plan to transition soon to prednisone soon (60mg daily x 5 days, 40mg x 5 days, 30mg x 5 days, 20mg x 5 days, 10mg x 5 days, then stop)  · Already has f/u appointment w/ Kristyn headley 2/7/22 at 11:30     Rheumatoid arthritis  · Has not been on immunosuppressive therapy in several weeks because of recurrent pneumonia  · Has been missing her appointment with dermatology service at  because of her recurrent hospitalization.      Hx Paroxysmal atrial fibrillation (currently sinus)  Hypertension  · Continue flecainide, atenolol, xarelto  · -- xarelto decreased to 15 mg based on kidney function   · Holding amlodipine & losartan (borderline low bp), bp acceptable off these meds     CKD stage III (baseline cr ~1-1.1)  -- xarelto was dosed based on CrCl     IBS  --diarrhea has with her IBS at home.  WBC wnl.  S/p 1 dose imodium 1/18.  improved     [facility]       Discharge Follow Up Recommendations for outpatient labs/diagnostics:   follow up with pcp one week   Keep schedueled appt with Pulmonary 2/7/22    Day of Discharge     HPI:   Patient is sitting up in bed in NAD. She states breathing is at baseline. She slept well. Tolerating diet. Able to move around in room no acute events overnight per nursing.     Review of Systems  Gen-  No fevers, chills  CV- No chest pain, palpitations  Resp- No cough, dyspnea w/exertion   GI- No N/V/D, abd pain        Vital Signs:   Temp:  [97.4 °F (36.3 °C)-97.8 °F (36.6 °C)] 97.4 °F (36.3 °C)  Heart Rate:  [52-62] 58  Resp:  [16-18] 18  BP: (113-136)/(63-87) 126/72  Flow (L/min):  [5] 5      Physical Exam:  Constitutional: No acute distress, awake, alert, frail   HENT: NCAT, mucous membranes moist  Respiratory: keagan inspiratory crackles, respiratory effort normal 5L 93%  Cardiovascular: RRR, no murmurs, rubs, or gallops  Gastrointestinal: Positive bowel sounds, soft, nontender, nondistended  Musculoskeletal: No bilateral ankle edema  Psychiatric: Appropriate affect, cooperative  Neurologic: Oriented x 3, strength symmetric in all extremities, Cranial Nerves grossly intact to confrontation, speech clear  Skin: No rashes      Pertinent  and/or Most Recent Results     LAB RESULTS:      Lab 01/22/22  0514 01/21/22  0507 01/20/22  0433 01/19/22  0806 01/18/22  0623 01/17/22  0557 01/17/22  0557   WBC 9.95 11.78* 7.88 8.30 6.94   < > 7.51   HEMOGLOBIN 10.9* 11.5* 10.9* 11.2* 10.3*   < > 11.6*   HEMATOCRIT 36.1 37.7 36.2 37.5 34.9   < > 39.3   PLATELETS 205 222 178 197 184   < > 231   NEUTROS ABS  --   --   --   --   --   --  6.99   IMMATURE GRANS (ABS)  --   --   --   --   --   --  0.03   LYMPHS ABS  --   --   --   --   --   --  0.26*   MONOS ABS  --   --   --   --   --   --  0.22   EOS ABS  --   --   --   --   --   --  0.00   MCV 85.5 83.2 83.4 86.4 83.9   < > 85.4   PROCALCITONIN  --   --   --   --   --   --  0.07    < > = values in this interval not displayed.         Lab 01/23/22  0530 01/22/22  0514 01/21/22  1951 01/21/22  0507 01/20/22  0433 01/19/22  0806 01/19/22  0806 01/18/22  0623 01/17/22  0557   SODIUM 139 144  --  142 137  --  142   < > 141   POTASSIUM 3.9 4.0 4.3 3.3* 3.6   < > 4.0   < > 4.4   CHLORIDE 100 104  --  102 100  --  102   < > 101   CO2 31.0* 31.0*  --  29.0 28.0  --  31.0*   < > 31.0*    ANION GAP 8.0 9.0  --  11.0 9.0  --  9.0   < > 9.0   BUN 25* 25*  --  22 17  --  16   < > 27*   CREATININE 0.99 1.10*  --  1.06* 0.84  --  0.99   < > 1.37*   GLUCOSE 129* 125*  --  119* 136*  --  95   < > 121*   CALCIUM 8.7 8.4*  --  8.4* 8.1*  --  8.4*   < > 8.3*   IONIZED CALCIUM  --   --   --   --   --   --  1.20  --   --    MAGNESIUM 2.0 2.0  --  1.8  --   --  2.0  --  3.0*   PHOSPHORUS  --   --   --   --   --   --  3.0  --  4.2    < > = values in this interval not displayed.         Lab 01/17/22  0557   TOTAL PROTEIN 5.9*   ALBUMIN 2.40*   GLOBULIN 3.5   ALT (SGPT) 10   AST (SGOT) 15   BILIRUBIN 0.3   ALK PHOS 171*                     Lab 01/19/22  0354 01/18/22  1602   PH, ARTERIAL 7.455* 7.484*   PCO2, ARTERIAL 48.3* 43.5   PO2 ART 79.1* 57.1*   FIO2 50 48   HCO3 ART 33.9* 32.7*   BASE EXCESS ART 8.8* 8.3*   CARBOXYHEMOGLOBIN 1.3 1.2     Brief Urine Lab Results  (Last result in the past 365 days)      Color   Clarity   Blood   Leuk Est   Nitrite   Protein   CREAT   Urine HCG        11/27/21 2334 Yellow   Clear   Negative   Negative   Negative   Trace               Microbiology Results (last 10 days)     Procedure Component Value - Date/Time    Respiratory Culture - Sputum, Cough [[id number]] Collected: 01/16/22 0931    Lab Status: Final result Specimen: Sputum from Cough Updated: 01/18/22 1128     Respiratory Culture Light growth (2+) Normal respiratory jeancarlos. No S. aureus or Pseudomonas aeruginosa detected. Final report.     Gram Stain No WBCs per low power field      Rare (1+) Epithelial cells per low power field      Few (2+) Gram negative bacilli      Moderate (3+) Budding yeast    MRSA Screen, PCR (Inpatient) - Swab, Nares [[id number]]  (Normal) Collected: 01/15/22 1920    Lab Status: Final result Specimen: Swab from Nares Updated: 01/15/22 2030     MRSA PCR Negative    Narrative:      MRSA Negative    Blood Culture - Blood, Arm, Left [[id number]]  (Normal) Collected: 01/14/22 1545    Lab Status: Final  result Specimen: Blood from Arm, Left Updated: 01/19/22 1600     Blood Culture No growth at 5 days    Blood Culture - Blood, Arm, Right [[id number]]  (Normal) Collected: 01/14/22 1540    Lab Status: Final result Specimen: Blood from Arm, Right Updated: 01/19/22 1600     Blood Culture No growth at 5 days    COVID-19, FLU A/B, RSV PCR - Swab, Nasopharynx [[id number]]  (Normal) Collected: 01/14/22 1313    Lab Status: Final result Specimen: Swab from Nasopharynx Updated: 01/14/22 1402     COVID19 Not Detected     Influenza A PCR Not Detected     Influenza B PCR Not Detected     RSV, PCR Not Detected    Narrative:      Fact sheet for providers: https://www.fda.gov/media/099666/download    Fact sheet for patients: https://www.fda.gov/media/921543/download    Test performed by PCR.          CT Chest Without Contrast Diagnostic    Result Date: 1/15/2022  EXAMINATION: CT CHEST WO CONTRAST DIAGNOSTIC-  INDICATION: Respiratory failure  TECHNIQUE: Axial noncontrast CT of the chest with multiplanar reconstruction  The radiation dose reduction device was turned on for each scan per the ALARA (As Low as Reasonably Achievable) protocol.  COMPARISON: 11/27/2021  FINDINGS: No pathologic axillary adenopathy or other worrisome body wall soft tissue finding in the chest. No acute findings in the partially imaged upper abdomen. No pleural or pericardial effusion. No pathologic mediastinal or hilar lymphadenopathy. Mildly atherosclerotic nonaneurysmal thoracic aorta. Evaluation of the osseous structures demonstrates no evidence of acute fracture or aggressive osseous lesion. Evaluation of the lung fields redemonstrates extensive findings of pulmonary fibrosis, with superimposed areas of alveolar groundglass opacity, most suspicious for superimposed pneumonia.      Redemonstrated extensive fibrotic changes, with increased intervening diffuse alveolar groundglass opacity most consistent with superimposed pneumonia.   This report was finalized  on 1/15/2022 1:31 PM by Steve Chairez.      XR Chest 1 View    Result Date: 1/14/2022  PROCEDURE: CR Chest 1 Vw COMPARISON:  January 14 12:59 PM INDICATIONS: resp failure; Acute and chronic respiratory failure with hypoxia; Pulmonary fibrosis, unspecified TECHNIQUE: Single AP  view of the chest FINDINGS:  Cardiomediastinal silhouette is stable. Lung volumes remain low with continued bilateral infiltrates with interstitial component. According to prior reports patient has underlying fibrosis. No acute osseous abnormality.     No significant change from previous exam performed same day at 12:59 PM.  Signer Name: Federica Blancas MD  Signed: 1/14/2022 8:20 PM  Workstation Name: [facility]  [facility]    XR Chest 1 View    Result Date: 1/14/2022  EXAMINATION: XR CHEST 1 VW-  INDICATION: SOA triage protocol  COMPARISON: 12/20/2021  FINDINGS: Redemonstrated extensive fibrotic appearance of the lung fields, with appearance suggesting some superimposed opacities, edema or pneumonia in both upper lobes. There is otherwise no enlarging effusion or thorax. Unchanged heart and mediastinal contours.      Redemonstrated extensive fibrotic appearance of the lung fields, with appearance suggesting some superimposed opacities, edema or pneumonia in both upper lobes.  This report was finalized on 1/14/2022 1:43 PM by Steve Chairez.                    Plan for Follow-up of Pending Labs/Results:     Discharge Details        Discharge Medications      New Medications      Instructions Start Date   guaiFENesin 600 MG 12 hr tablet  Commonly known as: MUCINEX   1,200 mg, Oral, Every 12 Hours Scheduled      lactobacillus acidophilus capsule capsule   1 capsule, Oral, Daily   Start Date: January 24, 2022        Changes to Medications      Instructions Start Date   predniSONE 10 MG tablet  Commonly known as: DELTASONE  What changed: additional instructions   10 mg, Oral, Daily, Resume home dose after taper is  complete      predniSONE 10 MG tablet  Commonly known as: DELTASONE  What changed: You were already taking a medication with the same name, and this prescription was added. Make sure you understand how and when to take each.   10 mg, Oral, Daily, (60mg daily x 5 days, 40mg x 5 days, 30mg x 5 days, 20mg x 5 days, then resume home dose      rivaroxaban 15 MG tablet  Commonly known as: XARELTO  What changed:   · medication strength  · how much to take  · when to take this   15 mg, Oral, Daily With Dinner         Continue These Medications      Instructions Start Date   albuterol sulfate  (90 Base) MCG/ACT inhaler  Commonly known as: PROVENTIL HFA;VENTOLIN HFA;PROAIR HFA   2 puffs, Inhalation, Every 4 Hours PRN      albuterol 1.25 MG/3ML nebulizer solution  Commonly known as: ACCUNEB   1.25 mg, Nebulization, Every 6 Hours PRN      allopurinol 300 MG tablet  Commonly known as: ZYLOPRIM   300 mg, Daily      atenolol 100 MG tablet  Commonly known as: TENORMIN   100 mg, Daily      dicyclomine 10 MG capsule  Commonly known as: BENTYL   10 mg, Oral, 2 Times Daily      doxepin 10 MG capsule  Commonly known as: SINEquan   40 mg, Oral, Nightly      FeroSul 325 (65 FE) MG tablet  Generic drug: ferrous sulfate   1 tablet, Oral, Daily      flecainide 50 MG tablet  Commonly known as: TAMBOCOR   50 mg, Oral, 2 Times Daily      FLUoxetine 40 MG capsule  Commonly known as: PROzac   40 mg, Oral, Daily      nystatin 100,000 unit/mL suspension  Commonly known as: MYCOSTATIN   5 mL, Oral, 4 Times Daily, Swish and Swallow      RABEprazole 20 MG EC tablet  Commonly known as: ACIPHEX   20 mg, Oral, 2 Times Daily      Robafen DM Cgh/Chest Congest  MG/5ML liquid  Generic drug: dextromethorphan-guaifenesin   10 mL, Oral, 3 Times Daily PRN      traMADol 50 MG tablet  Commonly known as: ULTRAM   50 mg, Oral, Every 6 Hours PRN      ZyrTEC Allergy 10 MG capsule  Generic drug: Cetirizine HCl   10 mg, Oral, Daily PRN         Stop These  Medications    amLODIPine 10 MG tablet  Commonly known as: NORVASC     doxycycline 100 MG capsule  Commonly known as: VIBRAMYCIN     leflunomide 20 MG tablet  Commonly known as: ARAVA     losartan 50 MG tablet  Commonly known as: COZAAR            Allergies   Allergen Reactions   • Moexipril-Hydrochlorothiazide Anaphylaxis   • Penicillins Hives     Tolerates ancef, rocephin, Zosyn   • Sulfa Antibiotics Rash     Also affects kidney function   • Ace Inhibitors Hives         Discharge Disposition:  Home-Health Care Svc    Diet:  Hospital:  Diet Order   Procedures   • Diet Regular; Thin; GI Soft       Activity:  Activity Instructions     Activity as Tolerated      Measure Blood Pressure            Restrictions or Other Recommendations:         CODE STATUS:    Code Status and Medical Interventions:   Ordered at: 01/21/22 1809     Medical Intervention Limits:    NO intubation (DNI)     Code Status (Patient has no pulse and is not breathing):    No CPR (Do Not Attempt to Resuscitate)     Medical Interventions (Patient has pulse or is breathing):    Limited Support       Future Appointments   Date Time Provider Department Center   2/7/2022 10:45 AM MEGAN YANICK CT 1  MEGAN  [place]   2/7/2022 11:30 AM Kristyn Haro, ANITHA [facility]       Additional Instructions for the Follow-ups that You Need to Schedule     Ambulatory Referral to Home Health   As directed      Face to Face Visit Date: 1/17/2022    Follow-up provider for Plan of Care?: I treated the patient in an acute care facility and will not continue treatment after discharge.    Follow-up provider: ZACH AGUILAR [[id number]]    Reason/Clinical Findings: pulmonary fibrosis    Describe mobility limitations that make leaving home difficult: impaireed functional mobility, balance, gait and endurance    Nursing/Therapeutic Services Requested: Skilled Nursing Physical Therapy    Skilled nursing orders: Other (resume home health)    PT orders: Other (add  comment) (resume home health)    Frequency: 1 Week 1         Discharge Follow-up with PCP   As directed       Currently Documented PCP:    Valerie Sesay APRN    PCP Phone Number:    [phone]     Follow Up Details: follow up with pcp one week         Discharge Follow-up with Specified Provider: keep scheduled follow up with pulmonary   As directed      To: keep scheduled follow up with pulmonary                     ANITHA Ogden  01/23/22      Time Spent on Discharge:  I spent  35 minutes on this discharge activity which included: face-to-face encounter with the patient, reviewing the data in the system, coordination of the care with the nursing staff as well as consultants, documentation, and entering orders.            Electronically signed by Evelia Freedman APRN at 01/23/22 1065

## 2022-01-23 NOTE — DISCHARGE SUMMARY
[facility] Medicine Services  DISCHARGE SUMMARY    Patient Name: Liz Borjas  : 1950  MRN: [id number]    Date of Admission: 2022 12:48 PM  Date of Discharge:  2022  Primary Care Physician: Valerie Sesay APRN    Consults     Date and Time Order Name Status Description    1/15/2022  9:02 AM Inpatient Pulmonology Consult Completed           Hospital Course     Presenting Problem:   Acute and chronic respiratory failure with hypoxia (HCC) [J96.21]    Active Hospital Problems    Diagnosis  POA   • **Acute exacerbation of idiopathic pulmonary fibrosis (HCC) [J84.112]  Yes   • Stage 3a chronic kidney disease (HCC) [N18.31]  Yes   • Acute and chronic respiratory failure with hypoxia (HCC) [J96.21]  Yes   • Chronic anticoagulation [Z79.01]  Not Applicable   • HTN (hypertension) [I10]  Yes   • PAF (paroxysmal atrial fibrillation) (HCC) [I48.0]  Yes   • COPD (chronic obstructive pulmonary disease) (HCC) [J44.9]  Yes   • Rheumatoid arthritis with positive rheumatoid factor (HCC) [M05.9]  Yes   • Anxiety and depression [F41.9, F32.A]  Yes      Resolved Hospital Problems   No resolved problems to display.          Hospital Course:  Liz Borjas is a 71 y.o. female with past medical history of COPD, chronic hypoxic respiratory failure on home oxygen, pulmonary fibrosis, rheumatoid arthritis, essential hypertension, A. fib, liver cirrhosis, CKD who presented to the hospital with worsening shortness of breath for 1 week after steroid taper started and home dose went down to 10 mg daily. She was found to be in respiratory failure requiring high oxygen supplementation at 80% FiO2 through high flow nasal cannula with concern for superimposed bacterial pneumonia     Assessment and plan:  Acute on chronic hypoxic respiratory failure  Bilateral upper lobe pneumonia, present on admission  Severe COPD with exacerbation  Severe pulmonary fibrosis  MRSA swab is negative.    sputum culture  shows few GNR and budding yeast.  Blood cultures NGTD  s/p high-dose IV steroids with Solu-Medrol 60 mg 3 times daily  Continue duo nebs and pulmonary toilet  Pulmonary follows: (patient follows with pulmonary associates) --> obviously w/ poor prognosis with baseline oxygen requirements of ~6Lnc recently per patient.. although DNR/DNI  she is full support at this point; weaned Speech eval  Completed 7 days empiric empiric antibiotics (zosyn & doxy) on 1/21/22  s/p lasix 20mg iv 1/22  -- Pulm recommends sat 88-92%  Continue solumedrol 40mg iv bid for now; plan to transition soon to prednisone soon (60mg daily x 5 days, 40mg x 5 days, 30mg x 5 days, 20mg x 5 days, 10mg x 5 days, then stop)  Already has f/u appointment w/ Kristyn headley 2/7/22 at 11:30     Rheumatoid arthritis  Has not been on immunosuppressive therapy in several weeks because of recurrent pneumonia  Has been missing her appointment with dermatology service at  because of her recurrent hospitalization.      Hx Paroxysmal atrial fibrillation (currently sinus)  Hypertension  Continue flecainide, atenolol, xarelto  -- xarelto decreased to 15 mg based on kidney function   Holding amlodipine & losartan (borderline low bp), bp acceptable off these meds     CKD stage III (baseline cr ~1-1.1)  -- xarelto was dosed based on CrCl     IBS  --diarrhea has with her IBS at home.  WBC wnl.  S/p 1 dose imodium 1/18.  improved     [facility] has remained clinically [facility]       Discharge Follow Up Recommendations for outpatient labs/diagnostics:   follow up with pcp one week   Keep schedueled appt with Pulmonary 2/7/22    Day of Discharge     HPI:   Patient is sitting up in bed in NAD. She states breathing is at baseline. She slept well. Tolerating diet. Able to move around in room no acute events overnight per nursing.     Review of Systems  Gen- No fevers, chills  CV- No chest pain, palpitations  Resp- No cough, dyspnea w/exertion    GI- No N/V/D, abd pain        Vital Signs:   Temp:  [97.4 °F (36.3 °C)-97.8 °F (36.6 °C)] 97.4 °F (36.3 °C)  Heart Rate:  [52-62] 58  Resp:  [16-18] 18  BP: (113-136)/(63-87) 126/72  Flow (L/min):  [5] 5      Physical Exam:  Constitutional: No acute distress, awake, alert, frail   HENT: NCAT, mucous membranes moist  Respiratory: keagan inspiratory crackles, respiratory effort normal 5L 93%  Cardiovascular: RRR, no murmurs, rubs, or gallops  Gastrointestinal: Positive bowel sounds, soft, nontender, nondistended  Musculoskeletal: No bilateral ankle edema  Psychiatric: Appropriate affect, cooperative  Neurologic: Oriented x 3, strength symmetric in all extremities, Cranial Nerves grossly intact to confrontation, speech clear  Skin: No rashes      Pertinent  and/or Most Recent Results     LAB RESULTS:      Lab 01/22/22  0514 01/21/22  0507 01/20/22  0433 01/19/22  0806 01/18/22  0623 01/17/22  0557 01/17/22  0557   WBC 9.95 11.78* 7.88 8.30 6.94   < > 7.51   HEMOGLOBIN 10.9* 11.5* 10.9* 11.2* 10.3*   < > 11.6*   HEMATOCRIT 36.1 37.7 36.2 37.5 34.9   < > 39.3   PLATELETS 205 222 178 197 184   < > 231   NEUTROS ABS  --   --   --   --   --   --  6.99   IMMATURE GRANS (ABS)  --   --   --   --   --   --  0.03   LYMPHS ABS  --   --   --   --   --   --  0.26*   MONOS ABS  --   --   --   --   --   --  0.22   EOS ABS  --   --   --   --   --   --  0.00   MCV 85.5 83.2 83.4 86.4 83.9   < > 85.4   PROCALCITONIN  --   --   --   --   --   --  0.07    < > = values in this interval not displayed.         Lab 01/23/22  0530 01/22/22  0514 01/21/22  1951 01/21/22  0507 01/20/22  0433 01/19/22  0806 01/19/22  0806 01/18/22  0623 01/17/22  0557   SODIUM 139 144  --  142 137  --  142   < > 141   POTASSIUM 3.9 4.0 4.3 3.3* 3.6   < > 4.0   < > 4.4   CHLORIDE 100 104  --  102 100  --  102   < > 101   CO2 31.0* 31.0*  --  29.0 28.0  --  31.0*   < > 31.0*   ANION GAP 8.0 9.0  --  11.0 9.0  --  9.0   < > 9.0   BUN 25* 25*  --  22 17  --  16   < >  27*   CREATININE 0.99 1.10*  --  1.06* 0.84  --  0.99   < > 1.37*   GLUCOSE 129* 125*  --  119* 136*  --  95   < > 121*   CALCIUM 8.7 8.4*  --  8.4* 8.1*  --  8.4*   < > 8.3*   IONIZED CALCIUM  --   --   --   --   --   --  1.20  --   --    MAGNESIUM 2.0 2.0  --  1.8  --   --  2.0  --  3.0*   PHOSPHORUS  --   --   --   --   --   --  3.0  --  4.2    < > = values in this interval not displayed.         Lab 01/17/22  0557   TOTAL PROTEIN 5.9*   ALBUMIN 2.40*   GLOBULIN 3.5   ALT (SGPT) 10   AST (SGOT) 15   BILIRUBIN 0.3   ALK PHOS 171*                     Lab 01/19/22  0354 01/18/22  1602   PH, ARTERIAL 7.455* 7.484*   PCO2, ARTERIAL 48.3* 43.5   PO2 ART 79.1* 57.1*   FIO2 50 48   HCO3 ART 33.9* 32.7*   BASE EXCESS ART 8.8* 8.3*   CARBOXYHEMOGLOBIN 1.3 1.2     Brief Urine Lab Results  (Last result in the past 365 days)      Color   Clarity   Blood   Leuk Est   Nitrite   Protein   CREAT   Urine HCG        11/27/21 2334 Yellow   Clear   Negative   Negative   Negative   Trace               Microbiology Results (last 10 days)     Procedure Component Value - Date/Time    Respiratory Culture - Sputum, Cough [[id number]] Collected: 01/16/22 0931    Lab Status: Final result Specimen: Sputum from Cough Updated: 01/18/22 1128     Respiratory Culture Light growth (2+) Normal respiratory jeancarlos. No S. aureus or Pseudomonas aeruginosa detected. Final report.     Gram Stain No WBCs per low power field      Rare (1+) Epithelial cells per low power field      Few (2+) Gram negative bacilli      Moderate (3+) Budding yeast    MRSA Screen, PCR (Inpatient) - Swab, Nares [[id number]]  (Normal) Collected: 01/15/22 1920    Lab Status: Final result Specimen: Swab from Nares Updated: 01/15/22 2030     MRSA PCR Negative    Narrative:      MRSA Negative    Blood Culture - Blood, Arm, Left [[id number]]  (Normal) Collected: 01/14/22 8089    Lab Status: Final result Specimen: Blood from Arm, Left Updated: 01/19/22 1600     Blood Culture No growth at 5  days    Blood Culture - Blood, Arm, Right [[id number]]  (Normal) Collected: 01/14/22 1540    Lab Status: Final result Specimen: Blood from Arm, Right Updated: 01/19/22 1600     Blood Culture No growth at 5 days    COVID-19, FLU A/B, RSV PCR - Swab, Nasopharynx [[id number]]  (Normal) Collected: 01/14/22 1313    Lab Status: Final result Specimen: Swab from Nasopharynx Updated: 01/14/22 1402     COVID19 Not Detected     Influenza A PCR Not Detected     Influenza B PCR Not Detected     RSV, PCR Not Detected    Narrative:      Fact sheet for providers: https://www.fda.gov/media/165662/download    Fact sheet for patients: https://www.fda.gov/media/098705/download    Test performed by PCR.          CT Chest Without Contrast Diagnostic    Result Date: 1/15/2022  EXAMINATION: CT CHEST WO CONTRAST DIAGNOSTIC-  INDICATION: Respiratory failure  TECHNIQUE: Axial noncontrast CT of the chest with multiplanar reconstruction  The radiation dose reduction device was turned on for each scan per the ALARA (As Low as Reasonably Achievable) protocol.  COMPARISON: 11/27/2021  FINDINGS: No pathologic axillary adenopathy or other worrisome body wall soft tissue finding in the chest. No acute findings in the partially imaged upper abdomen. No pleural or pericardial effusion. No pathologic mediastinal or hilar lymphadenopathy. Mildly atherosclerotic nonaneurysmal thoracic aorta. Evaluation of the osseous structures demonstrates no evidence of acute fracture or aggressive osseous lesion. Evaluation of the lung fields redemonstrates extensive findings of pulmonary fibrosis, with superimposed areas of alveolar groundglass opacity, most suspicious for superimposed pneumonia.      Redemonstrated extensive fibrotic changes, with increased intervening diffuse alveolar groundglass opacity most consistent with superimposed pneumonia.   This report was finalized on 1/15/2022 1:31 PM by Steve Chairez.      XR Chest 1 View    Result Date:  1/14/2022  PROCEDURE: CR Chest 1 Vw COMPARISON:  January 14 12:59 PM INDICATIONS: resp failure; Acute and chronic respiratory failure with hypoxia; Pulmonary fibrosis, unspecified TECHNIQUE: Single AP  view of the chest FINDINGS:  Cardiomediastinal silhouette is stable. Lung volumes remain low with continued bilateral infiltrates with interstitial component. According to prior reports patient has underlying fibrosis. No acute osseous abnormality.     No significant change from previous exam performed same day at 12:59 PM.  Signer Name: Federica Blancas MD  Signed: 1/14/2022 8:20 PM  Workstation Name: [facility]    XR Chest 1 View    Result Date: 1/14/2022  EXAMINATION: XR CHEST 1 VW-  INDICATION: SOA triage protocol  COMPARISON: 12/20/2021  FINDINGS: Redemonstrated extensive fibrotic appearance of the lung fields, with appearance suggesting some superimposed opacities, edema or pneumonia in both upper lobes. There is otherwise no enlarging effusion or thorax. Unchanged heart and mediastinal contours.      Redemonstrated extensive fibrotic appearance of the lung fields, with appearance suggesting some superimposed opacities, edema or pneumonia in both upper lobes.  This report was finalized on 1/14/2022 1:43 PM by Steve Chairez.                    Plan for Follow-up of Pending Labs/Results:     Discharge Details        Discharge Medications      New Medications      Instructions Start Date   guaiFENesin 600 MG 12 hr tablet  Commonly known as: MUCINEX   1,200 mg, Oral, Every 12 Hours Scheduled      lactobacillus acidophilus capsule capsule   1 capsule, Oral, Daily   Start Date: January 24, 2022        Changes to Medications      Instructions Start Date   predniSONE 10 MG tablet  Commonly known as: DELTASONE  What changed: additional instructions   10 mg, Oral, Daily, Resume home dose after taper is complete      predniSONE 10 MG tablet  Commonly known as: DELTASONE  What  changed: You were already taking a medication with the same name, and this prescription was added. Make sure you understand how and when to take each.   10 mg, Oral, Daily, (60mg daily x 5 days, 40mg x 5 days, 30mg x 5 days, 20mg x 5 days, then resume home dose      rivaroxaban 15 MG tablet  Commonly known as: XARELTO  What changed:   medication strength  how much to take  when to take this   15 mg, Oral, Daily With Dinner         Continue These Medications      Instructions Start Date   albuterol sulfate  (90 Base) MCG/ACT inhaler  Commonly known as: PROVENTIL HFA;VENTOLIN HFA;PROAIR HFA   2 puffs, Inhalation, Every 4 Hours PRN      albuterol 1.25 MG/3ML nebulizer solution  Commonly known as: ACCUNEB   1.25 mg, Nebulization, Every 6 Hours PRN      allopurinol 300 MG tablet  Commonly known as: ZYLOPRIM   300 mg, Daily      atenolol 100 MG tablet  Commonly known as: TENORMIN   100 mg, Daily      dicyclomine 10 MG capsule  Commonly known as: BENTYL   10 mg, Oral, 2 Times Daily      doxepin 10 MG capsule  Commonly known as: SINEquan   40 mg, Oral, Nightly      FeroSul 325 (65 FE) MG tablet  Generic drug: ferrous sulfate   1 tablet, Oral, Daily      flecainide 50 MG tablet  Commonly known as: TAMBOCOR   50 mg, Oral, 2 Times Daily      FLUoxetine 40 MG capsule  Commonly known as: PROzac   40 mg, Oral, Daily      nystatin 100,000 unit/mL suspension  Commonly known as: MYCOSTATIN   5 mL, Oral, 4 Times Daily, Swish and Swallow      RABEprazole 20 MG EC tablet  Commonly known as: ACIPHEX   20 mg, Oral, 2 Times Daily      Robafen DM Cgh/Chest Congest  MG/5ML liquid  Generic drug: dextromethorphan-guaifenesin   10 mL, Oral, 3 Times Daily PRN      traMADol 50 MG tablet  Commonly known as: ULTRAM   50 mg, Oral, Every 6 Hours PRN      ZyrTEC Allergy 10 MG capsule  Generic drug: Cetirizine HCl   10 mg, Oral, Daily PRN         Stop These Medications    amLODIPine 10 MG tablet  Commonly known as: NORVASC     doxycycline  100 MG capsule  Commonly known as: VIBRAMYCIN     leflunomide 20 MG tablet  Commonly known as: ARAVA     losartan 50 MG tablet  Commonly known as: COZAAR            Allergies   Allergen Reactions   • Moexipril-Hydrochlorothiazide Anaphylaxis   • Penicillins Hives     Tolerates ancef, rocephin, Zosyn   • Sulfa Antibiotics Rash     Also affects kidney function   • Ace Inhibitors Hives         Discharge Disposition:  Home-Health Care [facility] [place]    Diet:  Hospital:  Diet Order   Procedures   • Diet Regular; Thin; GI Soft       Activity:  Activity Instructions     Activity as Tolerated      Measure Blood Pressure            Restrictions or Other Recommendations:         CODE STATUS:    Code Status and Medical Interventions:   Ordered at: 01/21/22 1805     Medical Intervention Limits:    NO intubation (DNI)     Code Status (Patient has no pulse and is not breathing):    No CPR (Do Not Attempt to Resuscitate)     Medical Interventions (Patient has pulse or is breathing):    Limited Support       Future Appointments   Date Time Provider Department Center   2/7/2022 10:45 AM MEGAN YANICK CT 1  MEGAN GE CT [place]   2/7/2022 11:30 AM Kristyn Haro, ANITHA [facility] MEGAN MEGAN       Additional Instructions for the Follow-ups that You Need to Schedule     Ambulatory Referral to Home Health   As directed      Face to Face Visit Date: 1/17/2022    Follow-up provider for Plan of Care?: I treated the patient in an acute care facility and will not continue treatment after discharge.    Follow-up provider: ZACH AGUILAR [[id number]]    Reason/Clinical Findings: pulmonary fibrosis    Describe mobility limitations that make leaving home difficult: impaireed functional mobility, balance, gait and endurance    Nursing/Therapeutic Services Requested: Skilled Nursing Physical Therapy    Skilled nursing orders: Other (resume home health)    PT orders: Other (add comment) (resume home health)    Frequency: 1 Week 1         Discharge Follow-up with PCP    As directed       Currently Documented PCP:    Valerie Sesay APRN    PCP Phone Number:    [phone]     Follow Up Details: follow up with pcp one week         Discharge Follow-up with Specified Provider: keep scheduled follow up with pulmonary   As directed      To: keep scheduled follow up with pulmonary                     ANITHA Ogden  01/23/22      Time Spent on Discharge:  I spent  35 minutes on this discharge activity which included: face-to-face encounter with the patient, reviewing the data in the system, coordination of the care with the nursing staff as well as consultants, documentation, and entering orders.

## 2022-01-24 NOTE — OUTREACH NOTE
Prep Survey      Responses   Religion facility patient discharged from? [place]   Is LACE score < 7 ? No   Emergency Room discharge w/ pulse ox? No   Eligibility Readm Mgmt   Discharge diagnosis Acute on chronic hypoxic respiratory failure, Bilateral upper lobe pneumonia, COPD exacerbation   Does the patient have one of the following disease processes/diagnoses(primary or secondary)? COPD/Pneumonia   Does the patient have Home health ordered? Yes   What is the Home health agency?  [facility]   Is there a DME ordered? No  [has home O2 though [facility]]   Prep survey completed? Yes          Lizzy Cabral RN

## 2022-01-25 ENCOUNTER — READMISSION MANAGEMENT (OUTPATIENT)
Dept: CALL CENTER | Facility: HOSPITAL | Age: 72
End: 2022-01-25

## 2022-01-25 NOTE — OUTREACH NOTE
COPD/PN Week 1 Survey      Responses   [facility] patient discharged from? [place]   Does the patient have one of the following disease processes/diagnoses(primary or secondary)? COPD/Pneumonia   Was the primary reason for admission: Pneumonia   Week 1 attempt successful? No   Unsuccessful attempts Attempt 1          Zenaida Rodriguez, RN

## 2022-01-27 ENCOUNTER — READMISSION MANAGEMENT (OUTPATIENT)
Dept: CALL CENTER | Facility: HOSPITAL | Age: 72
End: 2022-01-27

## 2022-01-27 NOTE — OUTREACH NOTE
COPD/PN Week 1 Survey      Responses   [facility] patient discharged from? [place]   Does the patient have one of the following disease processes/diagnoses(primary or secondary)? COPD/Pneumonia   Was the primary reason for admission: Pneumonia   Week 1 attempt successful? No   Unsuccessful attempts Attempt 2          Sheela Gibbs RN

## 2022-01-31 ENCOUNTER — READMISSION MANAGEMENT (OUTPATIENT)
Dept: CALL CENTER | Facility: HOSPITAL | Age: 72
End: 2022-01-31

## 2022-01-31 NOTE — OUTREACH NOTE
COPD/PN Week 1 Survey      Responses   [facility] patient discharged from? [place]   Does the patient have one of the following disease processes/diagnoses(primary or secondary)? COPD/Pneumonia   Was the primary reason for admission: Pneumonia   Week 1 attempt successful? No   Unsuccessful attempts Attempt 3          Zenaida Rodriguez, RN

## 2022-02-02 ENCOUNTER — TELEPHONE (OUTPATIENT)
Dept: PULMONOLOGY | Facility: CLINIC | Age: 72
End: 2022-02-02

## 2022-02-02 NOTE — TELEPHONE ENCOUNTER
Raquel called today to let me know she tested positive for COVID today.  Some runny nose and a different cough, otherwise keeping her oxygen up on 4-5L.  Tapering down on her prednisone.  Currently doing half a vial of albuterol. Taking mucinex twice a day.     nurses say she sounds good.   We will reschedule her follow-up appointment to later on in February.  They will call and let me know if she has any issues.

## 2022-02-07 ENCOUNTER — APPOINTMENT (OUTPATIENT)
Dept: CT IMAGING | Facility: HOSPITAL | Age: 72
End: 2022-02-07

## 2022-02-09 ENCOUNTER — READMISSION MANAGEMENT (OUTPATIENT)
Dept: CALL CENTER | Facility: HOSPITAL | Age: 72
End: 2022-02-09

## 2022-02-09 NOTE — OUTREACH NOTE
COPD/PN Week 2 Survey      Responses   [facility] patient discharged from? [place]   Does the patient have one of the following disease processes/diagnoses(primary or secondary)? COPD/Pneumonia   Was the primary reason for admission: Pneumonia   Week 2 attempt successful? No   Unsuccessful attempts Attempt 1          Chelo Fermin RN

## 2022-02-14 ENCOUNTER — READMISSION MANAGEMENT (OUTPATIENT)
Dept: CALL CENTER | Facility: HOSPITAL | Age: 72
End: 2022-02-14

## 2022-02-14 NOTE — OUTREACH NOTE
COPD/PN Week 2 Survey      Responses   [facility] patient discharged from? [place]   Does the patient have one of the following disease processes/diagnoses(primary or secondary)? COPD/Pneumonia   Was the primary reason for admission: Pneumonia   Week 2 attempt successful? No   Unsuccessful attempts Attempt 2          Dimple Samano RN

## 2022-02-14 NOTE — OUTREACH NOTE
CT Surgery Week 2 Survey      Responses   [facility] patient discharged from? [place]   Does the patient have one of the following disease processes/diagnoses(primary or secondary)? COPD/Pneumonia          Dimple Samano, RN

## 2022-02-21 ENCOUNTER — OFFICE VISIT (OUTPATIENT)
Dept: PULMONOLOGY | Facility: CLINIC | Age: 72
End: 2022-02-21

## 2022-02-21 VITALS
OXYGEN SATURATION: 97 % | WEIGHT: 113 LBS | SYSTOLIC BLOOD PRESSURE: 122 MMHG | HEART RATE: 106 BPM | TEMPERATURE: 96.9 F | DIASTOLIC BLOOD PRESSURE: 86 MMHG | HEIGHT: 62 IN | BODY MASS INDEX: 20.8 KG/M2

## 2022-02-21 DIAGNOSIS — J84.9 INTERSTITIAL LUNG DISEASE: ICD-10-CM

## 2022-02-21 DIAGNOSIS — J96.21 ACUTE AND CHRONIC RESPIRATORY FAILURE WITH HYPOXIA: ICD-10-CM

## 2022-02-21 DIAGNOSIS — J43.9 PULMONARY EMPHYSEMA, UNSPECIFIED EMPHYSEMA TYPE: Primary | ICD-10-CM

## 2022-02-21 PROCEDURE — 99214 OFFICE O/P EST MOD 30 MIN: CPT | Performed by: NURSE PRACTITIONER

## 2022-02-21 RX ORDER — PREDNISONE 20 MG/1
20 TABLET ORAL DAILY
Qty: 30 TABLET | Refills: 2 | Status: SHIPPED | OUTPATIENT
Start: 2022-02-21 | End: 2022-05-24

## 2022-02-21 RX ORDER — LOSARTAN POTASSIUM 100 MG/1
100 TABLET ORAL DAILY
COMMUNITY
Start: 2022-02-08

## 2022-02-21 RX ORDER — DEXTROMETHORPHAN HYDROBROMIDE AND PROMETHAZINE HYDROCHLORIDE 15; 6.25 MG/5ML; MG/5ML
SYRUP ORAL
COMMUNITY
Start: 2022-02-02 | End: 2022-07-21

## 2022-02-21 RX ORDER — DOXEPIN HYDROCHLORIDE 50 MG/1
50 CAPSULE ORAL
COMMUNITY
Start: 2022-02-12 | End: 2022-02-21

## 2022-02-21 NOTE — PROGRESS NOTES
"[facility] Pulmonary Follow up      Chief Complaint  Shortness of Breath    Subjective          Liz Borjas presents to [facility] PULMONARY & CRITICAL CARE MEDICINE for follow-up after her recent hospital stay.  I have been following her for the last few months with her frequent hospitalization pneumonia.  She also has underlying interstitial lung disease with exacerbations.  Most recently she admitted to the hospital from January 14-23 with her fibrosis and acute on chronic hypoxic respiratory failure.  She was seen in the hospital by Dr. Daniels.    She was discharged home on a prolonged prednisone taper.  Currently she is on 20 mg of prednisone.  She has been able to wean her oxygen down to 3 L.  Overall she is doing significantly better than she has in the last few months.  They at this time it is prudent to keep her on steroids with her underlying ILD.    She is using a half a dose of albuterol 3 times a day to help with secretion clearance and shortness of breath.  She also has underlying obstructive lung disease with a history of tobacco use.  Currently she is not on any chronic maintenance therapy for her COPD.         Objective     Vital Signs:   /86   Pulse 106   Temp 96.9 °F (36.1 °C)   Ht 157.5 cm (62\")   Wt 51.3 kg (113 lb)   SpO2 97% Comment: 3 liters of oxygen at rest  BMI 20.67 kg/m²       Immunization History   Administered Date(s) Administered   • COVID-19 (MODERNA) 1st, 2nd, 3rd Dose Only 03/03/2021, 03/31/2021   • FLUAD TRI 65YR+ 12/27/2021   • Flu Vaccine Intradermal Quad 18-64YR 11/08/2007, 09/25/2009   • Flu Vaccine Split Quad 11/22/2013   • FluLaval/Fluarix/Fluzone >6 11/17/2016   • Hep A / Hep B 12/05/2019   • Hepatitis B 02/09/2017, 03/09/2017, 01/09/2020   • Influenza LAIV (Nasal) 10/25/2019   • Influenza Quad Vaccine (Inpatient) 10/26/2011   • Influenza, Unspecified 10/27/2013, 11/15/2014   • Pneumococcal Conjugate 13-Valent (PCV13) 11/17/2016   • " Pneumococcal Polysaccharide (PPSV23) 04/05/2018       Physical Exam  Vitals reviewed.   Constitutional:       Appearance: She is well-developed.   HENT:      Head: Normocephalic and atraumatic.   Eyes:      Pupils: Pupils are equal, round, and reactive to light.   Cardiovascular:      Rate and Rhythm: Normal rate and regular rhythm.      Heart sounds: No murmur heard.      Pulmonary:      Effort: Pulmonary effort is normal. No respiratory distress.      Breath sounds: Rales (bilateral lower lobes) present. No wheezing.   Abdominal:      General: Bowel sounds are normal. There is no distension.      Palpations: Abdomen is soft.   Musculoskeletal:         General: Normal range of motion.      Cervical back: Normal range of motion and neck supple.   Skin:     General: Skin is warm and dry.      Findings: No erythema.   Neurological:      Mental Status: She is alert and oriented to person, place, and time.   Psychiatric:         Behavior: Behavior normal.          Result Review :       Data reviewed: Radiologic studies CT of chest 1/15/2022     FINDINGS: No pathologic axillary adenopathy or other worrisome body wall  soft tissue finding in the chest. No acute findings in the partially  imaged upper abdomen. No pleural or pericardial effusion. No pathologic  mediastinal or hilar lymphadenopathy. Mildly atherosclerotic  nonaneurysmal thoracic aorta. Evaluation of the osseous structures  demonstrates no evidence of acute fracture or aggressive osseous lesion.  Evaluation of the lung fields redemonstrates extensive findings of  pulmonary fibrosis, with superimposed areas of alveolar groundglass  opacity, most suspicious for superimposed pneumonia.     IMPRESSION:  Redemonstrated extensive fibrotic changes, with increased  intervening diffuse alveolar groundglass opacity most consistent with  superimposed pneumonia.                      Assessment and Plan    Problem List Items Addressed This Visit        Pulmonary and  Pneumonias    COPD (chronic obstructive pulmonary disease) (HCC) - Primary    Relevant Medications    promethazine-dextromethorphan (PROMETHAZINE-DM) 6.25-15 MG/5ML syrup    predniSONE (DELTASONE) 20 MG tablet    Fluticasone-Umeclidin-Vilant (TRELEGY) 100-62.5-25 MCG/INH inhaler    Acute and chronic respiratory failure with hypoxia (HCC)    Interstitial lung disease (HCC)    Relevant Medications    promethazine-dextromethorphan (PROMETHAZINE-DM) 6.25-15 MG/5ML syrup    Fluticasone-Umeclidin-Vilant (TRELEGY) 100-62.5-25 MCG/INH inhaler        Start on Trelegy for COPD due to continued shortness of breath with activity.    Continue on her oxygen at 3 L, she monitors her oxygen saturations at home.  Currently she is doing very well.  She is interested in getting a smaller POC to help with her mobility.  However she is only been able to tolerate continuous flow at this point.  I did give him information to reaching out to an engine to see if they had a portable concentrator she would be able to use that is continuous flow.  Her current DME does not carry POC's.    For her interstitial lung disease she needs to continue on the prednisone for now.  She has had quite a bit of improvement after this most recent hospital stay.  She does have a follow-up with immunology as well as rheumatology pending as well with her history of rheumatoid arthritis.  She has been off treatment of that secondary to her recurrent infections.    Routine follow-up in 3 months.  At that time I would like to get full PFTs and a 6-minute walk test, she is never been able to have her full ILD studies given her recurrent hospitalizations    Follow Up     Return in about 3 months (around 5/21/2022).  Patient was given instructions and counseling regarding her condition or for health maintenance advice. Please see specific information pulled into the AVS if appropriate.         Moderate level of Medical Decision Making complexity based on 2 or more  chronic stable illnesses and prescription drug management.    ANITHA Arredondo, ACNP  [facility] Pulmonary Critical Care Medicine  Electronically signed

## 2022-02-23 ENCOUNTER — READMISSION MANAGEMENT (OUTPATIENT)
Dept: CALL CENTER | Facility: HOSPITAL | Age: 72
End: 2022-02-23

## 2022-02-23 NOTE — OUTREACH NOTE
COPD/PN Week 3 Survey      Responses   [facility] patient discharged from? [place]   Does the patient have one of the following disease processes/diagnoses(primary or secondary)? COPD/Pneumonia   Was the primary reason for admission: Pneumonia   Week 3 attempt successful? No   Unsuccessful attempts Attempt 1          Grazyna Massey, RN

## 2022-02-25 ENCOUNTER — READMISSION MANAGEMENT (OUTPATIENT)
Dept: CALL CENTER | Facility: HOSPITAL | Age: 72
End: 2022-02-25

## 2022-02-25 NOTE — OUTREACH NOTE
COPD/PN Week 3 Survey      Responses   [facility] patient discharged from? [place]   Does the patient have one of the following disease processes/diagnoses(primary or secondary)? COPD/Pneumonia   Was the primary reason for admission: Pneumonia   Week 3 attempt successful? No   Rescheduled Revoked   Revoke Decline to participate   Discharge diagnosis Acute on chronic hypoxic respiratory failure, Bilateral upper lobe pneumonia, COPD exacerbation   Revoked No further contact(revokes)-requires comment   Is the patient interested in additional calls from an ambulatory ?  NOTE:  applies to high risk patients requiring additional follow-up. No   Graduated/Revoked comments UTR x 7attempts          Daysi Mcneil RN

## 2022-05-24 ENCOUNTER — OFFICE VISIT (OUTPATIENT)
Dept: PULMONOLOGY | Facility: CLINIC | Age: 72
End: 2022-05-24

## 2022-05-24 VITALS
HEART RATE: 98 BPM | BODY MASS INDEX: 20.8 KG/M2 | OXYGEN SATURATION: 94 % | TEMPERATURE: 97.5 F | SYSTOLIC BLOOD PRESSURE: 122 MMHG | HEIGHT: 62 IN | WEIGHT: 113 LBS | DIASTOLIC BLOOD PRESSURE: 68 MMHG

## 2022-05-24 DIAGNOSIS — J43.9 PULMONARY EMPHYSEMA, UNSPECIFIED EMPHYSEMA TYPE: ICD-10-CM

## 2022-05-24 DIAGNOSIS — J96.11 CHRONIC RESPIRATORY FAILURE WITH HYPOXIA: ICD-10-CM

## 2022-05-24 DIAGNOSIS — J84.9 INTERSTITIAL LUNG DISEASE: Primary | ICD-10-CM

## 2022-05-24 PROBLEM — J84.112 ACUTE EXACERBATION OF IDIOPATHIC PULMONARY FIBROSIS: Status: RESOLVED | Noted: 2022-01-15 | Resolved: 2022-05-24

## 2022-05-24 PROBLEM — J96.01 ACUTE RESPIRATORY FAILURE WITH HYPOXIA (HCC): Status: RESOLVED | Noted: 2021-10-17 | Resolved: 2022-05-24

## 2022-05-24 PROCEDURE — 99214 OFFICE O/P EST MOD 30 MIN: CPT | Performed by: NURSE PRACTITIONER

## 2022-05-24 RX ORDER — SULFAMETHOXAZOLE AND TRIMETHOPRIM 400; 80 MG/1; MG/1
1 TABLET ORAL 3 TIMES WEEKLY
COMMUNITY
Start: 2022-04-14 | End: 2022-07-20

## 2022-05-24 RX ORDER — PREDNISONE 1 MG/1
15 TABLET ORAL DAILY
Qty: 90 TABLET | Refills: 3 | Status: SHIPPED | OUTPATIENT
Start: 2022-05-24 | End: 2022-06-13

## 2022-05-24 NOTE — PROGRESS NOTES
"[facility] Pulmonary Follow up      Chief Complaint  Pulmonary emphysema, unspecified emphysema type (F/U)    Subjective          Liz Borjas presents to [facility] PULMONARY & CRITICAL CARE MEDICINE for routine follow-up.  We have been seeing her here in the office for acute hypoxic respiratory failure with interstitial changes following a hospitalization for pneumonia in October 2021.  As well as COVID-19 in August 2021.  She does have a history of interstitial changes with a history of prolonged ventilation at  more than 10 years ago.  Her CT scan in 2017 showed bronchiectasis with diffuse pulmonary fibrosis, no honeycombing.    She does have a history of RA and has been on immunosuppressive therapy.    She is actually doing fairly well recently.  She is down to using 3 L of oxygen continuously.  She continues on her 20 mg of prednisone.  Both her and her son states that she has had much more activity tolerance recently and doing much better.  Unfortunately she had a spontaneous blood vessel rupture of her right knee which in the last week which has unfortunately decreased her activity tolerance.        Objective     Vital Signs:   /68 (BP Location: Right arm, Patient Position: Sitting, Cuff Size: Adult)   Pulse 98   Temp 97.5 °F (36.4 °C) (Infrared)   Ht 157.5 cm (62\")   Wt 51.3 kg (113 lb)   SpO2 94%   PF (!) 3 L/min Comment: resting pulse  BMI 20.67 kg/m²       Immunization History   Administered Date(s) Administered   • COVID-19 (MODERNA) 1st, 2nd, 3rd Dose Only 03/03/2021, 03/31/2021   • FLUAD TRI 65YR+ 12/27/2021   • Flu Vaccine Intradermal Quad 18-64YR 11/08/2007, 09/25/2009   • Flu Vaccine Split Quad 11/08/2007, 09/25/2009, 11/22/2013   • FluLaval/Fluarix/Fluzone >6 11/17/2016   • Hep A / Hep B 12/05/2019   • Hepatitis B 02/09/2017, 03/09/2017, 01/09/2020   • Influenza LAIV (Nasal) 10/25/2019   • Influenza Quad Vaccine (Inpatient) 10/26/2011   • Influenza, Unspecified " 10/27/2013, 11/15/2014   • Pneumococcal Conjugate 13-Valent (PCV13) 11/17/2016   • Pneumococcal Conjugate 20-Valent (PCV20) 03/30/2022   • Pneumococcal Polysaccharide (PPSV23) 04/05/2018       Physical Exam  Vitals reviewed.   Constitutional:       General: She is not in acute distress.     Appearance: She is well-developed. She is not ill-appearing.   HENT:      Head: Normocephalic and atraumatic.   Eyes:      Pupils: Pupils are equal, round, and reactive to light.   Cardiovascular:      Rate and Rhythm: Normal rate and regular rhythm.      Heart sounds: No murmur heard.  Pulmonary:      Effort: Pulmonary effort is normal. No respiratory distress.      Breath sounds: Rales present. No wheezing.   Abdominal:      General: Bowel sounds are normal. There is no distension.      Palpations: Abdomen is soft.   Musculoskeletal:         General: Normal range of motion.      Cervical back: Normal range of motion and neck supple.   Skin:     General: Skin is warm and dry.      Findings: No erythema.   Neurological:      Mental Status: She is alert and oriented to person, place, and time.   Psychiatric:         Behavior: Behavior normal.          Result Review :                         Assessment and Plan    Problem List Items Addressed This Visit        Pulmonary and Pneumonias    COPD (chronic obstructive pulmonary disease) ([facility])    Relevant Medications    predniSONE (DELTASONE) 5 MG tablet    Chronic respiratory failure with hypoxia ([facility])    Interstitial lung disease ([facility]) - Primary    Relevant Orders    CT Chest Hi Resolution Diagnostic          She has been on steroids since her acute illness around October 2021.  Currently she is on 20 mg but needs to start weaning those down.  I gave her written instructions on how to wean those down to 7.5 mg in the next 8 weeks.    She does need a follow-up CT scan, her last was about 6 months ago.    She is following up with rheumatology as well as immunology.  They tell me the  immunologist feels like she has a underlying immunodeficiency.    Continue on her oxygen at 3 L.  Continue on her Trelegy for her COPD.    Follow Up     No follow-ups on file.  Patient was given instructions and counseling regarding her condition or for health maintenance advice. Please see specific information pulled into the AVS if appropriate.     I spent 35 minutes caring for Liz on this date of service. This time includes time spent by me in the following activities:preparing for the visit, reviewing tests, obtaining and/or reviewing a separately obtained history, performing a medically appropriate examination and/or evaluation , counseling and educating the patient/family/caregiver, ordering medications, tests, or procedures, referring and communicating with other health care professionals , documenting information in the medical record and independently interpreting results and communicating that information with the patient/family/caregiver    Moderate level of Medical Decision Making complexity based on 2 or more chronic stable illnesses and prescription drug management.    ANITHA Arredondo, ACNP  [facility] Pulmonary Critical Care Medicine  Electronically signed

## 2022-05-26 RX ORDER — DOXYCYCLINE HYCLATE 100 MG/1
100 CAPSULE ORAL 2 TIMES DAILY
Qty: 20 CAPSULE | Refills: 0 | Status: SHIPPED | OUTPATIENT
Start: 2022-05-26 | End: 2022-07-20

## 2022-06-13 ENCOUNTER — TELEPHONE (OUTPATIENT)
Dept: PULMONOLOGY | Facility: CLINIC | Age: 72
End: 2022-06-13

## 2022-06-13 RX ORDER — PREDNISONE 20 MG/1
20 TABLET ORAL DAILY
Qty: 90 TABLET | Refills: 0 | Status: SHIPPED | OUTPATIENT
Start: 2022-06-13 | End: 2022-09-12 | Stop reason: SDUPTHER

## 2022-06-13 NOTE — TELEPHONE ENCOUNTER
Raquel called today, with decreasing Mrs Borjas's prednisone she has had increased joint pain with her RA.  She is now having shoulder pain and fingers are swelling on 15 mg of prednisone.   We discussed risks and benefits of chronic prednisone use.  She would like to continue on the 20mg for now until it is decided what treatment options there are for her RA.   Continued follow up with immunology and rheumatology.

## 2022-07-18 ENCOUNTER — HOSPITAL ENCOUNTER (OUTPATIENT)
Dept: CT IMAGING | Facility: HOSPITAL | Age: 72
Discharge: HOME | End: 2022-07-18
Admitting: NURSE PRACTITIONER

## 2022-07-18 DIAGNOSIS — J84.9 INTERSTITIAL LUNG DISEASE: ICD-10-CM

## 2022-07-18 PROCEDURE — 71250 CT THORAX DX C-: CPT

## 2022-07-21 ENCOUNTER — OFFICE VISIT (OUTPATIENT)
Dept: PULMONOLOGY | Facility: CLINIC | Age: 72
End: 2022-07-21

## 2022-07-21 VITALS
BODY MASS INDEX: 21.71 KG/M2 | DIASTOLIC BLOOD PRESSURE: 88 MMHG | HEIGHT: 62 IN | TEMPERATURE: 97.8 F | OXYGEN SATURATION: 97 % | SYSTOLIC BLOOD PRESSURE: 130 MMHG | HEART RATE: 100 BPM | WEIGHT: 118 LBS

## 2022-07-21 DIAGNOSIS — J84.9 INTERSTITIAL LUNG DISEASE: Primary | ICD-10-CM

## 2022-07-21 DIAGNOSIS — J43.9 PULMONARY EMPHYSEMA, UNSPECIFIED EMPHYSEMA TYPE: ICD-10-CM

## 2022-07-21 DIAGNOSIS — J96.11 CHRONIC RESPIRATORY FAILURE WITH HYPOXIA: ICD-10-CM

## 2022-07-21 PROCEDURE — 94375 RESPIRATORY FLOW VOLUME LOOP: CPT | Performed by: NURSE PRACTITIONER

## 2022-07-21 PROCEDURE — 99214 OFFICE O/P EST MOD 30 MIN: CPT | Performed by: NURSE PRACTITIONER

## 2022-07-21 RX ORDER — SULFAMETHOXAZOLE AND TRIMETHOPRIM 400; 80 MG/1; MG/1
1 TABLET ORAL 2 TIMES DAILY
COMMUNITY

## 2022-07-21 RX ORDER — DOXEPIN HYDROCHLORIDE 50 MG/1
1 CAPSULE ORAL
COMMUNITY
Start: 2022-06-15

## 2022-07-21 NOTE — PROGRESS NOTES
"[facility] Pulmonary Follow up      Chief Complaint  Interstitial lung disease (F/u)    Subjective          Liz Borjas presents to [facility]UL[facility]IT[facility]ARE MEDICINE for follow-up on her chronic respiratory failure with interstitial lung disease.  She has been followed here in the office for the last few months for recurrent pneumonias and hospitalization.  She also has been found to have underlying interstitial lung disease as well as rheumatoid arthritis and immunodeficiency.    Currently she is doing much better.  She says her shortness of breath has significantly improved lately.  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  She has no cough or sputum production.  She denies any congestion.  Right now she has not been using her nebulizers.  She does have an instance from odor she uses a couple times a day.    She is using 6 L on her home concentrator.  She has been complaining though of some headaches as well as some nose drainage.  She uses 3 L on her portable tank.  Today she is 97% on 3 L.  When she checks her oxygen at home she is also around 96 to 97%        Objective     Vital Signs:   /88 (BP Location: Left arm, Patient Position: Sitting, Cuff Size: Adult)   Pulse 100   Temp 97.8 °F (36.6 °C) (Infrared)   Ht 156.2 cm (61.5\")   Wt 53.5 kg (118 lb)   SpO2 97% Comment: 3L,continuous,at rest  BMI 21.93 kg/m²       Immunization History   Administered Date(s) Administered   • COVID-19 (MODERNA) 1st, 2nd, 3rd Dose Only 03/03/2021, 03/31/2021   • FLUAD TRI 65YR+ 12/27/2021   • Flu Vaccine Intradermal Quad 18-64YR 11/08/2007, 09/25/2009   • Flu Vaccine Split Quad 11/08/2007, 09/25/2009, 11/22/2013   • FluLaval/Fluarix/Fluzone >6 11/17/2016   • Hep A / Hep B 12/05/2019   • Hepatitis B 02/09/2017, 03/09/2017, 01/09/2020   • Influenza LAIV (Nasal) 10/25/2019   • Influenza Quad Vaccine (Inpatient) 10/26/2011   • Influenza, " Unspecified 10/27/2013, 11/15/2014   • Pneumococcal Conjugate 13-Valent (PCV13) 11/17/2016   • Pneumococcal Conjugate 20-Valent (PCV20) 03/30/2022   • Pneumococcal Polysaccharide (PPSV23) 04/05/2018       Physical Exam  Vitals reviewed.   Constitutional:       General: She is not in acute distress.     Appearance: She is well-developed. She is not ill-appearing.   HENT:      Head: Normocephalic and atraumatic.   Eyes:      Pupils: Pupils are equal, round, and reactive to light.   Cardiovascular:      Rate and Rhythm: Normal rate and regular rhythm.      Heart sounds: No murmur heard.  Pulmonary:      Effort: Pulmonary effort is normal. No respiratory distress.      Breath sounds: Rales present. No wheezing.   Abdominal:      General: Bowel sounds are normal. There is no distension.      Palpations: Abdomen is soft.   Musculoskeletal:         General: Normal range of motion.      Cervical back: Normal range of motion and neck supple.   Skin:     General: Skin is warm and dry.      Findings: No erythema.   Neurological:      Mental Status: She is alert and oriented to person, place, and time.   Psychiatric:         Behavior: Behavior normal.          Result Review :       Data reviewed: Radiologic studies July 18th HRCT       FINDINGS:   Limited high-resolution protocol evaluation of the body wall soft  tissues, upper abdomen and osseous structures demonstrates no acute  findings or significant change from prior exams. There is a  redemonstrated large hiatal hernia. Dedicated evaluation of the lung  fields demonstrates interval decrease in previously noted diffuse  groundglass opacities. Background interstitial lung disease persists,  primarily characterized by areas of bronchiectasis, peripheral  reticulation and honeycombing at the lung bases. Expiratory imaging  demonstrates no significant component of air trapping. Prone imaging  demonstrates no additional specific features.      IMPRESSION:  IMPRESSION :    Interval clearing involving much of the previously noted superimposed  groundglass opacity. This may represent treated, resolving pneumonia.  Background severe interstitial lung disease is otherwise unchanged and  characterized primarily by bronchiectasis and bronchial wall thickening,  peripheral reticulation and some areas of honeycombing at the lung  bases. Findings concerning for UIP pattern fibrosis.     This report was finalized on 7/18/2022 12:21 PM by Steve Chairez.                   Assessment and Plan    Problem List Items Addressed This Visit        Pulmonary and Pneumonias    COPD (chronic obstructive pulmonary disease) (HCC)    Chronic respiratory failure with hypoxia (HCC)    Interstitial lung disease (HCC) - Primary    Relevant Orders    Spirometry Without Bronchodilator (Completed)        I would like for her to turn her oxygen down since she is doing so much better.  We discussed that the 6 L is likely a bit too much and causing some nasal dryness and irritation.  She will go ahead and turn that down to 4 L continuous flow at home.  Continue on her 3 L continuous with activity.    We reviewed the CT scan of her chest, it does look much better.  The groundglass opacities from the pneumonia have essentially resolved, we did discuss that she continues to have significant interstitial lung disease.    She will continue to follow-up with rheumatology as well as immunology for continued treatment and plan.    Continue on her nebulizers, we discussed the importance with her bronchiectasis to start on her nebulizers if she has any worsening cough or sputum production to avoid acute exacerbations and illnesses.    Will continue to follow her for serial CTs and PFTs for her interstitial lung disease.      Follow Up     Return in about 3 months (around 10/21/2022).  Patient was given instructions and counseling regarding her condition or for health maintenance advice. Please see specific information  pulled into the AVS if appropriate.     I spent 35 minutes caring for Liz on this date of service. This time includes time spent by me in the following activities:preparing for the visit, reviewing tests, obtaining and/or reviewing a separately obtained history, performing a medically appropriate examination and/or evaluation , counseling and educating the patient/family/caregiver, ordering medications, tests, or procedures, referring and communicating with other health care professionals , documenting information in the medical record and independently interpreting results and communicating that information with the patient/family/caregiver    Moderate level of Medical Decision Making complexity based on 2 or more chronic stable illnesses and prescription drug management.    ANITHA Arredondo, ACNP  [facility] Pulmonary Critical Care Medicine  Electronically signed

## 2022-09-12 RX ORDER — PREDNISONE 20 MG/1
20 TABLET ORAL DAILY
Qty: 90 TABLET | Refills: 1 | Status: SHIPPED | OUTPATIENT
Start: 2022-09-12 | End: 2022-11-02

## 2022-11-02 RX ORDER — PREDNISONE 20 MG/1
20 TABLET ORAL DAILY
Qty: 30 TABLET | Refills: 0 | Status: SHIPPED | OUTPATIENT
Start: 2022-11-02 | End: 2023-01-16 | Stop reason: SDUPTHER

## 2023-01-09 RX ORDER — FLUTICASONE FUROATE, UMECLIDINIUM BROMIDE AND VILANTEROL TRIFENATATE 100; 62.5; 25 UG/1; UG/1; UG/1
POWDER RESPIRATORY (INHALATION)
Qty: 60 EACH | Refills: 3 | Status: SHIPPED | OUTPATIENT
Start: 2023-01-09

## 2023-01-16 ENCOUNTER — OFFICE VISIT (OUTPATIENT)
Dept: PULMONOLOGY | Facility: CLINIC | Age: 73
End: 2023-01-16
Payer: MEDICARE

## 2023-01-16 VITALS
HEART RATE: 78 BPM | DIASTOLIC BLOOD PRESSURE: 86 MMHG | TEMPERATURE: 97.5 F | BODY MASS INDEX: 23.55 KG/M2 | OXYGEN SATURATION: 94 % | RESPIRATION RATE: 16 BRPM | SYSTOLIC BLOOD PRESSURE: 130 MMHG | HEIGHT: 62 IN | WEIGHT: 128 LBS

## 2023-01-16 DIAGNOSIS — J84.9 INTERSTITIAL LUNG DISEASE: ICD-10-CM

## 2023-01-16 DIAGNOSIS — J43.9 PULMONARY EMPHYSEMA, UNSPECIFIED EMPHYSEMA TYPE: Primary | ICD-10-CM

## 2023-01-16 DIAGNOSIS — J96.11 CHRONIC RESPIRATORY FAILURE WITH HYPOXIA: ICD-10-CM

## 2023-01-16 PROCEDURE — 94375 RESPIRATORY FLOW VOLUME LOOP: CPT | Performed by: NURSE PRACTITIONER

## 2023-01-16 PROCEDURE — 94729 DIFFUSING CAPACITY: CPT | Performed by: NURSE PRACTITIONER

## 2023-01-16 PROCEDURE — 99214 OFFICE O/P EST MOD 30 MIN: CPT | Performed by: NURSE PRACTITIONER

## 2023-01-16 PROCEDURE — 94726 PLETHYSMOGRAPHY LUNG VOLUMES: CPT | Performed by: NURSE PRACTITIONER

## 2023-01-16 RX ORDER — HYDROXYCHLOROQUINE SULFATE 200 MG/1
TABLET, FILM COATED ORAL
COMMUNITY
Start: 2022-12-19

## 2023-01-16 RX ORDER — BACLOFEN 10 MG/1
10 TABLET ORAL EVERY 12 HOURS PRN
COMMUNITY
Start: 2022-10-25

## 2023-01-16 RX ORDER — SULFASALAZINE 500 MG/1
TABLET, DELAYED RELEASE ORAL
COMMUNITY
Start: 2022-12-19

## 2023-01-16 RX ORDER — FLUTICASONE PROPIONATE 50 MCG
2 SPRAY, SUSPENSION (ML) NASAL DAILY
COMMUNITY
Start: 2023-01-10

## 2023-01-16 RX ORDER — PREDNISONE 10 MG/1
10 TABLET ORAL DAILY
Qty: 90 TABLET | Refills: 1 | Status: SHIPPED | OUTPATIENT
Start: 2023-01-16

## 2023-01-16 NOTE — PROGRESS NOTES
"[facility] Pulmonary Follow up      Chief Complaint  Interstitial lung disease (HCC) (6 mo f/u)    Subjective          Liz Borjas presents to [facility] for routine follow-up on her ILD and chronic hypoxic respiratory failure.  She does have underlying ILD and unfortunately over the last year has had some frequent pneumonia episodes.  She has been on prolonged prednisone taper and currently follows up with rheumatology for initiation of biologic agents.    Overall she is doing very well lately.  She is back to doing her housework and cooking meals.  She said occasionally she has to stop and rest.  She does continue on her oxygen 3 L with activity on her tanks, and 4 to 5 L with activity at home on her home concentrator.    She said she has not had a little bit of runny nose and uses some Claritin-D as needed.  Otherwise she has no cough or sputum production.        Objective     Vital Signs:   /86 (BP Location: Left arm, Patient Position: Sitting, Cuff Size: Adult)   Pulse 78   Temp 97.5 °F (36.4 °C) (Infrared)   Resp 16   Ht 156.2 cm (61.5\")   Wt 58.1 kg (128 lb)   SpO2 94%   BMI 23.79 kg/m²       Immunization History   Administered Date(s) Administered   • COVID-19 (MODERNA) 1st, 2nd, 3rd Dose Only 03/03/2021, 03/31/2021   • FLUAD TRI 65YR+ 12/27/2021   • Flu Vaccine Intradermal Quad 18-64YR 11/08/2007, 09/25/2009   • Flu Vaccine Split Quad 11/08/2007, 09/25/2009, 11/22/2013   • FluLaval/Fluzone >6mos 11/17/2016   • Hep A / Hep B 12/05/2019   • Hepatitis B 02/09/2017, 03/09/2017, 01/09/2020   • Influenza LAIV (Nasal) 10/25/2019   • Influenza Quad Vaccine (Inpatient) 10/26/2011   • Influenza, Unspecified 10/27/2013, 11/15/2014   • Pneumococcal Conjugate 13-Valent (PCV13) 11/17/2016   • Pneumococcal Conjugate 20-Valent (PCV20) 03/30/2022   • Pneumococcal Polysaccharide (PPSV23) 04/05/2018       Physical Exam  Vitals reviewed.   Constitutional:       " General: She is not in acute distress.     Appearance: She is well-developed. She is not ill-appearing.   HENT:      Head: Normocephalic and atraumatic.   Eyes:      Pupils: Pupils are equal, round, and reactive to light.   Cardiovascular:      Rate and Rhythm: Normal rate and regular rhythm.      Heart sounds: No murmur heard.  Pulmonary:      Effort: Pulmonary effort is normal. No respiratory distress.      Breath sounds: Rales present. No wheezing.   Abdominal:      General: Bowel sounds are normal. There is no distension.      Palpations: Abdomen is soft.   Musculoskeletal:         General: Normal range of motion.      Cervical back: Normal range of motion and neck supple.   Skin:     General: Skin is warm and dry.      Findings: No erythema.   Neurological:      Mental Status: She is alert and oriented to person, place, and time.   Psychiatric:         Behavior: Behavior normal.          Result Review :                       Assessment and Plan    Problem List Items Addressed This Visit        Pulmonary and Pneumonias    COPD (chronic obstructive pulmonary disease) ([facility]) - Primary    Relevant Medications    fluticasone (FLONASE) 50 MCG/ACT nasal spray    predniSONE (DELTASONE) 10 MG tablet    Other Relevant Orders    Pulmonary Function Test (Completed)    Chronic respiratory failure with hypoxia ([facility])    Interstitial lung disease ([facility])    Relevant Medications    fluticasone (FLONASE) 50 MCG/ACT nasal spray     Mrs. Borjas is doing really well right now.  She is starting to regain her activity tolerance.  She has not had any acute episodes since I saw her last.    We did discuss trying to get off her prednisone, she is working with rheumatology as well and just started on a new biologic agent.  She will go ahead and start weaning down when she is able, I will call in 10 mg tablets to help make that easier, currently she is on 20mg daily.    For her bit of congestion and nasal congestion she can start on Flonase  1-2 times a day.    Continue on her oxygen.    Follow-up here in the office in 6 months or sooner with any issues.    Follow Up     No follow-ups on file.  Patient was given instructions and counseling regarding her condition or for health maintenance advice. Please see specific information pulled into the AVS if appropriate.         Moderate level of Medical Decision Making complexity based on 2 or more chronic stable illnesses and prescription drug management.    ANITHA Arredondo, ACNP  [facility]  Electronically signed

## 2023-04-14 RX ORDER — PREDNISONE 20 MG/1
20 TABLET ORAL DAILY
Qty: 90 TABLET | Refills: 0 | Status: SHIPPED | OUTPATIENT
Start: 2023-04-14

## 2023-05-10 DIAGNOSIS — J43.9 PULMONARY EMPHYSEMA, UNSPECIFIED EMPHYSEMA TYPE: Primary | ICD-10-CM

## 2023-05-10 RX ORDER — FLUTICASONE FUROATE, UMECLIDINIUM BROMIDE AND VILANTEROL TRIFENATATE 100; 62.5; 25 UG/1; UG/1; UG/1
POWDER RESPIRATORY (INHALATION)
Qty: 60 EACH | Refills: 3 | Status: SHIPPED | OUTPATIENT
Start: 2023-05-10

## 2023-10-13 DIAGNOSIS — J43.9 PULMONARY EMPHYSEMA, UNSPECIFIED EMPHYSEMA TYPE: Primary | ICD-10-CM

## 2023-10-13 RX ORDER — PREDNISONE 20 MG/1
20 TABLET ORAL DAILY
Qty: 90 TABLET | Refills: 0 | Status: SHIPPED | OUTPATIENT
Start: 2023-10-13

## 2023-11-02 ENCOUNTER — TELEPHONE (OUTPATIENT)
Dept: PULMONOLOGY | Facility: CLINIC | Age: 73
End: 2023-11-02
Payer: MEDICARE

## 2023-11-02 NOTE — TELEPHONE ENCOUNTER
I spoke with Mrs. Borjas's daughter-in-law today.  Ms. Mederos has been feeling quite a bit tired lately as well as having a bit of cough with some brown sputum production.  However she has not had any evidence of worsening hypoxemia and her dyspnea with activity is at baseline.      She went to GI appointment on Friday and had some labs drawn.  She is found to have an elevated white blood cell count of 14.8 and some elevated LFTs.  I repeated her labs yesterday and they were all back to normal.      She also had a follow-up chest x-ray done at [facility].  Mrs. Borjas would like me to follow-up on the chest x-ray to see if there is any new or acute findings.   We will call over and get the films sent over and I will call and let them know if there is anything different on the chest x-ray.

## 2023-11-03 ENCOUNTER — TELEPHONE (OUTPATIENT)
Dept: PULMONOLOGY | Facility: CLINIC | Age: 73
End: 2023-11-03
Payer: MEDICARE

## 2023-11-03 NOTE — TELEPHONE ENCOUNTER
I did receive the x-ray from the outside facility and compared it to her prior CT scan as well as her prior x-ray last year.  There is some increasing right upper lobe interstitial changes, which do seem to correlate with the CT scan done July of last year.  She does have a scheduled routine follow-up in January with repeat chest x-ray.

## 2024-01-01 ENCOUNTER — APPOINTMENT (OUTPATIENT)
Dept: GENERAL RADIOLOGY | Facility: HOSPITAL | Age: 74
End: 2024-01-01
Payer: MEDICARE

## 2024-01-01 ENCOUNTER — HOSPITAL ENCOUNTER (INPATIENT)
Facility: HOSPITAL | Age: 74
LOS: 8 days | End: 2024-01-24
Attending: EMERGENCY MEDICINE | Admitting: INTERNAL MEDICINE
Payer: MEDICARE

## 2024-01-01 ENCOUNTER — ANCILLARY PROCEDURE (OUTPATIENT)
Dept: SPEECH THERAPY | Facility: HOSPITAL | Age: 74
End: 2024-01-01
Payer: MEDICARE

## 2024-01-01 ENCOUNTER — APPOINTMENT (OUTPATIENT)
Dept: CT IMAGING | Facility: HOSPITAL | Age: 74
End: 2024-01-01
Payer: MEDICARE

## 2024-01-01 VITALS
SYSTOLIC BLOOD PRESSURE: 117 MMHG | RESPIRATION RATE: 24 BRPM | HEIGHT: 62 IN | BODY MASS INDEX: 17.94 KG/M2 | TEMPERATURE: 98.3 F | HEART RATE: 88 BPM | DIASTOLIC BLOOD PRESSURE: 102 MMHG | OXYGEN SATURATION: 82 % | WEIGHT: 97.5 LBS

## 2024-01-01 DIAGNOSIS — Z99.81 CHRONIC HYPOXIC RESPIRATORY FAILURE, ON HOME OXYGEN THERAPY: ICD-10-CM

## 2024-01-01 DIAGNOSIS — Z87.891 FORMER SMOKER: ICD-10-CM

## 2024-01-01 DIAGNOSIS — J84.10 PULMONARY FIBROSIS: ICD-10-CM

## 2024-01-01 DIAGNOSIS — J96.11 CHRONIC HYPOXIC RESPIRATORY FAILURE, ON HOME OXYGEN THERAPY: ICD-10-CM

## 2024-01-01 DIAGNOSIS — J18.9 MULTIFOCAL PNEUMONIA: ICD-10-CM

## 2024-01-01 DIAGNOSIS — J96.21 ACUTE ON CHRONIC RESPIRATORY FAILURE WITH HYPOXEMIA: Primary | ICD-10-CM

## 2024-01-01 DIAGNOSIS — R13.10 DYSPHAGIA, UNSPECIFIED TYPE: ICD-10-CM

## 2024-01-01 LAB
A FLAVUS AB SER QL ID: NEGATIVE
A FUMIGATUS AB SER QL ID: NEGATIVE
A NIGER AB SER QL ID: NEGATIVE
ALBUMIN SERPL-MCNC: 2.3 G/DL (ref 3.5–5.2)
ALBUMIN SERPL-MCNC: 2.4 G/DL (ref 3.5–5.2)
ALBUMIN SERPL-MCNC: 2.5 G/DL (ref 3.5–5.2)
ALBUMIN SERPL-MCNC: 2.6 G/DL (ref 3.5–5.2)
ALBUMIN SERPL-MCNC: 2.7 G/DL (ref 3.5–5.2)
ALBUMIN SERPL-MCNC: 2.7 G/DL (ref 3.5–5.2)
ALBUMIN SERPL-MCNC: 3.2 G/DL (ref 3.5–5.2)
ALBUMIN/GLOB SERPL: 0.7 G/DL
ALBUMIN/GLOB SERPL: 0.8 G/DL
ALBUMIN/GLOB SERPL: 0.8 G/DL
ALBUMIN/GLOB SERPL: 0.9 G/DL
ALP SERPL-CCNC: 260 U/L (ref 39–117)
ALP SERPL-CCNC: 277 U/L (ref 39–117)
ALP SERPL-CCNC: 281 U/L (ref 39–117)
ALP SERPL-CCNC: 306 U/L (ref 39–117)
ALP SERPL-CCNC: 321 U/L (ref 39–117)
ALP SERPL-CCNC: 331 U/L (ref 39–117)
ALP SERPL-CCNC: 410 U/L (ref 39–117)
ALT SERPL W P-5'-P-CCNC: 20 U/L (ref 1–33)
ALT SERPL W P-5'-P-CCNC: 20 U/L (ref 1–33)
ALT SERPL W P-5'-P-CCNC: 21 U/L (ref 1–33)
ALT SERPL W P-5'-P-CCNC: 25 U/L (ref 1–33)
ALT SERPL W P-5'-P-CCNC: 27 U/L (ref 1–33)
ALT SERPL W P-5'-P-CCNC: 32 U/L (ref 1–33)
ALT SERPL W P-5'-P-CCNC: 33 U/L (ref 1–33)
ANION GAP SERPL CALCULATED.3IONS-SCNC: 10 MMOL/L (ref 5–15)
ANION GAP SERPL CALCULATED.3IONS-SCNC: 11 MMOL/L (ref 5–15)
ANION GAP SERPL CALCULATED.3IONS-SCNC: 7 MMOL/L (ref 5–15)
ANION GAP SERPL CALCULATED.3IONS-SCNC: 7 MMOL/L (ref 5–15)
ANION GAP SERPL CALCULATED.3IONS-SCNC: 8 MMOL/L (ref 5–15)
ANION GAP SERPL CALCULATED.3IONS-SCNC: 8 MMOL/L (ref 5–15)
ANION GAP SERPL CALCULATED.3IONS-SCNC: 9 MMOL/L (ref 5–15)
ANION GAP SERPL CALCULATED.3IONS-SCNC: 9 MMOL/L (ref 5–15)
ARTERIAL PATENCY WRIST A: ABNORMAL
AST SERPL-CCNC: 26 U/L (ref 1–32)
AST SERPL-CCNC: 30 U/L (ref 1–32)
AST SERPL-CCNC: 30 U/L (ref 1–32)
AST SERPL-CCNC: 31 U/L (ref 1–32)
AST SERPL-CCNC: 36 U/L (ref 1–32)
AST SERPL-CCNC: 44 U/L (ref 1–32)
AST SERPL-CCNC: 52 U/L (ref 1–32)
ATMOSPHERIC PRESS: ABNORMAL MM[HG]
B DERMAT AB TITR SER: NEGATIVE {TITER}
B PARAPERT DNA SPEC QL NAA+PROBE: NOT DETECTED
B PERT DNA SPEC QL NAA+PROBE: NOT DETECTED
BACTERIA SPEC AEROBE CULT: NORMAL
BACTERIA SPEC AEROBE CULT: NORMAL
BACTERIA SPEC RESP CULT: ABNORMAL
BACTERIA SPEC RESP CULT: ABNORMAL
BASE EXCESS BLDA CALC-SCNC: 4.1 MMOL/L (ref 0–2)
BASOPHILS # BLD AUTO: 0 10*3/MM3 (ref 0–0.2)
BASOPHILS # BLD AUTO: 0.01 10*3/MM3 (ref 0–0.2)
BASOPHILS # BLD AUTO: 0.01 10*3/MM3 (ref 0–0.2)
BASOPHILS # BLD AUTO: 0.02 10*3/MM3 (ref 0–0.2)
BASOPHILS NFR BLD AUTO: 0 % (ref 0–1.5)
BASOPHILS NFR BLD AUTO: 0.1 % (ref 0–1.5)
BASOPHILS NFR BLD AUTO: 0.1 % (ref 0–1.5)
BASOPHILS NFR BLD AUTO: 0.2 % (ref 0–1.5)
BDY SITE: ABNORMAL
BILIRUB SERPL-MCNC: 0.2 MG/DL (ref 0–1.2)
BILIRUB SERPL-MCNC: 0.5 MG/DL (ref 0–1.2)
BILIRUB SERPL-MCNC: <0.2 MG/DL (ref 0–1.2)
BLASTOMYCES AG UR IA-MCNC: NORMAL NG/ML
BODY TEMPERATURE: 37
BUN SERPL-MCNC: 12 MG/DL (ref 8–23)
BUN SERPL-MCNC: 15 MG/DL (ref 8–23)
BUN SERPL-MCNC: 18 MG/DL (ref 8–23)
BUN SERPL-MCNC: 20 MG/DL (ref 8–23)
BUN SERPL-MCNC: 22 MG/DL (ref 8–23)
BUN SERPL-MCNC: 22 MG/DL (ref 8–23)
BUN SERPL-MCNC: 24 MG/DL (ref 8–23)
BUN SERPL-MCNC: 29 MG/DL (ref 8–23)
BUN/CREAT SERPL: 14.6 (ref 7–25)
BUN/CREAT SERPL: 18.2 (ref 7–25)
BUN/CREAT SERPL: 19.5 (ref 7–25)
BUN/CREAT SERPL: 22.2 (ref 7–25)
BUN/CREAT SERPL: 23 (ref 7–25)
BUN/CREAT SERPL: 24 (ref 7–25)
BUN/CREAT SERPL: 24.2 (ref 7–25)
BUN/CREAT SERPL: 28.4 (ref 7–25)
C PNEUM DNA NPH QL NAA+NON-PROBE: NOT DETECTED
CALCIUM SPEC-SCNC: 7.6 MG/DL (ref 8.6–10.5)
CALCIUM SPEC-SCNC: 7.9 MG/DL (ref 8.6–10.5)
CALCIUM SPEC-SCNC: 8 MG/DL (ref 8.6–10.5)
CALCIUM SPEC-SCNC: 8.1 MG/DL (ref 8.6–10.5)
CALCIUM SPEC-SCNC: 8.2 MG/DL (ref 8.6–10.5)
CALCIUM SPEC-SCNC: 8.3 MG/DL (ref 8.6–10.5)
CALCIUM SPEC-SCNC: 8.4 MG/DL (ref 8.6–10.5)
CALCIUM SPEC-SCNC: 9 MG/DL (ref 8.6–10.5)
CENTROMERE B AB SER-ACNC: <0.2 AI (ref 0–0.9)
CHLORIDE SERPL-SCNC: 101 MMOL/L (ref 98–107)
CHLORIDE SERPL-SCNC: 102 MMOL/L (ref 98–107)
CHLORIDE SERPL-SCNC: 104 MMOL/L (ref 98–107)
CHLORIDE SERPL-SCNC: 105 MMOL/L (ref 98–107)
CHLORIDE SERPL-SCNC: 96 MMOL/L (ref 98–107)
CHLORIDE SERPL-SCNC: 97 MMOL/L (ref 98–107)
CHLORIDE SERPL-SCNC: 98 MMOL/L (ref 98–107)
CHLORIDE SERPL-SCNC: 98 MMOL/L (ref 98–107)
CHROMATIN AB SERPL-ACNC: <0.2 AI (ref 0–0.9)
CK SERPL-CCNC: 37 U/L (ref 20–180)
CO2 BLDA-SCNC: 32.3 MMOL/L (ref 22–33)
CO2 SERPL-SCNC: 25 MMOL/L (ref 22–29)
CO2 SERPL-SCNC: 25 MMOL/L (ref 22–29)
CO2 SERPL-SCNC: 26 MMOL/L (ref 22–29)
CO2 SERPL-SCNC: 28 MMOL/L (ref 22–29)
CO2 SERPL-SCNC: 29 MMOL/L (ref 22–29)
CO2 SERPL-SCNC: 29 MMOL/L (ref 22–29)
CO2 SERPL-SCNC: 30 MMOL/L (ref 22–29)
CO2 SERPL-SCNC: 31 MMOL/L (ref 22–29)
COHGB MFR BLD: 0.9 % (ref 0–2)
CREAT SERPL-MCNC: 0.77 MG/DL (ref 0.57–1)
CREAT SERPL-MCNC: 0.82 MG/DL (ref 0.57–1)
CREAT SERPL-MCNC: 0.87 MG/DL (ref 0.57–1)
CREAT SERPL-MCNC: 0.91 MG/DL (ref 0.57–1)
CREAT SERPL-MCNC: 0.99 MG/DL (ref 0.57–1)
CREAT SERPL-MCNC: 0.99 MG/DL (ref 0.57–1)
CREAT SERPL-MCNC: 1 MG/DL (ref 0.57–1)
CREAT SERPL-MCNC: 1.02 MG/DL (ref 0.57–1)
CRP SERPL-MCNC: 2.05 MG/DL (ref 0–0.5)
CRP SERPL-MCNC: 7.86 MG/DL (ref 0–0.5)
CRP SERPL-MCNC: 9.05 MG/DL (ref 0–0.5)
D-LACTATE SERPL-SCNC: 0.8 MMOL/L (ref 0.5–2)
D-LACTATE SERPL-SCNC: 2.4 MMOL/L (ref 0.5–2)
DEPRECATED RDW RBC AUTO: 51.4 FL (ref 37–54)
DEPRECATED RDW RBC AUTO: 52.1 FL (ref 37–54)
DEPRECATED RDW RBC AUTO: 53.1 FL (ref 37–54)
DEPRECATED RDW RBC AUTO: 53.9 FL (ref 37–54)
DEPRECATED RDW RBC AUTO: 54.3 FL (ref 37–54)
DEPRECATED RDW RBC AUTO: 54.3 FL (ref 37–54)
DEPRECATED RDW RBC AUTO: 55.5 FL (ref 37–54)
DPYD GENE MUT ANL BLD/T: NEGATIVE
DPYD GENE MUT ANL BLD/T: POSITIVE
DSDNA AB SER-ACNC: <1 IU/ML (ref 0–9)
EGFRCR SERPLBLD CKD-EPI 2021: 58.9 ML/MIN/1.73
EGFRCR SERPLBLD CKD-EPI 2021: 60.4 ML/MIN/1.73
EGFRCR SERPLBLD CKD-EPI 2021: 61.1 ML/MIN/1.73
EGFRCR SERPLBLD CKD-EPI 2021: 61.1 ML/MIN/1.73
EGFRCR SERPLBLD CKD-EPI 2021: 67.6 ML/MIN/1.73
EGFRCR SERPLBLD CKD-EPI 2021: 71.3 ML/MIN/1.73
EGFRCR SERPLBLD CKD-EPI 2021: 76.6 ML/MIN/1.73
EGFRCR SERPLBLD CKD-EPI 2021: 82.6 ML/MIN/1.73
ENA JO1 AB SER-ACNC: <0.2 AI (ref 0–0.9)
ENA RNP AB SER-ACNC: <0.2 AI (ref 0–0.9)
ENA SCL70 AB SER-ACNC: <0.2 AI (ref 0–0.9)
ENA SM AB SER-ACNC: <0.2 AI (ref 0–0.9)
ENA SS-A AB SER-ACNC: <0.2 AI (ref 0–0.9)
ENA SS-B AB SER-ACNC: <0.2 AI (ref 0–0.9)
EOSINOPHIL # BLD AUTO: 0 10*3/MM3 (ref 0–0.4)
EOSINOPHIL # BLD AUTO: 0.01 10*3/MM3 (ref 0–0.4)
EOSINOPHIL # BLD AUTO: 0.02 10*3/MM3 (ref 0–0.4)
EOSINOPHIL NFR BLD AUTO: 0 % (ref 0.3–6.2)
EOSINOPHIL NFR BLD AUTO: 0.1 % (ref 0.3–6.2)
EOSINOPHIL NFR BLD AUTO: 0.3 % (ref 0.3–6.2)
EPAP: 0
ERYTHROCYTE [DISTWIDTH] IN BLOOD BY AUTOMATED COUNT: 14.9 % (ref 12.3–15.4)
ERYTHROCYTE [DISTWIDTH] IN BLOOD BY AUTOMATED COUNT: 14.9 % (ref 12.3–15.4)
ERYTHROCYTE [DISTWIDTH] IN BLOOD BY AUTOMATED COUNT: 15.1 % (ref 12.3–15.4)
ERYTHROCYTE [DISTWIDTH] IN BLOOD BY AUTOMATED COUNT: 15.2 % (ref 12.3–15.4)
ERYTHROCYTE [DISTWIDTH] IN BLOOD BY AUTOMATED COUNT: 15.3 % (ref 12.3–15.4)
ERYTHROCYTE [DISTWIDTH] IN BLOOD BY AUTOMATED COUNT: 15.4 % (ref 12.3–15.4)
ERYTHROCYTE [DISTWIDTH] IN BLOOD BY AUTOMATED COUNT: 15.7 % (ref 12.3–15.4)
FLUAV SUBTYP SPEC NAA+PROBE: NOT DETECTED
FLUBV RNA ISLT QL NAA+PROBE: NOT DETECTED
FUNGITELL VALUE: <31.25 PG/ML
FUNGITELL VALUE: ABNORMAL PG/ML
FUNGUS WND CULT: ABNORMAL
GALACTOMANNAN AG SPEC IA-ACNC: 0.06 INDEX (ref 0–0.49)
GEN 5 2HR TROPONIN T REFLEX: 14 NG/L
GIE STN SPEC: NORMAL
GLOBULIN UR ELPH-MCNC: 2.9 GM/DL
GLOBULIN UR ELPH-MCNC: 2.9 GM/DL
GLOBULIN UR ELPH-MCNC: 3 GM/DL
GLOBULIN UR ELPH-MCNC: 3 GM/DL
GLOBULIN UR ELPH-MCNC: 3.1 GM/DL
GLOBULIN UR ELPH-MCNC: 3.2 GM/DL
GLOBULIN UR ELPH-MCNC: 3.7 GM/DL
GLUCOSE BLDC GLUCOMTR-MCNC: 69 MG/DL (ref 70–130)
GLUCOSE SERPL-MCNC: 104 MG/DL (ref 65–99)
GLUCOSE SERPL-MCNC: 107 MG/DL (ref 65–99)
GLUCOSE SERPL-MCNC: 108 MG/DL (ref 65–99)
GLUCOSE SERPL-MCNC: 168 MG/DL (ref 65–99)
GLUCOSE SERPL-MCNC: 66 MG/DL (ref 65–99)
GLUCOSE SERPL-MCNC: 77 MG/DL (ref 65–99)
GLUCOSE SERPL-MCNC: 77 MG/DL (ref 65–99)
GLUCOSE SERPL-MCNC: 93 MG/DL (ref 65–99)
GRAM STN SPEC: ABNORMAL
HADV DNA SPEC NAA+PROBE: NOT DETECTED
HCO3 BLDA-SCNC: 30.6 MMOL/L (ref 20–26)
HCOV 229E RNA SPEC QL NAA+PROBE: NOT DETECTED
HCOV HKU1 RNA SPEC QL NAA+PROBE: NOT DETECTED
HCOV NL63 RNA SPEC QL NAA+PROBE: NOT DETECTED
HCOV OC43 RNA SPEC QL NAA+PROBE: NOT DETECTED
HCT VFR BLD AUTO: 31.4 % (ref 34–46.6)
HCT VFR BLD AUTO: 32 % (ref 34–46.6)
HCT VFR BLD AUTO: 32.6 % (ref 34–46.6)
HCT VFR BLD AUTO: 33.4 % (ref 34–46.6)
HCT VFR BLD AUTO: 33.9 % (ref 34–46.6)
HCT VFR BLD AUTO: 36.5 % (ref 34–46.6)
HCT VFR BLD AUTO: 40.4 % (ref 34–46.6)
HCT VFR BLD CALC: 32.8 % (ref 38–51)
HGB BLD-MCNC: 10.1 G/DL (ref 12–15.9)
HGB BLD-MCNC: 10.6 G/DL (ref 12–15.9)
HGB BLD-MCNC: 11.4 G/DL (ref 12–15.9)
HGB BLD-MCNC: 9 G/DL (ref 12–15.9)
HGB BLD-MCNC: 9.5 G/DL (ref 12–15.9)
HGB BLD-MCNC: 9.7 G/DL (ref 12–15.9)
HGB BLD-MCNC: 9.8 G/DL (ref 12–15.9)
HGB BLDA-MCNC: 10.7 G/DL (ref 14–18)
HMPV RNA NPH QL NAA+NON-PROBE: NOT DETECTED
HOLD SPECIMEN: NORMAL
HPIV1 RNA ISLT QL NAA+PROBE: NOT DETECTED
HPIV2 RNA SPEC QL NAA+PROBE: NOT DETECTED
HPIV3 RNA NPH QL NAA+PROBE: NOT DETECTED
HPIV4 P GENE NPH QL NAA+PROBE: NOT DETECTED
IMM GRANULOCYTES # BLD AUTO: 0.02 10*3/MM3 (ref 0–0.05)
IMM GRANULOCYTES # BLD AUTO: 0.05 10*3/MM3 (ref 0–0.05)
IMM GRANULOCYTES # BLD AUTO: 0.07 10*3/MM3 (ref 0–0.05)
IMM GRANULOCYTES # BLD AUTO: 0.09 10*3/MM3 (ref 0–0.05)
IMM GRANULOCYTES # BLD AUTO: 0.09 10*3/MM3 (ref 0–0.05)
IMM GRANULOCYTES NFR BLD AUTO: 0.3 % (ref 0–0.5)
IMM GRANULOCYTES NFR BLD AUTO: 0.4 % (ref 0–0.5)
IMM GRANULOCYTES NFR BLD AUTO: 0.4 % (ref 0–0.5)
IMM GRANULOCYTES NFR BLD AUTO: 0.6 % (ref 0–0.5)
IMM GRANULOCYTES NFR BLD AUTO: 0.8 % (ref 0–0.5)
IMM GRANULOCYTES NFR BLD AUTO: 0.8 % (ref 0–0.5)
IMM GRANULOCYTES NFR BLD AUTO: 1.7 % (ref 0–0.5)
IMP & REVIEW OF LAB RESULTS: ABNORMAL
IMP & REVIEW OF LAB RESULTS: NORMAL
INHALED O2 CONCENTRATION: 100 %
IPAP: 0
L PNEUMO1 AG UR QL IA: NEGATIVE
LYMPHOCYTES # BLD AUTO: 0.29 10*3/MM3 (ref 0.7–3.1)
LYMPHOCYTES # BLD AUTO: 0.29 10*3/MM3 (ref 0.7–3.1)
LYMPHOCYTES # BLD AUTO: 0.31 10*3/MM3 (ref 0.7–3.1)
LYMPHOCYTES # BLD AUTO: 0.33 10*3/MM3 (ref 0.7–3.1)
LYMPHOCYTES # BLD AUTO: 0.35 10*3/MM3 (ref 0.7–3.1)
LYMPHOCYTES # BLD AUTO: 0.36 10*3/MM3 (ref 0.7–3.1)
LYMPHOCYTES # BLD AUTO: 0.77 10*3/MM3 (ref 0.7–3.1)
LYMPHOCYTES NFR BLD AUTO: 11.9 % (ref 19.6–45.3)
LYMPHOCYTES NFR BLD AUTO: 2.1 % (ref 19.6–45.3)
LYMPHOCYTES NFR BLD AUTO: 4 % (ref 19.6–45.3)
LYMPHOCYTES NFR BLD AUTO: 5.3 % (ref 19.6–45.3)
LYMPHOCYTES NFR BLD AUTO: 5.4 % (ref 19.6–45.3)
LYMPHOCYTES NFR BLD AUTO: 5.9 % (ref 19.6–45.3)
LYMPHOCYTES NFR BLD AUTO: 6.4 % (ref 19.6–45.3)
Lab: ABNORMAL
Lab: ABNORMAL
Lab: NORMAL
M PNEUMO IGG SER IA-ACNC: NOT DETECTED
MAGNESIUM SERPL-MCNC: 2 MG/DL (ref 1.6–2.4)
MCH RBC QN AUTO: 27.5 PG (ref 26.6–33)
MCH RBC QN AUTO: 27.6 PG (ref 26.6–33)
MCH RBC QN AUTO: 27.7 PG (ref 26.6–33)
MCH RBC QN AUTO: 27.7 PG (ref 26.6–33)
MCH RBC QN AUTO: 27.9 PG (ref 26.6–33)
MCH RBC QN AUTO: 28.4 PG (ref 26.6–33)
MCH RBC QN AUTO: 28.5 PG (ref 26.6–33)
MCHC RBC AUTO-ENTMCNC: 28.2 G/DL (ref 31.5–35.7)
MCHC RBC AUTO-ENTMCNC: 28.7 G/DL (ref 31.5–35.7)
MCHC RBC AUTO-ENTMCNC: 29 G/DL (ref 31.5–35.7)
MCHC RBC AUTO-ENTMCNC: 29.1 G/DL (ref 31.5–35.7)
MCHC RBC AUTO-ENTMCNC: 29.3 G/DL (ref 31.5–35.7)
MCHC RBC AUTO-ENTMCNC: 29.8 G/DL (ref 31.5–35.7)
MCHC RBC AUTO-ENTMCNC: 30.3 G/DL (ref 31.5–35.7)
MCV RBC AUTO: 91.4 FL (ref 79–97)
MCV RBC AUTO: 93.6 FL (ref 79–97)
MCV RBC AUTO: 95.3 FL (ref 79–97)
MCV RBC AUTO: 96 FL (ref 79–97)
MCV RBC AUTO: 96.8 FL (ref 79–97)
MCV RBC AUTO: 97.8 FL (ref 79–97)
MCV RBC AUTO: 97.9 FL (ref 79–97)
METHGB BLD QL: 1.5 % (ref 0–1.5)
MODALITY: ABNORMAL
MONOCYTES # BLD AUTO: 0.15 10*3/MM3 (ref 0.1–0.9)
MONOCYTES # BLD AUTO: 0.16 10*3/MM3 (ref 0.1–0.9)
MONOCYTES # BLD AUTO: 0.19 10*3/MM3 (ref 0.1–0.9)
MONOCYTES # BLD AUTO: 0.3 10*3/MM3 (ref 0.1–0.9)
MONOCYTES # BLD AUTO: 0.34 10*3/MM3 (ref 0.1–0.9)
MONOCYTES # BLD AUTO: 0.58 10*3/MM3 (ref 0.1–0.9)
MONOCYTES # BLD AUTO: 0.61 10*3/MM3 (ref 0.1–0.9)
MONOCYTES NFR BLD AUTO: 2.8 % (ref 5–12)
MONOCYTES NFR BLD AUTO: 3 % (ref 5–12)
MONOCYTES NFR BLD AUTO: 3.9 % (ref 5–12)
MONOCYTES NFR BLD AUTO: 3.9 % (ref 5–12)
MONOCYTES NFR BLD AUTO: 4 % (ref 5–12)
MONOCYTES NFR BLD AUTO: 4.9 % (ref 5–12)
MONOCYTES NFR BLD AUTO: 9 % (ref 5–12)
MRSA DNA SPEC QL NAA+PROBE: NORMAL
NEUTROPHILS NFR BLD AUTO: 14.74 10*3/MM3 (ref 1.7–7)
NEUTROPHILS NFR BLD AUTO: 4.29 10*3/MM3 (ref 1.7–7)
NEUTROPHILS NFR BLD AUTO: 4.9 10*3/MM3 (ref 1.7–7)
NEUTROPHILS NFR BLD AUTO: 4.9 10*3/MM3 (ref 1.7–7)
NEUTROPHILS NFR BLD AUTO: 5.06 10*3/MM3 (ref 1.7–7)
NEUTROPHILS NFR BLD AUTO: 5.43 10*3/MM3 (ref 1.7–7)
NEUTROPHILS NFR BLD AUTO: 7.97 10*3/MM3 (ref 1.7–7)
NEUTROPHILS NFR BLD AUTO: 78.3 % (ref 42.7–76)
NEUTROPHILS NFR BLD AUTO: 88.4 % (ref 42.7–76)
NEUTROPHILS NFR BLD AUTO: 89.2 % (ref 42.7–76)
NEUTROPHILS NFR BLD AUTO: 90.2 % (ref 42.7–76)
NEUTROPHILS NFR BLD AUTO: 91.2 % (ref 42.7–76)
NEUTROPHILS NFR BLD AUTO: 91.2 % (ref 42.7–76)
NEUTROPHILS NFR BLD AUTO: 93.2 % (ref 42.7–76)
NRBC BLD AUTO-RTO: 0 /100 WBC (ref 0–0.2)
NT-PROBNP SERPL-MCNC: 3161 PG/ML (ref 0–900)
OXYHGB MFR BLDV: 96.8 % (ref 94–99)
PATHOLOGIST NAME: ABNORMAL
PATHOLOGIST NAME: NORMAL
PAW @ PEAK INSP FLOW SETTING VENT: 0 CMH2O
PCO2 BLDA: 54.4 MM HG (ref 35–45)
PCO2 TEMP ADJ BLD: 54.4 MM HG (ref 35–45)
PH BLDA: 7.36 PH UNITS (ref 7.35–7.45)
PH, TEMP CORRECTED: 7.36 PH UNITS
PLATELET # BLD AUTO: 109 10*3/MM3 (ref 140–450)
PLATELET # BLD AUTO: 122 10*3/MM3 (ref 140–450)
PLATELET # BLD AUTO: 124 10*3/MM3 (ref 140–450)
PLATELET # BLD AUTO: 133 10*3/MM3 (ref 140–450)
PLATELET # BLD AUTO: 147 10*3/MM3 (ref 140–450)
PLATELET # BLD AUTO: 151 10*3/MM3 (ref 140–450)
PLATELET # BLD AUTO: 199 10*3/MM3 (ref 140–450)
PMV BLD AUTO: 12 FL (ref 6–12)
PMV BLD AUTO: 12.1 FL (ref 6–12)
PMV BLD AUTO: 12.2 FL (ref 6–12)
PMV BLD AUTO: 12.2 FL (ref 6–12)
PMV BLD AUTO: 12.3 FL (ref 6–12)
PMV BLD AUTO: 12.4 FL (ref 6–12)
PMV BLD AUTO: 12.6 FL (ref 6–12)
PO2 BLDA: 337 MM HG (ref 83–108)
PO2 TEMP ADJ BLD: 337 MM HG (ref 83–108)
POTASSIUM SERPL-SCNC: 3.4 MMOL/L (ref 3.5–5.2)
POTASSIUM SERPL-SCNC: 3.9 MMOL/L (ref 3.5–5.2)
POTASSIUM SERPL-SCNC: 4.2 MMOL/L (ref 3.5–5.2)
POTASSIUM SERPL-SCNC: 4.3 MMOL/L (ref 3.5–5.2)
POTASSIUM SERPL-SCNC: 4.7 MMOL/L (ref 3.5–5.2)
POTASSIUM SERPL-SCNC: 4.7 MMOL/L (ref 3.5–5.2)
POTASSIUM SERPL-SCNC: 5 MMOL/L (ref 3.5–5.2)
PROCALCITONIN SERPL-MCNC: 0.08 NG/ML (ref 0–0.25)
PROCALCITONIN SERPL-MCNC: 0.11 NG/ML (ref 0–0.25)
PROCALCITONIN SERPL-MCNC: 0.16 NG/ML (ref 0–0.25)
PROT SERPL-MCNC: 5.4 G/DL (ref 6–8.5)
PROT SERPL-MCNC: 5.4 G/DL (ref 6–8.5)
PROT SERPL-MCNC: 5.6 G/DL (ref 6–8.5)
PROT SERPL-MCNC: 5.7 G/DL (ref 6–8.5)
PROT SERPL-MCNC: 6.9 G/DL (ref 6–8.5)
QT INTERVAL: 424 MS
QTC INTERVAL: 470 MS
RBC # BLD AUTO: 3.27 10*6/MM3 (ref 3.77–5.28)
RBC # BLD AUTO: 3.33 10*6/MM3 (ref 3.77–5.28)
RBC # BLD AUTO: 3.45 10*6/MM3 (ref 3.77–5.28)
RBC # BLD AUTO: 3.5 10*6/MM3 (ref 3.77–5.28)
RBC # BLD AUTO: 3.62 10*6/MM3 (ref 3.77–5.28)
RBC # BLD AUTO: 3.83 10*6/MM3 (ref 3.77–5.28)
RBC # BLD AUTO: 4.13 10*6/MM3 (ref 3.77–5.28)
REFERENCE VALUE: ABNORMAL
REFERENCE VALUE: NORMAL
RHINOVIRUS RNA SPEC NAA+PROBE: NOT DETECTED
RSV RNA NPH QL NAA+NON-PROBE: NOT DETECTED
S PNEUM AG SPEC QL LA: NEGATIVE
SARS-COV-2 RNA NPH QL NAA+NON-PROBE: NOT DETECTED
SODIUM SERPL-SCNC: 132 MMOL/L (ref 136–145)
SODIUM SERPL-SCNC: 134 MMOL/L (ref 136–145)
SODIUM SERPL-SCNC: 135 MMOL/L (ref 136–145)
SODIUM SERPL-SCNC: 139 MMOL/L (ref 136–145)
SODIUM SERPL-SCNC: 141 MMOL/L (ref 136–145)
SODIUM SERPL-SCNC: 142 MMOL/L (ref 136–145)
SPECIMEN SOURCE: NORMAL
TOTAL RATE: 0 BREATHS/MINUTE
TROPONIN T DELTA: -1 NG/L
TROPONIN T SERPL HS-MCNC: 15 NG/L
TROPONIN T SERPL HS-MCNC: 21 NG/L
WBC NRBC COR # BLD AUTO: 15.8 10*3/MM3 (ref 3.4–10.8)
WBC NRBC COR # BLD AUTO: 4.81 10*3/MM3 (ref 3.4–10.8)
WBC NRBC COR # BLD AUTO: 5.37 10*3/MM3 (ref 3.4–10.8)
WBC NRBC COR # BLD AUTO: 5.43 10*3/MM3 (ref 3.4–10.8)
WBC NRBC COR # BLD AUTO: 6.14 10*3/MM3 (ref 3.4–10.8)
WBC NRBC COR # BLD AUTO: 6.46 10*3/MM3 (ref 3.4–10.8)
WBC NRBC COR # BLD AUTO: 8.74 10*3/MM3 (ref 3.4–10.8)
WHOLE BLOOD HOLD COAG: NORMAL
WHOLE BLOOD HOLD SPECIMEN: NORMAL

## 2024-01-01 PROCEDURE — 25010000002 PIPERACILLIN SOD-TAZOBACTAM PER 1 G

## 2024-01-01 PROCEDURE — 84145 PROCALCITONIN (PCT): CPT | Performed by: INTERNAL MEDICINE

## 2024-01-01 PROCEDURE — 94799 UNLISTED PULMONARY SVC/PX: CPT

## 2024-01-01 PROCEDURE — 86606 ASPERGILLUS ANTIBODY: CPT | Performed by: INTERNAL MEDICINE

## 2024-01-01 PROCEDURE — 94664 DEMO&/EVAL PT USE INHALER: CPT

## 2024-01-01 PROCEDURE — 25010000002 METHYLPREDNISOLONE PER 125 MG: Performed by: INTERNAL MEDICINE

## 2024-01-01 PROCEDURE — 87186 SC STD MICRODIL/AGAR DIL: CPT | Performed by: STUDENT IN AN ORGANIZED HEALTH CARE EDUCATION/TRAINING PROGRAM

## 2024-01-01 PROCEDURE — 80048 BASIC METABOLIC PNL TOTAL CA: CPT | Performed by: STUDENT IN AN ORGANIZED HEALTH CARE EDUCATION/TRAINING PROGRAM

## 2024-01-01 PROCEDURE — 93005 ELECTROCARDIOGRAM TRACING: CPT | Performed by: EMERGENCY MEDICINE

## 2024-01-01 PROCEDURE — 82805 BLOOD GASES W/O2 SATURATION: CPT

## 2024-01-01 PROCEDURE — 83050 HGB METHEMOGLOBIN QUAN: CPT

## 2024-01-01 PROCEDURE — 36415 COLL VENOUS BLD VENIPUNCTURE: CPT

## 2024-01-01 PROCEDURE — 71045 X-RAY EXAM CHEST 1 VIEW: CPT

## 2024-01-01 PROCEDURE — 63710000001 PREDNISONE PER 1 MG: Performed by: INTERNAL MEDICINE

## 2024-01-01 PROCEDURE — 99232 SBSQ HOSP IP/OBS MODERATE 35: CPT | Performed by: INTERNAL MEDICINE

## 2024-01-01 PROCEDURE — 86235 NUCLEAR ANTIGEN ANTIBODY: CPT | Performed by: INTERNAL MEDICINE

## 2024-01-01 PROCEDURE — 87077 CULTURE AEROBIC IDENTIFY: CPT | Performed by: STUDENT IN AN ORGANIZED HEALTH CARE EDUCATION/TRAINING PROGRAM

## 2024-01-01 PROCEDURE — 5A09357 ASSISTANCE WITH RESPIRATORY VENTILATION, LESS THAN 24 CONSECUTIVE HOURS, CONTINUOUS POSITIVE AIRWAY PRESSURE: ICD-10-PCS | Performed by: INTERNAL MEDICINE

## 2024-01-01 PROCEDURE — 85025 COMPLETE CBC W/AUTO DIFF WBC: CPT | Performed by: INTERNAL MEDICINE

## 2024-01-01 PROCEDURE — 94660 CPAP INITIATION&MGMT: CPT

## 2024-01-01 PROCEDURE — 84145 PROCALCITONIN (PCT): CPT | Performed by: STUDENT IN AN ORGANIZED HEALTH CARE EDUCATION/TRAINING PROGRAM

## 2024-01-01 PROCEDURE — 25010000002 MAGNESIUM SULFATE PER 500 MG OF MAGNESIUM: Performed by: INTERNAL MEDICINE

## 2024-01-01 PROCEDURE — 83516 IMMUNOASSAY NONANTIBODY: CPT | Performed by: INTERNAL MEDICINE

## 2024-01-01 PROCEDURE — 99233 SBSQ HOSP IP/OBS HIGH 50: CPT | Performed by: INTERNAL MEDICINE

## 2024-01-01 PROCEDURE — 86225 DNA ANTIBODY NATIVE: CPT | Performed by: INTERNAL MEDICINE

## 2024-01-01 PROCEDURE — 97162 PT EVAL MOD COMPLEX 30 MIN: CPT

## 2024-01-01 PROCEDURE — 25010000002 METHYLPREDNISOLONE PER 40 MG: Performed by: EMERGENCY MEDICINE

## 2024-01-01 PROCEDURE — 0 DEXTROSE 5 % SOLUTION 500 ML FLEX CONT: Performed by: STUDENT IN AN ORGANIZED HEALTH CARE EDUCATION/TRAINING PROGRAM

## 2024-01-01 PROCEDURE — 87449 NOS EACH ORGANISM AG IA: CPT | Performed by: STUDENT IN AN ORGANIZED HEALTH CARE EDUCATION/TRAINING PROGRAM

## 2024-01-01 PROCEDURE — 86037 ANCA TITER EACH ANTIBODY: CPT | Performed by: INTERNAL MEDICINE

## 2024-01-01 PROCEDURE — 25010000002 MEROPENEM PER 100 MG: Performed by: STUDENT IN AN ORGANIZED HEALTH CARE EDUCATION/TRAINING PROGRAM

## 2024-01-01 PROCEDURE — 0 DEXTROSE 5 % SOLUTION 500 ML FLEX CONT: Performed by: INTERNAL MEDICINE

## 2024-01-01 PROCEDURE — 82550 ASSAY OF CK (CPK): CPT | Performed by: INTERNAL MEDICINE

## 2024-01-01 PROCEDURE — 71275 CT ANGIOGRAPHY CHEST: CPT

## 2024-01-01 PROCEDURE — 93010 ELECTROCARDIOGRAM REPORT: CPT | Performed by: INTERNAL MEDICINE

## 2024-01-01 PROCEDURE — 94640 AIRWAY INHALATION TREATMENT: CPT

## 2024-01-01 PROCEDURE — 99232 SBSQ HOSP IP/OBS MODERATE 35: CPT | Performed by: STUDENT IN AN ORGANIZED HEALTH CARE EDUCATION/TRAINING PROGRAM

## 2024-01-01 PROCEDURE — 99223 1ST HOSP IP/OBS HIGH 75: CPT | Performed by: INTERNAL MEDICINE

## 2024-01-01 PROCEDURE — 87385 HISTOPLASMA CAPSUL AG IA: CPT | Performed by: INTERNAL MEDICINE

## 2024-01-01 PROCEDURE — 86140 C-REACTIVE PROTEIN: CPT | Performed by: INTERNAL MEDICINE

## 2024-01-01 PROCEDURE — 87205 SMEAR GRAM STAIN: CPT | Performed by: STUDENT IN AN ORGANIZED HEALTH CARE EDUCATION/TRAINING PROGRAM

## 2024-01-01 PROCEDURE — 80053 COMPREHEN METABOLIC PANEL: CPT | Performed by: STUDENT IN AN ORGANIZED HEALTH CARE EDUCATION/TRAINING PROGRAM

## 2024-01-01 PROCEDURE — 84132 ASSAY OF SERUM POTASSIUM: CPT | Performed by: STUDENT IN AN ORGANIZED HEALTH CARE EDUCATION/TRAINING PROGRAM

## 2024-01-01 PROCEDURE — 87449 NOS EACH ORGANISM AG IA: CPT | Performed by: INTERNAL MEDICINE

## 2024-01-01 PROCEDURE — 85025 COMPLETE CBC W/AUTO DIFF WBC: CPT | Performed by: STUDENT IN AN ORGANIZED HEALTH CARE EDUCATION/TRAINING PROGRAM

## 2024-01-01 PROCEDURE — 94761 N-INVAS EAR/PLS OXIMETRY MLT: CPT

## 2024-01-01 PROCEDURE — 87040 BLOOD CULTURE FOR BACTERIA: CPT | Performed by: EMERGENCY MEDICINE

## 2024-01-01 PROCEDURE — 84484 ASSAY OF TROPONIN QUANT: CPT | Performed by: STUDENT IN AN ORGANIZED HEALTH CARE EDUCATION/TRAINING PROGRAM

## 2024-01-01 PROCEDURE — 93005 ELECTROCARDIOGRAM TRACING: CPT | Performed by: INTERNAL MEDICINE

## 2024-01-01 PROCEDURE — 82948 REAGENT STRIP/BLOOD GLUCOSE: CPT

## 2024-01-01 PROCEDURE — 36600 WITHDRAWAL OF ARTERIAL BLOOD: CPT

## 2024-01-01 PROCEDURE — 25010000002 PIPERACILLIN SOD-TAZOBACTAM PER 1 G: Performed by: INTERNAL MEDICINE

## 2024-01-01 PROCEDURE — 86612 BLASTOMYCES ANTIBODY: CPT | Performed by: INTERNAL MEDICINE

## 2024-01-01 PROCEDURE — 80053 COMPREHEN METABOLIC PANEL: CPT | Performed by: EMERGENCY MEDICINE

## 2024-01-01 PROCEDURE — 99285 EMERGENCY DEPT VISIT HI MDM: CPT

## 2024-01-01 PROCEDURE — 92610 EVALUATE SWALLOWING FUNCTION: CPT

## 2024-01-01 PROCEDURE — 25010000002 POTASSIUM CHLORIDE 10 MEQ/100ML SOLUTION: Performed by: STUDENT IN AN ORGANIZED HEALTH CARE EDUCATION/TRAINING PROGRAM

## 2024-01-01 PROCEDURE — 99239 HOSP IP/OBS DSCHRG MGMT >30: CPT | Performed by: INTERNAL MEDICINE

## 2024-01-01 PROCEDURE — 82375 ASSAY CARBOXYHB QUANT: CPT

## 2024-01-01 PROCEDURE — 87641 MR-STAPH DNA AMP PROBE: CPT | Performed by: STUDENT IN AN ORGANIZED HEALTH CARE EDUCATION/TRAINING PROGRAM

## 2024-01-01 PROCEDURE — 25010000002 ATROPINE PER 0.01 MG: Performed by: INTERNAL MEDICINE

## 2024-01-01 PROCEDURE — 25010000002 EPINEPHRINE 1 MG/10ML SOLUTION PREFILLED SYRINGE: Performed by: INTERNAL MEDICINE

## 2024-01-01 PROCEDURE — 83735 ASSAY OF MAGNESIUM: CPT | Performed by: STUDENT IN AN ORGANIZED HEALTH CARE EDUCATION/TRAINING PROGRAM

## 2024-01-01 PROCEDURE — 92612 ENDOSCOPY SWALLOW (FEES) VID: CPT

## 2024-01-01 PROCEDURE — 87305 ASPERGILLUS AG IA: CPT | Performed by: INTERNAL MEDICINE

## 2024-01-01 PROCEDURE — 87070 CULTURE OTHR SPECIMN AEROBIC: CPT | Performed by: STUDENT IN AN ORGANIZED HEALTH CARE EDUCATION/TRAINING PROGRAM

## 2024-01-01 PROCEDURE — 85025 COMPLETE CBC W/AUTO DIFF WBC: CPT | Performed by: EMERGENCY MEDICINE

## 2024-01-01 PROCEDURE — 87102 FUNGUS ISOLATION CULTURE: CPT | Performed by: INTERNAL MEDICINE

## 2024-01-01 PROCEDURE — 97530 THERAPEUTIC ACTIVITIES: CPT

## 2024-01-01 PROCEDURE — 99222 1ST HOSP IP/OBS MODERATE 55: CPT | Performed by: STUDENT IN AN ORGANIZED HEALTH CARE EDUCATION/TRAINING PROGRAM

## 2024-01-01 PROCEDURE — 25010000002 MORPHINE PER 10 MG: Performed by: INTERNAL MEDICINE

## 2024-01-01 PROCEDURE — 84484 ASSAY OF TROPONIN QUANT: CPT | Performed by: EMERGENCY MEDICINE

## 2024-01-01 PROCEDURE — 0202U NFCT DS 22 TRGT SARS-COV-2: CPT | Performed by: EMERGENCY MEDICINE

## 2024-01-01 PROCEDURE — 25010000002 VANCOMYCIN PER 500 MG

## 2024-01-01 PROCEDURE — 25510000001 IOPAMIDOL PER 1 ML: Performed by: EMERGENCY MEDICINE

## 2024-01-01 PROCEDURE — 83605 ASSAY OF LACTIC ACID: CPT | Performed by: EMERGENCY MEDICINE

## 2024-01-01 PROCEDURE — 87798 DETECT AGENT NOS DNA AMP: CPT | Performed by: INTERNAL MEDICINE

## 2024-01-01 PROCEDURE — 63710000001 PREDNISONE PER 1 MG: Performed by: STUDENT IN AN ORGANIZED HEALTH CARE EDUCATION/TRAINING PROGRAM

## 2024-01-01 PROCEDURE — 87206 SMEAR FLUORESCENT/ACID STAI: CPT | Performed by: INTERNAL MEDICINE

## 2024-01-01 PROCEDURE — 80053 COMPREHEN METABOLIC PANEL: CPT | Performed by: INTERNAL MEDICINE

## 2024-01-01 PROCEDURE — 83880 ASSAY OF NATRIURETIC PEPTIDE: CPT | Performed by: EMERGENCY MEDICINE

## 2024-01-01 RX ORDER — FLUOXETINE HYDROCHLORIDE 20 MG/1
40 CAPSULE ORAL DAILY
Status: DISCONTINUED | OUTPATIENT
Start: 2024-01-01 | End: 2024-01-01 | Stop reason: HOSPADM

## 2024-01-01 RX ORDER — TOBRAMYCIN INHALATION SOLUTION 300 MG/5ML
300 INHALANT RESPIRATORY (INHALATION)
Status: DISCONTINUED | OUTPATIENT
Start: 2024-01-01 | End: 2024-01-01

## 2024-01-01 RX ORDER — ATROPINE SULFATE 1 MG/ML
1 INJECTION, SOLUTION INTRAMUSCULAR; INTRAVENOUS; SUBCUTANEOUS ONCE
Status: COMPLETED | OUTPATIENT
Start: 2024-01-01 | End: 2024-01-01

## 2024-01-01 RX ORDER — VANCOMYCIN HYDROCHLORIDE 1 G/200ML
20 INJECTION, SOLUTION INTRAVENOUS ONCE
Status: COMPLETED | OUTPATIENT
Start: 2024-01-01 | End: 2024-01-01

## 2024-01-01 RX ORDER — AMOXICILLIN 250 MG
2 CAPSULE ORAL NIGHTLY PRN
Status: DISCONTINUED | OUTPATIENT
Start: 2024-01-01 | End: 2024-01-01 | Stop reason: HOSPADM

## 2024-01-01 RX ORDER — DOXYCYCLINE 100 MG/1
100 CAPSULE ORAL EVERY 12 HOURS SCHEDULED
Status: DISCONTINUED | OUTPATIENT
Start: 2024-01-01 | End: 2024-01-01 | Stop reason: HOSPADM

## 2024-01-01 RX ORDER — WATER 10 ML/10ML
INJECTION INTRAMUSCULAR; INTRAVENOUS; SUBCUTANEOUS
Status: COMPLETED
Start: 2024-01-01 | End: 2024-01-01

## 2024-01-01 RX ORDER — DOXEPIN HYDROCHLORIDE 50 MG/1
50 CAPSULE ORAL NIGHTLY
Status: DISCONTINUED | OUTPATIENT
Start: 2024-01-01 | End: 2024-01-01 | Stop reason: HOSPADM

## 2024-01-01 RX ORDER — CASTOR OIL AND BALSAM, PERU 788; 87 MG/G; MG/G
1 OINTMENT TOPICAL EVERY 12 HOURS SCHEDULED
Status: DISCONTINUED | OUTPATIENT
Start: 2024-01-01 | End: 2024-01-01 | Stop reason: HOSPADM

## 2024-01-01 RX ORDER — TOBRAMYCIN INHALATION SOLUTION 300 MG/5ML
300 INHALANT RESPIRATORY (INHALATION)
Status: DISCONTINUED | OUTPATIENT
Start: 2024-01-01 | End: 2024-01-01 | Stop reason: HOSPADM

## 2024-01-01 RX ORDER — POTASSIUM CHLORIDE 20 MEQ/1
40 TABLET, EXTENDED RELEASE ORAL EVERY 4 HOURS
Status: DISCONTINUED | OUTPATIENT
Start: 2024-01-01 | End: 2024-01-01

## 2024-01-01 RX ORDER — FERROUS SULFATE 325(65) MG
325 TABLET ORAL
Status: DISCONTINUED | OUTPATIENT
Start: 2024-01-01 | End: 2024-01-01 | Stop reason: HOSPADM

## 2024-01-01 RX ORDER — PANTOPRAZOLE SODIUM 40 MG/1
40 TABLET, DELAYED RELEASE ORAL
Status: DISCONTINUED | OUTPATIENT
Start: 2024-01-01 | End: 2024-01-01 | Stop reason: HOSPADM

## 2024-01-01 RX ORDER — SODIUM CHLORIDE 0.9 % (FLUSH) 0.9 %
10 SYRINGE (ML) INJECTION AS NEEDED
Status: DISCONTINUED | OUTPATIENT
Start: 2024-01-01 | End: 2024-01-01 | Stop reason: HOSPADM

## 2024-01-01 RX ORDER — LINEZOLID 2 MG/ML
600 INJECTION, SOLUTION INTRAVENOUS EVERY 12 HOURS
Status: DISCONTINUED | OUTPATIENT
Start: 2024-01-01 | End: 2024-01-01

## 2024-01-01 RX ORDER — METHYLPREDNISOLONE SODIUM SUCCINATE 125 MG/2ML
62.5 INJECTION, POWDER, LYOPHILIZED, FOR SOLUTION INTRAMUSCULAR; INTRAVENOUS ONCE
Status: COMPLETED | OUTPATIENT
Start: 2024-01-01 | End: 2024-01-01

## 2024-01-01 RX ORDER — FLUTICASONE PROPIONATE 50 MCG
2 SPRAY, SUSPENSION (ML) NASAL DAILY PRN
Status: DISCONTINUED | OUTPATIENT
Start: 2024-01-01 | End: 2024-01-01 | Stop reason: HOSPADM

## 2024-01-01 RX ORDER — MAGNESIUM SULFATE HEPTAHYDRATE 500 MG/ML
INJECTION, SOLUTION INTRAMUSCULAR; INTRAVENOUS
Status: COMPLETED | OUTPATIENT
Start: 2024-01-01 | End: 2024-01-01

## 2024-01-01 RX ORDER — PREDNISONE 20 MG/1
40 TABLET ORAL
Status: DISCONTINUED | OUTPATIENT
Start: 2024-01-01 | End: 2024-01-01

## 2024-01-01 RX ORDER — FLECAINIDE ACETATE 50 MG/1
50 TABLET ORAL EVERY 12 HOURS SCHEDULED
Status: DISCONTINUED | OUTPATIENT
Start: 2024-01-01 | End: 2024-01-01 | Stop reason: HOSPADM

## 2024-01-01 RX ORDER — SODIUM CHLORIDE 9 MG/ML
40 INJECTION, SOLUTION INTRAVENOUS AS NEEDED
Status: DISCONTINUED | OUTPATIENT
Start: 2024-01-01 | End: 2024-01-01

## 2024-01-01 RX ORDER — MORPHINE SULFATE 20 MG/ML
5 SOLUTION ORAL EVERY 4 HOURS PRN
Status: DISPENSED | OUTPATIENT
Start: 2024-01-01 | End: 2024-01-01

## 2024-01-01 RX ORDER — PREDNISONE 20 MG/1
40 TABLET ORAL 2 TIMES DAILY WITH MEALS
Status: DISCONTINUED | OUTPATIENT
Start: 2024-01-01 | End: 2024-01-01 | Stop reason: HOSPADM

## 2024-01-01 RX ORDER — LEVOTHYROXINE SODIUM 0.03 MG/1
25 TABLET ORAL
Status: DISCONTINUED | OUTPATIENT
Start: 2024-01-01 | End: 2024-01-01 | Stop reason: HOSPADM

## 2024-01-01 RX ORDER — POTASSIUM CHLORIDE 7.45 MG/ML
10 INJECTION INTRAVENOUS
Status: DISPENSED | OUTPATIENT
Start: 2024-01-01 | End: 2024-01-01

## 2024-01-01 RX ORDER — TRAMADOL HYDROCHLORIDE 50 MG/1
50 TABLET ORAL 2 TIMES DAILY
Status: DISCONTINUED | OUTPATIENT
Start: 2024-01-01 | End: 2024-01-01 | Stop reason: HOSPADM

## 2024-01-01 RX ORDER — METHYLPREDNISOLONE SODIUM SUCCINATE 125 MG/2ML
62.5 INJECTION, POWDER, LYOPHILIZED, FOR SOLUTION INTRAMUSCULAR; INTRAVENOUS EVERY 12 HOURS
Status: COMPLETED | OUTPATIENT
Start: 2024-01-01 | End: 2024-01-01

## 2024-01-01 RX ORDER — POTASSIUM CHLORIDE 7.45 MG/ML
10 INJECTION INTRAVENOUS ONCE
Status: COMPLETED | OUTPATIENT
Start: 2024-01-01 | End: 2024-01-01

## 2024-01-01 RX ORDER — SODIUM CHLORIDE 0.9 % (FLUSH) 0.9 %
10 SYRINGE (ML) INJECTION AS NEEDED
Status: DISCONTINUED | OUTPATIENT
Start: 2024-01-01 | End: 2024-01-01

## 2024-01-01 RX ORDER — L.ACID,PARA/B.BIFIDUM/S.THERM 8B CELL
1 CAPSULE ORAL DAILY
Status: DISCONTINUED | OUTPATIENT
Start: 2024-01-01 | End: 2024-01-01 | Stop reason: HOSPADM

## 2024-01-01 RX ORDER — BISACODYL 10 MG
10 SUPPOSITORY, RECTAL RECTAL DAILY PRN
Status: DISCONTINUED | OUTPATIENT
Start: 2024-01-01 | End: 2024-01-01 | Stop reason: HOSPADM

## 2024-01-01 RX ORDER — SULFAMETHOXAZOLE AND TRIMETHOPRIM 800; 160 MG/1; MG/1
2 TABLET ORAL EVERY 8 HOURS
Status: DISCONTINUED | OUTPATIENT
Start: 2024-01-01 | End: 2024-01-01

## 2024-01-01 RX ORDER — AMLODIPINE BESYLATE 5 MG/1
5 TABLET ORAL
Status: DISCONTINUED | OUTPATIENT
Start: 2024-01-01 | End: 2024-01-01 | Stop reason: HOSPADM

## 2024-01-01 RX ORDER — IPRATROPIUM BROMIDE AND ALBUTEROL SULFATE 2.5; .5 MG/3ML; MG/3ML
3 SOLUTION RESPIRATORY (INHALATION) ONCE
Status: COMPLETED | OUTPATIENT
Start: 2024-01-01 | End: 2024-01-01

## 2024-01-01 RX ORDER — SULFAMETHOXAZOLE AND TRIMETHOPRIM 400; 80 MG/1; MG/1
2 TABLET ORAL 3 TIMES WEEKLY
COMMUNITY

## 2024-01-01 RX ORDER — HYDROXYZINE HYDROCHLORIDE 25 MG/1
25 TABLET, FILM COATED ORAL 3 TIMES DAILY PRN
Status: DISCONTINUED | OUTPATIENT
Start: 2024-01-01 | End: 2024-01-01 | Stop reason: HOSPADM

## 2024-01-01 RX ORDER — METHYLPREDNISOLONE SODIUM SUCCINATE 40 MG/ML
40 INJECTION, POWDER, LYOPHILIZED, FOR SOLUTION INTRAMUSCULAR; INTRAVENOUS ONCE
Status: COMPLETED | OUTPATIENT
Start: 2024-01-01 | End: 2024-01-01

## 2024-01-01 RX ORDER — IPRATROPIUM BROMIDE AND ALBUTEROL SULFATE 2.5; .5 MG/3ML; MG/3ML
3 SOLUTION RESPIRATORY (INHALATION)
Status: DISCONTINUED | OUTPATIENT
Start: 2024-01-01 | End: 2024-01-01 | Stop reason: HOSPADM

## 2024-01-01 RX ORDER — GUAIFENESIN 600 MG/1
1200 TABLET, EXTENDED RELEASE ORAL EVERY 12 HOURS SCHEDULED
Status: DISCONTINUED | OUTPATIENT
Start: 2024-01-01 | End: 2024-01-01 | Stop reason: HOSPADM

## 2024-01-01 RX ORDER — BUDESONIDE AND FORMOTEROL FUMARATE DIHYDRATE 160; 4.5 UG/1; UG/1
2 AEROSOL RESPIRATORY (INHALATION)
Status: DISCONTINUED | OUTPATIENT
Start: 2024-01-01 | End: 2024-01-01 | Stop reason: HOSPADM

## 2024-01-01 RX ORDER — NITROGLYCERIN 0.4 MG/1
0.4 TABLET SUBLINGUAL
Status: DISCONTINUED | OUTPATIENT
Start: 2024-01-01 | End: 2024-01-01 | Stop reason: HOSPADM

## 2024-01-01 RX ORDER — POLYETHYLENE GLYCOL 3350 17 G/17G
17 POWDER, FOR SOLUTION ORAL DAILY PRN
Status: DISCONTINUED | OUTPATIENT
Start: 2024-01-01 | End: 2024-01-01 | Stop reason: HOSPADM

## 2024-01-01 RX ORDER — BISACODYL 5 MG/1
5 TABLET, DELAYED RELEASE ORAL DAILY PRN
Status: DISCONTINUED | OUTPATIENT
Start: 2024-01-01 | End: 2024-01-01 | Stop reason: HOSPADM

## 2024-01-01 RX ORDER — HYDROXYCHLOROQUINE SULFATE 200 MG/1
200 TABLET, FILM COATED ORAL
Status: DISCONTINUED | OUTPATIENT
Start: 2024-01-01 | End: 2024-01-01 | Stop reason: HOSPADM

## 2024-01-01 RX ORDER — CETIRIZINE HYDROCHLORIDE 10 MG/1
10 TABLET ORAL DAILY
Status: DISCONTINUED | OUTPATIENT
Start: 2024-01-01 | End: 2024-01-01 | Stop reason: HOSPADM

## 2024-01-01 RX ORDER — ACETAMINOPHEN 325 MG/1
650 TABLET ORAL EVERY 6 HOURS PRN
Status: DISCONTINUED | OUTPATIENT
Start: 2024-01-01 | End: 2024-01-01 | Stop reason: HOSPADM

## 2024-01-01 RX ORDER — SODIUM CHLORIDE 0.9 % (FLUSH) 0.9 %
10 SYRINGE (ML) INJECTION EVERY 12 HOURS SCHEDULED
Status: DISCONTINUED | OUTPATIENT
Start: 2024-01-01 | End: 2024-01-01

## 2024-01-01 RX ORDER — MORPHINE SULFATE 2 MG/ML
2 INJECTION, SOLUTION INTRAMUSCULAR; INTRAVENOUS
Status: DISCONTINUED | OUTPATIENT
Start: 2024-01-01 | End: 2024-01-01 | Stop reason: HOSPADM

## 2024-01-01 RX ORDER — SULFASALAZINE 500 MG/1
500 TABLET ORAL EVERY 12 HOURS SCHEDULED
Status: DISCONTINUED | OUTPATIENT
Start: 2024-01-01 | End: 2024-01-01 | Stop reason: HOSPADM

## 2024-01-01 RX ORDER — DICYCLOMINE HYDROCHLORIDE 10 MG/1
10 CAPSULE ORAL 3 TIMES DAILY
Status: DISCONTINUED | OUTPATIENT
Start: 2024-01-01 | End: 2024-01-01 | Stop reason: HOSPADM

## 2024-01-01 RX ORDER — IPRATROPIUM BROMIDE AND ALBUTEROL SULFATE 2.5; .5 MG/3ML; MG/3ML
3 SOLUTION RESPIRATORY (INHALATION) EVERY 4 HOURS PRN
Status: DISCONTINUED | OUTPATIENT
Start: 2024-01-01 | End: 2024-01-01 | Stop reason: HOSPADM

## 2024-01-01 RX ORDER — MORPHINE SULFATE 10 MG/5ML
5 SOLUTION ORAL EVERY 4 HOURS PRN
Status: DISCONTINUED | OUTPATIENT
Start: 2024-01-01 | End: 2024-01-01 | Stop reason: HOSPADM

## 2024-01-01 RX ORDER — SULFAMETHOXAZOLE AND TRIMETHOPRIM 800; 160 MG/1; MG/1
2 TABLET ORAL 3 TIMES WEEKLY
Status: DISCONTINUED | OUTPATIENT
Start: 2024-01-01 | End: 2024-01-01 | Stop reason: HOSPADM

## 2024-01-01 RX ORDER — ECHINACEA PURPUREA EXTRACT 125 MG
1 TABLET ORAL AS NEEDED
Status: DISCONTINUED | OUTPATIENT
Start: 2024-01-01 | End: 2024-01-01 | Stop reason: HOSPADM

## 2024-01-01 RX ADMIN — POTASSIUM CHLORIDE 10 MEQ: 7.46 INJECTION, SOLUTION INTRAVENOUS at 10:46

## 2024-01-01 RX ADMIN — GUAIFENESIN 1200 MG: 600 TABLET, EXTENDED RELEASE ORAL at 20:46

## 2024-01-01 RX ADMIN — PIPERACILLIN SODIUM AND TAZOBACTAM SODIUM 3.38 G: 3; .375 INJECTION, SOLUTION INTRAVENOUS at 08:52

## 2024-01-01 RX ADMIN — Medication 1 CAPSULE: at 09:26

## 2024-01-01 RX ADMIN — MINERAL OIL 30 ML: 1000 LIQUID ORAL at 18:03

## 2024-01-01 RX ADMIN — SULFASALAZINE 500 MG: 500 TABLET ORAL at 08:42

## 2024-01-01 RX ADMIN — TRAMADOL HYDROCHLORIDE 50 MG: 50 TABLET, COATED ORAL at 08:34

## 2024-01-01 RX ADMIN — DOXEPIN HYDROCHLORIDE 50 MG: 50 CAPSULE ORAL at 20:25

## 2024-01-01 RX ADMIN — DOXYCYCLINE 100 MG: 100 CAPSULE ORAL at 08:44

## 2024-01-01 RX ADMIN — TRAMADOL HYDROCHLORIDE 50 MG: 50 TABLET, COATED ORAL at 20:37

## 2024-01-01 RX ADMIN — SALINE NASAL SPRAY 1 SPRAY: 1.5 SOLUTION NASAL at 21:35

## 2024-01-01 RX ADMIN — SULFAMETHOXAZOLE AND TRIMETHOPRIM 2 TABLET: 800; 160 TABLET ORAL at 20:26

## 2024-01-01 RX ADMIN — MAGNESIUM SULFATE HEPTAHYDRATE 2 G: 500 INJECTION, SOLUTION INTRAMUSCULAR; INTRAVENOUS at 10:57

## 2024-01-01 RX ADMIN — MEROPENEM 1000 MG: 1 INJECTION, POWDER, FOR SOLUTION INTRAVENOUS at 17:04

## 2024-01-01 RX ADMIN — PIPERACILLIN SODIUM AND TAZOBACTAM SODIUM 3.38 G: 3; .375 INJECTION, SOLUTION INTRAVENOUS at 17:16

## 2024-01-01 RX ADMIN — PANTOPRAZOLE SODIUM 40 MG: 40 TABLET, DELAYED RELEASE ORAL at 18:11

## 2024-01-01 RX ADMIN — IPRATROPIUM BROMIDE AND ALBUTEROL SULFATE 3 ML: 2.5; .5 SOLUTION RESPIRATORY (INHALATION) at 17:11

## 2024-01-01 RX ADMIN — BUDESONIDE AND FORMOTEROL FUMARATE DIHYDRATE 2 PUFF: 160; 4.5 AEROSOL RESPIRATORY (INHALATION) at 07:45

## 2024-01-01 RX ADMIN — GUAIFENESIN 1200 MG: 600 TABLET, EXTENDED RELEASE ORAL at 08:19

## 2024-01-01 RX ADMIN — SULFASALAZINE 500 MG: 500 TABLET ORAL at 08:51

## 2024-01-01 RX ADMIN — SULFAMETHOXAZOLE AND TRIMETHOPRIM 2 TABLET: 800; 160 TABLET ORAL at 21:28

## 2024-01-01 RX ADMIN — IPRATROPIUM BROMIDE AND ALBUTEROL SULFATE 3 ML: 2.5; .5 SOLUTION RESPIRATORY (INHALATION) at 14:18

## 2024-01-01 RX ADMIN — PANTOPRAZOLE SODIUM 40 MG: 40 TABLET, DELAYED RELEASE ORAL at 09:16

## 2024-01-01 RX ADMIN — PANTOPRAZOLE SODIUM 40 MG: 40 TABLET, DELAYED RELEASE ORAL at 17:04

## 2024-01-01 RX ADMIN — SULFASALAZINE 500 MG: 500 TABLET ORAL at 20:42

## 2024-01-01 RX ADMIN — FLUOXETINE 40 MG: 20 CAPSULE ORAL at 08:49

## 2024-01-01 RX ADMIN — AMLODIPINE BESYLATE 5 MG: 5 TABLET ORAL at 08:47

## 2024-01-01 RX ADMIN — Medication 10 ML: at 08:20

## 2024-01-01 RX ADMIN — DOXEPIN HYDROCHLORIDE 50 MG: 50 CAPSULE ORAL at 20:22

## 2024-01-01 RX ADMIN — PIPERACILLIN SODIUM AND TAZOBACTAM SODIUM 3.38 G: 3; .375 INJECTION, SOLUTION INTRAVENOUS at 11:29

## 2024-01-01 RX ADMIN — LEVOTHYROXINE SODIUM 25 MCG: 25 TABLET ORAL at 05:36

## 2024-01-01 RX ADMIN — PIPERACILLIN SODIUM AND TAZOBACTAM SODIUM 4.5 G: 4; .5 INJECTION, SOLUTION INTRAVENOUS at 03:17

## 2024-01-01 RX ADMIN — CASTOR OIL AND BALSAM, PERU 1 APPLICATION: 788; 87 OINTMENT TOPICAL at 08:44

## 2024-01-01 RX ADMIN — TIOTROPIUM BROMIDE INHALATION SPRAY 2 PUFF: 3.12 SPRAY, METERED RESPIRATORY (INHALATION) at 07:06

## 2024-01-01 RX ADMIN — MINERAL OIL: 1000 LIQUID ORAL at 01:38

## 2024-01-01 RX ADMIN — DOXYCYCLINE 100 MG: 100 CAPSULE ORAL at 20:46

## 2024-01-01 RX ADMIN — SULFASALAZINE 500 MG: 500 TABLET ORAL at 20:23

## 2024-01-01 RX ADMIN — FLUOXETINE 40 MG: 20 CAPSULE ORAL at 08:48

## 2024-01-01 RX ADMIN — SULFAMETHOXAZOLE AND TRIMETHOPRIM 2 TABLET: 800; 160 TABLET ORAL at 05:00

## 2024-01-01 RX ADMIN — RIVAROXABAN 15 MG: 15 TABLET, FILM COATED ORAL at 16:51

## 2024-01-01 RX ADMIN — TOBRAMYCIN 300 MG: 300 SOLUTION ORAL at 09:44

## 2024-01-01 RX ADMIN — IPRATROPIUM BROMIDE AND ALBUTEROL SULFATE 3 ML: 2.5; .5 SOLUTION RESPIRATORY (INHALATION) at 11:49

## 2024-01-01 RX ADMIN — DICYCLOMINE HYDROCHLORIDE 10 MG: 10 CAPSULE ORAL at 08:48

## 2024-01-01 RX ADMIN — AMLODIPINE BESYLATE 5 MG: 5 TABLET ORAL at 08:34

## 2024-01-01 RX ADMIN — BUDESONIDE AND FORMOTEROL FUMARATE DIHYDRATE 2 PUFF: 160; 4.5 AEROSOL RESPIRATORY (INHALATION) at 20:38

## 2024-01-01 RX ADMIN — PREDNISONE 40 MG: 20 TABLET ORAL at 08:49

## 2024-01-01 RX ADMIN — METHYLPREDNISOLONE SODIUM SUCCINATE 62.5 MG: 125 INJECTION, POWDER, LYOPHILIZED, FOR SOLUTION INTRAMUSCULAR; INTRAVENOUS at 21:33

## 2024-01-01 RX ADMIN — Medication 1 CAPSULE: at 08:34

## 2024-01-01 RX ADMIN — DOXYCYCLINE 100 MG: 100 CAPSULE ORAL at 21:28

## 2024-01-01 RX ADMIN — SULFAMETHOXAZOLE AND TRIMETHOPRIM 280 MG OF TRIMETHOPRIM: 80; 16 INJECTION, SOLUTION, CONCENTRATE INTRAVENOUS at 01:39

## 2024-01-01 RX ADMIN — RIVAROXABAN 15 MG: 15 TABLET, FILM COATED ORAL at 17:16

## 2024-01-01 RX ADMIN — DOXYCYCLINE 100 MG: 100 CAPSULE ORAL at 20:42

## 2024-01-01 RX ADMIN — IPRATROPIUM BROMIDE AND ALBUTEROL SULFATE 3 ML: 2.5; .5 SOLUTION RESPIRATORY (INHALATION) at 20:01

## 2024-01-01 RX ADMIN — METOPROLOL TARTRATE 25 MG: 25 TABLET, FILM COATED ORAL at 21:28

## 2024-01-01 RX ADMIN — IPRATROPIUM BROMIDE AND ALBUTEROL SULFATE 3 ML: 2.5; .5 SOLUTION RESPIRATORY (INHALATION) at 07:40

## 2024-01-01 RX ADMIN — PREDNISONE 40 MG: 20 TABLET ORAL at 08:19

## 2024-01-01 RX ADMIN — SULFASALAZINE 500 MG: 500 TABLET ORAL at 20:46

## 2024-01-01 RX ADMIN — SULFASALAZINE 500 MG: 500 TABLET ORAL at 20:37

## 2024-01-01 RX ADMIN — SULFAMETHOXAZOLE AND TRIMETHOPRIM 2 TABLET: 800; 160 TABLET ORAL at 05:54

## 2024-01-01 RX ADMIN — CASTOR OIL AND BALSAM, PERU 1 APPLICATION: 788; 87 OINTMENT TOPICAL at 21:42

## 2024-01-01 RX ADMIN — FLECAINIDE ACETATE 50 MG: 50 TABLET ORAL at 22:14

## 2024-01-01 RX ADMIN — POTASSIUM CHLORIDE 10 MEQ: 7.46 INJECTION, SOLUTION INTRAVENOUS at 05:39

## 2024-01-01 RX ADMIN — GUAIFENESIN 1200 MG: 600 TABLET, EXTENDED RELEASE ORAL at 09:25

## 2024-01-01 RX ADMIN — CASTOR OIL AND BALSAM, PERU 1 APPLICATION: 788; 87 OINTMENT TOPICAL at 21:02

## 2024-01-01 RX ADMIN — FLECAINIDE ACETATE 50 MG: 50 TABLET ORAL at 21:28

## 2024-01-01 RX ADMIN — TIOTROPIUM BROMIDE INHALATION SPRAY 2 PUFF: 3.12 SPRAY, METERED RESPIRATORY (INHALATION) at 10:12

## 2024-01-01 RX ADMIN — IPRATROPIUM BROMIDE AND ALBUTEROL SULFATE 3 ML: 2.5; .5 SOLUTION RESPIRATORY (INHALATION) at 15:30

## 2024-01-01 RX ADMIN — METHYLPREDNISOLONE SODIUM SUCCINATE 62.5 MG: 125 INJECTION INTRAMUSCULAR; INTRAVENOUS at 17:16

## 2024-01-01 RX ADMIN — HYDROXYCHLOROQUINE SULFATE 200 MG: 200 TABLET ORAL at 08:48

## 2024-01-01 RX ADMIN — CETIRIZINE HYDROCHLORIDE 10 MG: 10 TABLET, FILM COATED ORAL at 08:34

## 2024-01-01 RX ADMIN — HYDROXYCHLOROQUINE SULFATE 200 MG: 200 TABLET ORAL at 09:16

## 2024-01-01 RX ADMIN — PREDNISONE 40 MG: 20 TABLET ORAL at 08:48

## 2024-01-01 RX ADMIN — PIPERACILLIN SODIUM AND TAZOBACTAM SODIUM 4.5 G: 4; .5 INJECTION, SOLUTION INTRAVENOUS at 12:31

## 2024-01-01 RX ADMIN — Medication 1 CAPSULE: at 12:30

## 2024-01-01 RX ADMIN — Medication 1 CAPSULE: at 08:18

## 2024-01-01 RX ADMIN — PIPERACILLIN SODIUM AND TAZOBACTAM SODIUM 4.5 G: 4; .5 INJECTION, SOLUTION INTRAVENOUS at 20:42

## 2024-01-01 RX ADMIN — POTASSIUM CHLORIDE 10 MEQ: 7.46 INJECTION, SOLUTION INTRAVENOUS at 08:32

## 2024-01-01 RX ADMIN — MEROPENEM 1000 MG: 1 INJECTION, POWDER, FOR SOLUTION INTRAVENOUS at 18:01

## 2024-01-01 RX ADMIN — BUDESONIDE AND FORMOTEROL FUMARATE DIHYDRATE 2 PUFF: 160; 4.5 AEROSOL RESPIRATORY (INHALATION) at 07:06

## 2024-01-01 RX ADMIN — HYDROXYCHLOROQUINE SULFATE 200 MG: 200 TABLET ORAL at 08:50

## 2024-01-01 RX ADMIN — IPRATROPIUM BROMIDE AND ALBUTEROL SULFATE 3 ML: 2.5; .5 SOLUTION RESPIRATORY (INHALATION) at 07:48

## 2024-01-01 RX ADMIN — SULFAMETHOXAZOLE AND TRIMETHOPRIM 2 TABLET: 800; 160 TABLET ORAL at 14:59

## 2024-01-01 RX ADMIN — RIVAROXABAN 15 MG: 15 TABLET, FILM COATED ORAL at 18:01

## 2024-01-01 RX ADMIN — PREDNISONE 40 MG: 20 TABLET ORAL at 18:01

## 2024-01-01 RX ADMIN — FLECAINIDE ACETATE 50 MG: 50 TABLET ORAL at 20:23

## 2024-01-01 RX ADMIN — TRAMADOL HYDROCHLORIDE 50 MG: 50 TABLET, COATED ORAL at 08:48

## 2024-01-01 RX ADMIN — DOXYCYCLINE 100 MG: 100 CAPSULE ORAL at 20:24

## 2024-01-01 RX ADMIN — METOPROLOL TARTRATE 25 MG: 25 TABLET, FILM COATED ORAL at 08:34

## 2024-01-01 RX ADMIN — SULFASALAZINE 500 MG: 500 TABLET ORAL at 20:26

## 2024-01-01 RX ADMIN — PIPERACILLIN SODIUM AND TAZOBACTAM SODIUM 4.5 G: 4; .5 INJECTION, SOLUTION INTRAVENOUS at 20:22

## 2024-01-01 RX ADMIN — DICYCLOMINE HYDROCHLORIDE 10 MG: 10 CAPSULE ORAL at 08:34

## 2024-01-01 RX ADMIN — HYDROXYZINE HYDROCHLORIDE 25 MG: 25 TABLET, FILM COATED ORAL at 21:06

## 2024-01-01 RX ADMIN — IPRATROPIUM BROMIDE AND ALBUTEROL SULFATE 3 ML: 2.5; .5 SOLUTION RESPIRATORY (INHALATION) at 20:38

## 2024-01-01 RX ADMIN — PIPERACILLIN SODIUM AND TAZOBACTAM SODIUM 3.38 G: 3; .375 INJECTION, SOLUTION INTRAVENOUS at 18:44

## 2024-01-01 RX ADMIN — Medication 10 ML: at 20:37

## 2024-01-01 RX ADMIN — SULFAMETHOXAZOLE AND TRIMETHOPRIM 299.2 MG OF TRIMETHOPRIM: 80; 16 INJECTION, SOLUTION, CONCENTRATE INTRAVENOUS at 15:51

## 2024-01-01 RX ADMIN — DICYCLOMINE HYDROCHLORIDE 10 MG: 10 CAPSULE ORAL at 15:51

## 2024-01-01 RX ADMIN — DICYCLOMINE HYDROCHLORIDE 10 MG: 10 CAPSULE ORAL at 17:16

## 2024-01-01 RX ADMIN — CASTOR OIL AND BALSAM, PERU 1 APPLICATION: 788; 87 OINTMENT TOPICAL at 08:19

## 2024-01-01 RX ADMIN — CETIRIZINE HYDROCHLORIDE 10 MG: 10 TABLET, FILM COATED ORAL at 08:49

## 2024-01-01 RX ADMIN — RIVAROXABAN 15 MG: 15 TABLET, FILM COATED ORAL at 22:14

## 2024-01-01 RX ADMIN — ATROPINE SULFATE 1 MG: 1 INJECTION, SOLUTION INTRAMUSCULAR; INTRAVENOUS; SUBCUTANEOUS at 10:42

## 2024-01-01 RX ADMIN — FLECAINIDE ACETATE 50 MG: 50 TABLET ORAL at 09:26

## 2024-01-01 RX ADMIN — DOXYCYCLINE 100 MG: 100 INJECTION, POWDER, LYOPHILIZED, FOR SOLUTION INTRAVENOUS at 09:15

## 2024-01-01 RX ADMIN — MINERAL OIL: 1000 LIQUID ORAL at 12:39

## 2024-01-01 RX ADMIN — TRAMADOL HYDROCHLORIDE 50 MG: 50 TABLET, COATED ORAL at 20:42

## 2024-01-01 RX ADMIN — TOBRAMYCIN 300 MG: 300 SOLUTION ORAL at 07:47

## 2024-01-01 RX ADMIN — MINERAL OIL: 1000 LIQUID ORAL at 16:51

## 2024-01-01 RX ADMIN — BUDESONIDE AND FORMOTEROL FUMARATE DIHYDRATE 2 PUFF: 160; 4.5 AEROSOL RESPIRATORY (INHALATION) at 07:47

## 2024-01-01 RX ADMIN — IPRATROPIUM BROMIDE AND ALBUTEROL SULFATE 3 ML: 2.5; .5 SOLUTION RESPIRATORY (INHALATION) at 12:26

## 2024-01-01 RX ADMIN — FERROUS SULFATE TAB 325 MG (65 MG ELEMENTAL FE) 325 MG: 325 (65 FE) TAB at 09:25

## 2024-01-01 RX ADMIN — AMLODIPINE BESYLATE 5 MG: 5 TABLET ORAL at 09:25

## 2024-01-01 RX ADMIN — MINERAL OIL: 1000 LIQUID ORAL at 08:19

## 2024-01-01 RX ADMIN — PANTOPRAZOLE SODIUM 40 MG: 40 TABLET, DELAYED RELEASE ORAL at 08:34

## 2024-01-01 RX ADMIN — TOBRAMYCIN 300 MG: 300 SOLUTION ORAL at 19:15

## 2024-01-01 RX ADMIN — LEVOTHYROXINE SODIUM 25 MCG: 25 TABLET ORAL at 06:19

## 2024-01-01 RX ADMIN — PIPERACILLIN SODIUM AND TAZOBACTAM SODIUM 3.38 G: 3; .375 INJECTION, SOLUTION INTRAVENOUS at 04:07

## 2024-01-01 RX ADMIN — BUDESONIDE AND FORMOTEROL FUMARATE DIHYDRATE 2 PUFF: 160; 4.5 AEROSOL RESPIRATORY (INHALATION) at 19:07

## 2024-01-01 RX ADMIN — MINERAL OIL: 1000 LIQUID ORAL at 17:36

## 2024-01-01 RX ADMIN — ATROPINE SULFATE 1 MG: 1 INJECTION, SOLUTION INTRAMUSCULAR; INTRAVENOUS; SUBCUTANEOUS at 10:48

## 2024-01-01 RX ADMIN — FLECAINIDE ACETATE 50 MG: 50 TABLET ORAL at 08:18

## 2024-01-01 RX ADMIN — PREDNISONE 40 MG: 20 TABLET ORAL at 08:34

## 2024-01-01 RX ADMIN — GUAIFENESIN 1200 MG: 600 TABLET, EXTENDED RELEASE ORAL at 14:59

## 2024-01-01 RX ADMIN — BUDESONIDE AND FORMOTEROL FUMARATE DIHYDRATE 2 PUFF: 160; 4.5 AEROSOL RESPIRATORY (INHALATION) at 20:12

## 2024-01-01 RX ADMIN — METOPROLOL TARTRATE 25 MG: 25 TABLET, FILM COATED ORAL at 20:42

## 2024-01-01 RX ADMIN — TRAMADOL HYDROCHLORIDE 50 MG: 50 TABLET, COATED ORAL at 22:15

## 2024-01-01 RX ADMIN — METOPROLOL TARTRATE 25 MG: 25 TABLET, FILM COATED ORAL at 22:14

## 2024-01-01 RX ADMIN — MORPHINE SULFATE 5 MG: 20 SOLUTION ORAL at 00:50

## 2024-01-01 RX ADMIN — DICYCLOMINE HYDROCHLORIDE 10 MG: 10 CAPSULE ORAL at 21:37

## 2024-01-01 RX ADMIN — GUAIFENESIN 1200 MG: 600 TABLET, EXTENDED RELEASE ORAL at 21:06

## 2024-01-01 RX ADMIN — POTASSIUM CHLORIDE 10 MEQ: 7.46 INJECTION, SOLUTION INTRAVENOUS at 13:02

## 2024-01-01 RX ADMIN — DICYCLOMINE HYDROCHLORIDE 10 MG: 10 CAPSULE ORAL at 15:04

## 2024-01-01 RX ADMIN — IPRATROPIUM BROMIDE AND ALBUTEROL SULFATE 3 ML: 2.5; .5 SOLUTION RESPIRATORY (INHALATION) at 10:12

## 2024-01-01 RX ADMIN — DOXEPIN HYDROCHLORIDE 50 MG: 50 CAPSULE ORAL at 20:37

## 2024-01-01 RX ADMIN — SULFAMETHOXAZOLE AND TRIMETHOPRIM 2 TABLET: 800; 160 TABLET ORAL at 13:17

## 2024-01-01 RX ADMIN — FLECAINIDE ACETATE 50 MG: 50 TABLET ORAL at 08:34

## 2024-01-01 RX ADMIN — FLUOXETINE 40 MG: 20 CAPSULE ORAL at 08:19

## 2024-01-01 RX ADMIN — HYDROXYZINE HYDROCHLORIDE 25 MG: 25 TABLET, FILM COATED ORAL at 20:52

## 2024-01-01 RX ADMIN — MINERAL OIL: 1000 LIQUID ORAL at 17:14

## 2024-01-01 RX ADMIN — HYDROXYCHLOROQUINE SULFATE 200 MG: 200 TABLET ORAL at 08:34

## 2024-01-01 RX ADMIN — FLECAINIDE ACETATE 50 MG: 50 TABLET ORAL at 20:37

## 2024-01-01 RX ADMIN — FLUOXETINE 40 MG: 20 CAPSULE ORAL at 08:43

## 2024-01-01 RX ADMIN — DICYCLOMINE HYDROCHLORIDE 10 MG: 10 CAPSULE ORAL at 09:26

## 2024-01-01 RX ADMIN — BUDESONIDE AND FORMOTEROL FUMARATE DIHYDRATE 2 PUFF: 160; 4.5 AEROSOL RESPIRATORY (INHALATION) at 06:42

## 2024-01-01 RX ADMIN — PANTOPRAZOLE SODIUM 40 MG: 40 TABLET, DELAYED RELEASE ORAL at 17:35

## 2024-01-01 RX ADMIN — DOXYCYCLINE 100 MG: 100 INJECTION, POWDER, LYOPHILIZED, FOR SOLUTION INTRAVENOUS at 13:11

## 2024-01-01 RX ADMIN — TRAMADOL HYDROCHLORIDE 50 MG: 50 TABLET, COATED ORAL at 20:24

## 2024-01-01 RX ADMIN — IPRATROPIUM BROMIDE AND ALBUTEROL SULFATE 3 ML: 2.5; .5 SOLUTION RESPIRATORY (INHALATION) at 19:34

## 2024-01-01 RX ADMIN — METOPROLOL TARTRATE 25 MG: 25 TABLET, FILM COATED ORAL at 08:42

## 2024-01-01 RX ADMIN — PIPERACILLIN SODIUM AND TAZOBACTAM SODIUM 4.5 G: 4; .5 INJECTION, SOLUTION INTRAVENOUS at 03:19

## 2024-01-01 RX ADMIN — CETIRIZINE HYDROCHLORIDE 10 MG: 10 TABLET, FILM COATED ORAL at 09:16

## 2024-01-01 RX ADMIN — PANTOPRAZOLE SODIUM 40 MG: 40 TABLET, DELAYED RELEASE ORAL at 08:43

## 2024-01-01 RX ADMIN — DICYCLOMINE HYDROCHLORIDE 10 MG: 10 CAPSULE ORAL at 16:55

## 2024-01-01 RX ADMIN — PIPERACILLIN SODIUM AND TAZOBACTAM SODIUM 3.38 G: 3; .375 INJECTION, SOLUTION INTRAVENOUS at 03:10

## 2024-01-01 RX ADMIN — METOPROLOL TARTRATE 25 MG: 25 TABLET, FILM COATED ORAL at 20:25

## 2024-01-01 RX ADMIN — IPRATROPIUM BROMIDE AND ALBUTEROL SULFATE 3 ML: 2.5; .5 SOLUTION RESPIRATORY (INHALATION) at 07:57

## 2024-01-01 RX ADMIN — TOBRAMYCIN 300 MG: 300 SOLUTION ORAL at 20:11

## 2024-01-01 RX ADMIN — MINERAL OIL: 1000 LIQUID ORAL at 18:11

## 2024-01-01 RX ADMIN — PANTOPRAZOLE SODIUM 40 MG: 40 TABLET, DELAYED RELEASE ORAL at 17:16

## 2024-01-01 RX ADMIN — MINERAL OIL: 1000 LIQUID ORAL at 09:26

## 2024-01-01 RX ADMIN — DICYCLOMINE HYDROCHLORIDE 10 MG: 10 CAPSULE ORAL at 20:26

## 2024-01-01 RX ADMIN — TRAMADOL HYDROCHLORIDE 50 MG: 50 TABLET, COATED ORAL at 20:46

## 2024-01-01 RX ADMIN — TOBRAMYCIN 300 MG: 300 SOLUTION ORAL at 19:34

## 2024-01-01 RX ADMIN — METOPROLOL TARTRATE 25 MG: 25 TABLET, FILM COATED ORAL at 08:50

## 2024-01-01 RX ADMIN — MEROPENEM 1000 MG: 1 INJECTION, POWDER, FOR SOLUTION INTRAVENOUS at 12:38

## 2024-01-01 RX ADMIN — FLUOXETINE 40 MG: 20 CAPSULE ORAL at 09:16

## 2024-01-01 RX ADMIN — PANTOPRAZOLE SODIUM 40 MG: 40 TABLET, DELAYED RELEASE ORAL at 08:48

## 2024-01-01 RX ADMIN — BUDESONIDE AND FORMOTEROL FUMARATE DIHYDRATE 2 PUFF: 160; 4.5 AEROSOL RESPIRATORY (INHALATION) at 07:57

## 2024-01-01 RX ADMIN — SULFAMETHOXAZOLE AND TRIMETHOPRIM 2 TABLET: 800; 160 TABLET ORAL at 08:49

## 2024-01-01 RX ADMIN — GUAIFENESIN 1200 MG: 600 TABLET, EXTENDED RELEASE ORAL at 08:43

## 2024-01-01 RX ADMIN — IPRATROPIUM BROMIDE AND ALBUTEROL SULFATE 3 ML: 2.5; .5 SOLUTION RESPIRATORY (INHALATION) at 12:39

## 2024-01-01 RX ADMIN — MEROPENEM 1000 MG: 1 INJECTION, POWDER, FOR SOLUTION INTRAVENOUS at 06:18

## 2024-01-01 RX ADMIN — METOPROLOL TARTRATE 25 MG: 25 TABLET, FILM COATED ORAL at 09:16

## 2024-01-01 RX ADMIN — HYDROXYZINE HYDROCHLORIDE 25 MG: 25 TABLET, FILM COATED ORAL at 08:19

## 2024-01-01 RX ADMIN — MEROPENEM 1000 MG: 1 INJECTION, POWDER, FOR SOLUTION INTRAVENOUS at 05:54

## 2024-01-01 RX ADMIN — METOPROLOL TARTRATE 25 MG: 25 TABLET, FILM COATED ORAL at 20:23

## 2024-01-01 RX ADMIN — IPRATROPIUM BROMIDE AND ALBUTEROL SULFATE 3 ML: 2.5; .5 SOLUTION RESPIRATORY (INHALATION) at 06:46

## 2024-01-01 RX ADMIN — LEVOTHYROXINE SODIUM 25 MCG: 25 TABLET ORAL at 08:47

## 2024-01-01 RX ADMIN — CETIRIZINE HYDROCHLORIDE 10 MG: 10 TABLET, FILM COATED ORAL at 08:42

## 2024-01-01 RX ADMIN — IPRATROPIUM BROMIDE AND ALBUTEROL SULFATE 3 ML: 2.5; .5 SOLUTION RESPIRATORY (INHALATION) at 00:10

## 2024-01-01 RX ADMIN — MORPHINE SULFATE 2 MG: 2 INJECTION, SOLUTION INTRAMUSCULAR; INTRAVENOUS at 11:02

## 2024-01-01 RX ADMIN — MINERAL OIL: 1000 LIQUID ORAL at 20:26

## 2024-01-01 RX ADMIN — DOXEPIN HYDROCHLORIDE 50 MG: 50 CAPSULE ORAL at 21:29

## 2024-01-01 RX ADMIN — IPRATROPIUM BROMIDE AND ALBUTEROL SULFATE 3 ML: 2.5; .5 SOLUTION RESPIRATORY (INHALATION) at 23:42

## 2024-01-01 RX ADMIN — RIVAROXABAN 15 MG: 15 TABLET, FILM COATED ORAL at 17:00

## 2024-01-01 RX ADMIN — FLECAINIDE ACETATE 50 MG: 50 TABLET ORAL at 09:16

## 2024-01-01 RX ADMIN — IPRATROPIUM BROMIDE AND ALBUTEROL SULFATE 3 ML: 2.5; .5 SOLUTION RESPIRATORY (INHALATION) at 19:15

## 2024-01-01 RX ADMIN — MINERAL OIL: 1000 LIQUID ORAL at 17:00

## 2024-01-01 RX ADMIN — IPRATROPIUM BROMIDE AND ALBUTEROL SULFATE 3 ML: 2.5; .5 SOLUTION RESPIRATORY (INHALATION) at 12:33

## 2024-01-01 RX ADMIN — FERROUS SULFATE TAB 325 MG (65 MG ELEMENTAL FE) 325 MG: 325 (65 FE) TAB at 08:48

## 2024-01-01 RX ADMIN — FLECAINIDE ACETATE 50 MG: 50 TABLET ORAL at 20:46

## 2024-01-01 RX ADMIN — IPRATROPIUM BROMIDE AND ALBUTEROL SULFATE 3 ML: 2.5; .5 SOLUTION RESPIRATORY (INHALATION) at 12:09

## 2024-01-01 RX ADMIN — IPRATROPIUM BROMIDE AND ALBUTEROL SULFATE 3 ML: 2.5; .5 SOLUTION RESPIRATORY (INHALATION) at 13:43

## 2024-01-01 RX ADMIN — LEVOTHYROXINE SODIUM 25 MCG: 25 TABLET ORAL at 05:31

## 2024-01-01 RX ADMIN — PANTOPRAZOLE SODIUM 40 MG: 40 TABLET, DELAYED RELEASE ORAL at 09:25

## 2024-01-01 RX ADMIN — TIOTROPIUM BROMIDE INHALATION SPRAY 2 PUFF: 3.12 SPRAY, METERED RESPIRATORY (INHALATION) at 06:46

## 2024-01-01 RX ADMIN — PANTOPRAZOLE SODIUM 40 MG: 40 TABLET, DELAYED RELEASE ORAL at 18:01

## 2024-01-01 RX ADMIN — PREDNISONE 40 MG: 20 TABLET ORAL at 17:04

## 2024-01-01 RX ADMIN — IPRATROPIUM BROMIDE AND ALBUTEROL SULFATE 3 ML: 2.5; .5 SOLUTION RESPIRATORY (INHALATION) at 07:06

## 2024-01-01 RX ADMIN — DOXYCYCLINE 100 MG: 100 INJECTION, POWDER, LYOPHILIZED, FOR SOLUTION INTRAVENOUS at 22:50

## 2024-01-01 RX ADMIN — DOXEPIN HYDROCHLORIDE 50 MG: 50 CAPSULE ORAL at 20:46

## 2024-01-01 RX ADMIN — MORPHINE SULFATE 5 MG: 20 SOLUTION ORAL at 05:18

## 2024-01-01 RX ADMIN — MINERAL OIL: 1000 LIQUID ORAL at 09:33

## 2024-01-01 RX ADMIN — PIPERACILLIN SODIUM AND TAZOBACTAM SODIUM 3.38 G: 3; .375 INJECTION, SOLUTION INTRAVENOUS at 10:00

## 2024-01-01 RX ADMIN — SULFASALAZINE 500 MG: 500 TABLET ORAL at 08:47

## 2024-01-01 RX ADMIN — DICYCLOMINE HYDROCHLORIDE 10 MG: 10 CAPSULE ORAL at 08:50

## 2024-01-01 RX ADMIN — MORPHINE SULFATE 2 MG: 2 INJECTION, SOLUTION INTRAMUSCULAR; INTRAVENOUS at 11:36

## 2024-01-01 RX ADMIN — BUDESONIDE AND FORMOTEROL FUMARATE DIHYDRATE 2 PUFF: 160; 4.5 AEROSOL RESPIRATORY (INHALATION) at 10:13

## 2024-01-01 RX ADMIN — DICYCLOMINE HYDROCHLORIDE 10 MG: 10 CAPSULE ORAL at 20:37

## 2024-01-01 RX ADMIN — METOPROLOL TARTRATE 25 MG: 25 TABLET, FILM COATED ORAL at 09:26

## 2024-01-01 RX ADMIN — FLUOXETINE 40 MG: 20 CAPSULE ORAL at 08:34

## 2024-01-01 RX ADMIN — FERROUS SULFATE TAB 325 MG (65 MG ELEMENTAL FE) 325 MG: 325 (65 FE) TAB at 08:49

## 2024-01-01 RX ADMIN — TRAMADOL HYDROCHLORIDE 50 MG: 50 TABLET, COATED ORAL at 08:49

## 2024-01-01 RX ADMIN — PREDNISONE 40 MG: 20 TABLET ORAL at 09:26

## 2024-01-01 RX ADMIN — TRAMADOL HYDROCHLORIDE 50 MG: 50 TABLET, COATED ORAL at 08:42

## 2024-01-01 RX ADMIN — METOPROLOL TARTRATE 25 MG: 25 TABLET, FILM COATED ORAL at 08:19

## 2024-01-01 RX ADMIN — DICYCLOMINE HYDROCHLORIDE 10 MG: 10 CAPSULE ORAL at 09:16

## 2024-01-01 RX ADMIN — PANTOPRAZOLE SODIUM 40 MG: 40 TABLET, DELAYED RELEASE ORAL at 08:49

## 2024-01-01 RX ADMIN — HYDROXYCHLOROQUINE SULFATE 200 MG: 200 TABLET ORAL at 08:44

## 2024-01-01 RX ADMIN — PANTOPRAZOLE SODIUM 40 MG: 40 TABLET, DELAYED RELEASE ORAL at 16:51

## 2024-01-01 RX ADMIN — SULFASALAZINE 500 MG: 500 TABLET ORAL at 09:26

## 2024-01-01 RX ADMIN — TRAMADOL HYDROCHLORIDE 50 MG: 50 TABLET, COATED ORAL at 20:26

## 2024-01-01 RX ADMIN — HYDROXYZINE HYDROCHLORIDE 25 MG: 25 TABLET, FILM COATED ORAL at 21:37

## 2024-01-01 RX ADMIN — IPRATROPIUM BROMIDE AND ALBUTEROL SULFATE 3 ML: 2.5; .5 SOLUTION RESPIRATORY (INHALATION) at 06:42

## 2024-01-01 RX ADMIN — IPRATROPIUM BROMIDE AND ALBUTEROL SULFATE 3 ML: 2.5; .5 SOLUTION RESPIRATORY (INHALATION) at 20:12

## 2024-01-01 RX ADMIN — FLECAINIDE ACETATE 50 MG: 50 TABLET ORAL at 08:49

## 2024-01-01 RX ADMIN — HYDROXYZINE HYDROCHLORIDE 25 MG: 25 TABLET, FILM COATED ORAL at 09:25

## 2024-01-01 RX ADMIN — MINERAL OIL: 1000 LIQUID ORAL at 08:51

## 2024-01-01 RX ADMIN — MINERAL OIL: 1000 LIQUID ORAL at 12:31

## 2024-01-01 RX ADMIN — DOXYCYCLINE 100 MG: 100 CAPSULE ORAL at 08:18

## 2024-01-01 RX ADMIN — SULFAMETHOXAZOLE AND TRIMETHOPRIM 2 TABLET: 800; 160 TABLET ORAL at 20:23

## 2024-01-01 RX ADMIN — SULFAMETHOXAZOLE AND TRIMETHOPRIM 2 TABLET: 800; 160 TABLET ORAL at 14:45

## 2024-01-01 RX ADMIN — FERROUS SULFATE TAB 325 MG (65 MG ELEMENTAL FE) 325 MG: 325 (65 FE) TAB at 09:16

## 2024-01-01 RX ADMIN — MORPHINE SULFATE 5 MG: 10 SOLUTION ORAL at 09:11

## 2024-01-01 RX ADMIN — BUDESONIDE AND FORMOTEROL FUMARATE DIHYDRATE 2 PUFF: 160; 4.5 AEROSOL RESPIRATORY (INHALATION) at 07:19

## 2024-01-01 RX ADMIN — METHYLPREDNISOLONE SODIUM SUCCINATE 62.5 MG: 125 INJECTION, POWDER, LYOPHILIZED, FOR SOLUTION INTRAMUSCULAR; INTRAVENOUS at 20:42

## 2024-01-01 RX ADMIN — PIPERACILLIN SODIUM AND TAZOBACTAM SODIUM 3.38 G: 3; .375 INJECTION, SOLUTION INTRAVENOUS at 17:36

## 2024-01-01 RX ADMIN — TIOTROPIUM BROMIDE INHALATION SPRAY 2 PUFF: 3.12 SPRAY, METERED RESPIRATORY (INHALATION) at 07:19

## 2024-01-01 RX ADMIN — BUDESONIDE AND FORMOTEROL FUMARATE DIHYDRATE 2 PUFF: 160; 4.5 AEROSOL RESPIRATORY (INHALATION) at 19:35

## 2024-01-01 RX ADMIN — HYDROXYZINE HYDROCHLORIDE 25 MG: 25 TABLET, FILM COATED ORAL at 12:43

## 2024-01-01 RX ADMIN — PANTOPRAZOLE SODIUM 40 MG: 40 TABLET, DELAYED RELEASE ORAL at 16:59

## 2024-01-01 RX ADMIN — TRAMADOL HYDROCHLORIDE 50 MG: 50 TABLET, COATED ORAL at 09:25

## 2024-01-01 RX ADMIN — FLECAINIDE ACETATE 50 MG: 50 TABLET ORAL at 21:36

## 2024-01-01 RX ADMIN — TOBRAMYCIN 300 MG: 300 SOLUTION ORAL at 20:38

## 2024-01-01 RX ADMIN — SULFAMETHOXAZOLE AND TRIMETHOPRIM 280 MG OF TRIMETHOPRIM: 80; 16 INJECTION, SOLUTION, CONCENTRATE INTRAVENOUS at 09:25

## 2024-01-01 RX ADMIN — FERROUS SULFATE TAB 325 MG (65 MG ELEMENTAL FE) 325 MG: 325 (65 FE) TAB at 08:19

## 2024-01-01 RX ADMIN — CETIRIZINE HYDROCHLORIDE 10 MG: 10 TABLET, FILM COATED ORAL at 08:18

## 2024-01-01 RX ADMIN — DICYCLOMINE HYDROCHLORIDE 10 MG: 10 CAPSULE ORAL at 08:45

## 2024-01-01 RX ADMIN — BUDESONIDE AND FORMOTEROL FUMARATE DIHYDRATE 2 PUFF: 160; 4.5 AEROSOL RESPIRATORY (INHALATION) at 19:15

## 2024-01-01 RX ADMIN — DICYCLOMINE HYDROCHLORIDE 10 MG: 10 CAPSULE ORAL at 18:11

## 2024-01-01 RX ADMIN — PANTOPRAZOLE SODIUM 40 MG: 40 TABLET, DELAYED RELEASE ORAL at 08:18

## 2024-01-01 RX ADMIN — IPRATROPIUM BROMIDE AND ALBUTEROL SULFATE 3 ML: 2.5; .5 SOLUTION RESPIRATORY (INHALATION) at 17:24

## 2024-01-01 RX ADMIN — TRAMADOL HYDROCHLORIDE 50 MG: 50 TABLET, COATED ORAL at 21:37

## 2024-01-01 RX ADMIN — METHYLPREDNISOLONE SODIUM SUCCINATE 40 MG: 40 INJECTION, POWDER, FOR SOLUTION INTRAMUSCULAR; INTRAVENOUS at 14:10

## 2024-01-01 RX ADMIN — SULFAMETHOXAZOLE AND TRIMETHOPRIM 280 MG OF TRIMETHOPRIM: 80; 16 INJECTION, SOLUTION, CONCENTRATE INTRAVENOUS at 16:37

## 2024-01-01 RX ADMIN — FLECAINIDE ACETATE 50 MG: 50 TABLET ORAL at 08:43

## 2024-01-01 RX ADMIN — DICYCLOMINE HYDROCHLORIDE 10 MG: 10 CAPSULE ORAL at 21:28

## 2024-01-01 RX ADMIN — DICYCLOMINE HYDROCHLORIDE 10 MG: 10 CAPSULE ORAL at 20:42

## 2024-01-01 RX ADMIN — AMLODIPINE BESYLATE 5 MG: 5 TABLET ORAL at 08:44

## 2024-01-01 RX ADMIN — Medication 10 ML: at 08:50

## 2024-01-01 RX ADMIN — TOBRAMYCIN 300 MG: 300 SOLUTION ORAL at 07:58

## 2024-01-01 RX ADMIN — BUDESONIDE AND FORMOTEROL FUMARATE DIHYDRATE 2 PUFF: 160; 4.5 AEROSOL RESPIRATORY (INHALATION) at 20:11

## 2024-01-01 RX ADMIN — METHYLPREDNISOLONE SODIUM SUCCINATE 62.5 MG: 125 INJECTION, POWDER, LYOPHILIZED, FOR SOLUTION INTRAMUSCULAR; INTRAVENOUS at 20:37

## 2024-01-01 RX ADMIN — DICYCLOMINE HYDROCHLORIDE 10 MG: 10 CAPSULE ORAL at 20:24

## 2024-01-01 RX ADMIN — TIOTROPIUM BROMIDE INHALATION SPRAY 2 PUFF: 3.12 SPRAY, METERED RESPIRATORY (INHALATION) at 07:45

## 2024-01-01 RX ADMIN — BUDESONIDE AND FORMOTEROL FUMARATE DIHYDRATE 2 PUFF: 160; 4.5 AEROSOL RESPIRATORY (INHALATION) at 09:41

## 2024-01-01 RX ADMIN — GUAIFENESIN 1200 MG: 600 TABLET, EXTENDED RELEASE ORAL at 21:28

## 2024-01-01 RX ADMIN — CETIRIZINE HYDROCHLORIDE 10 MG: 10 TABLET, FILM COATED ORAL at 09:25

## 2024-01-01 RX ADMIN — TRAMADOL HYDROCHLORIDE 50 MG: 50 TABLET, COATED ORAL at 21:29

## 2024-01-01 RX ADMIN — PREDNISONE 40 MG: 20 TABLET ORAL at 16:51

## 2024-01-01 RX ADMIN — IPRATROPIUM BROMIDE AND ALBUTEROL SULFATE 3 ML: 2.5; .5 SOLUTION RESPIRATORY (INHALATION) at 07:19

## 2024-01-01 RX ADMIN — DOXYCYCLINE 100 MG: 100 CAPSULE ORAL at 08:50

## 2024-01-01 RX ADMIN — PREDNISONE 40 MG: 20 TABLET ORAL at 17:00

## 2024-01-01 RX ADMIN — MEROPENEM 1000 MG: 1 INJECTION, POWDER, FOR SOLUTION INTRAVENOUS at 16:51

## 2024-01-01 RX ADMIN — DICYCLOMINE HYDROCHLORIDE 10 MG: 10 CAPSULE ORAL at 22:14

## 2024-01-01 RX ADMIN — PANTOPRAZOLE SODIUM 40 MG: 40 TABLET, DELAYED RELEASE ORAL at 22:14

## 2024-01-01 RX ADMIN — RIVAROXABAN 15 MG: 15 TABLET, FILM COATED ORAL at 17:35

## 2024-01-01 RX ADMIN — IPRATROPIUM BROMIDE AND ALBUTEROL SULFATE 3 ML: 2.5; .5 SOLUTION RESPIRATORY (INHALATION) at 16:44

## 2024-01-01 RX ADMIN — TIOTROPIUM BROMIDE INHALATION SPRAY 2 PUFF: 3.12 SPRAY, METERED RESPIRATORY (INHALATION) at 07:57

## 2024-01-01 RX ADMIN — FLECAINIDE ACETATE 50 MG: 50 TABLET ORAL at 20:26

## 2024-01-01 RX ADMIN — RIVAROXABAN 15 MG: 15 TABLET, FILM COATED ORAL at 18:11

## 2024-01-01 RX ADMIN — ATROPINE SULFATE 1 MG: 1 INJECTION, SOLUTION INTRAMUSCULAR; INTRAVENOUS; SUBCUTANEOUS at 10:45

## 2024-01-01 RX ADMIN — METOPROLOL TARTRATE 25 MG: 25 TABLET, FILM COATED ORAL at 08:48

## 2024-01-01 RX ADMIN — MEROPENEM 1000 MG: 1 INJECTION, POWDER, FOR SOLUTION INTRAVENOUS at 04:55

## 2024-01-01 RX ADMIN — AMLODIPINE BESYLATE 5 MG: 5 TABLET ORAL at 08:50

## 2024-01-01 RX ADMIN — FLUOXETINE 40 MG: 20 CAPSULE ORAL at 09:25

## 2024-01-01 RX ADMIN — FERROUS SULFATE TAB 325 MG (65 MG ELEMENTAL FE) 325 MG: 325 (65 FE) TAB at 08:34

## 2024-01-01 RX ADMIN — PREDNISONE 40 MG: 20 TABLET ORAL at 08:42

## 2024-01-01 RX ADMIN — SULFASALAZINE 500 MG: 500 TABLET ORAL at 09:32

## 2024-01-01 RX ADMIN — Medication 1 CAPSULE: at 08:44

## 2024-01-01 RX ADMIN — DICYCLOMINE HYDROCHLORIDE 10 MG: 10 CAPSULE ORAL at 08:49

## 2024-01-01 RX ADMIN — IPRATROPIUM BROMIDE AND ALBUTEROL SULFATE 3 ML: 2.5; .5 SOLUTION RESPIRATORY (INHALATION) at 15:42

## 2024-01-01 RX ADMIN — DICYCLOMINE HYDROCHLORIDE 10 MG: 10 CAPSULE ORAL at 20:46

## 2024-01-01 RX ADMIN — DICYCLOMINE HYDROCHLORIDE 10 MG: 10 CAPSULE ORAL at 15:57

## 2024-01-01 RX ADMIN — EPINEPHRINE 1 MG: 0.1 INJECTION INTRACARDIAC; INTRAVENOUS at 10:50

## 2024-01-01 RX ADMIN — FLECAINIDE ACETATE 50 MG: 50 TABLET ORAL at 20:42

## 2024-01-01 RX ADMIN — AMLODIPINE BESYLATE 5 MG: 5 TABLET ORAL at 09:16

## 2024-01-01 RX ADMIN — DOXEPIN HYDROCHLORIDE 50 MG: 50 CAPSULE ORAL at 21:35

## 2024-01-01 RX ADMIN — WATER 10 ML: 1 INJECTION INTRAMUSCULAR; INTRAVENOUS; SUBCUTANEOUS at 11:23

## 2024-01-01 RX ADMIN — SULFASALAZINE 500 MG: 500 TABLET ORAL at 21:32

## 2024-01-01 RX ADMIN — MINERAL OIL: 1000 LIQUID ORAL at 08:35

## 2024-01-01 RX ADMIN — METOPROLOL TARTRATE 25 MG: 25 TABLET, FILM COATED ORAL at 20:37

## 2024-01-01 RX ADMIN — MINERAL OIL 30 ML: 1000 LIQUID ORAL at 12:16

## 2024-01-01 RX ADMIN — HYDROXYCHLOROQUINE SULFATE 200 MG: 200 TABLET ORAL at 22:14

## 2024-01-01 RX ADMIN — METOPROLOL TARTRATE 25 MG: 25 TABLET, FILM COATED ORAL at 21:02

## 2024-01-01 RX ADMIN — SULFAMETHOXAZOLE AND TRIMETHOPRIM 2 TABLET: 800; 160 TABLET ORAL at 05:08

## 2024-01-01 RX ADMIN — SULFASALAZINE 500 MG: 500 TABLET ORAL at 08:34

## 2024-01-01 RX ADMIN — CETIRIZINE HYDROCHLORIDE 10 MG: 10 TABLET, FILM COATED ORAL at 08:50

## 2024-01-01 RX ADMIN — BUDESONIDE AND FORMOTEROL FUMARATE DIHYDRATE 2 PUFF: 160; 4.5 AEROSOL RESPIRATORY (INHALATION) at 20:01

## 2024-01-01 RX ADMIN — HYDROXYCHLOROQUINE SULFATE 200 MG: 200 TABLET ORAL at 08:19

## 2024-01-01 RX ADMIN — WATER 10 ML: 1 INJECTION INTRAMUSCULAR; INTRAVENOUS; SUBCUTANEOUS at 17:16

## 2024-01-01 RX ADMIN — WATER 10 ML: 1 INJECTION INTRAMUSCULAR; INTRAVENOUS; SUBCUTANEOUS at 08:48

## 2024-01-01 RX ADMIN — FERROUS SULFATE TAB 325 MG (65 MG ELEMENTAL FE) 325 MG: 325 (65 FE) TAB at 08:44

## 2024-01-01 RX ADMIN — DOXYCYCLINE 100 MG: 100 CAPSULE ORAL at 09:26

## 2024-01-01 RX ADMIN — CETIRIZINE HYDROCHLORIDE 10 MG: 10 TABLET, FILM COATED ORAL at 08:48

## 2024-01-01 RX ADMIN — HYDROXYCHLOROQUINE SULFATE 200 MG: 200 TABLET ORAL at 09:25

## 2024-01-01 RX ADMIN — IPRATROPIUM BROMIDE AND ALBUTEROL SULFATE 3 ML: 2.5; .5 SOLUTION RESPIRATORY (INHALATION) at 19:07

## 2024-01-01 RX ADMIN — DOXYCYCLINE 100 MG: 100 CAPSULE ORAL at 08:34

## 2024-01-01 RX ADMIN — SULFAMETHOXAZOLE AND TRIMETHOPRIM 2 TABLET: 800; 160 TABLET ORAL at 08:19

## 2024-01-01 RX ADMIN — IPRATROPIUM BROMIDE AND ALBUTEROL SULFATE 3 ML: 2.5; .5 SOLUTION RESPIRATORY (INHALATION) at 20:11

## 2024-01-01 RX ADMIN — MINERAL OIL 30 ML: 1000 LIQUID ORAL at 20:52

## 2024-01-01 RX ADMIN — VANCOMYCIN HYDROCHLORIDE 1000 MG: 1 INJECTION, SOLUTION INTRAVENOUS at 22:15

## 2024-01-01 RX ADMIN — TRAMADOL HYDROCHLORIDE 50 MG: 50 TABLET, COATED ORAL at 09:16

## 2024-01-01 RX ADMIN — TIOTROPIUM BROMIDE INHALATION SPRAY 2 PUFF: 3.12 SPRAY, METERED RESPIRATORY (INHALATION) at 06:43

## 2024-01-01 RX ADMIN — FLECAINIDE ACETATE 50 MG: 50 TABLET ORAL at 08:48

## 2024-01-01 RX ADMIN — PIPERACILLIN SODIUM AND TAZOBACTAM SODIUM 3.38 G: 3; .375 INJECTION, SOLUTION INTRAVENOUS at 03:25

## 2024-01-01 RX ADMIN — TIOTROPIUM BROMIDE INHALATION SPRAY 2 PUFF: 3.12 SPRAY, METERED RESPIRATORY (INHALATION) at 07:47

## 2024-01-01 RX ADMIN — DICYCLOMINE HYDROCHLORIDE 10 MG: 10 CAPSULE ORAL at 14:45

## 2024-01-01 RX ADMIN — SULFASALAZINE 500 MG: 500 TABLET ORAL at 21:34

## 2024-01-01 RX ADMIN — MINERAL OIL 30 ML: 1000 LIQUID ORAL at 21:30

## 2024-01-01 RX ADMIN — AMLODIPINE BESYLATE 5 MG: 5 TABLET ORAL at 08:49

## 2024-01-01 RX ADMIN — METOPROLOL TARTRATE 25 MG: 25 TABLET, FILM COATED ORAL at 08:49

## 2024-01-01 RX ADMIN — DICYCLOMINE HYDROCHLORIDE 10 MG: 10 CAPSULE ORAL at 08:19

## 2024-01-01 RX ADMIN — LEVOTHYROXINE SODIUM 25 MCG: 25 TABLET ORAL at 05:00

## 2024-01-01 RX ADMIN — MINERAL OIL 30 ML: 1000 LIQUID ORAL at 20:34

## 2024-01-01 RX ADMIN — RIVAROXABAN 15 MG: 15 TABLET, FILM COATED ORAL at 17:04

## 2024-01-01 RX ADMIN — MINERAL OIL: 1000 LIQUID ORAL at 20:43

## 2024-01-01 RX ADMIN — HYDROXYZINE HYDROCHLORIDE 25 MG: 25 TABLET, FILM COATED ORAL at 14:45

## 2024-01-01 RX ADMIN — WATER 10 ML: 1 INJECTION INTRAMUSCULAR; INTRAVENOUS; SUBCUTANEOUS at 20:43

## 2024-01-01 RX ADMIN — METHYLPREDNISOLONE SODIUM SUCCINATE 62.5 MG: 125 INJECTION, POWDER, LYOPHILIZED, FOR SOLUTION INTRAMUSCULAR; INTRAVENOUS at 08:48

## 2024-01-01 RX ADMIN — METHYLPREDNISOLONE SODIUM SUCCINATE 62.5 MG: 125 INJECTION, POWDER, LYOPHILIZED, FOR SOLUTION INTRAMUSCULAR; INTRAVENOUS at 09:00

## 2024-01-01 RX ADMIN — Medication 10 ML: at 22:31

## 2024-01-01 RX ADMIN — HYDROXYZINE HYDROCHLORIDE 25 MG: 25 TABLET, FILM COATED ORAL at 20:24

## 2024-01-01 RX ADMIN — LEVOTHYROXINE SODIUM 25 MCG: 25 TABLET ORAL at 05:08

## 2024-01-01 RX ADMIN — SULFAMETHOXAZOLE AND TRIMETHOPRIM 2 TABLET: 800; 160 TABLET ORAL at 21:02

## 2024-01-01 RX ADMIN — AMLODIPINE BESYLATE 5 MG: 5 TABLET ORAL at 08:18

## 2024-01-01 RX ADMIN — SULFASALAZINE 500 MG: 500 TABLET ORAL at 08:49

## 2024-01-01 RX ADMIN — DOXEPIN HYDROCHLORIDE 50 MG: 50 CAPSULE ORAL at 20:42

## 2024-01-01 RX ADMIN — IOPAMIDOL 85 ML: 755 INJECTION, SOLUTION INTRAVENOUS at 16:49

## 2024-01-01 RX ADMIN — LEVOTHYROXINE SODIUM 25 MCG: 25 TABLET ORAL at 05:54

## 2024-01-01 RX ADMIN — SULFAMETHOXAZOLE AND TRIMETHOPRIM 2 TABLET: 800; 160 TABLET ORAL at 06:18

## 2024-01-01 RX ADMIN — TOBRAMYCIN 300 MG: 300 SOLUTION ORAL at 07:05

## 2024-01-01 RX ADMIN — DOXYCYCLINE 100 MG: 100 CAPSULE ORAL at 20:26

## 2024-01-05 ENCOUNTER — TELEPHONE (OUTPATIENT)
Dept: PULMONOLOGY | Facility: CLINIC | Age: 74
End: 2024-01-05
Payer: MEDICARE

## 2024-01-05 NOTE — TELEPHONE ENCOUNTER
Mrs Borjas has had several episodes of increased shortness of breath and hypoxia due to acute upper respiratory illnesses, including COVID 19 during thanksgiving.  She currently dropping into the 80s with minimal activity.   Also has had a rheumatology flair with increased prednisone for last 2 weeks.     We did discuss it sounds like an acute flair of her ILD due to COVID 19.  I suggested they come to the ED if her symptoms worsen over the weekend

## 2024-01-06 ENCOUNTER — HOSPITAL ENCOUNTER (OUTPATIENT)
Facility: HOSPITAL | Age: 74
Setting detail: OBSERVATION
LOS: 2 days | Discharge: HOME | End: 2024-01-09
Attending: EMERGENCY MEDICINE | Admitting: HOSPITALIST
Payer: MEDICARE

## 2024-01-06 ENCOUNTER — APPOINTMENT (OUTPATIENT)
Dept: GENERAL RADIOLOGY | Facility: HOSPITAL | Age: 74
End: 2024-01-06
Payer: MEDICARE

## 2024-01-06 ENCOUNTER — APPOINTMENT (OUTPATIENT)
Dept: CARDIOLOGY | Facility: HOSPITAL | Age: 74
End: 2024-01-06
Payer: MEDICARE

## 2024-01-06 DIAGNOSIS — J96.21 ACUTE ON CHRONIC RESPIRATORY FAILURE WITH HYPOXIA AND HYPERCAPNIA: Primary | ICD-10-CM

## 2024-01-06 DIAGNOSIS — J96.22 ACUTE ON CHRONIC RESPIRATORY FAILURE WITH HYPOXIA AND HYPERCAPNIA: Primary | ICD-10-CM

## 2024-01-06 DIAGNOSIS — J18.9 PNEUMONIA OF RIGHT UPPER LOBE DUE TO INFECTIOUS ORGANISM: ICD-10-CM

## 2024-01-06 LAB
ALBUMIN SERPL-MCNC: 3.5 G/DL (ref 3.5–5.2)
ALBUMIN/GLOB SERPL: 1 G/DL
ALP SERPL-CCNC: 271 U/L (ref 39–117)
ALT SERPL W P-5'-P-CCNC: 13 U/L (ref 1–33)
ANION GAP SERPL CALCULATED.3IONS-SCNC: 9 MMOL/L (ref 5–15)
ARTERIAL PATENCY WRIST A: ABNORMAL
AST SERPL-CCNC: 20 U/L (ref 1–32)
ATMOSPHERIC PRESS: ABNORMAL MM[HG]
B PARAPERT DNA SPEC QL NAA+PROBE: NOT DETECTED
B PERT DNA SPEC QL NAA+PROBE: NOT DETECTED
BASE EXCESS BLDA CALC-SCNC: 5.2 MMOL/L (ref 0–2)
BASOPHILS # BLD AUTO: 0.03 10*3/MM3 (ref 0–0.2)
BASOPHILS NFR BLD AUTO: 0.3 % (ref 0–1.5)
BDY SITE: ABNORMAL
BH CV ECHO MEAS - AO MAX PG: 8.4 MMHG
BH CV ECHO MEAS - AO MEAN PG: 4.5 MMHG
BH CV ECHO MEAS - AO ROOT DIAM: 3.2 CM
BH CV ECHO MEAS - AO V2 MAX: 145 CM/SEC
BH CV ECHO MEAS - AO V2 VTI: 29 CM
BH CV ECHO MEAS - AVA(I,D): 1.83 CM2
BH CV ECHO MEAS - EDV(CUBED): 114.1 ML
BH CV ECHO MEAS - EDV(MOD-SP2): 95.8 ML
BH CV ECHO MEAS - EDV(MOD-SP4): 85.9 ML
BH CV ECHO MEAS - EF(MOD-BP): 68.1 %
BH CV ECHO MEAS - EF(MOD-SP2): 73.9 %
BH CV ECHO MEAS - EF(MOD-SP4): 63 %
BH CV ECHO MEAS - ESV(CUBED): 17.6 ML
BH CV ECHO MEAS - ESV(MOD-SP2): 25 ML
BH CV ECHO MEAS - ESV(MOD-SP4): 31.8 ML
BH CV ECHO MEAS - FS: 46.4 %
BH CV ECHO MEAS - IVS/LVPW: 1.4 CM
BH CV ECHO MEAS - IVSD: 1.05 CM
BH CV ECHO MEAS - LA DIMENSION: 4.1 CM
BH CV ECHO MEAS - LAT PEAK E' VEL: 6.2 CM/SEC
BH CV ECHO MEAS - LV MASS(C)D: 150.4 GRAMS
BH CV ECHO MEAS - LV MAX PG: 2.6 MMHG
BH CV ECHO MEAS - LV MEAN PG: 1 MMHG
BH CV ECHO MEAS - LV V1 MAX: 80.7 CM/SEC
BH CV ECHO MEAS - LV V1 VTI: 16.9 CM
BH CV ECHO MEAS - LVIDD: 4.9 CM
BH CV ECHO MEAS - LVIDS: 2.6 CM
BH CV ECHO MEAS - LVOT AREA: 3.1 CM2
BH CV ECHO MEAS - LVOT DIAM: 2 CM
BH CV ECHO MEAS - LVPWD: 0.75 CM
BH CV ECHO MEAS - MED PEAK E' VEL: 4.5 CM/SEC
BH CV ECHO MEAS - MV A MAX VEL: 89.9 CM/SEC
BH CV ECHO MEAS - MV DEC SLOPE: 256 CM/SEC2
BH CV ECHO MEAS - MV DEC TIME: 0.22 SEC
BH CV ECHO MEAS - MV E MAX VEL: 65 CM/SEC
BH CV ECHO MEAS - MV E/A: 0.72
BH CV ECHO MEAS - MV P1/2T: 75.9 MSEC
BH CV ECHO MEAS - MVA(P1/2T): 2.9 CM2
BH CV ECHO MEAS - PA ACC TIME: 0.1 SEC
BH CV ECHO MEAS - PA V2 MAX: 89.4 CM/SEC
BH CV ECHO MEAS - PAPD(PI EDV): 10 MMHG
BH CV ECHO MEAS - PI END-D VEL: 162 CM/SEC
BH CV ECHO MEAS - RAP SYSTOLE: 8 MMHG
BH CV ECHO MEAS - RVSP: 88 MMHG
BH CV ECHO MEAS - SV(LVOT): 53.1 ML
BH CV ECHO MEAS - SV(MOD-SP2): 70.8 ML
BH CV ECHO MEAS - SV(MOD-SP4): 54.1 ML
BH CV ECHO MEAS - TAPSE (>1.6): 2.4 CM
BH CV ECHO MEAS - TR MAX PG: 80.1 MMHG
BH CV ECHO MEAS - TR MAX VEL: 447.6 CM/SEC
BH CV ECHO MEASUREMENTS AVERAGE E/E' RATIO: 12.15
BH CV VAS BP LEFT ARM: NORMAL MMHG
BH CV XLRA - RV BASE: 5.2 CM
BH CV XLRA - RV LENGTH: 6.2 CM
BH CV XLRA - RV MID: 3.7 CM
BH CV XLRA - TDI S': 12.7 CM/SEC
BILIRUB SERPL-MCNC: 0.4 MG/DL (ref 0–1.2)
BODY TEMPERATURE: 37
BUN SERPL-MCNC: 18 MG/DL (ref 8–23)
BUN/CREAT SERPL: 17.5 (ref 7–25)
C PNEUM DNA NPH QL NAA+NON-PROBE: NOT DETECTED
CALCIUM SPEC-SCNC: 9.1 MG/DL (ref 8.6–10.5)
CHLORIDE SERPL-SCNC: 98 MMOL/L (ref 98–107)
CO2 BLDA-SCNC: 33.5 MMOL/L (ref 22–33)
CO2 SERPL-SCNC: 32 MMOL/L (ref 22–29)
COHGB MFR BLD: 0.7 % (ref 0–2)
CREAT SERPL-MCNC: 1.03 MG/DL (ref 0.57–1)
DEPRECATED RDW RBC AUTO: 57.8 FL (ref 37–54)
EGFRCR SERPLBLD CKD-EPI 2021: 58.3 ML/MIN/1.73
EOSINOPHIL # BLD AUTO: 0 10*3/MM3 (ref 0–0.4)
EOSINOPHIL NFR BLD AUTO: 0 % (ref 0.3–6.2)
EPAP: 0
ERYTHROCYTE [DISTWIDTH] IN BLOOD BY AUTOMATED COUNT: 15.9 % (ref 12.3–15.4)
FLUAV RNA RESP QL NAA+PROBE: NOT DETECTED
FLUAV SUBTYP SPEC NAA+PROBE: NOT DETECTED
FLUBV RNA ISLT QL NAA+PROBE: NOT DETECTED
FLUBV RNA RESP QL NAA+PROBE: NOT DETECTED
GLOBULIN UR ELPH-MCNC: 3.4 GM/DL
GLUCOSE SERPL-MCNC: 93 MG/DL (ref 65–99)
HADV DNA SPEC NAA+PROBE: NOT DETECTED
HCO3 BLDA-SCNC: 31.8 MMOL/L (ref 20–26)
HCOV 229E RNA SPEC QL NAA+PROBE: NOT DETECTED
HCOV HKU1 RNA SPEC QL NAA+PROBE: NOT DETECTED
HCOV NL63 RNA SPEC QL NAA+PROBE: NOT DETECTED
HCOV OC43 RNA SPEC QL NAA+PROBE: NOT DETECTED
HCT VFR BLD AUTO: 39.8 % (ref 34–46.6)
HCT VFR BLD CALC: 34.4 % (ref 38–51)
HGB BLD-MCNC: 11.5 G/DL (ref 12–15.9)
HGB BLDA-MCNC: 11.2 G/DL (ref 14–18)
HMPV RNA NPH QL NAA+NON-PROBE: NOT DETECTED
HOLD SPECIMEN: NORMAL
HPIV1 RNA ISLT QL NAA+PROBE: NOT DETECTED
HPIV2 RNA SPEC QL NAA+PROBE: NOT DETECTED
HPIV3 RNA NPH QL NAA+PROBE: NOT DETECTED
HPIV4 P GENE NPH QL NAA+PROBE: NOT DETECTED
IMM GRANULOCYTES # BLD AUTO: 0.02 10*3/MM3 (ref 0–0.05)
IMM GRANULOCYTES NFR BLD AUTO: 0.2 % (ref 0–0.5)
INHALED O2 CONCENTRATION: 80 %
IPAP: 0
IVRT: 116 MS
LEFT ATRIUM VOLUME INDEX: 42.7 ML/M2
LV EF 2D ECHO EST: 68 %
LYMPHOCYTES # BLD AUTO: 1.23 10*3/MM3 (ref 0.7–3.1)
LYMPHOCYTES NFR BLD AUTO: 13.7 % (ref 19.6–45.3)
M PNEUMO IGG SER IA-ACNC: NOT DETECTED
MCH RBC QN AUTO: 28.4 PG (ref 26.6–33)
MCHC RBC AUTO-ENTMCNC: 28.9 G/DL (ref 31.5–35.7)
MCV RBC AUTO: 98.3 FL (ref 79–97)
METHGB BLD QL: 1.6 % (ref 0–1.5)
MODALITY: ABNORMAL
MONOCYTES # BLD AUTO: 0.85 10*3/MM3 (ref 0.1–0.9)
MONOCYTES NFR BLD AUTO: 9.5 % (ref 5–12)
NEUTROPHILS NFR BLD AUTO: 6.83 10*3/MM3 (ref 1.7–7)
NEUTROPHILS NFR BLD AUTO: 76.3 % (ref 42.7–76)
NRBC BLD AUTO-RTO: 0 /100 WBC (ref 0–0.2)
NT-PROBNP SERPL-MCNC: 3306 PG/ML (ref 0–900)
OXYHGB MFR BLDV: 94.7 % (ref 94–99)
PAW @ PEAK INSP FLOW SETTING VENT: 0 CMH2O
PCO2 BLDA: 56.1 MM HG (ref 35–45)
PCO2 TEMP ADJ BLD: 56.1 MM HG (ref 35–45)
PH BLDA: 7.36 PH UNITS (ref 7.35–7.45)
PH, TEMP CORRECTED: 7.36 PH UNITS
PLATELET # BLD AUTO: 166 10*3/MM3 (ref 140–450)
PMV BLD AUTO: 11.9 FL (ref 6–12)
PO2 BLDA: 112 MM HG (ref 83–108)
PO2 TEMP ADJ BLD: 112 MM HG (ref 83–108)
POTASSIUM SERPL-SCNC: 3.8 MMOL/L (ref 3.5–5.2)
PROCALCITONIN SERPL-MCNC: 0.09 NG/ML (ref 0–0.25)
PROT SERPL-MCNC: 6.9 G/DL (ref 6–8.5)
RBC # BLD AUTO: 4.05 10*6/MM3 (ref 3.77–5.28)
RHINOVIRUS RNA SPEC NAA+PROBE: NOT DETECTED
RSV RNA NPH QL NAA+NON-PROBE: NOT DETECTED
SARS-COV-2 RNA NPH QL NAA+NON-PROBE: NOT DETECTED
SARS-COV-2 RNA RESP QL NAA+PROBE: NOT DETECTED
SODIUM SERPL-SCNC: 139 MMOL/L (ref 136–145)
TOTAL RATE: 0 BREATHS/MINUTE
TROPONIN T SERPL HS-MCNC: 20 NG/L
WBC NRBC COR # BLD AUTO: 8.96 10*3/MM3 (ref 3.4–10.8)
WHOLE BLOOD HOLD COAG: NORMAL

## 2024-01-06 PROCEDURE — 94640 AIRWAY INHALATION TREATMENT: CPT

## 2024-01-06 PROCEDURE — 83880 ASSAY OF NATRIURETIC PEPTIDE: CPT

## 2024-01-06 PROCEDURE — 80053 COMPREHEN METABOLIC PANEL: CPT

## 2024-01-06 PROCEDURE — G0378 HOSPITAL OBSERVATION PER HR: HCPCS

## 2024-01-06 PROCEDURE — 94799 UNLISTED PULMONARY SVC/PX: CPT

## 2024-01-06 PROCEDURE — 82805 BLOOD GASES W/O2 SATURATION: CPT

## 2024-01-06 PROCEDURE — 36415 COLL VENOUS BLD VENIPUNCTURE: CPT

## 2024-01-06 PROCEDURE — 99285 EMERGENCY DEPT VISIT HI MDM: CPT

## 2024-01-06 PROCEDURE — 71045 X-RAY EXAM CHEST 1 VIEW: CPT

## 2024-01-06 PROCEDURE — 0202U NFCT DS 22 TRGT SARS-COV-2: CPT | Performed by: INTERNAL MEDICINE

## 2024-01-06 PROCEDURE — 87040 BLOOD CULTURE FOR BACTERIA: CPT | Performed by: INTERNAL MEDICINE

## 2024-01-06 PROCEDURE — 84484 ASSAY OF TROPONIN QUANT: CPT

## 2024-01-06 PROCEDURE — 93005 ELECTROCARDIOGRAM TRACING: CPT

## 2024-01-06 PROCEDURE — 84145 PROCALCITONIN (PCT): CPT | Performed by: STUDENT IN AN ORGANIZED HEALTH CARE EDUCATION/TRAINING PROGRAM

## 2024-01-06 PROCEDURE — 87636 SARSCOV2 & INF A&B AMP PRB: CPT | Performed by: EMERGENCY MEDICINE

## 2024-01-06 PROCEDURE — 36600 WITHDRAWAL OF ARTERIAL BLOOD: CPT

## 2024-01-06 PROCEDURE — 82375 ASSAY CARBOXYHB QUANT: CPT

## 2024-01-06 PROCEDURE — 93306 TTE W/DOPPLER COMPLETE: CPT

## 2024-01-06 PROCEDURE — 83050 HGB METHEMOGLOBIN QUAN: CPT

## 2024-01-06 PROCEDURE — 85025 COMPLETE CBC W/AUTO DIFF WBC: CPT

## 2024-01-06 PROCEDURE — 25010000002 CEFTRIAXONE PER 250 MG: Performed by: INTERNAL MEDICINE

## 2024-01-06 PROCEDURE — 96375 TX/PRO/DX INJ NEW DRUG ADDON: CPT

## 2024-01-06 PROCEDURE — 94660 CPAP INITIATION&MGMT: CPT

## 2024-01-06 PROCEDURE — 25010000002 METHYLPREDNISOLONE PER 125 MG: Performed by: EMERGENCY MEDICINE

## 2024-01-06 PROCEDURE — 25010000002 METHYLPREDNISOLONE PER 125 MG: Performed by: INTERNAL MEDICINE

## 2024-01-06 PROCEDURE — 96376 TX/PRO/DX INJ SAME DRUG ADON: CPT

## 2024-01-06 RX ORDER — PANTOPRAZOLE SODIUM 40 MG/1
40 TABLET, DELAYED RELEASE ORAL
Status: DISCONTINUED | OUTPATIENT
Start: 2024-01-06 | End: 2024-01-09 | Stop reason: HOSPADM

## 2024-01-06 RX ORDER — SODIUM CHLORIDE 0.9 % (FLUSH) 0.9 %
10 SYRINGE (ML) INJECTION AS NEEDED
Status: DISCONTINUED | OUTPATIENT
Start: 2024-01-06 | End: 2024-01-09 | Stop reason: HOSPADM

## 2024-01-06 RX ORDER — IPRATROPIUM BROMIDE AND ALBUTEROL SULFATE 2.5; .5 MG/3ML; MG/3ML
3 SOLUTION RESPIRATORY (INHALATION) ONCE
Status: DISCONTINUED | OUTPATIENT
Start: 2024-01-06 | End: 2024-01-09

## 2024-01-06 RX ORDER — LEVOTHYROXINE SODIUM 0.03 MG/1
25 TABLET ORAL
COMMUNITY

## 2024-01-06 RX ORDER — LOSARTAN POTASSIUM 50 MG/1
100 TABLET ORAL DAILY
Status: DISCONTINUED | OUTPATIENT
Start: 2024-01-06 | End: 2024-01-09 | Stop reason: HOSPADM

## 2024-01-06 RX ORDER — SULFASALAZINE 500 MG/1
500 TABLET ORAL
Status: DISCONTINUED | OUTPATIENT
Start: 2024-01-08 | End: 2024-01-09 | Stop reason: HOSPADM

## 2024-01-06 RX ORDER — BISACODYL 5 MG/1
5 TABLET, DELAYED RELEASE ORAL DAILY PRN
Status: DISCONTINUED | OUTPATIENT
Start: 2024-01-06 | End: 2024-01-09 | Stop reason: HOSPADM

## 2024-01-06 RX ORDER — ACETAMINOPHEN 650 MG/1
650 SUPPOSITORY RECTAL EVERY 4 HOURS PRN
Status: DISCONTINUED | OUTPATIENT
Start: 2024-01-06 | End: 2024-01-09 | Stop reason: HOSPADM

## 2024-01-06 RX ORDER — DOXEPIN HYDROCHLORIDE 50 MG/1
50 CAPSULE ORAL NIGHTLY
Status: DISCONTINUED | OUTPATIENT
Start: 2024-01-06 | End: 2024-01-09 | Stop reason: HOSPADM

## 2024-01-06 RX ORDER — IPRATROPIUM BROMIDE AND ALBUTEROL SULFATE 2.5; .5 MG/3ML; MG/3ML
3 SOLUTION RESPIRATORY (INHALATION)
Status: DISCONTINUED | OUTPATIENT
Start: 2024-01-06 | End: 2024-01-09 | Stop reason: HOSPADM

## 2024-01-06 RX ORDER — AMOXICILLIN 250 MG
2 CAPSULE ORAL 2 TIMES DAILY
Status: DISCONTINUED | OUTPATIENT
Start: 2024-01-06 | End: 2024-01-09 | Stop reason: HOSPADM

## 2024-01-06 RX ORDER — SODIUM CHLORIDE 0.9 % (FLUSH) 0.9 %
10 SYRINGE (ML) INJECTION EVERY 12 HOURS SCHEDULED
Status: DISCONTINUED | OUTPATIENT
Start: 2024-01-06 | End: 2024-01-09 | Stop reason: HOSPADM

## 2024-01-06 RX ORDER — HYDROXYCHLOROQUINE SULFATE 200 MG/1
200 TABLET, FILM COATED ORAL
Status: DISCONTINUED | OUTPATIENT
Start: 2024-01-06 | End: 2024-01-09 | Stop reason: HOSPADM

## 2024-01-06 RX ORDER — AMLODIPINE BESYLATE 5 MG/1
5 TABLET ORAL
Status: DISCONTINUED | OUTPATIENT
Start: 2024-01-06 | End: 2024-01-09 | Stop reason: HOSPADM

## 2024-01-06 RX ORDER — BISACODYL 10 MG
10 SUPPOSITORY, RECTAL RECTAL DAILY PRN
Status: DISCONTINUED | OUTPATIENT
Start: 2024-01-06 | End: 2024-01-09 | Stop reason: HOSPADM

## 2024-01-06 RX ORDER — FLECAINIDE ACETATE 50 MG/1
50 TABLET ORAL 2 TIMES DAILY
Status: DISCONTINUED | OUTPATIENT
Start: 2024-01-06 | End: 2024-01-09 | Stop reason: HOSPADM

## 2024-01-06 RX ORDER — BUDESONIDE 0.5 MG/2ML
0.5 INHALANT ORAL
Status: DISCONTINUED | OUTPATIENT
Start: 2024-01-06 | End: 2024-01-09 | Stop reason: HOSPADM

## 2024-01-06 RX ORDER — FLUTICASONE PROPIONATE 50 MCG
2 SPRAY, SUSPENSION (ML) NASAL DAILY
Status: DISCONTINUED | OUTPATIENT
Start: 2024-01-06 | End: 2024-01-09 | Stop reason: HOSPADM

## 2024-01-06 RX ORDER — FLUOXETINE HYDROCHLORIDE 20 MG/1
40 CAPSULE ORAL DAILY
Status: DISCONTINUED | OUTPATIENT
Start: 2024-01-06 | End: 2024-01-09 | Stop reason: HOSPADM

## 2024-01-06 RX ORDER — TRAMADOL HYDROCHLORIDE 50 MG/1
50 TABLET ORAL EVERY 6 HOURS PRN
Status: DISCONTINUED | OUTPATIENT
Start: 2024-01-06 | End: 2024-01-09 | Stop reason: HOSPADM

## 2024-01-06 RX ORDER — ERGOCALCIFEROL 1.25 MG/1
50000 CAPSULE ORAL WEEKLY
COMMUNITY

## 2024-01-06 RX ORDER — ACETAMINOPHEN 160 MG/5ML
650 SOLUTION ORAL EVERY 4 HOURS PRN
Status: DISCONTINUED | OUTPATIENT
Start: 2024-01-06 | End: 2024-01-09 | Stop reason: HOSPADM

## 2024-01-06 RX ORDER — ONDANSETRON 2 MG/ML
4 INJECTION INTRAMUSCULAR; INTRAVENOUS EVERY 6 HOURS PRN
Status: DISCONTINUED | OUTPATIENT
Start: 2024-01-06 | End: 2024-01-09 | Stop reason: HOSPADM

## 2024-01-06 RX ORDER — ACETAMINOPHEN 325 MG/1
650 TABLET ORAL EVERY 4 HOURS PRN
Status: DISCONTINUED | OUTPATIENT
Start: 2024-01-06 | End: 2024-01-09 | Stop reason: HOSPADM

## 2024-01-06 RX ORDER — POLYETHYLENE GLYCOL 3350 17 G/17G
17 POWDER, FOR SOLUTION ORAL DAILY PRN
Status: DISCONTINUED | OUTPATIENT
Start: 2024-01-06 | End: 2024-01-09 | Stop reason: HOSPADM

## 2024-01-06 RX ORDER — METHYLPREDNISOLONE SODIUM SUCCINATE 125 MG/2ML
125 INJECTION, POWDER, LYOPHILIZED, FOR SOLUTION INTRAMUSCULAR; INTRAVENOUS ONCE
Status: COMPLETED | OUTPATIENT
Start: 2024-01-06 | End: 2024-01-06

## 2024-01-06 RX ORDER — SULFASALAZINE 500 MG/1
500 TABLET ORAL 3 TIMES WEEKLY
Status: DISCONTINUED | OUTPATIENT
Start: 2024-01-15 | End: 2024-01-06

## 2024-01-06 RX ORDER — LEVOTHYROXINE SODIUM 0.03 MG/1
25 TABLET ORAL
Status: DISCONTINUED | OUTPATIENT
Start: 2024-01-06 | End: 2024-01-09 | Stop reason: HOSPADM

## 2024-01-06 RX ORDER — SODIUM CHLORIDE 9 MG/ML
40 INJECTION, SOLUTION INTRAVENOUS AS NEEDED
Status: DISCONTINUED | OUTPATIENT
Start: 2024-01-06 | End: 2024-01-09 | Stop reason: HOSPADM

## 2024-01-06 RX ORDER — METHYLPREDNISOLONE SODIUM SUCCINATE 125 MG/2ML
60 INJECTION, POWDER, LYOPHILIZED, FOR SOLUTION INTRAMUSCULAR; INTRAVENOUS EVERY 12 HOURS
Status: DISCONTINUED | OUTPATIENT
Start: 2024-01-06 | End: 2024-01-08

## 2024-01-06 RX ADMIN — FLUTICASONE PROPIONATE 2 SPRAY: 50 SPRAY, METERED NASAL at 12:22

## 2024-01-06 RX ADMIN — RIVAROXABAN 15 MG: 15 TABLET, FILM COATED ORAL at 17:08

## 2024-01-06 RX ADMIN — FLUOXETINE HYDROCHLORIDE 40 MG: 20 CAPSULE ORAL at 12:22

## 2024-01-06 RX ADMIN — BUDESONIDE 0.5 MG: 0.5 INHALANT RESPIRATORY (INHALATION) at 18:54

## 2024-01-06 RX ADMIN — SENNOSIDES AND DOCUSATE SODIUM 2 TABLET: 8.6; 5 TABLET ORAL at 10:14

## 2024-01-06 RX ADMIN — BUDESONIDE 0.5 MG: 0.5 INHALANT RESPIRATORY (INHALATION) at 09:41

## 2024-01-06 RX ADMIN — FLECAINIDE ACETATE 50 MG: 50 TABLET ORAL at 21:33

## 2024-01-06 RX ADMIN — METHYLPREDNISOLONE SODIUM SUCCINATE 125 MG: 125 INJECTION, POWDER, LYOPHILIZED, FOR SOLUTION INTRAMUSCULAR; INTRAVENOUS at 07:29

## 2024-01-06 RX ADMIN — IPRATROPIUM BROMIDE AND ALBUTEROL SULFATE 3 ML: 2.5; .5 SOLUTION RESPIRATORY (INHALATION) at 18:54

## 2024-01-06 RX ADMIN — LOSARTAN POTASSIUM 100 MG: 50 TABLET, FILM COATED ORAL at 10:38

## 2024-01-06 RX ADMIN — DOXEPIN HYDROCHLORIDE 50 MG: 50 CAPSULE ORAL at 21:34

## 2024-01-06 RX ADMIN — Medication 10 ML: at 21:35

## 2024-01-06 RX ADMIN — LEVOTHYROXINE SODIUM 25 MCG: 25 TABLET ORAL at 10:38

## 2024-01-06 RX ADMIN — SODIUM CHLORIDE 1000 MG: 900 INJECTION INTRAVENOUS at 11:12

## 2024-01-06 RX ADMIN — IPRATROPIUM BROMIDE AND ALBUTEROL SULFATE 3 ML: 2.5; .5 SOLUTION RESPIRATORY (INHALATION) at 11:59

## 2024-01-06 RX ADMIN — METOPROLOL TARTRATE 25 MG: 25 TABLET, FILM COATED ORAL at 21:34

## 2024-01-06 RX ADMIN — METOPROLOL TARTRATE 25 MG: 25 TABLET, FILM COATED ORAL at 10:38

## 2024-01-06 RX ADMIN — METHYLPREDNISOLONE SODIUM SUCCINATE 60 MG: 125 INJECTION INTRAMUSCULAR; INTRAVENOUS at 23:42

## 2024-01-06 RX ADMIN — IPRATROPIUM BROMIDE AND ALBUTEROL SULFATE 3 ML: 2.5; .5 SOLUTION RESPIRATORY (INHALATION) at 07:15

## 2024-01-06 RX ADMIN — IPRATROPIUM BROMIDE AND ALBUTEROL SULFATE 3 ML: 2.5; .5 SOLUTION RESPIRATORY (INHALATION) at 15:20

## 2024-01-06 RX ADMIN — FLECAINIDE ACETATE 50 MG: 50 TABLET ORAL at 10:38

## 2024-01-06 RX ADMIN — DOXYCYCLINE 100 MG: 100 INJECTION, POWDER, LYOPHILIZED, FOR SOLUTION INTRAVENOUS at 07:29

## 2024-01-06 RX ADMIN — ACETAMINOPHEN 650 MG: 325 TABLET ORAL at 13:43

## 2024-01-06 RX ADMIN — Medication 10 ML: at 10:23

## 2024-01-06 RX ADMIN — TRAMADOL HYDROCHLORIDE 50 MG: 50 TABLET, COATED ORAL at 14:04

## 2024-01-06 RX ADMIN — PANTOPRAZOLE SODIUM 40 MG: 40 TABLET, DELAYED RELEASE ORAL at 10:37

## 2024-01-06 RX ADMIN — AMLODIPINE BESYLATE 5 MG: 5 TABLET ORAL at 10:14

## 2024-01-06 RX ADMIN — DOXYCYCLINE 100 MG: 100 INJECTION, POWDER, LYOPHILIZED, FOR SOLUTION INTRAVENOUS at 21:35

## 2024-01-06 RX ADMIN — HYDROXYCHLOROQUINE SULFATE 200 MG: 200 TABLET ORAL at 10:38

## 2024-01-06 NOTE — ED PROVIDER NOTES
Subjective   History of Present Illness  This is a 73-year-old female with past medical history of COPD chronically on 6 L of nasal cannula oxygen at home, presenting to the emergency department some difficulty breathing.  The patient states that she has been having some trouble breathing for the last 3 days.  She is also had some mild cough.  States that she is producing some white sputum.  Patient states that she woke up this morning and the shortness of breath intensified.  Patient is found to be profoundly hypoxic in triage.  She had diffuse wheezing.  Patient denies any fevers or chills.  No headache or change in vision.  No focal weakness.  No chest pain.  No abdominal pain or vomiting    History provided by:  Patient   used: No        Review of Systems   Constitutional:  Negative for chills and fever.   HENT:  Negative for congestion, ear pain and sore throat.    Eyes:  Negative for visual disturbance.   Respiratory:  Positive for cough, shortness of breath and wheezing.    Cardiovascular:  Negative for chest pain.   Gastrointestinal:  Negative for abdominal pain.   Genitourinary:  Negative for difficulty urinating.   Musculoskeletal:  Negative for arthralgias.   Skin:  Negative for rash.   Neurological:  Negative for dizziness, weakness and numbness.   Psychiatric/Behavioral:  Negative for agitation.        Past Medical History:   Diagnosis Date    Arthritis     Atrial fibrillation     Closed right hip fracture     COVID     GERD (gastroesophageal reflux disease)     Gout     Hyperlipidemia     Hypertension     Osteoporosis     Rheumatoid arthritis     Wears dentures     upper    Wears glasses     reading       Allergies   Allergen Reactions    Moexipril-Hydrochlorothiazide Anaphylaxis    Penicillins Hives     Tolerates ancef, rocephin, Zosyn    Ace Inhibitors Hives       Past Surgical History:   Procedure Laterality Date    ANKLE SURGERY Right     CHOLECYSTECTOMY      COLONOSCOPY      5  years ago    HIP FRACTURE SURGERY      Right Hip with Pins    LUMBAR DISCECTOMY Left 10/12/2016    Procedure: lumbar MICROdiscectomy LEFT L3-5;  Surgeon: Chris Son MD;  Location: [facility];  Service:     REPLACEMENT TOTAL KNEE      Right Knee    TOE AMPUTATION      right baby toe, left second toe       Family History   Problem Relation Age of Onset    Heart disease Mother     Cancer Father         LUNG       Social History     Socioeconomic History    Marital status:    Tobacco Use    Smoking status: Former     Types: Cigarettes    Smokeless tobacco: Never    Tobacco comments:     quit 30 years ago   Vaping Use    Vaping Use: Never used   Substance and Sexual Activity    Alcohol use: No    Drug use: No    Sexual activity: Defer           Objective   Physical Exam  Vitals and nursing note reviewed.   Constitutional:       General: She is not in acute distress.     Appearance: She is not ill-appearing or toxic-appearing.   HENT:      Mouth/Throat:      Pharynx: No posterior oropharyngeal erythema.   Eyes:      Conjunctiva/sclera: Conjunctivae normal.      Pupils: Pupils are equal, round, and reactive to light.   Cardiovascular:      Rate and Rhythm: Normal rate and regular rhythm.   Pulmonary:      Effort: Tachypnea and respiratory distress present.      Breath sounds: Wheezing present.   Abdominal:      General: Abdomen is flat. There is no distension.      Palpations: There is no mass.      Tenderness: There is no abdominal tenderness. There is no guarding or rebound.   Musculoskeletal:         General: No deformity. Normal range of motion.   Skin:     General: Skin is warm.      Findings: No rash.   Neurological:      General: No focal deficit present.      Mental Status: She is alert and oriented to person, place, and time.      Motor: No weakness.         ECG 12 Lead      Date/Time: 1/6/2024 6:34 AM    Performed by: John Paula MD  Authorized by: John Paula MD  Interpreted by  physician  Comparison: compared with previous ECG   Similar to previous ECG  Rhythm: sinus rhythm  Ectopy: PVCs  Rate: normal  BPM: 82  QRS axis: normal  Other findings: LVH  Other findings comments: Nonspecific ST changes  Clinical impression: non-specific ECG  Comments: EKG was directly visualized by myself, interpretations as documented in hospital course.               ED Course  ED Course as of 01/06/24 0707   Sat Jan 06, 2024   0636 BP: 175/100 [JK]   0637 Temp: 97.5 °F (36.4 °C) [JK]   0637 Heart Rate: 92 [JK]   0637 Resp: 24 [JK]   0637 SpO2(!): 70 %  Patient's repeat vitals, telemetry tracing, and pulse oximetry tracing were directly viewed and interpreted by myself.  Patient was tachypneic with a rate of 24, hypoxic on nasal cannula at 70% [JK]   0637 XR Chest 1 View  Imaging was directly visualized by myself, per my interpretations, chest x-ray showed chronic emphysematous changes with infiltrate in the right upper lobe. [JK]   0638 Patient was having significant respiratory distress on my examination. At this time it is most appropriate to start the patient on BiPAP therapy. Respiratory therapy was called to bedside. Patient was started on BiPAP with settings of IPAP of 10 and EPAP of 5 on 40% FiO2. [JK]   0704 Comprehensive Metabolic Panel(!) [JK]   0704 COVID-19 and FLU A/B PCR, 1 HR TAT - Swab, Nasopharynx [JK]   0704 Single High Sensitivity Troponin T(!) [JK]   0704 BNP(!) [JK]   0704 CBC & Differential(!) [JK]   0704 Blood Gas, Arterial With Co-Ox(!)  Laboratory studies were reviewed and interpreted directly by myself.  CMP was unremarkable, troponin marginal at 20, BNP elevated at 3306, CBC showed some chronic anemia with a hemoglobin 11.5, ABG was reviewed and interpreted directly by myself, shows some acute on chronic respiratory disease. [JK]   0704 On reevaluation, the patient is feeling much better.  Oxygenation is improved.  She is talking in full sentences.  I do believe her symptoms are  likely related to superimposed pneumonia on her chronic lung disease.  Will continue the patient on BiPAP therapy as well as broad-spectrum antibiotics.  Patient be admitted to the hospital for further evaluation and treatment. [JK]   0705 Based on the patient's presentation, history and diffuse work-up in the emergency department, the patient is deemed appropriate for admission to the hospital for further evaluation and treatment.  This was discussed with the patient at bedside.  They are in agreement with the current medical management.    Admitting physician: Dr. Freire    Discussion was had with admitting physician regarding the laboratory and imaging findings.  We did discuss current therapeutics in the emergency department and progression of the patient.  Working diagnosis was conveyed to the admitting physician, as well as current status and prognosis for the patient.  They are in agreement with these findings and have accepted admission.    Shared decision making:   After full review of the patient's clinical presentation, review of any work-up including but not limited to laboratory studies and radiology obtained, I had a discussion with the patient.  Treatment options were discussed as well as the risks, benefits and consequences.  I discussed all findings with the patient and family members if available.  During the discussion, treatment goals were understood by all as well as any misconceptions which were addressed with the patient.  Ample time was given for any questions they may have had.  They are in agreement with the treatment plan as well as final disposition. [JK]      ED Course User Index  [JK] John Paula MD                                             Medical Decision Making  This is a 73-year-old female with a history of COPD presenting to the emergency department with some difficulty breathing and cough.  The patient appears to be in some mild respiratory distress.  The patient was  significantly hypoxic in triage with room air sats in the 50s.  Findings are concerning for COPD exacerbation versus pneumonia.  IV access was established patient.  She is immediately placed on continuous telemetry and pulse oximeter monitoring.  Patient was placed on nonrebreather for respiratory support.  Workup initiated.      Differential diagnosis: URI, bronchitis, COVID, influenza, sinusitis, viral syndrome, RSV, pneumonia, COPD exacerbation, respiratory failure      Amount and/or Complexity of Data Reviewed  External Data Reviewed: labs, radiology, ECG and notes.     Details: External laboratories, imaging as well as notes were reviewed personally by myself.  All relevant studies were used to guide decision making.     Date of previous record: 7/17/2023    Source of note: Primary pulmonologist    Summary: Patient was seen and evaluated for chronic respiratory disease.  I did review basic laboratory studies on file as well as a previous chest x-ray and CT of the chest.  EKG is reviewed.    Labs: ordered. Decision-making details documented in ED Course.  Radiology: ordered and independent interpretation performed. Decision-making details documented in ED Course.  ECG/medicine tests: ordered and independent interpretation performed. Decision-making details documented in ED Course.    Risk  Prescription drug management.  Decision regarding hospitalization.    Critical Care  Total time providing critical care: 54 minutes (Authorized and performed by: John Paula MD  I personally spent a total of 54 minutes of critical care time with the patient.  Due to the high probability of clinically significant, life-threatening deterioration, the patient required my highest level of care to intervene emergently.  These interventions, including, but not limited to, establishing IV access, continuous pulse oximeter and telemetry monitoring, frequent monitoring and reevaluations, management the patient's airway and  cardiovascular system, discussion with other consultants as needed, which bear directly on the management the patient.  This also includes obtaining history, examining the patient, frequent reevaluations and coordinating high level of care.  Failure to emergently initiate these interventions would carry a high probability of resulting in sudden, clinically significant or life threatening deterioration in the patient's condition.  This does not include time spent on separately reported billable procedures.)        Final diagnoses:   Acute on chronic respiratory failure with hypoxia and hypercapnia   Pneumonia of right upper lobe due to infectious organism       ED Disposition  ED Disposition       ED Disposition   Decision to Admit    Condition   --    Comment   Level of Care: Telemetry [5]   Diagnosis: COPD (chronic obstructive pulmonary disease) [046775]   Certification: I Certify That Inpatient Hospital Services Are Medically Necessary For Greater Than 2 Midnights                 No follow-up provider specified.       Medication List      No changes were made to your prescriptions during this visit.            John Paula MD  01/06/24 0707

## 2024-01-06 NOTE — PLAN OF CARE
Goal Outcome Evaluation:  Plan of Care Reviewed With: patient, adrienne     Problem: Adult Inpatient Plan of Care  Goal: Plan of Care Review  Outcome: Ongoing, Progressing  Flowsheets (Taken 1/6/2024 1333)  Progress: no change  Plan of Care Reviewed With:   patient   adrienne  Goal: Patient-Specific Goal (Individualized)  Outcome: Ongoing, Progressing  Goal: Absence of Hospital-Acquired Illness or Injury  Outcome: Ongoing, Progressing  Intervention: Identify and Manage Fall Risk  Recent Flowsheet Documentation  Taken 1/6/2024 1015 by Sayra Sultana RN  Safety Promotion/Fall Prevention:   safety round/check completed   room organization consistent   nonskid shoes/slippers when out of bed   muscle strengthening facilitated   fall prevention program maintained   clutter free environment maintained   assistive device/personal items within reach   activity supervised  Taken 1/6/2024 0902 by Sayra Sultana RN  Safety Promotion/Fall Prevention:   room organization consistent   safety round/check completed   nonskid shoes/slippers when out of bed   muscle strengthening facilitated   fall prevention program maintained   clutter free environment maintained   assistive device/personal items within reach   activity supervised  Intervention: Prevent Skin Injury  Recent Flowsheet Documentation  Taken 1/6/2024 1200 by Sayra Sultana RN  Body Position:   position changed independently   supine  Taken 1/6/2024 1015 by Sayra Sultana RN  Body Position:   sitting up in bed   position changed independently  Taken 1/6/2024 0902 by Sayra Sultana RN  Body Position:   position changed independently   turned  Intervention: Prevent and Manage VTE (Venous Thromboembolism) Risk  Recent Flowsheet Documentation  Taken 1/6/2024 1200 by Sayra Sultana RN  Activity Management: activity minimized  Taken 1/6/2024 1015 by Sayra Sultana RN  Activity Management: activity minimized  Taken 1/6/2024 0902 by Sayra Sultana  RN  Activity Management: activity minimized  VTE Prevention/Management: (xarelto) --  Goal: Optimal Comfort and Wellbeing  Outcome: Ongoing, Progressing  Intervention: Provide Person-Centered Care  Recent Flowsheet Documentation  Taken 1/6/2024 1200 by Sayra Sultana RN  Trust Relationship/Rapport:   care explained   choices provided   questions answered   questions encouraged   reassurance provided  Taken 1/6/2024 1015 by Sayra Sultana RN  Trust Relationship/Rapport:   care explained   choices provided   questions answered   questions encouraged   reassurance provided  Taken 1/6/2024 0902 by Sayra Sultana RN  Trust Relationship/Rapport:   care explained   choices provided   questions answered   questions encouraged   reassurance provided  Goal: Readiness for Transition of Care  Outcome: Ongoing, Progressing  Intervention: Mutually Develop Transition Plan  Recent Flowsheet Documentation  Taken 1/6/2024 0910 by Sayra Sultana RN  Equipment Currently Used at Home:   commode   bath bench   walker, rolling   wheelchair   oxygen   pulse ox  Taken 1/6/2024 0907 by Sayra Sultana RN  Transportation Anticipated: family or friend will provide  Patient/Family Anticipated Services at Transition:   Patient/Family Anticipates Transition to: home     Problem: Fall Injury Risk  Goal: Absence of Fall and Fall-Related Injury  Outcome: Ongoing, Progressing  Intervention: Identify and Manage Contributors  Recent Flowsheet Documentation  Taken 1/6/2024 1015 by Sayra Sultana RN  Medication Review/Management: medications reviewed  Taken 1/6/2024 0902 by Sayra Sultana RN  Medication Review/Management: medications reviewed  Intervention: Promote Injury-Free Environment  Recent Flowsheet Documentation  Taken 1/6/2024 1015 by Sayra Sultana RN  Safety Promotion/Fall Prevention:   safety round/check completed   room organization consistent   nonskid shoes/slippers when out of bed   muscle  strengthening facilitated   fall prevention program maintained   clutter free environment maintained   assistive device/personal items within reach   activity supervised  Taken 1/6/2024 0902 by Sayra Sultana RN  Safety Promotion/Fall Prevention:   room organization consistent   safety round/check completed   nonskid shoes/slippers when out of bed   muscle strengthening facilitated   fall prevention program maintained   clutter free environment maintained   assistive device/personal items within reach   activity supervised     Problem: COPD (Chronic Obstructive Pulmonary Disease) Comorbidity  Goal: Maintenance of COPD Symptom Control  Outcome: Ongoing, Progressing  Intervention: Maintain COPD-Symptom Control  Recent Flowsheet Documentation  Taken 1/6/2024 1015 by Sayra Sultana RN  Medication Review/Management: medications reviewed  Taken 1/6/2024 0902 by Sayra Sultana RN  Medication Review/Management: medications reviewed     Problem: Hypertension Comorbidity  Goal: Blood Pressure in Desired Range  Outcome: Ongoing, Progressing  Intervention: Maintain Blood Pressure Management  Recent Flowsheet Documentation  Taken 1/6/2024 1015 by Sayra Sultana RN  Medication Review/Management: medications reviewed  Taken 1/6/2024 0902 by Sayra Sultana RN  Medication Review/Management: medications reviewed     Problem: Skin Injury Risk Increased  Goal: Skin Health and Integrity  Outcome: Ongoing, Progressing  Intervention: Optimize Skin Protection  Recent Flowsheet Documentation  Taken 1/6/2024 1200 by Sayra Sultana RN  Head of Bed (HOB) Positioning:   HOB elevated   HOB at 30-45 degrees  Taken 1/6/2024 1015 by Sayra Sultana RN  Head of Bed (HOB) Positioning:   HOB elevated   HOB at 30-45 degrees  Taken 1/6/2024 0902 by Sayra Sultana RN  Head of Bed (HOB) Positioning: HOB at 30 degrees        Progress: no change     VSS on 6 L O2 via humidified NC. Patient awake A/O x4. SOA with any activity.  BC x2 pending. IV abx and Neb treatments continue. Patient resting well at this time without complaints. Purewick in place to minimize activity.

## 2024-01-06 NOTE — PAYOR COMM NOTE
"Anay Garber RN  Utilization Management  P:[phone]  F:[phone]      [facility] ID# [id number]  Attempted to submit on availity and given an error and duplicate request message       Lelia Borjas (73 y.o. Female)       Date of Birth   1950    Social Security Number       Address   [address] [place] [address] [address] [place] [address]    Home Phone   [phone]    MRN   [id number]       Confucianism   [facility]    Marital Status                               Admission Date   1/6/24    Admission Type   Emergency    Admitting Provider   Tani Pierre MD    Attending Provider   Tani Pierre MD    Department, Room/Bed   [address], S521/1       Discharge Date       Discharge Disposition       Discharge Destination                                 Attending Provider: Tani Pierre MD    Allergies: Moexipril-hydrochlorothiazide, Penicillins, Ace Inhibitors    Isolation: None   Infection: COVID (rule out) (01/06/24)   Code Status: CPR    Ht: 157.5 cm (62\")   Wt: 49 kg (108 lb 1.6 oz)    Admission Cmt: None   Principal Problem: COPD (chronic obstructive pulmonary disease) [J44.9]                   Active Insurance as of 1/6/2024       Primary Coverage       Payor Plan Insurance Group Employer/Plan Group    MEDICARE MEDICARE B ONLY        Payor Plan Address Payor Plan Phone Number Payor Plan Fax Number Effective Dates    [address] [address] [phone]  2/1/2017 - None Entered    [facility] [id number]         Subscriber Name Subscriber Birth Date Member ID       LELIA BORJAS 1950 [id number]               Secondary Coverage       Payor Plan Insurance Group Employer/Plan Group    [place]        Payor Plan Address Payor Plan Phone Number Payor Plan Fax Number Effective Dates    [address]   1/1/2014 - None Entered    [facility] [id number]         Subscriber Name Subscriber Birth Date Member ID       LELIA BORJAS 1950 [id number]               "       Emergency Contacts        (Rel.) Home Phone Work Phone Mobile Phone    Corey Borjas (Son) [phone] -- [phone]    JOSE BORJAS (Daughter) [phone] -- [phone]                 History & Physical        Tani Pierre MD at 24 0836              [facility] Medicine Services  HISTORY AND PHYSICAL    Patient Name: Liz Borjas  : 1950  MRN: [id number]  Primary Care Physician: Valerie Sesay APRN  Date of admission: 2024      Subjective  Subjective     Chief Complaint:  Shortness of breath    HPI:  Liz Borjas is a 73 y.o. female with history of COPD, pulmonary fibrosis on chronic home oxygen, RA on chronic prednisone (20 mg daily), Atrial fibrillation, CKD and cirrhosis who presents with progressive shortness of breath and generalized weakness.  Patient say she suffered from a COVID 19 infection just after thanksgiving, and subsequent infulenza A, with treatment for a URI with antibiotics (IM and oral) by her PCP.  Over the past several days her shortness of breath has worsened, and on the day of presentation to the ED she felt weak and had difficulty walking.  Her son could not read her oxygen sat on home testing, and turned up her oxygen level.  She denies fevers, but has felt chilled for the past few mornings.  Slight cough, non productive.        Personal History     Past Medical History:   Diagnosis Date    Arthritis     Atrial fibrillation     Closed right hip fracture     COVID     GERD (gastroesophageal reflux disease)     Gout     Hyperlipidemia     Hypertension     Osteoporosis     Rheumatoid arthritis     Wears dentures     upper    Wears glasses     reading           Past Surgical History:   Procedure Laterality Date    ANKLE SURGERY Right     CHOLECYSTECTOMY      COLONOSCOPY      5 years ago    HIP FRACTURE SURGERY      Right Hip with Pins    LUMBAR DISCECTOMY Left 10/12/2016    Procedure: lumbar MICROdiscectomy LEFT  L3-5;  Surgeon: Chris Son MD;  Location: [facility];  Service:     REPLACEMENT TOTAL KNEE      Right Knee    TOE AMPUTATION      right baby toe, left second toe       Family History: family history includes Cancer in her father; Heart disease in her mother.     Social History:  reports that she has quit smoking. Her smoking use included cigarettes. She has never used smokeless tobacco. She reports that she does not drink alcohol and does not use drugs.  Social History     Social History Narrative    Not on file       Medications:  Available home medication information reviewed.  Cetirizine HCl, FLUoxetine, Fluticasone-Umeclidin-Vilant, RABEprazole, albuterol, albuterol sulfate HFA, allopurinol, amLODIPine, atenolol, baclofen, dicyclomine, doxepin, ferrous sulfate, flecainide, fluticasone, hydroxychloroquine, losartan, metoprolol tartrate, nystatin, predniSONE, rivaroxaban, sulfaSALAzine, sulfamethoxazole-trimethoprim, and traMADol    Allergies   Allergen Reactions    Moexipril-Hydrochlorothiazide Anaphylaxis    Penicillins Hives     Tolerates ancef, rocephin, Zosyn    Ace Inhibitors Hives       Objective  Objective     Vital Signs:   Temp:  [97.5 °F (36.4 °C)] 97.5 °F (36.4 °C)  Heart Rate:  [74-92] 74  Resp:  [24] 24  BP: (151-175)/() 151/98  Flow (L/min):  [10] 10       Physical Exam   Frail female, in bed  RRR, no murmur  Coarse breath sounds bilaterally with rare expiratory wheezes  Abd soft, NT, ND  No edema  Normal affect  Speech clear, alert, answers questions appropriately  Left foot extended, has some difficulty with flexion in this foot (says some bones are fused)  No rash on exposed extremities    Result Review:  I have personally reviewed the results from the time of this admission to 1/6/2024 08:36 EST and agree with these findings:  []  Laboratory list / accordion  []  Microbiology  []  Radiology  []  EKG/Telemetry   []  Cardiology/Vascular   []  Pathology  []  Old records  []   Other:  Most notable findings include:       LAB RESULTS:      Lab 01/06/24  0546   WBC 8.96   HEMOGLOBIN 11.5*   HEMATOCRIT 39.8   PLATELETS 166   NEUTROS ABS 6.83   IMMATURE GRANS (ABS) 0.02   LYMPHS ABS 1.23   MONOS ABS 0.85   EOS ABS 0.00   MCV 98.3*   PROCALCITONIN 0.09         Lab 01/06/24  0546   SODIUM 139   POTASSIUM 3.8   CHLORIDE 98   CO2 32.0*   ANION GAP 9.0   BUN 18   CREATININE 1.03*   EGFR 58.3*   GLUCOSE 93   CALCIUM 9.1         Lab 01/06/24  0546   TOTAL PROTEIN 6.9   ALBUMIN 3.5   GLOBULIN 3.4   ALT (SGPT) 13   AST (SGOT) 20   BILIRUBIN 0.4   ALK PHOS 271*         Lab 01/06/24  0546   PROBNP 3,306.0*   HSTROP T 20*                 Lab 01/06/24  0559   PH, ARTERIAL 7.362   PCO2, ARTERIAL 56.1*   PO2 .0*   FIO2 80   HCO3 ART 31.8*   BASE EXCESS ART 5.2*   CARBOXYHEMOGLOBIN 0.7         Microbiology Results (last 10 days)       Procedure Component Value - Date/Time    COVID-19 and FLU A/B PCR, 1 HR TAT - Swab, Nasopharynx [[id number]]  (Normal) Collected: 01/06/24 0552    Lab Status: Final result Specimen: Swab from Nasopharynx Updated: 01/06/24 0628     COVID19 Not Detected     Influenza A PCR Not Detected     Influenza B PCR Not Detected    Narrative:      Fact sheet for providers: https://www.fda.gov/media/882786/download    Fact sheet for patients: https://www.fda.gov/media/677691/download    Test performed by PCR.            XR Chest 1 View    Result Date: 1/6/2024  XR CHEST 1 VW Date of Exam: 1/6/2024 5:42 AM EST Indication: SOA triage protocol Comparison: 7/18/2022, 1/14/2022, 1/15/2022. Findings: Extensive chronic interstitial changes and scarring with groundglass changes present. Suspected superimposed mild patchy airspace disease seen within the subpleural right upper lobe.. No pleural fluid. No pneumothorax. The pulmonary vasculature appears within normal limits. The cardiac and mediastinal silhouette appear unremarkable. No acute osseous abnormality identified.     Impression:  Impression: Extensive chronic interstitial changes and scarring with suspected superimposed mild patchy airspace disease present within the subpleural right upper lobe, likely related to mild pneumonia. Electronically Signed: Vira Sanchez MD  1/6/2024 5:56 AM EST  Workstation ID: [id number]         Assessment & Plan  Assessment & Plan       COPD (chronic obstructive pulmonary disease)        COPD with AE  Pulmonary fibrosis  Possible pneumonia  - COVID/Influenza PCR panel negative, will check full respiratory panel  - elevated proBNP, check Echo  - BiPAP, transition to NC when possible  - continue doxycycline, add rocephin  - IV solumedrol  - scheduled duonebs and budesonide  - CXR am -- patient would probably benefit from CT imaging once off BiPAP    RA  - resume home steroids when treatment for COPD complete    Atrial fibrillation  - xaraelto    CKD    Generalized weakness  - PT/OT    DVT prophylaxis:  xarelto  DVT prophylaxis:  No DVT prophylaxis order currently exists.      CODE STATUS:  Full code -- patient says she would like to try ventilation and CPR should her condition worsens, but says she realizes that with her underlying pulmonary fibrosis it may be difficult to come off the ventilator.  Son at bedside during this discussion.  Code Status and Medical Interventions:   Ordered at: 01/06/24 0832     Level Of Support Discussed With:    Patient    Next of Kin (If No Surrogate)     Code Status (Patient has no pulse and is not breathing):    CPR (Attempt to Resuscitate)     Medical Interventions (Patient has pulse or is breathing):    Full Support       Expected Discharge   Expected discharge date/ time has not been documented.     Tani Pierre MD  01/06/24      Electronically signed by Tani Pierre MD at 01/06/24 0850          Emergency Department Notes        Kelsi Gaitan, RN at 01/06/24 0737           Liz Borjas    Nursing Report ED to Floor:  Mental status: a&ox4  Ambulatory status: bedrest  in ED  Oxygen Therapy:  50% Bipap  Cardiac Rhythm: NSR  Admitted from: ED  Safety Concerns:  Short of breath  Social Issues: None  ED Room #:  12    ED Nurse Phone Extension - [phone] or may call [phone].      HPI:   Chief Complaint   Patient presents with    Shortness of Breath       Past Medical History:  Past Medical History:   Diagnosis Date    Arthritis     Atrial fibrillation     Closed right hip fracture     COVID     GERD (gastroesophageal reflux disease)     Gout     Hyperlipidemia     Hypertension     Osteoporosis     Rheumatoid arthritis     Wears dentures     upper    Wears glasses     reading        Past Surgical History:  Past Surgical History:   Procedure Laterality Date    ANKLE SURGERY Right     CHOLECYSTECTOMY      COLONOSCOPY      5 years ago    HIP FRACTURE SURGERY      Right Hip with Pins    LUMBAR DISCECTOMY Left 10/12/2016    Procedure: lumbar MICROdiscectomy LEFT L3-5;  Surgeon: Chris Son MD;  Location: [facility] OR;  Service:     REPLACEMENT TOTAL KNEE      Right Knee    TOE AMPUTATION      right baby toe, left second toe        Admitting Doctor:   No admitting provider for patient encounter.    Consulting Provider(s):  Consults       No orders found from 12/8/2023 to 1/7/2024.             Admitting Diagnosis:   The primary encounter diagnosis was Acute on chronic respiratory failure with hypoxia and hypercapnia. A diagnosis of Pneumonia of right upper lobe due to infectious organism was also pertinent to this visit.    Most Recent Vitals:   Vitals:    01/06/24 0659 01/06/24 0700 01/06/24 0701 01/06/24 0702   BP:    151/98   Pulse: 76 74 75 74   Resp:       Temp:       SpO2: 90% (!) 86% (!) 88% (!) 89%   Weight:       Height:           Active LDAs/IV Access:   Lines, Drains & Airways       Active LDAs       Name Placement date Placement time Site Days    Peripheral IV 01/06/24 0541 Anterior;Right Forearm 01/06/24  0541  Forearm  less than 1                    Labs (abnormal labs have a  star):   Labs Reviewed   COMPREHENSIVE METABOLIC PANEL - Abnormal; Notable for the following components:       Result Value    Creatinine 1.03 (*)     CO2 32.0 (*)     Alkaline Phosphatase 271 (*)     eGFR 58.3 (*)     All other components within normal limits    Narrative:     GFR Normal >60  Chronic Kidney Disease <60  Kidney Failure <15    The GFR formula is only valid for adults with stable renal function between ages 18 and 70.   BNP (IN-HOUSE) - Abnormal; Notable for the following components:    proBNP 3,306.0 (*)     All other components within normal limits    Narrative:     This assay is used as an aid in the diagnosis of individuals suspected of having heart failure. It can be used as an aid in the diagnosis of acute decompensated heart failure (ADHF) in patients presenting with signs and symptoms of ADHF to the emergency department (ED). In addition, NT-proBNP of <300 pg/mL indicates ADHF is not likely.    Age Range Result Interpretation  NT-proBNP Concentration (pg/mL:      <50             Positive            >450                   Gray                 300-450                    Negative             <300    50-75           Positive            >900                  Gray                300-900                  Negative            <300      >75             Positive            >1800                  Gray                300-1800                  Negative            <300   SINGLE HSTROPONIN T - Abnormal; Notable for the following components:    HS Troponin T 20 (*)     All other components within normal limits    Narrative:     High Sensitive Troponin T Reference Range:  <14.0 ng/L- Negative Female for AMI  <22.0 ng/L- Negative Male for AMI  >=14 - Abnormal Female indicating possible myocardial injury.  >=22 - Abnormal Male indicating possible myocardial injury.   Clinicians would have to utilize clinical acumen, EKG, Troponin, and serial changes to determine if it is an Acute Myocardial Infarction or  "myocardial injury due to an underlying chronic condition.        CBC WITH AUTO DIFFERENTIAL - Abnormal; Notable for the following components:    Hemoglobin 11.5 (*)     MCV 98.3 (*)     MCHC 28.9 (*)     RDW 15.9 (*)     RDW-SD 57.8 (*)     Neutrophil % 76.3 (*)     Lymphocyte % 13.7 (*)     Eosinophil % 0.0 (*)     All other components within normal limits   BLOOD GAS, ARTERIAL W/CO-OXIMETRY - Abnormal; Notable for the following components:    pCO2, Arterial 56.1 (*)     pO2, Arterial 112.0 (*)     HCO3, Arterial 31.8 (*)     Base Excess, Arterial 5.2 (*)     Hemoglobin, Blood Gas 11.2 (*)     Hematocrit, Blood Gas 34.4 (*)     Methemoglobin 1.60 (*)     CO2 Content 33.5 (*)     pCO2, Temperature Corrected 56.1 (*)     pO2, Temperature Corrected 112 (*)     All other components within normal limits   COVID-19 AND FLU A/B, NP SWAB IN TRANSPORT MEDIA 1 HR TAT - Normal    Narrative:     Fact sheet for providers: https://www.fda.gov/media/207301/download    Fact sheet for patients: https://www.fda.gov/media/506476/download    Test performed by PCR.   PROCALCITONIN - Normal    Narrative:     As a Marker for Sepsis (Non-Neonates):    1. <0.5 ng/mL represents a low risk of severe sepsis and/or septic shock.  2. >2 ng/mL represents a high risk of severe sepsis and/or septic shock.    As a Marker for Lower Respiratory Tract Infections that require antibiotic therapy:    PCT on Admission    Antibiotic Therapy       6-12 Hrs later    >0.5                Strongly Recommended  >0.25 - <0.5        Recommended   0.1 - 0.25          Discouraged              Remeasure/reassess PCT  <0.1                Strongly Discouraged     Remeasure/reassess PCT    As 28 day mortality risk marker: \"Change in Procalcitonin Result\" (>80% or <=80%) if Day 0 (or Day 1) and Day 4 values are available. Refer to http://www.[employer]-pct-calculator.com    Change in PCT <=80%  A decrease of PCT levels below or equal to 80% defines a positive change in PCT " test result representing a higher risk for 28-day all-cause mortality of patients diagnosed with severe sepsis for septic shock.    Change in PCT >80%  A decrease of PCT levels of more than 80% defines a negative change in PCT result representing a lower risk for 28-day all-cause mortality of patients diagnosed with severe sepsis or septic shock.      RAINBOW DRAW    Narrative:     The following orders were created for panel order [place] Draw.  Procedure                               Abnormality         Status                     ---------                               -----------         ------                     Green Top (Gel)[[id number]]                                  Final result               Lavender Top[[id number]]                                                                Gold Top - SST[[id number]]                                   Final result               Bullard Top[[id number]]                                         In process                 Light Blue Top[[id number]]                                   Final result                 Please view results for these tests on the individual orders.   BLOOD GAS, ARTERIAL   CBC AND DIFFERENTIAL    Narrative:     The following orders were created for panel order CBC & Differential.  Procedure                               Abnormality         Status                     ---------                               -----------         ------                     CBC Auto Differential[[id number]]        Abnormal            Final result                 Please view results for these tests on the individual orders.   GREEN TOP   GOLD TOP - SST   LIGHT BLUE TOP   GRAY TOP       Meds Given in ED:   Medications   sodium chloride 0.9 % flush 10 mL (has no administration in time range)   ipratropium-albuterol (DUO-NEB) nebulizer solution 3 mL ( Nebulization Canceled Entry 1/6/24 0715)   doxycycline (VIBRAMYCIN) 100 mg in sodium chloride 0.9 % 100 mL IVPB (100 mg Intravenous New  Bag 1/6/24 0729)   ipratropium-albuterol (DUO-NEB) nebulizer solution 3 mL (3 mL Nebulization Given 1/6/24 0715)   methylPREDNISolone sodium succinate (SOLU-Medrol) injection 125 mg (125 mg Intravenous Given 1/6/24 0729)              Electronically signed by Kelsi Gaitan RN at 01/06/24 0739       John Paula MD at 01/06/24 0632        Procedure Orders    1. ECG 12 Lead [[id number]] ordered by John Paula MD                 Subjective   History of Present Illness  This is a 73-year-old female with past medical history of COPD chronically on 6 L of nasal cannula oxygen at home, presenting to the emergency department some difficulty breathing.  The patient states that she has been having some trouble breathing for the last 3 days.  She is also had some mild cough.  States that she is producing some white sputum.  Patient states that she woke up this morning and the shortness of breath intensified.  Patient is found to be profoundly hypoxic in triage.  She had diffuse wheezing.  Patient denies any fevers or chills.  No headache or change in vision.  No focal weakness.  No chest pain.  No abdominal pain or vomiting    History provided by:  Patient   used: No        Review of Systems   Constitutional:  Negative for chills and fever.   HENT:  Negative for congestion, ear pain and sore throat.    Eyes:  Negative for visual disturbance.   Respiratory:  Positive for cough, shortness of breath and wheezing.    Cardiovascular:  Negative for chest pain.   Gastrointestinal:  Negative for abdominal pain.   Genitourinary:  Negative for difficulty urinating.   Musculoskeletal:  Negative for arthralgias.   Skin:  Negative for rash.   Neurological:  Negative for dizziness, weakness and numbness.   Psychiatric/Behavioral:  Negative for agitation.        Past Medical History:   Diagnosis Date    Arthritis     Atrial fibrillation     Closed right hip fracture     COVID     GERD (gastroesophageal  reflux disease)     Gout     Hyperlipidemia     Hypertension     Osteoporosis     Rheumatoid arthritis     Wears dentures     upper    Wears glasses     reading       Allergies   Allergen Reactions    Moexipril-Hydrochlorothiazide Anaphylaxis    Penicillins Hives     Tolerates ancef, rocephin, Zosyn    Ace Inhibitors Hives       Past Surgical History:   Procedure Laterality Date    ANKLE SURGERY Right     CHOLECYSTECTOMY      COLONOSCOPY      5 years ago    HIP FRACTURE SURGERY      Right Hip with Pins    LUMBAR DISCECTOMY Left 10/12/2016    Procedure: lumbar MICROdiscectomy LEFT L3-5;  Surgeon: Chris Son MD;  Location: [facility];  Service:     REPLACEMENT TOTAL KNEE      Right Knee    TOE AMPUTATION      right baby toe, left second toe       Family History   Problem Relation Age of Onset    Heart disease Mother     Cancer Father         LUNG       Social History     Socioeconomic History    Marital status:    Tobacco Use    Smoking status: Former     Types: Cigarettes    Smokeless tobacco: Never    Tobacco comments:     quit 30 years ago   Vaping Use    Vaping Use: Never used   Substance and Sexual Activity    Alcohol use: No    Drug use: No    Sexual activity: Defer           Objective   Physical Exam  Vitals and nursing note reviewed.   Constitutional:       General: She is not in acute distress.     Appearance: She is not ill-appearing or toxic-appearing.   HENT:      Mouth/Throat:      Pharynx: No posterior oropharyngeal erythema.   Eyes:      Conjunctiva/sclera: Conjunctivae normal.      Pupils: Pupils are equal, round, and reactive to light.   Cardiovascular:      Rate and Rhythm: Normal rate and regular rhythm.   Pulmonary:      Effort: Tachypnea and respiratory distress present.      Breath sounds: Wheezing present.   Abdominal:      General: Abdomen is flat. There is no distension.      Palpations: There is no mass.      Tenderness: There is no abdominal tenderness. There is no guarding or  rebound.   Musculoskeletal:         General: No deformity. Normal range of motion.   Skin:     General: Skin is warm.      Findings: No rash.   Neurological:      General: No focal deficit present.      Mental Status: She is alert and oriented to person, place, and time.      Motor: No weakness.         ECG 12 Lead      Date/Time: 1/6/2024 6:34 AM    Performed by: John Paula MD  Authorized by: John Paula MD  Interpreted by physician  Comparison: compared with previous ECG   Similar to previous ECG  Rhythm: sinus rhythm  Ectopy: PVCs  Rate: normal  BPM: 82  QRS axis: normal  Other findings: LVH  Other findings comments: Nonspecific ST changes  Clinical impression: non-specific ECG  Comments: EKG was directly visualized by myself, interpretations as documented in hospital course.              ED Course  ED Course as of 01/06/24 0707   Sat Jan 06, 2024   0636 BP: 175/100 [JK]   0637 Temp: 97.5 °F (36.4 °C) [JK]   0637 Heart Rate: 92 [JK]   0637 Resp: 24 [JK]   0637 SpO2(!): 70 %  Patient's repeat vitals, telemetry tracing, and pulse oximetry tracing were directly viewed and interpreted by myself.  Patient was tachypneic with a rate of 24, hypoxic on nasal cannula at 70% [JK]   0637 XR Chest 1 View  Imaging was directly visualized by myself, per my interpretations, chest x-ray showed chronic emphysematous changes with infiltrate in the right upper lobe. [JK]   0638 Patient was having significant respiratory distress on my examination. At this time it is most appropriate to start the patient on BiPAP therapy. Respiratory therapy was called to bedside. Patient was started on BiPAP with settings of IPAP of 10 and EPAP of 5 on 40% FiO2. [JK]   0704 Comprehensive Metabolic Panel(!) [JK]   0704 COVID-19 and FLU A/B PCR, 1 HR TAT - Swab, Nasopharynx [JK]   0704 Single High Sensitivity Troponin T(!) [JK]   0704 BNP(!) [JK]   0704 CBC & Differential(!) [JK]   0704 Blood Gas, Arterial With Co-Ox(!)  Laboratory  studies were reviewed and interpreted directly by myself.  CMP was unremarkable, troponin marginal at 20, BNP elevated at 3306, CBC showed some chronic anemia with a hemoglobin 11.5, ABG was reviewed and interpreted directly by myself, shows some acute on chronic respiratory disease. [JK]   0704 On reevaluation, the patient is feeling much better.  Oxygenation is improved.  She is talking in full sentences.  I do believe her symptoms are likely related to superimposed pneumonia on her chronic lung disease.  Will continue the patient on BiPAP therapy as well as broad-spectrum antibiotics.  Patient be admitted to the hospital for further evaluation and treatment. [JK]   0705 Based on the patient's presentation, history and diffuse work-up in the emergency department, the patient is deemed appropriate for admission to the hospital for further evaluation and treatment.  This was discussed with the patient at bedside.  They are in agreement with the current medical management.    Admitting physician: Dr. Freire    Discussion was had with admitting physician regarding the laboratory and imaging findings.  We did discuss current therapeutics in the emergency department and progression of the patient.  Working diagnosis was conveyed to the admitting physician, as well as current status and prognosis for the patient.  They are in agreement with these findings and have accepted admission.    Shared decision making:   After full review of the patient's clinical presentation, review of any work-up including but not limited to laboratory studies and radiology obtained, I had a discussion with the patient.  Treatment options were discussed as well as the risks, benefits and consequences.  I discussed all findings with the patient and family members if available.  During the discussion, treatment goals were understood by all as well as any misconceptions which were addressed with the patient.  Ample time was given for any  questions they may have had.  They are in agreement with the treatment plan as well as final disposition. [JK]      ED Course User Index  [JK] John Paula MD                                             Medical Decision Making  This is a 73-year-old female with a history of COPD presenting to the emergency department with some difficulty breathing and cough.  The patient appears to be in some mild respiratory distress.  The patient was significantly hypoxic in triage with room air sats in the 50s.  Findings are concerning for COPD exacerbation versus pneumonia.  IV access was established patient.  She is immediately placed on continuous telemetry and pulse oximeter monitoring.  Patient was placed on nonrebreather for respiratory support.  Workup initiated.      Differential diagnosis: URI, bronchitis, COVID, influenza, sinusitis, viral syndrome, RSV, pneumonia, COPD exacerbation, respiratory failure      Amount and/or Complexity of Data Reviewed  External Data Reviewed: labs, radiology, ECG and notes.     Details: External laboratories, imaging as well as notes were reviewed personally by myself.  All relevant studies were used to guide decision making.     Date of previous record: 7/17/2023    Source of note: Primary pulmonologist    Summary: Patient was seen and evaluated for chronic respiratory disease.  I did review basic laboratory studies on file as well as a previous chest x-ray and CT of the chest.  EKG is reviewed.    Labs: ordered. Decision-making details documented in ED Course.  Radiology: ordered and independent interpretation performed. Decision-making details documented in ED Course.  ECG/medicine tests: ordered and independent interpretation performed. Decision-making details documented in ED Course.    Risk  Prescription drug management.  Decision regarding hospitalization.    Critical Care  Total time providing critical care: 54 minutes (Authorized and performed by: John Paula MD  I  personally spent a total of 54 minutes of critical care time with the patient.  Due to the high probability of clinically significant, life-threatening deterioration, the patient required my highest level of care to intervene emergently.  These interventions, including, but not limited to, establishing IV access, continuous pulse oximeter and telemetry monitoring, frequent monitoring and reevaluations, management the patient's airway and cardiovascular system, discussion with other consultants as needed, which bear directly on the management the patient.  This also includes obtaining history, examining the patient, frequent reevaluations and coordinating high level of care.  Failure to emergently initiate these interventions would carry a high probability of resulting in sudden, clinically significant or life threatening deterioration in the patient's condition.  This does not include time spent on separately reported billable procedures.)        Final diagnoses:   Acute on chronic respiratory failure with hypoxia and hypercapnia   Pneumonia of right upper lobe due to infectious organism       ED Disposition  ED Disposition       ED Disposition   Decision to Admit    Condition   --    Comment   Level of Care: Telemetry [5]   Diagnosis: COPD (chronic obstructive pulmonary disease) [938518]   Certification: I Certify That Inpatient Hospital Services Are Medically Necessary For Greater Than 2 Midnights                 No follow-up provider specified.       Medication List      No changes were made to your prescriptions during this visit.            John Paula MD  01/06/24 0707      Electronically signed by John Paula MD at 01/06/24 0707       Vital Signs (last day)       Date/Time Temp Temp src Pulse Resp BP Patient Position SpO2    01/06/24 0938 97.8 (36.6) Oral -- -- -- -- 94    01/06/24 0902 -- -- 76 22 152/86 Lying 95    01/06/24 0702 -- -- 74 -- 151/98 -- 89    01/06/24 0701 -- -- 75 -- -- --  88    01/06/24 0700 -- -- 74 -- -- -- 86    01/06/24 0659 -- -- 76 -- -- -- 90    01/06/24 0658 -- -- 75 -- -- -- 86    01/06/24 0657 -- -- 76 -- -- -- 88    01/06/24 0655 -- -- 77 -- -- -- 88    01/06/24 0654 -- -- 77 -- -- -- 88    01/06/24 0541 -- -- -- -- 175/100 -- --    01/06/24 0533 97.5 (36.4) -- 92 24 -- -- 70          Current Facility-Administered Medications   Medication Dose Route Frequency Provider Last Rate Last Admin    acetaminophen (TYLENOL) tablet 650 mg  650 mg Oral Q4H PRN Tani Pierre MD        Or    acetaminophen (TYLENOL) 160 MG/5ML oral solution 650 mg  650 mg Oral Q4H PRN Tani Pierre MD        Or    acetaminophen (TYLENOL) suppository 650 mg  650 mg Rectal Q4H PRN Tani Pierre MD        sennosides-docusate (PERICOLACE) 8.6-50 MG per tablet 2 tablet  2 tablet Oral BID Tani Pierre MD        And    polyethylene glycol (MIRALAX) packet 17 g  17 g Oral Daily PRN Tani Pierre MD        And    bisacodyl (DULCOLAX) EC tablet 5 mg  5 mg Oral Daily PRN Tani Pierre MD        And    bisacodyl (DULCOLAX) suppository 10 mg  10 mg Rectal Daily PRN Tani Pierre MD        budesonide (PULMICORT) nebulizer solution 0.5 mg  0.5 mg Nebulization BID - RT Tani Pierre MD   0.5 mg at 01/06/24 0941    cefTRIAXone (ROCEPHIN) 1,000 mg in sodium chloride 0.9 % 100 mL IVPB  1,000 mg Intravenous Q24H Tnai Pierre MD        doxycycline (VIBRAMYCIN) 100 mg in sodium chloride 0.9 % 100 mL IVPB  100 mg Intravenous Q12H Tani Pierre MD        ipratropium-albuterol (DUO-NEB) nebulizer solution 3 mL  3 mL Nebulization Once John Paula MD        ipratropium-albuterol (DUO-NEB) nebulizer solution 3 mL  3 mL Nebulization Q4H - RT John Underwood MD   3 mL at 01/06/24 0715    methylPREDNISolone sodium succinate (SOLU-Medrol) injection 60 mg  60 mg Intravenous Q12H Tani Pierre MD        ondansetron (ZOFRAN) injection 4 mg  4 mg Intravenous Q6H PRN Tani Pierre MD         "sodium chloride 0.9 % flush 10 mL  10 mL Intravenous PRN Emergency, Triage Protocol, MD        sodium chloride 0.9 % flush 10 mL  10 mL Intravenous Q12H Tani Pierre MD        sodium chloride 0.9 % flush 10 mL  10 mL Intravenous PRN Tani Pierre MD        sodium chloride 0.9 % infusion 40 mL  40 mL Intravenous PRN Tani Pierre MD         Lab Results (all)       Procedure Component Value Units Date/Time    Procalcitonin [[id number]]  (Normal) Collected: 01/06/24 0546    Specimen: Blood Updated: 01/06/24 0731     Procalcitonin 0.09 ng/mL     Narrative:      As a Marker for Sepsis (Non-Neonates):    1. <0.5 ng/mL represents a low risk of severe sepsis and/or septic shock.  2. >2 ng/mL represents a high risk of severe sepsis and/or septic shock.    As a Marker for Lower Respiratory Tract Infections that require antibiotic therapy:    PCT on Admission    Antibiotic Therapy       6-12 Hrs later    >0.5                Strongly Recommended  >0.25 - <0.5        Recommended   0.1 - 0.25          Discouraged              Remeasure/reassess PCT  <0.1                Strongly Discouraged     Remeasure/reassess PCT    As 28 day mortality risk marker: \"Change in Procalcitonin Result\" (>80% or <=80%) if Day 0 (or Day 1) and Day 4 values are available. Refer to http://www.[employer]-pct-calculator.com    Change in PCT <=80%  A decrease of PCT levels below or equal to 80% defines a positive change in PCT test result representing a higher risk for 28-day all-cause mortality of patients diagnosed with severe sepsis for septic shock.    Change in PCT >80%  A decrease of PCT levels of more than 80% defines a negative change in PCT result representing a lower risk for 28-day all-cause mortality of patients diagnosed with severe sepsis or septic shock.       Hernandez Draw [[id number]] Collected: 01/06/24 0546    Specimen: Blood Updated: 01/06/24 0646    Narrative:      The following orders were created for panel order Hernandez " Draw.  Procedure                               Abnormality         Status                     ---------                               -----------         ------                     Green Top (Gel)[[id number]]                                  Final result               Lavender Top[[id number]]                                                                Gold Top - SST[[id number]]                                   Final result               Bullard Top[[id number]]                                         In process                 Light Blue Top[[id number]]                                   Final result                 Please view results for these tests on the individual orders.    Green Top (Gel) [[id number]] Collected: 01/06/24 0546    Specimen: Blood Updated: 01/06/24 0646     Extra Tube Hold for add-ons.     Comment: Auto resulted.       Gold Top - SST [[id number]] Collected: 01/06/24 0546    Specimen: Blood Updated: 01/06/24 0646     Extra Tube Hold for add-ons.     Comment: Auto resulted.       Light Blue Top [[id number]] Collected: 01/06/24 0546    Specimen: Blood Updated: 01/06/24 0646     Extra Tube Hold for add-ons.     Comment: Auto resulted       Comprehensive Metabolic Panel [[id number]]  (Abnormal) Collected: 01/06/24 0546    Specimen: Blood Updated: 01/06/24 0640     Glucose 93 mg/dL      BUN 18 mg/dL      Creatinine 1.03 mg/dL      Sodium 139 mmol/L      Potassium 3.8 mmol/L      Chloride 98 mmol/L      CO2 32.0 mmol/L      Calcium 9.1 mg/dL      Total Protein 6.9 g/dL      Albumin 3.5 g/dL      ALT (SGPT) 13 U/L      AST (SGOT) 20 U/L      Alkaline Phosphatase 271 U/L      Total Bilirubin 0.4 mg/dL      Globulin 3.4 gm/dL      Comment: Calculated Result        A/G Ratio 1.0 g/dL      BUN/Creatinine Ratio 17.5     Anion Gap 9.0 mmol/L      eGFR 58.3 mL/min/1.73     Narrative:      GFR Normal >60  Chronic Kidney Disease <60  Kidney Failure <15    The GFR formula is only valid for adults with stable renal function  between ages 18 and 70.    COVID-19 and FLU A/B PCR, 1 HR TAT - Swab, Nasopharynx [[id number]]  (Normal) Collected: 01/06/24 0552    Specimen: Swab from Nasopharynx Updated: 01/06/24 0628     COVID19 Not Detected     Influenza A PCR Not Detected     Influenza B PCR Not Detected    Narrative:      Fact sheet for providers: https://www.fda.gov/media/573137/download    Fact sheet for patients: https://www.fda.gov/media/841256/download    Test performed by PCR.    BNP [[id number]]  (Abnormal) Collected: 01/06/24 0546    Specimen: Blood Updated: 01/06/24 0623     proBNP 3,306.0 pg/mL     Narrative:      This assay is used as an aid in the diagnosis of individuals suspected of having heart failure. It can be used as an aid in the diagnosis of acute decompensated heart failure (ADHF) in patients presenting with signs and symptoms of ADHF to the emergency department (ED). In addition, NT-proBNP of <300 pg/mL indicates ADHF is not likely.    Age Range Result Interpretation  NT-proBNP Concentration (pg/mL:      <50             Positive            >450                   Gray                 300-450                    Negative             <300    50-75           Positive            >900                  Gray                300-900                  Negative            <300      >75             Positive            >1800                  Gray                300-1800                  Negative            <300    Single High Sensitivity Troponin T [[id number]]  (Abnormal) Collected: 01/06/24 0546    Specimen: Blood Updated: 01/06/24 0623     HS Troponin T 20 ng/L     Narrative:      High Sensitive Troponin T Reference Range:  <14.0 ng/L- Negative Female for AMI  <22.0 ng/L- Negative Male for AMI  >=14 - Abnormal Female indicating possible myocardial injury.  >=22 - Abnormal Male indicating possible myocardial injury.   Clinicians would have to utilize clinical acumen, EKG, Troponin, and serial changes to determine if it is an Acute  Myocardial Infarction or myocardial injury due to an underlying chronic condition.         CBC & Differential [[id number]]  (Abnormal) Collected: 01/06/24 0546    Specimen: Blood Updated: 01/06/24 0609    Narrative:      The following orders were created for panel order CBC & Differential.  Procedure                               Abnormality         Status                     ---------                               -----------         ------                     CBC Auto Differential[[id number]]        Abnormal            Final result                 Please view results for these tests on the individual orders.    CBC Auto Differential [[id number]]  (Abnormal) Collected: 01/06/24 0546    Specimen: Blood Updated: 01/06/24 0609     WBC 8.96 10*3/mm3      RBC 4.05 10*6/mm3      Hemoglobin 11.5 g/dL      Hematocrit 39.8 %      MCV 98.3 fL      MCH 28.4 pg      MCHC 28.9 g/dL      RDW 15.9 %      RDW-SD 57.8 fl      MPV 11.9 fL      Platelets 166 10*3/mm3      Neutrophil % 76.3 %      Lymphocyte % 13.7 %      Monocyte % 9.5 %      Eosinophil % 0.0 %      Basophil % 0.3 %      Immature Grans % 0.2 %      Neutrophils, Absolute 6.83 10*3/mm3      Lymphocytes, Absolute 1.23 10*3/mm3      Monocytes, Absolute 0.85 10*3/mm3      Eosinophils, Absolute 0.00 10*3/mm3      Basophils, Absolute 0.03 10*3/mm3      Immature Grans, Absolute 0.02 10*3/mm3      nRBC 0.0 /100 WBC     Blood Gas, Arterial With Co-Ox [[id number]]  (Abnormal) Collected: 01/06/24 0559    Specimen: Arterial Blood Updated: 01/06/24 0559     Site Left Radial     Sami's Test N/A     pH, Arterial 7.362 pH units      pCO2, Arterial 56.1 mm Hg      Comment: 83 Value above reference range        pO2, Arterial 112.0 mm Hg      Comment: 83 Value above reference range        HCO3, Arterial 31.8 mmol/L      Base Excess, Arterial 5.2 mmol/L      Hemoglobin, Blood Gas 11.2 g/dL      Comment: 84 Value below reference range        Hematocrit, Blood Gas 34.4 %      Oxyhemoglobin  94.7 %      Methemoglobin 1.60 %      Comment: 83 Value above reference range        Carboxyhemoglobin 0.7 %      CO2 Content 33.5 mmol/L      Temperature 37.0     Barometric Pressure for Blood Gas --     Comment: N/A        Modality NRB     FIO2 80 %      Rate 0 Breaths/minute      PIP 0 cmH2O      Comment: Meter: [id number]     :  [id number]        IPAP 0     EPAP 0     pH, Temp Corrected 7.362 pH Units      pCO2, Temperature Corrected 56.1 mm Hg      pO2, Temperature Corrected 112 mm Hg     Gray Top [[id number]] Collected: 01/06/24 0546    Specimen: Blood Updated: 01/06/24 0552          Imaging Results (All)       Procedure Component Value Units Date/Time    XR Chest 1 View [[id number]] Collected: 01/06/24 0555     Updated: 01/06/24 0559    Narrative:      XR CHEST 1 VW    Date of Exam: 1/6/2024 5:42 AM EST    Indication: SOA triage protocol    Comparison: 7/18/2022, 1/14/2022, 1/15/2022.    Findings:  Extensive chronic interstitial changes and scarring with groundglass changes present. Suspected superimposed mild patchy airspace disease seen within the subpleural right upper lobe.. No pleural fluid. No pneumothorax. The pulmonary vasculature appears   within normal limits. The cardiac and mediastinal silhouette appear unremarkable. No acute osseous abnormality identified.      Impression:      Impression:  Extensive chronic interstitial changes and scarring with suspected superimposed mild patchy airspace disease present within the subpleural right upper lobe, likely related to mild pneumonia.        Electronically Signed: Vira Sanchez MD    1/6/2024 5:56 AM EST    Workstation ID: [id number]

## 2024-01-06 NOTE — H&P
[facility] Medicine Services  HISTORY AND PHYSICAL    Patient Name: Liz Borjas  : 1950  MRN: [id number]  Primary Care Physician: Valerie Sesay APRN  Date of admission: 2024      Subjective   Subjective     Chief Complaint:  Shortness of breath    HPI:  Liz Borjas is a 73 y.o. female with history of COPD, pulmonary fibrosis on chronic home oxygen, RA on chronic prednisone (20 mg daily), Atrial fibrillation, CKD and cirrhosis who presents with progressive shortness of breath and generalized weakness.  Patient say she suffered from a COVID 19 infection just after thanksgiving, and subsequent infulenza A, with treatment for a URI with antibiotics (IM and oral) by her PCP.  Over the past several days her shortness of breath has worsened, and on the day of presentation to the ED she felt weak and had difficulty walking.  Her son could not read her oxygen sat on home testing, and turned up her oxygen level.  She denies fevers, but has felt chilled for the past few mornings.  Slight cough, non productive.        Personal History     Past Medical History:   Diagnosis Date   • Arthritis    • Atrial fibrillation    • Closed right hip fracture    • COVID    • GERD (gastroesophageal reflux disease)    • Gout    • Hyperlipidemia    • Hypertension    • Osteoporosis    • Rheumatoid arthritis    • Wears dentures     upper   • Wears glasses     reading           Past Surgical History:   Procedure Laterality Date   • ANKLE SURGERY Right    • CHOLECYSTECTOMY     • COLONOSCOPY      5 years ago   • HIP FRACTURE SURGERY      Right Hip with Pins   • LUMBAR DISCECTOMY Left 10/12/2016    Procedure: lumbar MICROdiscectomy LEFT L3-5;  Surgeon: Chris Son MD;  Location: [facility];  Service:    • REPLACEMENT TOTAL KNEE      Right Knee   • TOE AMPUTATION      right baby toe, left second toe       Family History: family history includes Cancer in her father; Heart disease in her mother.      Social History:  reports that she has quit smoking. Her smoking use included cigarettes. She has never used smokeless tobacco. She reports that she does not drink alcohol and does not use drugs.  Social History     Social History Narrative   • Not on file       Medications:  Available home medication information reviewed.  Cetirizine HCl, FLUoxetine, Fluticasone-Umeclidin-Vilant, RABEprazole, albuterol, albuterol sulfate HFA, allopurinol, amLODIPine, atenolol, baclofen, dicyclomine, doxepin, ferrous sulfate, flecainide, fluticasone, hydroxychloroquine, losartan, metoprolol tartrate, nystatin, predniSONE, rivaroxaban, sulfaSALAzine, sulfamethoxazole-trimethoprim, and traMADol    Allergies   Allergen Reactions   • Moexipril-Hydrochlorothiazide Anaphylaxis   • Penicillins Hives     Tolerates ancef, rocephin, Zosyn   • Ace Inhibitors Hives       Objective   Objective     Vital Signs:   Temp:  [97.5 °F (36.4 °C)] 97.5 °F (36.4 °C)  Heart Rate:  [74-92] 74  Resp:  [24] 24  BP: (151-175)/() 151/98  Flow (L/min):  [10] 10       Physical Exam   Frail female, in bed  RRR, no murmur  Coarse breath sounds bilaterally with rare expiratory wheezes  Abd soft, NT, ND  No edema  Normal affect  Speech clear, alert, answers questions appropriately  Left foot extended, has some difficulty with flexion in this foot (says some bones are fused)  No rash on exposed extremities    Result Review:  I have personally reviewed the results from the time of this admission to 1/6/2024 08:36 EST and agree with these findings:  []  Laboratory list / accordion  []  Microbiology  []  Radiology  []  EKG/Telemetry   []  Cardiology/Vascular   []  Pathology  []  Old records  []  Other:  Most notable findings include:       LAB RESULTS:      Lab 01/06/24  0546   WBC 8.96   HEMOGLOBIN 11.5*   HEMATOCRIT 39.8   PLATELETS 166   NEUTROS ABS 6.83   IMMATURE GRANS (ABS) 0.02   LYMPHS ABS 1.23   MONOS ABS 0.85   EOS ABS 0.00   MCV 98.3*   PROCALCITONIN  0.09         Lab 01/06/24  0546   SODIUM 139   POTASSIUM 3.8   CHLORIDE 98   CO2 32.0*   ANION GAP 9.0   BUN 18   CREATININE 1.03*   EGFR 58.3*   GLUCOSE 93   CALCIUM 9.1         Lab 01/06/24  0546   TOTAL PROTEIN 6.9   ALBUMIN 3.5   GLOBULIN 3.4   ALT (SGPT) 13   AST (SGOT) 20   BILIRUBIN 0.4   ALK PHOS 271*         Lab 01/06/24  0546   PROBNP 3,306.0*   HSTROP T 20*                 Lab 01/06/24  0559   PH, ARTERIAL 7.362   PCO2, ARTERIAL 56.1*   PO2 .0*   FIO2 80   HCO3 ART 31.8*   BASE EXCESS ART 5.2*   CARBOXYHEMOGLOBIN 0.7         Microbiology Results (last 10 days)       Procedure Component Value - Date/Time    COVID-19 and FLU A/B PCR, 1 HR TAT - Swab, Nasopharynx [[id number]]  (Normal) Collected: 01/06/24 0552    Lab Status: Final result Specimen: Swab from Nasopharynx Updated: 01/06/24 0628     COVID19 Not Detected     Influenza A PCR Not Detected     Influenza B PCR Not Detected    Narrative:      Fact sheet for providers: https://www.fda.gov/media/597787/download    Fact sheet for patients: https://www.fda.gov/media/393550/download    Test performed by PCR.            XR Chest 1 View    Result Date: 1/6/2024  XR CHEST 1 VW Date of Exam: 1/6/2024 5:42 AM EST Indication: SOA triage protocol Comparison: 7/18/2022, 1/14/2022, 1/15/2022. Findings: Extensive chronic interstitial changes and scarring with groundglass changes present. Suspected superimposed mild patchy airspace disease seen within the subpleural right upper lobe.. No pleural fluid. No pneumothorax. The pulmonary vasculature appears within normal limits. The cardiac and mediastinal silhouette appear unremarkable. No acute osseous abnormality identified.     Impression: Impression: Extensive chronic interstitial changes and scarring with suspected superimposed mild patchy airspace disease present within the subpleural right upper lobe, likely related to mild pneumonia. Electronically Signed: Vira Sanchez MD  1/6/2024 5:56 AM EST  Workstation  ID: [id number]         Assessment & Plan   Assessment & Plan       COPD (chronic obstructive pulmonary disease)        COPD with AE  Pulmonary fibrosis  Possible pneumonia  - COVID/Influenza PCR panel negative, will check full respiratory panel  - elevated proBNP, check Echo  - BiPAP, transition to NC when possible  - continue doxycycline, add rocephin  - IV solumedrol  - scheduled duonebs and budesonide  - CXR am -- patient would probably benefit from CT imaging once off BiPAP    RA  - resume home steroids when treatment for COPD complete    Atrial fibrillation  - xaraelto    CKD    Generalized weakness  - PT/OT    DVT prophylaxis:  xarelto  DVT prophylaxis:  No DVT prophylaxis order currently exists.      CODE STATUS:  Full code -- patient says she would like to try ventilation and CPR should her condition worsens, but says she realizes that with her underlying pulmonary fibrosis it may be difficult to come off the ventilator.  Son at bedside during this discussion.  Code Status and Medical Interventions:   Ordered at: 01/06/24 0832     Level Of Support Discussed With:    Patient    Next of Kin (If No Surrogate)     Code Status (Patient has no pulse and is not breathing):    CPR (Attempt to Resuscitate)     Medical Interventions (Patient has pulse or is breathing):    Full Support       Expected Discharge   Expected discharge date/ time has not been documented.     aTni Pierre MD  01/06/24

## 2024-01-06 NOTE — ED NOTES
Liz Borjas    Nursing Report ED to Floor:  Mental status: a&ox4  Ambulatory status: bedrest in ED  Oxygen Therapy:  50% Bipap  Cardiac Rhythm: NSR  Admitted from: ED  Safety Concerns:  Short of breath  Social Issues: None  ED Room #:  12    ED Nurse Phone Extension - [phone] or may call [phone].      HPI:   Chief Complaint   Patient presents with    Shortness of Breath       Past Medical History:  Past Medical History:   Diagnosis Date    Arthritis     Atrial fibrillation     Closed right hip fracture     COVID     GERD (gastroesophageal reflux disease)     Gout     Hyperlipidemia     Hypertension     Osteoporosis     Rheumatoid arthritis     Wears dentures     upper    Wears glasses     reading        Past Surgical History:  Past Surgical History:   Procedure Laterality Date    ANKLE SURGERY Right     CHOLECYSTECTOMY      COLONOSCOPY      5 years ago    HIP FRACTURE SURGERY      Right Hip with Pins    LUMBAR DISCECTOMY Left 10/12/2016    Procedure: lumbar MICROdiscectomy LEFT L3-5;  Surgeon: Chris Son MD;  Location: [facility];  Service:     REPLACEMENT TOTAL KNEE      Right Knee    TOE AMPUTATION      right baby toe, left second toe        Admitting Doctor:   No admitting provider for patient encounter.    Consulting Provider(s):  Consults       No orders found from 12/8/2023 to 1/7/2024.             Admitting Diagnosis:   The primary encounter diagnosis was Acute on chronic respiratory failure with hypoxia and hypercapnia. A diagnosis of Pneumonia of right upper lobe due to infectious organism was also pertinent to this visit.    Most Recent Vitals:   Vitals:    01/06/24 0659 01/06/24 0700 01/06/24 0701 01/06/24 0702   BP:    151/98   Pulse: 76 74 75 74   Resp:       Temp:       SpO2: 90% (!) 86% (!) 88% (!) 89%   Weight:       Height:           Active LDAs/IV Access:   Lines, Drains & Airways       Active LDAs       Name Placement date Placement time Site Days    Peripheral IV 01/06/24 0541 Anterior;Right  [facility] 01/06/24  0541  Forearm  less than 1                    Labs (abnormal labs have a star):   Labs Reviewed   COMPREHENSIVE METABOLIC PANEL - Abnormal; Notable for the following components:       Result Value    Creatinine 1.03 (*)     CO2 32.0 (*)     Alkaline Phosphatase 271 (*)     eGFR 58.3 (*)     All other components within normal limits    Narrative:     GFR Normal >60  Chronic Kidney Disease <60  Kidney Failure <15    The GFR formula is only valid for adults with stable renal function between ages 18 and 70.   BNP (IN-HOUSE) - Abnormal; Notable for the following components:    proBNP 3,306.0 (*)     All other components within normal limits    Narrative:     This assay is used as an aid in the diagnosis of individuals suspected of having heart failure. It can be used as an aid in the diagnosis of acute decompensated heart failure (ADHF) in patients presenting with signs and symptoms of ADHF to the emergency department (ED). In addition, NT-proBNP of <300 pg/mL indicates ADHF is not likely.    Age Range Result Interpretation  NT-proBNP Concentration (pg/mL:      <50             Positive            >450                   Gray                 300-450                    Negative             <300    50-75           Positive            >900                  Gray                300-900                  Negative            <300      >75             Positive            >1800                  Gray                300-1800                  Negative            <300   SINGLE HSTROPONIN T - Abnormal; Notable for the following components:    HS Troponin T 20 (*)     All other components within normal limits    Narrative:     High Sensitive Troponin T Reference Range:  <14.0 ng/L- Negative Female for AMI  <22.0 ng/L- Negative Male for AMI  >=14 - Abnormal Female indicating possible myocardial injury.  >=22 - Abnormal Male indicating possible myocardial injury.   Clinicians would have to utilize clinical acumen, EKG,  "Troponin, and serial changes to determine if it is an Acute Myocardial Infarction or myocardial injury due to an underlying chronic condition.        CBC WITH AUTO DIFFERENTIAL - Abnormal; Notable for the following components:    Hemoglobin 11.5 (*)     MCV 98.3 (*)     MCHC 28.9 (*)     RDW 15.9 (*)     RDW-SD 57.8 (*)     Neutrophil % 76.3 (*)     Lymphocyte % 13.7 (*)     Eosinophil % 0.0 (*)     All other components within normal limits   BLOOD GAS, ARTERIAL W/CO-OXIMETRY - Abnormal; Notable for the following components:    pCO2, Arterial 56.1 (*)     pO2, Arterial 112.0 (*)     HCO3, Arterial 31.8 (*)     Base Excess, Arterial 5.2 (*)     Hemoglobin, Blood Gas 11.2 (*)     Hematocrit, Blood Gas 34.4 (*)     Methemoglobin 1.60 (*)     CO2 Content 33.5 (*)     pCO2, Temperature Corrected 56.1 (*)     pO2, Temperature Corrected 112 (*)     All other components within normal limits   COVID-19 AND FLU A/B, NP SWAB IN TRANSPORT MEDIA 1 HR TAT - Normal    Narrative:     Fact sheet for providers: https://www.fda.gov/media/559017/download    Fact sheet for patients: https://www.fda.gov/media/117357/download    Test performed by PCR.   PROCALCITONIN - Normal    Narrative:     As a Marker for Sepsis (Non-Neonates):    1. <0.5 ng/mL represents a low risk of severe sepsis and/or septic shock.  2. >2 ng/mL represents a high risk of severe sepsis and/or septic shock.    As a Marker for Lower Respiratory Tract Infections that require antibiotic therapy:    PCT on Admission    Antibiotic Therapy       6-12 Hrs later    >0.5                Strongly Recommended  >0.25 - <0.5        Recommended   0.1 - 0.25          Discouraged              Remeasure/reassess PCT  <0.1                Strongly Discouraged     Remeasure/reassess PCT    As 28 day mortality risk marker: \"Change in Procalcitonin Result\" (>80% or <=80%) if Day 0 (or Day 1) and Day 4 values are available. Refer to http://www.onkeas-pct-calculator.com    Change in PCT " <=80%  A decrease of PCT levels below or equal to 80% defines a positive change in PCT test result representing a higher risk for 28-day all-cause mortality of patients diagnosed with severe sepsis for septic shock.    Change in PCT >80%  A decrease of PCT levels of more than 80% defines a negative change in PCT result representing a lower risk for 28-day all-cause mortality of patients diagnosed with severe sepsis or septic shock.      RAINBOW DRAW    Narrative:     The following orders were created for panel order [place] Draw.  Procedure                               Abnormality         Status                     ---------                               -----------         ------                     Green Top (Gel)[[id number]]                                  Final result               Lavender Top[[id number]]                                                                Gold Top - SST[[id number]]                                   Final result               Bullard Top[[id number]]                                         In process                 Light Blue Top[[id number]]                                   Final result                 Please view results for these tests on the individual orders.   BLOOD GAS, ARTERIAL   CBC AND DIFFERENTIAL    Narrative:     The following orders were created for panel order CBC & Differential.  Procedure                               Abnormality         Status                     ---------                               -----------         ------                     CBC Auto Differential[[id number]]        Abnormal            Final result                 Please view results for these tests on the individual orders.   GREEN TOP   GOLD TOP - SST   LIGHT BLUE TOP   GRAY TOP       Meds Given in ED:   Medications   sodium chloride 0.9 % flush 10 mL (has no administration in time range)   ipratropium-albuterol (DUO-NEB) nebulizer solution 3 mL ( Nebulization Canceled Entry 1/6/24 7733)    doxycycline (VIBRAMYCIN) 100 mg in sodium chloride 0.9 % 100 mL IVPB (100 mg Intravenous New Bag 1/6/24 0729)   ipratropium-albuterol (DUO-NEB) nebulizer solution 3 mL (3 mL Nebulization Given 1/6/24 0715)   methylPREDNISolone sodium succinate (SOLU-Medrol) injection 125 mg (125 mg Intravenous Given 1/6/24 0729)

## 2024-01-07 ENCOUNTER — APPOINTMENT (OUTPATIENT)
Dept: GENERAL RADIOLOGY | Facility: HOSPITAL | Age: 74
End: 2024-01-07
Payer: MEDICARE

## 2024-01-07 PROBLEM — J96.21 ACUTE ON CHRONIC RESPIRATORY FAILURE WITH HYPOXIA: Status: ACTIVE | Noted: 2021-10-17

## 2024-01-07 PROBLEM — J44.1 COPD WITH ACUTE EXACERBATION: Status: ACTIVE | Noted: 2018-05-15

## 2024-01-07 LAB
ANION GAP SERPL CALCULATED.3IONS-SCNC: 7 MMOL/L (ref 5–15)
BASOPHILS # BLD AUTO: 0.02 10*3/MM3 (ref 0–0.2)
BASOPHILS NFR BLD AUTO: 0.4 % (ref 0–1.5)
BUN SERPL-MCNC: 19 MG/DL (ref 8–23)
BUN/CREAT SERPL: 21.8 (ref 7–25)
CALCIUM SPEC-SCNC: 8.6 MG/DL (ref 8.6–10.5)
CHLORIDE SERPL-SCNC: 102 MMOL/L (ref 98–107)
CO2 SERPL-SCNC: 30 MMOL/L (ref 22–29)
CREAT SERPL-MCNC: 0.87 MG/DL (ref 0.57–1)
DEPRECATED RDW RBC AUTO: 54.9 FL (ref 37–54)
EGFRCR SERPLBLD CKD-EPI 2021: 71.3 ML/MIN/1.73
EOSINOPHIL # BLD AUTO: 0 10*3/MM3 (ref 0–0.4)
EOSINOPHIL NFR BLD AUTO: 0 % (ref 0.3–6.2)
ERYTHROCYTE [DISTWIDTH] IN BLOOD BY AUTOMATED COUNT: 15.5 % (ref 12.3–15.4)
GLUCOSE SERPL-MCNC: 94 MG/DL (ref 65–99)
HBA1C MFR BLD: <4.2 % (ref 4.8–5.6)
HCT VFR BLD AUTO: 34.1 % (ref 34–46.6)
HGB BLD-MCNC: 10 G/DL (ref 12–15.9)
IMM GRANULOCYTES # BLD AUTO: 0.02 10*3/MM3 (ref 0–0.05)
IMM GRANULOCYTES NFR BLD AUTO: 0.4 % (ref 0–0.5)
LYMPHOCYTES # BLD AUTO: 0.31 10*3/MM3 (ref 0.7–3.1)
LYMPHOCYTES NFR BLD AUTO: 5.9 % (ref 19.6–45.3)
MCH RBC QN AUTO: 28 PG (ref 26.6–33)
MCHC RBC AUTO-ENTMCNC: 29.3 G/DL (ref 31.5–35.7)
MCV RBC AUTO: 95.5 FL (ref 79–97)
MONOCYTES # BLD AUTO: 0.14 10*3/MM3 (ref 0.1–0.9)
MONOCYTES NFR BLD AUTO: 2.7 % (ref 5–12)
NEUTROPHILS NFR BLD AUTO: 4.78 10*3/MM3 (ref 1.7–7)
NEUTROPHILS NFR BLD AUTO: 90.6 % (ref 42.7–76)
NRBC BLD AUTO-RTO: 0 /100 WBC (ref 0–0.2)
PLATELET # BLD AUTO: 120 10*3/MM3 (ref 140–450)
PMV BLD AUTO: 12.1 FL (ref 6–12)
POTASSIUM SERPL-SCNC: 4.5 MMOL/L (ref 3.5–5.2)
QT INTERVAL: 416 MS
QTC INTERVAL: 486 MS
RBC # BLD AUTO: 3.57 10*6/MM3 (ref 3.77–5.28)
SODIUM SERPL-SCNC: 139 MMOL/L (ref 136–145)
WBC NRBC COR # BLD AUTO: 5.27 10*3/MM3 (ref 3.4–10.8)

## 2024-01-07 PROCEDURE — 25010000002 METHYLPREDNISOLONE PER 125 MG: Performed by: INTERNAL MEDICINE

## 2024-01-07 PROCEDURE — 94799 UNLISTED PULMONARY SVC/PX: CPT

## 2024-01-07 PROCEDURE — 80048 BASIC METABOLIC PNL TOTAL CA: CPT | Performed by: INTERNAL MEDICINE

## 2024-01-07 PROCEDURE — 96376 TX/PRO/DX INJ SAME DRUG ADON: CPT

## 2024-01-07 PROCEDURE — 85025 COMPLETE CBC W/AUTO DIFF WBC: CPT | Performed by: INTERNAL MEDICINE

## 2024-01-07 PROCEDURE — 71045 X-RAY EXAM CHEST 1 VIEW: CPT

## 2024-01-07 PROCEDURE — 25010000002 CEFTRIAXONE PER 250 MG: Performed by: INTERNAL MEDICINE

## 2024-01-07 PROCEDURE — 97162 PT EVAL MOD COMPLEX 30 MIN: CPT

## 2024-01-07 PROCEDURE — 83036 HEMOGLOBIN GLYCOSYLATED A1C: CPT | Performed by: INTERNAL MEDICINE

## 2024-01-07 PROCEDURE — 25010000002 FUROSEMIDE PER 20 MG: Performed by: HOSPITALIST

## 2024-01-07 PROCEDURE — G0378 HOSPITAL OBSERVATION PER HR: HCPCS

## 2024-01-07 PROCEDURE — 94664 DEMO&/EVAL PT USE INHALER: CPT

## 2024-01-07 PROCEDURE — 96375 TX/PRO/DX INJ NEW DRUG ADDON: CPT

## 2024-01-07 RX ORDER — FUROSEMIDE 10 MG/ML
40 INJECTION INTRAMUSCULAR; INTRAVENOUS ONCE
Status: COMPLETED | OUTPATIENT
Start: 2024-01-07 | End: 2024-01-07

## 2024-01-07 RX ADMIN — Medication 10 ML: at 22:28

## 2024-01-07 RX ADMIN — FLECAINIDE ACETATE 50 MG: 50 TABLET ORAL at 22:26

## 2024-01-07 RX ADMIN — AMLODIPINE BESYLATE 5 MG: 5 TABLET ORAL at 09:13

## 2024-01-07 RX ADMIN — BUDESONIDE 0.5 MG: 0.5 INHALANT RESPIRATORY (INHALATION) at 19:59

## 2024-01-07 RX ADMIN — METHYLPREDNISOLONE SODIUM SUCCINATE 60 MG: 125 INJECTION INTRAMUSCULAR; INTRAVENOUS at 09:13

## 2024-01-07 RX ADMIN — IPRATROPIUM BROMIDE AND ALBUTEROL SULFATE 3 ML: 2.5; .5 SOLUTION RESPIRATORY (INHALATION) at 19:57

## 2024-01-07 RX ADMIN — FUROSEMIDE 40 MG: 10 INJECTION, SOLUTION INTRAMUSCULAR; INTRAVENOUS at 09:13

## 2024-01-07 RX ADMIN — METHYLPREDNISOLONE SODIUM SUCCINATE 60 MG: 125 INJECTION INTRAMUSCULAR; INTRAVENOUS at 22:28

## 2024-01-07 RX ADMIN — DOXEPIN HYDROCHLORIDE 50 MG: 50 CAPSULE ORAL at 22:27

## 2024-01-07 RX ADMIN — SODIUM CHLORIDE 1000 MG: 900 INJECTION INTRAVENOUS at 09:13

## 2024-01-07 RX ADMIN — FLECAINIDE ACETATE 50 MG: 50 TABLET ORAL at 09:13

## 2024-01-07 RX ADMIN — IPRATROPIUM BROMIDE AND ALBUTEROL SULFATE 3 ML: 2.5; .5 SOLUTION RESPIRATORY (INHALATION) at 10:40

## 2024-01-07 RX ADMIN — PANTOPRAZOLE SODIUM 40 MG: 40 TABLET, DELAYED RELEASE ORAL at 06:22

## 2024-01-07 RX ADMIN — IPRATROPIUM BROMIDE AND ALBUTEROL SULFATE 3 ML: 2.5; .5 SOLUTION RESPIRATORY (INHALATION) at 14:54

## 2024-01-07 RX ADMIN — BUDESONIDE 0.5 MG: 0.5 INHALANT RESPIRATORY (INHALATION) at 06:30

## 2024-01-07 RX ADMIN — DOXYCYCLINE 100 MG: 100 INJECTION, POWDER, LYOPHILIZED, FOR SOLUTION INTRAVENOUS at 09:22

## 2024-01-07 RX ADMIN — HYDROXYCHLOROQUINE SULFATE 200 MG: 200 TABLET ORAL at 09:13

## 2024-01-07 RX ADMIN — LEVOTHYROXINE SODIUM 25 MCG: 25 TABLET ORAL at 06:22

## 2024-01-07 RX ADMIN — LOSARTAN POTASSIUM 100 MG: 50 TABLET, FILM COATED ORAL at 09:13

## 2024-01-07 RX ADMIN — METOPROLOL TARTRATE 25 MG: 25 TABLET, FILM COATED ORAL at 09:13

## 2024-01-07 RX ADMIN — DOXYCYCLINE 100 MG: 100 INJECTION, POWDER, LYOPHILIZED, FOR SOLUTION INTRAVENOUS at 22:28

## 2024-01-07 RX ADMIN — RIVAROXABAN 15 MG: 15 TABLET, FILM COATED ORAL at 22:27

## 2024-01-07 RX ADMIN — METOPROLOL TARTRATE 25 MG: 25 TABLET, FILM COATED ORAL at 22:27

## 2024-01-07 RX ADMIN — IPRATROPIUM BROMIDE AND ALBUTEROL SULFATE 3 ML: 2.5; .5 SOLUTION RESPIRATORY (INHALATION) at 06:30

## 2024-01-07 NOTE — THERAPY EVALUATION
Patient Name: Liz Borjas  : 1950    MRN: [id number]                              Today's Date: 2024       Admit Date: 2024    Visit Dx:     ICD-10-CM ICD-9-CM   1. Acute on chronic respiratory failure with hypoxia and hypercapnia  J96.21 518.84    J96.22 786.09     799.02   2. Pneumonia of right upper lobe due to infectious organism  J18.9 486     Patient Active Problem List   Diagnosis    DDD (degenerative disc disease), lumbar    Spinal stenosis, lumbar region, with neurogenic claudication    Spinal stenosis of lumbar region with neurogenic claudication    Lumbar disc herniation with radiculopathy    Rheumatoid arthritis with positive rheumatoid factor    Anxiety and depression    COPD with acute exacerbation    Mild obesity    Physical deconditioning    Sepsis    Leukocytosis    Lactic acidosis    Hyponatremia    Acute on chronic respiratory failure with hypoxia    Chronic respiratory failure with hypoxia    Elevated d-dimer    Immunocompromised    PAF (paroxysmal atrial fibrillation)    Chronic anticoagulation    HTN (hypertension)    Interstitial lung disease    Stage 3a chronic kidney disease     Past Medical History:   Diagnosis Date    Arthritis     Atrial fibrillation     Closed right hip fracture     COVID     GERD (gastroesophageal reflux disease)     Gout     Hyperlipidemia     Hypertension     Osteoporosis     Rheumatoid arthritis     Wears dentures     upper    Wears glasses     reading     Past Surgical History:   Procedure Laterality Date    ANKLE SURGERY Right     CHOLECYSTECTOMY      COLONOSCOPY      5 years ago    HIP FRACTURE SURGERY      Right Hip with Pins    LUMBAR DISCECTOMY Left 10/12/2016    Procedure: lumbar MICROdiscectomy LEFT L3-5;  Surgeon: Chris Son MD;  [facility];  Service:     REPLACEMENT TOTAL KNEE      Right Knee    TOE AMPUTATION      right baby toe, left second toe      General Information       Row Name 24 8035          Physical  Therapy Time and Intention    Document Type evaluation  -KW     Mode of Treatment individual therapy;physical therapy  -KW       Row Name 01/07/24 1403          General Information    Patient Profile Reviewed yes  -KW     Prior Level of Function independent:;all household mobility;community mobility;gait;transfer;bed mobility  -KW     Existing Precautions/Restrictions fall;oxygen therapy device and L/min  6Lo2 chronically  -KW     Barriers to Rehab medically complex  -KW       Row Name 01/07/24 1403          Living Environment    People in Home alone  -KW       Row Name 01/07/24 1403          Home Main Entrance    Number of Stairs, Main Entrance none  -KW       Row Name 01/07/24 1403          Stairs Within Home, Primary    Number of Stairs, Within Home, Primary none  -KW       Row Name 01/07/24 1403          Cognition    Orientation Status (Cognition) oriented x 3  -KW       Row Name 01/07/24 1403          Safety Issues, Functional Mobility    Safety Issues Affecting Function (Mobility) awareness of need for assistance;insight into deficits/self-awareness  -KW     Impairments Affecting Function (Mobility) balance;endurance/activity tolerance;pain;shortness of breath;strength  -KW               User Key  (r) = Recorded By, (t) = Taken By, (c) = Cosigned By      Initials Name Provider Type    KW Marilu Chen, LUDY Physical Therapist                   Mobility       Row Name 01/07/24 1403          Sit-Stand Transfer    Sit-Stand [place] (Transfers) supervision  -KW     Assistive Device (Sit-Stand Transfers) walker, front-wheeled  -KW               User Key  (r) = Recorded By, (t) = Taken By, (c) = Cosigned By      Initials Name Provider Type    KW Marilu Chen PT Physical Therapist                   Obj/Interventions       Row Name 01/07/24 1403          Strength Comprehensive (MMT)    General Manual Muscle Testing (MMT) Assessment lower extremity strength deficits identified  -       Row Name  01/07/24 1403          Balance    Balance Assessment sitting static balance;sitting dynamic balance;sit to stand dynamic balance;standing static balance;standing dynamic balance  -KW     Static Sitting Balance independent  -KW     Dynamic Sitting Balance supervision  -KW     Position, Sitting Balance unsupported;sitting in chair  -KW     Static Standing Balance supervision  -KW     Dynamic Standing Balance contact guard  -KW     Position/Device Used, Standing Balance supported;walker, front-wheeled  -KW     Balance Interventions sitting;standing;sit to stand  -KW               User Key  (r) = Recorded By, (t) = Taken By, (c) = Cosigned By      Initials Name Provider Type    Marilu Reed, PT Physical Therapist                   Goals/Plan       Row Name 01/07/24 1403          Bed Mobility Goal 1 (PT)    Activity/Assistive Device (Bed Mobility Goal 1, PT) sit to supine;supine to sit  -KW     [place] Level/Cues Needed (Bed Mobility Goal 1, PT) independent  -KW     Time Frame (Bed Mobility Goal 1, PT) 10 days  -KW       Row Name 01/07/24 1403          Transfer Goal 1 (PT)    Activity/Assistive Device (Transfer Goal 1, PT) sit-to-stand/stand-to-sit;bed-to-chair/chair-to-bed  -KW     [place] Level/Cues Needed (Transfer Goal 1, PT) modified independence  -KW     Time Frame (Transfer Goal 1, PT) 10 days  -KW       Row Name 01/07/24 1403          Gait Training Goal 1 (PT)    Activity/Assistive Device (Gait Training Goal 1, PT) assistive device use;decrease fall risk;gait (walking locomotion);diminish gait deviation;improve balance and speed;increase endurance/gait distance;walker, rolling  -KW     [place] Level (Gait Training Goal 1, PT) modified independence  -KW     Distance (Gait Training Goal 1, PT) 100  -KW     Time Frame (Gait Training Goal 1, PT) 10 days  -KW               User Key  (r) = Recorded By, (t) = Taken By, (c) = Cosigned By      Initials Name Provider Type    JARAD Chen  Marilu, PT Physical Therapist                   Clinical Impression       Row Name 01/07/24 1403          Pain    Pretreatment Pain Rating 0/10 - no pain  -KW       Row Name 01/07/24 1403          Plan of Care Review    Plan of Care Reviewed With patient  -KW     Progress no change  -KW     Outcome Evaluation PT eval completed. Patient completed 2 sit to stands however with very poor activity tolerance. She desat to 62% after standing and required extended time to return to 83%. Patient left with RN. Patient presents below baseline for functional mobility. Patient demonstrates decreased activity tolerance, deconditioning and reduced dynamic balance. Patient would continue to benefit from skilled PT services to improve dynamic balance with gait, transfers strength, and activity tolerance training in order to build endurance and reduce risk of falls.  -KW       Row Name 01/07/24 1403          Therapy Assessment/Plan (PT)    Rehab Potential (PT) good, to achieve stated therapy goals  -KW     Criteria for Skilled Interventions Met (PT) yes;skilled treatment is necessary  -KW     Therapy Frequency (PT) daily  -KW       Row Name 01/07/24 1403          Vital Signs    Pre Systolic BP Rehab 139  -KW     Pre Treatment Diastolic BP 80  -KW     Pretreatment Heart Rate (beats/min) 73  -KW     Pre SpO2 (%) 92  -KW     Intra SpO2 (%) 62  -KW     Post SpO2 (%) 83  -KW       Row Name 01/07/24 1403          Positioning and Restraints    Pre-Treatment Position sitting in chair/recliner  -KW     Post Treatment Position chair  -KW     In Chair reclined;call light within reach;encouraged to call for assist;legs elevated;with nsg;with family/caregiver  -KW               User Key  (r) = Recorded By, (t) = Taken By, (c) = Cosigned By      Initials Name Provider Type    Marilu Reed, PT Physical Therapist                   Outcome Measures       Row Name 01/07/24 1403          How much help from another person do you currently  need...    Turning from your back to your side while in flat bed without using bedrails? 3  -KW     Moving from lying on back to sitting on the side of a flat bed without bedrails? 3  -KW     Moving to and from a bed to a chair (including a wheelchair)? 3  -KW     Standing up from a chair using your arms (e.g., wheelchair, bedside chair)? 3  -KW     Climbing 3-5 steps with a railing? 2  -KW     To walk in hospital room? 3  -KW     AM-PAC 6 Clicks Score (PT) 17  -KW     Highest Level of Mobility Goal 5 --> Static standing  -KW       Row Name 01/07/24 1406          Functional Assessment    Outcome Measure Options AM-PAC 6 Clicks Basic Mobility (PT)  -KW               User Key  (r) = Recorded By, (t) = Taken By, (c) = Cosigned By      Initials Name Provider Type    Marilu Reed, PT Physical Therapist                                   PT Recommendation and Plan     Plan of Care Reviewed With: patient  Progress: no change  Outcome Evaluation: PT eval completed. Patient completed 2 sit to stands however with very poor activity tolerance. She desat to 62% after standing and required extended time to return to 83%. Patient left with RN. Patient presents below baseline for functional mobility. Patient demonstrates decreased activity tolerance, deconditioning and reduced dynamic balance. Patient would continue to benefit from skilled PT services to improve dynamic balance with gait, transfers strength, and activity tolerance training in order to build endurance and reduce risk of falls.     Time Calculation:   PT Evaluation Complexity  History, PT Evaluation Complexity: 3 or more personal factors and/or comorbidities  Examination of Body Systems (PT Eval Complexity): total of 3 or more elements  Clinical Presentation (PT Evaluation Complexity): evolving  Clinical Decision Making (PT Evaluation Complexity): moderate complexity  Overall Complexity (PT Evaluation Complexity): moderate complexity     PT Charges        Row Name 01/07/24 1403             Time Calculation    Start Time 1403  -KW      PT Received On 01/07/24  -KW      PT Goal Re-Cert Due Date 01/17/24  -KW         Untimed Charges    PT Eval/Re-eval Minutes 52  -KW         Total Minutes    Untimed Charges Total Minutes 52  -KW       Total Minutes 52  -KW                User Key  (r) = Recorded By, (t) = Taken By, (c) = Cosigned By      Initials Name Provider Type    Marilu Reed, LUDY Physical Therapist                  Therapy Charges for Today       Code Description Service Date Service Provider Modifiers Qty    [id number] HC PT EVAL MOD COMPLEXITY 4 1/7/2024 Marilu Chen PT GP 1            PT G-Codes  Outcome Measure Options: AM-PAC 6 Clicks Basic Mobility (PT)  AM-PAC 6 Clicks Score (PT): 17  PT Discharge Summary  Anticipated Discharge Disposition (PT): inpatient rehabilitation facility    Marilu Chen PT  1/7/2024

## 2024-01-07 NOTE — PLAN OF CARE
Goal Outcome Evaluation:  Plan of Care Reviewed With: patient        Progress: no change  Outcome Evaluation: PT eval completed. Patient completed 2 sit to stands however with very poor activity tolerance. She desat to 62% after standing and required extended time to return to 83%. Patient left with RN. Patient presents below baseline for functional mobility. Patient demonstrates decreased activity tolerance, deconditioning and reduced dynamic balance. Patient would continue to benefit from skilled PT services to improve dynamic balance with gait, transfers strength, and activity tolerance training in order to build endurance and reduce risk of falls.      Anticipated Discharge Disposition (PT): inpatient rehabilitation facility

## 2024-01-07 NOTE — PLAN OF CARE
Goal Outcome Evaluation:  Plan of Care Reviewed With: patient        Progress: no change       A&Ox4. Placed on high flow nasal cannula for low oxygen sats. No c/o pain/SOA. Fall and skin interventions in place.

## 2024-01-07 NOTE — PROGRESS NOTES
"                  Clinical Nutrition   Nutrition Support Assessment  Reason for Visit: Identified at risk by screening criteria, MST score 2+      Patient Name: Liz Borjas  YOB: 1950  MRN: [id number]  Date of Encounter: 01/07/24 13:10 EST  Admission date: 1/6/2024    Comments:  +MSA/non-severe/chronic     Nutrition Assessment   Admission Diagnosis:  COPD (chronic obstructive pulmonary disease) [J44.9]    Problem List:    Acute on chronic respiratory failure with hypoxia    Rheumatoid arthritis with positive rheumatoid factor    COPD with acute exacerbation    PAF (paroxysmal atrial fibrillation)    Interstitial lung disease    Stage 3a chronic kidney disease      PMH:   She  has a past medical history of Arthritis, Atrial fibrillation, Closed right hip fracture, COVID, GERD (gastroesophageal reflux disease), Gout, Hyperlipidemia, Hypertension, Osteoporosis, Rheumatoid arthritis, Wears dentures, and Wears glasses.    PSH:  She  has a past surgical history that includes Cholecystectomy; Toe amputation; Colonoscopy; Lumbar discectomy (Left, 10/12/2016); Replacement total knee; Hip fracture surgery; and Ankle surgery (Right).    Applicable Nutrition Concerns:   Skin:  Oral:  GI:    Applicable Interval History:         Reported/Observed/Food/Nutrition Related History:   1/7  Patient reports her UBW ~ 125lb.   Bed weight with admission is 108lb , she is not sure if that is accurate, she does not feel she lost that much weight. Patient sitting in bedside chair at time of visit, RD not able to obtain weight.  Reports her appetite typically goes down she she is sick and comes back.  Her daughter in law has been giving her prier protein supplement to drink to help protein needs. Advised patient to mix that with Boost for caloric support.     Patient reports he had COVID x 7 times in the past 3 years.     Anthropometrics     Flowsheet Rows      Flowsheet Row First Filed Value   Admission Height 157.5 cm (62\") " "Documented at 01/06/2024 0533   Admission Weight 55.8 kg (123 lb) Documented at 01/06/2024 0533     Height: Height: 157.5 cm (62\")  Last Filed Weight: Weight: 49 kg (108 lb) (01/06/24 1557)  Method: Weight Method: Bed scale  BMI: BMI (Calculated): 19.7  BMI classification: Normal: 18.5-24.9kg/m2  IBW:  110lb    UBW:     Weight       Weight (kg) Weight (lbs) Weight Method Visit Report   11/27/2021 62.143 kg  137 lb  Bed scale      65.998 kg  145 lb 8 oz       65.998 kg  145 lb 8 oz      11/28/2021 65.998 kg  145 lb 8 oz      11/29/2021 63.73 kg  140 lb 8 oz  Bed scale     12/6/2021 61.145 kg  134 lb 12.8 oz   --    12/20/2021 63.504 kg  140 lb   --    1/14/2022 61.236 kg  135 lb  Stated     2/21/2022 51.256 kg  113 lb   --    5/24/2022 51.256 kg  113 lb   --    7/21/2022 53.524 kg  118 lb   --    1/16/2023 58.06 kg  128 lb   --    7/17/2023 58.514 kg  129 lb   --    1/6/2024 55.792 kg  123 lb  Bed scale      49.034 kg  108 lb 1.6 oz       48.988 kg  108 lb        Weight change:   patient reports she has not weight 140lb for several years.  She has been ~ 120-123lb for past couple of years.        Nutrition Focused Physical Exam     Date:   1/7      NFPE completed, Patient meets criteria for malnutrition diagnosis, see MSA note.    Current Nutrition Prescription   PO: Diet: Regular/House Diet; Texture: Regular Texture (IDDSI 7); Fluid Consistency: Thin (IDDSI 0)  Oral Nutrition Supplement:   Intake: Insufficient data      Nutrition Diagnosis   Date:  1/7            Updated:    Problem Malnutrition chronic. Non-severe    Etiology COPD    Signs/Symptoms Intake < needs; weight loss; muscle and fat wasting.    Status:     Date:     1/7  Updated:     Problem Predicted suboptimal energy intake   Etiology Usually has poor appetite when ill    Signs/Symptoms Reported per patient    Status:     Goal:   General: Nutrition support treatment  PO: Establish PO  EN/PN: N/A    Nutrition Intervention      Follow treatment progress, " Care plan reviewed, Interview for preferences, Encourage intake, Supplement provided    Based on patient recent wt loss and poor PO intake, patient meets criteria for severe malnutrition r/t acute illness/injury.  Nutrition Focused Physical Exam completed to determine fat and muscle wasting.  MD please attest and include in pt diagnosis as you deem appropriate.        Boost plus with lunch and dinner (vanilla)       Monitoring/Evaluation:   Per protocol, I&O, PO intake, Supplement intake, Pertinent labs      Katherine Isabel RD  Time Spent: 30min

## 2024-01-07 NOTE — PROGRESS NOTES
[facility] Medicine Services  PROGRESS NOTE    Patient Name: Liz Borjas  : 1950  MRN: [id number]    Date of Admission: 2024  Primary Care Physician: Valerie Sesay APRN    Subjective   Subjective     CC:  F/U SOA    HPI:  Seen this morning. No major complaints, says she is feeling better. Could not tolerate the BiPAP very long yesterday, currently on HFNC. She hopes to get off this soon. Reports dry cough, no sputum production at this time.       Objective   Objective     Vital Signs:   Temp:  [97.7 °F (36.5 °C)-98.1 °F (36.7 °C)] 98 °F (36.7 °C)  Heart Rate:  [61-82] 67  Resp:  [20-24] 20  BP: (128-161)/(78-83) 161/80  Flow (L/min):  [6-40] 40     Physical Exam:  Gen-no acute distress, chronically ill appearing  HENT-NCAT, mucous membranes moist  CV-RRR, S1 S2 normal, no m/r/g  Resp-coarse breath sounds bilaterally, no wheezes appreciated, nonlabored currently on HFNC  Abd-soft, NT, ND, +BS  Ext-no edema  Neuro-A&Ox3, no focal deficits  Skin-no rashes  Psych-appropriate mood      Results Reviewed:  LAB RESULTS:      Lab 24  0550 24  0546   WBC 5.27 8.96   HEMOGLOBIN 10.0* 11.5*   HEMATOCRIT 34.1 39.8   PLATELETS 120* 166   NEUTROS ABS 4.78 6.83   IMMATURE GRANS (ABS) 0.02 0.02   LYMPHS ABS 0.31* 1.23   MONOS ABS 0.14 0.85   EOS ABS 0.00 0.00   MCV 95.5 98.3*   PROCALCITONIN  --  0.09         Lab 24  0550 24  0546   SODIUM 139 139   POTASSIUM 4.5 3.8   CHLORIDE 102 98   CO2 30.0* 32.0*   ANION GAP 7.0 9.0   BUN 19 18   CREATININE 0.87 1.03*   EGFR 71.3 58.3*   GLUCOSE 94 93   CALCIUM 8.6 9.1   HEMOGLOBIN A1C <4.20*  --          Lab 24  0546   TOTAL PROTEIN 6.9   ALBUMIN 3.5   GLOBULIN 3.4   ALT (SGPT) 13   AST (SGOT) 20   BILIRUBIN 0.4   ALK PHOS 271*         Lab 24  0546   PROBNP 3,306.0*   HSTROP T 20*                 Lab 24  0559   PH, ARTERIAL 7.362   PCO2, ARTERIAL 56.1*   PO2 .0*   FIO2 80   HCO3 ART 31.8*   BASE  EXCESS ART 5.2*   CARBOXYHEMOGLOBIN 0.7     Brief Urine Lab Results       None            Microbiology Results Abnormal       Procedure Component Value - Date/Time    COVID PRE-OP / PRE-PROCEDURE SCREENING ORDER (NO ISOLATION) - Swab, Nasopharynx [[id number]]  (Normal) Collected: 01/06/24 0552    Lab Status: Final result Specimen: Swab from Nasopharynx Updated: 01/06/24 1111    Narrative:      The following orders were created for panel order COVID PRE-OP / PRE-PROCEDURE SCREENING ORDER (NO ISOLATION) - Swab, Nasopharynx.  Procedure                               Abnormality         Status                     ---------                               -----------         ------                     Respiratory Panel PCR w/...[[id number]]  Normal              Final result                 Please view results for these tests on the individual orders.    Respiratory Panel PCR w/COVID-19(SARS-CoV-2) ALFREDO/MEGAN/MARIA FERNANDA/PAD/COR/MONIKA In-House, NP Swab in UTM/VTM, 2 HR TAT - Swab, Nasopharynx [[id number]]  (Normal) Collected: 01/06/24 0552    Lab Status: Final result Specimen: Swab from Nasopharynx Updated: 01/06/24 1111     ADENOVIRUS, PCR Not Detected     Coronavirus 229E Not Detected     Coronavirus HKU1 Not Detected     Coronavirus NL63 Not Detected     Coronavirus OC43 Not Detected     COVID19 Not Detected     Human Metapneumovirus Not Detected     Human Rhinovirus/Enterovirus Not Detected     Influenza A PCR Not Detected     Influenza B PCR Not Detected     Parainfluenza Virus 1 Not Detected     Parainfluenza Virus 2 Not Detected     Parainfluenza Virus 3 Not Detected     Parainfluenza Virus 4 Not Detected     RSV, PCR Not Detected     Bordetella pertussis pcr Not Detected     Bordetella parapertussis PCR Not Detected     Chlamydophila pneumoniae PCR Not Detected     Mycoplasma pneumo by PCR Not Detected    Narrative:      In the setting of a positive respiratory panel with a viral infection PLUS a negative procalcitonin without other  underlying concern for bacterial infection, consider observing off antibiotics or discontinuation of antibiotics and continue supportive care. If the respiratory panel is positive for atypical bacterial infection (Bordetella pertussis, Chlamydophila pneumoniae, or Mycoplasma pneumoniae), consider antibiotic de-escalation to target atypical bacterial infection.    COVID-19 and FLU A/B PCR, 1 HR TAT - Swab, Nasopharynx [[id number]]  (Normal) Collected: 01/06/24 0552    Lab Status: Final result Specimen: Swab from Nasopharynx Updated: 01/06/24 0628     COVID19 Not Detected     Influenza A PCR Not Detected     Influenza B PCR Not Detected    Narrative:      Fact sheet for providers: https://www.fda.gov/media/652438/download    Fact sheet for patients: https://www.fda.gov/media/579995/download    Test performed by PCR.            XR Chest 1 View    Result Date: 1/7/2024  XR CHEST 1 VW Date of Exam: 1/7/2024 4:50 AM EST Indication: possible pneumonia, pulmonary fibrosis Comparison: 1/6/2024 Findings: What initially resembles a right IJ catheter actually appears to represent a type of sheet or clothing fold or other external shadow superimposed on the right neck. A similar but much thinner linear fold like density is seen here on yesterday's study. No  definite central line is identified. Note is again made of tracheal deviation to the right without narrowing, likely as a result of underlying scarring/fibrosis. Extensive changes of fibrosis are again seen as on the prior radiograph and earlier chest CT scans. Compared to the most recent 1/6/2024 study, little interval change is seen. There does appear to be slightly denser disease in the right upper lung, which could potentially represent a superimposed pneumonia. No effusion or pneumothorax is seen.     Impression: Impression: 1. Probable skin or clothing fold shadow or apparatus shadow superimposed over the right neck, as noted. 2. Extensive pulmonary fibrosis, possibly  with developing right upper lobe pneumonia. Electronically Signed: Cory Richter MD  1/7/2024 8:27 AM EST  Workstation ID: [id number]    Adult Transthoracic Echo Complete W/ Cont if Necessary Per Protocol    Result Date: 1/6/2024    Left ventricular ejection fraction appears to be 66 - 70%.   Left ventricular wall thickness is consistent with mild septal asymmetric hypertrophy.   Left ventricular diastolic function is consistent with (grade I) impaired relaxation.   The right ventricular cavity is mildly dilated.   The left atrial cavity is mildly dilated.   Left atrial volume is moderately increased.   The right atrial cavity is borderline dilated.   Moderate tricuspid valve regurgitation is present.   Estimated right ventricular systolic pressure from tricuspid regurgitation is markedly elevated (>55 mmHg).     XR Chest 1 View    Result Date: 1/6/2024  XR CHEST 1 VW Date of Exam: 1/6/2024 5:42 AM EST Indication: SOA triage protocol Comparison: 7/18/2022, 1/14/2022, 1/15/2022. Findings: Extensive chronic interstitial changes and scarring with groundglass changes present. Suspected superimposed mild patchy airspace disease seen within the subpleural right upper lobe.. No pleural fluid. No pneumothorax. The pulmonary vasculature appears within normal limits. The cardiac and mediastinal silhouette appear unremarkable. No acute osseous abnormality identified.     Impression: Impression: Extensive chronic interstitial changes and scarring with suspected superimposed mild patchy airspace disease present within the subpleural right upper lobe, likely related to mild pneumonia. Electronically Signed: Vira Sanchez MD  1/6/2024 5:56 AM EST  Workstation ID: [id number]     Results for orders placed during the hospital encounter of 01/06/24    Adult Transthoracic Echo Complete W/ Cont if Necessary Per Protocol    Interpretation Summary    Left ventricular ejection fraction appears to be 66 - 70%.    Left ventricular wall thickness is  consistent with mild septal asymmetric hypertrophy.    Left ventricular diastolic function is consistent with (grade I) impaired relaxation.    The right ventricular cavity is mildly dilated.    The left atrial cavity is mildly dilated.    Left atrial volume is moderately increased.    The right atrial cavity is borderline dilated.    Moderate tricuspid valve regurgitation is present.    Estimated right ventricular systolic pressure from tricuspid regurgitation is markedly elevated (>55 mmHg).      Current medications:  Scheduled Meds:amLODIPine, 5 mg, Oral, Q24H  budesonide, 0.5 mg, Nebulization, BID - RT  cefTRIAXone, 1,000 mg, Intravenous, Q24H  doxepin, 50 mg, Oral, Nightly  doxycycline, 100 mg, Intravenous, Q12H  flecainide, 50 mg, Oral, BID  FLUoxetine, 40 mg, Oral, Daily  fluticasone, 2 spray, Each Nare, Daily  hydroxychloroquine, 200 mg, Oral, Q24H  ipratropium-albuterol, 3 mL, Nebulization, Once  ipratropium-albuterol, 3 mL, Nebulization, Q4H - RT  levothyroxine, 25 mcg, Oral, Q AM  losartan, 100 mg, Oral, Daily  methylPREDNISolone sodium succinate, 60 mg, Intravenous, Q12H  metoprolol tartrate, 25 mg, Oral, BID  pantoprazole, 40 mg, Oral, Q AM  pharmacy consult - MTM, , Does not apply, Daily  rivaroxaban, 15 mg, Oral, Daily With Dinner  senna-docusate sodium, 2 tablet, Oral, BID  sodium chloride, 10 mL, Intravenous, Q12H  [START ON 1/8/2024] sulfaSALAzine, 500 mg, Oral, 2 times per day on Monday Wednesday Friday      Continuous Infusions:   PRN Meds:.  acetaminophen **OR** acetaminophen **OR** acetaminophen    senna-docusate sodium **AND** polyethylene glycol **AND** bisacodyl **AND** bisacodyl    ondansetron    sodium chloride    sodium chloride    sodium chloride    traMADol    Assessment & Plan   Assessment & Plan     Active Hospital Problems    Diagnosis  POA    **Acute on chronic respiratory failure with hypoxia [J96.21]  Yes    Stage 3a chronic kidney disease [N18.31]  Yes    Interstitial lung  disease [J84.9]  Yes    PAF (paroxysmal atrial fibrillation) [I48.0]  Yes    COPD with acute exacerbation [J44.1]  Yes    Rheumatoid arthritis with positive rheumatoid factor [M05.9]  Yes      Resolved Hospital Problems   No resolved problems to display.        Brief Hospital Course to date:  Liz Borjas is a 73 y.o. female with history of COPD, chronic hypoxic respiratory failure on 6 liters home oxygen, pulmonary fibrosis, RA on chronic prednisone (20 mg daily), HTN, Afib on Xarelto, CKD 3, IBS, and ROPER cirrhosis who presents with progressive shortness of breath and generalized weakness for the past several days. She reports having COVID-19 just after Thanksgiving, and then subsequently had Influenza A - was treated for URI with antibiotics (IM and oral) by her PCP (data deficit).    This patient's problems and plans were partially entered by my partner and updated as appropriate by me 01/07/24. Copied text in this note has been reviewed and is accurate as of today's date.     Acute on chronic hypoxic respiratory failure  COPD with acute exacerbation  Pulmonary fibrosis/interstitial lung disease  Right upper lobe pneumonia  Recent COVID-19 and Flu A  Pulmonary hypertension  --Patient reports recent COVID and Flu infections right after Thanksgiving; full respiratory PCR panel here is negative  --CXR on admission showed extensive chronic interstitial changes with suspected superimposed patchy airspace disease present within RUL, repeat CXR this morning again shows possibility of developing RUL pneumonia  --WBC and procal WNL, afebrile  --F/U blood cultures  --Continue Rocephin, Doxycycline  --Continue IV Solumedrol 60 mg Q12H  --Scheduled and PRN duonebs, budesonide  --proBNP elevated, Echo showing EF 66-70%, grade I diastolic dysfunction, moderate TR, markedly elevated RVSP >55 mmHg: will administer 40 mg IV Lasix x 1 today and assess response as do suspect some fluid component   --s/p BiPAP (could not  tolerate), now on HFNC -- wean back to baseline as tolerated; she had been on 4 liters back in July 2023 which is the last time she saw Pulmonology in the office, now most recent baseline has been 6 liters  --Consider CT chest and/or Pulmonology consult if no significant improvement     RA  --Resume home steroids when treatment for COPD complete     Atrial fibrillation  --Continue Xarelto, Flecainide, Metoprolol  --Follows with Dr. Garcia    HTN  --Continue Norvasc, Losartan, Metoprolol     CKD 3  --Baseline Cr ~1.0-1.1  --Stable, monitor    ROPER cirrhosis  --Followed at , last seen on 10/27/23 by Lupis WELSH     Generalized weakness  --PT/OT    Expected Discharge Location and Transportation: home vs rehab  Expected Discharge   Expected Discharge Date: 1/10/2024; Expected Discharge Time:      DVT prophylaxis:  Medical DVT prophylaxis orders are present.     AM-PAC 6 Clicks Score (PT): 17 (01/06/24 2000)    CODE STATUS:   Code Status and Medical Interventions:   Ordered at: 01/06/24 0832     Level Of Support Discussed With:    Patient    Next of Kin (If No Surrogate)     Code Status (Patient has no pulse and is not breathing):    CPR (Attempt to Resuscitate)     Medical Interventions (Patient has pulse or is breathing):    Full Support       Nirali Segura MD  01/07/24

## 2024-01-08 PROBLEM — J44.9 COPD (CHRONIC OBSTRUCTIVE PULMONARY DISEASE): Status: ACTIVE | Noted: 2024-01-08

## 2024-01-08 LAB
ANION GAP SERPL CALCULATED.3IONS-SCNC: 9 MMOL/L (ref 5–15)
BUN SERPL-MCNC: 29 MG/DL (ref 8–23)
BUN/CREAT SERPL: 25.9 (ref 7–25)
CALCIUM SPEC-SCNC: 8.8 MG/DL (ref 8.6–10.5)
CHLORIDE SERPL-SCNC: 101 MMOL/L (ref 98–107)
CO2 SERPL-SCNC: 31 MMOL/L (ref 22–29)
CREAT SERPL-MCNC: 1.12 MG/DL (ref 0.57–1)
DEPRECATED RDW RBC AUTO: 55.7 FL (ref 37–54)
EGFRCR SERPLBLD CKD-EPI 2021: 52.7 ML/MIN/1.73
ERYTHROCYTE [DISTWIDTH] IN BLOOD BY AUTOMATED COUNT: 15.8 % (ref 12.3–15.4)
GLUCOSE SERPL-MCNC: 110 MG/DL (ref 65–99)
HCT VFR BLD AUTO: 37.9 % (ref 34–46.6)
HGB BLD-MCNC: 11.1 G/DL (ref 12–15.9)
MCH RBC QN AUTO: 28.2 PG (ref 26.6–33)
MCHC RBC AUTO-ENTMCNC: 29.3 G/DL (ref 31.5–35.7)
MCV RBC AUTO: 96.2 FL (ref 79–97)
PLATELET # BLD AUTO: 132 10*3/MM3 (ref 140–450)
PMV BLD AUTO: 12.1 FL (ref 6–12)
POTASSIUM SERPL-SCNC: 4.3 MMOL/L (ref 3.5–5.2)
RBC # BLD AUTO: 3.94 10*6/MM3 (ref 3.77–5.28)
SODIUM SERPL-SCNC: 141 MMOL/L (ref 136–145)
WBC NRBC COR # BLD AUTO: 5.54 10*3/MM3 (ref 3.4–10.8)

## 2024-01-08 PROCEDURE — 96365 THER/PROPH/DIAG IV INF INIT: CPT

## 2024-01-08 PROCEDURE — 96368 THER/DIAG CONCURRENT INF: CPT

## 2024-01-08 PROCEDURE — 93005 ELECTROCARDIOGRAM TRACING: CPT | Performed by: NURSE PRACTITIONER

## 2024-01-08 PROCEDURE — 94799 UNLISTED PULMONARY SVC/PX: CPT

## 2024-01-08 PROCEDURE — 94664 DEMO&/EVAL PT USE INHALER: CPT

## 2024-01-08 PROCEDURE — G0378 HOSPITAL OBSERVATION PER HR: HCPCS

## 2024-01-08 PROCEDURE — 97166 OT EVAL MOD COMPLEX 45 MIN: CPT

## 2024-01-08 PROCEDURE — 25010000002 CEFTRIAXONE PER 250 MG: Performed by: INTERNAL MEDICINE

## 2024-01-08 PROCEDURE — 80048 BASIC METABOLIC PNL TOTAL CA: CPT | Performed by: HOSPITALIST

## 2024-01-08 PROCEDURE — 85027 COMPLETE CBC AUTOMATED: CPT | Performed by: HOSPITALIST

## 2024-01-08 PROCEDURE — 96376 TX/PRO/DX INJ SAME DRUG ADON: CPT

## 2024-01-08 PROCEDURE — 25010000002 METHYLPREDNISOLONE PER 125 MG: Performed by: INTERNAL MEDICINE

## 2024-01-08 PROCEDURE — 25010000002 METHYLPREDNISOLONE PER 40 MG: Performed by: HOSPITALIST

## 2024-01-08 RX ORDER — METHYLPREDNISOLONE SODIUM SUCCINATE 40 MG/ML
40 INJECTION, POWDER, LYOPHILIZED, FOR SOLUTION INTRAMUSCULAR; INTRAVENOUS EVERY 12 HOURS
Status: DISCONTINUED | OUTPATIENT
Start: 2024-01-08 | End: 2024-01-09 | Stop reason: HOSPADM

## 2024-01-08 RX ADMIN — TRAMADOL HYDROCHLORIDE 50 MG: 50 TABLET, COATED ORAL at 23:24

## 2024-01-08 RX ADMIN — FLUOXETINE HYDROCHLORIDE 40 MG: 20 CAPSULE ORAL at 08:10

## 2024-01-08 RX ADMIN — IPRATROPIUM BROMIDE AND ALBUTEROL SULFATE 3 ML: 2.5; .5 SOLUTION RESPIRATORY (INHALATION) at 23:16

## 2024-01-08 RX ADMIN — DOXEPIN HYDROCHLORIDE 50 MG: 50 CAPSULE ORAL at 23:23

## 2024-01-08 RX ADMIN — IPRATROPIUM BROMIDE AND ALBUTEROL SULFATE 3 ML: 2.5; .5 SOLUTION RESPIRATORY (INHALATION) at 20:42

## 2024-01-08 RX ADMIN — LEVOTHYROXINE SODIUM 25 MCG: 25 TABLET ORAL at 06:17

## 2024-01-08 RX ADMIN — HYDROXYCHLOROQUINE SULFATE 200 MG: 200 TABLET ORAL at 08:10

## 2024-01-08 RX ADMIN — BUDESONIDE 0.5 MG: 0.5 INHALANT RESPIRATORY (INHALATION) at 07:06

## 2024-01-08 RX ADMIN — SENNOSIDES AND DOCUSATE SODIUM 2 TABLET: 8.6; 5 TABLET ORAL at 08:10

## 2024-01-08 RX ADMIN — METHYLPREDNISOLONE SODIUM SUCCINATE 40 MG: 40 INJECTION, POWDER, FOR SOLUTION INTRAMUSCULAR; INTRAVENOUS at 23:25

## 2024-01-08 RX ADMIN — SODIUM CHLORIDE 1000 MG: 900 INJECTION INTRAVENOUS at 08:11

## 2024-01-08 RX ADMIN — FLECAINIDE ACETATE 50 MG: 50 TABLET ORAL at 23:24

## 2024-01-08 RX ADMIN — METOPROLOL TARTRATE 25 MG: 25 TABLET, FILM COATED ORAL at 08:10

## 2024-01-08 RX ADMIN — SULFASALAZINE 500 MG: 500 TABLET ORAL at 08:10

## 2024-01-08 RX ADMIN — IPRATROPIUM BROMIDE AND ALBUTEROL SULFATE 3 ML: 2.5; .5 SOLUTION RESPIRATORY (INHALATION) at 02:57

## 2024-01-08 RX ADMIN — IPRATROPIUM BROMIDE AND ALBUTEROL SULFATE 3 ML: 2.5; .5 SOLUTION RESPIRATORY (INHALATION) at 10:30

## 2024-01-08 RX ADMIN — AMLODIPINE BESYLATE 5 MG: 5 TABLET ORAL at 08:10

## 2024-01-08 RX ADMIN — RIVAROXABAN 15 MG: 15 TABLET, FILM COATED ORAL at 17:13

## 2024-01-08 RX ADMIN — LOSARTAN POTASSIUM 100 MG: 50 TABLET, FILM COATED ORAL at 08:09

## 2024-01-08 RX ADMIN — FLECAINIDE ACETATE 50 MG: 50 TABLET ORAL at 08:09

## 2024-01-08 RX ADMIN — IPRATROPIUM BROMIDE AND ALBUTEROL SULFATE 3 ML: 2.5; .5 SOLUTION RESPIRATORY (INHALATION) at 00:28

## 2024-01-08 RX ADMIN — BUDESONIDE 0.5 MG: 0.5 INHALANT RESPIRATORY (INHALATION) at 20:43

## 2024-01-08 RX ADMIN — IPRATROPIUM BROMIDE AND ALBUTEROL SULFATE 3 ML: 2.5; .5 SOLUTION RESPIRATORY (INHALATION) at 16:01

## 2024-01-08 RX ADMIN — Medication 10 ML: at 08:12

## 2024-01-08 RX ADMIN — DOXYCYCLINE 100 MG: 100 INJECTION, POWDER, LYOPHILIZED, FOR SOLUTION INTRAVENOUS at 23:24

## 2024-01-08 RX ADMIN — PANTOPRAZOLE SODIUM 40 MG: 40 TABLET, DELAYED RELEASE ORAL at 06:17

## 2024-01-08 RX ADMIN — SULFASALAZINE 500 MG: 500 TABLET ORAL at 23:23

## 2024-01-08 RX ADMIN — METHYLPREDNISOLONE SODIUM SUCCINATE 60 MG: 125 INJECTION INTRAMUSCULAR; INTRAVENOUS at 08:12

## 2024-01-08 RX ADMIN — IPRATROPIUM BROMIDE AND ALBUTEROL SULFATE 3 ML: 2.5; .5 SOLUTION RESPIRATORY (INHALATION) at 07:06

## 2024-01-08 RX ADMIN — METOPROLOL TARTRATE 25 MG: 25 TABLET, FILM COATED ORAL at 23:23

## 2024-01-08 RX ADMIN — FLUTICASONE PROPIONATE 2 SPRAY: 50 SPRAY, METERED NASAL at 08:11

## 2024-01-08 RX ADMIN — DOXYCYCLINE 100 MG: 100 INJECTION, POWDER, LYOPHILIZED, FOR SOLUTION INTRAVENOUS at 08:12

## 2024-01-08 NOTE — PLAN OF CARE
Goal Outcome Evaluation:  Plan of Care Reviewed With: patient        Progress: no change          A&Ox4. VSS on 6L per NC. No c/o pain or SOA. No acute events overnight.

## 2024-01-08 NOTE — CONSULTS
Referring Provider: MD Colton  Reason for Consultation: AoCRF    Subjective .   Education: NN spoke with pt at BS.  Pt alert and able to answer questions appropriately.  Pt O2 sat  94% on 5L currently, home O2 use  6L.  Pt reports the ability to ambulate  ~10-15 feet at baseline before experiencing SOB.  Pt states use of rescue medication once weekly, relief of SOB within ~2 mins.  Patient is up to date on flu and PNA vaccines.  Former smoker, quit date unclear.  Pt reports no issues at this time with medications or transportation for appointments.  Pt reports previous hx of formal COPD teaching, no understanding of action plan, or [place].  COPD education reviewed in the form of explanation, handouts, and videos.  No new concerns or questions voiced at this time.  NN will continue to follow as needed.     Age: 73 y.o.  Sex: female  Smoker Status: former, pack years unclear  Pulmonologist: LUTHER  FEV1 (PFT): 51% (1/16/2023)  Home O2: 6L    Objective     SpO2 SpO2: 96 % (01/08/24 1030)  Device Device (Oxygen Therapy): humidified, nasal cannula (01/08/24 1030)  Flow Flow (L/min): 6 (wears 6 at home) (01/08/24 1030)  Incentive Spirometer    IS Predicted Level (mL)     Number of Repetitions     Level Incentive Spirometer (mL)    Patient Tolerance     Inhaler Treatment Status    Treatment Route        Home Medications:  Medications Prior to Admission   Medication Sig Dispense Refill Last Dose    amLODIPine (NORVASC) 5 MG tablet    1/5/2024    Cetirizine HCl (ZyrTEC Allergy) 10 MG capsule Take 10 mg by mouth Daily As Needed.   1/5/2024    dicyclomine (BENTYL) 10 MG capsule Take 1 capsule by mouth 3 (Three) Times a Day.   1/5/2024    flecainide (TAMBOCOR) 50 MG tablet Take 1 tablet by mouth 2 (Two) Times a Day.   1/5/2024    FLUoxetine (PROzac) 40 MG capsule Take 1 capsule by mouth Daily.   1/5/2024    hydroxychloroquine (PLAQUENIL) 200 MG tablet    1/5/2024    levothyroxine (SYNTHROID, LEVOTHROID) 25 MCG tablet Take 1 tablet  by mouth Every Morning. .025 mg   1/5/2024    losartan (COZAAR) 100 MG tablet Take 1 tablet by mouth Daily.   1/5/2024    metoprolol tartrate (LOPRESSOR) 25 MG tablet Take 1 tablet by mouth 2 (Two) Times a Day.   1/5/2024    predniSONE (DELTASONE) 20 MG tablet Take 1 tablet by mouth Daily. 90 tablet 0 1/5/2024    RABEprazole (ACIPHEX) 20 MG EC tablet Take 1 tablet by mouth 2 (Two) Times a Day.   1/5/2024    rivaroxaban (XARELTO) 15 MG tablet Take 1 tablet by mouth Daily With Dinner. Indications: Atrial Fibrillation 30 tablet 0 1/5/2024    sulfaSALAzine (AZULFIDINE) 500 MG EC tablet Take 1 tablet by mouth 2 (Two) Times a Day.  3 times per week MWF   1/5/2024    traMADol (ULTRAM) 50 MG tablet Take 1 tablet by mouth Every 6 (Six) Hours As Needed for Moderate Pain.   1/5/2024    albuterol (ACCUNEB) 1.25 MG/3ML nebulizer solution Take 3 mL by nebulization Every 6 (Six) Hours As Needed for Wheezing. 120 each 0     albuterol sulfate  (90 Base) MCG/ACT inhaler Inhale 2 puffs Every 4 (Four) Hours As Needed for Wheezing or Shortness of Air. 8.5 g 0     allopurinol (ZYLOPRIM) 300 MG tablet Take 1 tablet by mouth Daily. (Patient not taking: Reported on 1/6/2024)   Not Taking    atenolol (TENORMIN) 100 MG tablet Take 1 tablet by mouth Daily. (Patient not taking: Reported on 1/6/2024)   Not Taking    baclofen (LIORESAL) 10 MG tablet Take 1 tablet by mouth Every 12 (Twelve) Hours As Needed. (Patient not taking: Reported on 1/6/2024)   Not Taking    doxepin (SINEquan) 50 MG capsule Take 1 capsule by mouth every night at bedtime.       FeroSul 325 (65 Fe) MG tablet Take 1 tablet by mouth Daily. (Patient not taking: Reported on 1/6/2024)   Not Taking    fluticasone (FLONASE) 50 MCG/ACT nasal spray 2 sprays by Each Nare route Daily As Needed. Shake liquid   Unknown    Fluticasone-Umeclidin-Vilant (Trelegy Ellipta) 100-62.5-25 MCG/ACT inhaler Inhale 1 puff Daily. 3 each 3     nystatin (MYCOSTATIN) 100,000 unit/mL suspension Take  5 mL by mouth 4 (Four) Times a Day. Swish and Swallow   Unknown    predniSONE (DELTASONE) 5 MG tablet Take 1 tablet by mouth Daily. 90 tablet 0     vitamin D (ERGOCALCIFEROL) 1.25 MG (37035 UT) capsule capsule Take 1 capsule by mouth 1 (One) Time Per Week.          Discussion: Per current GOLD Standards, please consider:LAMA/LABA/ICS in place (Trelegy), Outpatient PFT, Rehab as appropriate, Palliative Care consult       Patient was scheduled for a follow up with [facility] for 1/17/2024 @10 am with ANITHA Balbuena.    Cathy Farmer RN

## 2024-01-08 NOTE — PROGRESS NOTES
[facility] Medicine Services  PROGRESS NOTE    Patient Name: Liz Borjas  : 1950  MRN: [id number]    Date of Admission: 2024  Primary Care Physician: Valerie Sesay APRN    Subjective   Subjective     CC:  F/U SOA    HPI:  Seen this morning. Feeling much better. Remained on 6 liters overnight.      Objective   Objective     Vital Signs:   Temp:  [97.7 °F (36.5 °C)-98.3 °F (36.8 °C)] 97.7 °F (36.5 °C)  Heart Rate:  [64-86] 67  Resp:  [18-20] 20  BP: (142-175)/(77-89) 175/84  Flow (L/min):  [6-6.5] 6     Physical Exam:  Gen-no acute distress, chronically ill appearing  HENT-NCAT, mucous membranes moist  CV-RRR, S1 S2 normal, no m/r/g  Resp-improved breath sounds, very faint wheezes, nonlabored on 6 liters  Abd-soft, NT, ND, +BS  Ext-no edema  Neuro-A&Ox3, no focal deficits  Skin-no rashes  Psych-appropriate mood      Results Reviewed:  LAB RESULTS:      Lab 24  0501 24  0550 24  0546   WBC 5.54 5.27 8.96   HEMOGLOBIN 11.1* 10.0* 11.5*   HEMATOCRIT 37.9 34.1 39.8   PLATELETS 132* 120* 166   NEUTROS ABS  --  4.78 6.83   IMMATURE GRANS (ABS)  --  0.02 0.02   LYMPHS ABS  --  0.31* 1.23   MONOS ABS  --  0.14 0.85   EOS ABS  --  0.00 0.00   MCV 96.2 95.5 98.3*   PROCALCITONIN  --   --  0.09         Lab 24  0501 24  0550 24  0546   SODIUM 141 139 139   POTASSIUM 4.3 4.5 3.8   CHLORIDE 101 102 98   CO2 31.0* 30.0* 32.0*   ANION GAP 9.0 7.0 9.0   BUN 29* 19 18   CREATININE 1.12* 0.87 1.03*   EGFR 52.7* 71.3 58.3*   GLUCOSE 110* 94 93   CALCIUM 8.8 8.6 9.1   HEMOGLOBIN A1C  --  <4.20*  --          Lab 24  0546   TOTAL PROTEIN 6.9   ALBUMIN 3.5   GLOBULIN 3.4   ALT (SGPT) 13   AST (SGOT) 20   BILIRUBIN 0.4   ALK PHOS 271*         Lab 24  0546   PROBNP 3,306.0*   HSTROP T 20*                 Lab 24  0559   PH, ARTERIAL 7.362   PCO2, ARTERIAL 56.1*   PO2 .0*   FIO2 80   HCO3 ART 31.8*   BASE EXCESS ART 5.2*   CARBOXYHEMOGLOBIN  0.7     Brief Urine Lab Results       None            Microbiology Results Abnormal       Procedure Component Value - Date/Time    Blood Culture - Blood, Hand, Right [[id number]]  (Normal) Collected: 01/06/24 1039    Lab Status: Preliminary result Specimen: Blood from Hand, Right Updated: 01/08/24 1115     Blood Culture No growth at 2 days    Blood Culture - Blood, Arm, Left [[id number]]  (Normal) Collected: 01/06/24 1050    Lab Status: Preliminary result Specimen: Blood from Arm, Left Updated: 01/08/24 1115     Blood Culture No growth at 2 days    COVID PRE-OP / PRE-PROCEDURE SCREENING ORDER (NO ISOLATION) - Swab, Nasopharynx [[id number]]  (Normal) Collected: 01/06/24 0552    Lab Status: Final result Specimen: Swab from Nasopharynx Updated: 01/06/24 1111    Narrative:      The following orders were created for panel order COVID PRE-OP / PRE-PROCEDURE SCREENING ORDER (NO ISOLATION) - Swab, Nasopharynx.  Procedure                               Abnormality         Status                     ---------                               -----------         ------                     Respiratory Panel PCR w/...[[id number]]  Normal              Final result                 Please view results for these tests on the individual orders.    Respiratory Panel PCR w/COVID-19(SARS-CoV-2) ALFREDO/MEGAN/MARIA FERNANDA/PAD/COR/MONIKA In-House, NP Swab in UTM/VTM, 2 HR TAT - Swab, Nasopharynx [[id number]]  (Normal) Collected: 01/06/24 0552    Lab Status: Final result Specimen: Swab from Nasopharynx Updated: 01/06/24 1111     ADENOVIRUS, PCR Not Detected     Coronavirus 229E Not Detected     Coronavirus HKU1 Not Detected     Coronavirus NL63 Not Detected     Coronavirus OC43 Not Detected     COVID19 Not Detected     Human Metapneumovirus Not Detected     Human Rhinovirus/Enterovirus Not Detected     Influenza A PCR Not Detected     Influenza B PCR Not Detected     Parainfluenza Virus 1 Not Detected     Parainfluenza Virus 2 Not Detected     Parainfluenza Virus 3  Not Detected     Parainfluenza Virus 4 Not Detected     RSV, PCR Not Detected     Bordetella pertussis pcr Not Detected     Bordetella parapertussis PCR Not Detected     Chlamydophila pneumoniae PCR Not Detected     Mycoplasma pneumo by PCR Not Detected    Narrative:      In the setting of a positive respiratory panel with a viral infection PLUS a negative procalcitonin without other underlying concern for bacterial infection, consider observing off antibiotics or discontinuation of antibiotics and continue supportive care. If the respiratory panel is positive for atypical bacterial infection (Bordetella pertussis, Chlamydophila pneumoniae, or Mycoplasma pneumoniae), consider antibiotic de-escalation to target atypical bacterial infection.    COVID-19 and FLU A/B PCR, 1 HR TAT - Swab, Nasopharynx [[id number]]  (Normal) Collected: 01/06/24 0552    Lab Status: Final result Specimen: Swab from Nasopharynx Updated: 01/06/24 0628     COVID19 Not Detected     Influenza A PCR Not Detected     Influenza B PCR Not Detected    Narrative:      Fact sheet for providers: https://www.fda.gov/media/978545/download    Fact sheet for patients: https://www.fda.gov/media/590863/download    Test performed by PCR.            XR Chest 1 View    Result Date: 1/7/2024  XR CHEST 1 VW Date of Exam: 1/7/2024 4:50 AM EST Indication: possible pneumonia, pulmonary fibrosis Comparison: 1/6/2024 Findings: What initially resembles a right IJ catheter actually appears to represent a type of sheet or clothing fold or other external shadow superimposed on the right neck. A similar but much thinner linear fold like density is seen here on yesterday's study. No  definite central line is identified. Note is again made of tracheal deviation to the right without narrowing, likely as a result of underlying scarring/fibrosis. Extensive changes of fibrosis are again seen as on the prior radiograph and earlier chest CT scans. Compared to the most recent  1/6/2024 study, little interval change is seen. There does appear to be slightly denser disease in the right upper lung, which could potentially represent a superimposed pneumonia. No effusion or pneumothorax is seen.     Impression: Impression: 1. Probable skin or clothing fold shadow or apparatus shadow superimposed over the right neck, as noted. 2. Extensive pulmonary fibrosis, possibly with developing right upper lobe pneumonia. Electronically Signed: Cory Richter MD  1/7/2024 8:27 AM EST  Workstation ID: [id number]    Adult Transthoracic Echo Complete W/ Cont if Necessary Per Protocol    Result Date: 1/6/2024    Left ventricular ejection fraction appears to be 66 - 70%.   Left ventricular wall thickness is consistent with mild septal asymmetric hypertrophy.   Left ventricular diastolic function is consistent with (grade I) impaired relaxation.   The right ventricular cavity is mildly dilated.   The left atrial cavity is mildly dilated.   Left atrial volume is moderately increased.   The right atrial cavity is borderline dilated.   Moderate tricuspid valve regurgitation is present.   Estimated right ventricular systolic pressure from tricuspid regurgitation is markedly elevated (>55 mmHg).      Results for orders placed during the hospital encounter of 01/06/24    Adult Transthoracic Echo Complete W/ Cont if Necessary Per Protocol    Interpretation Summary    Left ventricular ejection fraction appears to be 66 - 70%.    Left ventricular wall thickness is consistent with mild septal asymmetric hypertrophy.    Left ventricular diastolic function is consistent with (grade I) impaired relaxation.    The right ventricular cavity is mildly dilated.    The left atrial cavity is mildly dilated.    Left atrial volume is moderately increased.    The right atrial cavity is borderline dilated.    Moderate tricuspid valve regurgitation is present.    Estimated right ventricular systolic pressure from tricuspid regurgitation is  markedly elevated (>55 mmHg).      Current medications:  Scheduled Meds:amLODIPine, 5 mg, Oral, Q24H  budesonide, 0.5 mg, Nebulization, BID - RT  cefTRIAXone, 1,000 mg, Intravenous, Q24H  doxepin, 50 mg, Oral, Nightly  doxycycline, 100 mg, Intravenous, Q12H  flecainide, 50 mg, Oral, BID  FLUoxetine, 40 mg, Oral, Daily  fluticasone, 2 spray, Each Nare, Daily  hydroxychloroquine, 200 mg, Oral, Q24H  ipratropium-albuterol, 3 mL, Nebulization, Once  ipratropium-albuterol, 3 mL, Nebulization, Q4H - RT  levothyroxine, 25 mcg, Oral, Q AM  losartan, 100 mg, Oral, Daily  methylPREDNISolone sodium succinate, 60 mg, Intravenous, Q12H  metoprolol tartrate, 25 mg, Oral, BID  pantoprazole, 40 mg, Oral, Q AM  rivaroxaban, 15 mg, Oral, Daily With Dinner  senna-docusate sodium, 2 tablet, Oral, BID  sodium chloride, 10 mL, Intravenous, Q12H  sulfaSALAzine, 500 mg, Oral, 2 times per day on Monday Wednesday Friday      Continuous Infusions:   PRN Meds:.  acetaminophen **OR** acetaminophen **OR** acetaminophen    senna-docusate sodium **AND** polyethylene glycol **AND** bisacodyl **AND** bisacodyl    ondansetron    sodium chloride    sodium chloride    sodium chloride    traMADol    Assessment & Plan   Assessment & Plan     Active Hospital Problems    Diagnosis  POA    **Acute on chronic respiratory failure with hypoxia [J96.21]  Yes    Stage 3a chronic kidney disease [N18.31]  Yes    Interstitial lung disease [J84.9]  Yes    PAF (paroxysmal atrial fibrillation) [I48.0]  Yes    COPD with acute exacerbation [J44.1]  Yes    Rheumatoid arthritis with positive rheumatoid factor [M05.9]  Yes      Resolved Hospital Problems   No resolved problems to display.        Brief Hospital Course to date:  Liz Borjas is a 73 y.o. female with history of COPD, chronic hypoxic respiratory failure on 6 liters home oxygen, pulmonary fibrosis, RA on chronic prednisone (20 mg daily), HTN, Afib on Xarelto, CKD 3, IBS, and ROPER cirrhosis who presents with  progressive shortness of breath and generalized weakness for the past several days. She reports having COVID-19 just after Thanksgiving, and then subsequently had Influenza A - was treated for URI with antibiotics (IM and oral) by her PCP (data deficit).    This patient's problems and plans were partially entered by my partner and updated as appropriate by me 01/08/24. Copied text in this note has been reviewed and is accurate as of today's date.     Acute on chronic hypoxic respiratory failure  COPD with acute exacerbation  Pulmonary fibrosis/interstitial lung disease  Right upper lobe pneumonia  Recent COVID-19 and Flu A  Pulmonary hypertension  --Patient reports recent COVID and Flu infections right after Thanksgiving; full respiratory PCR panel here is negative  --CXR on admission showed extensive chronic interstitial changes with suspected superimposed patchy airspace disease present within RUL, repeat CXR this morning again shows possibility of developing RUL pneumonia  --WBC and procal WNL, afebrile  --Blood cultures NGTD  --Continue Rocephin, Doxycycline  --Continue IV Solumedrol - decrease to 40 mg Q12H today  --Scheduled and PRN duonebs, budesonide  --proBNP elevated, Echo showing EF 66-70%, grade I diastolic dysfunction, moderate TR, markedly elevated RVSP >55 mmHg: s/p 40 mg IV Lasix x 1 as do suspect some fluid component   --s/p BiPAP (could not tolerate), then on HFNC -- now weaned back to baseline; she had been on 4 liters back in July 2023 which is the last time she saw Pulmonology in the office, now most recent baseline has been 6 liters     RA  --Resume home steroids when treatment for COPD complete     Atrial fibrillation  --Continue Xarelto, Flecainide, Metoprolol  --Follows with Dr. Garcia    HTN  --Continue Norvasc, Losartan, Metoprolol     CKD 3  --Baseline Cr ~1.0-1.1  --Stable, monitor    ROPER cirrhosis  --Followed at , last seen on 10/27/23 by Lupis Jaramillo  weakness  --PT/OT recommending inpatient rehab, continue to monitor for improvement    Expected Discharge Location and Transportation: home vs rehab  Expected Discharge   Expected Discharge Date: 1/10/2024; Expected Discharge Time:      DVT prophylaxis:  Medical DVT prophylaxis orders are present.     AM-PAC 6 Clicks Score (PT): 18 (01/08/24 0800)    CODE STATUS:   Code Status and Medical Interventions:   Ordered at: 01/06/24 0832     Level Of Support Discussed With:    Patient    Next of Kin (If No Surrogate)     Code Status (Patient has no pulse and is not breathing):    CPR (Attempt to Resuscitate)     Medical Interventions (Patient has pulse or is breathing):    Full Support       Nirali Segura MD  01/08/24

## 2024-01-08 NOTE — THERAPY EVALUATION
Patient Name: Liz Borjas  : 1950    MRN: [id number]                              Today's Date: 2024       Admit Date: 2024    Visit Dx:     ICD-10-CM ICD-9-CM   1. Acute on chronic respiratory failure with hypoxia and hypercapnia  J96.21 518.84    J96.22 786.09     799.02   2. Pneumonia of right upper lobe due to infectious organism  J18.9 486     Patient Active Problem List   Diagnosis    DDD (degenerative disc disease), lumbar    Spinal stenosis, lumbar region, with neurogenic claudication    Spinal stenosis of lumbar region with neurogenic claudication    Lumbar disc herniation with radiculopathy    Rheumatoid arthritis with positive rheumatoid factor    Anxiety and depression    COPD with acute exacerbation    Mild obesity    Physical deconditioning    Sepsis    Leukocytosis    Lactic acidosis    Hyponatremia    Acute on chronic respiratory failure with hypoxia    Chronic respiratory failure with hypoxia    Elevated d-dimer    Immunocompromised    PAF (paroxysmal atrial fibrillation)    Chronic anticoagulation    HTN (hypertension)    Interstitial lung disease    Stage 3a chronic kidney disease     Past Medical History:   Diagnosis Date    Arthritis     Atrial fibrillation     Closed right hip fracture     COVID     GERD (gastroesophageal reflux disease)     Gout     Hyperlipidemia     Hypertension     Osteoporosis     Rheumatoid arthritis     Wears dentures     upper    Wears glasses     reading     Past Surgical History:   Procedure Laterality Date    ANKLE SURGERY Right     CHOLECYSTECTOMY      COLONOSCOPY      5 years ago    HIP FRACTURE SURGERY      Right Hip with Pins    LUMBAR DISCECTOMY Left 10/12/2016    Procedure: lumbar MICROdiscectomy LEFT L3-5;  Surgeon: Chris Son MD;  Location: [facility];  Service:     REPLACEMENT TOTAL KNEE      Right Knee    TOE AMPUTATION      right baby toe, left second toe      General Information       Row Name 24 1148          OT Time and  Intention    Document Type evaluation  -JOSÉ     Mode of Treatment occupational therapy  -JOSÉ       Row Name 01/08/24 1148          General Information    Patient Profile Reviewed yes  -JOSÉ     Prior Level of Function independent:;ADL's;bed mobility;transfer;all household mobility;community mobility  Pt ambulates using rollator at baseline, on 4-5L O2; SUP for showers; family assists with HM  -JOSÉ     Existing Precautions/Restrictions fall;oxygen therapy device and L/min;other (see comments)  hx Rheumatoid Arthritis  -JOSÉ     Barriers to Rehab medically complex  -JOSÉ       Row Name 01/08/24 1148          Occupational Profile    Environmental Supports and Barriers (Occupational Profile) Lives alone; son and extended family live close by and check on throughout the day; ambulates using rollator; has shower chair in tub shower; on 4-5L supplemental O2 at baseline; has assist for HM, driving, shopping  -JOSÉ       Row Name 01/08/24 1148          Living Environment    People in Home alone  -JOSÉ       Row Name 01/08/24 1148          Home Main Entrance    Number of Stairs, Main Entrance none  -JOSÉ       Row Name 01/08/24 1148          Stairs Within Home, Primary    Number of Stairs, Within Home, Primary none  -JOSÉ       Row Name 01/08/24 1148          Cognition    Orientation Status (Cognition) oriented x 3  -JOSÉ       Row Name 01/08/24 1148          Safety Issues, Functional Mobility    Safety Issues Affecting Function (Mobility) awareness of need for assistance;insight into deficits/self-awareness  -JOSÉ     Impairments Affecting Function (Mobility) balance;endurance/activity tolerance;postural/trunk control;shortness of breath;strength  -JOSÉ               User Key  (r) = Recorded By, (t) = Taken By, (c) = Cosigned By      Initials Name Provider Type    JOSÉ Angelia Traylor OT Occupational Therapist                     Mobility/ADL's       Row Name 01/08/24 1152          Bed Mobility    Bed Mobility supine-sit;sit-supine  -JOSÉ      Supine-Sit [place] (Bed Mobility) contact guard  -JOSÉ     Sit-Supine [place] (Bed Mobility) contact guard  -JOSÉ     Assistive Device (Bed Mobility) bed rails;head of bed elevated  -     Comment, (Bed Mobility) Pt completed with increased time and effort; O2 desat. to 84% with activity  -[facility] Name 01/08/24 1152          Transfers    Comment, (Transfers) Pt declined OOB activity or transfer to chair  -       Row Name 01/08/24 1152          Functional Mobility    Functional Mobility- Ind. Level not tested  -[facility] Name 01/08/24 1152          Activities of Daily Living    BADL Assessment/Intervention grooming;lower body dressing  -[facility] Name 01/08/24 1152          Grooming Assessment/Training    [place] Level (Grooming) wash face, hands;set up  -     Position (Grooming) sitting up in bed  -     Comment, (Grooming) denture completed earlier this date by son  -[facility] Name 01/08/24 1152          Lower Body Dressing Assessment/Training    [place] Level (Lower Body Dressing) doff;don;socks;contact guard assist  -     Position (Lower Body Dressing) sitting up in bed  -               User Key  (r) = Recorded By, (t) = Taken By, (c) = Cosigned By      Initials Name Provider Type    Angelia Wood OT Occupational Therapist                   Obj/Interventions       Row Name 01/08/24 1403          Sensory Assessment (Somatosensory)    Sensory Assessment (Somatosensory) UE sensation intact  -[facility] Name 01/08/24 1403          Vision Assessment/Intervention    Visual Impairment/Limitations WFL;corrective lenses for reading  -       Row Name 01/08/24 1403          Range of Motion Comprehensive    General Range of Motion bilateral upper extremity ROM WFL  -       Row Name 01/08/24 1403          Strength Comprehensive (MMT)    Comment, General Manual Muscle Testing (MMT) Assessment BUE's grossly 4-/5  -       Row Name 01/08/24 1403          Balance    Balance  Assessment sitting static balance;sitting dynamic balance  -JOSÉ     Static Sitting Balance standby assist  -JOSÉ     Dynamic Sitting Balance supervision  -JOSÉ     Position, Sitting Balance unsupported;sitting edge of bed  -JOSÉ               User Key  (r) = Recorded By, (t) = Taken By, (c) = Cosigned By      Initials Name Provider Type    Angelia Wood OT Occupational Therapist                   Goals/Plan       Row Name 01/08/24 1411          Transfer Goal 1 (OT)    Activity/Assistive Device (Transfer Goal 1, OT) sit-to-stand/stand-to-sit;toilet;walker, rolling  -JOSÉ     [place] Level/Cues Needed (Transfer Goal 1, OT) supervision required  -JOSÉ     Time Frame (Transfer Goal 1, OT) long term goal (LTG);10 days  -JOSÉ     Progress/Outcome (Transfer Goal 1, OT) new goal  -JOSÉ       Row Name 01/08/24 1411          Toileting Goal 1 (OT)    Activity/Device (Toileting Goal 1, OT) adjust/manage clothing;perform perineal hygiene;commode;grab bar/safety frame  -JOSÉ     [place] Level/Cues Needed (Toileting Goal 1, OT) contact guard required  -JOSÉ     Time Frame (Toileting Goal 1, OT) long term goal (LTG);10 days  -JOSÉ     Progress/Outcome (Toileting Goal 1, OT) new goal  -JOSÉ       Row Name 01/08/24 1411          Grooming Goal 1 (OT)    Activity/Device (Grooming Goal 1, OT) oral care  -JOSÉ     [place] (Grooming Goal 1, OT) contact guard required  -JOSÉ     Time Frame (Grooming Goal 1, OT) long term goal (LTG);10 days  -JOSÉ     Strategies/Barriers (Grooming Goal 1, OT) sitting/standing sink side, maintaining O2 sat. >89%  -JOSÉ     Progress/Outcome (Grooming Goal 1, OT) new goal  -JOSÉ       Row Name 01/08/24 1411          Therapy Assessment/Plan (OT)    Planned Therapy Interventions (OT) activity tolerance training;BADL retraining;functional balance retraining;occupation/activity based interventions;patient/caregiver education/training;ROM/therapeutic exercise;strengthening exercise;transfer/mobility retraining  -JOSÉ                User Key  (r) = Recorded By, (t) = Taken By, (c) = Cosigned By      Initials Name Provider Type    Angelia Wood, OT Occupational Therapist                   Clinical Impression       Row Name 01/08/24 1405          Pain Assessment    Pretreatment Pain Rating 0/10 - no pain  -JOSÉ     Posttreatment Pain Rating 0/10 - no pain  -JOSÉ       Row Name 01/08/24 1401          Plan of Care Review    Plan of Care Reviewed With patient;son  -JOSÉ     Outcome Evaluation OT eval completed. Pt presents below baseline for ADL's, demonstrating generalized weakness and dyspnea with all transitional movements and self-care tasks. Pt SUP for bed mobility, completed grooming with GARCIA at bed level, unable to participate in further activity d/t fatigue. IP OT services warranted. Recommend home with 24/7 assist and HHOT at discharge.  -       Row Name 01/08/24 1405          Therapy Assessment/Plan (OT)    Rehab Potential (OT) good, to achieve stated therapy goals  -     Criteria for Skilled Therapeutic Interventions Met (OT) yes;meets criteria;skilled treatment is necessary  -     Therapy Frequency (OT) daily  -JOSÉ       Row Name 01/08/24 1402          Therapy Plan Review/Discharge Plan (OT)    Anticipated Discharge Disposition (OT) home with 24/7 care;home with home health  -       Row Name 01/08/24 1408          Vital Signs    Pre SpO2 (%) 95  -JOSÉ     O2 Delivery Pre Treatment nasal cannula  -JOSÉ     Intra SpO2 (%) 84  -JOSÉ     O2 Delivery Intra Treatment nasal cannula  -JOSÉ     Post SpO2 (%) 91  -JOSÉ     O2 Delivery Post Treatment nasal cannula  -JOSÉ     Pre Patient Position Supine  -JOSÉ     Intra Patient Position Sitting  -JOSÉ     Post Patient Position Supine  -JOSÉ       Row Name 01/08/24 1402          Positioning and Restraints    Pre-Treatment Position in bed  -JOSÉ     Post Treatment Position bed  -JOSÉ     In Bed notified nsg;fowlers;call light within reach;encouraged to call for assist;exit alarm on;with family/caregiver  -JOSÉ                User Key  (r) = Recorded By, (t) = Taken By, (c) = Cosigned By      Initials Name Provider Type    Angelia Wood OT Occupational Therapist                   Outcome Measures       Row Name 01/08/24 1412          How much help from another is currently needed...    Putting on and taking off regular lower body clothing? 3  -JOSÉ     Bathing (including washing, rinsing, and drying) 2  -JOSÉ     Toileting (which includes using toilet bed pan or urinal) 3  -JOSÉ     Putting on and taking off regular upper body clothing 3  -JOSÉ     Taking care of personal grooming (such as brushing teeth) 3  -JOSÉ     Eating meals 3  -JOSÉ     AM-PAC 6 Clicks Score (OT) 17  -JOSÉ       Row Name 01/08/24 0800          How much help from another person do you currently need...    Turning from your back to your side while in flat bed without using bedrails? 4  -CB     Moving from lying on back to sitting on the side of a flat bed without bedrails? 3  -CB     Moving to and from a bed to a chair (including a wheelchair)? 3  -CB     Standing up from a chair using your arms (e.g., wheelchair, bedside chair)? 3  -CB     Climbing 3-5 steps with a railing? 2  -CB     To walk in hospital room? 3  -CB     AM-PAC 6 Clicks Score (PT) 18  -CB     Highest Level of Mobility Goal 6 --> Walk 10 steps or more  -CB       Row Name 01/08/24 1412          Functional Assessment    Outcome Measure Options AM-PAC 6 Clicks Daily Activity (OT)  -JOSÉ               User Key  (r) = Recorded By, (t) = Taken By, (c) = Cosigned By      Initials Name Provider Type    Velia Acuna, RN Registered Nurse    Angelia Wood OT Occupational Therapist                    Occupational Therapy Education       Title: PT OT SLP Therapies (In Progress)       Topic: Occupational Therapy (In Progress)       Point: ADL training (Done)       Description:   Instruct learner(s) on proper safety adaptation and remediation techniques during self care or transfers.   Instruct in proper use of  assistive devices.                  Learning Progress Summary             Patient Acceptance, E, VU by JOSÉ at 1/8/2024 1413    Comment: OT POC; fall prevention; energy conservation/pacing   Family Acceptance, E, VU by JOSÉ at 1/8/2024 1413    Comment: OT POC; fall prevention; energy conservation/pacing                         Point: Home exercise program (Not Started)       Description:   Instruct learner(s) on appropriate technique for monitoring, assisting and/or progressing therapeutic exercises/activities.                  Learner Progress:  Not documented in this visit.              Point: Precautions (Done)       Description:   Instruct learner(s) on prescribed precautions during self-care and functional transfers.                  Learning Progress Summary             Patient Acceptance, E, VU by JOSÉ at 1/8/2024 1413    Comment: OT POC; fall prevention; energy conservation/pacing   Family Acceptance, E, VU by JOSÉ at 1/8/2024 1413    Comment: OT POC; fall prevention; energy conservation/pacing                         Point: Body mechanics (Not Started)       Description:   Instruct learner(s) on proper positioning and spine alignment during self-care, functional mobility activities and/or exercises.                  Learner Progress:  Not documented in this visit.                              User Key       Initials Effective Dates Name Provider Type Discipline     06/16/21 -  Angelia Traylor OT Occupational Therapist OT                  OT Recommendation and Plan  Planned Therapy Interventions (OT): activity tolerance training, BADL retraining, functional balance retraining, occupation/activity based interventions, patient/caregiver education/training, ROM/therapeutic exercise, strengthening exercise, transfer/mobility retraining  Therapy Frequency (OT): daily  Plan of Care Review  Plan of Care Reviewed With: patient, son  Outcome Evaluation: OT eval completed. Pt presents below baseline for ADL's, demonstrating  generalized weakness and dyspnea with all transitional movements and self-care tasks. Pt SUP for bed mobility, completed grooming with GARCIA at bed level, unable to participate in further activity d/t fatigue. IP OT services warranted. Recommend home with 24/7 assist and HHOT at discharge.     Time Calculation:   Evaluation Complexity (OT)  Review Occupational Profile/Medical/Therapy History Complexity: expanded/moderate complexity  Assessment, Occupational Performance/Identification of Deficit Complexity: 3-5 performance deficits  Clinical Decision Making Complexity (OT): detailed assessment/moderate complexity  Overall Complexity of Evaluation (OT): moderate complexity     Time Calculation- OT       Row Name 01/08/24 1120             Time Calculation- OT    OT Start Time 1120  -JOSÉ      OT Received On 01/08/24  -JOSÉ      OT Goal Re-Cert Due Date 01/18/24  -JOSÉ         Untimed Charges    OT Eval/Re-eval Minutes 48  -JOSÉ         Total Minutes    Untimed Charges Total Minutes 48  -JOSÉ       Total Minutes 48  -JOSÉ                User Key  (r) = Recorded By, (t) = Taken By, (c) = Cosigned By      Initials Name Provider Type    Angelia Wood OT Occupational Therapist                  Therapy Charges for Today       Code Description Service Date Service Provider Modifiers Qty    [id number]  OT EVAL MOD COMPLEXITY 4 1/8/2024 Angelia Traylor OT GO 1                 Angelia Traylor OT  1/8/2024

## 2024-01-08 NOTE — PLAN OF CARE
Goal Outcome Evaluation:  Plan of Care Reviewed With: patient, son           Outcome Evaluation: OT eval completed. Pt presents below baseline for ADL's, demonstrating generalized weakness and dyspnea with all transitional movements and self-care tasks. Pt SUP for bed mobility, completed grooming with GARCIA at bed level, unable to participate in further activity d/t fatigue. IP OT services warranted. Recommend home with 24/7 assist and HHOT at discharge.      Anticipated Discharge Disposition (OT): home with 24/7 care, home with home health

## 2024-01-09 ENCOUNTER — READMISSION MANAGEMENT (OUTPATIENT)
Dept: CALL CENTER | Facility: HOSPITAL | Age: 74
End: 2024-01-09
Payer: MEDICARE

## 2024-01-09 VITALS
HEART RATE: 79 BPM | OXYGEN SATURATION: 94 % | WEIGHT: 108 LBS | RESPIRATION RATE: 18 BRPM | BODY MASS INDEX: 19.88 KG/M2 | TEMPERATURE: 98.8 F | DIASTOLIC BLOOD PRESSURE: 61 MMHG | HEIGHT: 62 IN | SYSTOLIC BLOOD PRESSURE: 112 MMHG

## 2024-01-09 LAB
ANION GAP SERPL CALCULATED.3IONS-SCNC: 9 MMOL/L (ref 5–15)
BUN SERPL-MCNC: 29 MG/DL (ref 8–23)
BUN/CREAT SERPL: 35.8 (ref 7–25)
CALCIUM SPEC-SCNC: 8.4 MG/DL (ref 8.6–10.5)
CHLORIDE SERPL-SCNC: 105 MMOL/L (ref 98–107)
CO2 SERPL-SCNC: 30 MMOL/L (ref 22–29)
CREAT SERPL-MCNC: 0.81 MG/DL (ref 0.57–1)
DEPRECATED RDW RBC AUTO: 57.9 FL (ref 37–54)
EGFRCR SERPLBLD CKD-EPI 2021: 77.7 ML/MIN/1.73
ERYTHROCYTE [DISTWIDTH] IN BLOOD BY AUTOMATED COUNT: 15.9 % (ref 12.3–15.4)
GLUCOSE SERPL-MCNC: 103 MG/DL (ref 65–99)
HCT VFR BLD AUTO: 39.6 % (ref 34–46.6)
HGB BLD-MCNC: 11.3 G/DL (ref 12–15.9)
MCH RBC QN AUTO: 28 PG (ref 26.6–33)
MCHC RBC AUTO-ENTMCNC: 28.5 G/DL (ref 31.5–35.7)
MCV RBC AUTO: 98 FL (ref 79–97)
PLATELET # BLD AUTO: 115 10*3/MM3 (ref 140–450)
PMV BLD AUTO: 12.9 FL (ref 6–12)
POTASSIUM SERPL-SCNC: 4.6 MMOL/L (ref 3.5–5.2)
QT INTERVAL: 438 MS
QTC INTERVAL: 469 MS
RBC # BLD AUTO: 4.04 10*6/MM3 (ref 3.77–5.28)
SODIUM SERPL-SCNC: 144 MMOL/L (ref 136–145)
WBC NRBC COR # BLD AUTO: 5.62 10*3/MM3 (ref 3.4–10.8)

## 2024-01-09 PROCEDURE — G0378 HOSPITAL OBSERVATION PER HR: HCPCS

## 2024-01-09 PROCEDURE — 97530 THERAPEUTIC ACTIVITIES: CPT

## 2024-01-09 PROCEDURE — 25010000002 CEFTRIAXONE PER 250 MG: Performed by: INTERNAL MEDICINE

## 2024-01-09 PROCEDURE — 96376 TX/PRO/DX INJ SAME DRUG ADON: CPT

## 2024-01-09 PROCEDURE — 97110 THERAPEUTIC EXERCISES: CPT

## 2024-01-09 PROCEDURE — 85027 COMPLETE CBC AUTOMATED: CPT | Performed by: HOSPITALIST

## 2024-01-09 PROCEDURE — 25010000002 FUROSEMIDE PER 20 MG: Performed by: HOSPITALIST

## 2024-01-09 PROCEDURE — 94799 UNLISTED PULMONARY SVC/PX: CPT

## 2024-01-09 PROCEDURE — 94664 DEMO&/EVAL PT USE INHALER: CPT

## 2024-01-09 PROCEDURE — 80048 BASIC METABOLIC PNL TOTAL CA: CPT | Performed by: HOSPITALIST

## 2024-01-09 PROCEDURE — 25010000002 METHYLPREDNISOLONE PER 40 MG: Performed by: HOSPITALIST

## 2024-01-09 RX ORDER — FUROSEMIDE 10 MG/ML
40 INJECTION INTRAMUSCULAR; INTRAVENOUS ONCE
Status: COMPLETED | OUTPATIENT
Start: 2024-01-09 | End: 2024-01-09

## 2024-01-09 RX ORDER — PREDNISONE 20 MG/1
TABLET ORAL
Qty: 19 TABLET | Refills: 0 | Status: SHIPPED | OUTPATIENT
Start: 2024-01-09 | End: 2024-01-26

## 2024-01-09 RX ORDER — DOXYCYCLINE HYCLATE 100 MG/1
100 CAPSULE ORAL 2 TIMES DAILY
Qty: 4 CAPSULE | Refills: 0 | Status: SHIPPED | OUTPATIENT
Start: 2024-01-09 | End: 2024-01-11

## 2024-01-09 RX ORDER — CEFDINIR 300 MG/1
300 CAPSULE ORAL 2 TIMES DAILY
Qty: 4 CAPSULE | Refills: 0 | Status: SHIPPED | OUTPATIENT
Start: 2024-01-09 | End: 2024-01-11

## 2024-01-09 RX ADMIN — Medication 10 ML: at 09:57

## 2024-01-09 RX ADMIN — PANTOPRAZOLE SODIUM 40 MG: 40 TABLET, DELAYED RELEASE ORAL at 07:05

## 2024-01-09 RX ADMIN — HYDROXYCHLOROQUINE SULFATE 200 MG: 200 TABLET ORAL at 09:57

## 2024-01-09 RX ADMIN — METOPROLOL TARTRATE 25 MG: 25 TABLET, FILM COATED ORAL at 09:56

## 2024-01-09 RX ADMIN — IPRATROPIUM BROMIDE AND ALBUTEROL SULFATE 3 ML: 2.5; .5 SOLUTION RESPIRATORY (INHALATION) at 10:39

## 2024-01-09 RX ADMIN — IPRATROPIUM BROMIDE AND ALBUTEROL SULFATE 3 ML: 2.5; .5 SOLUTION RESPIRATORY (INHALATION) at 14:35

## 2024-01-09 RX ADMIN — LOSARTAN POTASSIUM 100 MG: 50 TABLET, FILM COATED ORAL at 09:56

## 2024-01-09 RX ADMIN — IPRATROPIUM BROMIDE AND ALBUTEROL SULFATE 3 ML: 2.5; .5 SOLUTION RESPIRATORY (INHALATION) at 03:04

## 2024-01-09 RX ADMIN — METHYLPREDNISOLONE SODIUM SUCCINATE 40 MG: 40 INJECTION, POWDER, FOR SOLUTION INTRAMUSCULAR; INTRAVENOUS at 10:52

## 2024-01-09 RX ADMIN — SODIUM CHLORIDE 1000 MG: 900 INJECTION INTRAVENOUS at 09:55

## 2024-01-09 RX ADMIN — AMLODIPINE BESYLATE 5 MG: 5 TABLET ORAL at 09:56

## 2024-01-09 RX ADMIN — FLECAINIDE ACETATE 50 MG: 50 TABLET ORAL at 09:56

## 2024-01-09 RX ADMIN — FLUTICASONE PROPIONATE 2 SPRAY: 50 SPRAY, METERED NASAL at 09:55

## 2024-01-09 RX ADMIN — FLUOXETINE HYDROCHLORIDE 40 MG: 20 CAPSULE ORAL at 09:55

## 2024-01-09 RX ADMIN — DOXYCYCLINE 100 MG: 100 INJECTION, POWDER, LYOPHILIZED, FOR SOLUTION INTRAVENOUS at 09:55

## 2024-01-09 RX ADMIN — LEVOTHYROXINE SODIUM 25 MCG: 25 TABLET ORAL at 07:05

## 2024-01-09 RX ADMIN — FUROSEMIDE 40 MG: 10 INJECTION, SOLUTION INTRAMUSCULAR; INTRAVENOUS at 09:57

## 2024-01-09 RX ADMIN — BUDESONIDE 0.5 MG: 0.5 INHALANT RESPIRATORY (INHALATION) at 07:26

## 2024-01-09 RX ADMIN — IPRATROPIUM BROMIDE AND ALBUTEROL SULFATE 3 ML: 2.5; .5 SOLUTION RESPIRATORY (INHALATION) at 07:26

## 2024-01-09 NOTE — THERAPY TREATMENT NOTE
Patient Name: Liz Bojras  : 1950    MRN: [id number]                              Today's Date: 2024       Admit Date: 2024    Visit Dx:     ICD-10-CM ICD-9-CM   1. Acute on chronic respiratory failure with hypoxia and hypercapnia  J96.21 518.84    J96.22 786.09     799.02   2. Pneumonia of right upper lobe due to infectious organism  J18.9 486     Patient Active Problem List   Diagnosis    DDD (degenerative disc disease), lumbar    Spinal stenosis, lumbar region, with neurogenic claudication    Spinal stenosis of lumbar region with neurogenic claudication    Lumbar disc herniation with radiculopathy    Rheumatoid arthritis with positive rheumatoid factor    Anxiety and depression    COPD with acute exacerbation    Mild obesity    Physical deconditioning    Sepsis    Leukocytosis    Lactic acidosis    Hyponatremia    Acute on chronic respiratory failure with hypoxia    Chronic respiratory failure with hypoxia    Elevated d-dimer    Immunocompromised    PAF (paroxysmal atrial fibrillation)    Chronic anticoagulation    HTN (hypertension)    Interstitial lung disease    Stage 3a chronic kidney disease    COPD (chronic obstructive pulmonary disease)     Past Medical History:   Diagnosis Date    Arthritis     Atrial fibrillation     Closed right hip fracture     COVID     GERD (gastroesophageal reflux disease)     Gout     Hyperlipidemia     Hypertension     Osteoporosis     Rheumatoid arthritis     Wears dentures     upper    Wears glasses     reading     Past Surgical History:   Procedure Laterality Date    ANKLE SURGERY Right     CHOLECYSTECTOMY      COLONOSCOPY      5 years ago    HIP FRACTURE SURGERY      Right Hip with Pins    LUMBAR DISCECTOMY Left 10/12/2016    Procedure: lumbar MICROdiscectomy LEFT L3-5;  Surgeon: Chris Son MD;  Location: [facility];  Service:     REPLACEMENT TOTAL KNEE      Right Knee    TOE AMPUTATION      right baby toe, left second toe      General Information        Row Name 01/09/24 0910          Physical Therapy Time and Intention    Document Type therapy note (daily note)  -AS     Mode of Treatment physical therapy  -AS       Row Name 01/09/24 0910          General Information    Patient Profile Reviewed yes  -AS     Existing Precautions/Restrictions fall;oxygen therapy device and L/min;other (see comments)  h/x RA, 6L O2 at baseline  -AS     Barriers to Rehab medically complex  -AS       Row Name 01/09/24 0910          Cognition    Orientation Status (Cognition) oriented x 3  -AS       Row Name 01/09/24 0910          Safety Issues, Functional Mobility    Safety Issues Affecting Function (Mobility) awareness of need for assistance;insight into deficits/self-awareness;safety precaution awareness  -AS     Impairments Affecting Function (Mobility) balance;endurance/activity tolerance;postural/trunk control;shortness of breath;strength  -AS     Comment, Safety Issues/Impairments (Mobility) alert and following commands, 6L O2 with increase to 7L with activity due to drop in O2 sats 70%-93%.  -AS               User Key  (r) = Recorded By, (t) = Taken By, (c) = Cosigned By      Initials Name Provider Type    AS Shannon Melo PTA Physical Therapist Assistant                   Mobility       Row Name 01/09/24 0913          Bed Mobility    Supine-Sit [place] (Bed Mobility) verbal cues;contact guard;1 person assist  -AS     Sit-Supine [place] (Bed Mobility) verbal cues;contact guard;1 person assist  -AS     Assistive Device (Bed Mobility) head of bed elevated;bed rails  -AS     Comment, (Bed Mobility) line mangement and increased time and effort to complete transfer, desat to 70% then increased to 90% on 7L O2  -AS       Row Name 01/09/24 0913          Transfers    Comment, (Transfers) bed>BSC>bed, unable to transfer to recliner due to drop in O2 sats, patient requested back to bed, nursing assisted with drawsheet to scoot HOB  -AS       Row Name 01/09/24 0913           Bed-Chair Transfer    Bed-Chair [place] (Transfers) unable to assess  -AS       Row Name 01/09/24 0913          Sit-Stand Transfer    Sit-Stand [place] (Transfers) contact guard;1 person assist  -AS     Assistive Device (Sit-Stand Transfers) other (see comments)  -AS     Comment, (Sit-Stand Transfer) B UE support  -AS               User Key  (r) = Recorded By, (t) = Taken By, (c) = Cosigned By      Initials Name Provider Type    AS Shannon Melo PTA Physical Therapist Assistant                   Obj/Interventions       Row Name 01/09/24 0916          Motor Skills    Therapeutic Exercise knee;ankle;shoulder  -AS       Row Name 01/09/24 0916          Shoulder (Therapeutic Exercise)    Shoulder (Therapeutic Exercise) AROM (active range of motion)  -AS     Shoulder AROM (Therapeutic Exercise) bilateral;flexion;extension;aBduction;aDduction;sitting;10 repetitions  bicep curls  -AS       Row Name 01/09/24 0916          Knee (Therapeutic Exercise)    Knee (Therapeutic Exercise) strengthening exercise  -AS     Knee Strengthening (Therapeutic Exercise) bilateral;marching while seated;LAQ (long arc quad);sitting;10 repetitions  -AS       Row Name 01/09/24 0916          Ankle (Therapeutic Exercise)    Ankle (Therapeutic Exercise) AROM (active range of motion)  -AS     Ankle AROM (Therapeutic Exercise) bilateral;dorsiflexion;plantarflexion;sitting;10 repetitions  -AS       Row Name 01/09/24 0916          Balance    Dynamic Standing Balance verbal cues;minimal assist;1-person assist  -AS     Position/Device Used, Standing Balance supported;walker, front-wheeled  -AS     Comment, Balance Min assist for safety and line management  -AS               User Key  (r) = Recorded By, (t) = Taken By, (c) = Cosigned By      Initials Name Provider Type    AS Shannon Melo PTA Physical Therapist Assistant                   Goals/Plan    No documentation.                  Clinical Impression       Row Name  01/09/24 0917          Pain    Pretreatment Pain Rating 0/10 - no pain  -AS     Posttreatment Pain Rating 0/10 - no pain  -AS     Pre/Posttreatment Pain Comment SOA  -AS     Pain Intervention(s) Repositioned;Ambulation/increased activity  -AS       Row Name 01/09/24 0917          Plan of Care Review    Plan of Care Reviewed With patient  -AS     Progress no change  -AS     Outcome Evaluation Patient completed supine<>sit with CGA x1, increased time and effort due to drop in O2 sats 70%-93%, Transfered bed>BSC>bed with min assist x1 and B UE support, progress limited by drop in O2 sats and increased SOA. Completed B UE/LE exercises with cues for technique and frequent rest breaks. Recommend home with 24/7 assist and HHPT.  -AS       Row Name 01/09/24 0917          Vital Signs    Pre SpO2 (%) 94  -AS     O2 Delivery Pre Treatment supplemental O2  -AS     Intra SpO2 (%) 70  -AS     O2 Delivery Intra Treatment supplemental O2  -AS     Post SpO2 (%) 93  -AS     O2 Delivery Post Treatment supplemental O2  -AS     Pre Patient Position Supine  -AS     Intra Patient Position Sitting  -AS     Post Patient Position Supine  -AS       Row Name 01/09/24 0917          Positioning and Restraints    Pre-Treatment Position in bed  -AS     Post Treatment Position bed  -AS     In Bed supine;call light within reach;encouraged to call for assist;exit alarm on  -AS               User Key  (r) = Recorded By, (t) = Taken By, (c) = Cosigned By      Initials Name Provider Type    AS Shannon Melo, JACOB Physical Therapist Assistant                   Outcome Measures       Row Name 01/09/24 0921          How much help from another person do you currently need...    Turning from your back to your side while in flat bed without using bedrails? 3  -AS     Moving from lying on back to sitting on the side of a flat bed without bedrails? 3  -AS     Moving to and from a bed to a chair (including a wheelchair)? 3  -AS     Standing up from a chair  using your arms (e.g., wheelchair, bedside chair)? 3  -AS     Climbing 3-5 steps with a railing? 2  -AS     To walk in hospital room? 3  -AS     AM-PAC 6 Clicks Score (PT) 17  -AS     Highest Level of Mobility Goal 5 --> Static standing  -AS       Row Name 01/09/24 0921          Functional Assessment    Outcome Measure Options AM-PAC 6 Clicks Basic Mobility (PT)  -AS               User Key  (r) = Recorded By, (t) = Taken By, (c) = Cosigned By      Initials Name Provider Type    AS Shannon Melo PTA Physical Therapist Assistant                                   PT Recommendation and Plan     Plan of Care Reviewed With: patient  Progress: no change  Outcome Evaluation: Patient completed supine<>sit with CGA x1, increased time and effort due to drop in O2 sats 70%-93%, Transfered bed>BSC>bed with min assist x1 and B UE support, progress limited by drop in O2 sats and increased SOA. Completed B UE/LE exercises with cues for technique and frequent rest breaks. Recommend home with 24/7 assist and HHPT.     Time Calculation:         PT Charges       Row Name 01/09/24 0921             Time Calculation    Start Time 0840  -AS      PT Received On 01/09/24  -AS      PT Goal Re-Cert Due Date 01/17/24  -AS         Timed Charges    03137 - PT Therapeutic Exercise Minutes 13  -AS      56500 - PT Therapeutic Activity Minutes 14  -AS         Total Minutes    Timed Charges Total Minutes 27  -AS       Total Minutes 27  -AS                User Key  (r) = Recorded By, (t) = Taken By, (c) = Cosigned By      Initials Name Provider Type    AS Shannon Melo PTA Physical Therapist Assistant                  Therapy Charges for Today       Code Description Service Date Service Provider Modifiers Qty    88353098512 HC PT THER PROC EA 15 MIN 1/9/2024 Shannon Melo PTA GP 1    60369086380 HC PT THERAPEUTIC ACT EA 15 MIN 1/9/2024 Shannon Melo PTA GP 1            PT G-Codes  Outcome Measure Options: AM-PAC 6 Clicks  Basic Mobility (PT)  AM-PAC 6 Clicks Score (PT): 17  AM-PAC 6 Clicks Score (OT): 17       Shannon Melo, JACOB  1/9/2024

## 2024-01-09 NOTE — PLAN OF CARE
Goal Outcome Evaluation:  Plan of Care Reviewed With: patient, son        Progress: improving

## 2024-01-09 NOTE — PLAN OF CARE
Goal Outcome Evaluation:  Plan of Care Reviewed With: patient        Progress: no change  Outcome Evaluation: Patient completed supine<>sit with CGA x1, increased time and effort due to drop in O2 sats 70%-93%, Transfered bed>BSC>bed with min assist x1 and B UE support, progress limited by drop in O2 sats and increased SOA. Completed B UE/LE exercises with cues for technique and frequent rest breaks. Recommend home with 24/7 assist and HHPT.

## 2024-01-09 NOTE — CONSULTS
Referring Provider: Adán Coates MD  Reason for Consultation:     Acute on chronic respiratory failure with hypoxia  Pulmonary fibrosis secondary to rheumatoid arthritis  COPD with acute exacerbation      Patient Care Team:  Valerie Sesay APRN as PCP - General (Family Medicine)  Alonzo Marie PA-C as Physician Assistant (Neurosurgery)  Carlos Eduardo Figueredo MD as Consulting Physician (Pain Medicine)  Chris Son MD as Consulting Physician (Neurosurgery)  Keegan Tellez PA-C as Physician Assistant (Physician Assistant)  Kristyn Haro APRN as Nurse Practitioner (Pulmonary Disease)      Subjective .     History of present illness: 73-year-old woman, former smoker who wears oxygen 3 to 4 L chronically and needs assistance at home for her usual activities particularly after hospitalized with an exacerbation January 2022.  She has underlying pulmonary fibrosis secondary to rheumatoid arthritis as well as underlying COPD.  Last pulmonary function test revealed a total lung capacity of 2.64 L, 62%, FEV1 1.03 L, 51%, FEV1 to FVC ratio of 97%, diffusion capacity of 27% consistent with severe restriction and diffusion impairment.  She was hospitalized with COVID-19 in August 2021 and has been on oxygen since that infection.  She is currently on 6 L nasal cannula with a saturation of 97%.  She is noted to desaturate with exertion.  He does have a new right upper lobe pneumonia with extensive underlying fibrosis.  She also has a hiatal hernia.  She admits to increasing reflux symptoms prior to admission.    Review of Systems  Review of Systems   Constitutional:  Positive for activity change and fatigue. Negative for fever.   HENT:  Negative for congestion and trouble swallowing.    Eyes:  Negative for visual disturbance.   Respiratory:  Positive for cough and shortness of breath. Negative for wheezing.    Cardiovascular:  Negative for leg swelling.   Gastrointestinal:  Negative for blood in stool and nausea.    Endocrine: Negative.    Genitourinary:  Negative for dysuria.   Musculoskeletal:  Positive for arthralgias.   Skin:  Negative for rash.   Allergic/Immunologic: Positive for immunocompromised state.   Neurological:  Positive for weakness.   Hematological:  Bruises/bleeds easily.   Psychiatric/Behavioral: Negative.         Current Medications  amLODIPine, 5 mg, Oral, Q24H  budesonide, 0.5 mg, Nebulization, BID - RT  cefTRIAXone, 1,000 mg, Intravenous, Q24H  doxepin, 50 mg, Oral, Nightly  doxycycline, 100 mg, Intravenous, Q12H  flecainide, 50 mg, Oral, BID  FLUoxetine, 40 mg, Oral, Daily  fluticasone, 2 spray, Each Nare, Daily  hydroxychloroquine, 200 mg, Oral, Q24H  ipratropium-albuterol, 3 mL, Nebulization, Q4H - RT  levothyroxine, 25 mcg, Oral, Q AM  losartan, 100 mg, Oral, Daily  methylPREDNISolone sodium succinate, 40 mg, Intravenous, Q12H  metoprolol tartrate, 25 mg, Oral, BID  pantoprazole, 40 mg, Oral, Q AM  rivaroxaban, 15 mg, Oral, Daily With Dinner  senna-docusate sodium, 2 tablet, Oral, BID  sodium chloride, 10 mL, Intravenous, Q12H  sulfaSALAzine, 500 mg, Oral, 2 times per day on Monday Wednesday Friday        History  Past Medical History:   Diagnosis Date    Arthritis     Atrial fibrillation     Closed right hip fracture     COVID     GERD (gastroesophageal reflux disease)     Gout     Hyperlipidemia     Hypertension     Osteoporosis     Rheumatoid arthritis     Wears dentures     upper    Wears glasses     reading   ,   Past Surgical History:   Procedure Laterality Date    ANKLE SURGERY Right     CHOLECYSTECTOMY      COLONOSCOPY      5 years ago    HIP FRACTURE SURGERY      Right Hip with Pins    LUMBAR DISCECTOMY Left 10/12/2016    Procedure: lumbar MICROdiscectomy LEFT L3-5;  Surgeon: Chris Son MD;  [facility]: [facility];  Service:     REPLACEMENT TOTAL KNEE      Right Knee    TOE AMPUTATION      right baby toe, left second toe   ,   Family History   Problem Relation Age of Onset    Heart  "disease Mother     Cancer Father         LUNG   ,   Social History     Tobacco Use    Smoking status: Former     Types: Cigarettes    Smokeless tobacco: Never    Tobacco comments:     quit 30 years ago   Vaping Use    Vaping Use: Never used   Substance Use Topics    Alcohol use: No    Drug use: Never    and Allergies:  Moexipril-hydrochlorothiazide, Penicillins, and Ace inhibitors    Objective     Vital Signs   Blood pressure 129/71, pulse 68, temperature 98.5 °F (36.9 °C), temperature source Oral, resp. rate 18, height 157.5 cm (62\"), weight 49 kg (108 lb), SpO2 97%, not currently breastfeeding.    Physical Exam:          General Appearance: Older woman upright in bed in no respiratory distress                                 HEENT: Conjunctiva pink, no jaundice, nasal mucosa normal, oropharynx without thrush                                    Neck: Limited range of motion, trachea midline                                   Chest: Symmetric chest expansion.  Breath sounds are bilateral with dry inspiratory crackles from the scapula to the base bilaterally, no wheeze or rhonchi                                    Heart: Regular rhythm, S1, S2 auscultated                             Abdomen:   Nondistended, bowel sounds present, soft                          Extremities: No pretibial edema, MIP joints enlarged and some flexion of the MIP joint with mild lateral deviation                                      Skin: Cool and dry                         Lymphatics:   No cervical adenopathy                       Neurological:    Alert and oriented, speech fluent, face symmetric,  equal                          Psychiatric: Normal affect    Results Review:  Lab Results (last 24 hours)       Procedure Component Value Units Date/Time    Blood Culture - Blood, Hand, Right [[id number]]  (Normal) Collected: 01/06/24 1039    Specimen: Blood from Hand, Right Updated: 01/09/24 1115     Blood Culture No growth at 3 days    Blood " Culture - Blood, Arm, Left [[id number]]  (Normal) Collected: 01/06/24 1050    Specimen: Blood from Arm, Left Updated: 01/09/24 1115     Blood Culture No growth at 3 days    Basic Metabolic Panel [[id number]]  (Abnormal) Collected: 01/09/24 0643    Specimen: Blood Updated: 01/09/24 0741     Glucose 103 mg/dL      BUN 29 mg/dL      Creatinine 0.81 mg/dL      Sodium 144 mmol/L      Potassium 4.6 mmol/L      Chloride 105 mmol/L      CO2 30.0 mmol/L      Calcium 8.4 mg/dL      BUN/Creatinine Ratio 35.8     Anion Gap 9.0 mmol/L      eGFR 77.7 mL/min/1.73     Narrative:      GFR Normal >60  Chronic Kidney Disease <60  Kidney Failure <15    The GFR formula is only valid for adults with stable renal function between ages 18 and 70.    CBC (No Diff) [[id number]]  (Abnormal) Collected: 01/09/24 0422    Specimen: Blood Updated: 01/09/24 0508     WBC 5.62 10*3/mm3      RBC 4.04 10*6/mm3      Hemoglobin 11.3 g/dL      Hematocrit 39.6 %      MCV 98.0 fL      MCH 28.0 pg      MCHC 28.5 g/dL      RDW 15.9 %      RDW-SD 57.9 fl      MPV 12.9 fL      Platelets 115 10*3/mm3           Imaging Results (Last 24 Hours)       ** No results found for the last 24 hours. **        Personally reviewed her chest x-ray from January 6 revealing diffuse underlying fibrotic changes in the periphery of both lungs no pleural effusion, superimposed right upper lobe lateral infiltrate suspicious for an acute pneumonia    CT scan from July 2022 revealed extensive fibrosis with honeycombing at the bases, some underlying bronchiectasis and resolving pneumonia      Acute on chronic respiratory failure with hypoxia    Rheumatoid arthritis with positive rheumatoid factor    COPD with acute exacerbation    PAF (paroxysmal atrial fibrillation)    Interstitial lung disease    Stage 3a chronic kidney disease    COPD (chronic obstructive pulmonary disease)      Assessment & Plan     73-year-old woman, previous smoker with rheumatoid arthritis, interstitial lung  disease with honeycombing, underlying COPD, Olivier cirrhosis, hospitalized January 6 with worsening shortness of air and weakness.  She reports having COVID at [place] and then developing influenza A.  Her PCR panel here is negative.  She does have a new right upper lobe infiltrate suspicious for a superimposed pneumonia.  I am suspicious that she may have had an aspiration pneumonia given her hiatal hernia.  She was treated with Rocephin and doxycycline and IV Solu-Medrol with clinical improvement.  Echo revealed an EF of 66 to 70% with some pulmonary hypertension.  She did initially require high flow cannula but has weaned to 6 L.  She did desaturate when working with physical therapy.  Her initial ABG did reveal some compensated hypercapnia with a pH of 7.36, pCO2 of 56 and pO2 of 112 on a nonrebreather mask.  proBNP was also elevated so there may have been a component of diastolic heart failure.  However, she has some chronic kidney disease with a GFR of 53 so the proBNP may be falsely elevated.  Procalcitonin was low on admission at 0.09 and white count is normal today at 5.6.  She is on Plaquenil, sulfasalazine for her rheumatoid arthritis which can suppress the white count.  She also takes steroids chronically.    Her CT scan is worrisome for UIP pattern.  She has not had PFTs since January 2023.  At that time her FVC was only 40%, total lung capacity 62% and diffusion capacity 27% consistent with severe restriction and diffusion impairment.  Now with recurrent COVID and influenza infection, increased oxygen requirement to 6 L I clinically feel she should be assessed for antifibrotic therapy once she recovers from this acute infection.    -Continue to mobilize with therapy  -Supplemental oxygen at 6 L  -Transition to oral steroids and taper back to baseline dose over 2 weeks  -Continue bronchodilators  -Continue empiric antibiotics  -Proton pump inhibitor  -Elevate the head of the bed for GERD  -She has a  follow-up appointment January 17, 2024 with ANITHA Arredondo in our office, at that time she needs pulmonary function test and a 6-minute walk test.  Will need to discuss initiation of antifibrotic therapy  -Okay to discharge home on 6 L    I discussed the patients findings and my recommendations with patient    Anais Gomez MD  01/09/24  15:42 EST    Time: 40min    Please note that portions of this note were completed with a voice recognition program.

## 2024-01-09 NOTE — DISCHARGE SUMMARY
[facility] Medicine Services  DISCHARGE SUMMARY    Patient Name: Liz Borjas  : 1950  MRN: [id number]    Date of Admission: 2024  5:35 AM  Date of Discharge:  24  Primary Care Physician: Valerie Sesay APRN    Consults       Date and Time Order Name Status Description    2024  9:23 AM Inpatient Pulmonology Consult              Hospital Course     Presenting Problem: Dyspnea.    Active Hospital Problems    Diagnosis  POA    **Acute on chronic respiratory failure with hypoxia [J96.21]  Yes    COPD (chronic obstructive pulmonary disease) [J44.9]  Yes    Stage 3a chronic kidney disease [N18.31]  Yes    Interstitial lung disease [J84.9]  Yes    PAF (paroxysmal atrial fibrillation) [I48.0]  Yes    COPD with acute exacerbation [J44.1]  Yes    Rheumatoid arthritis with positive rheumatoid factor [M05.9]  Yes      Resolved Hospital Problems   No resolved problems to display.          Hospital Course:  Liz Borjas is a 73 y.o. female with history of COPD, chronic hypoxic respiratory failure on 6 liters home oxygen, pulmonary fibrosis, RA on chronic prednisone (20 mg daily), HTN, Afib on Xarelto, CKD 3, IBS, and ROPER cirrhosis who presents with progressive shortness of breath and generalized weakness for the past several days. She reports having COVID-19 just after Thanksgiving, and then subsequently had Influenza A - was treated for URI with antibiotics (IM and oral) by her PCP (data deficit).     This patient's problems and plans were partially entered by my partner and updated as appropriate by me 24. Copied text in this note has been reviewed and is accurate as of today's date.      Acute on chronic hypoxic respiratory failure. Improving  COPD with acute exacerbation. On ^ L at basline.  Pulmonary fibrosis/interstitial lung disease  Right upper lobe pneumonia  Recent COVID-19 and Flu A  Pulmonary hypertension  --Patient reports recent COVID and Flu infections  right after Thanksgiving; full respiratory PCR panel here is negative  --CXR on admission showed extensive chronic interstitial changes with suspected superimposed patchy airspace disease present within RUL, repeat CXR this morning again shows possibility of developing RUL pneumonia  --WBC and procal WNL, afebrile  --Blood cultures NGTD  -- Started on Rocephin, Doxycycline  -- Started on IV Solumedrol   --Scheduled and PRN duonebs, budesonide  --proBNP elevated, Echo showing EF 66-70%, grade I diastolic dysfunction, moderate TR, markedly elevated RVSP >55 mmHg: s/p 40 mg IV Lasix x 1 as do suspect some fluid component   --s/p BiPAP (could not tolerate), then on HFNC -- then weaned back to baseline; she had been on 4 liters back in July 2023 which is the last time she saw Pulmonology in the office, now most recent baseline has been 6 liters.  - Pulmonary consulted before discharge.    Pulmonary Recommendations per ;s note:    -Transition to oral steroids and taper back to baseline dose over 2 weeks  -Continue bronchodilators  -Continue empiric antibiotics  -Proton pump inhibitor  -Elevate the head of the bed for GERD  -She has a follow-up appointment January 17, 2024 with ANITHA Arredondo in our office, at that time she needs pulmonary function test and a 6-minute walk test.  Will need to discuss initiation of antifibrotic therapy  -Okay to discharge home on 6 L     RA  --Resume home steroids when treatment for COPD complete     Atrial fibrillation  --Continue Xarelto, Flecainide, Metoprolol  --Follows with Dr. Garcia     HTN  --Continue Norvasc, Losartan, Metoprolol     CKD 3  --Baseline Cr ~1.0-1.1  --Stable, monitor     ROPER cirrhosis  --Followed at , last seen on 10/27/23 by Lupis WELSH         Discharge Follow Up Recommendations for outpatient labs/diagnostics:  Follow up with pcp in 1 week.  Follow up with pulmonary.    Day of Discharge     HPI:   Seen and examined.    Review of  Systems  No fever, chills, abd pain. or CP.    Vital Signs:   Temp:  [97.6 °F (36.4 °C)-98.8 °F (37.1 °C)] 98.8 °F (37.1 °C)  Heart Rate:  [63-91] 73  Resp:  [18-24] 18  BP: (112-164)/(61-88) 112/61  Flow (L/min):  [6] 6      Physical Exam:  Gen-no acute distress, chronically ill appearing  HENT-NCAT, mucous membranes moist  CV-RRR, S1 S2 normal, no m/r/g  Resp-improved breath sounds, very faint wheezes, nonlabored on 6 liters  Abd-soft, NT, ND, +BS  Ext-no edema  Neuro-A&Ox3, no focal deficits  Skin-no rashes  Psych-appropriate mood    Pertinent  and/or Most Recent Results     LAB RESULTS:      Lab 01/09/24  0422 01/08/24  0501 01/07/24  0550 01/06/24  0546   WBC 5.62 5.54 5.27 8.96   HEMOGLOBIN 11.3* 11.1* 10.0* 11.5*   HEMATOCRIT 39.6 37.9 34.1 39.8   PLATELETS 115* 132* 120* 166   NEUTROS ABS  --   --  4.78 6.83   IMMATURE GRANS (ABS)  --   --  0.02 0.02   LYMPHS ABS  --   --  0.31* 1.23   MONOS ABS  --   --  0.14 0.85   EOS ABS  --   --  0.00 0.00   MCV 98.0* 96.2 95.5 98.3*   PROCALCITONIN  --   --   --  0.09         Lab 01/09/24  0643 01/08/24  0501 01/07/24  0550 01/06/24  0546   SODIUM 144 141 139 139   POTASSIUM 4.6 4.3 4.5 3.8   CHLORIDE 105 101 102 98   CO2 30.0* 31.0* 30.0* 32.0*   ANION GAP 9.0 9.0 7.0 9.0   BUN 29* 29* 19 18   CREATININE 0.81 1.12* 0.87 1.03*   EGFR 77.7 52.7* 71.3 58.3*   GLUCOSE 103* 110* 94 93   CALCIUM 8.4* 8.8 8.6 9.1   HEMOGLOBIN A1C  --   --  <4.20*  --          Lab 01/06/24  0546   TOTAL PROTEIN 6.9   ALBUMIN 3.5   GLOBULIN 3.4   ALT (SGPT) 13   AST (SGOT) 20   BILIRUBIN 0.4   ALK PHOS 271*         Lab 01/06/24  0546   PROBNP 3,306.0*   HSTROP T 20*                 Lab 01/06/24  0559   PH, ARTERIAL 7.362   PCO2, ARTERIAL 56.1*   PO2 .0*   FIO2 80   HCO3 ART 31.8*   BASE EXCESS ART 5.2*   CARBOXYHEMOGLOBIN 0.7     Brief Urine Lab Results       None          Microbiology Results (last 10 days)       Procedure Component Value - Date/Time    Blood Culture - Blood, Arm, Left  [[id number]]  (Normal) Collected: 01/06/24 1050    Lab Status: Preliminary result Specimen: Blood from Arm, Left Updated: 01/09/24 1115     Blood Culture No growth at 3 days    Blood Culture - Blood, Hand, Right [[id number]]  (Normal) Collected: 01/06/24 1039    Lab Status: Preliminary result Specimen: Blood from Hand, Right Updated: 01/09/24 1115     Blood Culture No growth at 3 days    COVID-19 and FLU A/B PCR, 1 HR TAT - Swab, Nasopharynx [[id number]]  (Normal) Collected: 01/06/24 0552    Lab Status: Final result Specimen: Swab from Nasopharynx Updated: 01/06/24 0628     COVID19 Not Detected     Influenza A PCR Not Detected     Influenza B PCR Not Detected    Narrative:      Fact sheet for providers: https://www.fda.gov/media/901650/download    Fact sheet for patients: https://www.fda.gov/media/083480/download    Test performed by PCR.    COVID PRE-OP / PRE-PROCEDURE SCREENING ORDER (NO ISOLATION) - Swab, Nasopharynx [[id number]]  (Normal) Collected: 01/06/24 0552    Lab Status: Final result Specimen: Swab from Nasopharynx Updated: 01/06/24 1111    Narrative:      The following orders were created for panel order COVID PRE-OP / PRE-PROCEDURE SCREENING ORDER (NO ISOLATION) - Swab, Nasopharynx.  Procedure                               Abnormality         Status                     ---------                               -----------         ------                     Respiratory Panel PCR w/...[[id number]]  Normal              Final result                 Please view results for these tests on the individual orders.    Respiratory Panel PCR w/COVID-19(SARS-CoV-2) ALFREDO/MEGAN/MARIA FERNANDA/PAD/COR/MONIKA In-House, NP Swab in UTM/VTM, 2 HR TAT - Swab, Nasopharynx [[id number]]  (Normal) Collected: 01/06/24 0552    Lab Status: Final result Specimen: Swab from Nasopharynx Updated: 01/06/24 1111     ADENOVIRUS, PCR Not Detected     Coronavirus 229E Not Detected     Coronavirus HKU1 Not Detected     Coronavirus NL63 Not Detected     Coronavirus  OC43 Not Detected     COVID19 Not Detected     Human Metapneumovirus Not Detected     Human Rhinovirus/Enterovirus Not Detected     Influenza A PCR Not Detected     Influenza B PCR Not Detected     Parainfluenza Virus 1 Not Detected     Parainfluenza Virus 2 Not Detected     Parainfluenza Virus 3 Not Detected     Parainfluenza Virus 4 Not Detected     RSV, PCR Not Detected     Bordetella pertussis pcr Not Detected     Bordetella parapertussis PCR Not Detected     Chlamydophila pneumoniae PCR Not Detected     Mycoplasma pneumo by PCR Not Detected    Narrative:      In the setting of a positive respiratory panel with a viral infection PLUS a negative procalcitonin without other underlying concern for bacterial infection, consider observing off antibiotics or discontinuation of antibiotics and continue supportive care. If the respiratory panel is positive for atypical bacterial infection (Bordetella pertussis, Chlamydophila pneumoniae, or Mycoplasma pneumoniae), consider antibiotic de-escalation to target atypical bacterial infection.            XR Chest 1 View    Result Date: 1/7/2024  XR CHEST 1 VW Date of Exam: 1/7/2024 4:50 AM EST Indication: possible pneumonia, pulmonary fibrosis Comparison: 1/6/2024 Findings: What initially resembles a right IJ catheter actually appears to represent a type of sheet or clothing fold or other external shadow superimposed on the right neck. A similar but much thinner linear fold like density is seen here on yesterday's study. No  definite central line is identified. Note is again made of tracheal deviation to the right without narrowing, likely as a result of underlying scarring/fibrosis. Extensive changes of fibrosis are again seen as on the prior radiograph and earlier chest CT scans. Compared to the most recent 1/6/2024 study, little interval change is seen. There does appear to be slightly denser disease in the right upper lung, which could potentially represent a superimposed  pneumonia. No effusion or pneumothorax is seen.     Impression: 1. Probable skin or clothing fold shadow or apparatus shadow superimposed over the right neck, as noted. 2. Extensive pulmonary fibrosis, possibly with developing right upper lobe pneumonia. Electronically Signed: Cory Richter MD  1/7/2024 8:27 AM EST  Workstation ID: [id number]    Adult Transthoracic Echo Complete W/ Cont if Necessary Per Protocol    Result Date: 1/6/2024    Left ventricular ejection fraction appears to be 66 - 70%.   Left ventricular wall thickness is consistent with mild septal asymmetric hypertrophy.   Left ventricular diastolic function is consistent with (grade I) impaired relaxation.   The right ventricular cavity is mildly dilated.   The left atrial cavity is mildly dilated.   Left atrial volume is moderately increased.   The right atrial cavity is borderline dilated.   Moderate tricuspid valve regurgitation is present.   Estimated right ventricular systolic pressure from tricuspid regurgitation is markedly elevated (>55 mmHg).     XR Chest 1 View    Result Date: 1/6/2024  XR CHEST 1 VW Date of Exam: 1/6/2024 5:42 AM EST Indication: SOA triage protocol Comparison: 7/18/2022, 1/14/2022, 1/15/2022. Findings: Extensive chronic interstitial changes and scarring with groundglass changes present. Suspected superimposed mild patchy airspace disease seen within the subpleural right upper lobe.. No pleural fluid. No pneumothorax. The pulmonary vasculature appears within normal limits. The cardiac and mediastinal silhouette appear unremarkable. No acute osseous abnormality identified.     Impression: Extensive chronic interstitial changes and scarring with suspected superimposed mild patchy airspace disease present within the subpleural right upper lobe, likely related to mild pneumonia. Electronically Signed: Vira Sanchez MD  1/6/2024 5:56 AM EST  Workstation ID: [id number]             Results for orders placed during the hospital encounter of  01/06/24    Adult Transthoracic Echo Complete W/ Cont if Necessary Per Protocol    Interpretation Summary    Left ventricular ejection fraction appears to be 66 - 70%.    Left ventricular wall thickness is consistent with mild septal asymmetric hypertrophy.    Left ventricular diastolic function is consistent with (grade I) impaired relaxation.    The right ventricular cavity is mildly dilated.    The left atrial cavity is mildly dilated.    Left atrial volume is moderately increased.    The right atrial cavity is borderline dilated.    Moderate tricuspid valve regurgitation is present.    Estimated right ventricular systolic pressure from tricuspid regurgitation is markedly elevated (>55 mmHg).      Plan for Follow-up of Pending Labs/Results:   Pending Labs       Order Current Status    Blood Culture - Blood, Arm, Left Preliminary result    Blood Culture - Blood, Hand, Right Preliminary result          Discharge Details        Discharge Medications        New Medications        Instructions Start Date   cefdinir 300 MG capsule  Commonly known as: OMNICEF   300 mg, Oral, 2 Times Daily      doxycycline 100 MG capsule  Commonly known as: VIBRAMYCIN   100 mg, Oral, 2 Times Daily             Changes to Medications        Instructions Start Date   predniSONE 20 MG tablet  Commonly known as: DELTASONE  What changed:   medication strength  See the new instructions.  Another medication with the same name was removed. Continue taking this medication, and follow the directions you see here.   Take 2 tablets by mouth Daily for 2 days, THEN 1.5 tablets Daily for 5 days, THEN 1 tablet Daily for 5 days, THEN 0.5 tablets Daily for 5 days.   Start Date: January 9, 2024            Continue These Medications        Instructions Start Date   albuterol sulfate  (90 Base) MCG/ACT inhaler  Commonly known as: PROVENTIL HFA;VENTOLIN HFA;PROAIR HFA   2 puffs, Inhalation, Every 4 Hours PRN      albuterol 1.25 MG/3ML nebulizer  solution  Commonly known as: ACCUNEB   1.25 mg, Nebulization, Every 6 Hours PRN      amLODIPine 5 MG tablet  Commonly known as: NORVASC       dicyclomine 10 MG capsule  Commonly known as: BENTYL   10 mg, Oral, 3 Times Daily      doxepin 50 MG capsule  Commonly known as: SINEquan   1 capsule, Oral, Every Night at Bedtime      flecainide 50 MG tablet  Commonly known as: TAMBOCOR   50 mg, Oral, 2 Times Daily      FLUoxetine 40 MG capsule  Commonly known as: PROzac   40 mg, Oral, Daily      fluticasone 50 MCG/ACT nasal spray  Commonly known as: FLONASE   2 sprays, Each Nare, Daily PRN, Shake liquid      hydroxychloroquine 200 MG tablet  Commonly known as: PLAQUENIL   No dose, route, or frequency recorded.      levothyroxine 25 MCG tablet  Commonly known as: SYNTHROID, LEVOTHROID   25 mcg, Oral, Every Early Morning, .025 mg      losartan 100 MG tablet  Commonly known as: COZAAR   100 mg, Oral, Daily      metoprolol tartrate 25 MG tablet  Commonly known as: LOPRESSOR   Take 1 tablet by mouth 2 (Two) Times a Day.      nystatin 100,000 unit/mL suspension  Commonly known as: MYCOSTATIN   5 mL, Oral, 4 Times Daily, Swish and Swallow      RABEprazole 20 MG EC tablet  Commonly known as: ACIPHEX   20 mg, Oral, 2 Times Daily      sulfaSALAzine 500 MG EC tablet  Commonly known as: AZULFIDINE   500 mg, Oral, 2 Times Daily,  3 times per week MWF      traMADol 50 MG tablet  Commonly known as: ULTRAM   50 mg, Oral, Every 6 Hours PRN      Trelegy Ellipta 100-62.5-25 MCG/ACT inhaler  Generic drug: Fluticasone-Umeclidin-Vilant   1 puff, Inhalation, Daily      vitamin D 1.25 MG (70390 UT) capsule capsule  Commonly known as: ERGOCALCIFEROL   50,000 Units, Oral, Weekly      Xarelto 15 MG tablet  Generic drug: rivaroxaban   15 mg, Oral, Daily With Dinner      ZyrTEC Allergy 10 MG capsule  Generic drug: Cetirizine HCl   10 mg, Oral, Daily PRN             Stop These Medications      atenolol 100 MG tablet  Commonly known as: TENORMIN             ASK your doctor about these medications        Instructions Start Date   allopurinol 300 MG tablet  Commonly known as: ZYLOPRIM   300 mg, Daily      baclofen 10 MG tablet  Commonly known as: LIORESAL   10 mg, Every 12 Hours PRN      FeroSul 325 (65 Fe) MG tablet  Generic drug: ferrous sulfate   1 tablet, Daily               Allergies   Allergen Reactions    Moexipril-Hydrochlorothiazide Anaphylaxis    Penicillins Hives     Tolerates ancef, rocephin, Zosyn    Ace Inhibitors Hives         Discharge Disposition:  Home or Self Care    Diet:  Hospital:  Diet Order   Procedures    Diet: Regular/House Diet; Texture: Regular Texture (IDDSI 7); Fluid Consistency: Thin (IDDSI 0)            Activity: As tolerated.         CODE STATUS:    Code Status and Medical Interventions:   Ordered at: 01/06/24 0832     Level Of Support Discussed With:    Patient    Next of Kin (If No Surrogate)     Code Status (Patient has no pulse and is not breathing):    CPR (Attempt to Resuscitate)     Medical Interventions (Patient has pulse or is breathing):    Full Support       Future Appointments   Date Time Provider Department Center   1/17/2024 10:00 AM [facility]   1/17/2024 10:30 AM RAD TECH [facility]   1/17/2024 11:00 AM Kristyn Haro APRN [facility]                 Adán Coates MD  01/09/24      Time Spent on Discharge:  I spent  35  minutes on this discharge activity which included: face-to-face encounter with the patient, reviewing the data in the system, coordination of the care with the nursing staff as well as consultants, documentation, and entering orders.

## 2024-01-10 NOTE — OUTREACH NOTE
Prep Survey      Flowsheet Row Responses   [facility] facility patient discharged from? [facility]   Is LACE score < 7 ? No   Eligibility Readm Mgmt   Discharge diagnosis a/c Resp failure   Does the patient have one of the following disease processes/diagnoses(primary or secondary)? Other   Does the patient have Home health ordered? No   Is there a DME ordered? No   Prep survey completed? Yes            NILA PAT - Registered Nurse

## 2024-01-10 NOTE — PAYOR COMM NOTE
"Anay Garber RN  Utilization Management  P:[phone]  F:[phone]    Ref #[id number]   Inpatient was denied-converted to observation 1/8/24  Dc'd 1/9/24  No DC summary available    Lelia Borjas (73 y.o. Female)       Date of Birth   1950    Social Security Number       Address   [address] [place] [address] [address] [place] [address]    Home Phone   [phone]    MRN   [id number]       [facility]    Marital Status                               Admission Date   1/6/24    Admission Type   Emergency    Admitting Provider   Adán Coates MD    Attending Provider       Department, Room/Bed   [address], S521/1       Discharge Date   1/9/2024    Discharge Disposition   Home or Self Care    Discharge Destination                                 Attending Provider: (none)   Allergies: Moexipril-hydrochlorothiazide, Penicillins, Ace Inhibitors    Isolation: None   Infection: None   Code Status: Prior    Ht: 157.5 cm (62\")   Wt: 49 kg (108 lb)    Admission Cmt: None   Principal Problem: Acute on chronic respiratory failure with hypoxia [J96.21]                   Active Insurance as of 1/6/2024       Primary Coverage       Payor Plan Insurance Group Employer/Plan Group    MEDICARE MEDICARE B ONLY        Payor Plan Address Payor Plan Phone Number Payor Plan Fax Number Effective Dates    [address] [address] [phone]  2/1/2017 - None Entered    [facility] [address]         Subscriber Name Subscriber Birth Date Member ID       LELIA BORJAS 1950 [id number]               Secondary Coverage       Payor Plan Insurance Group Employer/Plan Group    [place]        Payor Plan Address Payor Plan Phone Number Payor Plan Fax Number Effective Dates    [address]   1/1/2014 - None Entered    [facility] [phone]         Subscriber Name Subscriber Birth Date Member ID       LELIA BORJAS 1950 [id number]                     Emergency Contacts       "  (Rel.) Home Phone Work Phone Mobile Phone    Corey Borjas (Son) [phone] -- [phone]    JOSE BORJAS (Daughter) [phone] -- [phone]                Anais Gomez MD   Physician  Pulmonology     Consults      Signed     Date of Service: 01/09/24 1541  Creation Time: 01/09/24 1541     Signed       Expand All Collapse All          Referring Provider: Adán Coates MD  Reason for Consultation:      Acute on chronic respiratory failure with hypoxia  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      History of present illness: 73-year-old woman, former smoker who wears oxygen 3 to 4 L chronically and needs assistance at home for her usual activities particularly after hospitalized with an exacerbation January 2022.  She has underlying pulmonary fibrosis secondary to rheumatoid arthritis as well as underlying COPD.  Last pulmonary function test revealed a total lung capacity of 2.64 L, 62%, FEV1 1.03 L, 51%, FEV1 to FVC ratio of 97%, diffusion capacity of 27% consistent with severe restriction and diffusion impairment.  She was hospitalized with COVID-19 in August 2021 and has been on oxygen since that infection.  She is currently on 6 L nasal cannula with a saturation of 97%.  She is noted to desaturate with exertion.  He does have a new right upper lobe pneumonia with extensive underlying fibrosis.  She also has a hiatal hernia.  She admits to increasing reflux symptoms prior to admission.     Review of Systems  Review  of Systems   Constitutional:  Positive for activity change and fatigue. Negative for fever.   HENT:  Negative for congestion and trouble swallowing.    Eyes:  Negative for visual disturbance.   Respiratory:  Positive for cough and shortness of breath. Negative for wheezing.    Cardiovascular:  Negative for leg swelling.   Gastrointestinal:  Negative for blood in stool and nausea.   Endocrine: Negative.    Genitourinary:  Negative for dysuria.   Musculoskeletal:  Positive for arthralgias.   Skin:  Negative for rash.   Allergic/Immunologic: Positive for immunocompromised state.   Neurological:  Positive for weakness.   Hematological:  Bruises/bleeds easily.   Psychiatric/Behavioral: Negative.           Current Medications  amLODIPine, 5 mg, Oral, Q24H  budesonide, 0.5 mg, Nebulization, BID - RT  cefTRIAXone, 1,000 mg, Intravenous, Q24H  doxepin, 50 mg, Oral, Nightly  doxycycline, 100 mg, Intravenous, Q12H  flecainide, 50 mg, Oral, BID  FLUoxetine, 40 mg, Oral, Daily  fluticasone, 2 spray, Each Nare, Daily  hydroxychloroquine, 200 mg, Oral, Q24H  ipratropium-albuterol, 3 mL, Nebulization, Q4H - RT  levothyroxine, 25 mcg, Oral, Q AM  losartan, 100 mg, Oral, Daily  methylPREDNISolone sodium succinate, 40 mg, Intravenous, Q12H  metoprolol tartrate, 25 mg, Oral, BID  pantoprazole, 40 mg, Oral, Q AM  rivaroxaban, 15 mg, Oral, Daily With Dinner  senna-docusate sodium, 2 tablet, Oral, BID  sodium chloride, 10 mL, Intravenous, Q12H  sulfaSALAzine, 500 mg, Oral, 2 times per day on Monday Wednesday Friday           History  Medical History        Past Medical History:   Diagnosis Date    Arthritis      Atrial fibrillation      Closed right hip fracture      COVID      GERD (gastroesophageal reflux disease)      Gout      Hyperlipidemia      Hypertension      Osteoporosis      Rheumatoid arthritis      Wears dentures       upper    Wears glasses       reading      ,   Surgical History         Past Surgical History:   Procedure  "Laterality Date    ANKLE SURGERY Right      CHOLECYSTECTOMY        COLONOSCOPY         5 years ago    HIP FRACTURE SURGERY         Right Hip with Pins    LUMBAR DISCECTOMY Left 10/12/2016     Procedure: lumbar MICROdiscectomy LEFT L3-5;  Surgeon: Chris Son MD;  Location: [facility];  Service:     REPLACEMENT TOTAL KNEE         Right Knee    TOE AMPUTATION         right baby toe, left second toe      ,         Family History   Problem Relation Age of Onset    Heart disease Mother      Cancer Father           LUNG   ,   Social History            Tobacco Use    Smoking status: Former       Types: Cigarettes    Smokeless tobacco: Never    Tobacco comments:       quit 30 years ago   Vaping Use    Vaping Use: Never used   Substance Use Topics    Alcohol use: No    Drug use: Never    and Allergies:  Moexipril-hydrochlorothiazide, Penicillins, and Ace inhibitors           Objective   Vital Signs   Blood pressure 129/71, pulse 68, temperature 98.5 °F (36.9 °C), temperature source Oral, resp. rate 18, height 157.5 cm (62\"), weight 49 kg (108 lb), SpO2 97%, not currently breastfeeding.     Physical Exam:          General Appearance: Older woman upright in bed in no respiratory distress                                 HEENT: Conjunctiva pink, no jaundice, nasal mucosa normal, oropharynx without thrush                                    Neck: Limited range of motion, trachea midline                                   Chest: Symmetric chest expansion.  Breath sounds are bilateral with dry inspiratory crackles from the scapula to the base bilaterally, no wheeze or rhonchi                                    Heart: Regular rhythm, S1, S2 auscultated                             Abdomen:   Nondistended, bowel sounds present, soft                          Extremities: No pretibial edema, MIP joints enlarged and some flexion of the MIP joint with mild lateral deviation                                      Skin: Cool and dry      "                    Lymphatics:   No cervical adenopathy                       Neurological:    Alert and oriented, speech fluent, face symmetric,  equal                          Psychiatric: Normal affect     Results Review:  Lab Results (last 24 hours)         Procedure Component Value Units Date/Time     Blood Culture - Blood, Hand, Right [[id number]]  (Normal) Collected: 01/06/24 1039     Specimen: Blood from Hand, Right Updated: 01/09/24 1115       Blood Culture No growth at 3 days     Blood Culture - Blood, Arm, Left [[id number]]  (Normal) Collected: 01/06/24 1050     Specimen: Blood from Arm, Left Updated: 01/09/24 1115       Blood Culture No growth at 3 days     Basic Metabolic Panel [[id number]]  (Abnormal) Collected: 01/09/24 0643     Specimen: Blood Updated: 01/09/24 0741       Glucose 103 mg/dL         BUN 29 mg/dL         Creatinine 0.81 mg/dL         Sodium 144 mmol/L         Potassium 4.6 mmol/L         Chloride 105 mmol/L         CO2 30.0 mmol/L         Calcium 8.4 mg/dL         BUN/Creatinine Ratio 35.8       Anion Gap 9.0 mmol/L         eGFR 77.7 mL/min/1.73       Narrative:       GFR Normal >60  Chronic Kidney Disease <60  Kidney Failure <15     The GFR formula is only valid for adults with stable renal function between ages 18 and 70.     CBC (No Diff) [[id number]]  (Abnormal) Collected: 01/09/24 0422     Specimen: Blood Updated: 01/09/24 0508       WBC 5.62 10*3/mm3         RBC 4.04 10*6/mm3         Hemoglobin 11.3 g/dL         Hematocrit 39.6 %         MCV 98.0 fL         MCH 28.0 pg         MCHC 28.5 g/dL         RDW 15.9 %         RDW-SD 57.9 fl         MPV 12.9 fL         Platelets 115 10*3/mm3               Imaging Results (Last 24 Hours)         ** No results found for the last 24 hours. **          Personally reviewed her chest x-ray from January 6 revealing diffuse underlying fibrotic changes in the periphery of both lungs no pleural effusion, superimposed right upper lobe lateral  infiltrate suspicious for an acute pneumonia     CT scan from July 2022 revealed extensive fibrosis with honeycombing at the bases, some underlying bronchiectasis and resolving pneumonia       Acute on chronic respiratory failure with hypoxia    Rheumatoid arthritis with positive rheumatoid factor    COPD with acute exacerbation    PAF (paroxysmal atrial fibrillation)    Interstitial lung disease    Stage 3a chronic kidney disease    COPD (chronic obstructive pulmonary disease)              Assessment & Plan  73-year-old woman, previous smoker with rheumatoid arthritis, interstitial lung disease with honeycombing, underlying COPD, Olivier cirrhosis, hospitalized January 6 with worsening shortness of air and weakness.  She reports having COVID at Thanksgiving and then developing influenza A.  Her PCR panel here is negative.  She does have a new right upper lobe infiltrate suspicious for a superimposed pneumonia.  I am suspicious that she may have had an aspiration pneumonia given her hiatal hernia.  She was treated with Rocephin and doxycycline and IV Solu-Medrol with clinical improvement.  Echo revealed an EF of 66 to 70% with some pulmonary hypertension.  She did initially require high flow cannula but has weaned to 6 L.  She did desaturate when working with physical therapy.  Her initial ABG did reveal some compensated hypercapnia with a pH of 7.36, pCO2 of 56 and pO2 of 112 on a nonrebreather mask.  proBNP was also elevated so there may have been a component of diastolic heart failure.  However, she has some chronic kidney disease with a GFR of 53 so the proBNP may be falsely elevated.  Procalcitonin was low on admission at 0.09 and white count is normal today at 5.6.  She is on Plaquenil, sulfasalazine for her rheumatoid arthritis which can suppress the white count.  She also takes steroids chronically.    Her CT scan is worrisome for UIP pattern.  She has not had PFTs since January 2023.  At that time her FVC was  only 40%, total lung capacity 62% and diffusion capacity 27% consistent with severe restriction and diffusion impairment.  Now with recurrent COVID and influenza infection, increased oxygen requirement to 6 L I clinically feel she should be assessed for antifibrotic therapy once she recovers from this acute infection.     -Continue to mobilize with therapy  -Supplemental oxygen at 6 L  -Transition to oral steroids and taper back to baseline dose over 2 weeks  -Continue bronchodilators  -Continue empiric antibiotics  -Proton pump inhibitor  -Elevate the head of the bed for GERD  -She has a follow-up appointment January 17, 2024 with ANITHA Arreodndo in our office, at that time she needs pulmonary function test and a 6-minute walk test.  Will need to discuss initiation of antifibrotic therapy  -Okay to discharge home on 6 L     I discussed the patients findings and my recommendations with patient     Anais Gomez MD  01/09/24  15:42 EST     Time: 40min     Please note that portions of this note were completed with a voice recognition program.                             Discharge Summary    No notes of this type exist for this encounter.

## 2024-01-11 LAB
BACTERIA SPEC AEROBE CULT: NORMAL
BACTERIA SPEC AEROBE CULT: NORMAL

## 2024-01-16 PROBLEM — J96.21 ACUTE ON CHRONIC HYPOXIC RESPIRATORY FAILURE: Status: ACTIVE | Noted: 2024-01-01

## 2024-01-16 NOTE — ED NOTES
Liz Borjas    Nursing Report ED to Floor:  Mental status: a/o x4  Ambulatory status: assist x2  Oxygen Therapy:  HFNC  Cardiac Rhythm: NSR  Admitted from: ED  Safety Concerns:  none  Social Issues: none  ED Room #:  15    ED Nurse Phone Extension - [phone] or may call [phone].      HPI:   Chief Complaint   Patient presents with    Shortness of Breath       Past Medical History:  Past Medical History:   Diagnosis Date    Arthritis     Atrial fibrillation     Closed right hip fracture     COVID     GERD (gastroesophageal reflux disease)     Gout     Hyperlipidemia     Hypertension     Osteoporosis     Rheumatoid arthritis     Wears dentures     upper    Wears glasses     reading        Past Surgical History:  Past Surgical History:   Procedure Laterality Date    ANKLE SURGERY Right     CHOLECYSTECTOMY      COLONOSCOPY      5 years ago    HIP FRACTURE SURGERY      Right Hip with Pins    LUMBAR DISCECTOMY Left 10/12/2016    Procedure: lumbar MICROdiscectomy LEFT L3-5;  Surgeon: Chris Son MD;  [facility] OR;  Service:     REPLACEMENT TOTAL KNEE      Right Knee    TOE AMPUTATION      right baby toe, left second toe        Admitting Doctor:   Brandi Regalado DO    Consulting Provider(s):  Consults       No orders found from 12/18/2023 to 1/17/2024.             Admitting Diagnosis:   The primary encounter diagnosis was Acute on chronic respiratory failure with hypoxemia. Diagnoses of Pulmonary fibrosis, Former smoker, Chronic hypoxic respiratory failure, on home oxygen therapy, and Multifocal pneumonia were also pertinent to this visit.    Most Recent Vitals:   Vitals:    01/16/24 1430 01/16/24 1500 01/16/24 1530 01/16/24 1600   BP: 94/63 110/69 103/64 117/67   BP Location:       Patient Position:       Pulse: 75 73 73 73   Resp:       Temp:       TempSrc:       SpO2:  99% 100% 97%   Weight:       Height:           Active LDAs/IV Access:   Lines, Drains & Airways       Active LDAs       Name Placement  date Placement time Site Days    Peripheral IV 01/16/24 1400 Posterior;Right Forearm 01/16/24  1400  Forearm  less than 1    Peripheral IV 01/16/24 1401 Anterior;Left Forearm 01/16/24  1401  Forearm  less than 1                    Labs (abnormal labs have a star):   Labs Reviewed   COMPREHENSIVE METABOLIC PANEL - Abnormal; Notable for the following components:       Result Value    Glucose 104 (*)     BUN 24 (*)     CO2 30.0 (*)     Albumin 3.2 (*)     AST (SGOT) 52 (*)     Alkaline Phosphatase 410 (*)     All other components within normal limits    Narrative:     GFR Normal >60  Chronic Kidney Disease <60  Kidney Failure <15    The GFR formula is only valid for adults with stable renal function between ages 18 and 70.   BNP (IN-HOUSE) - Abnormal; Notable for the following components:    proBNP 3,161.0 (*)     All other components within normal limits    Narrative:     This assay is used as an aid in the diagnosis of individuals suspected of having heart failure. It can be used as an aid in the diagnosis of acute decompensated heart failure (ADHF) in patients presenting with signs and symptoms of ADHF to the emergency department (ED). In addition, NT-proBNP of <300 pg/mL indicates ADHF is not likely.    Age Range Result Interpretation  NT-proBNP Concentration (pg/mL:      <50             Positive            >450                   Gray                 300-450                    Negative             <300    50-75           Positive            >900                  Gray                300-900                  Negative            <300      >75             Positive            >1800                  Gray                300-1800                  Negative            <300   SINGLE HSTROPONIN T - Abnormal; Notable for the following components:    HS Troponin T 21 (*)     All other components within normal limits    Narrative:     High Sensitive Troponin T Reference Range:  <14.0 ng/L- Negative Female for AMI  <22.0 ng/L-  Negative Male for AMI  >=14 - Abnormal Female indicating possible myocardial injury.  >=22 - Abnormal Male indicating possible myocardial injury.   Clinicians would have to utilize clinical acumen, EKG, Troponin, and serial changes to determine if it is an Acute Myocardial Infarction or myocardial injury due to an underlying chronic condition.        CBC WITH AUTO DIFFERENTIAL - Abnormal; Notable for the following components:    WBC 15.80 (*)     Hemoglobin 11.4 (*)     MCV 97.8 (*)     MCHC 28.2 (*)     RDW-SD 55.5 (*)     MPV 12.2 (*)     Neutrophil % 93.2 (*)     Lymphocyte % 2.1 (*)     Monocyte % 3.9 (*)     Eosinophil % 0.1 (*)     Immature Grans % 0.6 (*)     Neutrophils, Absolute 14.74 (*)     Lymphocytes, Absolute 0.33 (*)     Immature Grans, Absolute 0.09 (*)     All other components within normal limits   BLOOD GAS, ARTERIAL W/CO-OXIMETRY - Abnormal; Notable for the following components:    pCO2, Arterial 54.4 (*)     pO2, Arterial 337.0 (*)     HCO3, Arterial 30.6 (*)     Base Excess, Arterial 4.1 (*)     Hemoglobin, Blood Gas 10.7 (*)     Hematocrit, Blood Gas 32.8 (*)     pCO2, Temperature Corrected 54.4 (*)     pO2, Temperature Corrected 337 (*)     All other components within normal limits   LACTIC ACID, PLASMA - Abnormal; Notable for the following components:    Lactate 2.4 (*)     All other components within normal limits   RESPIRATORY PANEL PCR W/ COVID-19 (SARS-COV-2), NP SWAB IN UTM/VTP, 2 HR TAT - Normal    Narrative:     In the setting of a positive respiratory panel with a viral infection PLUS a negative procalcitonin without other underlying concern for bacterial infection, consider observing off antibiotics or discontinuation of antibiotics and continue supportive care. If the respiratory panel is positive for atypical bacterial infection (Bordetella pertussis, Chlamydophila pneumoniae, or Mycoplasma pneumoniae), consider antibiotic de-escalation to target atypical bacterial infection.  "  LACTIC ACID, REFLEX - Normal   PROCALCITONIN - Normal    Narrative:     As a Marker for Sepsis (Non-Neonates):    1. <0.5 ng/mL represents a low risk of severe sepsis and/or septic shock.  2. >2 ng/mL represents a high risk of severe sepsis and/or septic shock.    As a Marker for Lower Respiratory Tract Infections that require antibiotic therapy:    PCT on Admission    Antibiotic Therapy       6-12 Hrs later    >0.5                Strongly Recommended  >0.25 - <0.5        Recommended   0.1 - 0.25          Discouraged              Remeasure/reassess PCT  <0.1                Strongly Discouraged     Remeasure/reassess PCT    As 28 day mortality risk marker: \"Change in Procalcitonin Result\" (>80% or <=80%) if Day 0 (or Day 1) and Day 4 values are available. Refer to http://www.[facility]-pct-calculator.com    Change in PCT <=80%  A decrease of PCT levels below or equal to 80% defines a positive change in PCT test result representing a higher risk for 28-day all-cause mortality of patients diagnosed with severe sepsis for septic shock.    Change in PCT >80%  A decrease of PCT levels of more than 80% defines a negative change in PCT result representing a lower risk for 28-day all-cause mortality of patients diagnosed with severe sepsis or septic shock.      COVID PRE-OP / PRE-PROCEDURE SCREENING ORDER (NO ISOLATION)    Narrative:     The following orders were created for panel order COVID PRE-OP / PRE-PROCEDURE SCREENING ORDER (NO ISOLATION) - Swab, Nasopharynx.  Procedure                               Abnormality         Status                     ---------                               -----------         ------                     Respiratory Panel PCR w/...[[id number]]  Normal              Final result                 Please view results for these tests on the individual orders.   BLOOD CULTURE   BLOOD CULTURE   MRSA SCREEN, PCR   RESPIRATORY CULTURE   STREP PNEUMO AG, URINE OR CSF   LEGIONELLA ANTIGEN, URINE   RAINBOW " DRAW    Narrative:     The following orders were created for panel order [place] Draw.  Procedure                               Abnormality         Status                     ---------                               -----------         ------                     Green Top (Gel)[[id number]]                                  Final result               Lavender Top[[id number]]                                     Final result               Gold Top - SST[[id number]]                                   Final result               Bullard Top[[id number]]                                         Final result               Light Blue Top[[id number]]                                   Final result                 Please view results for these tests on the individual orders.   BLOOD GAS, ARTERIAL   CBC AND DIFFERENTIAL    Narrative:     The following orders were created for panel order CBC & Differential.  Procedure                               Abnormality         Status                     ---------                               -----------         ------                     CBC Auto Differential[[id number]]        Abnormal            Final result                 Please view results for these tests on the individual orders.   GREEN TOP   LAVENDER TOP   GOLD TOP - SST   GRAY TOP   LIGHT BLUE TOP       Meds Given in ED:   Medications   sodium chloride 0.9 % flush 10 mL (has no administration in time range)   Pharmacy to dose vancomycin (has no administration in time range)   piperacillin-tazobactam (ZOSYN) 3.375 g in iso-osmotic dextrose 50 ml (premix) (3.375 g Intravenous New Bag 1/16/24 1844)   piperacillin-tazobactam (ZOSYN) 3.375 g in iso-osmotic dextrose 50 ml (premix) (has no administration in time range)   vancomycin (VANCOCIN) 1000 mg/200 mL dextrose 5% IVPB (has no administration in time range)   ipratropium-albuterol (DUO-NEB) nebulizer solution 3 mL (3 mL Nebulization Given 1/16/24 1418)   methylPREDNISolone sodium succinate  (SOLU-Medrol) injection 40 mg (40 mg Intravenous Given 1/16/24 1410)   iopamidol (ISOVUE-370) 76 % injection 85 mL (85 mL Intravenous Given 1/16/24 4644)     Pharmacy to dose vancomycin,

## 2024-01-16 NOTE — ED PROVIDER NOTES
Subjective   History of Present Illness  Patient is 73-year-old female presenting to the emergency department with increased work of breathing and hypoxemia.  Patient has a long history of chronic lung disease and is typically on 6 L via nasal cannula at all times.  Son reports that she was recently admitted to the hospital and discharged home.  He noticed over the last 2 days that she has had increased work of breathing and fatigue.  He reports oxygen saturations typically fall to the low 80s when she is walking.  He states that she is now requiring 8 L via nasal cannula to maintain oxygen saturations.  She has a history of chronic lung disease, atrial fibrillation, hyperlipidemia, hypertension, rheumatoid arthritis.    History provided by:  Patient and relative      Review of Systems    Past Medical History:   Diagnosis Date    Arthritis     Atrial fibrillation     Closed right hip fracture     COVID     GERD (gastroesophageal reflux disease)     Gout     Hyperlipidemia     Hypertension     Osteoporosis     Rheumatoid arthritis     Wears dentures     upper    Wears glasses     reading       Allergies   Allergen Reactions    Moexipril-Hydrochlorothiazide Anaphylaxis    Penicillins Hives     Tolerates ancef, rocephin, Zosyn    Ace Inhibitors Hives       Past Surgical History:   Procedure Laterality Date    ANKLE SURGERY Right     CHOLECYSTECTOMY      COLONOSCOPY      5 years ago    HIP FRACTURE SURGERY      Right Hip with Pins    LUMBAR DISCECTOMY Left 10/12/2016    Procedure: lumbar MICROdiscectomy LEFT L3-5;  Surgeon: Chris Son MD;  Location: [facility];  Service:     REPLACEMENT TOTAL KNEE      Right Knee    TOE AMPUTATION      right baby toe, left second toe       Family History   Problem Relation Age of Onset    Heart disease Mother     Cancer Father         LUNG       Social History     Socioeconomic History    Marital status:    Tobacco Use    Smoking status: Former     Types: Cigarettes     Smokeless tobacco: Never    Tobacco comments:     quit 30 years ago   Vaping Use    Vaping Use: Never used   Substance and Sexual Activity    Alcohol use: No    Drug use: Never    Sexual activity: Defer           Objective   Physical Exam  Vitals and nursing note reviewed.   Constitutional:       General: She is in acute distress.      Appearance: She is well-developed. She is ill-appearing.   Cardiovascular:      Rate and Rhythm: Normal rate and regular rhythm.      Pulses: Normal pulses.   Pulmonary:      Effort: Tachypnea and respiratory distress present.      Breath sounds: Decreased breath sounds present.   Musculoskeletal:      Right lower leg: Edema present.      Left lower leg: Edema present.   Skin:     Coloration: Skin is cyanotic.   Neurological:      Mental Status: She is alert and oriented to person, place, and time.   Psychiatric:         Mood and Affect: Mood normal.         Behavior: Behavior normal.         Procedures           ED Course  ED Course as of 01/16/24 1843   Tue Jan 16, 2024   1433 WBC(!): 15.80  Leukocytosis noted which is new compared to recent prior. [RS]   1451 XR Chest 1 View  Personally reviewed the single view the chest.  On my interpretation there is no acute lobar infiltrate. [RS]   1732 Personally reviewed the CT images.  Patient with significant pulmonary fibrosis with multifocal pneumonia.  Antibiotics have been ordered.  Will plan admission for further evaluation management.  Hospitalist messaged for admission. [RS]      ED Course User Index  [RS] Chace Sanches MD                                             Medical Decision Making  Problems Addressed:  Acute on chronic respiratory failure with hypoxemia: complicated acute illness or injury  Chronic hypoxic respiratory failure, on home oxygen therapy: complicated acute illness or injury  Former smoker: complicated acute illness or injury  Multifocal pneumonia: complicated acute illness or injury  Pulmonary fibrosis:  complicated acute illness or injury    Amount and/or Complexity of Data Reviewed  Independent Historian: caregiver  External Data Reviewed: labs and notes.  Labs: ordered. Decision-making details documented in ED Course.  Radiology: ordered. Decision-making details documented in ED Course.  ECG/medicine tests: ordered.  Discussion of management or test interpretation with external provider(s): Hospitalist    Risk  Prescription drug management.  Decision regarding hospitalization.        Final diagnoses:   Acute on chronic respiratory failure with hypoxemia   Pulmonary fibrosis   Former smoker   Chronic hypoxic respiratory failure, on home oxygen therapy   Multifocal pneumonia       ED Disposition  ED Disposition       ED Disposition   Decision to Admit    Condition   --    Comment   Level of Care: Telemetry [5]   Diagnosis: Acute on chronic hypoxic respiratory failure [8700255]   Certification: I Certify That Inpatient Hospital Services Are Medically Necessary For Greater Than 2 Midnights                 No follow-up provider specified.       Medication List      No changes were made to your prescriptions during this visit.            Chace Sanches MD  01/16/24 7011

## 2024-01-17 PROBLEM — E43 SEVERE MALNUTRITION: Status: ACTIVE | Noted: 2024-01-01

## 2024-01-17 NOTE — CASE MANAGEMENT/SOCIAL WORK
Discharge Planning Assessment  [facility]     Patient Name: Liz Borjas  MRN: [id number]  Today's Date: 1/17/2024    Admit Date: 1/16/2024    Plan: discharge plan   Discharge Needs Assessment       Row Name 01/17/24 1440       Living Environment    People in Home alone    Primary Care Provided by self    Provides Primary Care For no one, unable/limited ability to care for self    Family Caregiver if Needed child(brian), adult    Family Caregiver Names Ej(son) and Raquel(daughter in law)    Quality of Family Relationships helpful;supportive;involved    Able to Return to Prior Arrangements yes    Living Arrangement Comments I spoke with pt at bedside with permission regarding discharge plan. Pt resides in [facility] in a home alone.       Transition Planning    Patient/Family Anticipates Transition to home with help/services  Pt current with [facility]     Patient/Family Anticipated Services at Transition     Transportation Anticipated family or friend will provide       Discharge Needs Assessment    Readmission Within the Last 30 Days no previous admission in last 30 days    Current Outpatient/Agency/Support Group homecare agency  Pt current with [facility]( [phone])    Equipment Currently Used at Home oxygen;respiratory supplies;shower chair;rollator  Pt reports she uses home O2 at 6 L, 24/7 [facility] by [facility]    Concerns to be Addressed discharge planning    Equipment Needed After Discharge oxygen;respiratory supplies;shower chair;rollator    Outpatient/Agency/Support Group Needs homecare agency  Pt plans to go home and resume HH with [facility]     Discharge Facility/Level of Care Needs home with home health    Discharge Coordination/Progress Pt confirms that she has Medicare and [facility] [employer] with prescription coverage. Pt uses [facility] on [address] in [place]. Pt has a history of [place], interstitial lung disease, COPD and here with  acute on chronic respiratory failure . Pt states she is able feed and dress self. Her daughter in law assists with bathing. Both son and daughter in law provide meals and transportation. I confirmed with [facility]([phone]) that pt is current with them for PT with a diagnosis of pulmonary fibrosis, CKD stage 3.  Pt will need a resume HH order at discharge, adding any services needed. I spoke with pt's on Corey and he will provide transportation home. CM will cont to follow                   Discharge Plan       Row Name 01/17/24 7914       Plan    Plan discharge plan    Plan Comments Plan is home and resume HH with [facility], adding services needed. Family will transport pt home. CM will cont  to follow    Final Discharge Disposition Code 06 - home with home health care                  Continued Care and Services - Admitted Since 1/16/2024    Coordination has not been started for this encounter.       Expected Discharge Date and Time       Expected Discharge Date Expected Discharge Time    Jan 20, 2024            Demographic Summary       Row Name 01/17/24 1432       General Information    General Information Comments PCP is ZACH AGUILAR       Contact Information    Permission Granted to Share Info With     Contact Information Obtained for     Contact Information Comments Corey Borjas(spouse) [phone]                   Functional Status    No documentation.                  Psychosocial    No documentation.                  Abuse/Neglect    No documentation.                  Legal    No documentation.                  Substance Abuse    No documentation.                  Patient Forms    No documentation.                     Lina Johsnon RN

## 2024-01-17 NOTE — CONSULTS
"PULMONARY SERVICE  PROGRESS NOTE        SUBJECTIVE     We were asked to see Liz Borjas, 73 y.o. female for: Shortness of Breath      Acute on chronic hypoxic respiratory failure    Rheumatoid arthritis with positive rheumatoid factor    Physical deconditioning    Chronic respiratory failure with hypoxia    HTN (hypertension)    Interstitial lung disease      History of Present Illness:  Liz is a 73 y.o. female, who presented to this Hospital on 1/16/2024 because of increasing dyspnea.     Recent hospitalization 1/6/24 to 1/09/23 for similar problems, dyspnea, ILD with exacerbation and \"RUL pneumonia\". Discharged on Oxygen at 6 lpm and Prednisone 40 mg/d.    She completed her oral antibiotics.    On Sunday 01/14/23 she started feeling bad, she cold, chills. On Monday 01/15/23 she was anorexic. On Tuesday 01/16/23 she developed cough and came to the ED. Her SpO2  were down to 60% before coming to the Hospital.    After prednisone was decreased to 30 mg/d, her symptoms started to flare-up, and she came back to the hospital.     She is now on HFNC 70%.    (+) Cough    (+) Sputum production, \"gray/yellow\".    No hemoptysis.    No chest pain.    Temp  Min: 97.8 °F (36.6 °C)  Max: 98.7 °F (37.1 °C)     PMH: She  has a past medical history of Arthritis, Atrial fibrillation, Closed right hip fracture, COVID, GERD (gastroesophageal reflux disease), Gout, Hyperlipidemia, Hypertension, Osteoporosis, Rheumatoid arthritis, Wears dentures, and Wears glasses.   PSxH: She  has a past surgical history that includes Cholecystectomy; Toe amputation; Colonoscopy; Lumbar discectomy (Left, 10/12/2016); Replacement total knee; Hip fracture surgery; and Ankle surgery (Right).     Immunization History   Administered Date(s) Administered    COVID-19 (MODERNA) 1st,2nd,3rd Dose Monovalent 03/03/2021, 03/31/2021    FLUAD TRI 65YR+ 12/27/2021    Flu Vaccine Intradermal Quad 18-64YR 11/08/2007, 09/25/2009    Flu Vaccine Split Quad 11/08/2007, " 09/25/2009, 11/22/2013    Fluzone (or Fluarix & Flulaval for VFC) >6mos 11/17/2016    Hep A / Hep B 12/05/2019    Hepatitis B Adult/Adolescent IM 02/09/2017, 03/09/2017, 01/09/2020    Influenza LAIV (Nasal) 10/25/2019    Influenza Quad Vaccine (Inpatient) 10/26/2011    Influenza, Unspecified 10/27/2013, 11/15/2014    Pneumococcal Conjugate 13-Valent (PCV13) 11/17/2016    Pneumococcal Conjugate 20-Valent (PCV20) 03/30/2022    Pneumococcal Polysaccharide (PPSV23) 04/05/2018       Medications:  No current facility-administered medications on file prior to encounter.     Current Outpatient Medications on File Prior to Encounter   Medication Sig    albuterol (ACCUNEB) 1.25 MG/3ML nebulizer solution Take 3 mL by nebulization Every 6 (Six) Hours As Needed for Wheezing. (Patient taking differently: Take 3 mL by nebulization Every 6 (Six) Hours As Needed for Wheezing or Shortness of Air.)    albuterol sulfate  (90 Base) MCG/ACT inhaler Inhale 2 puffs Every 4 (Four) Hours As Needed for Wheezing or Shortness of Air.    amLODIPine (NORVASC) 5 MG tablet Take 1 tablet by mouth Daily.    baclofen (LIORESAL) 10 MG tablet Take 1 tablet by mouth Every 12 (Twelve) Hours As Needed.    Cetirizine HCl (ZyrTEC Allergy) 10 MG capsule Take 10 mg by mouth Daily As Needed.    dicyclomine (BENTYL) 10 MG capsule Take 1 capsule by mouth 3 (Three) Times a Day.    doxepin (SINEquan) 50 MG capsule Take 1 capsule by mouth every night at bedtime.    FeroSul 325 (65 Fe) MG tablet Take 1 tablet by mouth Daily With Breakfast. OTC    flecainide (TAMBOCOR) 50 MG tablet Take 1 tablet by mouth 2 (Two) Times a Day.    FLUoxetine (PROzac) 40 MG capsule Take 1 capsule by mouth Daily.    fluticasone (FLONASE) 50 MCG/ACT nasal spray 2 sprays by Each Nare route Daily As Needed for Rhinitis or Allergies. OTC    Fluticasone-Umeclidin-Vilant (Trelegy Ellipta) 100-62.5-25 MCG/ACT inhaler Inhale 1 puff Daily.    hydroxychloroquine (PLAQUENIL) 200 MG tablet Take  1 tablet by mouth Daily.    levothyroxine (SYNTHROID, LEVOTHROID) 25 MCG tablet Take 1 tablet by mouth Every Morning.    losartan (COZAAR) 100 MG tablet Take 1 tablet by mouth Daily.    metoprolol tartrate (LOPRESSOR) 25 MG tablet Take 1 tablet by mouth 2 (Two) Times a Day.    predniSONE (DELTASONE) 20 MG tablet Take 2 tablets by mouth Daily for 2 days, THEN 1.5 tablets Daily for 5 days, THEN 1 tablet Daily for 5 days, THEN 0.5 tablets Daily for 5 days.    RABEprazole (ACIPHEX) 20 MG EC tablet Take 1 tablet by mouth 2 (Two) Times a Day.    rivaroxaban (XARELTO) 15 MG tablet Take 1 tablet by mouth Daily With Dinner. Indications: Atrial Fibrillation    sulfamethoxazole-trimethoprim (BACTRIM,SEPTRA) 400-80 MG tablet Take 2 tablets by mouth 3 (Three) Times a Week. MWF schedule    sulfaSALAzine (AZULFIDINE) 500 MG EC tablet Take 1 tablet by mouth 2 (Two) Times a Day.    traMADol (ULTRAM) 50 MG tablet Take 1 tablet by mouth Every 6 (Six) Hours As Needed for Moderate Pain.    vitamin D (ERGOCALCIFEROL) 1.25 MG (05032 UT) capsule capsule Take 1 capsule by mouth 1 (One) Time Per Week. Fridays        Allergies: She is allergic to moexipril-hydrochlorothiazide, penicillins, and ace inhibitors.   FH: Her family history includes Cancer in her father; Heart disease in her mother.   SH: She  reports that she has quit smoking. Her smoking use included cigarettes. She has never used smokeless tobacco. She reports that she does not drink alcohol and does not use drugs.     The patient's relevant past medical, surgical and social history were reviewed and updated in Epic as appropriate.        History     Last Reviewed by Gerardo Robertson MD on 1/17/2024 at 11:30 AM    Sections Reviewed    Medical, Surgical, Family, Tobacco, Alcohol, Drug Use, Sexual Activity,   Social Documentation    Problem list reviewed by Gerardo Robertson MD on 1/17/2024 at 11:30 AM  Medicines reviewed by Gerardo Robertson MD on 1/17/2024 at 11:29 AM  Allergies  reviewed by Gerardo Robertson MD on 1/17/2024 at 11:29 AM      Review of Systems  As described in the HPI.    Objective   O     Vitals:  Temp: 98.3 °F (36.8 °C) (01/17/24 0741) Temp  Min: 97.8 °F (36.6 °C)  Max: 98.7 °F (37.1 °C)   BP: 125/71 (01/17/24 0852) BP  Min: 103/64  Max: 143/73   Pulse: 63 (01/17/24 1013) Pulse  Min: 63  Max: 84   Resp: 18 (01/17/24 1013) Resp  Min: 18  Max: 22   SpO2: (!) 88 % (01/17/24 1013) SpO2  Min: 78 %  Max: 100 %   Device: heated, humidified, high-flow nasal cannula (01/17/24 1200)    Flow Rate: 55 (01/17/24 1200) Flow (L/min)  Min: 15  Max: 55     Medications (drips):       Telemetry:  Rhythm: normal sinus rhythm (01/17/24 1200)         Constitutional:  No acute distress.   Cardiovascular: RRR.    Respiratory: Normal breath sounds  (+) Rhonchi  (+) Squawks   Abdominal:  Soft with no tenderness.   Extremities: No Edema   Neurological:   Alert, Oriented, Cooperative.  Best Eye Response: 4-->(E4) spontaneous (01/17/24 0849)  Best Motor Response: 6-->(M6) obeys commands (01/17/24 0849)  Best Verbal Response: 5-->(V5) oriented (01/17/24 0849)  Adel Coma Scale Score: 15 (01/17/24 0849)     Results Reviewed:  Laboratory  Microbiology  Radiology  Pathology    Hematology:  Results from last 7 days   Lab Units 01/17/24  0503 01/16/24  1357   WBC 10*3/mm3 6.46 15.80*   HEMOGLOBIN g/dL 9.8* 11.4*   MCV fL 96.8 97.8*   PLATELETS 10*3/mm3 109* 199     Results from last 7 days   Lab Units 01/17/24  0503 01/16/24  1357   NEUTROS ABS 10*3/mm3 5.06 14.74*   LYMPHS ABS 10*3/mm3 0.77 0.33*   EOS ABS 10*3/mm3 0.02 0.01     Chemistry:  Estimated Creatinine Clearance: 43.4 mL/min (by C-G formula based on SCr of 0.91 mg/dL).    Results from last 7 days   Lab Units 01/17/24  0503 01/16/24  1357   SODIUM mmol/L 139 141   POTASSIUM mmol/L 3.9 4.2   CHLORIDE mmol/L 104 102   CO2 mmol/L 28.0 30.0*   BUN mg/dL 22 24*   CREATININE mg/dL 0.91 1.00   GLUCOSE mg/dL 66 104*     Results from last 7 days   Lab Units  01/17/24  0503 01/16/24  1357   CALCIUM mg/dL 8.0* 9.0     Hepatic Panel:  Results from last 7 days   Lab Units 01/17/24  0503 01/16/24  1357   ALBUMIN g/dL 2.4* 3.2*   TOTAL PROTEIN g/dL 5.4* 6.9   BILIRUBIN mg/dL 0.2 0.5   AST (SGOT) U/L 30 52*   ALT (SGPT) U/L 21 33   ALK PHOS U/L 306* 410*      Cardiac Labs:  Results from last 7 days   Lab Units 01/17/24  0014 01/16/24  2235 01/16/24  1357   PROBNP pg/mL  --   --  3,161.0*   HSTROP T ng/L 14* 15* 21*     Biomarkers:  Results from last 7 days   Lab Units 01/17/24  0503 01/16/24  1805 01/16/24  1357   LACTATE mmol/L  --  0.8 2.4*   PROCALCITONIN ng/mL 0.16  --  0.11     COVID-19  Lab Results   Component Value Date    COVID19 Not Detected 01/16/2024    COVID19 Not Detected 01/06/2024    COVID19 Not Detected 01/06/2024    COVID19 Not Detected 01/14/2022       Arterial Blood Gases:  Results from last 7 days   Lab Units 01/16/24  1449   PH, ARTERIAL pH units 7.358   PCO2, ARTERIAL mm Hg 54.4*   PO2 ART mm Hg 337.0*   FIO2 % 100       Images:  CT Angiogram Chest    Result Date: 1/16/2024  Impression: No evidence of pulmonary embolism. No evidence of thoracic aortic aneurysm or dissection. Findings compatible with advanced pulmonary fibrosis. There are superimposed groundglass attenuation of the lung parenchyma which may be secondary to interstitial pneumonitis. Small consolidations in the right upper and lower lobes are compatible with multifocal pneumonia. Electronically Signed: Geoffrey Wagner MD  1/16/2024 4:58 PM EST  Workstation ID: [id number]    XR Chest 1 View    Result Date: 1/16/2024  Impression: Similar multifocal airspace opacities remaining most dense in the right upper lobe. Electronically Signed: Viktor Chairez MD  1/16/2024 2:47 PM EST  Workstation ID: [id number]     Echo:  Results for orders placed during the hospital encounter of 01/06/24    Adult Transthoracic Echo Complete W/ Cont if Necessary Per Protocol    Interpretation Summary    Left ventricular  ejection fraction appears to be 66 - 70%.    Left ventricular wall thickness is consistent with mild septal asymmetric hypertrophy.    Left ventricular diastolic function is consistent with (grade I) impaired relaxation.    The right ventricular cavity is mildly dilated.    The left atrial cavity is mildly dilated.    Left atrial volume is moderately increased.    The right atrial cavity is borderline dilated.    Moderate tricuspid valve regurgitation is present.    Estimated right ventricular systolic pressure from tricuspid regurgitation is markedly elevated (>55 mmHg).      Results: Reviewed.  I reviewed the patient's new laboratory and imaging results.  I independently reviewed the patient's new images.    Medications: Reviewed.    Assessment    A / P     Hospital:  LOS: 1 day     Active Hospital Problems    Diagnosis  POA    **Acute on chronic hypoxic respiratory failure [J96.21]  Yes    Interstitial lung disease [J84.9]  Yes    HTN (hypertension) [I10]  Yes    Chronic respiratory failure with hypoxia [J96.11]  Yes    Physical deconditioning [R53.81]  Yes    Rheumatoid arthritis with positive rheumatoid factor [M05.9]  Yes     Liz is a 73 y.o. female admitted on 1/16/2024 with Acute on chronic hypoxic respiratory failure [J96.21]    Assessment/Management/Treatment Plan:    Progress Notes by Kristyn Haro APRN (07/17/2023 10:00)   Consults by Anais Gomez MD (01/09/2024 15:41)     ILD - associated to Rheumatoid arthritis. R/O acute exacerbation, as they were weaning steroids.  R/O atypical/opportunistic infection.  CT with both GGO and honeycombing.  Previous PFTs 01/16/2023: FVC 1.07 L 40%,  FEV1 1.03 L, 51%,  FEV1 ratio 0.97,  TLC 2.64 L, 62%,  DLCO 27% (Multiple attempts)  PH most likely Group 3, RVSP is markedly elevated, > 55 mmHg per ECHO 01/06/2024  Acute/Chronic Respiratory Failure type 2.  Former smoker.  HTN  A Fib per history. Anticoagulation with RIVaroxaban   Elevated  ALKP  Hypothyroidism ? Treatment with Levothyroxine   Lab Results   Component Value Date    TSH 0.930 01/15/2022      No history of T2 diabetes    Lab Results   Lab Value Date/Time    HGBA1C <4.20 (L) 01/07/2024 0550    HGBA1C 5.40 11/28/2021 0611           Code Status and Medical Interventions:   Ordered at: 01/16/24 1851     Level Of Support Discussed With:    Patient     Code Status (Patient has no pulse and is not breathing):    CPR (Attempt to Resuscitate)     Medical Interventions (Patient has pulse or is breathing):    Full Support       In brief:  Discussed with Dr. Gomez and Dr. Blue  Discussed with patient and family at bedside  With her janny FiO2  high risk for complication and need for Invasive Mechanical Ventilation if we do a Bronchoscopy.   Patient does not want to be intubated or be on Invasive Mechanical Ventilation   Need to treat empirically for potential exacerbation of ILD, and/or infections.  Even if we could start antifibrotic therapy, this would not help with current exacerbation. This therapy needs to be evaluated at the Pulmonary Office.  Need to consider Code Status and level of support.  Fungal serologies  CRP  Vasculitis serologies.  Change steroids to Methylprednisolone 125 mg/d  Continue SMX-TMP prophylaxis    I discussed the patient's findings and my recommendations with patient, family, and primary care team     MDM:    Problem(s) High due to: Acute or Chronic illness or injury that may poses a threat to life or bodily function  Data: High due to: Review of prior external records from each unique source, Review of the result(s) of each unique test, Ordering of each unique test, and Discuss management or test interpretation with external physician or NPP (not separately reported)    High    Thank you very much for allowing to participate in the care of Ms. Liz Borjas    [x] Inpatient Pulmonary Consult Service    Copied text in this note has been reviewed and is accurate  as of 01/17/24

## 2024-01-17 NOTE — PROGRESS NOTES
Clinical Nutrition   Nutrition Support Assessment  Reason for Visit: MST score 2+, Unintentional weight loss, Reduced oral intake      Patient Name: Liz Borjas  YOB: 1950  MRN: [id number]  Date of Encounter: 01/17/24 09:47 EST  Admission date: 1/16/2024    Comments:  Pt meets criteria for severe malnutrition in the context of chronic illness indicated by severe muscle wasting and subcutaneous fat loss, wt loss >10% x 6 months (22lb/17.1% wt loss x 6 months), po intake <75% EEN x >/=1 month.     Ordered Boost BID w/meals.  Preferences obtained and communicated to kitchen; encourage po intake.     Nutrition Assessment   Admission Diagnosis:  Acute on chronic hypoxic respiratory failure [J96.21]      Problem List:    Acute on chronic hypoxic respiratory failure        PMH:   She  has a past medical history of Arthritis, Atrial fibrillation, Closed right hip fracture, COVID, GERD (gastroesophageal reflux disease), Gout, Hyperlipidemia, Hypertension, Osteoporosis, Rheumatoid arthritis, Wears dentures, and Wears glasses.    PSH:  She  has a past surgical history that includes Cholecystectomy; Toe amputation; Colonoscopy; Lumbar discectomy (Left, 10/12/2016); Replacement total knee; Hip fracture surgery; and Ankle surgery (Right).    Applicable Nutrition Concerns:   Skin:  Oral:  GI:    Applicable Interval History:       Reported/Observed/Food/Nutrition Related History:     Pt eating breakfast during RD visit, states she has been eating well lately, however endorses significant wt loss in the past few months. Pt states [id number]. Pt reports her son and daughter in laws prepare and bring meals to her. Pt also states she drinks Boost daily, RD encouraged increasing frequency and/or adding snacks during the day to promote po intake and wt maintenance. Pt wears dentures and states they don't fit well but denies need for modified textures at this time. NKFA.     Anthropometrics  "    Flowsheet Rows      Flowsheet Row First Filed Value   Admission Height 157.5 cm (62\") Documented at 01/16/2024 1358   Admission Weight 49 kg (108 lb) Documented at 01/16/2024 1358          Height: Height: 157.5 cm (62\")  Last Filed Weight: Weight: 48.5 kg (107 lb) (01/17/24 0414)  Method: Weight Method: Bed scale  BMI: BMI (Calculated): 19.6  BMI classification: Normal: 18.5-24.9kg/m2  IBW:  50kg    UBW:  120-123lbs per pt report   Weight change:      Weight       Weight (kg) Weight (lbs) Weight Method Visit Report   1/16/2023 58.06 kg  128 lb   --    7/17/2023 58.514 kg  129 lb   --    1/6/2024 48.988 kg  108 lb  Bed scale      49.034 kg  108 lb 1.6 oz       55.792 kg  123 lb      1/16/2024 48.535 kg  107 lb  Bed scale      48.988 kg  108 lb  Stated;Estimated     1/17/2024 48.535 kg  107 lb  Bed scale       Nutrition Focused Physical Exam     Date:   1/17      Patient meets criteria for malnutrition diagnosis, see MSA note.    Current Nutrition Prescription   PO: Diet: Regular/House Diet; Texture: Regular Texture (IDDSI 7); Fluid Consistency: Thin (IDDSI 0)  Oral Nutrition Supplement:   Intake: RD observed pt ate ~25% of breakfast this am.     Nutrition Diagnosis   Date:              Updated:    Problem Malnutrition chronic severe   Etiology Energy intake < energy needs 2/2 COPD   Signs/Symptoms Severe muscle wasting and subcutaneous fat loss, wt loss >10% x 6 months (22lb/17.1% wt loss x 6 months), po intake <75% EEN x >/=1 month   Status:     Goal:   General: Nutrition to support treatment  PO: Increase intake  EN/PN: N/A    Nutrition Intervention      Follow treatment progress, Care plan reviewed, Interview for preferences, Encourage intake, Supplement provided    Chocolate milk and iced tea w/all breakfast trays  Boost+ (strawberry) BID  Pt prefers biscuits and gravy at breakfast    Monitoring/Evaluation:   Per protocol, PO intake, Supplement intake, Weight      Valerie Kuhn, MS,RD,LD  Time Spent: 30  "

## 2024-01-17 NOTE — PROGRESS NOTES
Malnutrition Severity Assessment    Patient Name:  Liz Borjas  YOB: 1950  MRN: [id number]  Admit Date:  1/16/2024    Patient meets criteria for : Severe Malnutrition    Comments:  Pt meets criteria for severe malnutrition in the context of chronic illness indicated by severe muscle wasting and subcutaneous fat loss, wt loss >10% x 6 months (22lb/17.1% wt loss x 6 months), po intake <75% EEN x >/=1 month.     Malnutrition Severity Assessment  Malnutrition Type: Chronic Disease - Related Malnutrition  Malnutrition Type (last 8 hours)       Malnutrition Severity Assessment       Row Name 01/17/24 0958       Malnutrition Severity Assessment    Malnutrition Type Chronic Disease - Related Malnutrition      Row Name 01/17/24 0958       Insufficient Energy Intake     Insufficient Energy Intake Findings Severe    Insufficient Energy Intake  <75% of est. energy requirement for > or equal to 1 month      Row Name 01/17/24 0958       Unintentional Weight Loss     Unintentional Weight Loss Findings Severe    Unintentional Weight Loss  Weight loss greater than 10% in six months      Row Name 01/17/24 0958       Muscle Loss    Loss of Muscle Mass Findings Severe    Clavicle Bone Region Severe - protruding prominent bone    Acromion Bone Region Severe - squared shoulders, bones, and acromion process protrusion prominent    Scapular Bone Region Severe - prominent bones, depressions easily visible between ribs, scapula, spine, shoulders    Dorsal Hand Region Severe - prominent depression    Patellar Region Severe - prominent bone, square looking, very little muscle definition    Anterior Thigh Region Severe - line/depression along thigh, obviously thin    Posterior Calf Region Moderate - some roundness, slight firmness      Row Name 01/17/24 0958       Fat Loss    Subcutaneous Fat Loss Findings Severe    Upper Arm Region Severe - mostly skin, very little space between folds, fingers touch    Thoracic & Lumbar  Region Moderate - ribs visible with mild depressions, iliac crest somewhat prominent      Row Name 01/17/24 0958       Criteria Met (Must meet criteria for severity in at least 2 of these categories: M Wasting, Fat Loss, Fluid, Secondary Signs, Wt. Status, Intake)    Patient meets criteria for  Severe Malnutrition                    Electronically signed by:  Valerie Kuhn MS,RD,LD  01/17/24 10:04 EST

## 2024-01-17 NOTE — ED NOTES
Liz Borjas    Nursing Report ED to Floor:  Mental status: AXO X4  Ambulatory status: TBD (haven't gotten pt up)  Oxygen Therapy:  HIGH FLOW, NON REBREATHER FOR TRANSPORT  Cardiac Rhythm: NSR  Admitted from: HOME/ED  Safety Concerns:  FALL RISK  Social Issues: NONE  ED Room #:  15    ED Nurse Phone Extension - [phone] or may call [phone].      HPI:   Chief Complaint   Patient presents with    Shortness of Breath       Past Medical History:  Past Medical History:   Diagnosis Date    Arthritis     Atrial fibrillation     Closed right hip fracture     COVID     GERD (gastroesophageal reflux disease)     Gout     Hyperlipidemia     Hypertension     Osteoporosis     Rheumatoid arthritis     Wears dentures     upper    Wears glasses     reading        Past Surgical History:  Past Surgical History:   Procedure Laterality Date    ANKLE SURGERY Right     CHOLECYSTECTOMY      COLONOSCOPY      5 years ago    HIP FRACTURE SURGERY      Right Hip with Pins    LUMBAR DISCECTOMY Left 10/12/2016    Procedure: lumbar MICROdiscectomy LEFT L3-5;  Surgeon: Chris Son MD;  Location: [facility] OR;  Service:     REPLACEMENT TOTAL KNEE      Right Knee    TOE AMPUTATION      right baby toe, left second toe        Admitting Doctor:   Brandi Regalado DO    Consulting Provider(s):  Consults       Date and Time Order Name Status Description    1/16/2024  6:52 PM Inpatient Pulmonology Consult               Admitting Diagnosis:   The primary encounter diagnosis was Acute on chronic respiratory failure with hypoxemia. Diagnoses of Pulmonary fibrosis, Former smoker, Chronic hypoxic respiratory failure, on home oxygen therapy, and Multifocal pneumonia were also pertinent to this visit.    Most Recent Vitals:   Vitals:    01/16/24 1730 01/16/24 1800 01/16/24 1830 01/16/24 1900   BP: 122/66 118/65 119/67 120/66   BP Location:       Patient Position:       Pulse: 76 75 76 75   Resp:       Temp:       TempSrc:       SpO2: 94% 94% 94% 95%    Weight:       Height:           Active LDAs/IV Access:   Lines, Drains & Airways       Active LDAs       Name Placement date Placement time Site Days    Peripheral IV 01/16/24 1400 Posterior;Right Forearm 01/16/24  1400  Forearm  less than 1    Peripheral IV 01/16/24 1401 Anterior;Left Forearm 01/16/24  1401  Forearm  less than 1                    Labs (abnormal labs have a star):   Labs Reviewed   COMPREHENSIVE METABOLIC PANEL - Abnormal; Notable for the following components:       Result Value    Glucose 104 (*)     BUN 24 (*)     CO2 30.0 (*)     Albumin 3.2 (*)     AST (SGOT) 52 (*)     Alkaline Phosphatase 410 (*)     All other components within normal limits    Narrative:     GFR Normal >60  Chronic Kidney Disease <60  Kidney Failure <15    The GFR formula is only valid for adults with stable renal function between ages 18 and 70.   BNP (IN-HOUSE) - Abnormal; Notable for the following components:    proBNP 3,161.0 (*)     All other components within normal limits    Narrative:     This assay is used as an aid in the diagnosis of individuals suspected of having heart failure. It can be used as an aid in the diagnosis of acute decompensated heart failure (ADHF) in patients presenting with signs and symptoms of ADHF to the emergency department (ED). In addition, NT-proBNP of <300 pg/mL indicates ADHF is not likely.    Age Range Result Interpretation  NT-proBNP Concentration (pg/mL:      <50             Positive            >450                   Gray                 300-450                    Negative             <300    50-75           Positive            >900                  Gray                300-900                  Negative            <300      >75             Positive            >1800                  Gray                300-1800                  Negative            <300   SINGLE HSTROPONIN T - Abnormal; Notable for the following components:    HS Troponin T 21 (*)     All other components within normal  limits    Narrative:     High Sensitive Troponin T Reference Range:  <14.0 ng/L- Negative Female for AMI  <22.0 ng/L- Negative Male for AMI  >=14 - Abnormal Female indicating possible myocardial injury.  >=22 - Abnormal Male indicating possible myocardial injury.   Clinicians would have to utilize clinical acumen, EKG, Troponin, and serial changes to determine if it is an Acute Myocardial Infarction or myocardial injury due to an underlying chronic condition.        CBC WITH AUTO DIFFERENTIAL - Abnormal; Notable for the following components:    WBC 15.80 (*)     Hemoglobin 11.4 (*)     MCV 97.8 (*)     MCHC 28.2 (*)     RDW-SD 55.5 (*)     MPV 12.2 (*)     Neutrophil % 93.2 (*)     Lymphocyte % 2.1 (*)     Monocyte % 3.9 (*)     Eosinophil % 0.1 (*)     Immature Grans % 0.6 (*)     Neutrophils, Absolute 14.74 (*)     Lymphocytes, Absolute 0.33 (*)     Immature Grans, Absolute 0.09 (*)     All other components within normal limits   BLOOD GAS, ARTERIAL W/CO-OXIMETRY - Abnormal; Notable for the following components:    pCO2, Arterial 54.4 (*)     pO2, Arterial 337.0 (*)     HCO3, Arterial 30.6 (*)     Base Excess, Arterial 4.1 (*)     Hemoglobin, Blood Gas 10.7 (*)     Hematocrit, Blood Gas 32.8 (*)     pCO2, Temperature Corrected 54.4 (*)     pO2, Temperature Corrected 337 (*)     All other components within normal limits   LACTIC ACID, PLASMA - Abnormal; Notable for the following components:    Lactate 2.4 (*)     All other components within normal limits   RESPIRATORY PANEL PCR W/ COVID-19 (SARS-COV-2), NP SWAB IN UTM/VTP, 2 HR TAT - Normal    Narrative:     In the setting of a positive respiratory panel with a viral infection PLUS a negative procalcitonin without other underlying concern for bacterial infection, consider observing off antibiotics or discontinuation of antibiotics and continue supportive care. If the respiratory panel is positive for atypical bacterial infection (Bordetella pertussis, Chlamydophila  "pneumoniae, or Mycoplasma pneumoniae), consider antibiotic de-escalation to target atypical bacterial infection.   LACTIC ACID, REFLEX - Normal   PROCALCITONIN - Normal    Narrative:     As a Marker for Sepsis (Non-Neonates):    1. <0.5 ng/mL represents a low risk of severe sepsis and/or septic shock.  2. >2 ng/mL represents a high risk of severe sepsis and/or septic shock.    As a Marker for Lower Respiratory Tract Infections that require antibiotic therapy:    PCT on Admission    Antibiotic Therapy       6-12 Hrs later    >0.5                Strongly Recommended  >0.25 - <0.5        Recommended   0.1 - 0.25          Discouraged              Remeasure/reassess PCT  <0.1                Strongly Discouraged     Remeasure/reassess PCT    As 28 day mortality risk marker: \"Change in Procalcitonin Result\" (>80% or <=80%) if Day 0 (or Day 1) and Day 4 values are available. Refer to http://www."[employer]"-pct-calculator.com    Change in PCT <=80%  A decrease of PCT levels below or equal to 80% defines a positive change in PCT test result representing a higher risk for 28-day all-cause mortality of patients diagnosed with severe sepsis for septic shock.    Change in PCT >80%  A decrease of PCT levels of more than 80% defines a negative change in PCT result representing a lower risk for 28-day all-cause mortality of patients diagnosed with severe sepsis or septic shock.      COVID PRE-OP / PRE-PROCEDURE SCREENING ORDER (NO ISOLATION)    Narrative:     The following orders were created for panel order COVID PRE-OP / PRE-PROCEDURE SCREENING ORDER (NO ISOLATION) - Swab, Nasopharynx.  Procedure                               Abnormality         Status                     ---------                               -----------         ------                     Respiratory Panel PCR w/...[[id number]]  Normal              Final result                 Please view results for these tests on the individual orders.   BLOOD CULTURE   BLOOD " CULTURE   MRSA SCREEN, PCR   RESPIRATORY CULTURE   STREP PNEUMO AG, URINE OR CSF   LEGIONELLA ANTIGEN, URINE   RAINBOW DRAW    Narrative:     The following orders were created for panel order [place] Draw.  Procedure                               Abnormality         Status                     ---------                               -----------         ------                     Green Top (Gel)[[id number]]                                  Final result               Lavender Top[[id number]]                                     Final result               Gold Top - SST[[id number]]                                   Final result               Bullard Top[[id number]]                                         Final result               Light Blue Top[[id number]]                                   Final result                 Please view results for these tests on the individual orders.   BLOOD GAS, ARTERIAL   CBC AND DIFFERENTIAL    Narrative:     The following orders were created for panel order CBC & Differential.  Procedure                               Abnormality         Status                     ---------                               -----------         ------                     CBC Auto Differential[[id number]]        Abnormal            Final result                 Please view results for these tests on the individual orders.   GREEN TOP   LAVENDER TOP   GOLD TOP - SST   GRAY TOP   LIGHT BLUE TOP       Meds Given in ED:   Medications   sodium chloride 0.9 % flush 10 mL (has no administration in time range)   Pharmacy to dose vancomycin (has no administration in time range)   piperacillin-tazobactam (ZOSYN) 3.375 g in iso-osmotic dextrose 50 ml (premix) (has no administration in time range)   vancomycin (VANCOCIN) 1000 mg/200 mL dextrose 5% IVPB (has no administration in time range)   amLODIPine (NORVASC) tablet 5 mg (has no administration in time range)   cetirizine (zyrTEC) tablet 10 mg (has no administration in time range)    dicyclomine (BENTYL) capsule 10 mg (has no administration in time range)   doxepin (SINEquan) capsule 50 mg (has no administration in time range)   ferrous sulfate tablet 325 mg (has no administration in time range)   flecainide (TAMBOCOR) tablet 50 mg (has no administration in time range)   FLUoxetine (PROzac) capsule 40 mg (has no administration in time range)   fluticasone (FLONASE) 50 MCG/ACT nasal spray 2 spray (has no administration in time range)   budesonide-formoterol (SYMBICORT) 160-4.5 MCG/ACT inhaler 2 puff (has no administration in time range)     And   tiotropium (SPIRIVA RESPIMAT) 2.5 mcg/act aerosol solution inhaler (2 puffs Inhalation Not Given 1/16/24 1935)   hydroxychloroquine (PLAQUENIL) tablet 200 mg (has no administration in time range)   levothyroxine (SYNTHROID, LEVOTHROID) tablet 25 mcg (has no administration in time range)   metoprolol tartrate (LOPRESSOR) tablet 25 mg (has no administration in time range)   rivaroxaban (XARELTO) tablet 15 mg (has no administration in time range)   pantoprazole (PROTONIX) EC tablet 40 mg (has no administration in time range)   sulfamethoxazole-trimethoprim (BACTRIM DS,SEPTRA DS) 800-160 MG per tablet 2 tablet (has no administration in time range)   sulfaSALAzine (AZULFIDINE) tablet 500 mg (has no administration in time range)   traMADol (ULTRAM) tablet 50 mg (has no administration in time range)   sodium chloride 0.9 % flush 10 mL (has no administration in time range)   sodium chloride 0.9 % flush 10 mL (has no administration in time range)   sodium chloride 0.9 % infusion 40 mL (has no administration in time range)   sennosides-docusate (PERICOLACE) 8.6-50 MG per tablet 2 tablet (has no administration in time range)     And   polyethylene glycol (MIRALAX) packet 17 g (has no administration in time range)     And   bisacodyl (DULCOLAX) EC tablet 5 mg (has no administration in time range)     And   bisacodyl (DULCOLAX) suppository 10 mg (has no  administration in time range)   nitroglycerin (NITROSTAT) SL tablet 0.4 mg (has no administration in time range)   Potassium Replacement - Follow Nurse / BPA Driven Protocol (has no administration in time range)   Magnesium Standard Dose Replacement - Follow Nurse / BPA Driven Protocol (has no administration in time range)   Phosphorus Replacement - Follow Nurse / BPA Driven Protocol (has no administration in time range)   Calcium Replacement - Follow Nurse / BPA Driven Protocol (has no administration in time range)   predniSONE (DELTASONE) tablet 40 mg (has no administration in time range)   ipratropium-albuterol (DUO-NEB) nebulizer solution 3 mL (3 mL Nebulization Given 1/16/24 1418)   methylPREDNISolone sodium succinate (SOLU-Medrol) injection 40 mg (40 mg Intravenous Given 1/16/24 1410)   iopamidol (ISOVUE-370) 76 % injection 85 mL (85 mL Intravenous Given 1/16/24 1649)   piperacillin-tazobactam (ZOSYN) 3.375 g in iso-osmotic dextrose 50 ml (premix) (3.375 g Intravenous New Bag 1/16/24 2515)     Pharmacy to dose vancomycin,

## 2024-01-17 NOTE — H&P
[facility] Medicine Services  HISTORY AND PHYSICAL    Patient Name: Liz Borjas  : 1950  MRN: [id number]  Primary Care Physician: Valerie Sesay, ANITHA  Date of admission: 2024      Subjective   Subjective     Chief Complaint:  Cough, hypoxia    HPI:  Liz Borjas is a 73 y.o. female with past medical history of chronic hypoxic respiratory failure on 6 L nasal cannula, advanced interstitial lung disease, underlying COPD, ROPER cirrhosis, rheumatoid arthritis on chronic prednisone (20 mg daily), HTN, A-fib on Xarelto, CKDIII, with recent hospitalization  to  for right upper lobe pneumonia who represented with hypoxia, cough and poor appetite.    History obtained from patient and son/daughter in the ED. Son reports that patient was discharged from the hospital a week ago, completed 2 more days of oral antibiotics on discharge. Antibiotics were completed on Thursday. Patient felt pretty good on Friday and Saturday, had a good appetite. , she was cold/had chills. Monday, had no appetite. Today, started coughing. Saturations were noted to be in the 60s at home today.    Patient reports that she has not had any shortness of breath or chest pain. Admits that her appetite has been poor over the past few days. Currently feels well, has no complaints.      Personal History     Past Medical History:   Diagnosis Date    Arthritis     Atrial fibrillation     Closed right hip fracture     COVID     GERD (gastroesophageal reflux disease)     Gout     Hyperlipidemia     Hypertension     Osteoporosis     Rheumatoid arthritis     Wears dentures     upper    Wears glasses     reading           Past Surgical History:   Procedure Laterality Date    ANKLE SURGERY Right     CHOLECYSTECTOMY      COLONOSCOPY      5 years ago    HIP FRACTURE SURGERY      Right Hip with Pins    LUMBAR DISCECTOMY Left 10/12/2016    Procedure: lumbar MICROdiscectomy LEFT L3-5;  Surgeon: Chris Son,  MD;  Location: [facility];  Service:     REPLACEMENT TOTAL KNEE      Right Knee    TOE AMPUTATION      right baby toe, left second toe       Family History: family history includes Cancer in her father; Heart disease in her mother.     Social History:  reports that she has quit smoking. Her smoking use included cigarettes. She has never used smokeless tobacco. She reports that she does not drink alcohol and does not use drugs.  Social History     Social History Narrative    Not on file       Medications:  Available home medication information reviewed.  Cetirizine HCl, FLUoxetine, Fluticasone-Umeclidin-Vilant, RABEprazole, albuterol, albuterol sulfate HFA, amLODIPine, baclofen, dicyclomine, doxepin, ferrous sulfate, flecainide, fluticasone, hydroxychloroquine, levothyroxine, losartan, metoprolol tartrate, predniSONE, rivaroxaban, sulfaSALAzine, sulfamethoxazole-trimethoprim, traMADol, and vitamin D    Allergies   Allergen Reactions    Moexipril-Hydrochlorothiazide Anaphylaxis    Penicillins Hives     Tolerates ancef, rocephin, Zosyn    Ace Inhibitors Hives       Objective   Objective     Vital Signs:   Temp:  [98.1 °F (36.7 °C)-98.7 °F (37.1 °C)] 98.7 °F (37.1 °C)  Heart Rate:  [73-84] 80  Resp:  [18-24] 18  BP: ()/(50-82) 126/65  Flow (L/min):  [8-55] 55       Physical Exam   Constitutional: Awake, alert, resting comfortably  HENT: NCAT, mucous membranes moist  Respiratory: Diffuse Velcro-like crackles present in upper and lower lung fields bilaterally, breath sounds diminished in right lower lobe, no wheezes, requiring HHFNC  Cardiovascular: RRR, no murmurs, rubs, or gallops  Gastrointestinal: Positive bowel sounds, soft, nontender, nondistended  Musculoskeletal: Mild pitting edema of ankles and feet bilaterally  Neurologic: Alert and oriented x 3, no focal deficits, speech clear  Skin: No rashes      Result Review:  I have personally reviewed the results from the time of this admission to 1/16/2024 22:01 EST  and agree with these findings:  [x]  Laboratory list / accordion  [x]  Microbiology  [x]  Radiology  [x]  EKG/Telemetry   []  Cardiology/Vascular   []  Pathology  []  Old records  []  Other:      LAB RESULTS:      Lab 01/16/24  1805 01/16/24  1357   WBC  --  15.80*   HEMOGLOBIN  --  11.4*   HEMATOCRIT  --  40.4   PLATELETS  --  199   NEUTROS ABS  --  14.74*   IMMATURE GRANS (ABS)  --  0.09*   LYMPHS ABS  --  0.33*   MONOS ABS  --  0.61   EOS ABS  --  0.01   MCV  --  97.8*   PROCALCITONIN  --  0.11   LACTATE 0.8 2.4*         Lab 01/16/24  1357   SODIUM 141   POTASSIUM 4.2   CHLORIDE 102   CO2 30.0*   ANION GAP 9.0   BUN 24*   CREATININE 1.00   EGFR 60.4   GLUCOSE 104*   CALCIUM 9.0         Lab 01/16/24  1357   TOTAL PROTEIN 6.9   ALBUMIN 3.2*   GLOBULIN 3.7   ALT (SGPT) 33   AST (SGOT) 52*   BILIRUBIN 0.5   ALK PHOS 410*         Lab 01/16/24  1357   PROBNP 3,161.0*   HSTROP T 21*                 Lab 01/16/24  1449   PH, ARTERIAL 7.358   PCO2, ARTERIAL 54.4*   PO2 .0*   FIO2 100   HCO3 ART 30.6*   BASE EXCESS ART 4.1*   CARBOXYHEMOGLOBIN 0.9         Microbiology Results (last 10 days)       Procedure Component Value - Date/Time    COVID PRE-OP / PRE-PROCEDURE SCREENING ORDER (NO ISOLATION) - Swab, Nasopharynx [[id number]]  (Normal) Collected: 01/16/24 1405    Lab Status: Final result Specimen: Swab from Nasopharynx Updated: 01/16/24 1503    Narrative:      The following orders were created for panel order COVID PRE-OP / PRE-PROCEDURE SCREENING ORDER (NO ISOLATION) - Swab, Nasopharynx.  Procedure                               Abnormality         Status                     ---------                               -----------         ------                     Respiratory Panel PCR w/...[[id number]]  Normal              Final result                 Please view results for these tests on the individual orders.    Respiratory Panel PCR w/COVID-19(SARS-CoV-2) ALFREDO/MEGAN/MARIA FERNANDA/PAD/COR/MONIKA In-House, NP Swab in UTM/VTM, 2 HR  TAT - Swab, Nasopharynx [[id number]]  (Normal) Collected: 01/16/24 1405    Lab Status: Final result Specimen: Swab from Nasopharynx Updated: 01/16/24 1503     ADENOVIRUS, PCR Not Detected     Coronavirus 229E Not Detected     Coronavirus HKU1 Not Detected     Coronavirus NL63 Not Detected     Coronavirus OC43 Not Detected     COVID19 Not Detected     Human Metapneumovirus Not Detected     Human Rhinovirus/Enterovirus Not Detected     Influenza A PCR Not Detected     Influenza B PCR Not Detected     Parainfluenza Virus 1 Not Detected     Parainfluenza Virus 2 Not Detected     Parainfluenza Virus 3 Not Detected     Parainfluenza Virus 4 Not Detected     RSV, PCR Not Detected     Bordetella pertussis pcr Not Detected     Bordetella parapertussis PCR Not Detected     Chlamydophila pneumoniae PCR Not Detected     Mycoplasma pneumo by PCR Not Detected    Narrative:      In the setting of a positive respiratory panel with a viral infection PLUS a negative procalcitonin without other underlying concern for bacterial infection, consider observing off antibiotics or discontinuation of antibiotics and continue supportive care. If the respiratory panel is positive for atypical bacterial infection (Bordetella pertussis, Chlamydophila pneumoniae, or Mycoplasma pneumoniae), consider antibiotic de-escalation to target atypical bacterial infection.            CT Angiogram Chest    Result Date: 1/16/2024  CT ANGIOGRAM CHEST Date of Exam: 1/16/2024 4:44 PM EST Indication: acute on chronic respiratory failure w/ hypoxemia and hypercapnea. Comparison: Chest radiograph performed on January 16, 2024. Chest CT performed on July 18, 2022. Technique: CTA of the chest was performed after the uneventful intravenous administration of Isovue-370, 85 mL. Reconstructed coronal and sagittal images were also obtained. In addition, a 3-D volume rendered image was created for interpretation. Automated exposure control and iterative reconstruction  methods were used. Findings: Pulmonary arteries: Adequate opacification of the pulmonary arteries.No evidence of acute pulmonary embolism. Thyroid: The visualized portion of the thyroid is unremarkable. Lungs and Pleura: Extensive interstitial thickening is visualized throughout the lung parenchyma with associated traction bronchiectasis. Lung volumes are low. Subpleural honeycombing is visualized and is most prominent in the lung bases. There is almost  diffuse groundglass attenuation of the lung parenchyma. Small foci of consolidation are visualized in the lateral right upper lobe and medial right lower lobe. No pleural effusion. No discrete pulmonary masses are visualized. No pneumothorax. Mediastinum/Marivel: No mediastinal or hilar lymphadenopathy. Moderate hiatal hernia is visualized. Lymph nodes: No axillary or supraclavicular lymphadenopathy. Cardiovascular: The heart is normal in size.No evidence of pericardial effusion.the thoracic aorta is normal in caliber and contour with small scattered calcifications. Small coronary calcifications are noted. Upper Abdomen: No acute process in the upper abdomen. Bones and Soft Tissue: No acute fracture, aggressive osseous lesions, or soft tissue process.     Impression: Impression: No evidence of pulmonary embolism. No evidence of thoracic aortic aneurysm or dissection. Findings compatible with advanced pulmonary fibrosis. There are superimposed groundglass attenuation of the lung parenchyma which may be secondary to interstitial pneumonitis. Small consolidations in the right upper and lower lobes are compatible with multifocal pneumonia. Electronically Signed: Geoffrey Wagner MD  1/16/2024 4:58 PM EST  Workstation ID: [id number]    XR Chest 1 View    Result Date: 1/16/2024  XR CHEST 1 VW Date of Exam: 1/16/2024 2:30 PM EST Indication: SOA triage protocol Comparison: 1/7/2024. Findings: Similar aeration to comparison with coarse bilateral airspace opacities present in  addition to more focal dense airspace disease in the right upper lobe. There is no enlarging effusion or distinct pneumothorax. Unchanged heart and mediastinal contours.     Impression: Impression: Similar multifocal airspace opacities remaining most dense in the right upper lobe. Electronically Signed: Viktor Chairez MD  1/16/2024 2:47 PM EST  Workstation ID: [id number]     Results for orders placed during the hospital encounter of 01/06/24    Adult Transthoracic Echo Complete W/ Cont if Necessary Per Protocol    Interpretation Summary    Left ventricular ejection fraction appears to be 66 - 70%.    Left ventricular wall thickness is consistent with mild septal asymmetric hypertrophy.    Left ventricular diastolic function is consistent with (grade I) impaired relaxation.    The right ventricular cavity is mildly dilated.    The left atrial cavity is mildly dilated.    Left atrial volume is moderately increased.    The right atrial cavity is borderline dilated.    Moderate tricuspid valve regurgitation is present.    Estimated right ventricular systolic pressure from tricuspid regurgitation is markedly elevated (>55 mmHg).      Assessment & Plan   Assessment & Plan       Acute on chronic hypoxic respiratory failure    Liz Borjas is a 73 y.o. female with past medical history of chronic hypoxic respiratory failure on 6 L nasal cannula, advanced interstitial lung disease, underlying COPD, ROPER cirrhosis, rheumatoid arthritis on chronic prednisone (20 mg daily), HTN, A-fib on Xarelto, CKDIII, with recent hospitalization 1/6 to 1/9 for right upper lobe pneumonia who represented with hypoxia, cough and poor appetite. Was found to have acute on chronic hypoxic respiratory failure secondary to multifocal pneumonia.    Acute on chronic hypoxic respiratory failure  -Secondary to multifocal pneumonia present in RUL and RLL  -Was saturating in 60s at home while on 6L (home dose)  -required heated high flow nasal cannula on  admission  -Continue supplemental oxygenation, titrate for goal SpO2 88 to 92%.    Multifocal pneumonia  Hx of advanced ILD with UIP pattern  Hx COPD without exacerbation   -Recent admission for RUL pneumonia with progression despite completing oral antibiotics  -Afebrile, WBC 15K on admission, resp PCR negative   -CTA chest with advanced pulmonary fibrosis and small consolidations in RUL and RLL  -s/p IV methylpred 40 mg x1 in ED  -Will cover empirically with IV Zosyn and IV vancomycin given recent hospitalization. Ordered sputum culture, MRSA probe, urine antigens. Blood cultures x2 pending. Daily CBC. Consulted Pulmonology given patient's advanced ILD.  -Continue prednisone 40 mg daily for now. Bactrim MWF. Symbicort/Spiriva/Duo-Nebs.     Elevated troponin  -Likely secondary to acute hypoxia  -EKG without acute ST-T changes; no chest pain  -Repeat troponin pending.    Elevated proBNP  ?Chronic HFpEF  -proBNP 3K on admission  -no evidence of edema on imaging, mild pedal edema on exam  -Echo 1/6/24 with EF 66 to 70%, grade 1 diastolic dysfunction, moderate TR, RVSP >55 mmHg  -Will hold off on diuresing for now. Can consider as needed. Strict Is&Os.    Chronic anemia  -Hgb appears at baseline, no evidence of overt bleeding  -Continue home oral iron supplement. Further workup as outpatient. Would update age-appropriate cancer screenings.    Rheumatoid arthritis  -On prednisone 20 mg daily, recently discharged on prolonged taper  -Continue home Plaquenil, sulfasalazine and Bentyl/Tramadol for pain.  -Continue prednisone 40 mg daily for now. Bactrim MWF.    Paroxysmal atrial fibrillation  -Continue metoprolol, flecainide and Xarelto.    GERD  -Continue PPI twice daily.    Hypothyroidism  -Continue levothyroxine.    Chronic insomnia  -Continue doxepin.    Hypertension  -Continue amlodipine.    Anxiety/depression  -Continue Prozac.    DVT prophylaxis:  Medical DVT prophylaxis orders are present.      CODE STATUS:    Code  Status and Medical Interventions:   Ordered at: 01/16/24 1851     Level Of Support Discussed With:    Patient     Code Status (Patient has no pulse and is not breathing):    CPR (Attempt to Resuscitate)     Medical Interventions (Patient has pulse or is breathing):    Full Support       Expected Discharge   Expected discharge date/ time has not been documented.     Brandi Regalado, DO  01/16/24

## 2024-01-17 NOTE — CONSULTS
Pharmacy Consult-Vancomycin Dosing  Liz Borjas is a  73 y.o. female receiving vancomycin therapy.     Indication: PNA  Consulting Provider: Hospitalist  ID Consult:     Goal AUC: 400 - 600 mg/L*hr    Current Antimicrobial Therapy  Anti-Infectives (From admission, onward)      Ordered     Dose/Rate Route Frequency Start Stop    01/16/24 1852  sulfamethoxazole-trimethoprim (BACTRIM DS,SEPTRA DS) 800-160 MG per tablet 2 tablet        Ordering Provider: Brandi Regalado DO    2 tablet Oral Once per day on Monday Wednesday Friday 01/17/24 0900 01/29/24 0859    01/16/24 1816  piperacillin-tazobactam (ZOSYN) 3.375 g in iso-osmotic dextrose 50 ml (premix)        Ordering Provider: Steve Jon IV, PharmD    3.375 g  over 4 Hours Intravenous Every 8 Hours 01/17/24 0200 01/24/24 0159    01/16/24 1852  hydroxychloroquine (PLAQUENIL) tablet 200 mg        Ordering Provider: Brandi Regalado DO    200 mg Oral Every 24 Hours Scheduled 01/16/24 2145      01/16/24 1816  vancomycin (VANCOCIN) 1000 mg/200 mL dextrose 5% IVPB        Ordering Provider: Steve Jon IV, PharmD    20 mg/kg × 49 kg  over 60 Minutes Intravenous Once 01/16/24 1930      01/16/24 1816  piperacillin-tazobactam (ZOSYN) 3.375 g in iso-osmotic dextrose 50 ml (premix)        Ordering Provider: Steve Jon IV, PharmD    3.375 g  over 30 Minutes Intravenous Once 01/16/24 1900 01/16/24 1914    01/16/24 1809  Pharmacy to dose vancomycin        Ordering Provider: Brandi Regalado DO     Does not apply Continuous PRN 01/16/24 1808 01/23/24 1807            Allergies  Allergies as of 01/16/2024 - Reviewed 01/16/2024   Allergen Reaction Noted    Moexipril-hydrochlorothiazide Anaphylaxis 09/02/2010    Penicillins Hives 09/02/2010    Ace inhibitors Hives 09/02/2010       Labs    Results from last 7 days   Lab Units 01/16/24  1357   BUN mg/dL 24*   CREATININE mg/dL 1.00       Results from last 7 days   Lab Units 01/16/24  1357   WBC  "10*3/mm3 15.80*       Evaluation of Dosing     Last Dose Received in the ED/Outside Facility: N/A  Is Patient on Dialysis or Renal Replacement: N    Ht - 157.5 cm (62\")  Wt - 49 kg (108 lb)    Estimated Creatinine Clearance: 39.9 mL/min (by C-G formula based on SCr of 1 mg/dL).    Intake & Output (last 3 days)       None            Microbiology and Radiology  Microbiology Results (last 10 days)       Procedure Component Value - Date/Time    COVID PRE-OP / PRE-PROCEDURE SCREENING ORDER (NO ISOLATION) - Swab, Nasopharynx [[id number]]  (Normal) Collected: 01/16/24 1405    Lab Status: Final result Specimen: Swab from Nasopharynx Updated: 01/16/24 1503    Narrative:      The following orders were created for panel order COVID PRE-OP / PRE-PROCEDURE SCREENING ORDER (NO ISOLATION) - Swab, Nasopharynx.  Procedure                               Abnormality         Status                     ---------                               -----------         ------                     Respiratory Panel PCR w/...[[id number]]  Normal              Final result                 Please view results for these tests on the individual orders.    Respiratory Panel PCR w/COVID-19(SARS-CoV-2) ALFREDO/MEGAN/MARIA FERNANDA/PAD/COR/MONIKA In-House, NP Swab in UTM/VTM, 2 HR TAT - Swab, Nasopharynx [[id number]]  (Normal) Collected: 01/16/24 1405    Lab Status: Final result Specimen: Swab from Nasopharynx Updated: 01/16/24 1503     ADENOVIRUS, PCR Not Detected     Coronavirus 229E Not Detected     Coronavirus HKU1 Not Detected     Coronavirus NL63 Not Detected     Coronavirus OC43 Not Detected     COVID19 Not Detected     Human Metapneumovirus Not Detected     Human Rhinovirus/Enterovirus Not Detected     Influenza A PCR Not Detected     Influenza B PCR Not Detected     Parainfluenza Virus 1 Not Detected     Parainfluenza Virus 2 Not Detected     Parainfluenza Virus 3 Not Detected     Parainfluenza Virus 4 Not Detected     RSV, PCR Not Detected     Bordetella pertussis pcr " Not Detected     Bordetella parapertussis PCR Not Detected     Chlamydophila pneumoniae PCR Not Detected     Mycoplasma pneumo by PCR Not Detected    Narrative:      In the setting of a positive respiratory panel with a viral infection PLUS a negative procalcitonin without other underlying concern for bacterial infection, consider observing off antibiotics or discontinuation of antibiotics and continue supportive care. If the respiratory panel is positive for atypical bacterial infection (Bordetella pertussis, Chlamydophila pneumoniae, or Mycoplasma pneumoniae), consider antibiotic de-escalation to target atypical bacterial infection.            Reported Vancomycin Levels                         [employer] AUC Calculation:    Current AUC:    mg/L*hr    Predicted Steady State AUC on Current Dose:  mg/L*hr  _________________________________    Predicted Steady State AUC on New Dose:   400-600 mg/L*hr    Assessment/Plan:    Patient with possible HCAP v recrudescent/under-treated PNA from recent admission. Significant lung disease. Was treated with ceftriaxone and doxy, improved and discharged on cefdinir and doxycycline.   Note, patient has received vancomycin many times during previous admits with persistently negative MRSA surveillance.    Zosyn 3.375 gm IV q8h  Vancomycin 1000 mg load  MRSA PCR    Further per rounding pharmacist    Thank you for this consult.  Steve Jon IV, PharmD, BCPS  1/16/2024  21:22 EST

## 2024-01-17 NOTE — PROGRESS NOTES
[facility] Medicine Services  PROGRESS NOTE    Patient Name: Liz Borjas  : 1950  MRN: [id number]    Date of Admission: 2024  Primary Care Physician: Valerie Sesay APRN    Subjective   Subjective     CC:  F/u dyspnea    HPI:  She is doing well this morning, remains on high flow at that she feels less short of breath than she did before admission      Objective   Objective     Vital Signs:   Temp:  [97.8 °F (36.6 °C)-98.7 °F (37.1 °C)] 98.3 °F (36.8 °C)  Heart Rate:  [63-84] 63  Resp:  [18-24] 18  BP: ()/(50-97) 125/71  Flow (L/min):  [15-55] 55     Physical Exam:  Constitutional: No acute distress, awake, alert, elderly and frail-appearing  HENT: NCAT, mucous membranes moist  Respiratory: Appears comfortable on high flow, Velcro type crackles heard diffusely  Cardiovascular: RRR, no murmurs, rubs, or gallops  Gastrointestinal: Positive bowel sounds, soft, nontender, nondistended  Musculoskeletal: No bilateral ankle edema  Psychiatric: Appropriate affect, cooperative  Neurologic: Oriented x 3, strength symmetric in all extremities, Cranial Nerves grossly intact to confrontation, speech clear  Skin: No rashes      Results Reviewed:  LAB RESULTS:      Lab 24  0503 24  1805 24  1357   WBC 6.46  --  15.80*   HEMOGLOBIN 9.8*  --  11.4*   HEMATOCRIT 33.4*  --  40.4   PLATELETS 109*  --  199   NEUTROS ABS 5.06  --  14.74*   IMMATURE GRANS (ABS) 0.02  --  0.09*   LYMPHS ABS 0.77  --  0.33*   MONOS ABS 0.58  --  0.61   EOS ABS 0.02  --  0.01   MCV 96.8  --  97.8*   PROCALCITONIN 0.16  --  0.11   LACTATE  --  0.8 2.4*         Lab 24  0503 24  1357   SODIUM 139 141   POTASSIUM 3.9 4.2   CHLORIDE 104 102   CO2 28.0 30.0*   ANION GAP 7.0 9.0   BUN 22 24*   CREATININE 0.91 1.00   EGFR 67.6 60.4   GLUCOSE 66 104*   CALCIUM 8.0* 9.0         Lab 24  0503 24  1357   TOTAL PROTEIN 5.4* 6.9   ALBUMIN 2.4* 3.2*   GLOBULIN 3.0 3.7   ALT (SGPT)  21 33   AST (SGOT) 30 52*   BILIRUBIN 0.2 0.5   ALK PHOS 306* 410*         Lab 01/17/24  0014 01/16/24  2235 01/16/24  1357   PROBNP  --   --  3,161.0*   HSTROP T 14* 15* 21*                 Lab 01/16/24  1449   PH, ARTERIAL 7.358   PCO2, ARTERIAL 54.4*   PO2 .0*   FIO2 100   HCO3 ART 30.6*   BASE EXCESS ART 4.1*   CARBOXYHEMOGLOBIN 0.9     Brief Urine Lab Results       None            Microbiology Results Abnormal       Procedure Component Value - Date/Time    MRSA Screen, PCR (Inpatient) - Swab, Nares [[id number]]  (Normal) Collected: 01/16/24 1800    Lab Status: Final result Specimen: Swab from Nares Updated: 01/17/24 0042     MRSA PCR No MRSA Detected    Narrative:      The negative predictive value of this diagnostic test is high and should only be used to consider de-escalating anti-MRSA therapy. A positive result may indicate colonization with MRSA and must be correlated clinically.    S. Pneumo Ag Urine or CSF - Urine, Straight Cath [[id number]]  (Normal) Collected: 01/16/24 1823    Lab Status: Final result Specimen: Urine from Straight Cath Updated: 01/17/24 0002     Strep Pneumo Ag Negative    Legionella Antigen, Urine - Urine, Straight Cath [[id number]]  (Normal) Collected: 01/16/24 1823    Lab Status: Final result Specimen: Urine from Straight Cath Updated: 01/17/24 0001     LEGIONELLA ANTIGEN, URINE Negative    COVID PRE-OP / PRE-PROCEDURE SCREENING ORDER (NO ISOLATION) - Swab, Nasopharynx [[id number]]  (Normal) Collected: 01/16/24 1405    Lab Status: Final result Specimen: Swab from Nasopharynx Updated: 01/16/24 1503    Narrative:      The following orders were created for panel order COVID PRE-OP / PRE-PROCEDURE SCREENING ORDER (NO ISOLATION) - Swab, Nasopharynx.  Procedure                               Abnormality         Status                     ---------                               -----------         ------                     Respiratory Panel PCR w/...[[id number]]  Normal               Final result                 Please view results for these tests on the individual orders.    Respiratory Panel PCR w/COVID-19(SARS-CoV-2) ALFREDO/MEGAN/MARIA FERNANDA/PAD/COR/MONIKA In-House, NP Swab in UTM/VTM, 2 HR TAT - Swab, Nasopharynx [[id number]]  (Normal) Collected: 01/16/24 1405    Lab Status: Final result Specimen: Swab from Nasopharynx Updated: 01/16/24 1503     ADENOVIRUS, PCR Not Detected     Coronavirus 229E Not Detected     Coronavirus HKU1 Not Detected     Coronavirus NL63 Not Detected     Coronavirus OC43 Not Detected     COVID19 Not Detected     Human Metapneumovirus Not Detected     Human Rhinovirus/Enterovirus Not Detected     Influenza A PCR Not Detected     Influenza B PCR Not Detected     Parainfluenza Virus 1 Not Detected     Parainfluenza Virus 2 Not Detected     Parainfluenza Virus 3 Not Detected     Parainfluenza Virus 4 Not Detected     RSV, PCR Not Detected     Bordetella pertussis pcr Not Detected     Bordetella parapertussis PCR Not Detected     Chlamydophila pneumoniae PCR Not Detected     Mycoplasma pneumo by PCR Not Detected    Narrative:      In the setting of a positive respiratory panel with a viral infection PLUS a negative procalcitonin without other underlying concern for bacterial infection, consider observing off antibiotics or discontinuation of antibiotics and continue supportive care. If the respiratory panel is positive for atypical bacterial infection (Bordetella pertussis, Chlamydophila pneumoniae, or Mycoplasma pneumoniae), consider antibiotic de-escalation to target atypical bacterial infection.            CT Angiogram Chest    Result Date: 1/16/2024  CT ANGIOGRAM CHEST Date of Exam: 1/16/2024 4:44 PM EST Indication: acute on chronic respiratory failure w/ hypoxemia and hypercapnea. Comparison: Chest radiograph performed on January 16, 2024. Chest CT performed on July 18, 2022. Technique: CTA of the chest was performed after the uneventful intravenous administration of Isovue-370, 85  mL. Reconstructed coronal and sagittal images were also obtained. In addition, a 3-D volume rendered image was created for interpretation. Automated exposure control and iterative reconstruction methods were used. Findings: Pulmonary arteries: Adequate opacification of the pulmonary arteries.No evidence of acute pulmonary embolism. Thyroid: The visualized portion of the thyroid is unremarkable. Lungs and Pleura: Extensive interstitial thickening is visualized throughout the lung parenchyma with associated traction bronchiectasis. Lung volumes are low. Subpleural honeycombing is visualized and is most prominent in the lung bases. There is almost  diffuse groundglass attenuation of the lung parenchyma. Small foci of consolidation are visualized in the lateral right upper lobe and medial right lower lobe. No pleural effusion. No discrete pulmonary masses are visualized. No pneumothorax. Mediastinum/Marivel: No mediastinal or hilar lymphadenopathy. Moderate hiatal hernia is visualized. Lymph nodes: No axillary or supraclavicular lymphadenopathy. Cardiovascular: The heart is normal in size.No evidence of pericardial effusion.the thoracic aorta is normal in caliber and contour with small scattered calcifications. Small coronary calcifications are noted. Upper Abdomen: No acute process in the upper abdomen. Bones and Soft Tissue: No acute fracture, aggressive osseous lesions, or soft tissue process.     Impression: Impression: No evidence of pulmonary embolism. No evidence of thoracic aortic aneurysm or dissection. Findings compatible with advanced pulmonary fibrosis. There are superimposed groundglass attenuation of the lung parenchyma which may be secondary to interstitial pneumonitis. Small consolidations in the right upper and lower lobes are compatible with multifocal pneumonia. Electronically Signed: Geoffrey Wagner MD  1/16/2024 4:58 PM EST  Workstation ID: [id number]    XR Chest 1 View    Result Date: 1/16/2024  XR CHEST  1 VW Date of Exam: 1/16/2024 2:30 PM EST Indication: SOA triage protocol Comparison: 1/7/2024. Findings: Similar aeration to comparison with coarse bilateral airspace opacities present in addition to more focal dense airspace disease in the right upper lobe. There is no enlarging effusion or distinct pneumothorax. Unchanged heart and mediastinal contours.     Impression: Impression: Similar multifocal airspace opacities remaining most dense in the right upper lobe. Electronically Signed: Viktor Chairez MD  1/16/2024 2:47 PM EST  Workstation ID: [id number]     Results for orders placed during the hospital encounter of 01/06/24    Adult Transthoracic Echo Complete W/ Cont if Necessary Per Protocol    Interpretation Summary    Left ventricular ejection fraction appears to be 66 - 70%.    Left ventricular wall thickness is consistent with mild septal asymmetric hypertrophy.    Left ventricular diastolic function is consistent with (grade I) impaired relaxation.    The right ventricular cavity is mildly dilated.    The left atrial cavity is mildly dilated.    Left atrial volume is moderately increased.    The right atrial cavity is borderline dilated.    Moderate tricuspid valve regurgitation is present.    Estimated right ventricular systolic pressure from tricuspid regurgitation is markedly elevated (>55 mmHg).      Current medications:  Scheduled Meds:amLODIPine, 5 mg, Oral, Q24H  budesonide-formoterol, 2 puff, Inhalation, BID - RT   And  tiotropium bromide monohydrate, 2 puff, Inhalation, Daily - RT  cetirizine, 10 mg, Oral, Daily  dicyclomine, 10 mg, Oral, TID  doxepin, 50 mg, Oral, Nightly  ferrous sulfate, 325 mg, Oral, Daily With Breakfast  flecainide, 50 mg, Oral, Q12H  FLUoxetine, 40 mg, Oral, Daily  hydroxychloroquine, 200 mg, Oral, Q24H  ipratropium-albuterol, 3 mL, Nebulization, 4x Daily - RT  levothyroxine, 25 mcg, Oral, Q AM  metoprolol tartrate, 25 mg, Oral, BID  pantoprazole, 40 mg, Oral, BID  AC  piperacillin-tazobactam, 3.375 g, Intravenous, Q8H  predniSONE, 40 mg, Oral, Daily With Breakfast  rivaroxaban, 15 mg, Oral, Daily With Dinner  sodium chloride, 10 mL, Intravenous, Q12H  sulfamethoxazole-trimethoprim, 2 tablet, Oral, Once per day on Monday Wednesday Friday  sulfaSALAzine, 500 mg, Oral, Q12H  traMADol, 50 mg, Oral, BID      Continuous Infusions:   PRN Meds:.  senna-docusate sodium **AND** polyethylene glycol **AND** bisacodyl **AND** bisacodyl    Calcium Replacement - Follow Nurse / BPA Driven Protocol    fluticasone    ipratropium-albuterol    Magnesium Standard Dose Replacement - Follow Nurse / BPA Driven Protocol    nitroglycerin    Phosphorus Replacement - Follow Nurse / BPA Driven Protocol    Potassium Replacement - Follow Nurse / BPA Driven Protocol    sodium chloride    sodium chloride    sodium chloride    Assessment & Plan   Assessment & Plan     Active Hospital Problems    Diagnosis  POA    **Acute on chronic hypoxic respiratory failure [J96.21]  Yes      Resolved Hospital Problems   No resolved problems to display.        Brief Hospital Course to date:  Liz Borjas is a 73 y.o. female with past medical history of chronic hypoxic respiratory failure on 6 L nasal cannula, advanced interstitial lung disease, underlying COPD, ROPER cirrhosis, rheumatoid arthritis on chronic prednisone (20 mg daily), HTN, A-fib on Xarelto, CKDIII, with recent hospitalization 1/6 to 1/9 for right upper lobe pneumonia who represented with hypoxia, cough and poor appetite. Was found to have acute on chronic hypoxic respiratory failure secondary to multifocal pneumonia. Pulmonology following outpatient for ILD, they have been consulted here as well. She was started on HFNC     Acute on chronic hypoxic respiratory failure  Multifocal pneumonia  Hx of advanced ILD with UIP pattern  Hx COPD without exacerbation   -Recent admission for RUL pneumonia with progression despite completing oral antibiotics  -Afebrile,  WBC 15K on admission, resp PCR negative   -CTA chest with advanced pulmonary fibrosis and small consolidations in RUL and RLL. Reviewed imaging with pulm today. Difficult to see much differences with scan 2 years ago, therefore hard to distinguish if there has been much more inflammation that will respond to steroids. Pulm to f/u whether to inc steroids. She was supposed to start anti-fibrotic agent today in clinic  -s/p IV methylpred 40 mg x1 in ED  -Covering empirically with IV Zosyn given recent hospitalization, dc vanc d/t neg MRSA screen. Cultures in process  -Continue prednisone 40 mg daily for now. Bactrim MWF. Symbicort/Spiriva/Duo-Nebs.      Elevated troponin  -Likely secondary to acute hypoxia  -EKG without acute ST-T changes; no chest pain  -Repeat troponin downtrending     Elevated proBNP  ?Chronic HFpEF  -proBNP 3K on admission  -no evidence of edema on imaging, mild pedal edema on exam  -Echo 1/6/24 with EF 66 to 70%, grade 1 diastolic dysfunction, moderate TR, RVSP >55 mmHg  -Will hold off on diuresing for now. Can consider as needed. Strict Is&Os.     Chronic anemia  -Hgb appears at baseline, no evidence of overt bleeding  -Continue home oral iron supplement. Further workup as outpatient. Would update age-appropriate cancer screenings.     Rheumatoid arthritis  -On prednisone 20 mg daily, recently discharged on prolonged taper  -Continue home Plaquenil, sulfasalazine and Bentyl/Tramadol for pain.  -Continue prednisone 40 mg daily for now. Bactrim MWF.     Paroxysmal atrial fibrillation  -Continue metoprolol, flecainide and Xarelto.     GERD  -Continue PPI twice daily.     Hypothyroidism  -Continue levothyroxine.     Chronic insomnia  -Continue doxepin.     Hypertension  -Continue amlodipine.     Anxiety/depression  -Continue Prozac.      Expected Discharge Location and Transportation: home  Expected Discharge   Expected Discharge Date: 1/20/2024; Expected Discharge Time:      DVT  prophylaxis:  Medical DVT prophylaxis orders are present.     AM-PAC 6 Clicks Score (PT): 12 (01/17/24 8891)    CODE STATUS:   Code Status and Medical Interventions:   Ordered at: 01/16/24 3779     Level Of Support Discussed With:    Patient     Code Status (Patient has no pulse and is not breathing):    CPR (Attempt to Resuscitate)     Medical Interventions (Patient has pulse or is breathing):    Full Support       Lory Blue MD  01/17/24

## 2024-01-18 ENCOUNTER — READMISSION MANAGEMENT (OUTPATIENT)
Dept: CALL CENTER | Facility: HOSPITAL | Age: 74
End: 2024-01-18
Payer: MEDICARE

## 2024-01-18 NOTE — CONSULTS
Palliative Care Initial Consult   Attending Physician: Rosie James MD  Referring Provider: Dr. Rosie James    Reason for Referral:  assistance with clarification of goals of care    Code Status:   Code Status and Medical Interventions:   Ordered at: 01/18/24 0854     Medical Intervention Limits:    NO intubation (DNI)     Level Of Support Discussed With:    Patient     Code Status (Patient has no pulse and is not breathing):    No CPR (Do Not Attempt to Resuscitate)     Medical Interventions (Patient has pulse or is breathing):    Limited Support      Advanced Directives: Advance Directive Status: Patient does not have advance directive   Family/Support: Corey Borjas (son), Raquel Borjas (dtr)  Goals of Care: TBD.    HPI: Liz Borjas is a 73 y.o. female with PMH significant for chronic hypoxic respiratory failure on 6LNC, advanced interstitial lung disease, underlying COPD, ROPER cirrhosis, RA on chronic prednisone, HTN, A-fib on Xarelto, CKDIII, with recent hospitalization 1/6-1/9 for right upper lobe pneumonia. Patient presented to [facility] ED on 1/16 for hypoxia, cough, and poor appetite. Work up revealed CTA chest with advanced pulmonary fibrosis and small consolidations in RUL and RLL. Pulmonology following. FiO2 100%, 55LPM on HFNC. Palliative Care consulted for GOC in the context of complex medical decision making.   Patient denies acute pain, chronic arthritic pain present, takes Tramadol 50mg PO q6 hours prn at home. Patient reports shortness of breath with any movement. Patient reports decreased appetite. Patient lives at home, uses a RW at baseline. Son at bedside during visit.     ROS: +shortness of breath, currently on HFNC, worse with any movement, unable to lay flat. +chronic joint pain. +decreased PO intake. +debility. Denies nausea, vomiting, anxiety.       Past Medical History:   Diagnosis Date    Arthritis     Atrial fibrillation     Closed right hip fracture     COVID     GERD (gastroesophageal  "reflux disease)     Gout     Hyperlipidemia     Hypertension     Osteoporosis     Rheumatoid arthritis     Wears dentures     upper    Wears glasses     reading     Past Surgical History:   Procedure Laterality Date    ANKLE SURGERY Right     CHOLECYSTECTOMY      COLONOSCOPY      5 years ago    HIP FRACTURE SURGERY      Right Hip with Pins    LUMBAR DISCECTOMY Left 10/12/2016    Procedure: lumbar MICROdiscectomy LEFT L3-5;  Surgeon: Chris Son MD;  [facility];  Service:     REPLACEMENT TOTAL KNEE      Right Knee    TOE AMPUTATION      right baby toe, left second toe     Social History     Socioeconomic History    Marital status:    Tobacco Use    Smoking status: Former     Types: Cigarettes    Smokeless tobacco: Never    Tobacco comments:     quit 30 years ago   Vaping Use    Vaping Use: Never used   Substance and Sexual Activity    Alcohol use: No    Drug use: Never    Sexual activity: Defer     Family History   Problem Relation Age of Onset    Heart disease Mother     Cancer Father         LUNG       Allergies   Allergen Reactions    Moexipril-Hydrochlorothiazide Anaphylaxis    Penicillins Hives     Tolerates ancef, rocephin, Zosyn    Ace Inhibitors Hives       Current medication reviewed for route, type, dose and frequency and are current per MAR at time of dictation.    Palliative Performance Scale Score:  30%    /75 (BP Location: Right arm, Patient Position: Lying)   Pulse 65   Temp 98.2 °F (36.8 °C) (Oral)   Resp 18   Ht 157.5 cm (62\")   Wt 49.9 kg (109 lb 14.4 oz)   LMP  (LMP Unknown)   SpO2 98%   BMI 20.10 kg/m²     Intake/Output Summary (Last 24 hours) at 1/18/2024 1015  Last data filed at 1/18/2024 0351  Gross per 24 hour   Intake --   Output 600 ml   Net -600 ml       Physical Exam:    General Appearance:    Patient laying in bed, awake, alert, A/C ill appearing, frail, cooperative, NAD   HEENT:    NC/AT, EOMI, anicteric, MMM, face relaxed   Neck:   supple, trachea " midline, no JVD   Lungs:     CTA bilat, diminished in bases; respirations regular, even and unlabored; RR 18-20 on exam, HFNC 100%, 50LPM    Heart:    RRR, normal S1 and S2, no M/R/G, HR 61 on monitor   Abdomen:     Normal bowel sounds, soft, nontender, nondistended   G/U:   Deferred   MSK/Extremities:   Wasting, no edema   Pulses:   Pulses palpable and equal bilaterally   Skin:   Warm, dry   Neurologic:   A/Ox3, cooperative, ANNA   Psych:   Calm, appropriate         Labs:   Results from last 7 days   Lab Units 01/18/24  0430   WBC 10*3/mm3 4.81   HEMOGLOBIN g/dL 9.5*   HEMATOCRIT % 32.6*   PLATELETS 10*3/mm3 124*     Results from last 7 days   Lab Units 01/18/24  0430   SODIUM mmol/L 142   POTASSIUM mmol/L 4.3   CHLORIDE mmol/L 105   CO2 mmol/L 29.0   BUN mg/dL 20   CREATININE mg/dL 0.87   GLUCOSE mg/dL 108*   CALCIUM mg/dL 8.2*     Results from last 7 days   Lab Units 01/18/24  0430   SODIUM mmol/L 142   POTASSIUM mmol/L 4.3   CHLORIDE mmol/L 105   CO2 mmol/L 29.0   BUN mg/dL 20   CREATININE mg/dL 0.87   CALCIUM mg/dL 8.2*   BILIRUBIN mg/dL 0.2   ALK PHOS U/L 277*   ALT (SGPT) U/L 20   AST (SGOT) U/L 26   GLUCOSE mg/dL 108*     Imaging Results (Last 72 Hours)       Procedure Component Value Units Date/Time    XR Chest 1 View [[id number]] Collected: 01/18/24 0707     Updated: 01/18/24 0715    Narrative:      XR CHEST 1 VW    Date of Exam: 1/18/2024 6:39 AM EST    Indication: hypoxia    Comparison: 1/16/2024    Findings:  Cardiac size is similar to prior exam. Low lung volumes. Again seen are coarse interstitial opacities throughout the lungs which appears slightly decreased from prior examination. No new focal consolidation. No pleural effusion or pneumothorax seen. No   evidence of acute osseous abnormalities. Visualized upper abdomen is unremarkable.      Impression:      Impression:  Again seen are coarse interstitial opacities throughout the lungs, slightly improved from prior examination.      Electronically  Signed: Rich Godwin MD    1/18/2024 7:12 AM EST    Workstation ID: [id number]    CT Angiogram Chest [[id number]] Collected: 01/16/24 1652     Updated: 01/16/24 1702    Narrative:      CT ANGIOGRAM CHEST    Date of Exam: 1/16/2024 4:44 PM EST    Indication: acute on chronic respiratory failure w/ hypoxemia and hypercapnea.    Comparison: Chest radiograph performed on January 16, 2024. Chest CT performed on July 18, 2022.    Technique: CTA of the chest was performed after the uneventful intravenous administration of Isovue-370, 85 mL. Reconstructed coronal and sagittal images were also obtained. In addition, a 3-D volume rendered image was created for interpretation.   Automated exposure control and iterative reconstruction methods were used.      Findings:    Pulmonary arteries: Adequate opacification of the pulmonary arteries.No evidence of acute pulmonary embolism.    Thyroid: The visualized portion of the thyroid is unremarkable.    Lungs and Pleura: Extensive interstitial thickening is visualized throughout the lung parenchyma with associated traction bronchiectasis. Lung volumes are low. Subpleural honeycombing is visualized and is most prominent in the lung bases. There is almost   diffuse groundglass attenuation of the lung parenchyma. Small foci of consolidation are visualized in the lateral right upper lobe and medial right lower lobe. No pleural effusion. No discrete pulmonary masses are visualized. No pneumothorax.    Mediastinum/Marivel: No mediastinal or hilar lymphadenopathy. Moderate hiatal hernia is visualized.    Lymph nodes: No axillary or supraclavicular lymphadenopathy.    Cardiovascular: The heart is normal in size.No evidence of pericardial effusion.the thoracic aorta is normal in caliber and contour with small scattered calcifications. Small coronary calcifications are noted.    Upper Abdomen: No acute process in the upper abdomen.    Bones and Soft Tissue: No acute fracture, aggressive osseous  lesions, or soft tissue process.        Impression:      Impression:  No evidence of pulmonary embolism.    No evidence of thoracic aortic aneurysm or dissection.    Findings compatible with advanced pulmonary fibrosis. There are superimposed groundglass attenuation of the lung parenchyma which may be secondary to interstitial pneumonitis. Small consolidations in the right upper and lower lobes are compatible with   multifocal pneumonia.        Electronically Signed: Geoffrey Wagner MD    1/16/2024 4:58 PM EST    Workstation ID: [id number]    XR Chest 1 View [[id number]] Collected: 01/16/24 1445     Updated: 01/16/24 1450    Narrative:      XR CHEST 1 VW    Date of Exam: 1/16/2024 2:30 PM EST    Indication: SOA triage protocol    Comparison: 1/7/2024.    Findings:  Similar aeration to comparison with coarse bilateral airspace opacities present in addition to more focal dense airspace disease in the right upper lobe. There is no enlarging effusion or distinct pneumothorax. Unchanged heart and mediastinal contours.      Impression:      Impression:  Similar multifocal airspace opacities remaining most dense in the right upper lobe.      Electronically Signed: Viktor Chairez MD    1/16/2024 2:47 PM EST    Workstation ID: [id number]                  Diagnostics: Reviewed    A:   Acute on chronic hypoxic respiratory failure    Rheumatoid arthritis with positive rheumatoid factor    Physical deconditioning    Chronic respiratory failure with hypoxia    HTN (hypertension)    Interstitial lung disease    Severe malnutrition     73 y.o. female with acute on chronic respiratory failure secondary to exacerbation of ILD, RA.   S/S:   Dyspnea -currently on HFNC  -nebs, ABX, steroid  -started Morphine 5mg PO q 3 hours prn dyspnea    2. Pain -chronic arthritic joint pain  -continue Tramadol 50mg PO BID    3. Debility- unable to tolerate PT/OT at this time    4. Decreased PO intake -recommend nutritional supplements    5. GOC -DNR/DNI  -per discussion and review of chart  -goal for continued full treatment with limit of DNR/DNI  -ongoing discussion regarding goals of care if patient is unable to be weaned from HFNC    P: Introduced Palliative care and services to patient and son at bedside. Reviewed symptoms, medications adjusted as above. Encouraged use of morphine to increase tolerance for activity. Reviewed continued full treatment versus comfort focused plan of care. Patient goal's are to continue full treatment with limit of DNR/DNI. Reviewed that if patient is unable to be weaned from HFNC or declines despite maximal HFNC therapy, we can discuss further GOC. All questions and concerns addressed.   Thank you for this consult and allowing us to participate in patient's plan of care. Palliative Care Team will continue to follow patient. Please do not hesitate to contact us regarding further symptom management or goals of care needs.  Time: 60 minutes spent reviewing medical and medication records, assessing and examining patient, discussing with family, answering questions, providing some guidance about a plan and documentation of care, and coordinating care with other healthcare members, with > 50% time spent face to face.         Lise Rodriguez, APRN  1/18/2024

## 2024-01-18 NOTE — PROGRESS NOTES
PULMONARY SERVICE  PROGRESS NOTE        SUBJECTIVE     We were asked to see Liz Borjas, 73 y.o. female for: Shortness of Breath      Acute on chronic hypoxic respiratory failure    Rheumatoid arthritis with positive rheumatoid factor    Physical deconditioning    Chronic respiratory failure with hypoxia    HTN (hypertension)    Interstitial lung disease    Severe malnutrition      Interval History:   She had a quick decompensation this morning.    She is also anxious.    Coughing. Sputum in lime-green, thick.    But she is better now.     She remains comfortable on HFNC.    Device (Oxygen Therapy): high-flow nasal cannula (01/18/24 1209): Flow (L/min): 45 (01/18/24 1216). Oxygen Concentration (%): 70 (01/18/24 1216).     She actually thinks she is a bit better than yesterday.  And her SpO2  has improved from 91% yesterday to % today.    Temp  Min: 97.6 °F (36.4 °C)  Max: 98.4 °F (36.9 °C)     The patient's relevant past medical, surgical and social history were reviewed and updated in Epic as appropriate.      History     Last Reviewed by Gerardo Robertson MD on 1/17/2024 at 11:30 AM    Sections Reviewed    Medical, Surgical, Family, Tobacco, Alcohol, Drug Use, Sexual Activity,   Social Documentation    Problem list reviewed by Gerardo Robertson MD on 1/17/2024 at 11:30 AM  Medicines reviewed by Gerardo Robertson MD on 1/17/2024 at 11:29 AM  Allergies reviewed by Gerardo Robertson MD on 1/17/2024 at 11:29 AM      Objective   O     Vitals:  Temp: 98.4 °F (36.9 °C) (01/18/24 1100) Temp  Min: 97.6 °F (36.4 °C)  Max: 98.4 °F (36.9 °C)   BP: 93/51 (01/18/24 1100) BP  Min: 93/51  Max: 170/85   Pulse: 61 (01/18/24 1209) Pulse  Min: 57  Max: 70   Resp: 17 (01/18/24 1209) Resp  Min: 17  Max: 22   SpO2: 100 % (01/18/24 1209) SpO2  Min: 94 %  Max: 100 %   Device: high-flow nasal cannula (01/18/24 1209)    Flow Rate: 45 (01/18/24 1216) Flow (L/min)  Min: 45  Max: 55     Medications (drips):       Telemetry:  Rhythm: normal  sinus rhythm (01/18/24 1200)         Constitutional:  No acute distress.   Cardiovascular: RRR.    Respiratory: Normal breath sounds  (+) Rhonchi  (+) Squawks   Abdominal:  Soft with no tenderness.   Extremities: No Edema   Neurological:   Alert, Oriented, Cooperative.  Best Eye Response: 4-->(E4) spontaneous (01/18/24 0847)  Best Motor Response: 6-->(M6) obeys commands (01/18/24 0847)  Best Verbal Response: 5-->(V5) oriented (01/18/24 0847)  [place] Coma Scale Score: 15 (01/18/24 0847)     Results Reviewed:  Laboratory  Microbiology  Radiology  Pathology    Hematology:  Results from last 7 days   Lab Units 01/18/24 0430 01/17/24  0503 01/16/24  1357   WBC 10*3/mm3 4.81 6.46 15.80*   HEMOGLOBIN g/dL 9.5* 9.8* 11.4*   MCV fL 97.9* 96.8 97.8*   PLATELETS 10*3/mm3 124* 109* 199     Results from last 7 days   Lab Units 01/18/24  0430 01/17/24  0503 01/16/24  1357   NEUTROS ABS 10*3/mm3 4.29 5.06 14.74*   LYMPHS ABS 10*3/mm3 0.31* 0.77 0.33*   EOS ABS 10*3/mm3 0.00 0.02 0.01     Chemistry:  Estimated Creatinine Clearance: 46.7 mL/min (by C-G formula based on SCr of 0.87 mg/dL).    Results from last 7 days   Lab Units 01/18/24  0430 01/17/24  0503   SODIUM mmol/L 142 139   POTASSIUM mmol/L 4.3 3.9   CHLORIDE mmol/L 105 104   CO2 mmol/L 29.0 28.0   BUN mg/dL 20 22   CREATININE mg/dL 0.87 0.91   GLUCOSE mg/dL 108* 66     Results from last 7 days   Lab Units 01/18/24 0430 01/17/24  0503   CALCIUM mg/dL 8.2* 8.0*     Hepatic Panel:  Results from last 7 days   Lab Units 01/18/24  0430 01/17/24  0503 01/16/24  1357   ALBUMIN g/dL 2.6* 2.4* 3.2*   TOTAL PROTEIN g/dL 5.6* 5.4* 6.9   BILIRUBIN mg/dL 0.2 0.2 0.5   AST (SGOT) U/L 26 30 52*   ALT (SGPT) U/L 20 21 33   ALK PHOS U/L 277* 306* 410*      Cardiac Labs:  Results from last 7 days   Lab Units 01/17/24  0014 01/16/24  2235 01/16/24  1357   PROBNP pg/mL  --   --  3,161.0*   HSTROP T ng/L 14* 15* 21*     Biomarkers:  Results from last 7 days   Lab Units 01/18/24  7950  01/17/24  1636 01/17/24  0503 01/16/24  1805 01/16/24  1357   CRP mg/dL 7.86* 9.05*  --   --   --    LACTATE mmol/L  --   --   --  0.8 2.4*   PROCALCITONIN ng/mL 0.08  --  0.16  --  0.11     COVID-19  Lab Results   Component Value Date    COVID19 Not Detected 01/16/2024    COVID19 Not Detected 01/06/2024    COVID19 Not Detected 01/06/2024    COVID19 Not Detected 01/14/2022     Images:  XR Chest 1 View    Result Date: 1/18/2024  Impression: Again seen are coarse interstitial opacities throughout the lungs, slightly improved from prior examination. Electronically Signed: Rich Godwin MD  1/18/2024 7:12 AM EST  Workstation ID: [id number]    CT Angiogram Chest    Result Date: 1/16/2024  Impression: No evidence of pulmonary embolism. No evidence of thoracic aortic aneurysm or dissection. Findings compatible with advanced pulmonary fibrosis. There are superimposed groundglass attenuation of the lung parenchyma which may be secondary to interstitial pneumonitis. Small consolidations in the right upper and lower lobes are compatible with multifocal pneumonia. Electronically Signed: Geoffrey Wagner MD  1/16/2024 4:58 PM EST  Workstation ID: [id number]     Echo:  Results for orders placed during the hospital encounter of 01/06/24    Adult Transthoracic Echo Complete W/ Cont if Necessary Per Protocol    Interpretation Summary    Left ventricular ejection fraction appears to be 66 - 70%.    Left ventricular wall thickness is consistent with mild septal asymmetric hypertrophy.    Left ventricular diastolic function is consistent with (grade I) impaired relaxation.    The right ventricular cavity is mildly dilated.    The left atrial cavity is mildly dilated.    Left atrial volume is moderately increased.    The right atrial cavity is borderline dilated.    Moderate tricuspid valve regurgitation is present.    Estimated right ventricular systolic pressure from tricuspid regurgitation is markedly elevated (>55 mmHg).      Results:  Reviewed.  I reviewed the patient's new laboratory and imaging results.  I independently reviewed the patient's new images.    Medications: Reviewed.    Assessment    A / P     Hospital:  LOS: 2 days     Active Hospital Problems    Diagnosis  POA    **Acute on chronic hypoxic respiratory failure [J96.21]  Yes    Severe malnutrition [E43]  Yes    Interstitial lung disease [J84.9]  Yes    HTN (hypertension) [I10]  Yes    Chronic respiratory failure with hypoxia [J96.11]  Yes    Physical deconditioning [R53.81]  Yes    Rheumatoid arthritis with positive rheumatoid factor [M05.9]  Yes     Liz is a 73 y.o. female admitted on 1/16/2024 with Acute on chronic hypoxic respiratory failure [J96.21]    Assessment/Management/Treatment Plan:    Progress Notes by Kristyn Haro APRN (07/17/2023 10:00)   Consults by Anais Gomez MD (01/09/2024 15:41)     ILD - associated to Rheumatoid arthritis. R/O acute exacerbation, as they were weaning steroids.  R/O atypical/opportunistic infection.  CT with both GGO and honeycombing.  Previous PFTs 01/16/2023: FVC 1.07 L 40%,  FEV1 1.03 L, 51%,  FEV1 ratio 0.97,  TLC 2.64 L, 62%,  DLCO 27% (Multiple attempts)  PH most likely Group 3, RVSP is markedly elevated, > 55 mmHg per ECHO 01/06/2024  Currently on Methylprednisolone 125 mg/d x 3 days (01/17/24-01/19/24)  Acute/Chronic Respiratory Failure type 2.  Former smoker.  HTN  A Fib per history. Anticoagulation with RIVaroxaban   Elevated ALKP  Hypothyroidism ? Treatment with Levothyroxine   Lab Results   Component Value Date    TSH 0.930 01/15/2022      No history of T2 diabetes    Lab Results   Lab Value Date/Time    HGBA1C <4.20 (L) 01/07/2024 0550    HGBA1C 5.40 11/28/2021 0611           Code Status and Medical Interventions:   Ordered at: 01/18/24 0854     Medical Intervention Limits:    NO intubation (DNI)     Level Of Support Discussed With:    Patient     Code Status (Patient has no pulse and is not breathing):    No CPR (Do  Not Attempt to Resuscitate)     Medical Interventions (Patient has pulse or is breathing):    Limited Support       In brief:    Discussed with patient and family at bedside (01/17/24)  With her janny FiO2  high risk for complication and need for Invasive Mechanical Ventilation if we do a Bronchoscopy.   Patient does not want to be intubated or be on Invasive Mechanical Ventilation   Need to treat empirically for potential exacerbation of ILD, and/or infections.  Discussed with patient and son.  Even if we could start antifibrotic therapy while in the hospital, this would not help with current exacerbation. This therapy needs to be evaluated at the Pulmonary Office.  After further conversations between Dr. James and patient, she has decided DNI/DNAR  F/U Cultures and serologies. Adjust antibiotics based on results and presence of resistance.  As discussed with Dr. James, add Doxy to cover atypical, and change TMP/SMX to IV daily to cover for potential PJP.  Continue Methylprednisolone 125 mg/d (Day 2 out of 3)  Palliative Team to help with symptoms control.    I discussed the patient's findings and my recommendations with patient, family, and primary care team     MDM:    Problem(s) High due to: Acute or Chronic illness or injury that may poses a threat to life or bodily function  Data: High due to: Review of prior external records from each unique source, Review of the result(s) of each unique test, Ordering of each unique test, and Discuss management or test interpretation with external physician or NPP (not separately reported)    High    Thank you very much for allowing to participate in the care of Ms. Liz Borjas    [x] Inpatient Pulmonary Consult Service    Copied text in this note has been reviewed and is accurate as of 01/18/24

## 2024-01-18 NOTE — PROGRESS NOTES
[facility] Medicine Services  PROGRESS NOTE    Patient Name: Liz Borjas  : 1950  MRN: [id number]    Date of Admission: 2024  Primary Care Physician: Valerie Sesay APRN    Subjective   Subjective     CC:  F/u dyspnea    HPI:  Remains on high flow, 55 L, 100%.  Afebrile.  No bowel movement.  Reports dyspnea.  Denied cough.  Reports feeling sleepy.  Had a bowel movement today.  Desaturated to 60% when moving her hips to go on bedpan and required NRB on top of the HFNC.     Objective   Objective     Vital Signs:   Temp:  [97.6 °F (36.4 °C)-98.3 °F (36.8 °C)] 98.2 °F (36.8 °C)  Heart Rate:  [57-76] 65  Resp:  [18-22] 18  BP: (123-170)/(65-85) 137/75  Flow (L/min):  [55] 55     Physical Exam:  Constitutional: No acute distress, awake, alert, elderly, frail  HENT: NCAT, mucous membranes moist  Respiratory: Velco crackles diffusely  Cardiovascular: RRR, no murmurs, rubs, or gallops  Gastrointestinal: Positive bowel sounds, soft, nontender, nondistended  Musculoskeletal: No bilateral ankle edema  Psychiatric: Appropriate affect, cooperative  Neurologic: PERRL, symmetric facies, speech clear  Skin: No rashes        Results Reviewed:  LAB RESULTS:      Lab 24  0430 24  1636 24  0503 24  1805 24  1357   WBC 4.81  --  6.46  --  15.80*   HEMOGLOBIN 9.5*  --  9.8*  --  11.4*   HEMATOCRIT 32.6*  --  33.4*  --  40.4   PLATELETS 124*  --  109*  --  199   NEUTROS ABS 4.29  --  5.06  --  14.74*   IMMATURE GRANS (ABS) 0.02  --  0.02  --  0.09*   LYMPHS ABS 0.31*  --  0.77  --  0.33*   MONOS ABS 0.19  --  0.58  --  0.61   EOS ABS 0.00  --  0.02  --  0.01   MCV 97.9*  --  96.8  --  97.8*   CRP 7.86* 9.05*  --   --   --    PROCALCITONIN 0.08  --  0.16  --  0.11   LACTATE  --   --   --  0.8 2.4*         Lab 24  0430 24  0503 24  1357   SODIUM 142 139 141   POTASSIUM 4.3 3.9 4.2   CHLORIDE 105 104 102   CO2 29.0 28.0 30.0*   ANION GAP 8.0 7.0 9.0    BUN 20 22 24*   CREATININE 0.87 0.91 1.00   EGFR 71.3 67.6 60.4   GLUCOSE 108* 66 104*   CALCIUM 8.2* 8.0* 9.0         Lab 01/18/24  0430 01/17/24  0503 01/16/24  1357   TOTAL PROTEIN 5.6* 5.4* 6.9   ALBUMIN 2.6* 2.4* 3.2*   GLOBULIN 3.0 3.0 3.7   ALT (SGPT) 20 21 33   AST (SGOT) 26 30 52*   BILIRUBIN 0.2 0.2 0.5   ALK PHOS 277* 306* 410*         Lab 01/17/24  0014 01/16/24  2235 01/16/24  1357   PROBNP  --   --  3,161.0*   HSTROP T 14* 15* 21*                 Lab 01/16/24  1449   PH, ARTERIAL 7.358   PCO2, ARTERIAL 54.4*   PO2 .0*   FIO2 100   HCO3 ART 30.6*   BASE EXCESS ART 4.1*   CARBOXYHEMOGLOBIN 0.9     Brief Urine Lab Results       None            Microbiology Results Abnormal       Procedure Component Value - Date/Time    Respiratory Culture - Sputum, Oropharynx [[id number]] Collected: 01/18/24 0054    Lab Status: Preliminary result Specimen: Sputum from Oropharynx Updated: 01/18/24 0216     Gram Stain No Epithelial cells seen      Moderate (3+) WBCs seen      Occasional Mixed bacterial morphotypes seen on Gram Stain    Blood Culture - Blood, Arm, Left [[id number]]  (Normal) Collected: 01/16/24 1807    Lab Status: Preliminary result Specimen: Blood from Arm, Left Updated: 01/17/24 1832     Blood Culture No growth at 24 hours    Narrative:      Less than seven (7) mL's of blood was collected.  Insufficient quantity may yield false negative results.    Blood Culture - Blood, Wrist, Right [[id number]]  (Normal) Collected: 01/16/24 1741    Lab Status: Preliminary result Specimen: Blood from Wrist, Right Updated: 01/17/24 1801     Blood Culture No growth at 24 hours    Narrative:      Less than seven (7) mL's of blood was collected.  Insufficient quantity may yield false negative results.    MRSA Screen, PCR (Inpatient) - Swab, Nares [[id number]]  (Normal) Collected: 01/16/24 1800    Lab Status: Final result Specimen: Swab from Nares Updated: 01/17/24 0042     MRSA PCR No MRSA Detected    Narrative:      The  negative predictive value of this diagnostic test is high and should only be used to consider de-escalating anti-MRSA therapy. A positive result may indicate colonization with MRSA and must be correlated clinically.    S. Pneumo Ag Urine or CSF - Urine, Straight Cath [[id number]]  (Normal) Collected: 01/16/24 1823    Lab Status: Final result Specimen: Urine from Straight Cath Updated: 01/17/24 0002     Strep Pneumo Ag Negative    Legionella Antigen, Urine - Urine, Straight Cath [[id number]]  (Normal) Collected: 01/16/24 1823    Lab Status: Final result Specimen: Urine from Straight Cath Updated: 01/17/24 0001     LEGIONELLA ANTIGEN, URINE Negative    COVID PRE-OP / PRE-PROCEDURE SCREENING ORDER (NO ISOLATION) - Swab, Nasopharynx [[id number]]  (Normal) Collected: 01/16/24 1405    Lab Status: Final result Specimen: Swab from Nasopharynx Updated: 01/16/24 1503    Narrative:      The following orders were created for panel order COVID PRE-OP / PRE-PROCEDURE SCREENING ORDER (NO ISOLATION) - Swab, Nasopharynx.  Procedure                               Abnormality         Status                     ---------                               -----------         ------                     Respiratory Panel PCR w/...[[id number]]  Normal              Final result                 Please view results for these tests on the individual orders.    Respiratory Panel PCR w/COVID-19(SARS-CoV-2) ALFREDO/MEGAN/MARIA FERNANDA/PAD/COR/MONIKA In-House, NP Swab in UTM/VTM, 2 HR TAT - Swab, Nasopharynx [[id number]]  (Normal) Collected: 01/16/24 1405    Lab Status: Final result Specimen: Swab from Nasopharynx Updated: 01/16/24 1503     ADENOVIRUS, PCR Not Detected     Coronavirus 229E Not Detected     Coronavirus HKU1 Not Detected     Coronavirus NL63 Not Detected     Coronavirus OC43 Not Detected     COVID19 Not Detected     Human Metapneumovirus Not Detected     Human Rhinovirus/Enterovirus Not Detected     Influenza A PCR Not Detected     Influenza B PCR Not Detected      Parainfluenza Virus 1 Not Detected     Parainfluenza Virus 2 Not Detected     Parainfluenza Virus 3 Not Detected     Parainfluenza Virus 4 Not Detected     RSV, PCR Not Detected     Bordetella pertussis pcr Not Detected     Bordetella parapertussis PCR Not Detected     Chlamydophila pneumoniae PCR Not Detected     Mycoplasma pneumo by PCR Not Detected    Narrative:      In the setting of a positive respiratory panel with a viral infection PLUS a negative procalcitonin without other underlying concern for bacterial infection, consider observing off antibiotics or discontinuation of antibiotics and continue supportive care. If the respiratory panel is positive for atypical bacterial infection (Bordetella pertussis, Chlamydophila pneumoniae, or Mycoplasma pneumoniae), consider antibiotic de-escalation to target atypical bacterial infection.            XR Chest 1 View    Result Date: 1/18/2024  XR CHEST 1 VW Date of Exam: 1/18/2024 6:39 AM EST Indication: hypoxia Comparison: 1/16/2024 Findings: Cardiac size is similar to prior exam. Low lung volumes. Again seen are coarse interstitial opacities throughout the lungs which appears slightly decreased from prior examination. No new focal consolidation. No pleural effusion or pneumothorax seen. No evidence of acute osseous abnormalities. Visualized upper abdomen is unremarkable.     Impression: Impression: Again seen are coarse interstitial opacities throughout the lungs, slightly improved from prior examination. Electronically Signed: Rich Godwin MD  1/18/2024 7:12 AM EST  Workstation ID: [id number]    CT Angiogram Chest    Result Date: 1/16/2024  CT ANGIOGRAM CHEST Date of Exam: 1/16/2024 4:44 PM EST Indication: acute on chronic respiratory failure w/ hypoxemia and hypercapnea. Comparison: Chest radiograph performed on January 16, 2024. Chest CT performed on July 18, 2022. Technique: CTA of the chest was performed after the uneventful intravenous administration of  Isovue-370, 85 mL. Reconstructed coronal and sagittal images were also obtained. In addition, a 3-D volume rendered image was created for interpretation. Automated exposure control and iterative reconstruction methods were used. Findings: Pulmonary arteries: Adequate opacification of the pulmonary arteries.No evidence of acute pulmonary embolism. Thyroid: The visualized portion of the thyroid is unremarkable. Lungs and Pleura: Extensive interstitial thickening is visualized throughout the lung parenchyma with associated traction bronchiectasis. Lung volumes are low. Subpleural honeycombing is visualized and is most prominent in the lung bases. There is almost  diffuse groundglass attenuation of the lung parenchyma. Small foci of consolidation are visualized in the lateral right upper lobe and medial right lower lobe. No pleural effusion. No discrete pulmonary masses are visualized. No pneumothorax. Mediastinum/Marivel: No mediastinal or hilar lymphadenopathy. Moderate hiatal hernia is visualized. Lymph nodes: No axillary or supraclavicular lymphadenopathy. Cardiovascular: The heart is normal in size.No evidence of pericardial effusion.the thoracic aorta is normal in caliber and contour with small scattered calcifications. Small coronary calcifications are noted. Upper Abdomen: No acute process in the upper abdomen. Bones and Soft Tissue: No acute fracture, aggressive osseous lesions, or soft tissue process.     Impression: Impression: No evidence of pulmonary embolism. No evidence of thoracic aortic aneurysm or dissection. Findings compatible with advanced pulmonary fibrosis. There are superimposed groundglass attenuation of the lung parenchyma which may be secondary to interstitial pneumonitis. Small consolidations in the right upper and lower lobes are compatible with multifocal pneumonia. Electronically Signed: Geoffrey Wagner MD  1/16/2024 4:58 PM EST  Workstation ID: [id number]    XR Chest 1 View    Result Date:  1/16/2024  XR CHEST 1 VW Date of Exam: 1/16/2024 2:30 PM EST Indication: SOA triage protocol Comparison: 1/7/2024. Findings: Similar aeration to comparison with coarse bilateral airspace opacities present in addition to more focal dense airspace disease in the right upper lobe. There is no enlarging effusion or distinct pneumothorax. Unchanged heart and mediastinal contours.     Impression: Impression: Similar multifocal airspace opacities remaining most dense in the right upper lobe. Electronically Signed: Viktor Chairez MD  1/16/2024 2:47 PM EST  Workstation ID: [id number]     Results for orders placed during the hospital encounter of 01/06/24    Adult Transthoracic Echo Complete W/ Cont if Necessary Per Protocol    Interpretation Summary    Left ventricular ejection fraction appears to be 66 - 70%.    Left ventricular wall thickness is consistent with mild septal asymmetric hypertrophy.    Left ventricular diastolic function is consistent with (grade I) impaired relaxation.    The right ventricular cavity is mildly dilated.    The left atrial cavity is mildly dilated.    Left atrial volume is moderately increased.    The right atrial cavity is borderline dilated.    Moderate tricuspid valve regurgitation is present.    Estimated right ventricular systolic pressure from tricuspid regurgitation is markedly elevated (>55 mmHg).      Current medications:  Scheduled Meds:amLODIPine, 5 mg, Oral, Q24H  budesonide-formoterol, 2 puff, Inhalation, BID - RT   And  tiotropium bromide monohydrate, 2 puff, Inhalation, Daily - RT  cetirizine, 10 mg, Oral, Daily  dicyclomine, 10 mg, Oral, TID  doxepin, 50 mg, Oral, Nightly  ferrous sulfate, 325 mg, Oral, Daily With Breakfast  flecainide, 50 mg, Oral, Q12H  FLUoxetine, 40 mg, Oral, Daily  hydroxychloroquine, 200 mg, Oral, Q24H  ipratropium-albuterol, 3 mL, Nebulization, 4x Daily - RT  levothyroxine, 25 mcg, Oral, Q AM  methylPREDNISolone sodium succinate, 62.5 mg, Intravenous,  Q12H  metoprolol tartrate, 25 mg, Oral, BID  pantoprazole, 40 mg, Oral, BID AC  piperacillin-tazobactam, 3.375 g, Intravenous, Q8H  rivaroxaban, 15 mg, Oral, Daily With Dinner  sulfamethoxazole-trimethoprim, 2 tablet, Oral, Once per day on Monday Wednesday Friday  sulfaSALAzine, 500 mg, Oral, Q12H  traMADol, 50 mg, Oral, BID      Continuous Infusions:   PRN Meds:.  senna-docusate sodium **AND** polyethylene glycol **AND** bisacodyl **AND** bisacodyl    Calcium Replacement - Follow Nurse / BPA Driven Protocol    fluticasone    ipratropium-albuterol    Magnesium Standard Dose Replacement - Follow Nurse / BPA Driven Protocol    nitroglycerin    Phosphorus Replacement - Follow Nurse / BPA Driven Protocol    Potassium Replacement - Follow Nurse / BPA Driven Protocol    sodium chloride    Assessment & Plan   Assessment & Plan     Active Hospital Problems    Diagnosis  POA    **Acute on chronic hypoxic respiratory failure [J96.21]  Yes    Severe malnutrition [E43]  Yes    Interstitial lung disease [J84.9]  Yes    HTN (hypertension) [I10]  Yes    Chronic respiratory failure with hypoxia [J96.11]  Yes    Physical deconditioning [R53.81]  Yes    Rheumatoid arthritis with positive rheumatoid factor [M05.9]  Yes      Resolved Hospital Problems   No resolved problems to display.        Brief Hospital Course to date:  Liz Borjas is a 73 y.o. female with past medical history of chronic hypoxic respiratory failure on 6 L nasal cannula, advanced interstitial lung disease, underlying COPD, ROPER cirrhosis, rheumatoid arthritis on chronic prednisone (20 mg daily), HTN, A-fib on Xarelto, CKDIII, with recent hospitalization 1/6 to 1/9 for right upper lobe pneumonia who represented with hypoxia, cough and poor appetite. Was found to have acute on chronic hypoxic respiratory failure secondary to multifocal pneumonia. Pulmonology following outpatient for ILD, they have been consulted here as well. She was started on HFNC  initially.    This patient's problems and plans were partially entered by my partner and updated as appropriate by me 01/18/24.     Acute on chronic hypoxic respiratory failure  Multifocal pneumonia  Hx of advanced ILD with UIP pattern  Hx COPD without exacerbation   -Recent admission for RUL pneumonia with progression despite completing oral antibiotics  -Afebrile, WBC 15K on admission, resp PCR negative   -CTA chest with advanced pulmonary fibrosis and small consolidations in RUL and RLL. Reviewed imaging with pulm today. Difficult to see much differences with scan 2 years ago, therefore hard to distinguish if there has been much more inflammation that will respond to steroids.   -Defer anti-fibrotic agent to outpatient  -Continue steroids per Pulm  -Covering empirically with IV Zosyn given recent hospitalization, dc vanc d/t neg MRSA   screen. Cultures in process  -Discussed with pulmonary who recommended the following: added doxy BID IV, IV bactrim daily  -hold for above Bactrim MWF. Symbicort/Spiriva/Duo-Nebs.     GOC  -Discussed with patient and she elected to transition to DNR/DNI and consulted palliative given worsened condition     Elevated troponin, type II MI  -Likely secondary to acute hypoxia  -EKG without acute ST-T changes; no chest pain  -Repeat troponin downtrending     Elevated proBNP  Chronic HFpEF  -proBNP 3K on admission  -no evidence of edema on imaging, mild pedal edema on exam  -Echo 1/6/24 with EF 66 to 70%, grade 1 diastolic dysfunction, moderate TR, RVSP >55 mmHg  -Will hold off on diuresing for now. Can consider as needed. Strict Is&Os.     Chronic anemia  -Hgb appears at baseline, no evidence of overt bleeding  -Continue home oral iron supplement. Further workup as outpatient. Would update age-appropriate cancer screenings.     Rheumatoid arthritis  -On outpatient prednisone 20 mg daily, recently discharged on prolonged taper  -Continue home Plaquenil, sulfasalazine and Bentyl/Tramadol  for pain.  -Continue steroids as above. Bactrim MWF (held as above).     Paroxysmal atrial fibrillation  -Continue metoprolol, flecainide and Xarelto.     GERD  -Continue PPI twice daily.     Hypothyroidism  -Continue levothyroxine.     Chronic insomnia  -Continue doxepin.     Hypertension  -Continue amlodipine.     Anxiety/depression  -Continue Prozac.    Expected Discharge Location and Transportation: home  Expected Discharge   Expected Discharge Date: 1/20/2024; Expected Discharge Time:      DVT prophylaxis:  Medical DVT prophylaxis orders are present.     AM-PAC 6 Clicks Score (PT): 12 (01/17/24 2701)    CODE STATUS:   Code Status and Medical Interventions:   Ordered at: 01/16/24 1800     Level Of Support Discussed With:    Patient     Code Status (Patient has no pulse and is not breathing):    CPR (Attempt to Resuscitate)     Medical Interventions (Patient has pulse or is breathing):    Full Support       Rosie James MD  01/18/24

## 2024-01-18 NOTE — OUTREACH NOTE
Medical Week 2 Survey      Flowsheet Row Responses   [facility] patient discharged from? [facility]   Does the patient have one of the following disease processes/diagnoses(primary or secondary)? Other   Week 2 attempt successful? No   Unsuccessful attempts Attempt 1   Revoke Readmitted            Miladys BEDOYA - Registered Nurse

## 2024-01-18 NOTE — PLAN OF CARE
Goal Outcome Evaluation:  Plan of Care Reviewed With: patient        Progress: no change  Outcome Evaluation: New palliative consult for assistance with GOC and symptom management pr Dr. James.  No ACP on file.  NOK are jonathon Nicole and Raquel Borjas.  DANELLE Rodriguez APRN and palliative RN saw pt for initial consult.  Palliative care introduced.  Palliative brochure and meal discount card provided.  Pt. Sitting up in bed on HFNC.  Per nursing report, pt desats into 60s very quickly with slightest movement such as getting onto bedpan and requires NRB over top of HFNC.  Pt. Did not demonstrate any visible signs of discomfort during palliative nursing visit and denied pain during visit but had been given scheduled tramadol for generalized RA arthritic pain.  APRN explained benefit of low dose opioid for dyspnea and made it available prn to patient.  She denied nausea and stated her appetite is pretty good when she gets the foods she likes such as chicken salad.  Hypotensive on monitor.  APRN verified code status and that pt wishes to remain full support at this time.  APRN discussed if pt is unable to wean off of HFNC we will have to further GOC conversations.  Palliative care to continue to follow for support, POC and ongoing GOC.        Problem: Palliative Care  Goal: Enhanced Quality of Life  Intervention: Promote Advance Care Planning  Flowsheets (Taken 1/18/2024 6999)  Life Transition/Adjustment:   palliative care initiated   palliative care discussed

## 2024-01-19 NOTE — PLAN OF CARE
Goal Outcome Evaluation:      VSS. Pt complains of extreme SOA when lying flat and turning for care, O2 in 80's with HFNC and non rebreather. Pt needs to sit straight up in bed for several minutes afterwards. Encouraged pt to take PRN med for dyspnea before care or after when pt struggling, son quickly told RN he doesn't think pt needs. NSR on tele.

## 2024-01-19 NOTE — PROGRESS NOTES
"Palliative Care Daily Progress Note     Referring: Rosie James    C/C: \"I feel better\"    S: Medical record reviewed.  Events noted. FiO2 68% when in room.  But is down from level yesterday AM.  Still unable to lie flat.  Not using prns.    ROS: SOA with activity, comfortable at rest,  Denies pain,  Denies N/V.    O: Code Status:   Code Status and Medical Interventions:   Ordered at: 01/18/24 0854     Medical Intervention Limits:    NO intubation (DNI)     Level Of Support Discussed With:    Patient     Code Status (Patient has no pulse and is not breathing):    No CPR (Do Not Attempt to Resuscitate)     Medical Interventions (Patient has pulse or is breathing):    Limited Support      Advanced Directives: Advance Directive Status: Patient does not have advance directive   Goals of Care: Ongoing.   Palliative Performance Scale Score: 30%     BP (!) 133/103 (BP Location: Right arm, Patient Position: Lying)   Pulse 71   Temp 97.9 °F (36.6 °C) (Oral)   Resp 22   Ht 157.5 cm (62\")   Wt 50.2 kg (110 lb 11.2 oz)   LMP  (LMP Unknown)   SpO2 91%   BMI 20.25 kg/m²     Intake/Output Summary (Last 24 hours) at 1/19/2024 1139  Last data filed at 1/19/2024 0012  Gross per 24 hour   Intake --   Output 500 ml   Net -500 ml       PE:  General Appearance:    Alert, cooperative, NAD   HEENT:    NC/AT, EOMI, anicteric, MMM, face relaxed   Neck:   supple, trachea midline, no JVD   Lungs:     CTA bilaterally, diminished in bases; respirations regular, even and unlabored; RR on exam    Heart:    RRR, normal S1 and S2, no M/R/G   Abdomen:     Normal bowel sounds, soft, nontender, nondistended   G/U:   Deferred   MSK/Extremities:   Wasting, no edema   Pulses:   Pulses palpable and equal bilaterally   Skin:   Warm, dry   Neurologic:   A/Ox3, cooperative, ANNA   Psych:   Calm, appropriate     Meds: Reviewed and changes not noted    Labs:   Results from last 7 days   Lab Units 01/19/24  0338   WBC 10*3/mm3 5.37   HEMOGLOBIN g/dL 9.0* "   HEMATOCRIT % 31.4*   PLATELETS 10*3/mm3 122*     Results from last 7 days   Lab Units 01/19/24  0338   SODIUM mmol/L 135*   POTASSIUM mmol/L 3.9   CHLORIDE mmol/L 101   CO2 mmol/L 25.0   BUN mg/dL 15   CREATININE mg/dL 0.77   GLUCOSE mg/dL 168*   CALCIUM mg/dL 7.6*     Results from last 7 days   Lab Units 01/19/24  0338   SODIUM mmol/L 135*   POTASSIUM mmol/L 3.9   CHLORIDE mmol/L 101   CO2 mmol/L 25.0   BUN mg/dL 15   CREATININE mg/dL 0.77   CALCIUM mg/dL 7.6*   BILIRUBIN mg/dL 0.2   ALK PHOS U/L 281*   ALT (SGPT) U/L 25   AST (SGOT) U/L 36*   GLUCOSE mg/dL 168*     Imaging Results (Last 72 Hours)       Procedure Component Value Units Date/Time    XR Chest 1 View [[id number]] Collected: 01/18/24 0707     Updated: 01/18/24 0715    Narrative:      XR CHEST 1 VW    Date of Exam: 1/18/2024 6:39 AM EST    Indication: hypoxia    Comparison: 1/16/2024    Findings:  Cardiac size is similar to prior exam. Low lung volumes. Again seen are coarse interstitial opacities throughout the lungs which appears slightly decreased from prior examination. No new focal consolidation. No pleural effusion or pneumothorax seen. No   evidence of acute osseous abnormalities. Visualized upper abdomen is unremarkable.      Impression:      Impression:  Again seen are coarse interstitial opacities throughout the lungs, slightly improved from prior examination.      Electronically Signed: Rich Godwin MD    1/18/2024 7:12 AM EST    Workstation ID: [id number]    CT Angiogram Chest [[id number]] Collected: 01/16/24 1652     Updated: 01/16/24 1702    Narrative:      CT ANGIOGRAM CHEST    Date of Exam: 1/16/2024 4:44 PM EST    Indication: acute on chronic respiratory failure w/ hypoxemia and hypercapnea.    Comparison: Chest radiograph performed on January 16, 2024. Chest CT performed on July 18, 2022.    Technique: CTA of the chest was performed after the uneventful intravenous administration of Isovue-370, 85 mL. Reconstructed coronal and  sagittal images were also obtained. In addition, a 3-D volume rendered image was created for interpretation.   Automated exposure control and iterative reconstruction methods were used.      Findings:    Pulmonary arteries: Adequate opacification of the pulmonary arteries.No evidence of acute pulmonary embolism.    Thyroid: The visualized portion of the thyroid is unremarkable.    Lungs and Pleura: Extensive interstitial thickening is visualized throughout the lung parenchyma with associated traction bronchiectasis. Lung volumes are low. Subpleural honeycombing is visualized and is most prominent in the lung bases. There is almost   diffuse groundglass attenuation of the lung parenchyma. Small foci of consolidation are visualized in the lateral right upper lobe and medial right lower lobe. No pleural effusion. No discrete pulmonary masses are visualized. No pneumothorax.    Mediastinum/Marivel: No mediastinal or hilar lymphadenopathy. Moderate hiatal hernia is visualized.    Lymph nodes: No axillary or supraclavicular lymphadenopathy.    Cardiovascular: The heart is normal in size.No evidence of pericardial effusion.the thoracic aorta is normal in caliber and contour with small scattered calcifications. Small coronary calcifications are noted.    Upper Abdomen: No acute process in the upper abdomen.    Bones and Soft Tissue: No acute fracture, aggressive osseous lesions, or soft tissue process.        Impression:      Impression:  No evidence of pulmonary embolism.    No evidence of thoracic aortic aneurysm or dissection.    Findings compatible with advanced pulmonary fibrosis. There are superimposed groundglass attenuation of the lung parenchyma which may be secondary to interstitial pneumonitis. Small consolidations in the right upper and lower lobes are compatible with   multifocal pneumonia.        Electronically Signed: Geoffrey Wagner MD    1/16/2024 4:58 PM EST    Workstation ID: [id number]    XR Chest 1 View  [[id number]] Collected: 01/16/24 1445     Updated: 01/16/24 1450    Narrative:      XR CHEST 1 VW    Date of Exam: 1/16/2024 2:30 PM EST    Indication: SOA triage protocol    Comparison: 1/7/2024.    Findings:  Similar aeration to comparison with coarse bilateral airspace opacities present in addition to more focal dense airspace disease in the right upper lobe. There is no enlarging effusion or distinct pneumothorax. Unchanged heart and mediastinal contours.      Impression:      Impression:  Similar multifocal airspace opacities remaining most dense in the right upper lobe.      Electronically Signed: Viktor Chairez MD    1/16/2024 2:47 PM EST    Workstation ID: [id number]                  Diagnostics: Reviewed    A:     Acute on chronic hypoxic respiratory failure    Rheumatoid arthritis with positive rheumatoid factor    Physical deconditioning    Chronic respiratory failure with hypoxia    HTN (hypertension)    Interstitial lung disease    Severe malnutrition       Impression:  A/C hypox resp failure  RA  ILD  Hypoalbuminemia    Symptoms:  Dyspnea  Debility  Joint pain        P:   Able to confirm DNR/DNI already entered.  She and son expressed understanding that current level of O2 support is not available outside of hospital.  Expressed understanding of need for further instructions if not improving by Monday.  Palliative Care Team will continue to follow patient.     Hemalatha Burnette MD  1/19/2024  Time spent:26

## 2024-01-19 NOTE — PROGRESS NOTES
[facility] Medicine Services  PROGRESS NOTE    Patient Name: Liz Borjas  : 1950  MRN: [id number]    Date of Admission: 2024  Primary Care Physician: Valerie Sesay APRN    Subjective   Subjective     CC:  F/u dyspnea    HPI:  Remains on high flow, down to 45L, 65%.  Some shortness of breath.  Feels like she is breathing slightly better compared to yesterday.  Patient's son is at bedside.  Denies any GI or  symptoms.  Reports sleeping well.    Objective   Objective     Vital Signs:   Temp:  [97.7 °F (36.5 °C)-98.6 °F (37 °C)] 97.7 °F (36.5 °C)  Heart Rate:  [59-64] 59  Resp:  [17-22] 22  BP: ()/(51-84) 118/84  Flow (L/min):  [45-55] 45     Physical Exam:  Constitutional: No acute distress, awake, alert, elderly, frail, sitting up in bed  HENT: NCAT, mucous membranes moist  Respiratory: Velco crackles diffusely  Cardiovascular: RRR, no murmurs, rubs, or gallops  Gastrointestinal: Normoactive bowel sounds, soft, nontender, nondistended  Musculoskeletal: No bilateral ankle edema  Psychiatric: Appropriate affect, cooperative  Neurologic: PERRL, symmetric facies, speech clear  Skin: No rashes        Results Reviewed:  LAB RESULTS:      Lab 24  0338 24  0430 24  1636 24  0503 24  1805 24  1357   WBC 5.37 4.81  --  6.46  --  15.80*   HEMOGLOBIN 9.0* 9.5*  --  9.8*  --  11.4*   HEMATOCRIT 31.4* 32.6*  --  33.4*  --  40.4   PLATELETS 122* 124*  --  109*  --  199   NEUTROS ABS 4.90 4.29  --  5.06  --  14.74*   IMMATURE GRANS (ABS) 0.02 0.02  --  0.02  --  0.09*   LYMPHS ABS 0.29* 0.31*  --  0.77  --  0.33*   MONOS ABS 0.16 0.19  --  0.58  --  0.61   EOS ABS 0.00 0.00  --  0.02  --  0.01   MCV 96.0 97.9*  --  96.8  --  97.8*   CRP  --  7.86* 9.05*  --   --   --    PROCALCITONIN  --  0.08  --  0.16  --  0.11   LACTATE  --   --   --   --  0.8 2.4*         Lab 24  0430 24  0503 24  1357   SODIUM 142 139 141   POTASSIUM 4.3  3.9 4.2   CHLORIDE 105 104 102   CO2 29.0 28.0 30.0*   ANION GAP 8.0 7.0 9.0   BUN 20 22 24*   CREATININE 0.87 0.91 1.00   EGFR 71.3 67.6 60.4   GLUCOSE 108* 66 104*   CALCIUM 8.2* 8.0* 9.0         Lab 01/18/24  0430 01/17/24  0503 01/16/24  1357   TOTAL PROTEIN 5.6* 5.4* 6.9   ALBUMIN 2.6* 2.4* 3.2*   GLOBULIN 3.0 3.0 3.7   ALT (SGPT) 20 21 33   AST (SGOT) 26 30 52*   BILIRUBIN 0.2 0.2 0.5   ALK PHOS 277* 306* 410*         Lab 01/17/24  0014 01/16/24  2235 01/16/24  1357   PROBNP  --   --  3,161.0*   HSTROP T 14* 15* 21*                 Lab 01/16/24  1449   PH, ARTERIAL 7.358   PCO2, ARTERIAL 54.4*   PO2 .0*   FIO2 100   HCO3 ART 30.6*   BASE EXCESS ART 4.1*   CARBOXYHEMOGLOBIN 0.9     Brief Urine Lab Results       None            Microbiology Results Abnormal       Procedure Component Value - Date/Time    Blood Culture - Blood, Arm, Left [[id number]]  (Normal) Collected: 01/16/24 1807    Lab Status: Preliminary result Specimen: Blood from Arm, Left Updated: 01/18/24 1831     Blood Culture No growth at 2 days    Narrative:      Less than seven (7) mL's of blood was collected.  Insufficient quantity may yield false negative results.    Blood Culture - Blood, Wrist, Right [[id number]]  (Normal) Collected: 01/16/24 1741    Lab Status: Preliminary result Specimen: Blood from Wrist, Right Updated: 01/18/24 1800     Blood Culture No growth at 2 days    Narrative:      Less than seven (7) mL's of blood was collected.  Insufficient quantity may yield false negative results.    Respiratory Culture - Sputum, Oropharynx [[id number]] Collected: 01/18/24 0054    Lab Status: Preliminary result Specimen: Sputum from Oropharynx Updated: 01/18/24 0216     Gram Stain No Epithelial cells seen      Moderate (3+) WBCs seen      Occasional Mixed bacterial morphotypes seen on Gram Stain    MRSA Screen, PCR (Inpatient) - Swab, Nares [[id number]]  (Normal) Collected: 01/16/24 1800    Lab Status: Final result Specimen: Swab from Nares  Updated: 01/17/24 0042     MRSA PCR No MRSA Detected    Narrative:      The negative predictive value of this diagnostic test is high and should only be used to consider de-escalating anti-MRSA therapy. A positive result may indicate colonization with MRSA and must be correlated clinically.    S. Pneumo Ag Urine or CSF - Urine, Straight Cath [[id number]]  (Normal) Collected: 01/16/24 1823    Lab Status: Final result Specimen: Urine from Straight Cath Updated: 01/17/24 0002     Strep Pneumo Ag Negative    Legionella Antigen, Urine - Urine, Straight Cath [[id number]]  (Normal) Collected: 01/16/24 1823    Lab Status: Final result Specimen: Urine from Straight Cath Updated: 01/17/24 0001     LEGIONELLA ANTIGEN, URINE Negative    COVID PRE-OP / PRE-PROCEDURE SCREENING ORDER (NO ISOLATION) - Swab, Nasopharynx [[id number]]  (Normal) Collected: 01/16/24 1405    Lab Status: Final result Specimen: Swab from Nasopharynx Updated: 01/16/24 1503    Narrative:      The following orders were created for panel order COVID PRE-OP / PRE-PROCEDURE SCREENING ORDER (NO ISOLATION) - Swab, Nasopharynx.  Procedure                               Abnormality         Status                     ---------                               -----------         ------                     Respiratory Panel PCR w/...[[id number]]  Normal              Final result                 Please view results for these tests on the individual orders.    Respiratory Panel PCR w/COVID-19(SARS-CoV-2) ALFREDO/MEGAN/MARIA FERNANDA/PAD/COR/MONIKA In-House, NP Swab in UTM/VTM, 2 HR TAT - Swab, Nasopharynx [[id number]]  (Normal) Collected: 01/16/24 1405    Lab Status: Final result Specimen: Swab from Nasopharynx Updated: 01/16/24 1503     ADENOVIRUS, PCR Not Detected     Coronavirus 229E Not Detected     Coronavirus HKU1 Not Detected     Coronavirus NL63 Not Detected     Coronavirus OC43 Not Detected     COVID19 Not Detected     Human Metapneumovirus Not Detected     Human Rhinovirus/Enterovirus Not  Detected     Influenza A PCR Not Detected     Influenza B PCR Not Detected     Parainfluenza Virus 1 Not Detected     Parainfluenza Virus 2 Not Detected     Parainfluenza Virus 3 Not Detected     Parainfluenza Virus 4 Not Detected     RSV, PCR Not Detected     Bordetella pertussis pcr Not Detected     Bordetella parapertussis PCR Not Detected     Chlamydophila pneumoniae PCR Not Detected     Mycoplasma pneumo by PCR Not Detected    Narrative:      In the setting of a positive respiratory panel with a viral infection PLUS a negative procalcitonin without other underlying concern for bacterial infection, consider observing off antibiotics or discontinuation of antibiotics and continue supportive care. If the respiratory panel is positive for atypical bacterial infection (Bordetella pertussis, Chlamydophila pneumoniae, or Mycoplasma pneumoniae), consider antibiotic de-escalation to target atypical bacterial infection.            XR Chest 1 View    Result Date: 1/18/2024  XR CHEST 1 VW Date of Exam: 1/18/2024 6:39 AM EST Indication: hypoxia Comparison: 1/16/2024 Findings: Cardiac size is similar to prior exam. Low lung volumes. Again seen are coarse interstitial opacities throughout the lungs which appears slightly decreased from prior examination. No new focal consolidation. No pleural effusion or pneumothorax seen. No evidence of acute osseous abnormalities. Visualized upper abdomen is unremarkable.     Impression: Impression: Again seen are coarse interstitial opacities throughout the lungs, slightly improved from prior examination. Electronically Signed: Rich Godwin MD  1/18/2024 7:12 AM EST  Workstation ID: [id number]     Results for orders placed during the hospital encounter of 01/06/24    Adult Transthoracic Echo Complete W/ Cont if Necessary Per Protocol    Interpretation Summary    Left ventricular ejection fraction appears to be 66 - 70%.    Left ventricular wall thickness is consistent with mild septal  asymmetric hypertrophy.    Left ventricular diastolic function is consistent with (grade I) impaired relaxation.    The right ventricular cavity is mildly dilated.    The left atrial cavity is mildly dilated.    Left atrial volume is moderately increased.    The right atrial cavity is borderline dilated.    Moderate tricuspid valve regurgitation is present.    Estimated right ventricular systolic pressure from tricuspid regurgitation is markedly elevated (>55 mmHg).      Current medications:  Scheduled Meds:amLODIPine, 5 mg, Oral, Q24H  budesonide-formoterol, 2 puff, Inhalation, BID - RT   And  tiotropium bromide monohydrate, 2 puff, Inhalation, Daily - RT  cetirizine, 10 mg, Oral, Daily  dicyclomine, 10 mg, Oral, TID  doxepin, 50 mg, Oral, Nightly  doxycycline, 100 mg, Intravenous, Q12H  ferrous sulfate, 325 mg, Oral, Daily With Breakfast  flecainide, 50 mg, Oral, Q12H  FLUoxetine, 40 mg, Oral, Daily  hydroxychloroquine, 200 mg, Oral, Q24H  ipratropium-albuterol, 3 mL, Nebulization, 4x Daily - RT  levothyroxine, 25 mcg, Oral, Q AM  methylPREDNISolone sodium succinate, 62.5 mg, Intravenous, Q12H  metoprolol tartrate, 25 mg, Oral, BID  palliative care oral rinse, , Mouth/Throat, 4x Daily  pantoprazole, 40 mg, Oral, BID AC  piperacillin-tazobactam, 3.375 g, Intravenous, Q8H  rivaroxaban, 15 mg, Oral, Daily With Dinner  [Held by provider] sulfamethoxazole-trimethoprim, 2 tablet, Oral, Once per day on Monday Wednesday Friday  trimethoprim-sulfamethoxazole, 280 mg, Intravenous, Q8H  sulfaSALAzine, 500 mg, Oral, Q12H  traMADol, 50 mg, Oral, BID      Continuous Infusions:   PRN Meds:.  senna-docusate sodium **AND** polyethylene glycol **AND** bisacodyl **AND** bisacodyl    Calcium Replacement - Follow Nurse / BPA Driven Protocol    fluticasone    ipratropium-albuterol    Magnesium Standard Dose Replacement - Follow Nurse / BPA Driven Protocol    morphine    nitroglycerin    Phosphorus Replacement - Follow Nurse / BPA  Driven Protocol    Potassium Replacement - Follow Nurse / BPA Driven Protocol    sodium chloride    sodium chloride    Assessment & Plan   Assessment & Plan     Active Hospital Problems    Diagnosis  POA    **Acute on chronic hypoxic respiratory failure [J96.21]  Yes    Severe malnutrition [E43]  Yes    Interstitial lung disease [J84.9]  Yes    HTN (hypertension) [I10]  Yes    Chronic respiratory failure with hypoxia [J96.11]  Yes    Physical deconditioning [R53.81]  Yes    Rheumatoid arthritis with positive rheumatoid factor [M05.9]  Yes      Resolved Hospital Problems   No resolved problems to display.        Brief Hospital Course to date:  Liz Borjas is a 73 y.o. female with past medical history of chronic hypoxic respiratory failure on 6 L nasal cannula, advanced interstitial lung disease, underlying COPD, ROPER cirrhosis, rheumatoid arthritis on chronic prednisone (20 mg daily), HTN, A-fib on Xarelto, CKDIII, with recent hospitalization 1/6 to 1/9 for right upper lobe pneumonia who represented with hypoxia, cough and poor appetite. Was found to have acute on chronic hypoxic respiratory failure secondary to multifocal pneumonia. Pulmonology following outpatient for ILD, they have been consulted here as well. She was started on HFNC initially.    This patient's problems and plans were partially entered by my partner and updated as appropriate by me 01/19/24.     Acute on chronic hypoxic respiratory failure  Multifocal pneumonia  Hx of advanced ILD with UIP pattern  Hx COPD without exacerbation   -Recent admission for RUL pneumonia with progression despite completing oral antibiotics  -Afebrile, WBC 15K on admission, resp PCR negative   -CTA chest with advanced pulmonary fibrosis and small consolidations in RUL and RLL. Reviewed imaging with pulm today. Difficult to see much differences with scan 2 years ago, therefore hard to distinguish if there has been much more inflammation that will respond to steroids.    -Defer anti-fibrotic agent to outpatient  -Continue steroids per Pulm (change to PO prednisone 40mg BID)  -Covering empirically with IV Zosyn given recent hospitalization, dc vanc d/t neg MRSA   screen. Cultures in process --> growing PSA, susceptibilities pending, tobramycin nebs added for double coverage by pulm, will follow-up  -Discussed with pulmonary who recommended the following: added doxy BID IV, IV bactrim daily  -hold for above Bactrim MWF.   -Continue Symbicort/Spiriva/Duo-Nebs.     GOC  -Discussed with patient and she elected to transition to DNR/DNI on 1/18  -Palliative consulted for further discussions; discussed with patient that there is a risk of continued decline while in the hospital due to the severity of her underlying condition despite aggressive medical treatment     Elevated troponin, type II MI  -Likely secondary to acute hypoxia  -EKG without acute ST-T changes; no chest pain  -Repeat troponin downtrending     Elevated proBNP  Chronic HFpEF  -proBNP 3K on admission  -no evidence of edema on imaging, mild pedal edema on exam  -Echo 1/6/24 with EF 66 to 70%, grade 1 diastolic dysfunction, moderate TR, RVSP >55 mmHg  -Will hold off on diuresing for now. Can consider as needed. Strict Is&Os.     Chronic anemia  -Hgb appears at baseline, no evidence of overt bleeding  -Continue home oral iron supplement. Further workup as outpatient. Would update age-appropriate cancer screenings.     Rheumatoid arthritis  -On outpatient prednisone 20 mg daily, recently discharged on prolonged taper  -Continue home Plaquenil, sulfasalazine and Bentyl/Tramadol for pain.  -Continue steroids as above. Bactrim MWF (held as above).     Paroxysmal atrial fibrillation  -Continue metoprolol, flecainide and Xarelto.     GERD  -Continue PPI twice daily.     Hypothyroidism  -Continue levothyroxine.     Chronic insomnia  -Continue doxepin.     Hypertension  -Continue amlodipine.     Anxiety/depression  -Continue  Prozac.    Expected Discharge Location and Transportation: home? Once more stable from respiratory status, PT/OT will need to evaluate  Expected Discharge   Expected Discharge Date: 1/24/2024; Expected Discharge Time:      DVT prophylaxis:  Medical DVT prophylaxis orders are present.     AM-PAC 6 Clicks Score (PT): 12 (01/17/24 0755)    CODE STATUS:   Code Status and Medical Interventions:   Ordered at: 01/18/24 0854     Medical Intervention Limits:    NO intubation (DNI)     Level Of Support Discussed With:    Patient     Code Status (Patient has no pulse and is not breathing):    No CPR (Do Not Attempt to Resuscitate)     Medical Interventions (Patient has pulse or is breathing):    Limited Support       Rosie James MD  01/19/24

## 2024-01-19 NOTE — CASE MANAGEMENT/SOCIAL WORK
Continued Stay Note  [facility]     Patient Name: Liz Borjas  MRN: [id number]  Today's Date: 1/19/2024    Admit Date: 1/16/2024    Plan: discharge plan   Discharge Plan       Row Name 01/19/24 1641       Plan    Plan discharge plan    Plan Comments Per discussion  in MDR, pt not medically ready for discharge as she remains on high flow O2. I spoke with pt in room and goal remains home and resume HH with [facility]. Palliative and pulmonology following. CM will follow up Monday    Final Discharge Disposition Code 06 - home with home health care                   Discharge Codes    No documentation.                 Expected Discharge Date and Time       Expected Discharge Date Expected Discharge Time    Jan 24, 2024               Lina Johnson RN

## 2024-01-19 NOTE — PROGRESS NOTES
PULMONARY SERVICE  PROGRESS NOTE        SUBJECTIVE     We were asked to see Liz Borjas, 73 y.o. female for: Shortness of Breath      Acute on chronic hypoxic respiratory failure    Rheumatoid arthritis with positive rheumatoid factor    Physical deconditioning    Chronic respiratory failure with hypoxia    HTN (hypertension)    Interstitial lung disease    Severe malnutrition      Interval History:   Doing better today.    Son stayed during the night.    Some cough, and sputum production.    Weaning FiO2     Device (Oxygen Therapy): heated, high-flow nasal cannula, humidified (01/19/24 1530): Flow (L/min): 45 (01/19/24 1530). Oxygen Concentration (%): (S) 55 (01/19/24 1530).     Temp  Min: 96.7 °F (35.9 °C)  Max: 98.2 °F (36.8 °C)     The patient's relevant past medical, surgical and social history were reviewed and updated in Epic as appropriate.      History     Last Reviewed by Gerardo Robertson MD on 1/17/2024 at 11:30 AM    Sections Reviewed    Medical, Surgical, Family, Tobacco, Alcohol, Drug Use, Sexual Activity,   Social Documentation    Problem list reviewed by Gerardo Robertson MD on 1/17/2024 at 11:30 AM  Medicines reviewed by Gerardo Robertson MD on 1/17/2024 at 11:29 AM  Allergies reviewed by Gerardo Robertson MD on 1/17/2024 at 11:29 AM      Objective   O     Vitals:  Temp: 98.2 °F (36.8 °C) (01/19/24 1737) Temp  Min: 96.7 °F (35.9 °C)  Max: 98.2 °F (36.8 °C)   BP: 118/70 (01/19/24 1737) BP  Min: 107/66  Max: 133/103   Pulse: 64 (01/19/24 1530) Pulse  Min: 59  Max: 87   Resp: 20 (01/19/24 1737) Resp  Min: 18  Max: 22   SpO2: 97 % (01/19/24 1530) SpO2  Min: 82 %  Max: 98 %   Device: heated, high-flow nasal cannula, humidified (01/19/24 1530)    Flow Rate: 45 (01/19/24 1530) Flow (L/min)  Min: 45  Max: 55       Telemetry:  Rhythm: normal sinus rhythm (01/19/24 0631)         Constitutional:  No acute distress.   Cardiovascular: RRR.    Respiratory: Normal breath sounds  (+) Rhonchi   Abdominal:  Soft with no  tenderness.   Extremities: No Edema   Neurological:   Alert, Oriented, Cooperative.  Best Eye Response: 4-->(E4) spontaneous (01/19/24 0631)  Best Motor Response: 6-->(M6) obeys commands (01/19/24 0631)  Best Verbal Response: 5-->(V5) oriented (01/19/24 0631)  Irina Coma Scale Score: 15 (01/19/24 0631)     Results Reviewed:  Laboratory  Microbiology  Radiology  Pathology    Hematology:  Results from last 7 days   Lab Units 01/19/24 0338 01/18/24  0430 01/17/24  0503   WBC 10*3/mm3 5.37 4.81 6.46   HEMOGLOBIN g/dL 9.0* 9.5* 9.8*   MCV fL 96.0 97.9* 96.8   PLATELETS 10*3/mm3 122* 124* 109*     Results from last 7 days   Lab Units 01/19/24 0338 01/18/24  0430 01/17/24  0503   NEUTROS ABS 10*3/mm3 4.90 4.29 5.06   LYMPHS ABS 10*3/mm3 0.29* 0.31* 0.77   EOS ABS 10*3/mm3 0.00 0.00 0.02     Chemistry:  Estimated Creatinine Clearance: 53.1 mL/min (by C-G formula based on SCr of 0.77 mg/dL).    Results from last 7 days   Lab Units 01/19/24 0338 01/18/24  0430   SODIUM mmol/L 135* 142   POTASSIUM mmol/L 3.9 4.3   CHLORIDE mmol/L 101 105   CO2 mmol/L 25.0 29.0   BUN mg/dL 15 20   CREATININE mg/dL 0.77 0.87   GLUCOSE mg/dL 168* 108*     Results from last 7 days   Lab Units 01/19/24 0338 01/18/24  0430   CALCIUM mg/dL 7.6* 8.2*     Hepatic Panel:  Results from last 7 days   Lab Units 01/19/24 0338 01/18/24  0430 01/17/24  0503   ALBUMIN g/dL 2.3* 2.6* 2.4*   TOTAL PROTEIN g/dL 5.4* 5.6* 5.4*   BILIRUBIN mg/dL 0.2 0.2 0.2   AST (SGOT) U/L 36* 26 30   ALT (SGPT) U/L 25 20 21   ALK PHOS U/L 281* 277* 306*      Biomarkers:  Results from last 7 days   Lab Units 01/18/24  0430 01/17/24  1636 01/17/24  0503 01/16/24  1805 01/16/24  1357   CRP mg/dL 7.86* 9.05*  --   --   --    LACTATE mmol/L  --   --   --  0.8 2.4*   PROCALCITONIN ng/mL 0.08  --  0.16  --  0.11       Lab Results   Component Value Date    RESPCX Light growth (2+) Pseudomonas species (A) 01/18/2024    RESPCX No Normal Respiratory Noris (A) 01/18/2024        COVID-19  Lab Results   Component Value Date    COVID19 Not Detected 01/16/2024    COVID19 Not Detected 01/06/2024    COVID19 Not Detected 01/06/2024    COVID19 Not Detected 01/14/2022     Images:  XR Chest 1 View    Result Date: 1/18/2024  Impression: Again seen are coarse interstitial opacities throughout the lungs, slightly improved from prior examination. Electronically Signed: Rich Godwin MD  1/18/2024 7:12 AM EST  Workstation ID: [id number]     Echo:  Results for orders placed during the hospital encounter of 01/06/24    Adult Transthoracic Echo Complete W/ Cont if Necessary Per Protocol    Interpretation Summary    Left ventricular ejection fraction appears to be 66 - 70%.    Left ventricular wall thickness is consistent with mild septal asymmetric hypertrophy.    Left ventricular diastolic function is consistent with (grade I) impaired relaxation.    The right ventricular cavity is mildly dilated.    The left atrial cavity is mildly dilated.    Left atrial volume is moderately increased.    The right atrial cavity is borderline dilated.    Moderate tricuspid valve regurgitation is present.    Estimated right ventricular systolic pressure from tricuspid regurgitation is markedly elevated (>55 mmHg).      Results: Reviewed.  I reviewed the patient's new laboratory and imaging results.  I independently reviewed the patient's new images.    Medications: Reviewed.    Assessment    A / P     Hospital:  LOS: 3 days     Active Hospital Problems    Diagnosis  POA    **Acute on chronic hypoxic respiratory failure [J96.21]  Yes    Severe malnutrition [E43]  Yes    Interstitial lung disease [J84.9]  Yes    HTN (hypertension) [I10]  Yes    Chronic respiratory failure with hypoxia [J96.11]  Yes    Physical deconditioning [R53.81]  Yes    Rheumatoid arthritis with positive rheumatoid factor [M05.9]  Yes     Liz is a 73 y.o. female admitted on 1/16/2024 with Acute on chronic hypoxic respiratory failure  [J96.21]    Assessment/Management/Treatment Plan:    Progress Notes by Kristyn Haro APRN (07/17/2023 10:00)   Consults by Anais Gomez MD (01/09/2024 15:41)     ILD - associated to Rheumatoid arthritis. R/O acute exacerbation, as they were weaning steroids.  R/O atypical/opportunistic infection.  CT with both GGO and honeycombing.  Previous PFTs 01/16/2023: FVC 1.07 L 40%,  FEV1 1.03 L, 51%,  FEV1 ratio 0.97,  TLC 2.64 L, 62%,  DLCO 27% (Multiple attempts)  PH most likely Group 3, RVSP is markedly elevated, > 55 mmHg per ECHO 01/06/2024  Currently on Methylprednisolone 125 mg/d x 3 days (01/17/24-01/19/24)  Acute/Chronic Respiratory Failure type 2.  Former smoker.  HTN  A Fib per history. Anticoagulation with RIVaroxaban   Elevated ALKP  Hypothyroidism ? Treatment with Levothyroxine   Lab Results   Component Value Date    TSH 0.930 01/15/2022      No history of T2 diabetes    Lab Results   Lab Value Date/Time    HGBA1C <4.20 (L) 01/07/2024 0550    HGBA1C 5.40 11/28/2021 0611           Code Status and Medical Interventions:   Ordered at: 01/18/24 0854     Medical Intervention Limits:    NO intubation (DNI)     Level Of Support Discussed With:    Patient     Code Status (Patient has no pulse and is not breathing):    No CPR (Do Not Attempt to Resuscitate)     Medical Interventions (Patient has pulse or is breathing):    Limited Support       In brief:    Discussed with patient and family at bedside (01/17/24)  With her janny FiO2  high risk for complication and need for Invasive Mechanical Ventilation if we do a Bronchoscopy.   Patient does not want to be intubated or be on Invasive Mechanical Ventilation   Need to treat empirically for potential exacerbation of ILD, and/or infections.  Discussed with patient and son 01/19/24   Respiratory culture showing Pseudomonas.  Susceptibility: pending.  F/U Cultures and serologies. Adjust antibiotics based on results and presence of resistance.  Change dose of  Piperacillin-Tazobactam to 4.5 g.  Change TMP/SMX IV to PO, continue dose to cover PJP. If clinical course and/or labs suggest no Pneumocystis, then change to prophylaxis dosis.  Transition Solumedrol 62.5 mg BID (3rd day) to Prednisone 40 mg BID, starting tomorrow.  Change Doxy IV to PO. May discontinue soon, depending on lab results. Legionella Urine Ag: Negative.(But only detects serogroup 1, and sensitivity ~70-80%)  PCXR in AM  Palliative Team to help with symptoms control.    I discussed the patient's findings and my recommendations with patient, family, and primary care team     MDM:    Problem(s) High due to: Acute or Chronic illness or injury that may poses a threat to life or bodily function  Data: High due to: Review of prior external records from each unique source, Review of the result(s) of each unique test, Ordering of each unique test, and Discuss management or test interpretation with external physician or NPP (not separately reported)    High    Thank you very much for allowing to participate in the care of Ms. Liz Borjas    [x] Inpatient Pulmonary Consult Service    Copied text in this note has been reviewed and is accurate as of 01/19/24

## 2024-01-20 NOTE — PROGRESS NOTES
[facility] Medicine Services  PROGRESS NOTE    Patient Name: Liz Borjas  : 1950  MRN: [id number]    Date of Admission: 2024  Primary Care Physician: Valerie Sesay APRN    Subjective   Subjective     CC:  F/u dyspnea    HPI:  Remains on high flow, down to 45L, 55%. Had 2 BM. Reports breathing has improved some. Has loose stools - 2 yesterday and one today. Feels anxious, but wants a less intense medication than benzodiazepines. Patient's son is at bedside.     Objective   Objective     Vital Signs:   Temp:  [97.4 °F (36.3 °C)-98.2 °F (36.8 °C)] 97.4 °F (36.3 °C)  Heart Rate:  [62-71] 65  Resp:  [20-22] 20  BP: (118-150)/() 150/69  Flow (L/min):  [45] 45     Physical Exam:  Constitutional: No acute distress, awake, alert, elderly, frail, sitting up in bed  HENT: NCAT, mucous membranes moist  Respiratory: Velco crackles diffusely, no wheezing  Cardiovascular: RRR, no murmurs, rubs, or gallops  Gastrointestinal: Normoactive bowel sounds, soft, nontender, nondistended  Musculoskeletal: No bilateral ankle edema  Psychiatric: Appropriate affect, cooperative  Neurologic: PERRL, symmetric facies, speech clear  Skin: No rashes        Results Reviewed:  LAB RESULTS:      Lab 24  0338 24  0338 24  0430 24  1636 24  0503 24  1805 24  1357   WBC 5.43 5.37 4.81  --  6.46  --  15.80*   HEMOGLOBIN 10.1* 9.0* 9.5*  --  9.8*  --  11.4*   HEMATOCRIT 33.9* 31.4* 32.6*  --  33.4*  --  40.4   PLATELETS 133* 122* 124*  --  109*  --  199   NEUTROS ABS 4.90 4.90 4.29  --  5.06  --  14.74*   IMMATURE GRANS (ABS) 0.09* 0.02 0.02  --  0.02  --  0.09*   LYMPHS ABS 0.29* 0.29* 0.31*  --  0.77  --  0.33*   MONOS ABS 0.15 0.16 0.19  --  0.58  --  0.61   EOS ABS 0.00 0.00 0.00  --  0.02  --  0.01   MCV 93.6 96.0 97.9*  --  96.8  --  97.8*   CRP  --   --  7.86* 9.05*  --   --   --    PROCALCITONIN  --   --  0.08  --  0.16  --  0.11   LACTATE  --   --   --    --   --  0.8 2.4*         Lab 01/20/24  0338 01/19/24  0338 01/18/24  0430 01/17/24  0503 01/16/24  1357   SODIUM 132* 135* 142 139 141   POTASSIUM 3.9 3.9 4.3 3.9 4.2   CHLORIDE 96* 101 105 104 102   CO2 26.0 25.0 29.0 28.0 30.0*   ANION GAP 10.0 9.0 8.0 7.0 9.0   BUN 12 15 20 22 24*   CREATININE 0.82 0.77 0.87 0.91 1.00   EGFR 76.6 82.6 71.3 67.6 60.4   GLUCOSE 107* 168* 108* 66 104*   CALCIUM 7.9* 7.6* 8.2* 8.0* 9.0         Lab 01/20/24 0338 01/19/24 0338 01/18/24  0430 01/17/24  0503 01/16/24  1357   TOTAL PROTEIN 5.7* 5.4* 5.6* 5.4* 6.9   ALBUMIN 2.5* 2.3* 2.6* 2.4* 3.2*   GLOBULIN 3.2 3.1 3.0 3.0 3.7   ALT (SGPT) 32 25 20 21 33   AST (SGOT) 44* 36* 26 30 52*   BILIRUBIN 0.2 0.2 0.2 0.2 0.5   ALK PHOS 331* 281* 277* 306* 410*         Lab 01/17/24  0014 01/16/24  2235 01/16/24  1357   PROBNP  --   --  3,161.0*   HSTROP T 14* 15* 21*                 Lab 01/16/24  1449   PH, ARTERIAL 7.358   PCO2, ARTERIAL 54.4*   PO2 .0*   FIO2 100   HCO3 ART 30.6*   BASE EXCESS ART 4.1*   CARBOXYHEMOGLOBIN 0.9     Brief Urine Lab Results       None            Microbiology Results Abnormal       Procedure Component Value - Date/Time    Blood Culture - Blood, Arm, Left [[id number]]  (Normal) Collected: 01/16/24 1807    Lab Status: Preliminary result Specimen: Blood from Arm, Left Updated: 01/19/24 1831     Blood Culture No growth at 3 days    Narrative:      Less than seven (7) mL's of blood was collected.  Insufficient quantity may yield false negative results.    Blood Culture - Blood, Wrist, Right [[id number]]  (Normal) Collected: 01/16/24 1741    Lab Status: Preliminary result Specimen: Blood from Wrist, Right Updated: 01/19/24 1800     Blood Culture No growth at 3 days    Narrative:      Less than seven (7) mL's of blood was collected.  Insufficient quantity may yield false negative results.    Fungus Smear - Sputum, Cough [[id number]] Collected: 01/18/24 0054    Lab Status: Final result Specimen: Sputum from Cough  Updated: 01/19/24 1314     Fungal Stain No fungal elements seen    MRSA Screen, PCR (Inpatient) - Swab, Nares [[id number]]  (Normal) Collected: 01/16/24 1800    Lab Status: Final result Specimen: Swab from Nares Updated: 01/17/24 0042     MRSA PCR No MRSA Detected    Narrative:      The negative predictive value of this diagnostic test is high and should only be used to consider de-escalating anti-MRSA therapy. A positive result may indicate colonization with MRSA and must be correlated clinically.    S. Pneumo Ag Urine or CSF - Urine, Straight Cath [[id number]]  (Normal) Collected: 01/16/24 1823    Lab Status: Final result Specimen: Urine from Straight Cath Updated: 01/17/24 0002     Strep Pneumo Ag Negative    Legionella Antigen, Urine - Urine, Straight Cath [[id number]]  (Normal) Collected: 01/16/24 1823    Lab Status: Final result Specimen: Urine from Straight Cath Updated: 01/17/24 0001     LEGIONELLA ANTIGEN, URINE Negative    COVID PRE-OP / PRE-PROCEDURE SCREENING ORDER (NO ISOLATION) - Swab, Nasopharynx [[id number]]  (Normal) Collected: 01/16/24 1405    Lab Status: Final result Specimen: Swab from Nasopharynx Updated: 01/16/24 1503    Narrative:      The following orders were created for panel order COVID PRE-OP / PRE-PROCEDURE SCREENING ORDER (NO ISOLATION) - Swab, Nasopharynx.  Procedure                               Abnormality         Status                     ---------                               -----------         ------                     Respiratory Panel PCR w/...[[id number]]  Normal              Final result                 Please view results for these tests on the individual orders.    Respiratory Panel PCR w/COVID-19(SARS-CoV-2) ALFREDO/MEGAN/MARIA FERNANDA/PAD/COR/MONIKA In-House, NP Swab in UTM/VTM, 2 HR TAT - Swab, Nasopharynx [[id number]]  (Normal) Collected: 01/16/24 1405    Lab Status: Final result Specimen: Swab from Nasopharynx Updated: 01/16/24 1503     ADENOVIRUS, PCR Not Detected     Coronavirus 229E Not  Detected     Coronavirus HKU1 Not Detected     Coronavirus NL63 Not Detected     Coronavirus OC43 Not Detected     COVID19 Not Detected     Human Metapneumovirus Not Detected     Human Rhinovirus/Enterovirus Not Detected     Influenza A PCR Not Detected     Influenza B PCR Not Detected     Parainfluenza Virus 1 Not Detected     Parainfluenza Virus 2 Not Detected     Parainfluenza Virus 3 Not Detected     Parainfluenza Virus 4 Not Detected     RSV, PCR Not Detected     Bordetella pertussis pcr Not Detected     Bordetella parapertussis PCR Not Detected     Chlamydophila pneumoniae PCR Not Detected     Mycoplasma pneumo by PCR Not Detected    Narrative:      In the setting of a positive respiratory panel with a viral infection PLUS a negative procalcitonin without other underlying concern for bacterial infection, consider observing off antibiotics or discontinuation of antibiotics and continue supportive care. If the respiratory panel is positive for atypical bacterial infection (Bordetella pertussis, Chlamydophila pneumoniae, or Mycoplasma pneumoniae), consider antibiotic de-escalation to target atypical bacterial infection.            No radiology results from the last 24 hrs    Results for orders placed during the hospital encounter of 01/06/24    Adult Transthoracic Echo Complete W/ Cont if Necessary Per Protocol    Interpretation Summary    Left ventricular ejection fraction appears to be 66 - 70%.    Left ventricular wall thickness is consistent with mild septal asymmetric hypertrophy.    Left ventricular diastolic function is consistent with (grade I) impaired relaxation.    The right ventricular cavity is mildly dilated.    The left atrial cavity is mildly dilated.    Left atrial volume is moderately increased.    The right atrial cavity is borderline dilated.    Moderate tricuspid valve regurgitation is present.    Estimated right ventricular systolic pressure from tricuspid regurgitation is markedly elevated  (>55 mmHg).      Current medications:  Scheduled Meds:amLODIPine, 5 mg, Oral, Q24H  budesonide-formoterol, 2 puff, Inhalation, BID - RT   And  tiotropium bromide monohydrate, 2 puff, Inhalation, Daily - RT  cetirizine, 10 mg, Oral, Daily  dicyclomine, 10 mg, Oral, TID  doxepin, 50 mg, Oral, Nightly  doxycycline, 100 mg, Oral, Q12H  ferrous sulfate, 325 mg, Oral, Daily With Breakfast  flecainide, 50 mg, Oral, Q12H  FLUoxetine, 40 mg, Oral, Daily  hydroxychloroquine, 200 mg, Oral, Q24H  ipratropium-albuterol, 3 mL, Nebulization, 4x Daily - RT  levothyroxine, 25 mcg, Oral, Q AM  metoprolol tartrate, 25 mg, Oral, BID  palliative care oral rinse, , Mouth/Throat, 4x Daily  pantoprazole, 40 mg, Oral, BID AC  piperacillin-tazobactam, 4.5 g, Intravenous, Q8H  predniSONE, 40 mg, Oral, BID With Meals  rivaroxaban, 15 mg, Oral, Daily With Dinner  [Held by provider] sulfamethoxazole-trimethoprim, 2 tablet, Oral, Once per day on Monday Wednesday Friday  sulfamethoxazole-trimethoprim, 2 tablet, Oral, Q8H  sulfaSALAzine, 500 mg, Oral, Q12H  tobramycin PF, 300 mg, Nebulization, Q12H - RT  traMADol, 50 mg, Oral, BID      Continuous Infusions:   PRN Meds:.  senna-docusate sodium **AND** polyethylene glycol **AND** bisacodyl **AND** bisacodyl    Calcium Replacement - Follow Nurse / BPA Driven Protocol    fluticasone    ipratropium-albuterol    Magnesium Standard Dose Replacement - Follow Nurse / BPA Driven Protocol    morphine    nitroglycerin    Phosphorus Replacement - Follow Nurse / BPA Driven Protocol    Potassium Replacement - Follow Nurse / BPA Driven Protocol    sodium chloride    sodium chloride    Assessment & Plan   Assessment & Plan     Active Hospital Problems    Diagnosis  POA    **Acute on chronic hypoxic respiratory failure [J96.21]  Yes    Severe malnutrition [E43]  Yes    Interstitial lung disease [J84.9]  Yes    HTN (hypertension) [I10]  Yes    Chronic respiratory failure with hypoxia [J96.11]  Yes    Physical  deconditioning [R53.81]  Yes    Rheumatoid arthritis with positive rheumatoid factor [M05.9]  Yes      Resolved Hospital Problems   No resolved problems to display.        Brief Hospital Course to date:  Liz Borjas is a 73 y.o. female with past medical history of chronic hypoxic respiratory failure on 6 L nasal cannula, advanced interstitial lung disease, underlying COPD, ROPER cirrhosis, rheumatoid arthritis on chronic prednisone (20 mg daily), HTN, A-fib on Xarelto, CKDIII, with recent hospitalization 1/6 to 1/9 for right upper lobe pneumonia who represented with hypoxia, cough and poor appetite. Was found to have acute on chronic hypoxic respiratory failure secondary to multifocal pneumonia. CTA chest with advanced pulmonary fibrosis and small consolidations in RUL and RLL. Difficult to see much differences with scan 2 years ago, therefore hard to distinguish if there has been much more inflammation that will respond to steroids. Pulmonology following outpatient for ILD, they have been consulted here as well. She was started on HFNC initially.    This patient's problems and plans were partially entered by my partner and updated as appropriate by me 01/20/24.     Acute on chronic hypoxic respiratory failure  Multifocal Pseudomonas pneumonia  Hx of advanced ILD with UIP pattern  Hx COPD without exacerbation   -Recent admission for RUL pneumonia with progression despite completing oral antibiotics  -Afebrile, WBC 15K on admission, resp PCR negative, MRSA neg (vanc d/c)  -Defer anti-fibrotic agent to outpatient  -Continue steroids per Pulm (changed to PO prednisone 40mg BID on 1/20)  -Covering with Zosyn, inhaled tobramycin for Pseudomonas; susceptibilities appear preliminary and quite resistant. Several susceptibilities are missing including Zosyn, carbapenems.  Discussed with pharmacy and are requesting additional susceptibilities to be done by lab  -Discussed with pulmonary who recommended the following: added  doxy BID, IV bactrim daily  -hold for above Bactrim MWF.   -Continue Symbicort/Spiriva/Duo-Nebs.     GOC  -Discussed with patient and she elected to transition to DNR/DNI on 1/18  -Palliative consulted for further discussions; discussed with patient that there is a risk of continued decline while in the hospital due to the severity of her underlying condition despite aggressive medical treatment  -PRN morphine, atarax     Elevated troponin, type II MI  -Likely secondary to acute hypoxia  -EKG without acute ST-T changes; no chest pain  -Repeat troponin downtrending     Elevated proBNP  Chronic HFpEF  -proBNP 3K on admission  -no evidence of edema on imaging, mild pedal edema on exam  -Echo 1/6/24 with EF 66 to 70%, grade 1 diastolic dysfunction, moderate TR, RVSP >55 mmHg  -Will hold off on diuresing for now. Can consider as needed. Strict Is&Os.     Chronic anemia  -Hgb appears at baseline, no evidence of overt bleeding  -Continue home oral iron supplement. Further workup as outpatient. Would update age-appropriate cancer screenings.     Rheumatoid arthritis  -On outpatient prednisone 20 mg daily, recently discharged on prolonged taper  -Continue home Plaquenil, sulfasalazine and Bentyl/Tramadol for pain.  -Continue steroids as above. Bactrim MWF (held as above).     Paroxysmal atrial fibrillation  -Continue metoprolol, flecainide and Xarelto.     GERD  -Continue PPI twice daily.     Hypothyroidism  -Continue levothyroxine.     Chronic insomnia  -Continue doxepin.     Hypertension  -Continue amlodipine.     Anxiety/depression  -Continue Prozac.    Expected Discharge Location and Transportation: home vs rehab (suspect rehab); Once more stable from respiratory status, PT/OT will need to evaluate  Expected Discharge   Expected Discharge Date: 1/24/2024; Expected Discharge Time:      DVT prophylaxis:  Medical DVT prophylaxis orders are present.     AM-PAC 6 Clicks Score (PT): 11 (01/19/24 1800)    CODE STATUS:   Code  Status and Medical Interventions:   Ordered at: 01/18/24 0854     Medical Intervention Limits:    NO intubation (DNI)     Level Of Support Discussed With:    Patient     Code Status (Patient has no pulse and is not breathing):    No CPR (Do Not Attempt to Resuscitate)     Medical Interventions (Patient has pulse or is breathing):    Limited Support       Rosie James MD  01/20/24

## 2024-01-21 NOTE — PLAN OF CARE
Goal Outcome Evaluation:  Plan of Care Reviewed With: patient, daughter        Progress: improving         Anticipated Discharge Disposition (SLP): skilled nursing facility          SLP Swallowing Diagnosis: R/O pharyngeal dysphagia (01/21/24 1200)

## 2024-01-21 NOTE — THERAPY EVALUATION
Acute Care - Speech Language Pathology   Swallow Initial Evaluation [facility]  Clinical Swallow Evaluation       Patient Name: Lzi Borjas  : 1950  MRN: [id number]  Today's Date: 2024               Admit Date: 2024    Visit Dx:     ICD-10-CM ICD-9-CM   1. Acute on chronic respiratory failure with hypoxemia  J96.21 518.84   2. Pulmonary fibrosis  J84.10 515   3. Former smoker  Z87.891 V15.82   4. Chronic hypoxic respiratory failure, on home oxygen therapy  J96.11 518.83    Z99.81 799.02     V46.2   5. Multifocal pneumonia  J18.9 486   6. Dysphagia, unspecified type  R13.10 787.20     Patient Active Problem List   Diagnosis    DDD (degenerative disc disease), lumbar    Spinal stenosis, lumbar region, with neurogenic claudication    Spinal stenosis of lumbar region with neurogenic claudication    Lumbar disc herniation with radiculopathy    Rheumatoid arthritis with positive rheumatoid factor    Anxiety and depression    COPD with acute exacerbation    Mild obesity    Physical deconditioning    Sepsis    Leukocytosis    Lactic acidosis    Hyponatremia    Acute on chronic respiratory failure with hypoxia    Chronic respiratory failure with hypoxia    Elevated d-dimer    Immunocompromised    PAF (paroxysmal atrial fibrillation)    Chronic anticoagulation    HTN (hypertension)    Interstitial lung disease    Stage 3a chronic kidney disease    COPD (chronic obstructive pulmonary disease)    Acute on chronic hypoxic respiratory failure    Severe malnutrition     Past Medical History:   Diagnosis Date    Arthritis     Atrial fibrillation     Closed right hip fracture     COVID     GERD (gastroesophageal reflux disease)     Gout     Hyperlipidemia     Hypertension     Osteoporosis     Rheumatoid arthritis     Wears dentures     upper    Wears glasses     reading     Past Surgical History:   Procedure Laterality Date    ANKLE SURGERY Right     CHOLECYSTECTOMY      COLONOSCOPY      5 years ago    HIP  FRACTURE SURGERY      Right Hip with Pins    LUMBAR DISCECTOMY Left 10/12/2016    Procedure: lumbar MICROdiscectomy LEFT L3-5;  Surgeon: Chris Son MD;  [facility]: [facility];  Service:     REPLACEMENT TOTAL KNEE      Right Knee    TOE AMPUTATION      right baby toe, left second toe       SLP Recommendation and Plan  SLP Swallowing Diagnosis: R/O pharyngeal dysphagia (01/21/24 1200)  SLP Diet Recommendation: soft to chew textures, chopped, nectar thick liquids (01/21/24 1200)  Recommended Precautions and Strategies: upright posture during/after eating, small bites of food and sips of liquid, general aspiration precautions, fatigue precautions (01/21/24 1200)  SLP Rec. for Method of Medication Administration: meds whole, meds crushed, with thick liquids, with puree, as tolerated (01/21/24 1200)     Monitor for Signs of Aspiration: yes, notify SLP if any concerns (01/21/24 1200)  Recommended Diagnostics: FEES (01/21/24 1200)  Swallow Criteria for Skilled Therapeutic Interventions Met: demonstrates skilled criteria (01/21/24 1200)  Anticipated Discharge Disposition (SLP): skilled nursing facility (01/21/24 1200)  Rehab Potential/Prognosis, Swallowing: adequate, monitor progress closely (01/21/24 1200)        Oral Care Recommendations: Oral Care BID/PRN, Suction toothbrush (01/21/24 1200)                                      Oral Care Recommendations: Oral Care BID/PRN, Suction toothbrush (01/21/24 1200)    Plan of Care Reviewed With: patient, daughter  Progress: improving      SWALLOW EVALUATION (last 72 hours)       SLP Adult Swallow Evaluation       Row Name 01/21/24 1200                   Rehab Evaluation    Document Type evaluation  -RS        Subjective Information no complaints  -RS        Patient Observations alert;cooperative;agree to therapy  -RS        Patient/Family/Caregiver Comments/Observations daughter present  -RS        Patient Effort good  -RS        Symptoms Noted During/After Treatment none   -RS           General Information    Patient Profile Reviewed yes  -RS        Pertinent History Of Current Problem Pt is a 73 year old female w PMHx sig for chronic hypoxic respiratory failure on 6 L nasal cannula, advanced interstitial lung disease, underlying COPD, ROPER cirrhosis, rheumatoid arthritis on chronic prednisone, HTN, A-fib on Xarelto, CKDIII, with recent hospitalization 1/6 to 1/9 for right upper lobe pneumonia. Pt re-presented 1/16 with hypoxia, cough and poor appetite.  -RS        Current Method of Nutrition NPO  -RS        Precautions/Limitations, Vision WFL;for purposes of eval  -RS        Precautions/Limitations, Hearing WFL;for purposes of eval  -RS        Prior Level of Function-Communication WFL  -RS        Prior Level of Function-Swallowing no diet consistency restrictions  -RS        Plans/Goals Discussed with patient and family;agreed upon  -RS        Patient's Goals for Discharge patient did not state  -RS        Family Goals for Discharge family did not state  -RS           Pain    Additional Documentation Pain Scale: FACES Pre/Post-Treatment (Group)  -RS           Pain Scale: FACES Pre/Post-Treatment    Pain: FACES Scale, Pretreatment 0-->no hurt  -RS        Posttreatment Pain Rating 0-->no hurt  -RS           Oral Motor Structure and Function    Dentition Assessment teeth are in poor condition  -RS        Secretion Management WNL/WFL  -RS        Mucosal Quality dry  -RS        Volitional Swallow delayed  -RS        Volitional Cough weak  -RS           Oral Musculature and Cranial Nerve Assessment    Oral Motor General Assessment WFL  -RS           General Eating/Swallowing Observations    Respiratory Support Currently in Use high-flow nasal cannula  -RS        Eating/Swallowing Skills self-fed;fed by SLP;appropriate self-feeding skills observed  -RS        Positioning During Eating upright in bed  -RS        Utensils Used spoon;cup;straw  -RS        Consistencies Trialed regular  textures;soft to chew textures  -RS           Clinical Swallow Eval    Oral Prep Phase WFL  -RS        Oral Transit WFL  -RS        Oral Residue WFL  -RS        Pharyngeal Phase suspected pharyngeal impairment  -RS        Esophageal Phase unremarkable  -RS        Clinical Swallow Evaluation Summary Pt on HFNC, on 6L NC at baseline. Pharyngeal patterns w thins. Respiratory status places pt at higher aspiration risk. D/w pt, daughter, and RN, agreeable to plan.  -RS           Pharyngeal Phase Concerns    Pharyngeal Phase Concerns multiple swallows;throat clear  -RS        Multiple Swallows thin  -RS        Throat Clear thin  -RS           SLP Evaluation Clinical Impression    SLP Swallowing Diagnosis R/O pharyngeal dysphagia  -RS        Functional Impact risk of aspiration/pneumonia  -RS        Rehab Potential/Prognosis, Swallowing adequate, monitor progress closely  -RS        Swallow Criteria for Skilled Therapeutic Interventions Met demonstrates skilled criteria  -RS           Recommendations    SLP Diet Recommendation soft to chew textures;chopped;nectar thick liquids  -RS        Recommended Diagnostics FEES  -RS        Recommended Precautions and Strategies upright posture during/after eating;small bites of food and sips of liquid;general aspiration precautions;fatigue precautions  -RS        Oral Care Recommendations Oral Care BID/PRN;Suction toothbrush  -RS        SLP Rec. for Method of Medication Administration meds whole;meds crushed;with thick liquids;with puree;as tolerated  -RS        Monitor for Signs of Aspiration yes;notify SLP if any concerns  -RS        Anticipated Discharge Disposition (SLP) skilled nursing facility  -RS                  User Key  (r) = Recorded By, (t) = Taken By, (c) = Cosigned By      Initials Name Effective Dates    Wojciech Burgos MS CCC-SLP 09/14/23 -                     EDUCATION  The patient has been educated in the following areas:   Dysphagia (Swallowing Impairment)  Modified Diet Instruction.              Time Calculation:    Time Calculation- SLP       Row Name 01/21/24 1245             Time Calculation- SLP    SLP Start Time 1200  -RS      SLP Received On 01/21/24  -RS         Untimed Charges    84700-WK Eval Oral Pharyng Swallow Minutes 54  -RS         Total Minutes    Untimed Charges Total Minutes 54  -RS       Total Minutes 54  -RS                User Key  (r) = Recorded By, (t) = Taken By, (c) = Cosigned By      Initials Name Provider Type    RS Wojciech Combs MS CCC-SLP Speech and Language Pathologist                    Therapy Charges for Today       Code Description Service Date Service Provider Modifiers Qty    [id number]  ST EVAL ORAL PHARYNG SWALLOW 4 1/21/2024 Wojciech Combs MS CCC-SLP GN 1                 Wojciech Combs MS CCC-TRINIDAD  1/21/2024

## 2024-01-21 NOTE — PROGRESS NOTES
[facility] Medicine Services  PROGRESS NOTE    Patient Name: Liz Borjas  : 1950  MRN: [id number]    Date of Admission: 2024  Primary Care Physician: Valerie Sesay APRN    Subjective   Subjective     CC:  F/u dyspnea    HPI:  Yesterday, was on high flow 45L, 55%. Now back up to 60L, 60%. Nursing reports possible aspiration event with dinner with coughing.  Patient reports feeling better today.  Had a bowel movement.  Patient's daughter-in-law at bedside.    Objective   Objective     Vital Signs:   Temp:  [96.3 °F (35.7 °C)-97.8 °F (36.6 °C)] 97.8 °F (36.6 °C)  Heart Rate:  [59-84] 62  Resp:  [18-22] 20  BP: (101-150)/(58-80) 140/75  Flow (L/min):  [45-60] 60     Physical Exam:  Constitutional: No acute distress, awake, alert, elderly, frail, sitting up in bed, chronically-ill appearing  HENT: NCAT, mucous membranes moist  Respiratory: Velco crackles diffusely, no wheezing  Cardiovascular: RRR, no murmurs, rubs, or gallops  Gastrointestinal: Normoactive bowel sounds, soft, nontender, nondistended  Musculoskeletal: No bilateral ankle edema  Psychiatric: Appropriate affect, cooperative  Neurologic: PERRL, symmetric facies, speech clear  Skin: No rashes        Results Reviewed:  LAB RESULTS:      Lab 24  0327 24  0338 24  0338 24  0430 24  1636 24  0503 24  1805 24  1357   WBC 6.14 5.43 5.37 4.81  --  6.46  --  15.80*   HEMOGLOBIN 10.6* 10.1* 9.0* 9.5*  --  9.8*  --  11.4*   HEMATOCRIT 36.5 33.9* 31.4* 32.6*  --  33.4*  --  40.4   PLATELETS 147 133* 122* 124*  --  109*  --  199   NEUTROS ABS 5.43 4.90 4.90 4.29  --  5.06  --  14.74*   IMMATURE GRANS (ABS) 0.05 0.09* 0.02 0.02  --  0.02  --  0.09*   LYMPHS ABS 0.36* 0.29* 0.29* 0.31*  --  0.77  --  0.33*   MONOS ABS 0.30 0.15 0.16 0.19  --  0.58  --  0.61   EOS ABS 0.00 0.00 0.00 0.00  --  0.02  --  0.01   MCV 95.3 93.6 96.0 97.9*  --  96.8  --  97.8*   CRP  --   --   --  7.86*  9.05*  --   --   --    PROCALCITONIN  --   --   --  0.08  --  0.16  --  0.11   LACTATE  --   --   --   --   --   --  0.8 2.4*         Lab 01/21/24 0327 01/20/24 0338 01/19/24 0338 01/18/24  0430 01/17/24  0503   SODIUM 135* 132* 135* 142 139   POTASSIUM 3.4* 3.9 3.9 4.3 3.9   CHLORIDE 97* 96* 101 105 104   CO2 31.0* 26.0 25.0 29.0 28.0   ANION GAP 7.0 10.0 9.0 8.0 7.0   BUN 18 12 15 20 22   CREATININE 0.99 0.82 0.77 0.87 0.91   EGFR 61.1 76.6 82.6 71.3 67.6   GLUCOSE 93 107* 168* 108* 66   CALCIUM 8.1* 7.9* 7.6* 8.2* 8.0*         Lab 01/21/24 0327 01/20/24 0338 01/19/24 0338 01/18/24  0430 01/17/24  0503   TOTAL PROTEIN 5.6* 5.7* 5.4* 5.6* 5.4*   ALBUMIN 2.7* 2.5* 2.3* 2.6* 2.4*   GLOBULIN 2.9 3.2 3.1 3.0 3.0   ALT (SGPT) 27 32 25 20 21   AST (SGOT) 31 44* 36* 26 30   BILIRUBIN 0.2 0.2 0.2 0.2 0.2   ALK PHOS 321* 331* 281* 277* 306*         Lab 01/17/24  0014 01/16/24 2235 01/16/24  1357   PROBNP  --   --  3,161.0*   HSTROP T 14* 15* 21*                 Lab 01/16/24  1449   PH, ARTERIAL 7.358   PCO2, ARTERIAL 54.4*   PO2 .0*   FIO2 100   HCO3 ART 30.6*   BASE EXCESS ART 4.1*   CARBOXYHEMOGLOBIN 0.9     Brief Urine Lab Results       None            Microbiology Results Abnormal       Procedure Component Value - Date/Time    Blood Culture - Blood, Arm, Left [[id number]]  (Normal) Collected: 01/16/24 1807    Lab Status: Preliminary result Specimen: Blood from Arm, Left Updated: 01/20/24 1831     Blood Culture No growth at 4 days    Narrative:      Less than seven (7) mL's of blood was collected.  Insufficient quantity may yield false negative results.    Blood Culture - Blood, Wrist, Right [[id number]]  (Normal) Collected: 01/16/24 1741    Lab Status: Preliminary result Specimen: Blood from Wrist, Right Updated: 01/20/24 1800     Blood Culture No growth at 4 days    Narrative:      Less than seven (7) mL's of blood was collected.  Insufficient quantity may yield false negative results.    Fungus Smear -  Sputum, Cough [[id number]] Collected: 01/18/24 0054    Lab Status: Final result Specimen: Sputum from Cough Updated: 01/19/24 1314     Fungal Stain No fungal elements seen    MRSA Screen, PCR (Inpatient) - Swab, Nares [[id number]]  (Normal) Collected: 01/16/24 1800    Lab Status: Final result Specimen: Swab from Nares Updated: 01/17/24 0042     MRSA PCR No MRSA Detected    Narrative:      The negative predictive value of this diagnostic test is high and should only be used to consider de-escalating anti-MRSA therapy. A positive result may indicate colonization with MRSA and must be correlated clinically.    S. Pneumo Ag Urine or CSF - Urine, Straight Cath [[id number]]  (Normal) Collected: 01/16/24 1823    Lab Status: Final result Specimen: Urine from Straight Cath Updated: 01/17/24 0002     Strep Pneumo Ag Negative    Legionella Antigen, Urine - Urine, Straight Cath [[id number]]  (Normal) Collected: 01/16/24 1823    Lab Status: Final result Specimen: Urine from Straight Cath Updated: 01/17/24 0001     LEGIONELLA ANTIGEN, URINE Negative    COVID PRE-OP / PRE-PROCEDURE SCREENING ORDER (NO ISOLATION) - Swab, Nasopharynx [[id number]]  (Normal) Collected: 01/16/24 1405    Lab Status: Final result Specimen: Swab from Nasopharynx Updated: 01/16/24 1503    Narrative:      The following orders were created for panel order COVID PRE-OP / PRE-PROCEDURE SCREENING ORDER (NO ISOLATION) - Swab, Nasopharynx.  Procedure                               Abnormality         Status                     ---------                               -----------         ------                     Respiratory Panel PCR w/...[[id number]]  Normal              Final result                 Please view results for these tests on the individual orders.    Respiratory Panel PCR w/COVID-19(SARS-CoV-2) ALFREDO/MEGAN/MARIA FERNANDA/PAD/COR/MONIKA In-House, NP Swab in UTM/VTM, 2 HR TAT - Swab, Nasopharynx [[id number]]  (Normal) Collected: 01/16/24 1405    Lab Status: Final result  Specimen: Swab from Nasopharynx Updated: 01/16/24 1503     ADENOVIRUS, PCR Not Detected     Coronavirus 229E Not Detected     Coronavirus HKU1 Not Detected     Coronavirus NL63 Not Detected     Coronavirus OC43 Not Detected     COVID19 Not Detected     Human Metapneumovirus Not Detected     Human Rhinovirus/Enterovirus Not Detected     Influenza A PCR Not Detected     Influenza B PCR Not Detected     Parainfluenza Virus 1 Not Detected     Parainfluenza Virus 2 Not Detected     Parainfluenza Virus 3 Not Detected     Parainfluenza Virus 4 Not Detected     RSV, PCR Not Detected     Bordetella pertussis pcr Not Detected     Bordetella parapertussis PCR Not Detected     Chlamydophila pneumoniae PCR Not Detected     Mycoplasma pneumo by PCR Not Detected    Narrative:      In the setting of a positive respiratory panel with a viral infection PLUS a negative procalcitonin without other underlying concern for bacterial infection, consider observing off antibiotics or discontinuation of antibiotics and continue supportive care. If the respiratory panel is positive for atypical bacterial infection (Bordetella pertussis, Chlamydophila pneumoniae, or Mycoplasma pneumoniae), consider antibiotic de-escalation to target atypical bacterial infection.            XR Chest 1 View    Result Date: 1/20/2024  XR CHEST 1 VW Date of Exam: 1/20/2024 4:35 AM EST Indication: Bilateral infiltrates Comparison: January 18, 2024 Findings: The heart appears mildly enlarged. There is prominence of interstitial markings with focal opacities in the right upper lung, as before. No definite new or worsening infiltrate. No significant pleural effusion. The heart appears mildly enlarged. There appears to be a moderate hiatal hernia, as before.     Impression: Impression: 1.Prominence of interstitial markings with focal opacities in the right upper lung, as before. Findings could be related to edema or pneumonia. 2.Cardiomegaly. 3.Moderate hiatal hernia.  Electronically Signed: Pancho Cotton  1/20/2024 9:24 AM EST  Workstation ID: [id number]     Results for orders placed during the hospital encounter of 01/06/24    Adult Transthoracic Echo Complete W/ Cont if Necessary Per Protocol    Interpretation Summary    Left ventricular ejection fraction appears to be 66 - 70%.    Left ventricular wall thickness is consistent with mild septal asymmetric hypertrophy.    Left ventricular diastolic function is consistent with (grade I) impaired relaxation.    The right ventricular cavity is mildly dilated.    The left atrial cavity is mildly dilated.    Left atrial volume is moderately increased.    The right atrial cavity is borderline dilated.    Moderate tricuspid valve regurgitation is present.    Estimated right ventricular systolic pressure from tricuspid regurgitation is markedly elevated (>55 mmHg).      Current medications:  Scheduled Meds:amLODIPine, 5 mg, Oral, Q24H  budesonide-formoterol, 2 puff, Inhalation, BID - RT   And  tiotropium bromide monohydrate, 2 puff, Inhalation, Daily - RT  cetirizine, 10 mg, Oral, Daily  dicyclomine, 10 mg, Oral, TID  doxepin, 50 mg, Oral, Nightly  doxycycline, 100 mg, Oral, Q12H  ferrous sulfate, 325 mg, Oral, Daily With Breakfast  flecainide, 50 mg, Oral, Q12H  FLUoxetine, 40 mg, Oral, Daily  hydroxychloroquine, 200 mg, Oral, Q24H  ipratropium-albuterol, 3 mL, Nebulization, 4x Daily - RT  lactobacillus acidophilus, 1 capsule, Oral, Daily  levothyroxine, 25 mcg, Oral, Q AM  metoprolol tartrate, 25 mg, Oral, BID  palliative care oral rinse, , Mouth/Throat, 4x Daily  pantoprazole, 40 mg, Oral, BID AC  piperacillin-tazobactam, 4.5 g, Intravenous, Q8H  potassium chloride, 10 mEq, Intravenous, Q1H  predniSONE, 40 mg, Oral, BID With Meals  rivaroxaban, 15 mg, Oral, Daily With Dinner  [Held by provider] sulfamethoxazole-trimethoprim, 2 tablet, Oral, Once per day on Monday Wednesday Friday  sulfamethoxazole-trimethoprim, 2 tablet, Oral,  Q8H  sulfaSALAzine, 500 mg, Oral, Q12H  tobramycin PF, 300 mg, Nebulization, Q12H - RT  traMADol, 50 mg, Oral, BID      Continuous Infusions:   PRN Meds:.  senna-docusate sodium **AND** polyethylene glycol **AND** bisacodyl **AND** bisacodyl    Calcium Replacement - Follow Nurse / BPA Driven Protocol    fluticasone    hydrOXYzine    ipratropium-albuterol    Magnesium Standard Dose Replacement - Follow Nurse / BPA Driven Protocol    morphine    nitroglycerin    Phosphorus Replacement - Follow Nurse / BPA Driven Protocol    Potassium Replacement - Follow Nurse / BPA Driven Protocol    sodium chloride    sodium chloride    Assessment & Plan   Assessment & Plan     Active Hospital Problems    Diagnosis  POA    **Acute on chronic hypoxic respiratory failure [J96.21]  Yes    Severe malnutrition [E43]  Yes    Interstitial lung disease [J84.9]  Yes    HTN (hypertension) [I10]  Yes    Chronic respiratory failure with hypoxia [J96.11]  Yes    Physical deconditioning [R53.81]  Yes    Rheumatoid arthritis with positive rheumatoid factor [M05.9]  Yes      Resolved Hospital Problems   No resolved problems to display.        Brief Hospital Course to date:  Liz Borjas is a 73 y.o. female with past medical history of chronic hypoxic respiratory failure on 6 L nasal cannula, advanced interstitial lung disease, underlying COPD, ROPER cirrhosis, rheumatoid arthritis on chronic prednisone (20 mg daily), HTN, A-fib on Xarelto, CKDIII, with recent hospitalization 1/6 to 1/9 for right upper lobe pneumonia who represented with hypoxia, cough and poor appetite. She was found to have acute on chronic hypoxic respiratory failure secondary to multifocal PSA pneumonia. CTA chest with advanced pulmonary fibrosis and small consolidations in RUL and RLL. Difficult to see much differences with scan 2 years ago, therefore hard to distinguish if there has been much more inflammation that will respond to steroids. Pulmonology consulted. She was  started on HFNC initially.    This patient's problems and plans were partially entered by my partner and updated as appropriate by me 01/21/24.     Acute on chronic hypoxic respiratory failure  Multifocal Pseudomonas pneumonia  Hx of advanced ILD with UIP pattern  Hx COPD without exacerbation   -Afebrile, WBC 15K on admission, resp PCR negative, MRSA neg (vanc d/c)  -Defer anti-fibrotic agent to outpatient  -Continue steroids per Pulm (changed to PO prednisone 40mg BID on 1/20)  -Double-covering PSA per pulm; was on inhaled Romycin/Zosyn, however, lab having issues completing his Zosyn susceptibilities; transition to inhaled tobramycin/meropenem for now  -Discussed with pulmonary who recommended the following: added doxy BID, IV bactrim daily  -hold for above Bactrim MWF.   -Continue Symbicort/Spiriva/Duo-Nebs.     GOC  -Discussed with patient and she elected to transition to DNR/DNI on 1/18  -Palliative consulted for further discussions; discussed with patient that there is a risk of continued decline while in the hospital due to the severity of her underlying condition despite aggressive medical treatment  -PRN morphine, atarax    Dysphagia  -SLP following, on modified diet     Elevated troponin, type II MI  -Likely secondary to acute hypoxia  -EKG without acute ST-T changes; no chest pain  -Repeat troponin downtrending     Elevated proBNP  Chronic HFpEF  -proBNP 3K on admission  -no evidence of edema on imaging, mild pedal edema on exam  -Echo 1/6/24 with EF 66 to 70%, grade 1 diastolic dysfunction, moderate TR, RVSP >55 mmHg  -Will hold off on diuresing for now. Can consider as needed. -Strict Is&Os.     Rheumatoid arthritis  -On outpatient prednisone 20 mg daily, recently discharged on prolonged taper  -Continue home Plaquenil, sulfasalazine   -Continue steroids as above. Bactrim MWF (held as above).     Paroxysmal atrial fibrillation -Continue metoprolol, flecainide and Xarelto.  GERD -Continue PPI twice  daily.  Hypothyroidism -Continue levothyroxine.  Chronic insomnia -Continue doxepin.  Hypertension -Continue amlodipine.  Anxiety/depression -Continue Prozac.  Chronic anemia -Hgb appears at baseline, no evidence of overt bleeding    Expected Discharge Location and Transportation: home vs rehab (suspect rehab); Once more stable from respiratory status, PT/OT will need to evaluate  Expected Discharge   Expected Discharge Date: 1/24/2024; Expected Discharge Time:      DVT prophylaxis:  Medical DVT prophylaxis orders are present.     AM-PAC 6 Clicks Score (PT): 11 (01/20/24 0848)    CODE STATUS:   Code Status and Medical Interventions:   Ordered at: 01/18/24 0854     Medical Intervention Limits:    NO intubation (DNI)     Level Of Support Discussed With:    Patient     Code Status (Patient has no pulse and is not breathing):    No CPR (Do Not Attempt to Resuscitate)     Medical Interventions (Patient has pulse or is breathing):    Limited Support       Rosie James MD  01/21/24

## 2024-01-21 NOTE — PLAN OF CARE
Problem: COPD (Chronic Obstructive Pulmonary Disease) Comorbidity  Goal: Maintenance of COPD Symptom Control  Outcome: Ongoing, Progressing  Intervention: Maintain COPD-Symptom Control  Recent Flowsheet Documentation  Taken 1/21/2024 1600 by Valerie Eden, RN  Medication Review/Management: medications reviewed  Taken 1/21/2024 1400 by Valerie Eden, RN  Supportive Measures: active listening utilized  Medication Review/Management: medications reviewed  Taken 1/21/2024 1200 by Valerie Eden, RN  Medication Review/Management: medications reviewed  Taken 1/21/2024 1000 by Valerie Eden, RN  Medication Review/Management: medications reviewed  Taken 1/21/2024 0800 by Valerie Eden, RN  Supportive Measures:   active listening utilized   verbalization of feelings encouraged  Medication Review/Management: medications reviewed     Problem: Noninvasive Ventilation Acute  Goal: Effective Unassisted Ventilation and Oxygenation  Outcome: Ongoing, Progressing  Intervention: Monitor and Manage Noninvasive Ventilation  Recent Flowsheet Documentation  Taken 1/21/2024 0800 by Valerie Eden RN  Airway/Ventilation Management: pulmonary hygiene promoted     Problem: Adult Inpatient Plan of Care  Goal: Plan of Care Review  Outcome: Ongoing, Progressing  Flowsheets (Taken 1/21/2024 1736)  Plan of Care Reviewed With:   patient   family  Outcome Evaluation: VSS on HFNC, titrated down to 40L 55% (oxygen requirements increased w/ activity, but pt recovers within minutes).  NSR per tele- continuous cardiac monitoring discontinued per provider.  Diet ordered per SLP, plans for FEEs Monday.  BM x1.  Adequate UOP.  Family updated at bedside.  Goal: Patient-Specific Goal (Individualized)  Outcome: Ongoing, Progressing  Goal: Absence of Hospital-Acquired Illness or Injury  Outcome: Ongoing, Progressing  Intervention: Identify and Manage Fall Risk  Recent Flowsheet  Documentation  Taken 1/21/2024 1600 by Valerie Eden, RN  Safety Promotion/Fall Prevention:   activity supervised   clutter free environment maintained   fall prevention program maintained   nonskid shoes/slippers when out of bed   room organization consistent   safety round/check completed  Taken 1/21/2024 1400 by Valerie Eden, RN  Safety Promotion/Fall Prevention:   activity supervised   clutter free environment maintained   fall prevention program maintained   nonskid shoes/slippers when out of bed   room organization consistent   safety round/check completed  Taken 1/21/2024 1200 by Valerie Eden RN  Safety Promotion/Fall Prevention:   activity supervised   clutter free environment maintained   fall prevention program maintained   nonskid shoes/slippers when out of bed   room organization consistent   safety round/check completed  Taken 1/21/2024 1000 by Valerie Eden RN  Safety Promotion/Fall Prevention:   activity supervised   clutter free environment maintained   fall prevention program maintained   nonskid shoes/slippers when out of bed   room organization consistent   safety round/check completed  Taken 1/21/2024 0800 by Valerie Eden, RN  Safety Promotion/Fall Prevention:   activity supervised   clutter free environment maintained   fall prevention program maintained   nonskid shoes/slippers when out of bed   room organization consistent   safety round/check completed  Intervention: Prevent Skin Injury  Recent Flowsheet Documentation  Taken 1/21/2024 1600 by Valerie Eden RN  Body Position:   turned   neutral body alignment   neutral head position   lower extremity elevated   upper extremity elevated  Skin Protection:   adhesive use limited   incontinence pads utilized   tubing/devices free from skin contact   skin-to-device areas padded  Taken 1/21/2024 1400 by Valerie Eden RN  Body Position:   tilted   right   upper  extremity elevated   lower extremity elevated  Skin Protection:   adhesive use limited   incontinence pads utilized   tubing/devices free from skin contact   skin-to-device areas padded  Taken 1/21/2024 1200 by Valerie Eden RN  Body Position:   tilted   left   lower extremity elevated   upper extremity elevated  Skin Protection:   adhesive use limited   incontinence pads utilized   tubing/devices free from skin contact   skin-to-device areas padded  Taken 1/21/2024 1000 by Valerie Eden RN  Body Position:   tilted   right   lower extremity elevated   upper extremity elevated  Skin Protection:   adhesive use limited   incontinence pads utilized   tubing/devices free from skin contact   skin-to-device areas padded  Taken 1/21/2024 0800 by Valerie Eden RN  Body Position:   weight shifting   tilted   left  Skin Protection:   adhesive use limited   incontinence pads utilized   tubing/devices free from skin contact   skin-to-device areas padded  Intervention: Prevent and Manage VTE (Venous Thromboembolism) Risk  Recent Flowsheet Documentation  Taken 1/21/2024 1600 by Valerie Eden RN  Activity Management: activity minimized  Range of Motion: active ROM (range of motion) encouraged  Taken 1/21/2024 1400 by Valerie Eden RN  Activity Management: activity encouraged  Range of Motion: active ROM (range of motion) encouraged  Taken 1/21/2024 1200 by Valerie Eden RN  Activity Management: activity encouraged  Range of Motion: active ROM (range of motion) encouraged  Taken 1/21/2024 1000 by Valerie Eden RN  Activity Management: activity encouraged  Range of Motion: active ROM (range of motion) encouraged  Taken 1/21/2024 0800 by Valerie Eden RN  Activity Management: activity minimized  VTE Prevention/Management: (xarelto) --  Range of Motion: active ROM (range of motion) encouraged  Intervention: Prevent Infection  Recent  Flowsheet Documentation  Taken 1/21/2024 1200 by Valerie Eden, RN  Infection Prevention:   environmental surveillance performed   single patient room provided   visitors restricted/screened   personal protective equipment utilized   rest/sleep promoted  Taken 1/21/2024 1000 by Valerie Eden RN  Infection Prevention:   environmental surveillance performed   visitors restricted/screened   single patient room provided   rest/sleep promoted   personal protective equipment utilized  Taken 1/21/2024 0800 by Valerie Eden RN  Infection Prevention:   environmental surveillance performed   visitors restricted/screened   single patient room provided   rest/sleep promoted   hand hygiene promoted   personal protective equipment utilized  Goal: Optimal Comfort and Wellbeing  Outcome: Ongoing, Progressing  Intervention: Provide Person-Centered Care  Recent Flowsheet Documentation  Taken 1/21/2024 1600 by Valerie Eden RN  Trust Relationship/Rapport:   care explained   choices provided   questions answered   reassurance provided   thoughts/feelings acknowledged  Taken 1/21/2024 1400 by Valerie Eden, XAVI  Trust Relationship/Rapport:   care explained   questions answered  Taken 1/21/2024 0800 by Valerie Eden RN  Trust Relationship/Rapport:   care explained   choices provided   thoughts/feelings acknowledged   questions answered  Goal: Readiness for Transition of Care  Outcome: Ongoing, Progressing  Goal: Plan of Care Review  Outcome: Ongoing, Progressing  Flowsheets (Taken 1/21/2024 1736)  Plan of Care Reviewed With:   patient   family  Outcome Evaluation: VSS on HFNC, titrated down to 40L 55% (oxygen requirements increased w/ activity, but pt recovers within minutes).  NSR per tele- continuous cardiac monitoring discontinued per provider.  Diet ordered per SLP, plans for FEEs Monday.  BM x1.  Adequate UOP.  Family updated at bedside.  Goal: Patient-Specific  Goal (Individualized)  Outcome: Ongoing, Progressing  Goal: Absence of Hospital-Acquired Illness or Injury  Outcome: Ongoing, Progressing  Intervention: Identify and Manage Fall Risk  Recent Flowsheet Documentation  Taken 1/21/2024 1600 by Valerie Eden, RN  Safety Promotion/Fall Prevention:   activity supervised   clutter free environment maintained   fall prevention program maintained   nonskid shoes/slippers when out of bed   room organization consistent   safety round/check completed  Taken 1/21/2024 1400 by Valerie Eden, RN  Safety Promotion/Fall Prevention:   activity supervised   clutter free environment maintained   fall prevention program maintained   nonskid shoes/slippers when out of bed   room organization consistent   safety round/check completed  Taken 1/21/2024 1200 by Valerie Eden, RN  Safety Promotion/Fall Prevention:   activity supervised   clutter free environment maintained   fall prevention program maintained   nonskid shoes/slippers when out of bed   room organization consistent   safety round/check completed  Taken 1/21/2024 1000 by Valerie Eden, RN  Safety Promotion/Fall Prevention:   activity supervised   clutter free environment maintained   fall prevention program maintained   nonskid shoes/slippers when out of bed   room organization consistent   safety round/check completed  Taken 1/21/2024 0800 by Valerie Eden, RN  Safety Promotion/Fall Prevention:   activity supervised   clutter free environment maintained   fall prevention program maintained   nonskid shoes/slippers when out of bed   room organization consistent   safety round/check completed  Intervention: Prevent Skin Injury  Recent Flowsheet Documentation  Taken 1/21/2024 1600 by Valerie Eden, RN  Body Position:   turned   neutral body alignment   neutral head position   lower extremity elevated   upper extremity elevated  Skin Protection:   adhesive use  limited   incontinence pads utilized   tubing/devices free from skin contact   skin-to-device areas padded  Taken 1/21/2024 1400 by Valerie Eden RN  Body Position:   tilted   right   upper extremity elevated   lower extremity elevated  Skin Protection:   adhesive use limited   incontinence pads utilized   tubing/devices free from skin contact   skin-to-device areas padded  Taken 1/21/2024 1200 by Valerie Eden RN  Body Position:   tilted   left   lower extremity elevated   upper extremity elevated  Skin Protection:   adhesive use limited   incontinence pads utilized   tubing/devices free from skin contact   skin-to-device areas padded  Taken 1/21/2024 1000 by Valerie Eden RN  Body Position:   tilted   right   lower extremity elevated   upper extremity elevated  Skin Protection:   adhesive use limited   incontinence pads utilized   tubing/devices free from skin contact   skin-to-device areas padded  Taken 1/21/2024 0800 by Valerie Eden RN  Body Position:   weight shifting   tilted   left  Skin Protection:   adhesive use limited   incontinence pads utilized   tubing/devices free from skin contact   skin-to-device areas padded  Intervention: Prevent and Manage VTE (Venous Thromboembolism) Risk  Recent Flowsheet Documentation  Taken 1/21/2024 1600 by Valerie Eden RN  Activity Management: activity minimized  Range of Motion: active ROM (range of motion) encouraged  Taken 1/21/2024 1400 by Valerie Eden RN  Activity Management: activity encouraged  Range of Motion: active ROM (range of motion) encouraged  Taken 1/21/2024 1200 by Valerie Eden RN  Activity Management: activity encouraged  Range of Motion: active ROM (range of motion) encouraged  Taken 1/21/2024 1000 by Valerie Eden RN  Activity Management: activity encouraged  Range of Motion: active ROM (range of motion) encouraged  Taken 1/21/2024 0800 by Valerie Eden  Linda, XAVI  Activity Management: activity minimized  VTE Prevention/Management: (xarelto) --  Range of Motion: active ROM (range of motion) encouraged  Intervention: Prevent Infection  Recent Flowsheet Documentation  Taken 1/21/2024 1200 by Valerie Eden RN  Infection Prevention:   environmental surveillance performed   single patient room provided   visitors restricted/screened   personal protective equipment utilized   rest/sleep promoted  Taken 1/21/2024 1000 by Valerie Eden RN  Infection Prevention:   environmental surveillance performed   visitors restricted/screened   single patient room provided   rest/sleep promoted   personal protective equipment utilized  Taken 1/21/2024 0800 by Valerie Eden, RN  Infection Prevention:   environmental surveillance performed   visitors restricted/screened   single patient room provided   rest/sleep promoted   hand hygiene promoted   personal protective equipment utilized  Goal: Optimal Comfort and Wellbeing  Outcome: Ongoing, Progressing  Intervention: Provide Person-Centered Care  Recent Flowsheet Documentation  Taken 1/21/2024 1600 by Valerie Eden RN  Trust Relationship/Rapport:   care explained   choices provided   questions answered   reassurance provided   thoughts/feelings acknowledged  Taken 1/21/2024 1400 by Valerie Eden RN  Trust Relationship/Rapport:   care explained   questions answered  Taken 1/21/2024 0800 by Valerie Eden RN  Trust Relationship/Rapport:   care explained   choices provided   thoughts/feelings acknowledged   questions answered  Goal: Readiness for Transition of Care  Outcome: Ongoing, Progressing     Problem: Fluid Imbalance (Pneumonia)  Goal: Fluid Balance  Outcome: Ongoing, Progressing     Problem: Infection (Pneumonia)  Goal: Resolution of Infection Signs and Symptoms  Outcome: Ongoing, Progressing     Problem: Respiratory Compromise (Pneumonia)  Goal: Effective  Oxygenation and Ventilation  Outcome: Ongoing, Progressing  Intervention: Promote Airway Secretion Clearance  Recent Flowsheet Documentation  Taken 1/21/2024 1600 by Valerie Eden RN  Cough And Deep Breathing: done independently per patient  Taken 1/21/2024 1400 by Valerie Eden RN  Cough And Deep Breathing: done independently per patient  Taken 1/21/2024 1200 by Valerie Eden RN  Cough And Deep Breathing: done independently per patient  Taken 1/21/2024 0800 by Valerie Eden RN  Cough And Deep Breathing: done independently per patient  Intervention: Optimize Oxygenation and Ventilation  Recent Flowsheet Documentation  Taken 1/21/2024 1600 by Valerie Eden RN  Head of Bed (HOB) Positioning: HOB at 60-90 degrees  Taken 1/21/2024 1400 by Valerie Eden RN  Head of Bed (HOB) Positioning: HOB at 60-90 degrees  Taken 1/21/2024 1200 by Valerie Eden RN  Head of Bed (HOB) Positioning: HOB at 60-90 degrees  Taken 1/21/2024 1000 by Valerie Eden RN  Head of Bed (HOB) Positioning: HOB at 60-90 degrees  Taken 1/21/2024 0800 by Valerie Eden RN  Head of Bed (HOB) Positioning: HOB at 60-90 degrees  Airway/Ventilation Management: pulmonary hygiene promoted     Problem: Fall Injury Risk  Goal: Absence of Fall and Fall-Related Injury  Outcome: Ongoing, Progressing  Intervention: Identify and Manage Contributors  Recent Flowsheet Documentation  Taken 1/21/2024 1600 by Valerie Eden, RN  Medication Review/Management: medications reviewed  Taken 1/21/2024 1400 by Valerie Eden, RN  Medication Review/Management: medications reviewed  Taken 1/21/2024 1200 by Valerie Eden, RN  Medication Review/Management: medications reviewed  Taken 1/21/2024 1000 by Valerie Eden, RN  Medication Review/Management: medications reviewed  Taken 1/21/2024 0800 by Valerie Eden, XAVI  Medication  Review/Management: medications reviewed  Intervention: Promote Injury-Free Environment  Recent Flowsheet Documentation  Taken 1/21/2024 1600 by Valerie Eden, RN  Safety Promotion/Fall Prevention:   activity supervised   clutter free environment maintained   fall prevention program maintained   nonskid shoes/slippers when out of bed   room organization consistent   safety round/check completed  Taken 1/21/2024 1400 by Valerie Eden, RN  Safety Promotion/Fall Prevention:   activity supervised   clutter free environment maintained   fall prevention program maintained   nonskid shoes/slippers when out of bed   room organization consistent   safety round/check completed  Taken 1/21/2024 1200 by Valerie Eden, RN  Safety Promotion/Fall Prevention:   activity supervised   clutter free environment maintained   fall prevention program maintained   nonskid shoes/slippers when out of bed   room organization consistent   safety round/check completed  Taken 1/21/2024 1000 by Valerie Eden, RN  Safety Promotion/Fall Prevention:   activity supervised   clutter free environment maintained   fall prevention program maintained   nonskid shoes/slippers when out of bed   room organization consistent   safety round/check completed  Taken 1/21/2024 0800 by Valerie Eden, RN  Safety Promotion/Fall Prevention:   activity supervised   clutter free environment maintained   fall prevention program maintained   nonskid shoes/slippers when out of bed   room organization consistent   safety round/check completed     Problem: Palliative Care  Goal: Enhanced Quality of Life  Outcome: Ongoing, Progressing  Intervention: Maximize Comfort  Recent Flowsheet Documentation  Taken 1/21/2024 0800 by Valerie Eden, RN  Oral Care:   tongue brushed   teeth brushed - regular toothbrush   lip/mouth moisturizer applied  Intervention: Optimize Psychosocial Wellbeing  Recent Flowsheet  Documentation  Taken 1/21/2024 1400 by Valerie Eden RN  Supportive Measures: active listening utilized  Taken 1/21/2024 0800 by Valerie Eden RN  Supportive Measures:   active listening utilized   verbalization of feelings encouraged     Problem: Skin Injury Risk Increased  Goal: Skin Health and Integrity  Outcome: Ongoing, Progressing  Intervention: Optimize Skin Protection  Recent Flowsheet Documentation  Taken 1/21/2024 1600 by Valerie Eden RN  Pressure Reduction Techniques:   frequent weight shift encouraged   heels elevated off bed   pressure points protected   weight shift assistance provided  Head of Bed (HOB) Positioning: HOB at 60-90 degrees  Pressure Reduction Devices:   alternating pressure pump (ADD)   elbow protectors utilized   specialty bed utilized   pressure-redistributing mattress utilized   positioning supports utilized   heel offloading device utilized  Skin Protection:   adhesive use limited   incontinence pads utilized   tubing/devices free from skin contact   skin-to-device areas padded  Taken 1/21/2024 1400 by Valerie Eden RN  Pressure Reduction Techniques:   frequent weight shift encouraged   heels elevated off bed   pressure points protected   weight shift assistance provided  Head of Bed (HOB) Positioning: HOB at 60-90 degrees  Pressure Reduction Devices:   alternating pressure pump (ADD)   elbow protectors utilized   specialty bed utilized   pressure-redistributing mattress utilized   positioning supports utilized   heel offloading device utilized  Skin Protection:   adhesive use limited   incontinence pads utilized   tubing/devices free from skin contact   skin-to-device areas padded  Taken 1/21/2024 1200 by Valerie Eden RN  Pressure Reduction Techniques:   frequent weight shift encouraged   heels elevated off bed   pressure points protected   weight shift assistance provided  Head of Bed (HOB) Positioning: HOB at 60-90  degrees  Pressure Reduction Devices:   alternating pressure pump (ADD)   elbow protectors utilized   specialty bed utilized   pressure-redistributing mattress utilized   positioning supports utilized   heel offloading device utilized  Skin Protection:   adhesive use limited   incontinence pads utilized   tubing/devices free from skin contact   skin-to-device areas padded  Taken 1/21/2024 1000 by Valerie Eden RN  Pressure Reduction Techniques:   frequent weight shift encouraged   heels elevated off bed   pressure points protected   weight shift assistance provided  Head of Bed (HOB) Positioning: HOB at 60-90 degrees  Pressure Reduction Devices:   alternating pressure pump (ADD)   elbow protectors utilized   specialty bed utilized   pressure-redistributing mattress utilized   positioning supports utilized   heel offloading device utilized  Skin Protection:   adhesive use limited   incontinence pads utilized   tubing/devices free from skin contact   skin-to-device areas padded  Taken 1/21/2024 0800 by Valerie Eden RN  Pressure Reduction Techniques:   frequent weight shift encouraged   heels elevated off bed   pressure points protected   weight shift assistance provided  Head of Bed (HOB) Positioning: HOB at 60-90 degrees  Pressure Reduction Devices:   alternating pressure pump (ADD)   elbow protectors utilized   specialty bed utilized   pressure-redistributing mattress utilized   positioning supports utilized   heel offloading device utilized  Skin Protection:   adhesive use limited   incontinence pads utilized   tubing/devices free from skin contact   skin-to-device areas padded   Goal Outcome Evaluation:  Plan of Care Reviewed With: patient, family           Outcome Evaluation: VSS on HFNC, titrated down to 40L 55% (oxygen requirements increased w/ activity, but pt recovers within minutes).  NSR per tele- continuous cardiac monitoring discontinued per provider.  Diet ordered per SLP, plans  for FEEs Monday.  BM x1.  Adequate UOP.  Family updated at bedside.

## 2024-01-21 NOTE — PLAN OF CARE
Goal Outcome Evaluation:  Plan of Care Reviewed With: patient        Progress: no change  Outcome Evaluation: VSS. Pt currently on 60% 60L. PRN morphine given x1 for dyspnea with relief. Antibiotics  Potassium replaced. NPO but meds crushed in pudding. Pt tolerated well. Will continue plan of care.

## 2024-01-22 NOTE — NURSING NOTE
"Reason for Wound, Ostomy and Continence (WOC) Nursing Consultation: \"  Pressure Injury     Was Pressure Injury Present on Admission? No    Indicate Wound Location / Details midline cocyx    \"    Patient seen on 100% high flow and 60 L.  Family placed nonrebreather on patient before rolling in bed for assessment, noted that patient was 100% before turning.  Family/support person present.  Patient incontinent of stool and urine on assessment, gown and absorbent pad changed.  New pure wick placed.    Wounds noted by WOC:    Wound Assessment  Wound Type: Pressure Injury Deep Tissue Pressure Injury (DTPI)  Location: left coccyx area, follows vertical line of coccyx, patient with extremely loose skin  Measurements: 4x1.5cm  Wound Bed: non-blanchable and purple-slightly more purple in person than the brownish discoloration seen in photo, however not firm, not warm or painful.  Periwound Skin: dry   Drainage Characteristics/Odor: none  Drainage Amount: none  Pain: No   Care provided: Covered with Z guard, patient incontinent  Notes: Order Venelex, follow-up  Wound Image:       Wound Assessment  Wound Type: Pressure injury, stage II  Location: Right of coccyx  Measurements: 0.6 x 0.5 cm  Wound Bed: dermis  Wound Edges: Open  Periwound Skin: macerated   Drainage Characteristics/Odor: none  Drainage Amount: none  Pain: Yes   Care provided: Cleanse and cover with Z guard, patient incontinent  Notes: Order Venelex.  Wound Image: see in above photo    Wound Assessment  Wound Type: Pressure Injury stage I  Location: Linear, unsure of origin/etiology  Measurements: 4 x 1  Wound Bed: Red, nonblanchable, hint of slight purple discoloration horizontally distal area  Periwound Skin: intact   Drainage Characteristics/Odor: none  Drainage Amount: none  Pain: No   Care provided: Cleanse and cover with Z guard, patient incontinent  Notes:  Order Venelex.  Wound Image:       Recommendation(s) for management of wound:   -Refer to wound care " orders for specific instructions on how to treat/manage wound.  -Heels are boggy but blanchable  --Practice pressure injury prevention protocol.    Most recent John Scale score:  Sensory Perception: 3-->slightly limited  Moisture: 3-->occasionally moist  Activity: 2-->chairfast  Mobility: 2-->very limited  Nutrition: 2-->probably inadequate  Friction and Shear: 1-->problem  John Score: 13 (01/22/24 0855)     Specialty support surface: Foam Mattress with Waffle Overlay   --order dolphin with Low Air Loss bed    Pressure Injury Prevention Protocol (initiate for John Score of 18 or less):   *Keep skin dry, turn q 2 hr, keep heels elevated and offloaded with offloading heel boots.    *Apply z-guard to bottom and bony prominences daily and as needed with incontinence episodes.  *Follow C.A.R.E protocol if medical devices (Bipap, lo, Ng tube, etc) are being used.  *Reduce layers under patient (one sheet as drawsheet and two incontinence pads) to allow waffle or MUKUL to improve microclimate   *Clean skin gently with no-rinse PH-balanced cleanser and soft, disposable cloth (barrier wipes-blue pack).   *Raise knee-gatch before elevating HOB to reduce shearing      WOC Team will continue to follow for pressure injury verification.  Please re-consult if the wound(s) worsens.

## 2024-01-22 NOTE — PLAN OF CARE
Goal Outcome Evaluation:           Progress: no change  Outcome Evaluation: Pt remains on HFNC; settings vary based on patient's movement as pt quickly desats with exertion (often requires NRB over HFNC with activity.) PRN Atarax given x1 for anxiety. Fall and skin precautions in place. IV ABx continued. Assessed by speech today; thickened liquids no longer required. Pt currently resting in bed with no complaints.

## 2024-01-22 NOTE — PROGRESS NOTES
[facility] Medicine Services  PROGRESS NOTE    Patient Name: Liz Borjas  : 1950  MRN: [id number]    Date of Admission: 2024  Primary Care Physician: Valerie Sesay APRN    Subjective   Subjective     CC:  Hypoxia f/u    HPI:  Patient on HFNC 100% with cleaning her up in bed today, able to wean throughout the morning  Persistent cough  Son feels it was a good weekend - asking questions as to microbiology for her      Objective   Objective     Vital Signs:   Temp:  [97.6 °F (36.4 °C)-97.7 °F (36.5 °C)] 97.6 °F (36.4 °C)  Heart Rate:  [56-70] 61  Resp:  [18-28] 18  BP: (105-150)/(69-87) 136/85  Flow (L/min):  [40-60] 60     Physical Exam:  Constitutional: No acute distress, awake, alert female sitting up in bed  HENT: NCAT, mucous membranes moist  Respiratory: Diminished air movement w exp wheezes bilaterally, increased wob on HFNC (weaned to 50% while in room)  Cardiovascular: RRR, no murmurs, rubs, or gallops  Gastrointestinal: Soft, nontender, nondistended  Musculoskeletal: Muscle tone mildly decreased throughout, no joint effusions appreciated  Psychiatric: Appropriate affect, cooperative  Neurologic: Alert and oriented, very agreeable, facial movements symmetric and spontaneous movement of all 4 extremities grossly equal bilaterally, speech clear  Skin: No rashes    Results Reviewed:  LAB RESULTS:      Lab 24  0327 24  0338 24  0338 24  0430 24  1636 24  0503 24  1805 24  1357   WBC 6.14 5.43 5.37 4.81  --  6.46  --  15.80*   HEMOGLOBIN 10.6* 10.1* 9.0* 9.5*  --  9.8*  --  11.4*   HEMATOCRIT 36.5 33.9* 31.4* 32.6*  --  33.4*  --  40.4   PLATELETS 147 133* 122* 124*  --  109*  --  199   NEUTROS ABS 5.43 4.90 4.90 4.29  --  5.06  --  14.74*   IMMATURE GRANS (ABS) 0.05 0.09* 0.02 0.02  --  0.02  --  0.09*   LYMPHS ABS 0.36* 0.29* 0.29* 0.31*  --  0.77  --  0.33*   MONOS ABS 0.30 0.15 0.16 0.19  --  0.58  --  0.61   EOS ABS  0.00 0.00 0.00 0.00  --  0.02  --  0.01   MCV 95.3 93.6 96.0 97.9*  --  96.8  --  97.8*   CRP  --   --   --  7.86* 9.05*  --   --   --    PROCALCITONIN  --   --   --  0.08  --  0.16  --  0.11   LACTATE  --   --   --   --   --   --  0.8 2.4*         Lab 01/22/24  0438 01/21/24  1744 01/21/24 0327 01/20/24 0338 01/19/24 0338 01/18/24  0430   SODIUM 135*  --  135* 132* 135* 142   POTASSIUM 4.7 4.7 3.4* 3.9 3.9 4.3   CHLORIDE 98  --  97* 96* 101 105   CO2 29.0  --  31.0* 26.0 25.0 29.0   ANION GAP 8.0  --  7.0 10.0 9.0 8.0   BUN 22  --  18 12 15 20   CREATININE 0.99  --  0.99 0.82 0.77 0.87   EGFR 61.1  --  61.1 76.6 82.6 71.3   GLUCOSE 77  --  93 107* 168* 108*   CALCIUM 8.4*  --  8.1* 7.9* 7.6* 8.2*   MAGNESIUM 2.0  --   --   --   --   --          Lab 01/21/24 0327 01/20/24 0338 01/19/24 0338 01/18/24  0430 01/17/24  0503   TOTAL PROTEIN 5.6* 5.7* 5.4* 5.6* 5.4*   ALBUMIN 2.7* 2.5* 2.3* 2.6* 2.4*   GLOBULIN 2.9 3.2 3.1 3.0 3.0   ALT (SGPT) 27 32 25 20 21   AST (SGOT) 31 44* 36* 26 30   BILIRUBIN 0.2 0.2 0.2 0.2 0.2   ALK PHOS 321* 331* 281* 277* 306*         Lab 01/17/24  0014 01/16/24 2235 01/16/24  1357   PROBNP  --   --  3,161.0*   HSTROP T 14* 15* 21*                 Lab 01/16/24  1449   PH, ARTERIAL 7.358   PCO2, ARTERIAL 54.4*   PO2 .0*   FIO2 100   HCO3 ART 30.6*   BASE EXCESS ART 4.1*   CARBOXYHEMOGLOBIN 0.9     Brief Urine Lab Results       None            Microbiology Results Abnormal       Procedure Component Value - Date/Time    Blood Culture - Blood, Arm, Left [[id number]]  (Normal) Collected: 01/16/24 1807    Lab Status: Final result Specimen: Blood from Arm, Left Updated: 01/21/24 1831     Blood Culture No growth at 5 days    Narrative:      Less than seven (7) mL's of blood was collected.  Insufficient quantity may yield false negative results.    Blood Culture - Blood, Wrist, Right [[id number]]  (Normal) Collected: 01/16/24 1741    Lab Status: Final result Specimen: Blood from Wrist, Right  Updated: 01/21/24 1801     Blood Culture No growth at 5 days    Narrative:      Less than seven (7) mL's of blood was collected.  Insufficient quantity may yield false negative results.    Fungus Smear - Sputum, Cough [[id number]] Collected: 01/18/24 0054    Lab Status: Final result Specimen: Sputum from Cough Updated: 01/19/24 1314     Fungal Stain No fungal elements seen    MRSA Screen, PCR (Inpatient) - Swab, Nares [[id number]]  (Normal) Collected: 01/16/24 1800    Lab Status: Final result Specimen: Swab from Nares Updated: 01/17/24 0042     MRSA PCR No MRSA Detected    Narrative:      The negative predictive value of this diagnostic test is high and should only be used to consider de-escalating anti-MRSA therapy. A positive result may indicate colonization with MRSA and must be correlated clinically.    S. Pneumo Ag Urine or CSF - Urine, Straight Cath [[id number]]  (Normal) Collected: 01/16/24 1823    Lab Status: Final result Specimen: Urine from Straight Cath Updated: 01/17/24 0002     Strep Pneumo Ag Negative    Legionella Antigen, Urine - Urine, Straight Cath [[id number]]  (Normal) Collected: 01/16/24 1823    Lab Status: Final result Specimen: Urine from Straight Cath Updated: 01/17/24 0001     LEGIONELLA ANTIGEN, URINE Negative    COVID PRE-OP / PRE-PROCEDURE SCREENING ORDER (NO ISOLATION) - Swab, Nasopharynx [[id number]]  (Normal) Collected: 01/16/24 1405    Lab Status: Final result Specimen: Swab from Nasopharynx Updated: 01/16/24 1503    Narrative:      The following orders were created for panel order COVID PRE-OP / PRE-PROCEDURE SCREENING ORDER (NO ISOLATION) - Swab, Nasopharynx.  Procedure                               Abnormality         Status                     ---------                               -----------         ------                     Respiratory Panel PCR w/...[[id number]]  Normal              Final result                 Please view results for these tests on the individual orders.     Respiratory Panel PCR w/COVID-19(SARS-CoV-2) ALFREDO/MEGAN/MARIA FERNANDA/PAD/COR/MONIKA In-House, NP Swab in UTM/VTM, 2 HR TAT - Swab, Nasopharynx [[id number]]  (Normal) Collected: 01/16/24 1405    Lab Status: Final result Specimen: Swab from Nasopharynx Updated: 01/16/24 1503     ADENOVIRUS, PCR Not Detected     Coronavirus 229E Not Detected     Coronavirus HKU1 Not Detected     Coronavirus NL63 Not Detected     Coronavirus OC43 Not Detected     COVID19 Not Detected     Human Metapneumovirus Not Detected     Human Rhinovirus/Enterovirus Not Detected     Influenza A PCR Not Detected     Influenza B PCR Not Detected     Parainfluenza Virus 1 Not Detected     Parainfluenza Virus 2 Not Detected     Parainfluenza Virus 3 Not Detected     Parainfluenza Virus 4 Not Detected     RSV, PCR Not Detected     Bordetella pertussis pcr Not Detected     Bordetella parapertussis PCR Not Detected     Chlamydophila pneumoniae PCR Not Detected     Mycoplasma pneumo by PCR Not Detected    Narrative:      In the setting of a positive respiratory panel with a viral infection PLUS a negative procalcitonin without other underlying concern for bacterial infection, consider observing off antibiotics or discontinuation of antibiotics and continue supportive care. If the respiratory panel is positive for atypical bacterial infection (Bordetella pertussis, Chlamydophila pneumoniae, or Mycoplasma pneumoniae), consider antibiotic de-escalation to target atypical bacterial infection.            SLP FEES - Fiberoptic Endo Eval Swallow    Result Date: 1/22/2024  This procedure was auto-finalized with no dictation required.    XR Chest 1 View    Result Date: 1/21/2024  XR CHEST 1 VW Date of Exam: 1/21/2024 7:00 AM EST Indication: worsening hypoxia Comparison: January 20, 2024 Findings: The heart appears enlarged, as before. Lung volumes are low. There are prominent interstitial markings with airspace opacities in the right upper lung. Findings appear similar to the  recent prior chest radiographs. No significant pleural effusion or pneumothorax is seen. There appears to be a moderate hiatal hernia, as before. Superior subluxation of the humeral heads suggest bilateral chronic rotator cuff tears. There are advanced degenerative changes at the right acromion.     Impression: Impression: 1.Low lung volumes with prominent interstitial markings and airspace opacities in the right upper lung, as before. Findings may represent pneumonia and/or edema. 2.Stable cardiomegaly. Electronically Signed: Pancho Yury  1/21/2024 9:27 AM EST  Workstation ID: [id number]     Results for orders placed during the hospital encounter of 01/06/24    Adult Transthoracic Echo Complete W/ Cont if Necessary Per Protocol    Interpretation Summary    Left ventricular ejection fraction appears to be 66 - 70%.    Left ventricular wall thickness is consistent with mild septal asymmetric hypertrophy.    Left ventricular diastolic function is consistent with (grade I) impaired relaxation.    The right ventricular cavity is mildly dilated.    The left atrial cavity is mildly dilated.    Left atrial volume is moderately increased.    The right atrial cavity is borderline dilated.    Moderate tricuspid valve regurgitation is present.    Estimated right ventricular systolic pressure from tricuspid regurgitation is markedly elevated (>55 mmHg).      Current medications:  Scheduled Meds:amLODIPine, 5 mg, Oral, Q24H  budesonide-formoterol, 2 puff, Inhalation, BID - RT   And  tiotropium bromide monohydrate, 2 puff, Inhalation, Daily - RT  castor oil-balsam peru, 1 application , Topical, Q12H  cetirizine, 10 mg, Oral, Daily  dicyclomine, 10 mg, Oral, TID  doxepin, 50 mg, Oral, Nightly  doxycycline, 100 mg, Oral, Q12H  ferrous sulfate, 325 mg, Oral, Daily With Breakfast  flecainide, 50 mg, Oral, Q12H  FLUoxetine, 40 mg, Oral, Daily  guaiFENesin, 1,200 mg, Oral, Q12H  hydroxychloroquine, 200 mg, Oral,  Q24H  ipratropium-albuterol, 3 mL, Nebulization, 4x Daily - RT  lactobacillus acidophilus, 1 capsule, Oral, Daily  levothyroxine, 25 mcg, Oral, Q AM  meropenem, 1,000 mg, Intravenous, Q12H  metoprolol tartrate, 25 mg, Oral, BID  palliative care oral rinse, , Mouth/Throat, 4x Daily  pantoprazole, 40 mg, Oral, BID AC  predniSONE, 40 mg, Oral, BID With Meals  rivaroxaban, 15 mg, Oral, Daily With Dinner  [Held by provider] sulfamethoxazole-trimethoprim, 2 tablet, Oral, Once per day on Monday Wednesday Friday  sulfamethoxazole-trimethoprim, 2 tablet, Oral, Q8H  sulfaSALAzine, 500 mg, Oral, Q12H  tobramycin PF, 300 mg, Nebulization, Q12H - RT  traMADol, 50 mg, Oral, BID      Continuous Infusions:   PRN Meds:.  senna-docusate sodium **AND** polyethylene glycol **AND** bisacodyl **AND** bisacodyl    Calcium Replacement - Follow Nurse / BPA Driven Protocol    fluticasone    hydrOXYzine    ipratropium-albuterol    Magnesium Standard Dose Replacement - Follow Nurse / BPA Driven Protocol    morphine    nitroglycerin    Phosphorus Replacement - Follow Nurse / BPA Driven Protocol    Potassium Replacement - Follow Nurse / BPA Driven Protocol    sodium chloride    sodium chloride    Assessment & Plan   Assessment & Plan     Active Hospital Problems    Diagnosis  POA    **Acute on chronic hypoxic respiratory failure [J96.21]  Yes    Severe malnutrition [E43]  Yes    Interstitial lung disease [J84.9]  Yes    HTN (hypertension) [I10]  Yes    Chronic respiratory failure with hypoxia [J96.11]  Yes    Physical deconditioning [R53.81]  Yes    Rheumatoid arthritis with positive rheumatoid factor [M05.9]  Yes      Resolved Hospital Problems   No resolved problems to display.        Brief Hospital Course to date:  Liz Borjas is a 73 y.o. female w advanced ILD on home 6 liters n/c, RA on chronic prednisone, A-fib on xarelto, recent admission 1/6-1/9 for RUL pneumonia who re-presented on 1/16 with acute on chronic hypoxic respiratory  failure found to have Pseudomonas multifocal PNA. Currently requiring HFNC, higher FIO2 w any movement    Acute on chronic hypoxic resp failure 2/2 multifocal Pseudomonas PNA  Severe ILD w UIP pattern  -inhaled tobramycin/meropenem per susceptibilities (intermediate to zosyn), started 1/21  -high dose steroid, taper per pulmonology  -on trx dose for PJP wo evidence of such, likely can go back to ppx dosing soon, defer to pulm  -complete 7 days doxycycline  -pulm treatments, appreciate all pulmonary input in very frail patient  -wean HFNC as able, with activity expect will continue to require    RA - on home plaquenil + sulfasalazine. Unclear if role in ILD trx as well v consider holding in severe infection as above. Will research, continue current plan for now    Type II NSTEMI 2/2 hypoxia above  Chronic CHFpEF withOUT acute exacerbation  Concern for oropharyngeal dysphagia - resolved on repeat testing, suspect related to HFNC + dentures    Paroxysmal atrial fibrillation -Continue metoprolol, flecainide, xarelto  GERD - PPI twice daily.  Hypothyroidism -levothyroxine.  Chronic insomnia -doxepin.  Hypertension -amlodipine.  Anxiety/depression -prozac.  Chronic anemia  Chronically elev alk phos    Expected Discharge Location and Transportation: TBD  Expected Discharge 1/29 (Discharge date is tentative pending patient's medical condition and is subject to change)  Expected Discharge Date: 1/29/2024; Expected Discharge Time:      DVT prophylaxis:  Medical DVT prophylaxis orders are present.     AM-PAC 6 Clicks Score (PT): 13 (01/21/24 2000)    CODE STATUS:   Code Status and Medical Interventions:   Ordered at: 01/18/24 0854     Medical Intervention Limits:    NO intubation (DNI)     Level Of Support Discussed With:    Patient     Code Status (Patient has no pulse and is not breathing):    No CPR (Do Not Attempt to Resuscitate)     Medical Interventions (Patient has pulse or is breathing):    Limited Support       Christie CASTANEDA  MD Garret  01/22/24

## 2024-01-22 NOTE — CASE MANAGEMENT/SOCIAL WORK
Continued Stay Note  [facility]     Patient Name: Liz Borjas  MRN: [id number]  Today's Date: 1/22/2024    Admit Date: 1/16/2024    Plan: Home with [facility]   Discharge Plan       Row Name 01/22/24 1326       Plan    Plan Home with [facility]    Patient/Family in Agreement with Plan yes    Plan Comments I have met with Ms. Borjas at the bedside today to discuss the discharge plan.  She states that she continues to plan to return home with home health services.  Her son, José Miguel is at the bedside and states that he will provide assistance and transportation as needed.  CM will cont to follow the plan of care and assist with discharge planning as recommendations become available.     Final Discharge Disposition Code 06 - home with home health care                   Discharge Codes    No documentation.                 Expected Discharge Date and Time       Expected Discharge Date Expected Discharge Time    Jan 24, 2024               Lina Zuñiga RN

## 2024-01-22 NOTE — MBS/VFSS/FEES
Acute Care - Speech Language Pathology   Swallow Initial Evaluation [facility]  Fiberoptic Endoscopic Evaluation of Swallowing (FEES)       Patient Name: Liz Borjas  : 1950  MRN: [id number]  Today's Date: 2024               Admit Date: 2024    Visit Dx:     ICD-10-CM ICD-9-CM   1. Acute on chronic respiratory failure with hypoxemia  J96.21 518.84   2. Pulmonary fibrosis  J84.10 515   3. Former smoker  Z87.891 V15.82   4. Chronic hypoxic respiratory failure, on home oxygen therapy  J96.11 518.83    Z99.81 799.02     V46.2   5. Multifocal pneumonia  J18.9 486   6. Dysphagia, unspecified type  R13.10 787.20     Patient Active Problem List   Diagnosis    DDD (degenerative disc disease), lumbar    Spinal stenosis, lumbar region, with neurogenic claudication    Spinal stenosis of lumbar region with neurogenic claudication    Lumbar disc herniation with radiculopathy    Rheumatoid arthritis with positive rheumatoid factor    Anxiety and depression    COPD with acute exacerbation    Mild obesity    Physical deconditioning    Sepsis    Leukocytosis    Lactic acidosis    Hyponatremia    Acute on chronic respiratory failure with hypoxia    Chronic respiratory failure with hypoxia    Elevated d-dimer    Immunocompromised    PAF (paroxysmal atrial fibrillation)    Chronic anticoagulation    HTN (hypertension)    Interstitial lung disease    Stage 3a chronic kidney disease    COPD (chronic obstructive pulmonary disease)    Acute on chronic hypoxic respiratory failure    Severe malnutrition     Past Medical History:   Diagnosis Date    Arthritis     Atrial fibrillation     Closed right hip fracture     COVID     GERD (gastroesophageal reflux disease)     Gout     Hyperlipidemia     Hypertension     Osteoporosis     Rheumatoid arthritis     Wears dentures     upper    Wears glasses     reading     Past Surgical History:   Procedure Laterality Date    ANKLE SURGERY Right     CHOLECYSTECTOMY      COLONOSCOPY       5 years ago    HIP FRACTURE SURGERY      Right Hip with Pins    LUMBAR DISCECTOMY Left 10/12/2016    Procedure: lumbar MICROdiscectomy LEFT L3-5;  Surgeon: Chris Son MD;  Location: Replaced by [facility];  Service:     REPLACEMENT TOTAL KNEE      Right Knee    TOE AMPUTATION      right baby toe, left second toe       SLP Recommendation and Plan  SLP Swallowing Diagnosis: functional oral phase, functional pharyngeal phase (01/22/24 1344)  SLP Diet Recommendation: regular textures, thin liquids (01/22/24 1344)  Recommended Precautions and Strategies: upright posture during/after eating (01/22/24 1344)  SLP Rec. for Method of Medication Administration: meds whole, with thin liquids, with puree, as tolerated (01/22/24 1344)           Swallow Criteria for Skilled Therapeutic Interventions Met: no problems identified which require skilled intervention (01/22/24 1344)  Anticipated Discharge Disposition (SLP): home with assist (01/22/24 1344)     Therapy Frequency (Swallow): evaluation only (01/22/24 1344)     Oral Care Recommendations: Oral Care BID/PRN, Toothbrush (01/22/24 1344)                                      Oral Care Recommendations: Oral Care BID/PRN, Toothbrush (01/22/24 1344)    Plan of Care Reviewed With: patient, son  Progress: improving      SWALLOW EVALUATION (last 72 hours)       SLP Adult Swallow Evaluation       Row Name 01/22/24 1344 01/21/24 1200                Rehab Evaluation    Document Type evaluation  -EN evaluation  -RS       Subjective Information no complaints  -EN no complaints  -RS       Patient Observations alert;cooperative;agree to therapy  -EN alert;cooperative;agree to therapy  -RS       Patient/Family/Caregiver Comments/Observations son present  -EN daughter present  -RS       Patient Effort good  -EN good  -RS       Symptoms Noted During/After Treatment none  -EN none  -RS          General Information    Patient Profile Reviewed yes  -EN yes  -RS       Pertinent History Of Current  Problem See previous evaluation.  -EN Pt is a 73 year old female w PMHx sig for chronic hypoxic respiratory failure on 6 L nasal cannula, advanced interstitial lung disease, underlying COPD, ROPER cirrhosis, rheumatoid arthritis on chronic prednisone, HTN, A-fib on Xarelto, CKDIII, with recent hospitalization 1/6 to 1/9 for right upper lobe pneumonia. Pt re-presented 1/16 with hypoxia, cough and poor appetite.  -RS       Current Method of Nutrition soft to chew textures;chopped;no mixed consistencies;nectar/syrup-thick liquids  -EN NPO  -RS       Precautions/Limitations, Vision WFL;for purposes of eval  -EN WFL;for purposes of eval  -RS       Precautions/Limitations, Hearing WFL;for purposes of eval  -EN WFL;for purposes of eval  -RS       Prior Level of Function-Communication WFL  -EN WFL  -RS       Prior Level of Function-Swallowing no diet consistency restrictions  -EN no diet consistency restrictions  -RS       Plans/Goals Discussed with patient and family;agreed upon  -EN patient and family;agreed upon  -RS       Barriers to Rehab medically complex;previous functional deficit  -EN --       Patient's Goals for Discharge return to regular diet  -EN patient did not state  -RS       Family Goals for Discharge patient able to return to regular diet  -EN family did not state  -RS          Pain    Additional Documentation Pain Scale: FACES Pre/Post-Treatment (Group)  -EN Pain Scale: FACES Pre/Post-Treatment (Group)  -RS          Pain Scale: FACES Pre/Post-Treatment    Pain: FACES Scale, Pretreatment 0-->no hurt  -EN 0-->no hurt  -RS       Posttreatment Pain Rating 0-->no hurt  -EN 0-->no hurt  -RS          Oral Motor Structure and Function    Dentition Assessment -- teeth are in poor condition  -RS       Secretion Management -- WNL/WFL  -RS       Mucosal Quality -- dry  -RS       Volitional Swallow -- delayed  -RS       Volitional Cough -- weak  -RS          Oral Musculature and Cranial Nerve Assessment    Oral Motor  General Assessment -- WFL  -RS          General Eating/Swallowing Observations    Respiratory Support Currently in Use -- high-flow nasal cannula  -RS       Eating/Swallowing Skills -- self-fed;fed by SLP;appropriate self-feeding skills observed  -RS       Positioning During Eating -- upright in bed  -RS       Utensils Used -- spoon;cup;straw  -RS       Consistencies Trialed -- regular textures;soft to chew textures  -RS          Clinical Swallow Eval    Oral Prep Phase -- WFL  -RS       Oral Transit -- WFL  -RS       Oral Residue -- WFL  -RS       Pharyngeal Phase -- suspected pharyngeal impairment  -RS       Esophageal Phase -- unremarkable  -RS       Clinical Swallow Evaluation Summary -- Pt on HFNC, on 6L NC at baseline. Pharyngeal patterns w thins. Respiratory status places pt at higher aspiration risk. D/w pt, daughter, and RN, agreeable to plan.  -RS          Pharyngeal Phase Concerns    Pharyngeal Phase Concerns -- multiple swallows;throat clear  -RS       Multiple Swallows -- thin  -RS       Throat Clear -- thin  -RS          Fiberoptic Endoscopic Evaluation of Swallowing (FEES)    Risks/Benefits Reviewed risks/benefits explained;patient;family;agreed to eval  -EN --       Nasal Entry right:  -EN --       Scope serial number/identification [id number]  -EN --          Anatomy and Physiology    Anatomic Considerations no anatomic structural deviation  -EN --       Velopharyngeal WFL  -EN --       Base of Tongue symmetrical  -EN --       Epiglottis WFL  -EN --       Laryngeal Function Breathing symmetrical  -EN --       Laryngeal Function Phonation symmetrical  -EN --       Laryngeal Function to Breath Hold TVF/FVF/Arytenoid;sustained closure  -EN --       Secretion Rating Scale (Vu et al. 1996) 0- normal, no visible secretions  -EN --       Sensory sensed scope  -EN --       Utensils Used Spoon;Cup;Straw  -EN --       Consistencies Trialed thin liquids;pudding/puree;regular textures  -EN --          FEES  Interpretation    Oral Phase WFL  -EN --          Initiation of Pharyngeal Swallow    Initiation of Pharyngeal Swallow WFL  -EN --       Pharyngeal Phase functional pharyngeal phase of swallow  -EN --          SLP Evaluation Clinical Impression    SLP Swallowing Diagnosis functional oral phase;functional pharyngeal phase  -EN R/O pharyngeal dysphagia  -RS       Functional Impact no impact on function  -EN risk of aspiration/pneumonia  -RS       Rehab Potential/Prognosis, Swallowing -- adequate, monitor progress closely  -RS       Swallow Criteria for Skilled Therapeutic Interventions Met no problems identified which require skilled intervention  -EN demonstrates skilled criteria  -RS          Recommendations    Therapy Frequency (Swallow) evaluation only  -EN --       SLP Diet Recommendation regular textures;thin liquids  -EN soft to chew textures;chopped;nectar thick liquids  -RS       Recommended Diagnostics -- FEES  -RS       Recommended Precautions and Strategies upright posture during/after eating  -EN upright posture during/after eating;small bites of food and sips of liquid;general aspiration precautions;fatigue precautions  -RS       Oral Care Recommendations Oral Care BID/PRN;Toothbrush  -EN Oral Care BID/PRN;Suction toothbrush  -RS       SLP Rec. for Method of Medication Administration meds whole;with thin liquids;with puree;as tolerated  -EN meds whole;meds crushed;with thick liquids;with puree;as tolerated  -RS       Monitor for Signs of Aspiration -- yes;notify SLP if any concerns  -RS       Anticipated Discharge Disposition (SLP) home with assist  -EN skilled nursing facility  -RS                 User Key  (r) = Recorded By, (t) = Taken By, (c) = Cosigned By      Initials Name Effective Dates    EN Christie Tyler MS CCC-SLP 06/22/22 -     RS Wojciech Combs MS CCC-SLP 09/14/23 -                     EDUCATION  The patient has been educated in the following areas:   Dysphagia (Swallowing Impairment).               Time Calculation:    Time Calculation- SLP       Row Name 01/22/24 1439             Time Calculation- SLP    SLP Start Time 1344  -EN      SLP Received On 01/22/24  -EN         Untimed Charges    SLP Eval/Re-eval  ST Fiberoptic Endo Eval Swallow - 65217  -EN      86273-ML Fiberoptic Endo Eval Swallow Minutes 105  -EN         Total Minutes    Untimed Charges Total Minutes 105  -EN       Total Minutes 105  -EN                User Key  (r) = Recorded By, (t) = Taken By, (c) = Cosigned By      Initials Name Provider Type    EN Christie Tyler MS CCC-SLP Speech and Language Pathologist                    Therapy Charges for Today       Code Description Service Date Service Provider Modifiers Qty    92161078778 HC ST FIBEROPTIC ENDO EVAL SWALL 7 1/22/2024 Christie Tyler, MS CCC-SLP GN 1                 Christie Tyler MS CCC-SLP  1/22/2024

## 2024-01-22 NOTE — PROGRESS NOTES
"Palliative Care Daily Progress Note     Referring: Rosie James    C/C: I had a good weekend.    S: Medical record reviewed. Events noted. Son bedside again today.  Required bump in O2 again when up this morning but working on turning down again.  Feels she is getting better.  Salvador added.    ROS: SOA improving.  No pain, nausea    O: Code Status:   Code Status and Medical Interventions:   Ordered at: 01/18/24 0854     Medical Intervention Limits:    NO intubation (DNI)     Level Of Support Discussed With:    Patient     Code Status (Patient has no pulse and is not breathing):    No CPR (Do Not Attempt to Resuscitate)     Medical Interventions (Patient has pulse or is breathing):    Limited Support      Advanced Directives: Advance Directive Status: Patient does not have advance directive   Goals of Care: Ongoing.   Palliative Performance Scale Score: 30%     /75 (BP Location: Right arm, Patient Position: Lying)   Pulse 65   Temp 97.6 °F (36.4 °C) (Oral)   Resp 19   Ht 157.5 cm (62\")   Wt 53.8 kg (118 lb 11.2 oz)   LMP  (LMP Unknown)   SpO2 (!) 89%   BMI 21.71 kg/m²     Intake/Output Summary (Last 24 hours) at 1/22/2024 1205  Last data filed at 1/22/2024 0715  Gross per 24 hour   Intake --   Output 800 ml   Net -800 ml       PE:  General Appearance:    Alert, cooperative, NAD   HEENT:    EOMI, anicteric, MMM, face relaxed   Neck:   supple, trachea midline, no JVD   Lungs:     CTA bilaterally, diminished in bases; respirations regular, even and unlabored    Heart:    RRR, normal S1 and S2, no M/R/G   Abdomen:     Normal bowel sounds, soft, nontender, nondistended   G/U:   Deferred   MSK/Extremities:   Wasting, no edema   Pulses:   Pulses palpable and equal bilaterally   Skin:   Warm, dry   Neurologic:   A/Ox3, cooperative, ANNA   Psych:   Calm, appropriate     Meds: Reviewed and changes noted    Labs:   Results from last 7 days   Lab Units 01/21/24  0327   WBC 10*3/mm3 6.14   HEMOGLOBIN g/dL 10.6* "   HEMATOCRIT % 36.5   PLATELETS 10*3/mm3 147     Results from last 7 days   Lab Units 01/22/24  0438   SODIUM mmol/L 135*   POTASSIUM mmol/L 4.7   CHLORIDE mmol/L 98   CO2 mmol/L 29.0   BUN mg/dL 22   CREATININE mg/dL 0.99   GLUCOSE mg/dL 77   CALCIUM mg/dL 8.4*     Results from last 7 days   Lab Units 01/22/24  0438 01/21/24  1744 01/21/24  0327   SODIUM mmol/L 135*  --  135*   POTASSIUM mmol/L 4.7   < > 3.4*   CHLORIDE mmol/L 98  --  97*   CO2 mmol/L 29.0  --  31.0*   BUN mg/dL 22  --  18   CREATININE mg/dL 0.99  --  0.99   CALCIUM mg/dL 8.4*  --  8.1*   BILIRUBIN mg/dL  --   --  0.2   ALK PHOS U/L  --   --  321*   ALT (SGPT) U/L  --   --  27   AST (SGOT) U/L  --   --  31   GLUCOSE mg/dL 77  --  93    < > = values in this interval not displayed.     Imaging Results (Last 72 Hours)       Procedure Component Value Units Date/Time    XR Chest 1 View [[id number]] Collected: 01/21/24 0922     Updated: 01/21/24 0930    Narrative:      XR CHEST 1 VW    Date of Exam: 1/21/2024 7:00 AM EST    Indication: worsening hypoxia    Comparison: January 20, 2024    Findings:  The heart appears enlarged, as before. Lung volumes are low. There are prominent interstitial markings with airspace opacities in the right upper lung. Findings appear similar to the recent prior chest radiographs. No significant pleural effusion or   pneumothorax is seen.    There appears to be a moderate hiatal hernia, as before. Superior subluxation of the humeral heads suggest bilateral chronic rotator cuff tears. There are advanced degenerative changes at the right acromion.      Impression:      Impression:  1.Low lung volumes with prominent interstitial markings and airspace opacities in the right upper lung, as before. Findings may represent pneumonia and/or edema.  2.Stable cardiomegaly.        Electronically Signed: Pancho Cotton    1/21/2024 9:27 AM EST    Workstation ID: [id number]    XR Chest 1 View [[id number]] Collected: 01/20/24 0922     Updated:  01/20/24 0927    Narrative:      XR CHEST 1 VW    Date of Exam: 1/20/2024 4:35 AM EST    Indication: Bilateral infiltrates    Comparison: January 18, 2024    Findings:  The heart appears mildly enlarged. There is prominence of interstitial markings with focal opacities in the right upper lung, as before. No definite new or worsening infiltrate. No significant pleural effusion. The heart appears mildly enlarged. There   appears to be a moderate hiatal hernia, as before.      Impression:      Impression:  1.Prominence of interstitial markings with focal opacities in the right upper lung, as before. Findings could be related to edema or pneumonia.  2.Cardiomegaly.  3.Moderate hiatal hernia.        Electronically Signed: Pancho Yury    1/20/2024 9:24 AM EST    Workstation ID: [id number]                  Diagnostics: Reviewed    A:     Acute on chronic hypoxic respiratory failure    Rheumatoid arthritis with positive rheumatoid factor    Physical deconditioning    Chronic respiratory failure with hypoxia    HTN (hypertension)    Interstitial lung disease    Severe malnutrition       Impression:  A/C hypox resp failure  RA  ILD  Hypoalbuminemia     Symptoms:  Dyspnea  Debility  Joint pain    P:   Continue to monitor for needs.  Pt remains hopeful for O2 weaning.  Palliative Care Team will continue to follow patient.     Hemalatha Burnette MD  1/22/2024  Time spent:24

## 2024-01-22 NOTE — PLAN OF CARE
Goal Outcome Evaluation:  Plan of Care Reviewed With: patient, son        Progress: improving         Anticipated Discharge Disposition (SLP): home with assist          SLP Swallowing Diagnosis: functional oral phase, functional pharyngeal phase (01/22/24 7161)

## 2024-01-22 NOTE — PLAN OF CARE
Goal Outcome Evaluation:  Plan of Care Reviewed With: patient        Progress: no change  Outcome Evaluation: VSS on HFNC 40L/55%. O2 requirements increase with activity. A&O x4. No c/o pain. PRN atarax given for anxiety. Son at bedside. Fall and skin precautions in place. Abx infusing. Pills taken with nectar-thick liquids. Will continue plan of care.

## 2024-01-23 NOTE — PLAN OF CARE
Goal Outcome Evaluation:  Plan of Care Reviewed With: patient, son           Outcome Evaluation: Patient required additional time to complete all mobility due to dyspnea and additional rest time. Patient presents below baseline for mobility and would continue to benefit from skilled PT to address strength, balance and activity tolerance deficits.      Anticipated Discharge Disposition (PT): skilled nursing facility

## 2024-01-23 NOTE — CONSULTS
[facility]    INFECTIOUS DISEASE CONSULT/INITIAL HOSPITAL VISIT    Liz Borjas  1950  [id number]    Date of consult: 1/23/2024    Admit date: 1/16/2024    Requesting Provider: No ref. provider found  Evaluating physician: Kevin Eugene MD  Reason for Consultation: Pneumonia, respiratory failure, immunocompromised host with rheumatoid arthritis, interstitial lung disease  Chief Complaint: Above      Subjective   History of present illness:  Patient is a  73 y.o.  Yr old female With a history of rheumatoid arthritis, essential hypertension, hyperlipidemia, COVID in the past, GERD, atrial fibrillation, DJD, gout, recently hospitalized 1/6 until 1/9 and discharged on oral antibiotics.  Patient completed 2 weeks of antibiotic therapy but then developed worsening respiratory distress and was readmitted to [facility] on 1/16/2024.  The patient's respiratory failure worsened from her baseline of 6 L/min at home.  The patient's sputum from 1/18 has yielded Pseudomonas aeruginosa resistant to ciprofloxacin, intermediate to ceftazidime and cefepime, intermediate to piperacillin/tazobactam, sensitive to meropenem.  BloodLegionella and streptococcal pneumonia antigen negative.  Cultures x 2 from 1/16 negative.  MRSA screen from 1/16 negative.  Respiratory panel PCR from 1/16 negative.  Previous COVID-19 and influenza from 1/6 negative.The patient's antibiotics were changed from piperacillin/tazobactam to meropenem.  I was consulted on 1/23/2024 for further evaluation and treatment.  Patient is a poor historian.  The patient has received linezolid 1/17, piperacillin/tazobactam 1/16 until 1/21, and meropenem 1/21-.    Past Medical History:   Diagnosis Date    Arthritis     Atrial fibrillation     Closed right hip fracture     COVID     GERD (gastroesophageal reflux disease)     Gout     Hyperlipidemia     Hypertension     Osteoporosis     Rheumatoid arthritis     Wears  dentures     upper    Wears glasses     reading       Past Surgical History:   Procedure Laterality Date    ANKLE SURGERY Right     CHOLECYSTECTOMY      COLONOSCOPY      5 years ago    HIP FRACTURE SURGERY      Right Hip with Pins    LUMBAR DISCECTOMY Left 10/12/2016    Procedure: lumbar MICROdiscectomy LEFT L3-5;  Surgeon: Chris Son MD;  Location: [facility];  Service:     REPLACEMENT TOTAL KNEE      Right Knee    TOE AMPUTATION      right baby toe, left second toe       Pediatric History   Patient Parents    Not on file     Other Topics Concern    Not on file   Social History Narrative    Not on file   Quit smoking in the past, no alcohol or drug use    family history includes Cancer in her father; Heart disease in her mother.    Allergies   Allergen Reactions    Moexipril-Hydrochlorothiazide Anaphylaxis    Penicillins Hives     Tolerates cefazolin, ceftriaxone, Zosyn    Ace Inhibitors Hives       Immunization History   Administered Date(s) Administered    COVID-19 (MODERNA) 1st,2nd,3rd Dose Monovalent 03/03/2021, 03/31/2021    FLUAD TRI 65YR+ 12/27/2021    Flu Vaccine Intradermal Quad 18-64YR 11/08/2007, 09/25/2009    Flu Vaccine Split Quad 11/08/2007, 09/25/2009, 11/22/2013    Fluzone (or Fluarix & Flulaval for VFC) >6mos 11/17/2016    Hep A / Hep B 12/05/2019    Hepatitis B Adult/Adolescent IM 02/09/2017, 03/09/2017, 01/09/2020    Influenza LAIV (Nasal) 10/25/2019    Influenza Quad Vaccine (Inpatient) 10/26/2011    Influenza, Unspecified 10/27/2013, 11/15/2014    Pneumococcal Conjugate 13-Valent (PCV13) 11/17/2016    Pneumococcal Conjugate 20-Valent (PCV20) 03/30/2022    Pneumococcal Polysaccharide (PPSV23) 04/05/2018       Medication:  @Scheduled Meds:amLODIPine, 5 mg, Oral, Q24H  budesonide-formoterol, 2 puff, Inhalation, BID - RT   And  tiotropium bromide monohydrate, 2 puff, Inhalation, Daily - RT  castor oil-balsam peru, 1 application , Topical, Q12H  cetirizine, 10 mg, Oral, Daily  dicyclomine,  10 mg, Oral, TID  doxepin, 50 mg, Oral, Nightly  doxycycline, 100 mg, Oral, Q12H  ferrous sulfate, 325 mg, Oral, Daily With Breakfast  flecainide, 50 mg, Oral, Q12H  FLUoxetine, 40 mg, Oral, Daily  guaiFENesin, 1,200 mg, Oral, Q12H  hydroxychloroquine, 200 mg, Oral, Q24H  ipratropium-albuterol, 3 mL, Nebulization, 4x Daily - RT  lactobacillus acidophilus, 1 capsule, Oral, Daily  levothyroxine, 25 mcg, Oral, Q AM  meropenem, 1,000 mg, Intravenous, Q12H  metoprolol tartrate, 25 mg, Oral, BID  palliative care oral rinse, , Mouth/Throat, 4x Daily  pantoprazole, 40 mg, Oral, BID AC  predniSONE, 40 mg, Oral, BID With Meals  rivaroxaban, 15 mg, Oral, Daily With Dinner  [Held by provider] sulfamethoxazole-trimethoprim, 2 tablet, Oral, Once per day on Monday Wednesday Friday  sulfamethoxazole-trimethoprim, 2 tablet, Oral, Q8H  sulfaSALAzine, 500 mg, Oral, Q12H  tobramycin PF, 300 mg, Nebulization, Q12H - RT  traMADol, 50 mg, Oral, BID      Continuous Infusions:   PRN Meds:.  senna-docusate sodium **AND** polyethylene glycol **AND** bisacodyl **AND** bisacodyl    Calcium Replacement - Follow Nurse / BPA Driven Protocol    fluticasone    hydrOXYzine    ipratropium-albuterol    Magnesium Standard Dose Replacement - Follow Nurse / BPA Driven Protocol    morphine    nitroglycerin    Phosphorus Replacement - Follow Nurse / BPA Driven Protocol    Potassium Replacement - Follow Nurse / BPA Driven Protocol    sodium chloride    sodium chloride     Please refer to the medical record for a full medication list    Review of Systems:    Constitutional-- No Fever, chills or sweats.  Appetite good, and no malaise. No fatigue.  HEENT-- No new vision, hearing or throat complaints.  No epistaxis or oral sores.  Denies odynophagia or dysphagia.  No odynophagia or dysphagia. No headache, photophobia or neck stiffness.  CV-- No chest pain, palpitation or syncope  Resp-- Some SOB/productive cough/no Hemoptysis  GI- No nausea, vomiting, or  "diarrhea.  No hematochezia, melena, or hematemesis. Denies jaundice or chronic liver disease.  -- No dysuria, hematuria, or flank pain.  Denies hesitancy, urgency or flank pain.  Lymph- no swollen lymph nodes in neck/axilla or groin.   Heme- No active bruising or bleeding; no Hx of DVT or PE.  MS-- no swelling or pain in the bones or joints of arms/legs.  No new back pain.  Neuro-- No acute focal weakness or numbness in the arms or legs.  No seizures.  Skin--No rashes or lesions    Physical Exam:   Vital Signs   Temp:  [97.3 °F (36.3 °C)-97.6 °F (36.4 °C)] 97.5 °F (36.4 °C)  Heart Rate:  [55-68] 57  Resp:  [18] 18  BP: (107-142)/(57-85) 138/61    Blood pressure 138/61, pulse 57, temperature 97.5 °F (36.4 °C), temperature source Oral, resp. rate 18, height 157.5 cm (62\"), weight 51.8 kg (114 lb 1.6 oz), SpO2 92%, not currently breastfeeding.  GENERAL: Awake and alert, in moderate distress. Appears older than stated age.  HEENT:  Normocephalic, atraumatic.  Oropharynx without thrush. Dentition in good repair. No cervical adenopathy. No neck masses.  Ears externally normal, Nose externally normal.  EYES: PERRL. No conjunctival injection. No icterus. EOM full.  LYMPHATICS: No lymphadenopathy of the neck or axillary or inguinal regions.   HEART: No murmur, gallop, or pericardial friction rub. Reg rate rhythm, No JVD at 45 degrees.  LUNGS: Bilateral rales. No respiratory distress, no use of accessory muscles.  ABDOMEN: Soft, nontender, nondistended. No appreciable HSM.  Bowel sounds normal.  GENITAL: No external lesions, breasts without masses, back straight, no CVAT, rectal external without lesions.   SKIN: Warm and dry without cutaneous eruptions.  No nodules.    PSYCHIATRIC: Mental status lucid. No confusion.  EXT:  No cellulitic change.  NEURO: Oriented to name, CN 2 to 12 intact, DTR 1 + and symmetric, sensory intact to LT upper and lower extremity, motor 5/5 upper and lower extremity, cerebellar and gait not " "tested.      Results Review:   I reviewed the patient's new clinical results.  I reviewed the patient's new imaging results and agree with the interpretation.  I reviewed the patient's other test results and agree with the interpretation    Results from last 7 days   Lab Units 01/21/24  0327 01/20/24  0338 01/19/24  0338   WBC 10*3/mm3 6.14 5.43 5.37   HEMOGLOBIN g/dL 10.6* 10.1* 9.0*   HEMATOCRIT % 36.5 33.9* 31.4*   PLATELETS 10*3/mm3 147 133* 122*     Results from last 7 days   Lab Units 01/22/24  0438   SODIUM mmol/L 135*   POTASSIUM mmol/L 4.7   CHLORIDE mmol/L 98   CO2 mmol/L 29.0   BUN mg/dL 22   CREATININE mg/dL 0.99   GLUCOSE mg/dL 77   CALCIUM mg/dL 8.4*     Results from last 7 days   Lab Units 01/21/24  0327   ALK PHOS U/L 321*   BILIRUBIN mg/dL 0.2   ALT (SGPT) U/L 27   AST (SGOT) U/L 31         Results from last 7 days   Lab Units 01/18/24  0430   CRP mg/dL 7.86*         Results from last 7 days   Lab Units 01/16/24  1805   LACTATE mmol/L 0.8     Estimated Creatinine Clearance: 42.6 mL/min (by C-G formula based on SCr of 0.99 mg/dL).     Procalitonin Results:      Lab 01/18/24  0430 01/17/24  0503 01/16/24  1357   PROCALCITONIN 0.08 0.16 0.11      Brief Urine Lab Results       None           No results found for: \"SITE\", \"ALLENTEST\", \"PHART\", \"BVE1UDW\", \"PO2ART\", \"OPA2MLL\", \"BASEEXCESS\", \"U2AEFVSF\", \"HGBBG\", \"HCTABG\", \"OXYHEMOGLOBI\", \"METHHGBN\", \"CARBOXYHGB\", \"CO2CT\", \"BAROMETRIC\", \"MODALITY\", \"FIO2\"     Microbiology:  Blood Culture   Date Value Ref Range Status   01/16/2024 No growth at 5 days  Final     No results found for: \"BCIDPCR\", \"CXREFLEX\", \"CSFCX\", \"CULTURETIS\"  No results found for: \"CULTURES\", \"HSVCX\", \"URCX\"  No results found for: \"EYECULTURE\", \"GCCX\", \"HSVCULTURE\", \"LABHSV\"  No results found for: \"LEGIONELLA\", \"MRSACX\", \"MUMPSCX\", \"MYCOPLASCX\"  No results found for: \"NOCARDIACX\", \"STOOLCX\"  No results found for: \"THROATCX\", \"UNSTIMCULT\", \"URINECX\", \"CULTURE\", \"VZVCULTUR\"  No results found " "for: \"VIRALCULTU\", \"WOUNDCX\"     Blood Culture   Date Value Ref Range Status   01/16/2024 No growth at 5 days  Final     Fungus Culture   Date Value Ref Range Status   01/18/2024 No fungus isolated at less than 1 week  Preliminary     Respiratory Culture   Date Value Ref Range Status   01/18/2024 Light growth (2+) Pseudomonas aeruginosa MDRO (A)  Final     Comment:       Multi drug resistant Pseudomonas, patient may be an isolation risk.   01/18/2024 No Normal Respiratory Noris (A)  Final   (    Sputum from 1/18 has yielded Pseudomonas aeruginosa resistant to ciprofloxacin, intermediate to ceftazidime and cefepime, intermediate to piperacillin/tazobactam, sensitive to meropenem.  BloodLegionella and streptococcal pneumonia antigen negative.  Cultures x 2 from 1/16 negative.  MRSA screen from 1/16 negative.  Respiratory panel PCR from 1/16 negative.  Previous COVID-19 and influenza from 1/6 negative.    Radiology:  Imaging Results (Last 72 Hours)       Procedure Component Value Units Date/Time    SLP FEES - Fiberoptic Endo Eval Swallow [[id number]] Resulted: 01/22/24 1436     Updated: 01/22/24 1436    Narrative:      This procedure was auto-finalized with no dictation required.    XR Chest 1 View [[id number]] Collected: 01/21/24 0922     Updated: 01/21/24 0930    Narrative:      XR CHEST 1 VW    Date of Exam: 1/21/2024 7:00 AM EST    Indication: worsening hypoxia    Comparison: January 20, 2024    Findings:  The heart appears enlarged, as before. Lung volumes are low. There are prominent interstitial markings with airspace opacities in the right upper lung. Findings appear similar to the recent prior chest radiographs. No significant pleural effusion or   pneumothorax is seen.    There appears to be a moderate hiatal hernia, as before. Superior subluxation of the humeral heads suggest bilateral chronic rotator cuff tears. There are advanced degenerative changes at the right acromion.      Impression:      " Impression:  1.Low lung volumes with prominent interstitial markings and airspace opacities in the right upper lung, as before. Findings may represent pneumonia and/or edema.  2.Stable cardiomegaly.        Electronically Signed: Pancho Cotton    1/21/2024 9:27 AM EST    Workstation ID: [id number]            1/16/24    IMPRESSION:     - Pneumonia, multidrug-resistant Pseudomonas aeruginosa from 1/18/24, resistant to quinolones, intermediate to cephalosporins, and piperacillin/tazobactam.  This is in the setting of advanced COPD and interstitial lung disease.  Sputum from 1/18 has yielded Pseudomonas aeruginosa resistant to ciprofloxacin, intermediate to ceftazidime and cefepime, intermediate to piperacillin/tazobactam, sensitive to meropenem.  BloodLegionella and streptococcal pneumonia antigen negative.  Cultures x 2 from 1/16 negative.  MRSA screen from 1/16 negative.  Respiratory panel PCR from 1/16 negative.  Previous COVID-19 and influenza from 1/6 negative.  - Acute hypoxic respiratory failure on a baseline of chronic hypoxic respiratory failure on 6 L/min nasal cannula oxygen.  55 L/m on 1/23/24.  - Interstitial lung disease, likely related to autoimmune illness and previous infections along with rheumatoid arthritis.  On high-dose steroids.  Less likely pneumocystis.  - Encephalopathy, toxic and metabolic related to above issues.  - Rheumatoid arthritis, previously on prednisone 20 mg daily, as well as Plaquenil, sulfasalazine, Bentyl and tramadol for pain.    - Paroxysmal atrial fibrillation on metoprolol, flecainide, Xarelto.  - GERD, on PPI, increased risk for aspiration pneumonia.  - Anxiety, depression on Prozac.  - Hyponatremia 135.  - Hypokalemia 3.4.  - Hypocalcemia 8.1.  - Anemia, chronic disease, affects all aspects of care above.    RECOMMENDATIONS:    - Diagnostically, continue to follow patient's physical exam, CBC, CMP, CRP, histoplasma, blastomycosis urine antigens, Aspergillus galactomannan  assay, Fungitell, and radiographic studies.  Consider bronchoscopy but may be too high of a risk and end up on the ventilator, versus open lung biopsy.  Also could consider conservative measures given her comorbidities, as she has very poor prognosis.  - Therapeutically, continue meropenem 1/21-for 14 days and reassess, until approximately 2/5/2024.  Continue trimethoprim/sulfamethoxazole for pneumocystis and if negative for w/u then prevention while on doses of prednisone greater than 20 mg/day.  Plus/min neb tobra.  Doxy x 7 days.  - Supportive care, 55 L/min high flow oxygen.  - Palliative care has been assessing.    I discussed the patient's findings and my recommendations with patient and nursing staff    Thank you for asking me to see Liz Borjas.  Our group would be pleased to follow this patient over the course of their hospitalization and assist with outpatient antimicrobial therapy, as indicated.  Further recommendations depend on the results of the cultures and clinical course.  Increased risk for adverse drug reactions, complications of IV access, readmission, death.  I d/w patient's son.    Kevin Eugene MD  1/23/2024

## 2024-01-23 NOTE — PLAN OF CARE
Goal Outcome Evaluation:  Plan of Care Reviewed With: patient           Outcome Evaluation: Patient was switched to a Dolphin and reports improved comfort and pain relief. Patient continues use of nonrebreather with any activity and continuous high flow 55L NC at 70%. she has been titrated down from 75% today but continues to need higher percentages for medication administration or eating. With activity O2 sat drops to low 80's but improves with rest back to 90's. Will continue to titrate high flow O2 down as tolerated. PRN Atarax given with patient c/o anxiety rt SOA during morning care. Patient pressures sores assessed, Venelex and Zguard were reapplied, and patient has had no c/o pain throught shift. Patient was educated on necessity of ROM in lower extremities rt limited dorsiflexion findings at 0800 and refused use of ankle boots for foot drop prevention.

## 2024-01-23 NOTE — THERAPY EVALUATION
Patient Name: Liz Borjas  : 1950    MRN: [id number]                              Today's Date: 2024       Admit Date: 2024    Visit Dx:     ICD-10-CM ICD-9-CM   1. Acute on chronic respiratory failure with hypoxemia  J96.21 518.84   2. Pulmonary fibrosis  J84.10 515   3. Former smoker  Z87.891 V15.82   4. Chronic hypoxic respiratory failure, on home oxygen therapy  J96.11 518.83    Z99.81 799.02     V46.2   5. Multifocal pneumonia  J18.9 486   6. Dysphagia, unspecified type  R13.10 787.20     Patient Active Problem List   Diagnosis    DDD (degenerative disc disease), lumbar    Spinal stenosis, lumbar region, with neurogenic claudication    Spinal stenosis of lumbar region with neurogenic claudication    Lumbar disc herniation with radiculopathy    Rheumatoid arthritis with positive rheumatoid factor    Anxiety and depression    COPD with acute exacerbation    Mild obesity    Physical deconditioning    Sepsis    Leukocytosis    Lactic acidosis    Hyponatremia    Acute on chronic respiratory failure with hypoxia    Chronic respiratory failure with hypoxia    Elevated d-dimer    Immunocompromised    PAF (paroxysmal atrial fibrillation)    Chronic anticoagulation    HTN (hypertension)    Interstitial lung disease    Stage 3a chronic kidney disease    COPD (chronic obstructive pulmonary disease)    Acute on chronic hypoxic respiratory failure    Severe malnutrition     Past Medical History:   Diagnosis Date    Arthritis     Atrial fibrillation     Closed right hip fracture     COVID     GERD (gastroesophageal reflux disease)     Gout     Hyperlipidemia     Hypertension     Osteoporosis     Rheumatoid arthritis     Wears dentures     upper    Wears glasses     reading     Past Surgical History:   Procedure Laterality Date    ANKLE SURGERY Right     CHOLECYSTECTOMY      COLONOSCOPY      5 years ago    HIP FRACTURE SURGERY      Right Hip with Pins    LUMBAR DISCECTOMY Left 10/12/2016    Procedure:  lumbar MICROdiscectomy LEFT L3-5;  Surgeon: Chris Son MD;  Location: [facility];  Service:     REPLACEMENT TOTAL KNEE      Right Knee    TOE AMPUTATION      right baby toe, left second toe      General Information       Row Name 01/23/24 1137          Physical Therapy Time and Intention    Document Type evaluation  -ML     Mode of Treatment physical therapy  -       Row Name 01/23/24 1137          General Information    Patient Profile Reviewed yes  -ML     Prior Level of Function independent:;all household mobility;gait;transfer;bed mobility;feeding;grooming;mod assist:;dressing;bathing;dependent:;home management;cooking;cleaning;driving  patient ambulates household distances with a rollator at baseline, family lives nearby and assists her throughout the day  -ML     Existing Precautions/Restrictions fall;oxygen therapy device and L/min;other (see comments)  oxygen desaturation with minimal activity  -     Barriers to Rehab medically complex;previous functional deficit  -ML       Row Name 01/23/24 1137          Living Environment    People in Home alone  son and dtr in law live closeby  -       Row Name 01/23/24 1137          Home Main Entrance    Number of Stairs, Main Entrance none  -ML       Row Name 01/23/24 1137          Stairs Within Home, Primary    Number of Stairs, Within Home, Primary none  -ML       Row Name 01/23/24 1137          Cognition    Orientation Status (Cognition) oriented x 3  -ML       Row Name 01/23/24 1137          Safety Issues, Functional Mobility    Safety Issues Affecting Function (Mobility) insight into deficits/self-awareness;safety precaution awareness;safety precautions follow-through/compliance;sequencing abilities;awareness of need for assistance;friction/shear risk  -ML     Impairments Affecting Function (Mobility) balance;endurance/activity tolerance;shortness of breath;strength;postural/trunk control  -ML               User Key  (r) = Recorded By, (t) = Taken By,  (c) = Cosigned By      Initials Name Provider Type    ML Farida Turner Physical Therapist                   Mobility       Row Name 01/23/24 1143          Bed Mobility    Bed Mobility supine-sit;sit-supine;rolling left;rolling right;scooting/bridging  -ML     Rolling Left [place] (Bed Mobility) contact guard;verbal cues  -ML     Rolling Right [place] (Bed Mobility) contact guard;verbal cues  -ML     Scooting/Bridging [place] (Bed Mobility) dependent (less than 25% patient effort);2 person assist  -ML     Supine-Sit [place] (Bed Mobility) verbal cues;minimum assist (75% patient effort);1 person assist  -ML     Sit-Supine [place] (Bed Mobility) contact guard  -ML       Row Name 01/23/24 1143          Sit-Stand Transfer    Sit-Stand [place] (Transfers) verbal cues;moderate assist (50% patient effort);1 person to manage equipment  -ML     Assistive Device (Sit-Stand Transfers) other (see comments)  hand held  -ML     Comment, (Sit-Stand Transfer) patient attempted one sit to stand transfer from EOB, able to complete partial sit to stand transfer  -ML               User Key  (r) = Recorded By, (t) = Taken By, (c) = Cosigned By      Initials Name Provider Type    ML Farida Turner Physical Therapist                   Obj/Interventions       Row Name 01/23/24 1144          Range of Motion Comprehensive    General Range of Motion bilateral lower extremity ROM WFL  -ML     Comment, General Range of Motion hx of R ankle fusion  -ML       Row Name 01/23/24 1144          Strength Comprehensive (MMT)    General Manual Muscle Testing (MMT) Assessment lower extremity strength deficits identified  -ML     Comment, General Manual Muscle Testing (MMT) Assessment BLE grossly 3/5  -ML       Row Name 01/23/24 1144          Balance    Balance Assessment sitting static balance;sitting dynamic balance;sit to stand dynamic balance;standing static balance  -ML     Static Sitting Balance contact guard  -ML      Dynamic Sitting Balance contact guard  -ML     Position, Sitting Balance unsupported;sitting edge of bed  -ML     Sit to Stand Dynamic Balance verbal cues;non-verbal cues (demo/gesture);moderate assist;1-person assist  -ML     Static Standing Balance verbal cues;moderate assist;1-person assist  -ML     Balance Interventions sitting;standing;sit to stand;supported;occupation based/functional task  -ML               User Key  (r) = Recorded By, (t) = Taken By, (c) = Cosigned By      Initials Name Provider Type    ML Farida Turner Physical Therapist                   Goals/Plan       Row Name 01/23/24 1150          Bed Mobility Goal 1 (PT)    Activity/Assistive Device (Bed Mobility Goal 1, PT) sit to supine;supine to sit  -ML     [place] Level/Cues Needed (Bed Mobility Goal 1, PT) standby assist  -ML     Time Frame (Bed Mobility Goal 1, PT) 10 days  -ML       Row Name 01/23/24 1150          Transfer Goal 1 (PT)    Activity/Assistive Device (Transfer Goal 1, PT) sit-to-stand/stand-to-sit;bed-to-chair/chair-to-bed;walker, rolling  -ML     [place] Level/Cues Needed (Transfer Goal 1, PT) standby assist  -ML     Time Frame (Transfer Goal 1, PT) 10 days  -ML       Row Name 01/23/24 1150          Gait Training Goal 1 (PT)    Activity/Assistive Device (Gait Training Goal 1, PT) gait (walking locomotion);increase endurance/gait distance;walker, rolling  -ML     [place] Level (Gait Training Goal 1, PT) contact guard required  -ML     Distance (Gait Training Goal 1, PT) 25  -ML     Time Frame (Gait Training Goal 1, PT) 10 days  -ML       Row Name 01/23/24 1150          Therapy Assessment/Plan (PT)    Planned Therapy Interventions (PT) balance training;bed mobility training;gait training;home exercise program;neuromuscular re-education;patient/family education;postural re-education;ROM (range of motion);strengthening;stretching;transfer training  -ML               User Key  (r) = Recorded By, (t) = Taken By, (c) =  Cosigned By      Initials Name Provider Type    Farida Barba Physical Therapist                   Clinical Impression       Row Name 01/23/24 1147          Pain    Pretreatment Pain Rating 0/10 - no pain  -ML     Posttreatment Pain Rating 0/10 - no pain  -ML       Row Name 01/23/24 1147          Plan of Care Review    Plan of Care Reviewed With patient;son  -ML     Outcome Evaluation Patient required additional time to complete all mobility due to dyspnea and additional rest time. Patient presents below baseline for mobility and would continue to benefit from skilled PT to address strength, balance and activity tolerance deficits.  -[facility] Name 01/23/24 1147          Therapy Assessment/Plan (PT)    Rehab Potential (PT) fair, will monitor progress closely  -     Criteria for Skilled Interventions Met (PT) yes;meets criteria;skilled treatment is necessary  -     Therapy Frequency (PT) daily  -ML       Row Name 01/23/24 1147          Vital Signs    Pre SpO2 (%) 93  -ML     O2 Delivery Pre Treatment hi-flow  -ML     Intra SpO2 (%) 81  -ML     O2 Delivery Intra Treatment hi-flow  plus NRB mask  -ML     Post SpO2 (%) 89  -ML     O2 Delivery Post Treatment hi-flow  -ML     Pre Patient Position Supine  -ML     Intra Patient Position Standing  -ML     Post Patient Position Supine  -ML       Row Name 01/23/24 1147          Positioning and Restraints    Pre-Treatment Position in bed  -ML     Post Treatment Position bed  -ML     In Bed notified nsg;sitting;call light within reach;encouraged to call for assist;exit alarm on;with family/caregiver;side rails up x2  -ML               User Key  (r) = Recorded By, (t) = Taken By, (c) = Cosigned By      Initials Name Provider Type    Farida Barba Physical Therapist                   Outcome Measures       Row Name 01/23/24 1151          How much help from another person do you currently need...    Turning from your back to your side while in flat bed without using  bedrails? 3  -ML     Moving from lying on back to sitting on the side of a flat bed without bedrails? 3  -ML     Moving to and from a bed to a chair (including a wheelchair)? 2  -ML     Standing up from a chair using your arms (e.g., wheelchair, bedside chair)? 2  -ML     Climbing 3-5 steps with a railing? 1  -ML     To walk in hospital room? 1  -ML     AM-PAC 6 Clicks Score (PT) 12  -ML     Highest Level of Mobility Goal 4 --> Transfer to chair/commode  -ML       Row Name 01/23/24 1151          Functional Assessment    Outcome Measure Options AM-PAC 6 Clicks Basic Mobility (PT)  -ML               User Key  (r) = Recorded By, (t) = Taken By, (c) = Cosigned By      Initials Name Provider Type    Farida Barba Physical Therapist                                 Physical Therapy Education       Title: PT OT SLP Therapies (In Progress)       Topic: Physical Therapy (In Progress)       Point: Mobility training (Done)       Learning Progress Summary             Patient Acceptance, E, VU,NR by  at 1/23/2024 1151   Family Acceptance, E, VU,NR by  at 1/23/2024 1151                         Point: Home exercise program (Not Started)       Learner Progress:  Not documented in this visit.              Point: Body mechanics (Not Started)       Learner Progress:  Not documented in this visit.              Point: Precautions (Done)       Learning Progress Summary             Patient Acceptance, E, VU,NR by ML at 1/23/2024 1151   Family Acceptance, E, VU,NR by  at 1/23/2024 1151                                         User Key       Initials Effective Dates Name Provider Type [facility] 04/22/21 -  Farida Turner Physical Therapist PT                  PT Recommendation and Plan  Planned Therapy Interventions (PT): balance training, bed mobility training, gait training, home exercise program, neuromuscular re-education, patient/family education, postural re-education, ROM (range of motion), strengthening, stretching,  transfer training  Plan of Care Reviewed With: patient, son  Outcome Evaluation: Patient required additional time to complete all mobility due to dyspnea and additional rest time. Patient presents below baseline for mobility and would continue to benefit from skilled PT to address strength, balance and activity tolerance deficits.     Time Calculation:   PT Evaluation Complexity  History, PT Evaluation Complexity: 3 or more personal factors and/or comorbidities  Examination of Body Systems (PT Eval Complexity): total of 3 or more elements  Clinical Presentation (PT Evaluation Complexity): evolving  Clinical Decision Making (PT Evaluation Complexity): moderate complexity  Overall Complexity (PT Evaluation Complexity): moderate complexity     PT Charges       Row Name 01/23/24 1152             Time Calculation    Start Time 1058  -ML      PT Received On 01/23/24  -ML      PT Goal Re-Cert Due Date 02/02/24  -ML         Timed Charges    73693 - PT Therapeutic Activity Minutes 15  -ML         Untimed Charges    PT Eval/Re-eval Minutes 46  -ML         Total Minutes    Timed Charges Total Minutes 15  -ML      Untimed Charges Total Minutes 46  -ML       Total Minutes 61  -ML                User Key  (r) = Recorded By, (t) = Taken By, (c) = Cosigned By      Initials Name Provider Type    Farida Barba Physical Therapist                  Therapy Charges for Today       Code Description Service Date Service Provider Modifiers Qty    41264876163 HC PT THERAPEUTIC ACT EA 15 MIN 1/23/2024 Farida Turner GP 1    [id number] HC PT EVAL MOD COMPLEXITY 4 1/23/2024 Farida Turner GP 1            PT G-Codes  Outcome Measure Options: AM-PAC 6 Clicks Basic Mobility (PT)  AM-PAC 6 Clicks Score (PT): 12  PT Discharge Summary  Anticipated Discharge Disposition (PT): skilled nursing facility    Farida Turner  1/23/2024

## 2024-01-23 NOTE — PLAN OF CARE
"  Problem: Palliative Care  Goal: Enhanced Quality of Life  Intervention: Optimize Psychosocial Wellbeing  Recent Flowsheet Documentation  Taken 1/23/2024 1430 by Linda Etienne \"Maryann\" SELMA CASTANEDA  Supportive Measures: (symptom assessment)   active listening utilized   positive reinforcement provided   verbalization of feelings encouraged   other (see comments)  Taken 1/23/2024 1300 by Linda Etienne \"Maryann\" SELMA CASTANEDA  Supportive Measures: (Palliative IDT: TRISHA Burnette MD, DANELLE WELSH, JOVANY Etienne LCSW, LIONEL Woodward RN, SUJATA Multani W, JOVANY Gallardo MDiv) Visit with patient at bedside, patient dozing but awakens easily, HFNC 75%FiO2, mild dyspnea at rest increases on exertion.  Patient eating some (pudding), drinking more than eating, intermittent anxiety associated with dyspnea, denies pain and depression.  Patient very fragile, concerned this may be new baseline - if so current oxygen needs prohibit disposition planning.  Palliative Care continues to follow for support and assist with plan of care and symptom management.     "

## 2024-01-23 NOTE — PROGRESS NOTES
Pulmonary/Critical Care Follow-up     LOS: 7 days   Patient Care Team:  Valerie Sesay APRN as PCP - General (Family Medicine)  Alonzo Marie PA-C as Physician Assistant (Neurosurgery)  Carlos Eduardo Figueredo MD as Consulting Physician (Pain Medicine)  Chris Son MD as Consulting Physician (Neurosurgery)  Keegan Tellez PA-C as Physician Assistant (Physician Assistant)  Kristyn Haro APRN as Nurse Practitioner (Pulmonary Disease)      Chief Complaint   Patient presents with    Shortness of Breath     Subjective     History reviewed and updated in EMR as indicated.    Interval History:     Patient reports she is feeling overall better.  She remains on high flow nasal cannula oxygen but has been weaned a bit.  No fevers or chills.  No nausea or vomiting.  She is on antibiotics with meropenem and tobramycin nebulizers.  Family is at the bedside.  Cough is a bit productive currently.    History taken from: Patient, staff    PMH/FH/Social History were reviewed and updated appropriately in the electronic medical record.     Review of Systems:    Review of 14 systems was completed with positives and pertinent negatives noted in the subjective section.  All other systems reviewed and are negative.       Objective     Vital Signs  Temp:  [97.3 °F (36.3 °C)-97.6 °F (36.4 °C)] 97.5 °F (36.4 °C)  Heart Rate:  [55-68] 59  Resp:  [18] 18  BP: (107-142)/(57-85) 138/61  01/22 0701 - 01/23 0700  In: 240 [P.O.:240]  Out: 550 [Urine:550]  Body mass index is 20.87 kg/m².     IV drips:      Physical Exam:     Constitutional:   Alert, in no acute distress   Head:   Normocephalic, atraumatic   Eyes:           Lids and lashes normal, conjunctivae and sclerae normal.  PER   ENMT:  Ears appear intact with no abnormalities noted     Lips normal.     Neck:  Trachea midline, no JVD   Lungs/Resp:    Normal effort, symmetric chest rise, no crepitus, moderate rhonchi and wheezes bilaterally.               Heart/CV:   Regular rhythm  and normal rate, no murmur   Abdomen/GI:    Soft, nontender, nondistended   :    Deferred   Extremities/MSK:  No clubbing or cyanosis.  1+ bilateral ankle edema.     Pulses:  Pulses palpable and equal bilaterally   Skin:  No bleeding, bruising or rash   Heme/Lymph:  No cervical or supraclavicular adenopathy.   Neurologic:    Psychiatric:    Moves all extremities with no obvious focal motor deficit.  Cranial nerves 2 - 12 grossly intact  Non-agitated, normal affect.     The above physical exam findings were reviewed and reflect my exam findings as of today's exam.   Electronically signed by:  John Burnette MD  01/23/24  14:07 EST      Results Review:     I reviewed the patient's new clinical results.   Results from last 7 days   Lab Units 01/22/24  0438 01/21/24  1744 01/21/24 0327 01/20/24 0338 01/19/24 0338   SODIUM mmol/L 135*  --  135* 132* 135*   POTASSIUM mmol/L 4.7 4.7 3.4* 3.9 3.9   CHLORIDE mmol/L 98  --  97* 96* 101   CO2 mmol/L 29.0  --  31.0* 26.0 25.0   BUN mg/dL 22  --  18 12 15   CREATININE mg/dL 0.99  --  0.99 0.82 0.77   CALCIUM mg/dL 8.4*  --  8.1* 7.9* 7.6*   BILIRUBIN mg/dL  --   --  0.2 0.2 0.2   ALK PHOS U/L  --   --  321* 331* 281*   ALT (SGPT) U/L  --   --  27 32 25   AST (SGOT) U/L  --   --  31 44* 36*   GLUCOSE mg/dL 77  --  93 107* 168*     Results from last 7 days   Lab Units 01/21/24  0327 01/20/24 0338 01/19/24  0338   WBC 10*3/mm3 6.14 5.43 5.37   HEMOGLOBIN g/dL 10.6* 10.1* 9.0*   HEMATOCRIT % 36.5 33.9* 31.4*   PLATELETS 10*3/mm3 147 133* 122*     Results from last 7 days   Lab Units 01/16/24  1449   PH, ARTERIAL pH units 7.358   PO2 ART mm Hg 337.0*   PCO2, ARTERIAL mm Hg 54.4*   HCO3 ART mmol/L 30.6*     Results from last 7 days   Lab Units 01/22/24  0438   MAGNESIUM mg/dL 2.0       I reviewed the patient's new imaging including images and reports.    CTA chest 1/16/2024 was reviewed and shows severe diffuse interstitial disease/scarring with some likely overlying acute  infiltrates in the right upper lobe and right lower lobes at least.  Radiologist's impression follows:    Impression:  No evidence of pulmonary embolism.     No evidence of thoracic aortic aneurysm or dissection.     Findings compatible with advanced pulmonary fibrosis. There are superimposed groundglass attenuation of the lung parenchyma which may be secondary to interstitial pneumonitis. Small consolidations in the right upper and lower lobes are compatible with   multifocal pneumonia.         Medication Review:   amLODIPine, 5 mg, Oral, Q24H  budesonide-formoterol, 2 puff, Inhalation, BID - RT   And  tiotropium bromide monohydrate, 2 puff, Inhalation, Daily - RT  castor oil-balsam peru, 1 application , Topical, Q12H  cetirizine, 10 mg, Oral, Daily  dicyclomine, 10 mg, Oral, TID  doxepin, 50 mg, Oral, Nightly  doxycycline, 100 mg, Oral, Q12H  ferrous sulfate, 325 mg, Oral, Daily With Breakfast  flecainide, 50 mg, Oral, Q12H  FLUoxetine, 40 mg, Oral, Daily  guaiFENesin, 1,200 mg, Oral, Q12H  hydroxychloroquine, 200 mg, Oral, Q24H  ipratropium-albuterol, 3 mL, Nebulization, 4x Daily - RT  lactobacillus acidophilus, 1 capsule, Oral, Daily  levothyroxine, 25 mcg, Oral, Q AM  meropenem, 1,000 mg, Intravenous, Q12H  metoprolol tartrate, 25 mg, Oral, BID  palliative care oral rinse, , Mouth/Throat, 4x Daily  pantoprazole, 40 mg, Oral, BID AC  predniSONE, 40 mg, Oral, BID With Meals  rivaroxaban, 15 mg, Oral, Daily With Dinner  [Held by provider] sulfamethoxazole-trimethoprim, 2 tablet, Oral, Once per day on Monday Wednesday Friday  sulfamethoxazole-trimethoprim, 2 tablet, Oral, Q8H  sulfaSALAzine, 500 mg, Oral, Q12H  tobramycin PF, 300 mg, Nebulization, Q12H - RT  traMADol, 50 mg, Oral, BID           Assessment & Plan         Acute on chronic hypoxic respiratory failure    Rheumatoid arthritis with positive rheumatoid factor    Physical deconditioning    Chronic respiratory failure with hypoxia    HTN (hypertension)     Interstitial lung disease    Severe malnutrition    73 y.o. female with history of RA, ILD with honeycombing, COPD, Olivier cirrhosis, history of COVID around Thanksgiving 2023, admitted 1/16/2024 with worsening cough and dyspnea.  She had a recent hospitalization and was discharged 1/10/2024.  Sputum is growing multidrug-resistant Pseudomonas.    She seems to be doing a little bit better.  She has acute on chronic hypoxemic respiratory failure, currently requiring high flow nasal cannula at around 75 % FiO2.    Plan:  1.  For acute on chronic hypoxemic respiratory failure: Continue supplemental oxygen and wean as tolerated.  2.  For right upper and lower lobe Pseudomonas pneumonia: MDR Pseudomonas growing from sputum culture currently on meropenem and tobramycin nebulized.  3.  For ILD with possible exacerbation: Recommend steroid slow taper.  Currently prednisone 40 mg twice daily.  I would taper over about 6 weeks to 10 mg daily.  4.  Pneumocystis prophylaxis in the setting of high-dose steroids: Bactrim.          Electronically signed by:    John Burnette MD  01/23/24  14:07 EST      *. Please note that portions of this note were completed with [employer] - a voice recognition program.

## 2024-01-23 NOTE — PROGRESS NOTES
Patient is on Rivaroxaban.  Education provided on 1/23/24 verbally and in writing.  Information provided includes effects of medication, drug-drug and drug-food interactions, and signs/symptoms of bleeding and clotting.  All pertinent questions were answered.     Alonzo Roland, PharmD, BCPS  1/23/2024  13:31 EST

## 2024-01-23 NOTE — PROGRESS NOTES
[facility] Medicine Services  PROGRESS NOTE    Patient Name: Liz Borjas  : 1950  MRN: [id number]    Date of Admission: 2024  Primary Care Physician: Valerie Sesay APRN    Subjective   Subjective     CC:  Hypoxia f/u    HPI:  High oxygen need again today - able to stand with NRB over HFNC for short period  Son reports at baseline sats in 70s after movement at home on 6 liters n/c so is unsurprised by this  Appetite somewhat improved      Objective   Objective     Vital Signs:   Temp:  [97.3 °F (36.3 °C)-97.5 °F (36.4 °C)] 97.5 °F (36.4 °C)  Heart Rate:  [55-68] 67  Resp:  [18] 18  BP: (107-142)/(57-78) 138/61  Flow (L/min):  [55] 55     Physical Exam:  Constitutional: No acute distress, awake, frail alert female sitting up in bed, working with PT  HENT: NCAT, mucous membranes moist  Respiratory: Diminished air movement w exp wheezes bilaterally significant, increased wob on HFNC  Cardiovascular: RRR, no murmurs, rubs, or gallops  Gastrointestinal: Soft, nontender, nondistended  Musculoskeletal: Muscle tonedecreased throughout, no joint effusions appreciated  Psychiatric: Appropriate affect, cooperative  Neurologic: Alert and oriented, very agreeable, facial movements symmetric and spontaneous movement of all 4 extremities grossly equal bilaterally, speech clear  Skin: No rashes    Results Reviewed:  LAB RESULTS:      Lab 24  0327 24  0338 24  0338 24  0430 24  1636 24  0503 24  1805   WBC 6.14 5.43 5.37 4.81  --  6.46  --    HEMOGLOBIN 10.6* 10.1* 9.0* 9.5*  --  9.8*  --    HEMATOCRIT 36.5 33.9* 31.4* 32.6*  --  33.4*  --    PLATELETS 147 133* 122* 124*  --  109*  --    NEUTROS ABS 5.43 4.90 4.90 4.29  --  5.06  --    IMMATURE GRANS (ABS) 0.05 0.09* 0.02 0.02  --  0.02  --    LYMPHS ABS 0.36* 0.29* 0.29* 0.31*  --  0.77  --    MONOS ABS 0.30 0.15 0.16 0.19  --  0.58  --    EOS ABS 0.00 0.00 0.00 0.00  --  0.02  --    MCV 95.3  93.6 96.0 97.9*  --  96.8  --    CRP  --   --   --  7.86* 9.05*  --   --    PROCALCITONIN  --   --   --  0.08  --  0.16  --    LACTATE  --   --   --   --   --   --  0.8         Lab 01/22/24  0438 01/21/24  1744 01/21/24 0327 01/20/24 0338 01/19/24 0338 01/18/24  0430   SODIUM 135*  --  135* 132* 135* 142   POTASSIUM 4.7 4.7 3.4* 3.9 3.9 4.3   CHLORIDE 98  --  97* 96* 101 105   CO2 29.0  --  31.0* 26.0 25.0 29.0   ANION GAP 8.0  --  7.0 10.0 9.0 8.0   BUN 22  --  18 12 15 20   CREATININE 0.99  --  0.99 0.82 0.77 0.87   EGFR 61.1  --  61.1 76.6 82.6 71.3   GLUCOSE 77  --  93 107* 168* 108*   CALCIUM 8.4*  --  8.1* 7.9* 7.6* 8.2*   MAGNESIUM 2.0  --   --   --   --   --          Lab 01/21/24 0327 01/20/24 0338 01/19/24 0338 01/18/24  0430 01/17/24  0503   TOTAL PROTEIN 5.6* 5.7* 5.4* 5.6* 5.4*   ALBUMIN 2.7* 2.5* 2.3* 2.6* 2.4*   GLOBULIN 2.9 3.2 3.1 3.0 3.0   ALT (SGPT) 27 32 25 20 21   AST (SGOT) 31 44* 36* 26 30   BILIRUBIN 0.2 0.2 0.2 0.2 0.2   ALK PHOS 321* 331* 281* 277* 306*         Lab 01/17/24  0014 01/16/24 2235   HSTROP T 14* 15*                   Brief Urine Lab Results       None            Microbiology Results Abnormal       Procedure Component Value - Date/Time    Fungus Culture - Sputum, Cough [[id number]] Collected: 01/18/24 0054    Lab Status: Preliminary result Specimen: Sputum from Cough Updated: 01/23/24 0115     Fungus Culture No fungus isolated at less than 1 week    Blood Culture - Blood, Arm, Left [[id number]]  (Normal) Collected: 01/16/24 1807    Lab Status: Final result Specimen: Blood from Arm, Left Updated: 01/21/24 1831     Blood Culture No growth at 5 days    Narrative:      Less than seven (7) mL's of blood was collected.  Insufficient quantity may yield false negative results.    Blood Culture - Blood, Wrist, Right [[id number]]  (Normal) Collected: 01/16/24 1741    Lab Status: Final result Specimen: Blood from Wrist, Right Updated: 01/21/24 1801     Blood Culture No growth at 5 days     Narrative:      Less than seven (7) mL's of blood was collected.  Insufficient quantity may yield false negative results.    Fungus Smear - Sputum, Cough [[id number]] Collected: 01/18/24 0054    Lab Status: Final result Specimen: Sputum from Cough Updated: 01/19/24 1314     Fungal Stain No fungal elements seen    MRSA Screen, PCR (Inpatient) - Swab, Nares [[id number]]  (Normal) Collected: 01/16/24 1800    Lab Status: Final result Specimen: Swab from Nares Updated: 01/17/24 0042     MRSA PCR No MRSA Detected    Narrative:      The negative predictive value of this diagnostic test is high and should only be used to consider de-escalating anti-MRSA therapy. A positive result may indicate colonization with MRSA and must be correlated clinically.    S. Pneumo Ag Urine or CSF - Urine, Straight Cath [[id number]]  (Normal) Collected: 01/16/24 1823    Lab Status: Final result Specimen: Urine from Straight Cath Updated: 01/17/24 0002     Strep Pneumo Ag Negative    Legionella Antigen, Urine - Urine, Straight Cath [[id number]]  (Normal) Collected: 01/16/24 1823    Lab Status: Final result Specimen: Urine from Straight Cath Updated: 01/17/24 0001     LEGIONELLA ANTIGEN, URINE Negative    COVID PRE-OP / PRE-PROCEDURE SCREENING ORDER (NO ISOLATION) - Swab, Nasopharynx [[id number]]  (Normal) Collected: 01/16/24 1405    Lab Status: Final result Specimen: Swab from Nasopharynx Updated: 01/16/24 1503    Narrative:      The following orders were created for panel order COVID PRE-OP / PRE-PROCEDURE SCREENING ORDER (NO ISOLATION) - Swab, Nasopharynx.  Procedure                               Abnormality         Status                     ---------                               -----------         ------                     Respiratory Panel PCR w/...[[id number]]  Normal              Final result                 Please view results for these tests on the individual orders.    Respiratory Panel PCR w/COVID-19(SARS-CoV-2)  ALFREDO/MEGAN/MARIA FERNANDA/PAD/COR/MONIKA In-House, NP Swab in UTM/VTM, 2 HR TAT - Swab, Nasopharynx [[id number]]  (Normal) Collected: 01/16/24 1405    Lab Status: Final result Specimen: Swab from Nasopharynx Updated: 01/16/24 1503     ADENOVIRUS, PCR Not Detected     Coronavirus 229E Not Detected     Coronavirus HKU1 Not Detected     Coronavirus NL63 Not Detected     Coronavirus OC43 Not Detected     COVID19 Not Detected     Human Metapneumovirus Not Detected     Human Rhinovirus/Enterovirus Not Detected     Influenza A PCR Not Detected     Influenza B PCR Not Detected     Parainfluenza Virus 1 Not Detected     Parainfluenza Virus 2 Not Detected     Parainfluenza Virus 3 Not Detected     Parainfluenza Virus 4 Not Detected     RSV, PCR Not Detected     Bordetella pertussis pcr Not Detected     Bordetella parapertussis PCR Not Detected     Chlamydophila pneumoniae PCR Not Detected     Mycoplasma pneumo by PCR Not Detected    Narrative:      In the setting of a positive respiratory panel with a viral infection PLUS a negative procalcitonin without other underlying concern for bacterial infection, consider observing off antibiotics or discontinuation of antibiotics and continue supportive care. If the respiratory panel is positive for atypical bacterial infection (Bordetella pertussis, Chlamydophila pneumoniae, or Mycoplasma pneumoniae), consider antibiotic de-escalation to target atypical bacterial infection.            SLP FEES - Fiberoptic Endo Eval Swallow    Result Date: 1/22/2024  This procedure was auto-finalized with no dictation required.     Results for orders placed during the hospital encounter of 01/06/24    Adult Transthoracic Echo Complete W/ Cont if Necessary Per Protocol    Interpretation Summary    Left ventricular ejection fraction appears to be 66 - 70%.    Left ventricular wall thickness is consistent with mild septal asymmetric hypertrophy.    Left ventricular diastolic function is consistent with (grade I) impaired  relaxation.    The right ventricular cavity is mildly dilated.    The left atrial cavity is mildly dilated.    Left atrial volume is moderately increased.    The right atrial cavity is borderline dilated.    Moderate tricuspid valve regurgitation is present.    Estimated right ventricular systolic pressure from tricuspid regurgitation is markedly elevated (>55 mmHg).      Current medications:  Scheduled Meds:amLODIPine, 5 mg, Oral, Q24H  budesonide-formoterol, 2 puff, Inhalation, BID - RT   And  tiotropium bromide monohydrate, 2 puff, Inhalation, Daily - RT  castor oil-balsam peru, 1 application , Topical, Q12H  cetirizine, 10 mg, Oral, Daily  dicyclomine, 10 mg, Oral, TID  doxepin, 50 mg, Oral, Nightly  doxycycline, 100 mg, Oral, Q12H  ferrous sulfate, 325 mg, Oral, Daily With Breakfast  flecainide, 50 mg, Oral, Q12H  FLUoxetine, 40 mg, Oral, Daily  guaiFENesin, 1,200 mg, Oral, Q12H  hydroxychloroquine, 200 mg, Oral, Q24H  ipratropium-albuterol, 3 mL, Nebulization, 4x Daily - RT  lactobacillus acidophilus, 1 capsule, Oral, Daily  levothyroxine, 25 mcg, Oral, Q AM  meropenem, 1,000 mg, Intravenous, Q12H  metoprolol tartrate, 25 mg, Oral, BID  palliative care oral rinse, , Mouth/Throat, 4x Daily  pantoprazole, 40 mg, Oral, BID AC  predniSONE, 40 mg, Oral, BID With Meals  rivaroxaban, 15 mg, Oral, Daily With Dinner  [Held by provider] sulfamethoxazole-trimethoprim, 2 tablet, Oral, Once per day on Monday Wednesday Friday  sulfamethoxazole-trimethoprim, 2 tablet, Oral, Q8H  sulfaSALAzine, 500 mg, Oral, Q12H  tobramycin PF, 300 mg, Nebulization, Q12H - RT  traMADol, 50 mg, Oral, BID      Continuous Infusions:   PRN Meds:.  senna-docusate sodium **AND** polyethylene glycol **AND** bisacodyl **AND** bisacodyl    Calcium Replacement - Follow Nurse / BPA Driven Protocol    fluticasone    hydrOXYzine    ipratropium-albuterol    Magnesium Standard Dose Replacement - Follow Nurse / BPA Driven Protocol    nitroglycerin     Phosphorus Replacement - Follow Nurse / BPA Driven Protocol    Potassium Replacement - Follow Nurse / BPA Driven Protocol    sodium chloride    sodium chloride    Assessment & Plan   Assessment & Plan     Active Hospital Problems    Diagnosis  POA    **Acute on chronic hypoxic respiratory failure [J96.21]  Yes    Severe malnutrition [E43]  Yes    Interstitial lung disease [J84.9]  Yes    HTN (hypertension) [I10]  Yes    Chronic respiratory failure with hypoxia [J96.11]  Yes    Physical deconditioning [R53.81]  Yes    Rheumatoid arthritis with positive rheumatoid factor [M05.9]  Yes      Resolved Hospital Problems   No resolved problems to display.        Brief Hospital Course to date:  Liz Borjas is a 73 y.o. female w advanced ILD on home 6 liters n/c, RA on chronic prednisone, A-fib on xarelto, recent admission 1/6-1/9 for RUL pneumonia who re-presented on 1/16 with acute on chronic hypoxic respiratory failure found to have Pseudomonas multifocal PNA.   Currently requiring HFNC, higher FIO2 w any movement    Acute on chronic hypoxic resp failure 2/2 multifocal Pseudomonas PNA  Severe ILD w UIP pattern  -inhaled tobramycin/meropenem per susceptibilities (intermediate to zosyn), started 1/21. Duration per ID  -high dose steroid, taper per pulmonology  -on trx dose for PJP until testing completed, then back to ppx dosing  -complete 7 days doxycycline  -pulm treatments, appreciate all pulmonary input in very frail patient  -wean HFNC as able, with activity expect will continue to require as at baseline sats 70's w exertion at home    RA - on home plaquenil + sulfasalazine. Unclear if role in ILD trx as well v consider holding in severe infection as above. Will research, continue current plan for now    Type II NSTEMI 2/2 hypoxia above  Chronic CHFpEF withOUT acute exacerbation  Concern for oropharyngeal dysphagia - resolved on repeat testing, suspect related to HFNC + dentures    Paroxysmal atrial fibrillation  -Continue metoprolol, flecainide, xarelto  GERD - PPI twice daily.  Hypothyroidism -levothyroxine.  Chronic insomnia -doxepin.  Hypertension -amlodipine.  Anxiety/depression -prozac.  Chronic anemia  Chronically elev alk phos    Expected Discharge Location and Transportation: TBD  Expected Discharge 1/29 (Discharge date is tentative pending patient's medical condition and is subject to change)  Expected Discharge Date: 1/29/2024; Expected Discharge Time:      DVT prophylaxis:  Medical DVT prophylaxis orders are present.     AM-PAC 6 Clicks Score (PT): 12 (01/23/24 1151)    CODE STATUS:   Code Status and Medical Interventions:   Ordered at: 01/18/24 0854     Medical Intervention Limits:    NO intubation (DNI)     Level Of Support Discussed With:    Patient     Code Status (Patient has no pulse and is not breathing):    No CPR (Do Not Attempt to Resuscitate)     Medical Interventions (Patient has pulse or is breathing):    Limited Support       Christie Combs MD  01/23/24

## 2024-01-23 NOTE — PLAN OF CARE
Problem: Noninvasive Ventilation Acute  Goal: Effective Unassisted Ventilation and Oxygenation  Outcome: Ongoing, Progressing  Intervention: Monitor and Manage Noninvasive Ventilation  Description: Monitor patient response, such as gas exchange and ventilatory parameters, neurologic and hemodynamic status, flow and pressure waveforms, to determine interface tolerance; recognize need for intubation.  Acknowledge, normalize and validate the intensity of fear and anxiety associated with the use of noninvasive ventilation therapy.  Adjust pressure support gradually to promote tolerance and comfort; provide coaching and reassurance.  Keep exhalation port free from obstruction to maintain carbon dioxide elimination; port may be in the circuit or in the interface device.  Ensure that nasal prongs, mask or helmet fit properly; adjust strap tension to avoid leaks without overtightening.  Consider heated humidification for prolonged use of positive pressure, thick secretions, mucosal dryness or comfort.  Assess pressure points regularly to promote skin and mucous membrane integrity; provide frequent oral care.  Maintain head of bed elevation to promote ventilation-perfusion and reduce risk of aspiration.  Evaluate nutrition status; consider nutritional supplementation.  Promote pulmonary hygiene regularly to promote mucociliary clearance and prevent sputum retention.  Utilize nonpharmacologic measures, such as music or presence at the bedside and medication, to reduce pain and anxiety; monitor medication effects closely for respiratory depression.  Recent Flowsheet Documentation  Taken 1/23/2024 0607 by Angelica Casas RN  Airway/Ventilation Management: airway patency maintained  Taken 1/23/2024 0420 by Angelica Casas RN  Airway/Ventilation Management: airway patency maintained  Taken 1/23/2024 0220 by Angelica Casas RN  Airway/Ventilation Management: airway patency maintained  Taken 1/23/2024 0020 by Angelica Casas  RN  Airway/Ventilation Management: airway patency maintained  Taken 1/22/2024 2215 by Angelica Casas RN  Airway/Ventilation Management: airway patency maintained  Taken 1/22/2024 2015 by Angelica Casas RN  Airway/Ventilation Management: airway patency maintained     Problem: COPD (Chronic Obstructive Pulmonary Disease) Comorbidity  Goal: Maintenance of COPD Symptom Control  Outcome: Ongoing, Progressing  Intervention: Maintain COPD-Symptom Control  Description: Evaluate adherence to management plan (e.g., medication, trigger avoidance, infection prevention, self-monitoring).  Advocate for continuation of home regimen, including medication, method of delivery, schedule and symptom monitoring.  Anticipate the need for breathing techniques and activity pacing to minimize fatigue and breathlessness.  Assess for proper use of inhaled medication and delivery technique; assist or reinstruct if needed.  Evaluate effectiveness of coping skills; encourage expression of feelings, expectations and concerns related to disease management and quality of life; reinforce education to enhance management plan and wellbeing.  Recent Flowsheet Documentation  Taken 1/23/2024 0607 by Angelica Casas RN  Medication Review/Management: medications reviewed  Taken 1/23/2024 0420 by Angelica Casas RN  Medication Review/Management: medications reviewed  Taken 1/23/2024 0220 by Angelica Casas RN  Medication Review/Management: medications reviewed  Taken 1/23/2024 0020 by Angelica Casas RN  Medication Review/Management: medications reviewed  Taken 1/22/2024 2215 by Angelica Casas RN  Supportive Measures:   active listening utilized   counseling provided   decision-making supported   positive reinforcement provided   relaxation techniques promoted   self-care encouraged   self-reflection promoted   self-responsibility promoted   verbalization of feelings encouraged  Medication Review/Management: medications reviewed  Taken 1/22/2024 2015 by  Angelica Casas RN  Medication Review/Management: medications reviewed     Problem: Adult Inpatient Plan of Care  Goal: Absence of Hospital-Acquired Illness or Injury  Outcome: Ongoing, Progressing     Problem: Adult Inpatient Plan of Care  Goal: Absence of Hospital-Acquired Illness or Injury  Intervention: Prevent Skin Injury  Description: Perform a screening for skin injury risk, such as pressure or moisture associated skin damage on admission and at regular intervals throughout hospital stay.  Keep all areas of skin (especially folds) clean and dry.  Maintain adequate skin hydration.  Relieve and redistribute pressure and protect bony prominences; implement measures based on patient-specific risk factors.  Match turning and repositioning schedule to clinical condition.  Encourage weight shift frequently; assist with reposition if unable to complete independently.  Float heels off bed; avoid pressure on the Achilles tendon.  Keep skin free from extended contact with medical devices.  Encourage functional activity and mobility, as early as tolerated.  Use aids (e.g., slide boards, mechanical lift) during transfer.  Recent Flowsheet Documentation  Taken 1/23/2024 0607 by Angelica Casas RN  Body Position: position changed independently  Skin Protection:   adhesive use limited   skin-to-device areas padded   skin-to-skin areas padded   transparent dressing maintained   tubing/devices free from skin contact  Taken 1/23/2024 0420 by Angelica Casas RN  Body Position: position changed independently  Skin Protection:   adhesive use limited   skin-to-device areas padded   skin-to-skin areas padded   transparent dressing maintained   tubing/devices free from skin contact  Taken 1/23/2024 0220 by Angelica Casas RN  Body Position: position changed independently  Skin Protection:   adhesive use limited   skin-to-device areas padded   skin-to-skin areas padded   transparent dressing maintained   tubing/devices free from skin  contact  Taken 1/23/2024 0020 by Angelica Casas RN  Body Position: position changed independently  Skin Protection:   adhesive use limited   skin-to-device areas padded   skin-to-skin areas padded   transparent dressing maintained   tubing/devices free from skin contact  Taken 1/22/2024 2215 by Angelica Casas RN  Body Position: position changed independently  Skin Protection:   adhesive use limited   skin-to-device areas padded   skin-to-skin areas padded   transparent dressing maintained   tubing/devices free from skin contact  Taken 1/22/2024 2015 by Angelica Casas RN  Body Position: position changed independently  Skin Protection:   adhesive use limited   skin-to-device areas padded   skin-to-skin areas padded   transparent dressing maintained   tubing/devices free from skin contact     Problem: Adult Inpatient Plan of Care  Goal: Absence of Hospital-Acquired Illness or Injury  Intervention: Prevent Infection  Description: Maintain skin and mucous membrane integrity; promote hand, oral and pulmonary hygiene.  Optimize fluid balance, nutrition, sleep and glycemic control to maximize infection resistance.  Identify potential sources of infection early to prevent or mitigate progression of infection (e.g., wound, lines, devices).  Evaluate ongoing need for invasive devices; remove promptly when no longer indicated.  Recent Flowsheet Documentation  Taken 1/23/2024 0607 by Angelica Casas RN  Infection Prevention:   environmental surveillance performed   rest/sleep promoted   single patient room provided  Taken 1/23/2024 0420 by Angelica Casas RN  Infection Prevention:   environmental surveillance performed   rest/sleep promoted   single patient room provided  Taken 1/23/2024 0220 by Angelica Casas RN  Infection Prevention:   environmental surveillance performed   rest/sleep promoted   single patient room provided  Taken 1/23/2024 0020 by Angelica Casas RN  Infection Prevention:   environmental surveillance  performed   rest/sleep promoted   single patient room provided  Taken 1/22/2024 2215 by Angelica Casas RN  Infection Prevention:   environmental surveillance performed   rest/sleep promoted   single patient room provided  Taken 1/22/2024 2015 by Angelica Casas RN  Infection Prevention:   environmental surveillance performed   rest/sleep promoted   single patient room provided     Problem: Adult Inpatient Plan of Care  Goal: Optimal Comfort and Wellbeing  Intervention: Provide Person-Centered Care  Description: Use a family-focused approach to care.  Develop trust and rapport by proactively providing information, encouraging questions, addressing concerns and offering reassurance.  Acknowledge emotional response to hospitalization.  Recognize and utilize personal coping strategies.  Honor spiritual and cultural preferences.  Recent Flowsheet Documentation  Taken 1/23/2024 0607 by Angelica Casas RN  Trust Relationship/Rapport:   care explained   thoughts/feelings acknowledged   emotional support provided   empathic listening provided  Taken 1/23/2024 0420 by Angelica Casas RN  Trust Relationship/Rapport:   care explained   thoughts/feelings acknowledged   emotional support provided   empathic listening provided  Taken 1/23/2024 0220 by Angelica Casas RN  Trust Relationship/Rapport:   care explained   thoughts/feelings acknowledged   emotional support provided   empathic listening provided  Taken 1/23/2024 0020 by Angelica Casas RN  Trust Relationship/Rapport:   care explained   thoughts/feelings acknowledged   emotional support provided   empathic listening provided  Taken 1/22/2024 2215 by Angelica Casas RN  Trust Relationship/Rapport:   care explained   thoughts/feelings acknowledged   emotional support provided   empathic listening provided  Taken 1/22/2024 2015 by Angelica Casas RN  Trust Relationship/Rapport:   care explained   thoughts/feelings acknowledged   emotional support provided   empathic  listening provided     Problem: Adult Inpatient Plan of Care  Goal: Absence of Hospital-Acquired Illness or Injury  Outcome: Ongoing, Progressing  Intervention: Identify and Manage Fall Risk  Description: Perform standard risk assessment on admission using a validated tool or comprehensive approach appropriate to the patient; reassess fall risk frequently, with change in status or transfer to another level of care.  Communicate fall injury risk to interprofessional healthcare team.  Determine need for increased observation, equipment and environmental modification, such as low bed, signage and supportive, nonskid footwear.  Adjust safety measures to individual developmental age, stage and identified risk factors.  Reinforce the importance of safety and physical activity with patient and family.  Perform regular intentional rounding to assess need for position change, pain assessment and personal needs, including assistance with toileting.  Recent Flowsheet Documentation  Taken 1/23/2024 0607 by Angelica Casas RN  Safety Promotion/Fall Prevention:   clutter free environment maintained   assistive device/personal items within reach   activity supervised   fall prevention program maintained   nonskid shoes/slippers when out of bed   room organization consistent   safety round/check completed   toileting scheduled  Taken 1/23/2024 0420 by Angelica Casas RN  Safety Promotion/Fall Prevention:   clutter free environment maintained   assistive device/personal items within reach   activity supervised   fall prevention program maintained   nonskid shoes/slippers when out of bed   room organization consistent   safety round/check completed   toileting scheduled  Taken 1/23/2024 0220 by Angelica Casas RN  Safety Promotion/Fall Prevention:   clutter free environment maintained   assistive device/personal items within reach   activity supervised   fall prevention program maintained   nonskid shoes/slippers when out of bed    room organization consistent   safety round/check completed   toileting scheduled  Taken 1/23/2024 0020 by Angelica Casas RN  Safety Promotion/Fall Prevention:   clutter free environment maintained   assistive device/personal items within reach   activity supervised   fall prevention program maintained   nonskid shoes/slippers when out of bed   room organization consistent   safety round/check completed   toileting scheduled  Taken 1/22/2024 2215 by Angelica Casas RN  Safety Promotion/Fall Prevention:   clutter free environment maintained   assistive device/personal items within reach   activity supervised   fall prevention program maintained   nonskid shoes/slippers when out of bed   room organization consistent   safety round/check completed   toileting scheduled  Taken 1/22/2024 2015 by Angelica Casas RN  Safety Promotion/Fall Prevention:   clutter free environment maintained   assistive device/personal items within reach   activity supervised   fall prevention program maintained   nonskid shoes/slippers when out of bed   room organization consistent   safety round/check completed   toileting scheduled  Intervention: Prevent Skin Injury  Description: Perform a screening for skin injury risk, such as pressure or moisture associated skin damage on admission and at regular intervals throughout hospital stay.  Keep all areas of skin (especially folds) clean and dry.  Maintain adequate skin hydration.  Relieve and redistribute pressure and protect bony prominences; implement measures based on patient-specific risk factors.  Match turning and repositioning schedule to clinical condition.  Encourage weight shift frequently; assist with reposition if unable to complete independently.  Float heels off bed; avoid pressure on the Achilles tendon.  Keep skin free from extended contact with medical devices.  Encourage functional activity and mobility, as early as tolerated.  Use aids (e.g., slide boards, mechanical lift)  during transfer.  Recent Flowsheet Documentation  Taken 1/23/2024 0607 by Angelica Casas RN  Body Position: position changed independently  Skin Protection:   adhesive use limited   skin-to-device areas padded   skin-to-skin areas padded   transparent dressing maintained   tubing/devices free from skin contact  Taken 1/23/2024 0420 by Angelica Casas RN  Body Position: position changed independently  Skin Protection:   adhesive use limited   skin-to-device areas padded   skin-to-skin areas padded   transparent dressing maintained   tubing/devices free from skin contact  Taken 1/23/2024 0220 by Angelica Casas RN  Body Position: position changed independently  Skin Protection:   adhesive use limited   skin-to-device areas padded   skin-to-skin areas padded   transparent dressing maintained   tubing/devices free from skin contact  Taken 1/23/2024 0020 by Angelica Casas RN  Body Position: position changed independently  Skin Protection:   adhesive use limited   skin-to-device areas padded   skin-to-skin areas padded   transparent dressing maintained   tubing/devices free from skin contact  Taken 1/22/2024 2215 by Angelica Casas RN  Body Position: position changed independently  Skin Protection:   adhesive use limited   skin-to-device areas padded   skin-to-skin areas padded   transparent dressing maintained   tubing/devices free from skin contact  Taken 1/22/2024 2015 by Angelica Casas RN  Body Position: position changed independently  Skin Protection:   adhesive use limited   skin-to-device areas padded   skin-to-skin areas padded   transparent dressing maintained   tubing/devices free from skin contact  Intervention: Prevent and Manage VTE (Venous Thromboembolism) Risk  Description: Assess for VTE (venous thromboembolism) risk.  Encourage and assist with early ambulation.  Initiate and maintain compression or other therapy, as indicated, based on identified risk in accordance with organizational protocol and  provider order.  Encourage both active and passive leg exercises while in bed, if unable to ambulate.  Recent Flowsheet Documentation  Taken 1/23/2024 0607 by Angelica Casas RN  Activity Management: unable to complete activity (see comments)  Taken 1/23/2024 0420 by Angelica Casas RN  Activity Management: unable to complete activity (see comments)  Taken 1/23/2024 0220 by Angelica Casas RN  Activity Management: unable to complete activity (see comments)  Taken 1/23/2024 0020 by Angelica Casas RN  Activity Management: unable to complete activity (see comments)  Taken 1/22/2024 2215 by Angelica Casas RN  Activity Management: unable to complete activity (see comments)  Range of Motion: active ROM (range of motion) encouraged  Taken 1/22/2024 2015 by Angelica Casas RN  Activity Management: (O2 Drops) unable to complete activity (see comments)  Intervention: Prevent Infection  Description: Maintain skin and mucous membrane integrity; promote hand, oral and pulmonary hygiene.  Optimize fluid balance, nutrition, sleep and glycemic control to maximize infection resistance.  Identify potential sources of infection early to prevent or mitigate progression of infection (e.g., wound, lines, devices).  Evaluate ongoing need for invasive devices; remove promptly when no longer indicated.  Recent Flowsheet Documentation  Taken 1/23/2024 0607 by Angelica Casas RN  Infection Prevention:   environmental surveillance performed   rest/sleep promoted   single patient room provided  Taken 1/23/2024 0420 by Angelica Casas RN  Infection Prevention:   environmental surveillance performed   rest/sleep promoted   single patient room provided  Taken 1/23/2024 0220 by Angelica Casas RN  Infection Prevention:   environmental surveillance performed   rest/sleep promoted   single patient room provided  Taken 1/23/2024 0020 by Angelica Casas RN  Infection Prevention:   environmental surveillance performed   rest/sleep promoted   single  patient room provided  Taken 1/22/2024 2215 by Angelica Casas RN  Infection Prevention:   environmental surveillance performed   rest/sleep promoted   single patient room provided  Taken 1/22/2024 2015 by Angelica Casas RN  Infection Prevention:   environmental surveillance performed   rest/sleep promoted   single patient room provided   Goal Outcome Evaluation:  Plan of Care Reviewed With: patient        Progress: no change  Outcome Evaluation: Pt remains on HFNC ~78% 55L/VSS/non-Tele. Pt desats quickly with any movement and needs non-rebreather to recover or pre-oxygenate. Skin issues addressed, see orders. Encouraged pt to rotate position, pt. frequently refuses position changes especially when sleepy. Meds given as ordered. Continue monitoring.

## 2024-01-24 NOTE — PROGRESS NOTES
Pulmonary/Critical Care Follow-up     LOS: 8 days   Patient Care Team:  Valerie Sesay APRN as PCP - General (Family Medicine)  Alonzo Marie PA-C as Physician Assistant (Neurosurgery)  Carlos Eduardo Figueredo MD as Consulting Physician (Pain Medicine)  Chris Son MD as Consulting Physician (Neurosurgery)  Keegan Tellez PA-C as Physician Assistant (Physician Assistant)  Kristyn Haro APRN as Nurse Practitioner (Pulmonary Disease)      Chief Complaint   Patient presents with    Shortness of Breath     Subjective     History reviewed and updated in EMR as indicated.    Interval History:     Patient was having bradycardia and then active rapid response when I stopped by to see her today.  She was bradycardic and had been given 3 doses of atropine.  She seemed to have heart block with underlying bradycardic sinus rhythm.  I reviewed doses of epinephrine and she had some wide-complex tachycardia and I ordered some magnesium.  Then had some varying rhythms but was more awake and alert.  She was dyspneic.  She is no CPR.  Son was at the bedside.  After discussion I ordered morphine.      History taken from: Patient, staff    PMH/FH/Social History were reviewed and updated appropriately in the electronic medical record.     Review of Systems:    Review of 14 systems was completed with positives and pertinent negatives noted in the subjective section.  All other systems reviewed and are negative.       Objective     Vital Signs  Temp:  [97.4 °F (36.3 °C)-98.3 °F (36.8 °C)] 98.3 °F (36.8 °C)  Heart Rate:  [56-80] 69  Resp:  [18-22] 22  BP: (102-148)/(66-79) 148/79  01/23 0701 - 01/24 0700  In: 120 [P.O.:120]  Out: -   Body mass index is 17.83 kg/m².     IV drips:      Physical Exam:     Constitutional:   Ill-appearing.  Dyspneic.   Head:   Normocephalic, atraumatic   Eyes:           Lids and lashes normal, conjunctivae and sclerae normal.  PER   ENMT:  Ears appear intact with no abnormalities noted     Lips  normal.     Neck:  Trachea midline, no JVD   Lungs/Resp:    Normal effort, symmetric chest rise, no crepitus, moderate rhonchi and wheezes bilaterally.               Heart/CV:   Regular rhythm and normal rate, no murmur   Abdomen/GI:    Soft, nontender, nondistended   :    Deferred   Extremities/MSK:  No clubbing or cyanosis.  1+ bilateral ankle edema.     Pulses:  Pulses palpable and equal bilaterally   Skin:  No bleeding, bruising or rash   Heme/Lymph:  No cervical or supraclavicular adenopathy.   Neurologic:    Psychiatric:    Moves all extremities with no obvious focal motor deficit.  Cranial nerves 2 - 12 grossly intact  Somewhat anxious affect.     The above physical exam findings were reviewed and reflect my exam findings as of today's exam.   Electronically signed by:  John Burnette MD  01/24/24  10:23 EST      Results Review:     I reviewed the patient's new clinical results.   Results from last 7 days   Lab Units 01/24/24  0453 01/22/24  0438 01/21/24  1744 01/21/24 0327 01/20/24 0338   SODIUM mmol/L 134* 135*  --  135* 132*   POTASSIUM mmol/L 5.0 4.7 4.7 3.4* 3.9   CHLORIDE mmol/L 98 98  --  97* 96*   CO2 mmol/L 25.0 29.0  --  31.0* 26.0   BUN mg/dL 29* 22  --  18 12   CREATININE mg/dL 1.02* 0.99  --  0.99 0.82   CALCIUM mg/dL 8.3* 8.4*  --  8.1* 7.9*   BILIRUBIN mg/dL <0.2  --   --  0.2 0.2   ALK PHOS U/L 260*  --   --  321* 331*   ALT (SGPT) U/L 20  --   --  27 32   AST (SGOT) U/L 30  --   --  31 44*   GLUCOSE mg/dL 77 77  --  93 107*     Results from last 7 days   Lab Units 01/24/24  0453 01/21/24  0327 01/20/24  0338   WBC 10*3/mm3 8.74 6.14 5.43   HEMOGLOBIN g/dL 9.7* 10.6* 10.1*   HEMATOCRIT % 32.0* 36.5 33.9*   PLATELETS 10*3/mm3 151 147 133*           Results from last 7 days   Lab Units 01/22/24  0438   MAGNESIUM mg/dL 2.0       I reviewed the patient's new imaging including images and reports.    CTA chest 1/16/2024 was reviewed and shows severe diffuse interstitial disease/scarring  with some likely overlying acute infiltrates in the right upper lobe and right lower lobes at least.  Radiologist's impression follows:    Impression:  No evidence of pulmonary embolism.     No evidence of thoracic aortic aneurysm or dissection.     Findings compatible with advanced pulmonary fibrosis. There are superimposed groundglass attenuation of the lung parenchyma which may be secondary to interstitial pneumonitis. Small consolidations in the right upper and lower lobes are compatible with   multifocal pneumonia.         Medication Review:   amLODIPine, 5 mg, Oral, Q24H  budesonide-formoterol, 2 puff, Inhalation, BID - RT   And  tiotropium bromide monohydrate, 2 puff, Inhalation, Daily - RT  castor oil-balsam peru, 1 application , Topical, Q12H  cetirizine, 10 mg, Oral, Daily  dicyclomine, 10 mg, Oral, TID  doxepin, 50 mg, Oral, Nightly  doxycycline, 100 mg, Oral, Q12H  ferrous sulfate, 325 mg, Oral, Daily With Breakfast  flecainide, 50 mg, Oral, Q12H  FLUoxetine, 40 mg, Oral, Daily  guaiFENesin, 1,200 mg, Oral, Q12H  hydroxychloroquine, 200 mg, Oral, Q24H  ipratropium-albuterol, 3 mL, Nebulization, 4x Daily - RT  lactobacillus acidophilus, 1 capsule, Oral, Daily  levothyroxine, 25 mcg, Oral, Q AM  meropenem, 1,000 mg, Intravenous, Q12H  metoprolol tartrate, 25 mg, Oral, BID  palliative care oral rinse, , Mouth/Throat, 4x Daily  pantoprazole, 40 mg, Oral, BID AC  predniSONE, 40 mg, Oral, BID With Meals  rivaroxaban, 15 mg, Oral, Daily With Dinner  sulfamethoxazole-trimethoprim, 2 tablet, Oral, Once per day on Monday Wednesday Friday  sulfaSALAzine, 500 mg, Oral, Q12H  tobramycin PF, 300 mg, Nebulization, Q12H - RT  traMADol, 50 mg, Oral, BID           Assessment & Plan         Acute on chronic hypoxic respiratory failure    Rheumatoid arthritis with positive rheumatoid factor    Physical deconditioning    Chronic respiratory failure with hypoxia    HTN (hypertension)    Interstitial lung disease    Severe  malnutrition    73 y.o. female with history of RA, ILD with honeycombing, COPD, Olivier cirrhosis, history of COVID around Thanksgiving 2023, admitted 1/16/2024 with worsening cough and dyspnea.  She had a recent hospitalization and was discharged 1/10/2024.  Sputum is growing multidrug-resistant Pseudomonas.    She seems to be doing a little bit better.  Unfortunately at this point she has had worsening hypoxemia with an adequate oxygen saturations on 100% nonrebreather.  Patient/family wish to focus on comfort at this point.  I ordered some morphine.    Plan:  1.  For acute on chronic hypoxemic respiratory failure: Oxygen as needed.  Morphine for dyspnea.  Focusing on comfort at this point.  2.  For right upper and lower lobe Pseudomonas pneumonia: MDR Pseudomonas growing from sputum culture currently on meropenem and tobramycin nebulized.  3.  For ILD with possible exacerbation: Had been on steroids.  4.  Pneumocystis prophylaxis recommended.  Patient on Bactrim per ID.            Electronically signed by:    John Burnette MD  01/24/24  10:23 EST      *. Please note that portions of this note were completed with [employer] - a voice recognition program.

## 2024-01-24 NOTE — PLAN OF CARE
Goal Outcome Evaluation:  Plan of Care Reviewed With: patient        Progress: no change  Outcome Evaluation: Pt remains on HFNC ~75-85% 55L/VSS/non-Tele. Pt desats quickly with any movement and needs non-rebreather to recover or pre-oxygenate. Skin issues addressed, see orders. Encouraged pt to rotate position, pt. frequently refuses position changes especially when sleepy. Meds given as ordered. Continue monitoring.

## 2024-01-24 NOTE — CONSULTS
[facility]    INFECTIOUS DISEASE CONSULT/INITIAL HOSPITAL VISIT    Liz Borjas  1950  [id number]    Date of consult: 1/24/2024    Admit date: 1/16/2024    Requesting Provider: No ref. provider found  Evaluating physician: Kevin Eugene MD  Reason for Consultation: Pneumonia, respiratory failure, immunocompromised host with rheumatoid arthritis, interstitial lung disease  Chief Complaint: Above      Subjective   History of present illness:  Patient is a  73 y.o.  Yr old female With a history of rheumatoid arthritis, essential hypertension, hyperlipidemia, COVID in the past, GERD, atrial fibrillation, DJD, gout, recently hospitalized 1/6 until 1/9 and discharged on oral antibiotics.  Patient completed 2 weeks of antibiotic therapy but then developed worsening respiratory distress and was readmitted to [facility] on 1/16/2024.  The patient's respiratory failure worsened from her baseline of 6 L/min at home.  The patient's sputum from 1/18 has yielded Pseudomonas aeruginosa resistant to ciprofloxacin, intermediate to ceftazidime and cefepime, intermediate to piperacillin/tazobactam, sensitive to meropenem.  BloodLegionella and streptococcal pneumonia antigen negative.  Cultures x 2 from 1/16 negative.  MRSA screen from 1/16 negative.  Respiratory panel PCR from 1/16 negative.  Previous COVID-19 and influenza from 1/6 negative.The patient's antibiotics were changed from piperacillin/tazobactam to meropenem.  I was consulted on 1/23/2024 for further evaluation and treatment.  Patient is a poor historian.  The patient has received linezolid 1/17, piperacillin/tazobactam 1/16 until 1/21, and meropenem 1/21-.    1/24/2024 history reviewed.  Resting in bed with shortness of breath.  No high fever.  Tolerating meropenem, doxycycline, and trimethoprim/sulfamethoxazole.  Waiting on new culture data.      Past Medical History:   Diagnosis Date    Arthritis     Atrial  fibrillation     Closed right hip fracture     COVID     GERD (gastroesophageal reflux disease)     Gout     Hyperlipidemia     Hypertension     Osteoporosis     Rheumatoid arthritis     Wears dentures     upper    Wears glasses     reading       Past Surgical History:   Procedure Laterality Date    ANKLE SURGERY Right     CHOLECYSTECTOMY      COLONOSCOPY      5 years ago    HIP FRACTURE SURGERY      Right Hip with Pins    LUMBAR DISCECTOMY Left 10/12/2016    Procedure: lumbar MICROdiscectomy LEFT L3-5;  Surgeon: Chris Son MD;  Location: [facility];  Service:     REPLACEMENT TOTAL KNEE      Right Knee    TOE AMPUTATION      right baby toe, left second toe       Pediatric History   Patient Parents    Not on file     Other Topics Concern    Not on file   Social History Narrative    Not on file   Quit smoking in the past, no alcohol or drug use    family history includes Cancer in her father; Heart disease in her mother.    Allergies   Allergen Reactions    Moexipril-Hydrochlorothiazide Anaphylaxis    Penicillins Hives     Tolerates cefazolin, ceftriaxone, Zosyn    Ace Inhibitors Hives       Immunization History   Administered Date(s) Administered    COVID-19 (MODERNA) 1st,2nd,3rd Dose Monovalent 03/03/2021, 03/31/2021    FLUAD TRI 65YR+ 12/27/2021    Flu Vaccine Intradermal Quad 18-64YR 11/08/2007, 09/25/2009    Flu Vaccine Split Quad 11/08/2007, 09/25/2009, 11/22/2013    Fluzone (or Fluarix & Flulaval for VFC) >6mos 11/17/2016    Hep A / Hep B 12/05/2019    Hepatitis B Adult/Adolescent IM 02/09/2017, 03/09/2017, 01/09/2020    Influenza LAIV (Nasal) 10/25/2019    Influenza Quad Vaccine (Inpatient) 10/26/2011    Influenza, Unspecified 10/27/2013, 11/15/2014    Pneumococcal Conjugate 13-Valent (PCV13) 11/17/2016    Pneumococcal Conjugate 20-Valent (PCV20) 03/30/2022    Pneumococcal Polysaccharide (PPSV23) 04/05/2018       Medication:  @Scheduled Meds:amLODIPine, 5 mg, Oral, Q24H  budesonide-formoterol, 2 puff,  Inhalation, BID - RT   And  tiotropium bromide monohydrate, 2 puff, Inhalation, Daily - RT  castor oil-balsam peru, 1 application , Topical, Q12H  cetirizine, 10 mg, Oral, Daily  dicyclomine, 10 mg, Oral, TID  doxepin, 50 mg, Oral, Nightly  doxycycline, 100 mg, Oral, Q12H  ferrous sulfate, 325 mg, Oral, Daily With Breakfast  flecainide, 50 mg, Oral, Q12H  FLUoxetine, 40 mg, Oral, Daily  guaiFENesin, 1,200 mg, Oral, Q12H  hydroxychloroquine, 200 mg, Oral, Q24H  ipratropium-albuterol, 3 mL, Nebulization, 4x Daily - RT  lactobacillus acidophilus, 1 capsule, Oral, Daily  levothyroxine, 25 mcg, Oral, Q AM  meropenem, 1,000 mg, Intravenous, Q12H  metoprolol tartrate, 25 mg, Oral, BID  palliative care oral rinse, , Mouth/Throat, 4x Daily  pantoprazole, 40 mg, Oral, BID AC  predniSONE, 40 mg, Oral, BID With Meals  rivaroxaban, 15 mg, Oral, Daily With Dinner  [Held by provider] sulfamethoxazole-trimethoprim, 2 tablet, Oral, Once per day on Monday Wednesday Friday  sulfamethoxazole-trimethoprim, 2 tablet, Oral, Q8H  sulfaSALAzine, 500 mg, Oral, Q12H  tobramycin PF, 300 mg, Nebulization, Q12H - RT  traMADol, 50 mg, Oral, BID      Continuous Infusions:   PRN Meds:.  acetaminophen    senna-docusate sodium **AND** polyethylene glycol **AND** bisacodyl **AND** bisacodyl    Calcium Replacement - Follow Nurse / BPA Driven Protocol    fluticasone    hydrOXYzine    ipratropium-albuterol    Magnesium Standard Dose Replacement - Follow Nurse / BPA Driven Protocol    nitroglycerin    Phosphorus Replacement - Follow Nurse / BPA Driven Protocol    Potassium Replacement - Follow Nurse / BPA Driven Protocol    sodium chloride    sodium chloride     Please refer to the medical record for a full medication list    Review of Systems:    Constitutional-- No Fever, chills or sweats.  Appetite fair, and no malaise.  Some fatigue.  HEENT-- No new vision, hearing or throat complaints.  No epistaxis or oral sores.  Denies odynophagia or dysphagia.   "No odynophagia or dysphagia. No headache, photophobia or neck stiffness.  CV-- No chest pain, palpitation or syncope  Resp-- Some SOB/productive cough/no Hemoptysis  GI- No nausea, vomiting, or diarrhea.  No hematochezia, melena, or hematemesis. Denies jaundice or chronic liver disease.  -- No dysuria, hematuria, or flank pain.  Denies hesitancy, urgency or flank pain.  Lymph- no swollen lymph nodes in neck/axilla or groin.   Heme- No active bruising or bleeding; no Hx of DVT or PE.  MS-- no swelling or pain in the bones or joints of arms/legs.  No new back pain.  Neuro-- No acute focal weakness or numbness in the arms or legs.  No seizures.  Skin--No rashes or lesions    Physical Exam:   Vital Signs   Temp:  [97.4 °F (36.3 °C)-97.5 °F (36.4 °C)] 97.4 °F (36.3 °C)  Heart Rate:  [56-80] 65  Resp:  [18-20] 19  BP: (102-144)/(61-78) 144/66    Blood pressure 144/66, pulse 65, temperature 97.4 °F (36.3 °C), temperature source Oral, resp. rate 19, height 157.5 cm (62\"), weight 44.2 kg (97 lb 8 oz), SpO2 91%, not currently breastfeeding.  GENERAL: Awake and alert, in moderate distress. Appears older than stated age.  Resting in bed.  HEENT:  Normocephalic, atraumatic.  Oropharynx without thrush. Dentition in good repair. No cervical adenopathy. No neck masses.  Ears externally normal, Nose externally normal.  EYES: No conjunctival injection. No icterus. EOM full.  LYMPHATICS: No lymphadenopathy of the neck or axillary or inguinal regions.   HEART: No murmur, gallop, or pericardial friction rub. Reg rate rhythm, No JVD at 45 degrees.  LUNGS: Bilateral crackles. No respiratory distress, no use of accessory muscles.  ABDOMEN: Soft, nontender, nondistended. No appreciable HSM.  Bowel sounds normal.  SKIN: Warm and dry without cutaneous eruptions.  No nodules.    PSYCHIATRIC: Mental status lucid. No confusion.  EXT:  No cellulitic change.  NEURO: Oriented to name, nonfocal.      Results Review:   I reviewed the patient's new " "clinical results.  I reviewed the patient's new imaging results and agree with the interpretation.  I reviewed the patient's other test results and agree with the interpretation    Results from last 7 days   Lab Units 01/24/24  0453 01/21/24  0327 01/20/24  0338   WBC 10*3/mm3 8.74 6.14 5.43   HEMOGLOBIN g/dL 9.7* 10.6* 10.1*   HEMATOCRIT % 32.0* 36.5 33.9*   PLATELETS 10*3/mm3 151 147 133*     Results from last 7 days   Lab Units 01/24/24  0453   SODIUM mmol/L 134*   POTASSIUM mmol/L 5.0   CHLORIDE mmol/L 98   CO2 mmol/L 25.0   BUN mg/dL 29*   CREATININE mg/dL 1.02*   GLUCOSE mg/dL 77   CALCIUM mg/dL 8.3*     Results from last 7 days   Lab Units 01/24/24 0453   ALK PHOS U/L 260*   BILIRUBIN mg/dL <0.2   ALT (SGPT) U/L 20   AST (SGOT) U/L 30         Results from last 7 days   Lab Units 01/24/24 0453   CRP mg/dL 2.05*               Estimated Creatinine Clearance: 35.3 mL/min (A) (by C-G formula based on SCr of 1.02 mg/dL (H)).  CPK          1/24/2024    04:53   Common Labsle   Creatine Kinase 37       Procalitonin Results:      Lab 01/18/24 0430   PROCALCITONIN 0.08      Brief Urine Lab Results       None           No results found for: \"SITE\", \"ALLENTEST\", \"PHART\", \"PXW1PAU\", \"PO2ART\", \"SIL5NRT\", \"BASEEXCESS\", \"A5YRBGCN\", \"HGBBG\", \"HCTABG\", \"OXYHEMOGLOBI\", \"METHHGBN\", \"CARBOXYHGB\", \"CO2CT\", \"BAROMETRIC\", \"MODALITY\", \"FIO2\"     Microbiology:  Blood Culture   Date Value Ref Range Status   01/16/2024 No growth at 5 days  Final     Blood Culture   Date Value Ref Range Status   01/16/2024 No growth at 5 days  Final     Fungus Culture   Date Value Ref Range Status   01/18/2024 No fungus isolated at less than 1 week  Preliminary     Respiratory Culture   Date Value Ref Range Status   01/18/2024 Light growth (2+) Pseudomonas aeruginosa MDRO (A)  Final     Comment:       Multi drug resistant Pseudomonas, patient may be an isolation risk.   01/18/2024 No Normal Respiratory Noris (A)  Final   (    Sputum from 1/18 has " yielded Pseudomonas aeruginosa resistant to ciprofloxacin, intermediate to ceftazidime and cefepime, intermediate to piperacillin/tazobactam, sensitive to meropenem.  BloodLegionella and streptococcal pneumonia antigen negative.  Cultures x 2 from 1/16 negative.  MRSA screen from 1/16 negative.  Respiratory panel PCR from 1/16 negative.  Previous COVID-19 and influenza from 1/6 negative.    Radiology:  Imaging Results (Last 72 Hours)       Procedure Component Value Units Date/Time    XR Chest 1 View [[id number]] Collected: 01/24/24 0134     Updated: 01/24/24 0138    Narrative:      XR CHEST 1 VW    Date of Exam: 1/24/2024 12:44 AM EST    Indication: dyspnea    Comparison: 1/21/2024 and CT chest 1/16/2024.    Findings:  Stable diffuse coarse interstitial opacities present throughout both lungs. No definite new lung opacity. There is volume loss in both lungs suggesting restrictive disease. No pneumothorax or pleural effusion. No focal consolidation. No acute osseous   abnormality. There is evidence of chronic right-sided rotator cuff tear and there is DJD at both shoulders.      Impression:      Impression:  Stable chronic interstitial lung disease. No definite superimposed acute process.        Electronically Signed: Kevin Olea MD    1/24/2024 1:35 AM EST    Workstation ID: [id number]    SLP FEES - Fiberoptic Endo Eval Swallow [[id number]] Resulted: 01/22/24 1436     Updated: 01/22/24 1436    Narrative:      This procedure was auto-finalized with no dictation required.    XR Chest 1 View [[id number]] Collected: 01/21/24 0922     Updated: 01/21/24 0930    Narrative:      XR CHEST 1 VW    Date of Exam: 1/21/2024 7:00 AM EST    Indication: worsening hypoxia    Comparison: January 20, 2024    Findings:  The heart appears enlarged, as before. Lung volumes are low. There are prominent interstitial markings with airspace opacities in the right upper lung. Findings appear similar to the recent prior chest radiographs. No  significant pleural effusion or   pneumothorax is seen.    There appears to be a moderate hiatal hernia, as before. Superior subluxation of the humeral heads suggest bilateral chronic rotator cuff tears. There are advanced degenerative changes at the right acromion.      Impression:      Impression:  1.Low lung volumes with prominent interstitial markings and airspace opacities in the right upper lung, as before. Findings may represent pneumonia and/or edema.  2.Stable cardiomegaly.        Electronically Signed: Pancho Yury    1/21/2024 9:27 AM EST    Workstation ID: [id number]            1/16/24    IMPRESSION:     - Pneumonia, multidrug-resistant Pseudomonas aeruginosa from 1/18/24, resistant to quinolones, intermediate to cephalosporins, and piperacillin/tazobactam.  This is in the setting of advanced COPD and interstitial lung disease.  Sputum from 1/18 has yielded Pseudomonas aeruginosa resistant to ciprofloxacin, intermediate to ceftazidime and cefepime, intermediate to piperacillin/tazobactam, sensitive to meropenem.  BloodLegionella and streptococcal pneumonia antigen negative.  Cultures x 2 from 1/16 negative.  MRSA screen from 1/16 negative.  Respiratory panel PCR from 1/16 negative.  Previous COVID-19 and influenza from 1/6 negative.  Fungitell 1/17 negative, fungal antibodies negative, VAN negative.  - Acute hypoxic respiratory failure on a baseline of chronic hypoxic respiratory failure on 6 L/min nasal cannula oxygen.  55 L/m on 1/23/24, 1/24.  - Interstitial lung disease, likely related to autoimmune illness and previous infections along with rheumatoid arthritis.  On high-dose steroids.  Less likely pneumocystis.  - Encephalopathy, toxic and metabolic related to above issues.  - Rheumatoid arthritis, previously on prednisone 20 mg daily, as well as Plaquenil, sulfasalazine, Bentyl and tramadol for pain.    - Paroxysmal atrial fibrillation on metoprolol, flecainide, Xarelto.  - GERD, on PPI, increased  risk for aspiration pneumonia.  - Anxiety, depression on Prozac.  - Hyponatremia 134, worse.  - Hypocalcemia 8.3.  - Anemia, chronic disease, affects all aspects of care above.  Worse.  - Elevated alkaline phosphatase 260, new.    PLAN:    - Diagnostically, continue to follow patient's physical exam, CBC, CMP, CRP, histoplasma, blastomycosis urine antigens, Aspergillus galactomannan assay, Fungitell, and radiographic studies.  Considered bronchoscopy but too high of a risk and likely end up on the ventilator, versus open lung biopsy, with high comorbidities.  Also could consider conservative measures given her comorbidities, as she has very poor prognosis.  - Therapeutically, continue meropenem 1/21-for 14 days and reassess, until approximately 2/5/2024.  Continue trimethoprim/sulfamethoxazole for pneumocystis prevention on Monday, Wednesday, Friday while on doses of prednisone greater than 20 mg/day.  Plus/min neb tobra.  Doxy x 10 days until 1/26.  - Supportive care, 55 L/min high flow oxygen.  - Palliative care has been assessing.    I discussed the patient's findings and my recommendations with patient and nursing staff    Thank you for asking me to see Liz Borjas.  Our group would be pleased to follow this patient over the course of their hospitalization and assist with outpatient antimicrobial therapy, as indicated.  Further recommendations depend on the results of the cultures and clinical course.  Increased risk for adverse drug reactions, complications of IV access, readmission, death.  I d/w patient's son.    Kevin Eugene MD  1/24/2024

## 2024-01-24 NOTE — DISCHARGE SUMMARY
[facility] Medicine Services  DEATH SUMMARY    Patient Name: Liz Borjas  : 1950  MRN: [id number]    Date of Admission: 2024  Date of Death:  1230  Time of Death:  2024    Primary Care Physician: Valerie Sesay APRN    Consults       Date and Time Order Name Status Description    2024 10:32 AM Inpatient Infectious Diseases Consult Completed     2024  8:54 AM Inpatient Palliative Care MD Consult Completed     2024  6:52 PM Inpatient Pulmonology Consult Completed             Summary of Hospital Events   Presenting Problem: acute on chronic hypoxic resp failure 2/2 Pseudomonas PNA    Active Hospital Problems    Diagnosis  POA    **Acute on chronic hypoxic respiratory failure [J96.21]  Yes    Severe malnutrition [E43]  Yes    Interstitial lung disease [J84.9]  Yes    HTN (hypertension) [I10]  Yes    Chronic respiratory failure with hypoxia [J96.11]  Yes    Physical deconditioning [R53.81]  Yes    Rheumatoid arthritis with positive rheumatoid factor [M05.9]  Yes      Resolved Hospital Problems   No resolved problems to display.          Hospital Course:  Liz Borjas was a 73 y.o. female w advanced ILD on home 6 liters n/c, RA on chronic prednisone, A-fib on xarelto, recent admission - for RUL pneumonia who re-presented on  with acute on chronic hypoxic respiratory failure found to have resistant-Pseudomonas multifocal PNA.     Despite appropriate abx treatment and steroids, patient's hypoxia was persistently severe (100% HFNC) and without improvement. Patient was DNR/DNI, palliative was following patient.    On , patient acutely went into unstable heart arrhythmia w what appeared high grade heart block. S/p atropine x 3, epinephrine to have recovery of heart rhythm and patient consciousness. Decision made for comfort care plan with patient's son bedside. We discuss this last arrhythmogenic process was consequence of patient's severe  respiratory failure. Family was all able to be bedside and visiting with her at her time of death later that same day. Able to reminisce on patient's optimistic, full-of-life spirit with family bedside after her passing.    Official Cause of Death and any directly related diagnoses:  Pseudomonas PNA in setting of severe ILD causing acute on chronic hypoxic respiratory failure, high-degree heart block, RA on chronic prednisone, A-fib on xarelto, severe malnutrition      Christie Combs MD  01/24/24

## 2024-01-24 NOTE — CASE MANAGEMENT/SOCIAL WORK
Continued Stay Note  [facility]     Patient Name: Liz Borjas  MRN: [id number]  Today's Date: 1/24/2024    Admit Date: 1/16/2024    Plan: discharge plan TBD   Discharge Plan       Row Name 01/24/24 1145       Plan    Plan discharge plan TBD    Plan Comments Per discussion in MDR, pt remains on high flow O2 and not medically ready for discharge. Pt is being followed by pulmonology, ID and pallitive.  CM will cont to follow for discharge needs    Final Discharge Disposition Code 30 - still a patient                   Discharge Codes    No documentation.                 Expected Discharge Date and Time       Expected Discharge Date Expected Discharge Time    Jan 29, 2024               Lina Johnson RN

## 2024-01-24 NOTE — SIGNIFICANT NOTE
Exam confirms with auscultation zero audible heart tones and zero audible respirations. Ms.Patsy WILLARD Borjas was pronounced dead at 1230.  MD notified by Patient's RN.    Pancho Gutierres RN  Clinical House Supervisor  1/24/2024 13:29 EST

## 2024-01-24 NOTE — PLAN OF CARE
Problem: COPD (Chronic Obstructive Pulmonary Disease) Comorbidity  Goal: Maintenance of COPD Symptom Control  Intervention: Maintain COPD-Symptom Control  Recent Flowsheet Documentation  Taken 1/24/2024 1436 by Naima Loyola RN  Supportive Measures:   active listening utilized   verbalization of feelings encouraged   goal-setting facilitated     Problem: Adult Inpatient Plan of Care  Goal: Plan of Care Review  Recent Flowsheet Documentation  Taken 1/24/2024 1436 by Naima Loyola RN  Progress: declining  Plan of Care Reviewed With:   son   family  Outcome Evaluation: Pt is actively dying.  Pt is on 100% HFNC and NRB.  Sats dropping into the 60's%. Family at bedside.   Prn morphine 2mg ordered q 15 min prn.  RN gave a dose of morphine and may repeat for respiratory distress. Feet ice cold.  Pt is comfort measures.   has been there for support.   Goal Outcome Evaluation:  Plan of Care Reviewed With: son, family        Progress: declining  Outcome Evaluation: Pt is actively dying.  Pt is on 100% HFNC and NRB.  Sats dropping into the 60's%. Family at bedside.   Prn morphine 2mg ordered q 15 min prn.  RN gave a dose of morphine and may repeat for respiratory distress. Feet ice cold.  Pt is comfort measures.   has been there for support.  Palliative IDT: RN, SW, MD Roly,   After hours# [phone]

## 2024-01-24 NOTE — SIGNIFICANT NOTE
Called bedside 2/2 HR in 30's  Unclear heart block with HR 30's and dropping BP, decreased responsiveness associated  Immediately initiated atropine 1 mg x3 per ACLS protocol  Dr Burnette bedside then ordered epinephrine 1 mg x 1  Then patient rhythm changed to wide QRS tachycardia in 130 briefly - then given IV Mg 2 mg x 1 -  before resuming in unclear heart block rhythm in 70's.   D/w patient's son bedside alongside Dr Burnette about her unstable arrhythmia in setting of patient's severe resp distress and all agree with comfort care plan as most beneficial for her  IV morphine initiated  All teams updated including palliative,  bedside   waiting on family to arrive  Would not give further medications if unstable arrhythmia resumes after d/w son

## 2024-01-24 NOTE — NURSING NOTE
Patient on HFNC %~ 55L Patient unable to keep sats > 88% non rebreather @ 15L placed along with HFNC in place. Patient not recovering with added non rebreather, Pt heart rate decreases to 30's, rhythm changed, respiratory called to assist, Dr. Combs notified and present at bedside. RRT called.

## 2024-01-25 LAB
QT INTERVAL: 576 MS
QTC INTERVAL: 469 MS

## 2024-01-25 NOTE — PAYOR COMM NOTE
"Lelia Borjas (Dcsd. Female)     [id number]     Karen Grace, RN  Utilization Review  [phone]  [phone]      Pt had Medicare listed, found to have Part B coverage only    Fax 1 of 2          Date of Birth   1950    Social Security Number       Address   [address] [place] [address] [address] [address]    Home Phone   [phone]    MRN   [id number]       Sikh   Latter day    Marital Status                               Admission Date   24    Admission Type   Emergency    Admitting Provider   Christie Combs MD    Attending Provider       Department, Room/Bed   [address], N[address]/1       Discharge Date   2024    Discharge Disposition       Discharge Destination   Other                              Attending Provider: (none)   Allergies: Moexipril-hydrochlorothiazide, Penicillins, Ace Inhibitors    Isolation: None   Infection: MDR Pseudomonas (24)   Code Status: Prior    Ht: 157.5 cm (62\")   Wt: 44.2 kg (97 lb 8 oz)    Admission Cmt: None   Principal Problem: Acute on chronic hypoxic respiratory failure [J96.21]                   Active Insurance as of 2024       Primary Coverage       Payor Plan Insurance Group Employer/Plan Group    MEDICARE MEDICARE B ONLY        Payor Plan Address Payor Plan Phone Number Payor Plan Fax Number Effective Dates    [address] [address] [phone]  2017 - None Entered    [facility] [id number]         Subscriber Name Subscriber Birth Date Member ID       LELIA BORJAS 1950 [id number]               Secondary Coverage       Payor Plan Insurance Group Employer/Plan Group    [place]        Payor Plan Address Payor Plan Phone Number Payor Plan Fax Number Effective Dates    [address]   2014 - None Entered    [facility] [phone]         Subscriber Name Subscriber Birth Date Member ID       LELIA BORJAS 1950 [id number]                     Emergency Contacts "        (Rel.) Home Phone Work Phone Mobile Phone    Corey Borjas (Son) [phone] -- [phone]    JOSE BORJAS (Daughter) [phone] -- [phone]                 History & Physical        Brandi Regalado DO at 24 2200              [facility]  HISTORY AND PHYSICAL    Patient Name: Liz Borjas  : 1950  MRN: [id number]  Primary Care Physician: Valerie Sesay APRN  Date of admission: 2024      Subjective  Subjective     Chief Complaint:  Cough, hypoxia    HPI:  Liz Borjas is a 73 y.o. female with past medical history of chronic hypoxic respiratory failure on 6 L nasal cannula, advanced interstitial lung disease, underlying COPD, ROPER cirrhosis, rheumatoid arthritis on chronic prednisone (20 mg daily), HTN, A-fib on Xarelto, CKDIII, with recent hospitalization  to  for right upper lobe pneumonia who represented with hypoxia, cough and poor appetite.    History obtained from patient and son/daughter in the ED. Son reports that patient was discharged from the hospital a week ago, completed 2 more days of oral antibiotics on discharge. Antibiotics were completed on Thursday. Patient felt pretty good on Friday and Saturday, had a good appetite. , she was cold/had chills. Monday, had no appetite. Today, started coughing. Saturations were noted to be in the 60s at home today.    Patient reports that she has not had any shortness of breath or chest pain. Admits that her appetite has been poor over the past few days. Currently feels well, has no complaints.      Personal History     Past Medical History:   Diagnosis Date    Arthritis     Atrial fibrillation     Closed right hip fracture     COVID     GERD (gastroesophageal reflux disease)     Gout     Hyperlipidemia     Hypertension     Osteoporosis     Rheumatoid arthritis     Wears dentures     upper    Wears glasses     reading           Past Surgical History:    Procedure Laterality Date    ANKLE SURGERY Right     CHOLECYSTECTOMY      COLONOSCOPY      5 years ago    HIP FRACTURE SURGERY      Right Hip with Pins    LUMBAR DISCECTOMY Left 10/12/2016    Procedure: lumbar MICROdiscectomy LEFT L3-5;  Surgeon: Chris Son MD;  Location: [facility];  Service:     REPLACEMENT TOTAL KNEE      Right Knee    TOE AMPUTATION      right baby toe, left second toe       Family History: family history includes Cancer in her father; Heart disease in her mother.     Social History:  reports that she has quit smoking. Her smoking use included cigarettes. She has never used smokeless tobacco. She reports that she does not drink alcohol and does not use drugs.  Social History     Social History Narrative    Not on file       Medications:  Available home medication information reviewed.  Cetirizine HCl, FLUoxetine, Fluticasone-Umeclidin-Vilant, RABEprazole, albuterol, albuterol sulfate HFA, amLODIPine, baclofen, dicyclomine, doxepin, ferrous sulfate, flecainide, fluticasone, hydroxychloroquine, levothyroxine, losartan, metoprolol tartrate, predniSONE, rivaroxaban, sulfaSALAzine, sulfamethoxazole-trimethoprim, traMADol, and vitamin D    Allergies   Allergen Reactions    Moexipril-Hydrochlorothiazide Anaphylaxis    Penicillins Hives     Tolerates ancef, rocephin, Zosyn    Ace Inhibitors Hives       Objective  Objective     Vital Signs:   Temp:  [98.1 °F (36.7 °C)-98.7 °F (37.1 °C)] 98.7 °F (37.1 °C)  Heart Rate:  [73-84] 80  Resp:  [18-24] 18  BP: ()/(50-82) 126/65  Flow (L/min):  [8-55] 55       Physical Exam   Constitutional: Awake, alert, resting comfortably  HENT: NCAT, mucous membranes moist  Respiratory: Diffuse Velcro-like crackles present in upper and lower lung fields bilaterally, breath sounds diminished in right lower lobe, no wheezes, requiring HHFNC  Cardiovascular: RRR, no murmurs, rubs, or gallops  Gastrointestinal: Positive bowel sounds, soft, nontender,  nondistended  Musculoskeletal: Mild pitting edema of ankles and feet bilaterally  Neurologic: Alert and oriented x 3, no focal deficits, speech clear  Skin: No rashes      Result Review:  I have personally reviewed the results from the time of this admission to 1/16/2024 22:01 EST and agree with these findings:  [x]  Laboratory list / accordion  [x]  Microbiology  [x]  Radiology  [x]  EKG/Telemetry   []  Cardiology/Vascular   []  Pathology  []  Old records  []  Other:      LAB RESULTS:      Lab 01/16/24  1805 01/16/24  1357   WBC  --  15.80*   HEMOGLOBIN  --  11.4*   HEMATOCRIT  --  40.4   PLATELETS  --  199   NEUTROS ABS  --  14.74*   IMMATURE GRANS (ABS)  --  0.09*   LYMPHS ABS  --  0.33*   MONOS ABS  --  0.61   EOS ABS  --  0.01   MCV  --  97.8*   PROCALCITONIN  --  0.11   LACTATE 0.8 2.4*         Lab 01/16/24  1357   SODIUM 141   POTASSIUM 4.2   CHLORIDE 102   CO2 30.0*   ANION GAP 9.0   BUN 24*   CREATININE 1.00   EGFR 60.4   GLUCOSE 104*   CALCIUM 9.0         Lab 01/16/24  1357   TOTAL PROTEIN 6.9   ALBUMIN 3.2*   GLOBULIN 3.7   ALT (SGPT) 33   AST (SGOT) 52*   BILIRUBIN 0.5   ALK PHOS 410*         Lab 01/16/24  1357   PROBNP 3,161.0*   HSTROP T 21*                 Lab 01/16/24  1449   PH, ARTERIAL 7.358   PCO2, ARTERIAL 54.4*   PO2 .0*   FIO2 100   HCO3 ART 30.6*   BASE EXCESS ART 4.1*   CARBOXYHEMOGLOBIN 0.9         Microbiology Results (last 10 days)       Procedure Component Value - Date/Time    COVID PRE-OP / PRE-PROCEDURE SCREENING ORDER (NO ISOLATION) - Swab, Nasopharynx [[id number]]  (Normal) Collected: 01/16/24 1405    Lab Status: Final result Specimen: Swab from Nasopharynx Updated: 01/16/24 7708    Narrative:      The following orders were created for panel order COVID PRE-OP / PRE-PROCEDURE SCREENING ORDER (NO ISOLATION) - Swab, Nasopharynx.  Procedure                               Abnormality         Status                     ---------                               -----------          ------                     Respiratory Panel PCR w/...[[id number]]  Normal              Final result                 Please view results for these tests on the individual orders.    Respiratory Panel PCR w/COVID-19(SARS-CoV-2) ALFREDO/MEGAN/MARIA FERNANDA/PAD/COR/MONIKA In-House, NP Swab in UTM/VTM, 2 HR TAT - Swab, Nasopharynx [[id number]]  (Normal) Collected: 01/16/24 1405    Lab Status: Final result Specimen: Swab from Nasopharynx Updated: 01/16/24 1503     ADENOVIRUS, PCR Not Detected     Coronavirus 229E Not Detected     Coronavirus HKU1 Not Detected     Coronavirus NL63 Not Detected     Coronavirus OC43 Not Detected     COVID19 Not Detected     Human Metapneumovirus Not Detected     Human Rhinovirus/Enterovirus Not Detected     Influenza A PCR Not Detected     Influenza B PCR Not Detected     Parainfluenza Virus 1 Not Detected     Parainfluenza Virus 2 Not Detected     Parainfluenza Virus 3 Not Detected     Parainfluenza Virus 4 Not Detected     RSV, PCR Not Detected     Bordetella pertussis pcr Not Detected     Bordetella parapertussis PCR Not Detected     Chlamydophila pneumoniae PCR Not Detected     Mycoplasma pneumo by PCR Not Detected    Narrative:      In the setting of a positive respiratory panel with a viral infection PLUS a negative procalcitonin without other underlying concern for bacterial infection, consider observing off antibiotics or discontinuation of antibiotics and continue supportive care. If the respiratory panel is positive for atypical bacterial infection (Bordetella pertussis, Chlamydophila pneumoniae, or Mycoplasma pneumoniae), consider antibiotic de-escalation to target atypical bacterial infection.            CT Angiogram Chest    Result Date: 1/16/2024  CT ANGIOGRAM CHEST Date of Exam: 1/16/2024 4:44 PM EST Indication: acute on chronic respiratory failure w/ hypoxemia and hypercapnea. Comparison: Chest radiograph performed on January 16, 2024. Chest CT performed on July 18, 2022. Technique: CTA of the  chest was performed after the uneventful intravenous administration of Isovue-370, 85 mL. Reconstructed coronal and sagittal images were also obtained. In addition, a 3-D volume rendered image was created for interpretation. Automated exposure control and iterative reconstruction methods were used. Findings: Pulmonary arteries: Adequate opacification of the pulmonary arteries.No evidence of acute pulmonary embolism. Thyroid: The visualized portion of the thyroid is unremarkable. Lungs and Pleura: Extensive interstitial thickening is visualized throughout the lung parenchyma with associated traction bronchiectasis. Lung volumes are low. Subpleural honeycombing is visualized and is most prominent in the lung bases. There is almost  diffuse groundglass attenuation of the lung parenchyma. Small foci of consolidation are visualized in the lateral right upper lobe and medial right lower lobe. No pleural effusion. No discrete pulmonary masses are visualized. No pneumothorax. Mediastinum/Marivel: No mediastinal or hilar lymphadenopathy. Moderate hiatal hernia is visualized. Lymph nodes: No axillary or supraclavicular lymphadenopathy. Cardiovascular: The heart is normal in size.No evidence of pericardial effusion.the thoracic aorta is normal in caliber and contour with small scattered calcifications. Small coronary calcifications are noted. Upper Abdomen: No acute process in the upper abdomen. Bones and Soft Tissue: No acute fracture, aggressive osseous lesions, or soft tissue process.     Impression: Impression: No evidence of pulmonary embolism. No evidence of thoracic aortic aneurysm or dissection. Findings compatible with advanced pulmonary fibrosis. There are superimposed groundglass attenuation of the lung parenchyma which may be secondary to interstitial pneumonitis. Small consolidations in the right upper and lower lobes are compatible with multifocal pneumonia. Electronically Signed: Geoffrey Wagner MD  1/16/2024 4:58  PM EST  Workstation ID: [id number]    XR Chest 1 View    Result Date: 1/16/2024  XR CHEST 1 VW Date of Exam: 1/16/2024 2:30 PM EST Indication: SOA triage protocol Comparison: 1/7/2024. Findings: Similar aeration to comparison with coarse bilateral airspace opacities present in addition to more focal dense airspace disease in the right upper lobe. There is no enlarging effusion or distinct pneumothorax. Unchanged heart and mediastinal contours.     Impression: Impression: Similar multifocal airspace opacities remaining most dense in the right upper lobe. Electronically Signed: Viktor Chairez MD  1/16/2024 2:47 PM EST  Workstation ID: [id number]     Results for orders placed during the hospital encounter of 01/06/24    Adult Transthoracic Echo Complete W/ Cont if Necessary Per Protocol    Interpretation Summary    Left ventricular ejection fraction appears to be 66 - 70%.    Left ventricular wall thickness is consistent with mild septal asymmetric hypertrophy.    Left ventricular diastolic function is consistent with (grade I) impaired relaxation.    The right ventricular cavity is mildly dilated.    The left atrial cavity is mildly dilated.    Left atrial volume is moderately increased.    The right atrial cavity is borderline dilated.    Moderate tricuspid valve regurgitation is present.    Estimated right ventricular systolic pressure from tricuspid regurgitation is markedly elevated (>55 mmHg).      Assessment & Plan  Assessment & Plan       Acute on chronic hypoxic respiratory failure    Liz Borjas is a 73 y.o. female with past medical history of chronic hypoxic respiratory failure on 6 L nasal cannula, advanced interstitial lung disease, underlying COPD, ROPER cirrhosis, rheumatoid arthritis on chronic prednisone (20 mg daily), HTN, A-fib on Xarelto, CKDIII, with recent hospitalization 1/6 to 1/9 for right upper lobe pneumonia who represented with hypoxia, cough and poor appetite. Was found to have acute on  chronic hypoxic respiratory failure secondary to multifocal pneumonia.    Acute on chronic hypoxic respiratory failure  -Secondary to multifocal pneumonia present in RUL and RLL  -Was saturating in 60s at home while on 6L (home dose)  -required heated high flow nasal cannula on admission  -Continue supplemental oxygenation, titrate for goal SpO2 88 to 92%.    Multifocal pneumonia  Hx of advanced ILD with UIP pattern  Hx COPD without exacerbation   -Recent admission for RUL pneumonia with progression despite completing oral antibiotics  -Afebrile, WBC 15K on admission, resp PCR negative   -CTA chest with advanced pulmonary fibrosis and small consolidations in RUL and RLL  -s/p IV methylpred 40 mg x1 in ED  -Will cover empirically with IV Zosyn and IV vancomycin given recent hospitalization. Ordered sputum culture, MRSA probe, urine antigens. Blood cultures x2 pending. Daily CBC. Consulted Pulmonology given patient's advanced ILD.  -Continue prednisone 40 mg daily for now. Bactrim MWF. Symbicort/Spiriva/Duo-Nebs.     Elevated troponin  -Likely secondary to acute hypoxia  -EKG without acute ST-T changes; no chest pain  -Repeat troponin pending.    Elevated proBNP  ?Chronic HFpEF  -proBNP 3K on admission  -no evidence of edema on imaging, mild pedal edema on exam  -Echo 1/6/24 with EF 66 to 70%, grade 1 diastolic dysfunction, moderate TR, RVSP >55 mmHg  -Will hold off on diuresing for now. Can consider as needed. Strict Is&Os.    Chronic anemia  -Hgb appears at baseline, no evidence of overt bleeding  -Continue home oral iron supplement. Further workup as outpatient. Would update age-appropriate cancer screenings.    Rheumatoid arthritis  -On prednisone 20 mg daily, recently discharged on prolonged taper  -Continue home Plaquenil, sulfasalazine and Bentyl/Tramadol for pain.  -Continue prednisone 40 mg daily for now. Bactrim MWF.    Paroxysmal atrial fibrillation  -Continue metoprolol, flecainide and  Xarelto.    GERD  -Continue PPI twice daily.    Hypothyroidism  -Continue levothyroxine.    Chronic insomnia  -Continue doxepin.    Hypertension  -Continue amlodipine.    Anxiety/depression  -Continue Prozac.    DVT prophylaxis:  Medical DVT prophylaxis orders are present.      CODE STATUS:    Code Status and Medical Interventions:   Ordered at: 01/16/24 1851     Level Of Support Discussed With:    Patient     Code Status (Patient has no pulse and is not breathing):    CPR (Attempt to Resuscitate)     Medical Interventions (Patient has pulse or is breathing):    Full Support       Expected Discharge   Expected discharge date/ time has not been documented.     Brandi Regalado DO  01/16/24      Electronically signed by Brandi Regalado DO at 01/16/24 0451          Emergency Department Notes        Dena Roberson RN at 01/16/24 1951           Liz Borjas    Nursing Report ED to Floor:  Mental status: AXO X4  Ambulatory status: TBD (haven't gotten pt up)  Oxygen Therapy:  HIGH FLOW, NON REBREATHER FOR TRANSPORT  Cardiac Rhythm: NSR  Admitted from: HOME/ED  Safety Concerns:  FALL RISK  Social Issues: NONE  ED Room #:  15    ED Nurse Phone Extension - [phone] or may call [phone].      HPI:   Chief Complaint   Patient presents with    Shortness of Breath       Past Medical History:  Past Medical History:   Diagnosis Date    Arthritis     Atrial fibrillation     Closed right hip fracture     COVID     GERD (gastroesophageal reflux disease)     Gout     Hyperlipidemia     Hypertension     Osteoporosis     Rheumatoid arthritis     Wears dentures     upper    Wears glasses     reading        Past Surgical History:  Past Surgical History:   Procedure Laterality Date    ANKLE SURGERY Right     CHOLECYSTECTOMY      COLONOSCOPY      5 years ago    HIP FRACTURE SURGERY      Right Hip with Pins    LUMBAR DISCECTOMY Left 10/12/2016    Procedure: lumbar MICROdiscectomy LEFT L3-5;  Surgeon: Chris Son MD;   [facility];  Service:     REPLACEMENT TOTAL KNEE      Right Knee    TOE AMPUTATION      right baby toe, left second toe        Admitting Doctor:   Brandi Regalado DO    Consulting Provider(s):  Consults       Date and Time Order Name Status Description    1/16/2024  6:52 PM Inpatient Pulmonology Consult               Admitting Diagnosis:   The primary encounter diagnosis was Acute on chronic respiratory failure with hypoxemia. Diagnoses of Pulmonary fibrosis, Former smoker, Chronic hypoxic respiratory failure, on home oxygen therapy, and Multifocal pneumonia were also pertinent to this visit.    Most Recent Vitals:   Vitals:    01/16/24 1730 01/16/24 1800 01/16/24 1830 01/16/24 1900   BP: 122/66 118/65 119/67 120/66   BP Location:       Patient Position:       Pulse: 76 75 76 75   Resp:       Temp:       TempSrc:       SpO2: 94% 94% 94% 95%   Weight:       Height:           Active LDAs/IV Access:   Lines, Drains & Airways       Active LDAs       Name Placement date Placement time Site Days    Peripheral IV 01/16/24 1400 Posterior;Right Forearm 01/16/24  1400  Forearm  less than 1    Peripheral IV 01/16/24 1401 Anterior;Left Forearm 01/16/24  1401  Forearm  less than 1                    Labs (abnormal labs have a star):   Labs Reviewed   COMPREHENSIVE METABOLIC PANEL - Abnormal; Notable for the following components:       Result Value    Glucose 104 (*)     BUN 24 (*)     CO2 30.0 (*)     Albumin 3.2 (*)     AST (SGOT) 52 (*)     Alkaline Phosphatase 410 (*)     All other components within normal limits    Narrative:     GFR Normal >60  Chronic Kidney Disease <60  Kidney Failure <15    The GFR formula is only valid for adults with stable renal function between ages 18 and 70.   BNP (IN-HOUSE) - Abnormal; Notable for the following components:    proBNP 3,161.0 (*)     All other components within normal limits    Narrative:     This assay is used as an aid in the diagnosis of individuals suspected of  having heart failure. It can be used as an aid in the diagnosis of acute decompensated heart failure (ADHF) in patients presenting with signs and symptoms of ADHF to the emergency department (ED). In addition, NT-proBNP of <300 pg/mL indicates ADHF is not likely.    Age Range Result Interpretation  NT-proBNP Concentration (pg/mL:      <50             Positive            >450                   Gray                 300-450                    Negative             <300    50-75           Positive            >900                  Gray                300-900                  Negative            <300      >75             Positive            >1800                  Gray                300-1800                  Negative            <300   SINGLE HSTROPONIN T - Abnormal; Notable for the following components:    HS Troponin T 21 (*)     All other components within normal limits    Narrative:     High Sensitive Troponin T Reference Range:  <14.0 ng/L- Negative Female for AMI  <22.0 ng/L- Negative Male for AMI  >=14 - Abnormal Female indicating possible myocardial injury.  >=22 - Abnormal Male indicating possible myocardial injury.   Clinicians would have to utilize clinical acumen, EKG, Troponin, and serial changes to determine if it is an Acute Myocardial Infarction or myocardial injury due to an underlying chronic condition.        CBC WITH AUTO DIFFERENTIAL - Abnormal; Notable for the following components:    WBC 15.80 (*)     Hemoglobin 11.4 (*)     MCV 97.8 (*)     MCHC 28.2 (*)     RDW-SD 55.5 (*)     MPV 12.2 (*)     Neutrophil % 93.2 (*)     Lymphocyte % 2.1 (*)     Monocyte % 3.9 (*)     Eosinophil % 0.1 (*)     Immature Grans % 0.6 (*)     Neutrophils, Absolute 14.74 (*)     Lymphocytes, Absolute 0.33 (*)     Immature Grans, Absolute 0.09 (*)     All other components within normal limits   BLOOD GAS, ARTERIAL W/CO-OXIMETRY - Abnormal; Notable for the following components:    pCO2, Arterial 54.4 (*)     pO2, Arterial  "337.0 (*)     HCO3, Arterial 30.6 (*)     Base Excess, Arterial 4.1 (*)     Hemoglobin, Blood Gas 10.7 (*)     Hematocrit, Blood Gas 32.8 (*)     pCO2, Temperature Corrected 54.4 (*)     pO2, Temperature Corrected 337 (*)     All other components within normal limits   LACTIC ACID, PLASMA - Abnormal; Notable for the following components:    Lactate 2.4 (*)     All other components within normal limits   RESPIRATORY PANEL PCR W/ COVID-19 (SARS-COV-2), NP SWAB IN UTM/VTP, 2 HR TAT - Normal    Narrative:     In the setting of a positive respiratory panel with a viral infection PLUS a negative procalcitonin without other underlying concern for bacterial infection, consider observing off antibiotics or discontinuation of antibiotics and continue supportive care. If the respiratory panel is positive for atypical bacterial infection (Bordetella pertussis, Chlamydophila pneumoniae, or Mycoplasma pneumoniae), consider antibiotic de-escalation to target atypical bacterial infection.   LACTIC ACID, REFLEX - Normal   PROCALCITONIN - Normal    Narrative:     As a Marker for Sepsis (Non-Neonates):    1. <0.5 ng/mL represents a low risk of severe sepsis and/or septic shock.  2. >2 ng/mL represents a high risk of severe sepsis and/or septic shock.    As a Marker for Lower Respiratory Tract Infections that require antibiotic therapy:    PCT on Admission    Antibiotic Therapy       6-12 Hrs later    >0.5                Strongly Recommended  >0.25 - <0.5        Recommended   0.1 - 0.25          Discouraged              Remeasure/reassess PCT  <0.1                Strongly Discouraged     Remeasure/reassess PCT    As 28 day mortality risk marker: \"Change in Procalcitonin Result\" (>80% or <=80%) if Day 0 (or Day 1) and Day 4 values are available. Refer to http://www.IRIS.TVs-pct-calculator.com    Change in PCT <=80%  A decrease of PCT levels below or equal to 80% defines a positive change in PCT test result representing a higher risk for " 28-day all-cause mortality of patients diagnosed with severe sepsis for septic shock.    Change in PCT >80%  A decrease of PCT levels of more than 80% defines a negative change in PCT result representing a lower risk for 28-day all-cause mortality of patients diagnosed with severe sepsis or septic shock.      COVID PRE-OP / PRE-PROCEDURE SCREENING ORDER (NO ISOLATION)    Narrative:     The following orders were created for panel order COVID PRE-OP / PRE-PROCEDURE SCREENING ORDER (NO ISOLATION) - Swab, Nasopharynx.  Procedure                               Abnormality         Status                     ---------                               -----------         ------                     Respiratory Panel PCR w/...[[id number]]  Normal              Final result                 Please view results for these tests on the individual orders.   BLOOD CULTURE   BLOOD CULTURE   MRSA SCREEN, PCR   RESPIRATORY CULTURE   STREP PNEUMO AG, URINE OR CSF   LEGIONELLA ANTIGEN, URINE   RAINBOW DRAW    Narrative:     The following orders were created for panel order Stanton Draw.  Procedure                               Abnormality         Status                     ---------                               -----------         ------                     Green Top (Gel)[[id number]]                                  Final result               Lavender Top[[id number]]                                     Final result               Gold Top - SST[[id number]]                                   Final result               Bullard Top[[id number]]                                         Final result               Light Blue Top[[id number]]                                   Final result                 Please view results for these tests on the individual orders.   BLOOD GAS, ARTERIAL   CBC AND DIFFERENTIAL    Narrative:     The following orders were created for panel order CBC & Differential.  Procedure                               Abnormality          Status                     ---------                               -----------         ------                     CBC Auto Differential[[id number]]        Abnormal            Final result                 Please view results for these tests on the individual orders.   GREEN TOP   LAVENDER TOP   GOLD TOP - SST   GRAY TOP   LIGHT BLUE TOP       Meds Given in ED:   Medications   sodium chloride 0.9 % flush 10 mL (has no administration in time range)   Pharmacy to dose vancomycin (has no administration in time range)   piperacillin-tazobactam (ZOSYN) 3.375 g in iso-osmotic dextrose 50 ml (premix) (has no administration in time range)   vancomycin (VANCOCIN) 1000 mg/200 mL dextrose 5% IVPB (has no administration in time range)   amLODIPine (NORVASC) tablet 5 mg (has no administration in time range)   cetirizine (zyrTEC) tablet 10 mg (has no administration in time range)   dicyclomine (BENTYL) capsule 10 mg (has no administration in time range)   doxepin (SINEquan) capsule 50 mg (has no administration in time range)   ferrous sulfate tablet 325 mg (has no administration in time range)   flecainide (TAMBOCOR) tablet 50 mg (has no administration in time range)   FLUoxetine (PROzac) capsule 40 mg (has no administration in time range)   fluticasone (FLONASE) 50 MCG/ACT nasal spray 2 spray (has no administration in time range)   budesonide-formoterol (SYMBICORT) 160-4.5 MCG/ACT inhaler 2 puff (has no administration in time range)     And   tiotropium (SPIRIVA RESPIMAT) 2.5 mcg/act aerosol solution inhaler (2 puffs Inhalation Not Given 1/16/24 1935)   hydroxychloroquine (PLAQUENIL) tablet 200 mg (has no administration in time range)   levothyroxine (SYNTHROID, LEVOTHROID) tablet 25 mcg (has no administration in time range)   metoprolol tartrate (LOPRESSOR) tablet 25 mg (has no administration in time range)   rivaroxaban (XARELTO) tablet 15 mg (has no administration in time range)   pantoprazole (PROTONIX) EC tablet 40 mg (has no  administration in time range)   sulfamethoxazole-trimethoprim (BACTRIM DS,SEPTRA DS) 800-160 MG per tablet 2 tablet (has no administration in time range)   sulfaSALAzine (AZULFIDINE) tablet 500 mg (has no administration in time range)   traMADol (ULTRAM) tablet 50 mg (has no administration in time range)   sodium chloride 0.9 % flush 10 mL (has no administration in time range)   sodium chloride 0.9 % flush 10 mL (has no administration in time range)   sodium chloride 0.9 % infusion 40 mL (has no administration in time range)   sennosides-docusate (PERICOLACE) 8.6-50 MG per tablet 2 tablet (has no administration in time range)     And   polyethylene glycol (MIRALAX) packet 17 g (has no administration in time range)     And   bisacodyl (DULCOLAX) EC tablet 5 mg (has no administration in time range)     And   bisacodyl (DULCOLAX) suppository 10 mg (has no administration in time range)   nitroglycerin (NITROSTAT) SL tablet 0.4 mg (has no administration in time range)   Potassium Replacement - Follow Nurse / BPA Driven Protocol (has no administration in time range)   Magnesium Standard Dose Replacement - Follow Nurse / BPA Driven Protocol (has no administration in time range)   Phosphorus Replacement - Follow Nurse / BPA Driven Protocol (has no administration in time range)   Calcium Replacement - Follow Nurse / BPA Driven Protocol (has no administration in time range)   predniSONE (DELTASONE) tablet 40 mg (has no administration in time range)   ipratropium-albuterol (DUO-NEB) nebulizer solution 3 mL (3 mL Nebulization Given 1/16/24 1418)   methylPREDNISolone sodium succinate (SOLU-Medrol) injection 40 mg (40 mg Intravenous Given 1/16/24 1410)   iopamidol (ISOVUE-370) 76 % injection 85 mL (85 mL Intravenous Given 1/16/24 1649)   piperacillin-tazobactam (ZOSYN) 3.375 g in iso-osmotic dextrose 50 ml (premix) (3.375 g Intravenous New Bag 1/16/24 6245)     Pharmacy to dose vancomycin,             Electronically signed by  Dena Roberson, RN at 01/1950       Sahara Perez, XAVI at 01/16/24 1847           Liz Borjas    Nursing Report ED to Floor:  Mental status: a/o x4  Ambulatory status: assist x2  Oxygen Therapy:  HFNC  Cardiac Rhythm: NSR  Admitted from: ED  Safety Concerns:  none  Social Issues: none  ED Room #:  15    ED Nurse Phone Extension - [phone] or may call [phone].      HPI:   Chief Complaint   Patient presents with    Shortness of Breath       Past Medical History:  Past Medical History:   Diagnosis Date    Arthritis     Atrial fibrillation     Closed right hip fracture     COVID     GERD (gastroesophageal reflux disease)     Gout     Hyperlipidemia     Hypertension     Osteoporosis     Rheumatoid arthritis     Wears dentures     upper    Wears glasses     reading        Past Surgical History:  Past Surgical History:   Procedure Laterality Date    ANKLE SURGERY Right     CHOLECYSTECTOMY      COLONOSCOPY      5 years ago    HIP FRACTURE SURGERY      Right Hip with Pins    LUMBAR DISCECTOMY Left 10/12/2016    Procedure: lumbar MICROdiscectomy LEFT L3-5;  Surgeon: Chris Son MD;  Location: [facility];  Service:     REPLACEMENT TOTAL KNEE      Right Knee    TOE AMPUTATION      right baby toe, left second toe        Admitting Doctor:   Brandi Regalado DO    Consulting Provider(s):  Consults       No orders found from 12/18/2023 to 1/17/2024.             Admitting Diagnosis:   The primary encounter diagnosis was Acute on chronic respiratory failure with hypoxemia. Diagnoses of Pulmonary fibrosis, Former smoker, Chronic hypoxic respiratory failure, on home oxygen therapy, and Multifocal pneumonia were also pertinent to this visit.    Most Recent Vitals:   Vitals:    01/16/24 1430 01/16/24 1500 01/16/24 1530 01/16/24 1600   BP: 94/63 110/69 103/64 117/67   BP Location:       Patient Position:       Pulse: 75 73 73 73   Resp:       Temp:       TempSrc:       SpO2:  99% 100% 97%   Weight:       Height:            Active LDAs/IV Access:   Lines, Drains & Airways       Active LDAs       Name Placement date Placement time Site Days    Peripheral IV 01/16/24 1400 Posterior;Right Forearm 01/16/24  1400  Forearm  less than 1    Peripheral IV 01/16/24 1401 Anterior;Left Forearm 01/16/24  1401  Forearm  less than 1                    Labs (abnormal labs have a star):   Labs Reviewed   COMPREHENSIVE METABOLIC PANEL - Abnormal; Notable for the following components:       Result Value    Glucose 104 (*)     BUN 24 (*)     CO2 30.0 (*)     Albumin 3.2 (*)     AST (SGOT) 52 (*)     Alkaline Phosphatase 410 (*)     All other components within normal limits    Narrative:     GFR Normal >60  Chronic Kidney Disease <60  Kidney Failure <15    The GFR formula is only valid for adults with stable renal function between ages 18 and 70.   BNP (IN-HOUSE) - Abnormal; Notable for the following components:    proBNP 3,161.0 (*)     All other components within normal limits    Narrative:     This assay is used as an aid in the diagnosis of individuals suspected of having heart failure. It can be used as an aid in the diagnosis of acute decompensated heart failure (ADHF) in patients presenting with signs and symptoms of ADHF to the emergency department (ED). In addition, NT-proBNP of <300 pg/mL indicates ADHF is not likely.    Age Range Result Interpretation  NT-proBNP Concentration (pg/mL:      <50             Positive            >450                   Gray                 300-450                    Negative             <300    50-75           Positive            >900                  Gray                300-900                  Negative            <300      >75             Positive            >1800                  Gray                300-1800                  Negative            <300   SINGLE HSTROPONIN T - Abnormal; Notable for the following components:    HS Troponin T 21 (*)     All other components within normal limits    Narrative:      High Sensitive Troponin T Reference Range:  <14.0 ng/L- Negative Female for AMI  <22.0 ng/L- Negative Male for AMI  >=14 - Abnormal Female indicating possible myocardial injury.  >=22 - Abnormal Male indicating possible myocardial injury.   Clinicians would have to utilize clinical acumen, EKG, Troponin, and serial changes to determine if it is an Acute Myocardial Infarction or myocardial injury due to an underlying chronic condition.        CBC WITH AUTO DIFFERENTIAL - Abnormal; Notable for the following components:    WBC 15.80 (*)     Hemoglobin 11.4 (*)     MCV 97.8 (*)     MCHC 28.2 (*)     RDW-SD 55.5 (*)     MPV 12.2 (*)     Neutrophil % 93.2 (*)     Lymphocyte % 2.1 (*)     Monocyte % 3.9 (*)     Eosinophil % 0.1 (*)     Immature Grans % 0.6 (*)     Neutrophils, Absolute 14.74 (*)     Lymphocytes, Absolute 0.33 (*)     Immature Grans, Absolute 0.09 (*)     All other components within normal limits   BLOOD GAS, ARTERIAL W/CO-OXIMETRY - Abnormal; Notable for the following components:    pCO2, Arterial 54.4 (*)     pO2, Arterial 337.0 (*)     HCO3, Arterial 30.6 (*)     Base Excess, Arterial 4.1 (*)     Hemoglobin, Blood Gas 10.7 (*)     Hematocrit, Blood Gas 32.8 (*)     pCO2, Temperature Corrected 54.4 (*)     pO2, Temperature Corrected 337 (*)     All other components within normal limits   LACTIC ACID, PLASMA - Abnormal; Notable for the following components:    Lactate 2.4 (*)     All other components within normal limits   RESPIRATORY PANEL PCR W/ COVID-19 (SARS-COV-2), NP SWAB IN UTM/VTP, 2 HR TAT - Normal    Narrative:     In the setting of a positive respiratory panel with a viral infection PLUS a negative procalcitonin without other underlying concern for bacterial infection, consider observing off antibiotics or discontinuation of antibiotics and continue supportive care. If the respiratory panel is positive for atypical bacterial infection (Bordetella pertussis, Chlamydophila pneumoniae, or  "Mycoplasma pneumoniae), consider antibiotic de-escalation to target atypical bacterial infection.   LACTIC ACID, REFLEX - Normal   PROCALCITONIN - Normal    Narrative:     As a Marker for Sepsis (Non-Neonates):    1. <0.5 ng/mL represents a low risk of severe sepsis and/or septic shock.  2. >2 ng/mL represents a high risk of severe sepsis and/or septic shock.    As a Marker for Lower Respiratory Tract Infections that require antibiotic therapy:    PCT on Admission    Antibiotic Therapy       6-12 Hrs later    >0.5                Strongly Recommended  >0.25 - <0.5        Recommended   0.1 - 0.25          Discouraged              Remeasure/reassess PCT  <0.1                Strongly Discouraged     Remeasure/reassess PCT    As 28 day mortality risk marker: \"Change in Procalcitonin Result\" (>80% or <=80%) if Day 0 (or Day 1) and Day 4 values are available. Refer to http://www.[employer]-pct-calculator.com    Change in PCT <=80%  A decrease of PCT levels below or equal to 80% defines a positive change in PCT test result representing a higher risk for 28-day all-cause mortality of patients diagnosed with severe sepsis for septic shock.    Change in PCT >80%  A decrease of PCT levels of more than 80% defines a negative change in PCT result representing a lower risk for 28-day all-cause mortality of patients diagnosed with severe sepsis or septic shock.      COVID PRE-OP / PRE-PROCEDURE SCREENING ORDER (NO ISOLATION)    Narrative:     The following orders were created for panel order COVID PRE-OP / PRE-PROCEDURE SCREENING ORDER (NO ISOLATION) - Swab, Nasopharynx.  Procedure                               Abnormality         Status                     ---------                               -----------         ------                     Respiratory Panel PCR w/...[[id number]]  Normal              Final result                 Please view results for these tests on the individual orders.   BLOOD CULTURE   BLOOD CULTURE   MRSA " SCREEN, PCR   RESPIRATORY CULTURE   STREP PNEUMO AG, URINE OR CSF   LEGIONELLA ANTIGEN, URINE   RAINBOW DRAW    Narrative:     The following orders were created for panel order Cassandra Draw.  Procedure                               Abnormality         Status                     ---------                               -----------         ------                     Green Top (Gel)[[id number]]                                  Final result               Lavender Top[[id number]]                                     Final result               Gold Top - SST[[id number]]                                   Final result               Bullard Top[[id number]]                                         Final result               Light Blue Top[[id number]]                                   Final result                 Please view results for these tests on the individual orders.   BLOOD GAS, ARTERIAL   CBC AND DIFFERENTIAL    Narrative:     The following orders were created for panel order CBC & Differential.  Procedure                               Abnormality         Status                     ---------                               -----------         ------                     CBC Auto Differential[[id number]]        Abnormal            Final result                 Please view results for these tests on the individual orders.   GREEN TOP   LAVENDER TOP   GOLD TOP - SST   GRAY TOP   LIGHT BLUE TOP       Meds Given in ED:   Medications   sodium chloride 0.9 % flush 10 mL (has no administration in time range)   Pharmacy to dose vancomycin (has no administration in time range)   piperacillin-tazobactam (ZOSYN) 3.375 g in iso-osmotic dextrose 50 ml (premix) (3.375 g Intravenous New Bag 1/16/24 6973)   piperacillin-tazobactam (ZOSYN) 3.375 g in iso-osmotic dextrose 50 ml (premix) (has no administration in time range)   vancomycin (VANCOCIN) 1000 mg/200 mL dextrose 5% IVPB (has no administration in time range)   ipratropium-albuterol (DUO-NEB)  nebulizer solution 3 mL (3 mL Nebulization Given 1/16/24 1418)   methylPREDNISolone sodium succinate (SOLU-Medrol) injection 40 mg (40 mg Intravenous Given 1/16/24 1410)   iopamidol (ISOVUE-370) 76 % injection 85 mL (85 mL Intravenous Given 1/16/24 1649)     Pharmacy to dose vancomycin,             Electronically signed by Sahara Perez RN at 01/16/24 5171       Chace Sanches MD at 01/16/24 7115          Subjective   History of Present Illness  Patient is 73-year-old female presenting to the emergency department with increased work of breathing and hypoxemia.  Patient has a long history of chronic lung disease and is typically on 6 L via nasal cannula at all times.  Son reports that she was recently admitted to the hospital and discharged home.  He noticed over the last 2 days that she has had increased work of breathing and fatigue.  He reports oxygen saturations typically fall to the low 80s when she is walking.  He states that she is now requiring 8 L via nasal cannula to maintain oxygen saturations.  She has a history of chronic lung disease, atrial fibrillation, hyperlipidemia, hypertension, rheumatoid arthritis.    History provided by:  Patient and relative      Review of Systems    Past Medical History:   Diagnosis Date    Arthritis     Atrial fibrillation     Closed right hip fracture     COVID     GERD (gastroesophageal reflux disease)     Gout     Hyperlipidemia     Hypertension     Osteoporosis     Rheumatoid arthritis     Wears dentures     upper    Wears glasses     reading       Allergies   Allergen Reactions    Moexipril-Hydrochlorothiazide Anaphylaxis    Penicillins Hives     Tolerates ancef, rocephin, Zosyn    Ace Inhibitors Hives       Past Surgical History:   Procedure Laterality Date    ANKLE SURGERY Right     CHOLECYSTECTOMY      COLONOSCOPY      5 years ago    HIP FRACTURE SURGERY      Right Hip with Pins    LUMBAR DISCECTOMY Left 10/12/2016    Procedure: lumbar MICROdiscectomy LEFT  L3-5;  Surgeon: Chris Son MD;  Location: [facility];  Service:     REPLACEMENT TOTAL KNEE      Right Knee    TOE AMPUTATION      right baby toe, left second toe       Family History   Problem Relation Age of Onset    Heart disease Mother     Cancer Father         LUNG       Social History     Socioeconomic History    Marital status:    Tobacco Use    Smoking status: Former     Types: Cigarettes    Smokeless tobacco: Never    Tobacco comments:     quit 30 years ago   Vaping Use    Vaping Use: Never used   Substance and Sexual Activity    Alcohol use: No    Drug use: Never    Sexual activity: Defer           Objective   Physical Exam  Vitals and nursing note reviewed.   Constitutional:       General: She is in acute distress.      Appearance: She is well-developed. She is ill-appearing.   Cardiovascular:      Rate and Rhythm: Normal rate and regular rhythm.      Pulses: Normal pulses.   Pulmonary:      Effort: Tachypnea and respiratory distress present.      Breath sounds: Decreased breath sounds present.   Musculoskeletal:      Right lower leg: Edema present.      Left lower leg: Edema present.   Skin:     Coloration: Skin is cyanotic.   Neurological:      Mental Status: She is alert and oriented to person, place, and time.   Psychiatric:         Mood and Affect: Mood normal.         Behavior: Behavior normal.         Procedures          ED Course  ED Course as of 01/16/24 1843   Tue Jan 16, 2024   1433 WBC(!): 15.80  Leukocytosis noted which is new compared to recent prior. [RS]   1451 XR Chest 1 View  Personally reviewed the single view the chest.  On my interpretation there is no acute lobar infiltrate. [RS]   1732 Personally reviewed the CT images.  Patient with significant pulmonary fibrosis with multifocal pneumonia.  Antibiotics have been ordered.  Will plan admission for further evaluation management.  Hospitalist messaged for admission. [RS]      ED Course User Index  [RS] Chace Sanches  MD Shannon                                             Medical Decision Making  Problems Addressed:  Acute on chronic respiratory failure with hypoxemia: complicated acute illness or injury  Chronic hypoxic respiratory failure, on home oxygen therapy: complicated acute illness or injury  Former smoker: complicated acute illness or injury  Multifocal pneumonia: complicated acute illness or injury  Pulmonary fibrosis: complicated acute illness or injury    Amount and/or Complexity of Data Reviewed  Independent Historian: caregiver  External Data Reviewed: labs and notes.  Labs: ordered. Decision-making details documented in ED Course.  Radiology: ordered. Decision-making details documented in ED Course.  ECG/medicine tests: ordered.  Discussion of management or test interpretation with external provider(s): Hospitalist    Risk  Prescription drug management.  Decision regarding hospitalization.        Final diagnoses:   Acute on chronic respiratory failure with hypoxemia   Pulmonary fibrosis   Former smoker   Chronic hypoxic respiratory failure, on home oxygen therapy   Multifocal pneumonia       ED Disposition  ED Disposition       ED Disposition   Decision to Admit    Condition   --    Comment   Level of Care: Telemetry [5]   Diagnosis: Acute on chronic hypoxic respiratory failure [1767071]   Certification: I Certify That Inpatient Hospital Services Are Medically Necessary For Greater Than 2 Midnights                 No follow-up provider specified.       Medication List      No changes were made to your prescriptions during this visit.            Chace Sanches MD  01/16/24 1843      Electronically signed by Chace Sanches MD at 01/16/24 1843        Lory Blue MD   Physician  Hospitalist     Progress Notes      Signed     Date of Service: 01/17/24 1352  Creation Time: 01/17/24 1352     Signed       Expand All Collapse All         [facility] Medicine Services  PROGRESS NOTE      Patient Name: Liz Borjas  : 1950  MRN: [id number]     Date of Admission: 2024  Primary Care Physician: Valerie Sesay APRN        Subjective   Subjective      CC:  F/u dyspnea     HPI:  She is doing well this morning, remains on high flow at that she feels less short of breath than she did before admission              Objective   Objective      Vital Signs:   Temp:  [97.8 °F (36.6 °C)-98.7 °F (37.1 °C)] 98.3 °F (36.8 °C)  Heart Rate:  [63-84] 63  Resp:  [18-24] 18  BP: ()/(50-97) 125/71  Flow (L/min):  [15-55] 55     Physical Exam:  Constitutional: No acute distress, awake, alert, elderly and frail-appearing  HENT: NCAT, mucous membranes moist  Respiratory: Appears comfortable on high flow, Velcro type crackles heard diffusely  Cardiovascular: RRR, no murmurs, rubs, or gallops  Gastrointestinal: Positive bowel sounds, soft, nontender, nondistended  Musculoskeletal: No bilateral ankle edema  Psychiatric: Appropriate affect, cooperative  Neurologic: Oriented x 3, strength symmetric in all extremities, Cranial Nerves grossly intact to confrontation, speech clear  Skin: No rashes        Results Reviewed:  LAB RESULTS:            Lab 24  0503 24  1805 24  1357   WBC 6.46  --  15.80*   HEMOGLOBIN 9.8*  --  11.4*   HEMATOCRIT 33.4*  --  40.4   PLATELETS 109*  --  199   NEUTROS ABS 5.06  --  14.74*   IMMATURE GRANS (ABS) 0.02  --  0.09*   LYMPHS ABS 0.77  --  0.33*   MONOS ABS 0.58  --  0.61   EOS ABS 0.02  --  0.01   MCV 96.8  --  97.8*   PROCALCITONIN 0.16  --  0.11   LACTATE  --  0.8 2.4*               Lab 24  0503 24  1357   SODIUM 139 141   POTASSIUM 3.9 4.2   CHLORIDE 104 102   CO2 28.0 30.0*   ANION GAP 7.0 9.0   BUN 22 24*   CREATININE 0.91 1.00   EGFR 67.6 60.4   GLUCOSE 66 104*   CALCIUM 8.0* 9.0               Lab 24  0503 24  1357   TOTAL PROTEIN 5.4* 6.9   ALBUMIN 2.4* 3.2*   GLOBULIN 3.0 3.7   ALT (SGPT) 21 33   AST (SGOT) 30 52*   BILIRUBIN  0.2 0.5   ALK PHOS 306* 410*                Lab 01/17/24  0014 01/16/24  2235 01/16/24  1357   PROBNP  --   --  3,161.0*   HSTROP T 14* 15* 21*                      Lab 01/16/24  1449   PH, ARTERIAL 7.358   PCO2, ARTERIAL 54.4*   PO2 .0*   FIO2 100   HCO3 ART 30.6*   BASE EXCESS ART 4.1*   CARBOXYHEMOGLOBIN 0.9      Brief Urine Lab Results         None                Microbiology Results Abnormal         Procedure Component Value - Date/Time     MRSA Screen, PCR (Inpatient) - Swab, Nares [[id number]]  (Normal) Collected: 01/16/24 1800     Lab Status: Final result Specimen: Swab from Nares Updated: 01/17/24 0042       MRSA PCR No MRSA Detected     Narrative:       The negative predictive value of this diagnostic test is high and should only be used to consider de-escalating anti-MRSA therapy. A positive result may indicate colonization with MRSA and must be correlated clinically.     S. Pneumo Ag Urine or CSF - Urine, Straight Cath [[id number]]  (Normal) Collected: 01/16/24 1823     Lab Status: Final result Specimen: Urine from Straight Cath Updated: 01/17/24 0002       Strep Pneumo Ag Negative     Legionella Antigen, Urine - Urine, Straight Cath [[id number]]  (Normal) Collected: 01/16/24 1823     Lab Status: Final result Specimen: Urine from Straight Cath Updated: 01/17/24 0001       LEGIONELLA ANTIGEN, URINE Negative     COVID PRE-OP / PRE-PROCEDURE SCREENING ORDER (NO ISOLATION) - Swab, Nasopharynx [[id number]]  (Normal) Collected: 01/16/24 1405     Lab Status: Final result Specimen: Swab from Nasopharynx Updated: 01/16/24 1503     Narrative:       The following orders were created for panel order COVID PRE-OP / PRE-PROCEDURE SCREENING ORDER (NO ISOLATION) - Swab, Nasopharynx.  Procedure                               Abnormality         Status                     ---------                               -----------         ------                     Respiratory Panel PCR w/...[[id number]]  Normal               Final result                  Please view results for these tests on the individual orders.     Respiratory Panel PCR w/COVID-19(SARS-CoV-2) ALFREDO/MEGAN/MARIA FERNANDA/PAD/COR/MONIKA In-House, NP Swab in UTM/VTM, 2 HR TAT - Swab, Nasopharynx [[id number]]  (Normal) Collected: 01/16/24 1405     Lab Status: Final result Specimen: Swab from Nasopharynx Updated: 01/16/24 1503       ADENOVIRUS, PCR Not Detected       Coronavirus 229E Not Detected       Coronavirus HKU1 Not Detected       Coronavirus NL63 Not Detected       Coronavirus OC43 Not Detected       COVID19 Not Detected       Human Metapneumovirus Not Detected       Human Rhinovirus/Enterovirus Not Detected       Influenza A PCR Not Detected       Influenza B PCR Not Detected       Parainfluenza Virus 1 Not Detected       Parainfluenza Virus 2 Not Detected       Parainfluenza Virus 3 Not Detected       Parainfluenza Virus 4 Not Detected       RSV, PCR Not Detected       Bordetella pertussis pcr Not Detected       Bordetella parapertussis PCR Not Detected       Chlamydophila pneumoniae PCR Not Detected       Mycoplasma pneumo by PCR Not Detected     Narrative:       In the setting of a positive respiratory panel with a viral infection PLUS a negative procalcitonin without other underlying concern for bacterial infection, consider observing off antibiotics or discontinuation of antibiotics and continue supportive care. If the respiratory panel is positive for atypical bacterial infection (Bordetella pertussis, Chlamydophila pneumoniae, or Mycoplasma pneumoniae), consider antibiotic de-escalation to target atypical bacterial infection.                  Radiology results from the last 24 hours:   CT Angiogram Chest     Result Date: 1/16/2024  CT ANGIOGRAM CHEST Date of Exam: 1/16/2024 4:44 PM EST Indication: acute on chronic respiratory failure w/ hypoxemia and hypercapnea. Comparison: Chest radiograph performed on January 16, 2024. Chest CT performed on July 18, 2022. Technique: CTA  of the chest was performed after the uneventful intravenous administration of Isovue-370, 85 mL. Reconstructed coronal and sagittal images were also obtained. In addition, a 3-D volume rendered image was created for interpretation. Automated exposure control and iterative reconstruction methods were used. Findings: Pulmonary arteries: Adequate opacification of the pulmonary arteries.No evidence of acute pulmonary embolism. Thyroid: The visualized portion of the thyroid is unremarkable. Lungs and Pleura: Extensive interstitial thickening is visualized throughout the lung parenchyma with associated traction bronchiectasis. Lung volumes are low. Subpleural honeycombing is visualized and is most prominent in the lung bases. There is almost  diffuse groundglass attenuation of the lung parenchyma. Small foci of consolidation are visualized in the lateral right upper lobe and medial right lower lobe. No pleural effusion. No discrete pulmonary masses are visualized. No pneumothorax. Mediastinum/Marivel: No mediastinal or hilar lymphadenopathy. Moderate hiatal hernia is visualized. Lymph nodes: No axillary or supraclavicular lymphadenopathy. Cardiovascular: The heart is normal in size.No evidence of pericardial effusion.the thoracic aorta is normal in caliber and contour with small scattered calcifications. Small coronary calcifications are noted. Upper Abdomen: No acute process in the upper abdomen. Bones and Soft Tissue: No acute fracture, aggressive osseous lesions, or soft tissue process.      Impression: Impression: No evidence of pulmonary embolism. No evidence of thoracic aortic aneurysm or dissection. Findings compatible with advanced pulmonary fibrosis. There are superimposed groundglass attenuation of the lung parenchyma which may be secondary to interstitial pneumonitis. Small consolidations in the right upper and lower lobes are compatible with multifocal pneumonia. Electronically Signed: Geoffrey Wagner MD   1/16/2024 4:58 PM EST  Workstation ID: [id number]     XR Chest 1 View     Result Date: 1/16/2024  XR CHEST 1 VW Date of Exam: 1/16/2024 2:30 PM EST Indication: SOA triage protocol Comparison: 1/7/2024. Findings: Similar aeration to comparison with coarse bilateral airspace opacities present in addition to more focal dense airspace disease in the right upper lobe. There is no enlarging effusion or distinct pneumothorax. Unchanged heart and mediastinal contours.      Impression: Impression: Similar multifocal airspace opacities remaining most dense in the right upper lobe. Electronically Signed: Viktor Chairez MD  1/16/2024 2:47 PM EST  Workstation ID: [id number]         Results for orders placed during the hospital encounter of 01/06/24     Adult Transthoracic Echo Complete W/ Cont if Necessary Per Protocol     Interpretation Summary    Left ventricular ejection fraction appears to be 66 - 70%.    Left ventricular wall thickness is consistent with mild septal asymmetric hypertrophy.    Left ventricular diastolic function is consistent with (grade I) impaired relaxation.    The right ventricular cavity is mildly dilated.    The left atrial cavity is mildly dilated.    Left atrial volume is moderately increased.    The right atrial cavity is borderline dilated.    Moderate tricuspid valve regurgitation is present.    Estimated right ventricular systolic pressure from tricuspid regurgitation is markedly elevated (>55 mmHg).        Current medications:  Scheduled Meds:amLODIPine, 5 mg, Oral, Q24H  budesonide-formoterol, 2 puff, Inhalation, BID - RT   And  tiotropium bromide monohydrate, 2 puff, Inhalation, Daily - RT  cetirizine, 10 mg, Oral, Daily  dicyclomine, 10 mg, Oral, TID  doxepin, 50 mg, Oral, Nightly  ferrous sulfate, 325 mg, Oral, Daily With Breakfast  flecainide, 50 mg, Oral, Q12H  FLUoxetine, 40 mg, Oral, Daily  hydroxychloroquine, 200 mg, Oral, Q24H  ipratropium-albuterol, 3 mL, Nebulization, 4x Daily -  RT  levothyroxine, 25 mcg, Oral, Q AM  metoprolol tartrate, 25 mg, Oral, BID  pantoprazole, 40 mg, Oral, BID AC  piperacillin-tazobactam, 3.375 g, Intravenous, Q8H  predniSONE, 40 mg, Oral, Daily With Breakfast  rivaroxaban, 15 mg, Oral, Daily With Dinner  sodium chloride, 10 mL, Intravenous, Q12H  sulfamethoxazole-trimethoprim, 2 tablet, Oral, Once per day on Monday Wednesday Friday  sulfaSALAzine, 500 mg, Oral, Q12H  traMADol, 50 mg, Oral, BID        Continuous Infusions:   PRN Meds:.  senna-docusate sodium **AND** polyethylene glycol **AND** bisacodyl **AND** bisacodyl    Calcium Replacement - Follow Nurse / BPA Driven Protocol    fluticasone    ipratropium-albuterol    Magnesium Standard Dose Replacement - Follow Nurse / BPA Driven Protocol    nitroglycerin    Phosphorus Replacement - Follow Nurse / BPA Driven Protocol    Potassium Replacement - Follow Nurse / BPA Driven Protocol    sodium chloride    sodium chloride    sodium chloride           Assessment & Plan  Assessment & Plan            Active Hospital Problems     Diagnosis   POA    **Acute on chronic hypoxic respiratory failure [J96.21]   Yes       Resolved Hospital Problems   No resolved problems to display.         Brief Hospital Course to date:  Liz Borjas is a 73 y.o. female with past medical history of chronic hypoxic respiratory failure on 6 L nasal cannula, advanced interstitial lung disease, underlying COPD, ROPER cirrhosis, rheumatoid arthritis on chronic prednisone (20 mg daily), HTN, A-fib on Xarelto, CKDIII, with recent hospitalization 1/6 to 1/9 for right upper lobe pneumonia who represented with hypoxia, cough and poor appetite. Was found to have acute on chronic hypoxic respiratory failure secondary to multifocal pneumonia. Pulmonology following outpatient for ILD, they have been consulted here as well. She was started on HFNC     Acute on chronic hypoxic respiratory failure  Multifocal pneumonia  Hx of advanced ILD with UIP pattern  Hx  COPD without exacerbation   -Recent admission for RUL pneumonia with progression despite completing oral antibiotics  -Afebrile, WBC 15K on admission, resp PCR negative   -CTA chest with advanced pulmonary fibrosis and small consolidations in RUL and RLL. Reviewed imaging with pulm today. Difficult to see much differences with scan 2 years ago, therefore hard to distinguish if there has been much more inflammation that will respond to steroids. Pulm to f/u whether to inc steroids. She was supposed to start anti-fibrotic agent today in clinic  -s/p IV methylpred 40 mg x1 in ED  -Covering empirically with IV Zosyn given recent hospitalization, dc vanc d/t neg MRSA screen. Cultures in process  -Continue prednisone 40 mg daily for now. Bactrim MWF. Symbicort/Spiriva/Duo-Nebs.      Elevated troponin  -Likely secondary to acute hypoxia  -EKG without acute ST-T changes; no chest pain  -Repeat troponin downtrending     Elevated proBNP  ?Chronic HFpEF  -proBNP 3K on admission  -no evidence of edema on imaging, mild pedal edema on exam  -Echo 1/6/24 with EF 66 to 70%, grade 1 diastolic dysfunction, moderate TR, RVSP >55 mmHg  -Will hold off on diuresing for now. Can consider as needed. Strict Is&Os.     Chronic anemia  -Hgb appears at baseline, no evidence of overt bleeding  -Continue home oral iron supplement. Further workup as outpatient. Would update age-appropriate cancer screenings.     Rheumatoid arthritis  -On prednisone 20 mg daily, recently discharged on prolonged taper  -Continue home Plaquenil, sulfasalazine and Bentyl/Tramadol for pain.  -Continue prednisone 40 mg daily for now. Bactrim MWF.     Paroxysmal atrial fibrillation  -Continue metoprolol, flecainide and Xarelto.     GERD  -Continue PPI twice daily.     Hypothyroidism  -Continue levothyroxine.     Chronic insomnia  -Continue doxepin.     Hypertension  -Continue amlodipine.     Anxiety/depression  -Continue Prozac.        Expected Discharge Location and  "Transportation: home  Expected Discharge   Expected Discharge Date: 1/20/2024; Expected Discharge Time:       DVT prophylaxis:  Medical DVT prophylaxis orders are present.      AM-PAC 6 Clicks Score (PT): 12 (01/17/24 0751)     CODE STATUS:       Code Status and Medical Interventions:   Ordered at: 01/16/24 1851     Level Of Support Discussed With:     Patient     Code Status (Patient has no pulse and is not breathing):     CPR (Attempt to Resuscitate)     Medical Interventions (Patient has pulse or is breathing):     Full Support         Lory Blue MD  01/17/24                                 Gerardo Robertson MD   Physician  Intensivist     Consults      Addendum     Date of Service: 01/17/24 1441  Creation Time: 01/17/24 1441  Consult Orders   Inpatient Pulmonology Consult [[id number]] ordered by Brandi Regalado DO at 01/16/24 1851          Expand All Collapse All    PULMONARY SERVICE  PROGRESS NOTE         Subjective   SUBJECTIVE      We were asked to see Liz Borjas, 73 y.o. female for: Shortness of Breath       Acute on chronic hypoxic respiratory failure    Rheumatoid arthritis with positive rheumatoid factor    Physical deconditioning    Chronic respiratory failure with hypoxia    HTN (hypertension)    Interstitial lung disease        History of Present Illness:  Liz is a 73 y.o. female, who presented to this Hospital on 1/16/2024 because of increasing dyspnea.      Recent hospitalization 1/6/24 to 1/09/23 for similar problems, dyspnea, ILD with exacerbation and \"RUL pneumonia\". Discharged on Oxygen at 6 lpm and Prednisone 40 mg/d.     She completed her oral antibiotics.     On Sunday 01/14/23 she started feeling bad, she cold, chills. On Monday 01/15/23 she was anorexic. On Tuesday 01/16/23 she developed cough and came to the ED. Her SpO2  were down to 60% before coming to the Hospital.     After prednisone was decreased to 30 mg/d, her symptoms started to flare-up, and she came back to the " [facility].      She is now on HFNC 70%.     (+) Cough     (+) Sputum production, \"gray/yellow\".     No hemoptysis.     No chest pain.     Temp  Min: 97.8 °F (36.6 °C)  Max: 98.7 °F (37.1 °C)      PMH: She  has a past medical history of Arthritis, Atrial fibrillation, Closed right hip fracture, COVID, GERD (gastroesophageal reflux disease), Gout, Hyperlipidemia, Hypertension, Osteoporosis, Rheumatoid arthritis, Wears dentures, and Wears glasses.   PSxH: She  has a past surgical history that includes Cholecystectomy; Toe amputation; Colonoscopy; Lumbar discectomy (Left, 10/12/2016); Replacement total knee; Hip fracture surgery; and Ankle surgery (Right).           Immunization History   Administered Date(s) Administered    COVID-19 (MODERNA) 1st,2nd,3rd Dose Monovalent 03/03/2021, 03/31/2021    FLUAD TRI 65YR+ 12/27/2021    Flu Vaccine Intradermal Quad 18-64YR 11/08/2007, 09/25/2009    Flu Vaccine Split Quad 11/08/2007, 09/25/2009, 11/22/2013    Fluzone (or Fluarix & Flulaval for VFC) >6mos 11/17/2016    Hep A / Hep B 12/05/2019    Hepatitis B Adult/Adolescent IM 02/09/2017, 03/09/2017, 01/09/2020    Influenza LAIV (Nasal) 10/25/2019    Influenza Quad Vaccine (Inpatient) 10/26/2011    Influenza, Unspecified 10/27/2013, 11/15/2014    Pneumococcal Conjugate 13-Valent (PCV13) 11/17/2016    Pneumococcal Conjugate 20-Valent (PCV20) 03/30/2022    Pneumococcal Polysaccharide (PPSV23) 04/05/2018         Medications:  No current facility-administered medications on file prior to encounter.           Current Outpatient Medications on File Prior to Encounter   Medication Sig    albuterol (ACCUNEB) 1.25 MG/3ML nebulizer solution Take 3 mL by nebulization Every 6 (Six) Hours As Needed for Wheezing. (Patient taking differently: Take 3 mL by nebulization Every 6 (Six) Hours As Needed for Wheezing or Shortness of Air.)    albuterol sulfate  (90 Base) MCG/ACT inhaler Inhale 2 puffs Every 4 (Four) Hours As Needed for Wheezing or " Shortness of Air.    amLODIPine (NORVASC) 5 MG tablet Take 1 tablet by mouth Daily.    baclofen (LIORESAL) 10 MG tablet Take 1 tablet by mouth Every 12 (Twelve) Hours As Needed.    Cetirizine HCl (ZyrTEC Allergy) 10 MG capsule Take 10 mg by mouth Daily As Needed.    dicyclomine (BENTYL) 10 MG capsule Take 1 capsule by mouth 3 (Three) Times a Day.    doxepin (SINEquan) 50 MG capsule Take 1 capsule by mouth every night at bedtime.    FeroSul 325 (65 Fe) MG tablet Take 1 tablet by mouth Daily With Breakfast. OTC    flecainide (TAMBOCOR) 50 MG tablet Take 1 tablet by mouth 2 (Two) Times a Day.    FLUoxetine (PROzac) 40 MG capsule Take 1 capsule by mouth Daily.    fluticasone (FLONASE) 50 MCG/ACT nasal spray 2 sprays by Each Nare route Daily As Needed for Rhinitis or Allergies. OTC    Fluticasone-Umeclidin-Vilant (Trelegy Ellipta) 100-62.5-25 MCG/ACT inhaler Inhale 1 puff Daily.    hydroxychloroquine (PLAQUENIL) 200 MG tablet Take 1 tablet by mouth Daily.    levothyroxine (SYNTHROID, LEVOTHROID) 25 MCG tablet Take 1 tablet by mouth Every Morning.    losartan (COZAAR) 100 MG tablet Take 1 tablet by mouth Daily.    metoprolol tartrate (LOPRESSOR) 25 MG tablet Take 1 tablet by mouth 2 (Two) Times a Day.    predniSONE (DELTASONE) 20 MG tablet Take 2 tablets by mouth Daily for 2 days, THEN 1.5 tablets Daily for 5 days, THEN 1 tablet Daily for 5 days, THEN 0.5 tablets Daily for 5 days.    RABEprazole (ACIPHEX) 20 MG EC tablet Take 1 tablet by mouth 2 (Two) Times a Day.    rivaroxaban (XARELTO) 15 MG tablet Take 1 tablet by mouth Daily With Dinner. Indications: Atrial Fibrillation    sulfamethoxazole-trimethoprim (BACTRIM,SEPTRA) 400-80 MG tablet Take 2 tablets by mouth 3 (Three) Times a Week. MWF schedule    sulfaSALAzine (AZULFIDINE) 500 MG EC tablet Take 1 tablet by mouth 2 (Two) Times a Day.    traMADol (ULTRAM) 50 MG tablet Take 1 tablet by mouth Every 6 (Six) Hours As Needed for Moderate Pain.    vitamin D  (ERGOCALCIFEROL) 1.25 MG (36438 UT) capsule capsule Take 1 capsule by mouth 1 (One) Time Per Week. Fridays         Allergies: She is allergic to moexipril-hydrochlorothiazide, penicillins, and ace inhibitors.   FH: Her family history includes Cancer in her father; Heart disease in her mother.   SH: She  reports that she has quit smoking. Her smoking use included cigarettes. She has never used smokeless tobacco. She reports that she does not drink alcohol and does not use drugs.      The patient's relevant past medical, surgical and social history were reviewed and updated in Epic as appropriate.         History     Last Reviewed by Gerardo Robertson MD on 1/17/2024 at 11:30 AM    Sections Reviewed    Medical, Surgical, Family, Tobacco, Alcohol, Drug Use, Sexual Activity,   Social Documentation    Problem list reviewed by Gerardo Robertson MD on 1/17/2024 at 11:30 AM  Medicines reviewed by Gerardo Robertson MD on 1/17/2024 at 11:29 AM  Allergies reviewed by Gerardo Robertson MD on 1/17/2024 at 11:29 AM        Review of Systems  As described in the HPI.           Objective   O      Vitals:  Temp: 98.3 °F (36.8 °C) (01/17/24 0741) Temp  Min: 97.8 °F (36.6 °C)  Max: 98.7 °F (37.1 °C)   BP: 125/71 (01/17/24 0852) BP  Min: 103/64  Max: 143/73   Pulse: 63 (01/17/24 1013) Pulse  Min: 63  Max: 84   Resp: 18 (01/17/24 1013) Resp  Min: 18  Max: 22   SpO2: (!) 88 % (01/17/24 1013) SpO2  Min: 78 %  Max: 100 %   Device: heated, humidified, high-flow nasal cannula (01/17/24 1200)     Flow Rate: 55 (01/17/24 1200) Flow (L/min)  Min: 15  Max: 55      Medications (drips):     Telemetry:  Rhythm: normal sinus rhythm (01/17/24 1200)   Constitutional:  No acute distress.   Cardiovascular: RRR.    Respiratory: Normal breath sounds  (+) Rhonchi  (+) Squawks   Abdominal:  Soft with no tenderness.   Extremities: No Edema   Neurological:             Alert, Oriented, Cooperative.  Best Eye Response: 4-->(E4) spontaneous (01/17/24 0873)  Best Motor  Response: 6-->(M6) obeys commands (01/17/24 0849)  Best Verbal Response: 5-->(V5) oriented (01/17/24 0849)  Irina Coma Scale Score: 15 (01/17/24 0849)      Results Reviewed:  Laboratory  Microbiology  Radiology  Pathology     Hematology:        Results from last 7 days   Lab Units 01/17/24  0503 01/16/24  1357   WBC 10*3/mm3 6.46 15.80*   HEMOGLOBIN g/dL 9.8* 11.4*   MCV fL 96.8 97.8*   PLATELETS 10*3/mm3 109* 199            Results from last 7 days   Lab Units 01/17/24  0503 01/16/24  1357   NEUTROS ABS 10*3/mm3 5.06 14.74*   LYMPHS ABS 10*3/mm3 0.77 0.33*   EOS ABS 10*3/mm3 0.02 0.01      Chemistry:  Estimated Creatinine Clearance: 43.4 mL/min (by C-G formula based on SCr of 0.91 mg/dL).           Results from last 7 days   Lab Units 01/17/24  0503 01/16/24  1357   SODIUM mmol/L 139 141   POTASSIUM mmol/L 3.9 4.2   CHLORIDE mmol/L 104 102   CO2 mmol/L 28.0 30.0*   BUN mg/dL 22 24*   CREATININE mg/dL 0.91 1.00   GLUCOSE mg/dL 66 104*            Results from last 7 days   Lab Units 01/17/24  0503 01/16/24  1357   CALCIUM mg/dL 8.0* 9.0      Hepatic Panel:        Results from last 7 days   Lab Units 01/17/24  0503 01/16/24  1357   ALBUMIN g/dL 2.4* 3.2*   TOTAL PROTEIN g/dL 5.4* 6.9   BILIRUBIN mg/dL 0.2 0.5   AST (SGOT) U/L 30 52*   ALT (SGPT) U/L 21 33   ALK PHOS U/L 306* 410*      Cardiac Labs:         Results from last 7 days   Lab Units 01/17/24  0014 01/16/24  2235 01/16/24  1357   PROBNP pg/mL  --   --  3,161.0*   HSTROP T ng/L 14* 15* 21*      Biomarkers:         Results from last 7 days   Lab Units 01/17/24  0503 01/16/24  1805 01/16/24  1357   LACTATE mmol/L  --  0.8 2.4*   PROCALCITONIN ng/mL 0.16  --  0.11      COVID-19        Lab Results   Component Value Date     COVID19 Not Detected 01/16/2024     COVID19 Not Detected 01/06/2024     COVID19 Not Detected 01/06/2024     COVID19 Not Detected 01/14/2022         Arterial Blood Gases:       Results from last 7 days   Lab Units 01/16/24  1449   PH, ARTERIAL  pH units 7.358   PCO2, ARTERIAL mm Hg 54.4*   PO2 ART mm Hg 337.0*   FIO2 % 100         Images:  CT Angiogram Chest     Result Date: 1/16/2024  Impression: No evidence of pulmonary embolism. No evidence of thoracic aortic aneurysm or dissection. Findings compatible with advanced pulmonary fibrosis. There are superimposed groundglass attenuation of the lung parenchyma which may be secondary to interstitial pneumonitis. Small consolidations in the right upper and lower lobes are compatible with multifocal pneumonia. Electronically Signed: Geoffrey Wagner MD  1/16/2024 4:58 PM EST  Workstation ID: [id number]     XR Chest 1 View     Result Date: 1/16/2024  Impression: Similar multifocal airspace opacities remaining most dense in the right upper lobe. Electronically Signed: Viktor Chairez MD  1/16/2024 2:47 PM EST  Workstation ID: [id number]      Echo:  Results for orders placed during the hospital encounter of 01/06/24     Adult Transthoracic Echo Complete W/ Cont if Necessary Per Protocol     Interpretation Summary    Left ventricular ejection fraction appears to be 66 - 70%.    Left ventricular wall thickness is consistent with mild septal asymmetric hypertrophy.    Left ventricular diastolic function is consistent with (grade I) impaired relaxation.    The right ventricular cavity is mildly dilated.    The left atrial cavity is mildly dilated.    Left atrial volume is moderately increased.    The right atrial cavity is borderline dilated.    Moderate tricuspid valve regurgitation is present.    Estimated right ventricular systolic pressure from tricuspid regurgitation is markedly elevated (>55 mmHg).        Results: Reviewed.  I reviewed the patient's new laboratory and imaging results.  I independently reviewed the patient's new images.     Medications: Reviewed.           Assessment   A / P      Hospital:          LOS: 1 day            Active Hospital Problems     Diagnosis   POA    **Acute on chronic hypoxic  respiratory failure [J96.21]   Yes    Interstitial lung disease [J84.9]   Yes    HTN (hypertension) [I10]   Yes    Chronic respiratory failure with hypoxia [J96.11]   Yes    Physical deconditioning [R53.81]   Yes    Rheumatoid arthritis with positive rheumatoid factor [M05.9]   Yes      Liz is a 73 y.o. female admitted on 1/16/2024 with Acute on chronic hypoxic respiratory failure [J96.21]     Assessment/Management/Treatment Plan:     Progress Notes by Kristyn Haro APRN (07/17/2023 10:00)   Consults by Anais Gomez MD (01/09/2024 15:41)      ILD - associated to Rheumatoid arthritis. R/O acute exacerbation, as they were weaning steroids.  R/O atypical/opportunistic infection.  CT with both GGO and honeycombing.  Previous PFTs 01/16/2023: FVC 1.07 L 40%,  FEV1 1.03 L, 51%,  FEV1 ratio 0.97,  TLC 2.64 L, 62%,  DLCO 27% (Multiple attempts)  PH most likely Group 3, RVSP is markedly elevated, > 55 mmHg per ECHO 01/06/2024  Acute/Chronic Respiratory Failure type 2.  Former smoker.  HTN  A Fib per history. Anticoagulation with RIVaroxaban   Elevated ALKP  Hypothyroidism ? Treatment with Levothyroxine         Lab Results   Component Value Date     TSH 0.930 01/15/2022      No history of T2 diabetes           Lab Results   Lab Value Date/Time     HGBA1C <4.20 (L) 01/07/2024 0550     HGBA1C 5.40 11/28/2021 0611                 Code Status and Medical Interventions:   Ordered at: 01/16/24 1851     Level Of Support Discussed With:     Patient     Code Status (Patient has no pulse and is not breathing):     CPR (Attempt to Resuscitate)     Medical Interventions (Patient has pulse or is breathing):     Full Support         In brief:  Discussed with Dr. Gomez and Dr. Blue  Discussed with patient and family at bedside  With her janny FiO2  high risk for complication and need for Invasive Mechanical Ventilation if we do a Bronchoscopy.   Patient does not want to be intubated or be on Invasive Mechanical  Ventilation   Need to treat empirically for potential exacerbation of ILD, and/or infections.  Even if we could start antifibrotic therapy, this would not help with current exacerbation. This therapy needs to be evaluated at the Pulmonary Office.  Need to consider Code Status and level of support.  Fungal serologies  CRP  Vasculitis serologies.  Change steroids to Methylprednisolone 125 mg/d  Continue SMX-TMP prophylaxis     I discussed the patient's findings and my recommendations with patient, family, and primary care team     MDM:    Problem(s) High due to: Acute or Chronic illness or injury that may poses a threat to life or bodily function  Data: High due to: Review of prior external records from each unique source, Review of the result(s) of each unique test, Ordering of each unique test, and Discuss management or test interpretation with external physician or NPP (not separately reported)     High     Thank you very much for allowing to participate in the care of Ms. Liz Borjas     [x] Inpatient Pulmonary Consult Service     Copied text in this note has been reviewed and is accurate as of 24                            Revision History        Rosie James MD   Physician  Medicine     Progress Notes      Addendum     Date of Service: 24  Creation Time: 24     Expand All Collapse [facility] Medicine Services  PROGRESS NOTE     Patient Name: Liz Borjas  : 1950  MRN: [id number]     Date of Admission: 2024  Primary Care Physician: Valerie Sesay APRN        Subjective   Subjective      CC:  F/u dyspnea     HPI:  Remains on high flow, 55 L, 100%.  Afebrile.  No bowel movement.  Reports dyspnea.  Denied cough.  Reports feeling sleepy.  Had a bowel movement today.  Desaturated to 60% when moving her hips to go on bedpan and required NRB on top of the HFNC.            Objective   Objective      Vital Signs:   Temp:  [97.6 °F (36.4  °C)-98.3 °F (36.8 °C)] 98.2 °F (36.8 °C)  Heart Rate:  [57-76] 65  Resp:  [18-22] 18  BP: (123-170)/(65-85) 137/75  Flow (L/min):  [55] 55     Physical Exam:  Constitutional: No acute distress, awake, alert, elderly, frail  HENT: NCAT, mucous membranes moist  Respiratory: Velco crackles diffusely  Cardiovascular: RRR, no murmurs, rubs, or gallops  Gastrointestinal: Positive bowel sounds, soft, nontender, nondistended  Musculoskeletal: No bilateral ankle edema  Psychiatric: Appropriate affect, cooperative  Neurologic: PERRL, symmetric facies, speech clear  Skin: No rashes           Results Reviewed:  LAB RESULTS:              Lab 01/18/24  0430 01/17/24  1636 01/17/24  0503 01/16/24  1805 01/16/24  1357   WBC 4.81  --  6.46  --  15.80*   HEMOGLOBIN 9.5*  --  9.8*  --  11.4*   HEMATOCRIT 32.6*  --  33.4*  --  40.4   PLATELETS 124*  --  109*  --  199   NEUTROS ABS 4.29  --  5.06  --  14.74*   IMMATURE GRANS (ABS) 0.02  --  0.02  --  0.09*   LYMPHS ABS 0.31*  --  0.77  --  0.33*   MONOS ABS 0.19  --  0.58  --  0.61   EOS ABS 0.00  --  0.02  --  0.01   MCV 97.9*  --  96.8  --  97.8*   CRP 7.86* 9.05*  --   --   --    PROCALCITONIN 0.08  --  0.16  --  0.11   LACTATE  --   --   --  0.8 2.4*                Lab 01/18/24  0430 01/17/24  0503 01/16/24  1357   SODIUM 142 139 141   POTASSIUM 4.3 3.9 4.2   CHLORIDE 105 104 102   CO2 29.0 28.0 30.0*   ANION GAP 8.0 7.0 9.0   BUN 20 22 24*   CREATININE 0.87 0.91 1.00   EGFR 71.3 67.6 60.4   GLUCOSE 108* 66 104*   CALCIUM 8.2* 8.0* 9.0                Lab 01/18/24  0430 01/17/24  0503 01/16/24  1357   TOTAL PROTEIN 5.6* 5.4* 6.9   ALBUMIN 2.6* 2.4* 3.2*   GLOBULIN 3.0 3.0 3.7   ALT (SGPT) 20 21 33   AST (SGOT) 26 30 52*   BILIRUBIN 0.2 0.2 0.5   ALK PHOS 277* 306* 410*                Lab 01/17/24  0014 01/16/24  2235 01/16/24  1357   PROBNP  --   --  3,161.0*   HSTROP T 14* 15* 21*                      Lab 01/16/24  1449   PH, ARTERIAL 7.358   PCO2, ARTERIAL 54.4*   PO2 .0*    FIO2 100   HCO3 ART 30.6*   BASE EXCESS ART 4.1*   CARBOXYHEMOGLOBIN 0.9      Brief Urine Lab Results         None                Microbiology Results Abnormal         Procedure Component Value - Date/Time     Respiratory Culture - Sputum, Oropharynx [[id number]] Collected: 01/18/24 0054     Lab Status: Preliminary result Specimen: Sputum from Oropharynx Updated: 01/18/24 0216       Gram Stain No Epithelial cells seen         Moderate (3+) WBCs seen         Occasional Mixed bacterial morphotypes seen on Gram Stain     Blood Culture - Blood, Arm, Left [[id number]]  (Normal) Collected: 01/16/24 1807     Lab Status: Preliminary result Specimen: Blood from Arm, Left Updated: 01/17/24 1832       Blood Culture No growth at 24 hours     Narrative:       Less than seven (7) mL's of blood was collected.  Insufficient quantity may yield false negative results.     Blood Culture - Blood, Wrist, Right [[id number]]  (Normal) Collected: 01/16/24 1741     Lab Status: Preliminary result Specimen: Blood from Wrist, Right Updated: 01/17/24 1801       Blood Culture No growth at 24 hours     Narrative:       Less than seven (7) mL's of blood was collected.  Insufficient quantity may yield false negative results.     MRSA Screen, PCR (Inpatient) - Swab, Nares [[id number]]  (Normal) Collected: 01/16/24 1800     Lab Status: Final result Specimen: Swab from Nares Updated: 01/17/24 0042       MRSA PCR No MRSA Detected     Narrative:       The negative predictive value of this diagnostic test is high and should only be used to consider de-escalating anti-MRSA therapy. A positive result may indicate colonization with MRSA and must be correlated clinically.     S. Pneumo Ag Urine or CSF - Urine, Straight Cath [[id number]]  (Normal) Collected: 01/16/24 1823     Lab Status: Final result Specimen: Urine from Straight Cath Updated: 01/17/24 0002       Strep Pneumo Ag Negative     Legionella Antigen, Urine - Urine, Straight Cath [[id number]]  (Normal)  Collected: 01/16/24 1823     Lab Status: Final result Specimen: Urine from Straight Cath Updated: 01/17/24 0001       LEGIONELLA ANTIGEN, URINE Negative     COVID PRE-OP / PRE-PROCEDURE SCREENING ORDER (NO ISOLATION) - Swab, Nasopharynx [[id number]]  (Normal) Collected: 01/16/24 1405     Lab Status: Final result Specimen: Swab from Nasopharynx Updated: 01/16/24 1503     Narrative:       The following orders were created for panel order COVID PRE-OP / PRE-PROCEDURE SCREENING ORDER (NO ISOLATION) - Swab, Nasopharynx.  Procedure                               Abnormality         Status                     ---------                               -----------         ------                     Respiratory Panel PCR w/...[[id number]]  Normal              Final result                  Please view results for these tests on the individual orders.     Respiratory Panel PCR w/COVID-19(SARS-CoV-2) ALFREDO/MEGAN/MARIA FERNANDA/PAD/COR/MONIKA In-House, NP Swab in UTM/VTM, 2 HR TAT - Swab, Nasopharynx [[id number]]  (Normal) Collected: 01/16/24 1405     Lab Status: Final result Specimen: Swab from Nasopharynx Updated: 01/16/24 1503       ADENOVIRUS, PCR Not Detected       Coronavirus 229E Not Detected       Coronavirus HKU1 Not Detected       Coronavirus NL63 Not Detected       Coronavirus OC43 Not Detected       COVID19 Not Detected       Human Metapneumovirus Not Detected       Human Rhinovirus/Enterovirus Not Detected       Influenza A PCR Not Detected       Influenza B PCR Not Detected       Parainfluenza Virus 1 Not Detected       Parainfluenza Virus 2 Not Detected       Parainfluenza Virus 3 Not Detected       Parainfluenza Virus 4 Not Detected       RSV, PCR Not Detected       Bordetella pertussis pcr Not Detected       Bordetella parapertussis PCR Not Detected       Chlamydophila pneumoniae PCR Not Detected       Mycoplasma pneumo by PCR Not Detected     Narrative:       In the setting of a positive respiratory panel with a viral infection PLUS a  negative procalcitonin without other underlying concern for bacterial infection, consider observing off antibiotics or discontinuation of antibiotics and continue supportive care. If the respiratory panel is positive for atypical bacterial infection (Bordetella pertussis, Chlamydophila pneumoniae, or Mycoplasma pneumoniae), consider antibiotic de-escalation to target atypical bacterial infection.                  Radiology results from the last 24 hours:   XR Chest 1 View     Result Date: 1/18/2024  XR CHEST 1 VW Date of Exam: 1/18/2024 6:39 AM EST Indication: hypoxia Comparison: 1/16/2024 Findings: Cardiac size is similar to prior exam. Low lung volumes. Again seen are coarse interstitial opacities throughout the lungs which appears slightly decreased from prior examination. No new focal consolidation. No pleural effusion or pneumothorax seen. No evidence of acute osseous abnormalities. Visualized upper abdomen is unremarkable.      Impression: Impression: Again seen are coarse interstitial opacities throughout the lungs, slightly improved from prior examination. Electronically Signed: Rich Godwin MD  1/18/2024 7:12 AM EST  Workstation ID: [id number]     CT Angiogram Chest     Result Date: 1/16/2024  CT ANGIOGRAM CHEST Date of Exam: 1/16/2024 4:44 PM EST Indication: acute on chronic respiratory failure w/ hypoxemia and hypercapnea. Comparison: Chest radiograph performed on January 16, 2024. Chest CT performed on July 18, 2022. Technique: CTA of the chest was performed after the uneventful intravenous administration of Isovue-370, 85 mL. Reconstructed coronal and sagittal images were also obtained. In addition, a 3-D volume rendered image was created for interpretation. Automated exposure control and iterative reconstruction methods were used. Findings: Pulmonary arteries: Adequate opacification of the pulmonary arteries.No evidence of acute pulmonary embolism. Thyroid: The visualized portion of the thyroid is  unremarkable. Lungs and Pleura: Extensive interstitial thickening is visualized throughout the lung parenchyma with associated traction bronchiectasis. Lung volumes are low. Subpleural honeycombing is visualized and is most prominent in the lung bases. There is almost  diffuse groundglass attenuation of the lung parenchyma. Small foci of consolidation are visualized in the lateral right upper lobe and medial right lower lobe. No pleural effusion. No discrete pulmonary masses are visualized. No pneumothorax. Mediastinum/Marivel: No mediastinal or hilar lymphadenopathy. Moderate hiatal hernia is visualized. Lymph nodes: No axillary or supraclavicular lymphadenopathy. Cardiovascular: The heart is normal in size.No evidence of pericardial effusion.the thoracic aorta is normal in caliber and contour with small scattered calcifications. Small coronary calcifications are noted. Upper Abdomen: No acute process in the upper abdomen. Bones and Soft Tissue: No acute fracture, aggressive osseous lesions, or soft tissue process.      Impression: Impression: No evidence of pulmonary embolism. No evidence of thoracic aortic aneurysm or dissection. Findings compatible with advanced pulmonary fibrosis. There are superimposed groundglass attenuation of the lung parenchyma which may be secondary to interstitial pneumonitis. Small consolidations in the right upper and lower lobes are compatible with multifocal pneumonia. Electronically Signed: Geoffrey Wagner MD  1/16/2024 4:58 PM EST  Workstation ID: [id number]     XR Chest 1 View     Result Date: 1/16/2024  XR CHEST 1 VW Date of Exam: 1/16/2024 2:30 PM EST Indication: SOA triage protocol Comparison: 1/7/2024. Findings: Similar aeration to comparison with coarse bilateral airspace opacities present in addition to more focal dense airspace disease in the right upper lobe. There is no enlarging effusion or distinct pneumothorax. Unchanged heart and mediastinal contours.      Impression:  Impression: Similar multifocal airspace opacities remaining most dense in the right upper lobe. Electronically Signed: Viktor Chairez MD  1/16/2024 2:47 PM EST  Workstation ID: [id number]         Results for orders placed during the hospital encounter of 01/06/24     Adult Transthoracic Echo Complete W/ Cont if Necessary Per Protocol     Interpretation Summary    Left ventricular ejection fraction appears to be 66 - 70%.    Left ventricular wall thickness is consistent with mild septal asymmetric hypertrophy.    Left ventricular diastolic function is consistent with (grade I) impaired relaxation.    The right ventricular cavity is mildly dilated.    The left atrial cavity is mildly dilated.    Left atrial volume is moderately increased.    The right atrial cavity is borderline dilated.    Moderate tricuspid valve regurgitation is present.    Estimated right ventricular systolic pressure from tricuspid regurgitation is markedly elevated (>55 mmHg).        Current medications:  Scheduled Meds:amLODIPine, 5 mg, Oral, Q24H  budesonide-formoterol, 2 puff, Inhalation, BID - RT   And  tiotropium bromide monohydrate, 2 puff, Inhalation, Daily - RT  cetirizine, 10 mg, Oral, Daily  dicyclomine, 10 mg, Oral, TID  doxepin, 50 mg, Oral, Nightly  ferrous sulfate, 325 mg, Oral, Daily With Breakfast  flecainide, 50 mg, Oral, Q12H  FLUoxetine, 40 mg, Oral, Daily  hydroxychloroquine, 200 mg, Oral, Q24H  ipratropium-albuterol, 3 mL, Nebulization, 4x Daily - RT  levothyroxine, 25 mcg, Oral, Q AM  methylPREDNISolone sodium succinate, 62.5 mg, Intravenous, Q12H  metoprolol tartrate, 25 mg, Oral, BID  pantoprazole, 40 mg, Oral, BID AC  piperacillin-tazobactam, 3.375 g, Intravenous, Q8H  rivaroxaban, 15 mg, Oral, Daily With Dinner  sulfamethoxazole-trimethoprim, 2 tablet, Oral, Once per day on Monday Wednesday Friday  sulfaSALAzine, 500 mg, Oral, Q12H  traMADol, 50 mg, Oral, BID        Continuous Infusions:   PRN Meds:.  senna-docusate  sodium **AND** polyethylene glycol **AND** bisacodyl **AND** bisacodyl    Calcium Replacement - Follow Nurse / BPA Driven Protocol    fluticasone    ipratropium-albuterol    Magnesium Standard Dose Replacement - Follow Nurse / BPA Driven Protocol    nitroglycerin    Phosphorus Replacement - Follow Nurse / BPA Driven Protocol    Potassium Replacement - Follow Nurse / BPA Driven Protocol    sodium chloride           Assessment & Plan  Assessment & Plan            Active Hospital Problems     Diagnosis   POA    **Acute on chronic hypoxic respiratory failure [J96.21]   Yes    Severe malnutrition [E43]   Yes    Interstitial lung disease [J84.9]   Yes    HTN (hypertension) [I10]   Yes    Chronic respiratory failure with hypoxia [J96.11]   Yes    Physical deconditioning [R53.81]   Yes    Rheumatoid arthritis with positive rheumatoid factor [M05.9]   Yes       Resolved Hospital Problems   No resolved problems to display.         Brief Hospital Course to date:  Liz Borjas is a 73 y.o. female with past medical history of chronic hypoxic respiratory failure on 6 L nasal cannula, advanced interstitial lung disease, underlying COPD, ROPER cirrhosis, rheumatoid arthritis on chronic prednisone (20 mg daily), HTN, A-fib on Xarelto, CKDIII, with recent hospitalization 1/6 to 1/9 for right upper lobe pneumonia who represented with hypoxia, cough and poor appetite. Was found to have acute on chronic hypoxic respiratory failure secondary to multifocal pneumonia. Pulmonology following outpatient for ILD, they have been consulted here as well. She was started on HFNC initially.     This patient's problems and plans were partially entered by my partner and updated as appropriate by me 01/18/24.     Acute on chronic hypoxic respiratory failure  Multifocal pneumonia  Hx of advanced ILD with UIP pattern  Hx COPD without exacerbation   -Recent admission for RUL pneumonia with progression despite completing oral antibiotics  -Afebrile, WBC  15K on admission, resp PCR negative   -CTA chest with advanced pulmonary fibrosis and small consolidations in RUL and RLL. Reviewed imaging with pulm today. Difficult to see much differences with scan 2 years ago, therefore hard to distinguish if there has been much more inflammation that will respond to steroids.   -Defer anti-fibrotic agent to outpatient  -Continue steroids per Pulm  -Covering empirically with IV Zosyn given recent hospitalization, dc vanc d/t neg MRSA   screen. Cultures in process  -Discussed with pulmonary who recommended the following: added doxy BID IV, IV bactrim daily  -hold for above Bactrim MWF. Symbicort/Spiriva/Duo-Nebs.      GOC  -Discussed with patient and she elected to transition to DNR/DNI and consulted palliative given worsened condition     Elevated troponin, type II MI  -Likely secondary to acute hypoxia  -EKG without acute ST-T changes; no chest pain  -Repeat troponin downtrending     Elevated proBNP  Chronic HFpEF  -proBNP 3K on admission  -no evidence of edema on imaging, mild pedal edema on exam  -Echo 1/6/24 with EF 66 to 70%, grade 1 diastolic dysfunction, moderate TR, RVSP >55 mmHg  -Will hold off on diuresing for now. Can consider as needed. Strict Is&Os.     Chronic anemia  -Hgb appears at baseline, no evidence of overt bleeding  -Continue home oral iron supplement. Further workup as outpatient. Would update age-appropriate cancer screenings.     Rheumatoid arthritis  -On outpatient prednisone 20 mg daily, recently discharged on prolonged taper  -Continue home Plaquenil, sulfasalazine and Bentyl/Tramadol for pain.  -Continue steroids as above. Bactrim MWF (held as above).     Paroxysmal atrial fibrillation  -Continue metoprolol, flecainide and Xarelto.     GERD  -Continue PPI twice daily.     Hypothyroidism  -Continue levothyroxine.     Chronic insomnia  -Continue doxepin.     Hypertension  -Continue amlodipine.     Anxiety/depression  -Continue Prozac.     Expected  Discharge Location and Transportation: home  Expected Discharge   Expected Discharge Date: 1/20/2024; Expected Discharge Time:       DVT prophylaxis:  Medical DVT prophylaxis orders are present.      AM-PAC 6 Clicks Score (PT): 12 (01/17/24 0751)     CODE STATUS:       Code Status and Medical Interventions:   Ordered at: 01/16/24 1851     Level Of Support Discussed With:     Patient     Code Status (Patient has no pulse and is not breathing):     CPR (Attempt to Resuscitate)     Medical Interventions (Patient has pulse or is breathing):     Full Support         Rosie James MD  01/18/24                    Revision History           Gerardo Robertson MD   Physician  Intensivist     Progress Notes      Signed     Date of Service: 01/18/24 1545  Creation Time: 01/18/24 1545     Signed       Expand All Collapse All    PULMONARY SERVICE  PROGRESS NOTE         Subjective   SUBJECTIVE      We were asked to see Liz Borjas, 73 y.o. female for: Shortness of Breath       Acute on chronic hypoxic respiratory failure    Rheumatoid arthritis with positive rheumatoid factor    Physical deconditioning    Chronic respiratory failure with hypoxia    HTN (hypertension)    Interstitial lung disease    Severe malnutrition        Interval History:   She had a quick decompensation this morning.     She is also anxious.     Coughing. Sputum in lime-green, thick.     But she is better now.      She remains comfortable on HFNC.     Device (Oxygen Therapy): high-flow nasal cannula (01/18/24 1209): Flow (L/min): 45 (01/18/24 1216). Oxygen Concentration (%): 70 (01/18/24 1216).      She actually thinks she is a bit better than yesterday.  And her SpO2  has improved from 91% yesterday to % today.     Temp  Min: 97.6 °F (36.4 °C)  Max: 98.4 °F (36.9 °C)      The patient's relevant past medical, surgical and social history were reviewed and updated in Epic as appropriate.      History     Last Reviewed by Gerardo Robertson MD on 1/17/2024 at  11:30 AM    Sections Reviewed    Medical, Surgical, Family, Tobacco, Alcohol, Drug Use, Sexual Activity,   Social Documentation    Problem list reviewed by Gerardo Robertson MD on 1/17/2024 at 11:30 AM  Medicines reviewed by Gerardo Robertson MD on 1/17/2024 at 11:29 AM  Allergies reviewed by Gerardo Robertson MD on 1/17/2024 at 11:29 AM              Objective   O      Vitals:  Temp: 98.4 °F (36.9 °C) (01/18/24 1100) Temp  Min: 97.6 °F (36.4 °C)  Max: 98.4 °F (36.9 °C)   BP: 93/51 (01/18/24 1100) BP  Min: 93/51  Max: 170/85   Pulse: 61 (01/18/24 1209) Pulse  Min: 57  Max: 70   Resp: 17 (01/18/24 1209) Resp  Min: 17  Max: 22   SpO2: 100 % (01/18/24 1209) SpO2  Min: 94 %  Max: 100 %   Device: high-flow nasal cannula (01/18/24 1209)     Flow Rate: 45 (01/18/24 1216) Flow (L/min)  Min: 45  Max: 55      Medications (drips):     Telemetry:  Rhythm: normal sinus rhythm (01/18/24 1200)   Constitutional:  No acute distress.   Cardiovascular: RRR.    Respiratory: Normal breath sounds  (+) Rhonchi  (+) Squawks   Abdominal:  Soft with no tenderness.   Extremities: No Edema   Neurological:             Alert, Oriented, Cooperative.  Best Eye Response: 4-->(E4) spontaneous (01/18/24 0847)  Best Motor Response: 6-->(M6) obeys commands (01/18/24 0847)  Best Verbal Response: 5-->(V5) oriented (01/18/24 0847)  [place] Coma Scale Score: 15 (01/18/24 0847)      Results Reviewed:  Laboratory  Microbiology  Radiology  Pathology     Hematology:         Results from last 7 days   Lab Units 01/18/24  0430 01/17/24  0503 01/16/24  1357   WBC 10*3/mm3 4.81 6.46 15.80*   HEMOGLOBIN g/dL 9.5* 9.8* 11.4*   MCV fL 97.9* 96.8 97.8*   PLATELETS 10*3/mm3 124* 109* 199             Results from last 7 days   Lab Units 01/18/24  0430 01/17/24  0503 01/16/24  1357   NEUTROS ABS 10*3/mm3 4.29 5.06 14.74*   LYMPHS ABS 10*3/mm3 0.31* 0.77 0.33*   EOS ABS 10*3/mm3 0.00 0.02 0.01      Chemistry:  Estimated Creatinine Clearance: 46.7 mL/min (by C-G formula based on  SCr of 0.87 mg/dL).           Results from last 7 days   Lab Units 01/18/24  0430 01/17/24  0503   SODIUM mmol/L 142 139   POTASSIUM mmol/L 4.3 3.9   CHLORIDE mmol/L 105 104   CO2 mmol/L 29.0 28.0   BUN mg/dL 20 22   CREATININE mg/dL 0.87 0.91   GLUCOSE mg/dL 108* 66            Results from last 7 days   Lab Units 01/18/24  0430 01/17/24  0503   CALCIUM mg/dL 8.2* 8.0*      Hepatic Panel:         Results from last 7 days   Lab Units 01/18/24  0430 01/17/24  0503 01/16/24  1357   ALBUMIN g/dL 2.6* 2.4* 3.2*   TOTAL PROTEIN g/dL 5.6* 5.4* 6.9   BILIRUBIN mg/dL 0.2 0.2 0.5   AST (SGOT) U/L 26 30 52*   ALT (SGPT) U/L 20 21 33   ALK PHOS U/L 277* 306* 410*      Cardiac Labs:         Results from last 7 days   Lab Units 01/17/24  0014 01/16/24  2235 01/16/24  1357   PROBNP pg/mL  --   --  3,161.0*   HSTROP T ng/L 14* 15* 21*      Biomarkers:           Results from last 7 days   Lab Units 01/18/24  0430 01/17/24  1636 01/17/24  0503 01/16/24  1805 01/16/24  1357   CRP mg/dL 7.86* 9.05*  --   --   --    LACTATE mmol/L  --   --   --  0.8 2.4*   PROCALCITONIN ng/mL 0.08  --  0.16  --  0.11      COVID-19        Lab Results   Component Value Date     COVID19 Not Detected 01/16/2024     COVID19 Not Detected 01/06/2024     COVID19 Not Detected 01/06/2024     COVID19 Not Detected 01/14/2022      Images:  XR Chest 1 View     Result Date: 1/18/2024  Impression: Again seen are coarse interstitial opacities throughout the lungs, slightly improved from prior examination. Electronically Signed: Rich Godwin MD  1/18/2024 7:12 AM EST  Workstation ID: [id number]     CT Angiogram Chest     Result Date: 1/16/2024  Impression: No evidence of pulmonary embolism. No evidence of thoracic aortic aneurysm or dissection. Findings compatible with advanced pulmonary fibrosis. There are superimposed groundglass attenuation of the lung parenchyma which may be secondary to interstitial pneumonitis. Small consolidations in the right upper and lower  lobes are compatible with multifocal pneumonia. Electronically Signed: Geoffrey Wagner MD  1/16/2024 4:58 PM EST  Workstation ID: [id number]      Echo:  Results for orders placed during the hospital encounter of 01/06/24     Adult Transthoracic Echo Complete W/ Cont if Necessary Per Protocol     Interpretation Summary    Left ventricular ejection fraction appears to be 66 - 70%.    Left ventricular wall thickness is consistent with mild septal asymmetric hypertrophy.    Left ventricular diastolic function is consistent with (grade I) impaired relaxation.    The right ventricular cavity is mildly dilated.    The left atrial cavity is mildly dilated.    Left atrial volume is moderately increased.    The right atrial cavity is borderline dilated.    Moderate tricuspid valve regurgitation is present.    Estimated right ventricular systolic pressure from tricuspid regurgitation is markedly elevated (>55 mmHg).        Results: Reviewed.  I reviewed the patient's new laboratory and imaging results.  I independently reviewed the patient's new images.     Medications: Reviewed.           Assessment   A / P      Hospital:          LOS: 2 days            Active Hospital Problems     Diagnosis   POA    **Acute on chronic hypoxic respiratory failure [J96.21]   Yes    Severe malnutrition [E43]   Yes    Interstitial lung disease [J84.9]   Yes    HTN (hypertension) [I10]   Yes    Chronic respiratory failure with hypoxia [J96.11]   Yes    Physical deconditioning [R53.81]   Yes    Rheumatoid arthritis with positive rheumatoid factor [M05.9]   Yes      Liz is a 73 y.o. female admitted on 1/16/2024 with Acute on chronic hypoxic respiratory failure [J96.21]     Assessment/Management/Treatment Plan:     Progress Notes by Kristyn Haro APRN (07/17/2023 10:00)   Consults by Anais Gomez MD (01/09/2024 15:41)      ILD - associated to Rheumatoid arthritis. R/O acute exacerbation, as they were weaning steroids.  R/O  atypical/opportunistic infection.  CT with both GGO and honeycombing.  Previous PFTs 01/16/2023: FVC 1.07 L 40%,  FEV1 1.03 L, 51%,  FEV1 ratio 0.97,  TLC 2.64 L, 62%,  DLCO 27% (Multiple attempts)  PH most likely Group 3, RVSP is markedly elevated, > 55 mmHg per ECHO 01/06/2024  Currently on Methylprednisolone 125 mg/d x 3 days (01/17/24-01/19/24)  Acute/Chronic Respiratory Failure type 2.  Former smoker.  HTN  A Fib per history. Anticoagulation with RIVaroxaban   Elevated ALKP  Hypothyroidism ? Treatment with Levothyroxine         Lab Results   Component Value Date     TSH 0.930 01/15/2022      No history of T2 diabetes           Lab Results   Lab Value Date/Time     HGBA1C <4.20 (L) 01/07/2024 0550     HGBA1C 5.40 11/28/2021 0611                 Code Status and Medical Interventions:   Ordered at: 01/18/24 0854     Medical Intervention Limits:     NO intubation (DNI)     Level Of Support Discussed With:     Patient     Code Status (Patient has no pulse and is not breathing):     No CPR (Do Not Attempt to Resuscitate)     Medical Interventions (Patient has pulse or is breathing):     Limited Support         In brief:     Discussed with patient and family at bedside (01/17/24)  With her janny FiO2  high risk for complication and need for Invasive Mechanical Ventilation if we do a Bronchoscopy.   Patient does not want to be intubated or be on Invasive Mechanical Ventilation   Need to treat empirically for potential exacerbation of ILD, and/or infections.  Discussed with patient and son.  Even if we could start antifibrotic therapy while in the hospital, this would not help with current exacerbation. This therapy needs to be evaluated at the Pulmonary Office.  After further conversations between Dr. James and patient, she has decided DNI/DNAR  F/U Cultures and serologies. Adjust antibiotics based on results and presence of resistance.  As discussed with Dr. James, add Doxy to cover atypical, and change TMP/SMX to  IV daily to cover for potential PJP.  Continue Methylprednisolone 125 mg/d (Day 2 out of 3)  Palliative Team to help with symptoms control.     I discussed the patient's findings and my recommendations with patient, family, and primary care team     MDM:    Problem(s) High due to: Acute or Chronic illness or injury that may poses a threat to life or bodily function  Data: High due to: Review of prior external records from each unique source, Review of the result(s) of each unique test, Ordering of each unique test, and Discuss management or test interpretation with external physician or NPP (not separately reported)     High     Thank you very much for allowing to participate in the care of Ms. Liz Borjas     [x] Inpatient Pulmonary Consult Service     Copied text in this note has been reviewed and is accurate as of 24                                      Rosie James MD   Physician  Medicine     Progress Notes      Addendum     Date of Service: 24  Creation Time: 24     Expand All Collapse [facility] Medicine Services  PROGRESS NOTE     Patient Name: Liz Borjas  : 1950  MRN: [id number]     Date of Admission: 2024  Primary Care Physician: Valerie Sesay APRN        Subjective   Subjective      CC:  F/u dyspnea     HPI:  Remains on high flow, down to 45L, 65%.  Some shortness of breath.  Feels like she is breathing slightly better compared to yesterday.  Patient's son is at bedside.  Denies any GI or  symptoms.  Reports sleeping well.           Objective   Objective      Vital Signs:   Temp:  [97.7 °F (36.5 °C)-98.6 °F (37 °C)] 97.7 °F (36.5 °C)  Heart Rate:  [59-64] 59  Resp:  [17-22] 22  BP: ()/(51-84) 118/84  Flow (L/min):  [45-55] 45     Physical Exam:  Constitutional: No acute distress, awake, alert, elderly, frail, sitting up in bed  HENT: NCAT, mucous membranes moist  Respiratory: Velco crackles  diffusely  Cardiovascular: RRR, no murmurs, rubs, or gallops  Gastrointestinal: Normoactive bowel sounds, soft, nontender, nondistended  Musculoskeletal: No bilateral ankle edema  Psychiatric: Appropriate affect, cooperative  Neurologic: PERRL, symmetric facies, speech clear  Skin: No rashes           Results Reviewed:  LAB RESULTS:               Lab 01/19/24  0338 01/18/24  0430 01/17/24  1636 01/17/24  0503 01/16/24  1805 01/16/24  1357   WBC 5.37 4.81  --  6.46  --  15.80*   HEMOGLOBIN 9.0* 9.5*  --  9.8*  --  11.4*   HEMATOCRIT 31.4* 32.6*  --  33.4*  --  40.4   PLATELETS 122* 124*  --  109*  --  199   NEUTROS ABS 4.90 4.29  --  5.06  --  14.74*   IMMATURE GRANS (ABS) 0.02 0.02  --  0.02  --  0.09*   LYMPHS ABS 0.29* 0.31*  --  0.77  --  0.33*   MONOS ABS 0.16 0.19  --  0.58  --  0.61   EOS ABS 0.00 0.00  --  0.02  --  0.01   MCV 96.0 97.9*  --  96.8  --  97.8*   CRP  --  7.86* 9.05*  --   --   --    PROCALCITONIN  --  0.08  --  0.16  --  0.11   LACTATE  --   --   --   --  0.8 2.4*                Lab 01/18/24  0430 01/17/24  0503 01/16/24  1357   SODIUM 142 139 141   POTASSIUM 4.3 3.9 4.2   CHLORIDE 105 104 102   CO2 29.0 28.0 30.0*   ANION GAP 8.0 7.0 9.0   BUN 20 22 24*   CREATININE 0.87 0.91 1.00   EGFR 71.3 67.6 60.4   GLUCOSE 108* 66 104*   CALCIUM 8.2* 8.0* 9.0                Lab 01/18/24  0430 01/17/24  0503 01/16/24  1357   TOTAL PROTEIN 5.6* 5.4* 6.9   ALBUMIN 2.6* 2.4* 3.2*   GLOBULIN 3.0 3.0 3.7   ALT (SGPT) 20 21 33   AST (SGOT) 26 30 52*   BILIRUBIN 0.2 0.2 0.5   ALK PHOS 277* 306* 410*                Lab 01/17/24  0014 01/16/24  2235 01/16/24  1357   PROBNP  --   --  3,161.0*   HSTROP T 14* 15* 21*                      Lab 01/16/24  1449   PH, ARTERIAL 7.358   PCO2, ARTERIAL 54.4*   PO2 .0*   FIO2 100   HCO3 ART 30.6*   BASE EXCESS ART 4.1*   CARBOXYHEMOGLOBIN 0.9      Brief Urine Lab Results         None                Microbiology Results Abnormal         Procedure Component Value -  Date/Time     Blood Culture - Blood, Arm, Left [[id number]]  (Normal) Collected: 01/16/24 1807     Lab Status: Preliminary result Specimen: Blood from Arm, Left Updated: 01/18/24 1831       Blood Culture No growth at 2 days     Narrative:       Less than seven (7) mL's of blood was collected.  Insufficient quantity may yield false negative results.     Blood Culture - Blood, Wrist, Right [[id number]]  (Normal) Collected: 01/16/24 1741     Lab Status: Preliminary result Specimen: Blood from Wrist, Right Updated: 01/18/24 1800       Blood Culture No growth at 2 days     Narrative:       Less than seven (7) mL's of blood was collected.  Insufficient quantity may yield false negative results.     Respiratory Culture - Sputum, Oropharynx [[id number]] Collected: 01/18/24 0054     Lab Status: Preliminary result Specimen: Sputum from Oropharynx Updated: 01/18/24 0216       Gram Stain No Epithelial cells seen         Moderate (3+) WBCs seen         Occasional Mixed bacterial morphotypes seen on Gram Stain     MRSA Screen, PCR (Inpatient) - Swab, Nares [[id number]]  (Normal) Collected: 01/16/24 1800     Lab Status: Final result Specimen: Swab from Nares Updated: 01/17/24 0042       MRSA PCR No MRSA Detected     Narrative:       The negative predictive value of this diagnostic test is high and should only be used to consider de-escalating anti-MRSA therapy. A positive result may indicate colonization with MRSA and must be correlated clinically.     S. Pneumo Ag Urine or CSF - Urine, Straight Cath [[id number]]  (Normal) Collected: 01/16/24 1823     Lab Status: Final result Specimen: Urine from Straight Cath Updated: 01/17/24 0002       Strep Pneumo Ag Negative     Legionella Antigen, Urine - Urine, Straight Cath [[id number]]  (Normal) Collected: 01/16/24 1823     Lab Status: Final result Specimen: Urine from Straight Cath Updated: 01/17/24 0001       LEGIONELLA ANTIGEN, URINE Negative     COVID PRE-OP / PRE-PROCEDURE SCREENING ORDER  (NO ISOLATION) - Swab, Nasopharynx [[id number]]  (Normal) Collected: 01/16/24 1405     Lab Status: Final result Specimen: Swab from Nasopharynx Updated: 01/16/24 1503     Narrative:       The following orders were created for panel order COVID PRE-OP / PRE-PROCEDURE SCREENING ORDER (NO ISOLATION) - Swab, Nasopharynx.  Procedure                               Abnormality         Status                     ---------                               -----------         ------                     Respiratory Panel PCR w/...[[id number]]  Normal              Final result                  Please view results for these tests on the individual orders.     Respiratory Panel PCR w/COVID-19(SARS-CoV-2) ALFREDO/MEGAN/MARIA FERNANDA/PAD/COR/MONIKA In-House, NP Swab in UTM/VTM, 2 HR TAT - Swab, Nasopharynx [[id number]]  (Normal) Collected: 01/16/24 1405     Lab Status: Final result Specimen: Swab from Nasopharynx Updated: 01/16/24 1503       ADENOVIRUS, PCR Not Detected       Coronavirus 229E Not Detected       Coronavirus HKU1 Not Detected       Coronavirus NL63 Not Detected       Coronavirus OC43 Not Detected       COVID19 Not Detected       Human Metapneumovirus Not Detected       Human Rhinovirus/Enterovirus Not Detected       Influenza A PCR Not Detected       Influenza B PCR Not Detected       Parainfluenza Virus 1 Not Detected       Parainfluenza Virus 2 Not Detected       Parainfluenza Virus 3 Not Detected       Parainfluenza Virus 4 Not Detected       RSV, PCR Not Detected       Bordetella pertussis pcr Not Detected       Bordetella parapertussis PCR Not Detected       Chlamydophila pneumoniae PCR Not Detected       Mycoplasma pneumo by PCR Not Detected     Narrative:       In the setting of a positive respiratory panel with a viral infection PLUS a negative procalcitonin without other underlying concern for bacterial infection, consider observing off antibiotics or discontinuation of antibiotics and continue supportive care. If the respiratory  panel is positive for atypical bacterial infection (Bordetella pertussis, Chlamydophila pneumoniae, or Mycoplasma pneumoniae), consider antibiotic de-escalation to target atypical bacterial infection.                  Radiology results from the last 24 hours:   XR Chest 1 View     Result Date: 1/18/2024  XR CHEST 1 VW Date of Exam: 1/18/2024 6:39 AM EST Indication: hypoxia Comparison: 1/16/2024 Findings: Cardiac size is similar to prior exam. Low lung volumes. Again seen are coarse interstitial opacities throughout the lungs which appears slightly decreased from prior examination. No new focal consolidation. No pleural effusion or pneumothorax seen. No evidence of acute osseous abnormalities. Visualized upper abdomen is unremarkable.      Impression: Impression: Again seen are coarse interstitial opacities throughout the lungs, slightly improved from prior examination. Electronically Signed: Rich Godwin MD  1/18/2024 7:12 AM EST  Workstation ID: [id number]         Results for orders placed during the hospital encounter of 01/06/24     Adult Transthoracic Echo Complete W/ Cont if Necessary Per Protocol     Interpretation Summary    Left ventricular ejection fraction appears to be 66 - 70%.    Left ventricular wall thickness is consistent with mild septal asymmetric hypertrophy.    Left ventricular diastolic function is consistent with (grade I) impaired relaxation.    The right ventricular cavity is mildly dilated.    The left atrial cavity is mildly dilated.    Left atrial volume is moderately increased.    The right atrial cavity is borderline dilated.    Moderate tricuspid valve regurgitation is present.    Estimated right ventricular systolic pressure from tricuspid regurgitation is markedly elevated (>55 mmHg).        Current medications:  Scheduled Meds:amLODIPine, 5 mg, Oral, Q24H  budesonide-formoterol, 2 puff, Inhalation, BID - RT   And  tiotropium bromide monohydrate, 2 puff, Inhalation, Daily -  RT  cetirizine, 10 mg, Oral, Daily  dicyclomine, 10 mg, Oral, TID  doxepin, 50 mg, Oral, Nightly  doxycycline, 100 mg, Intravenous, Q12H  ferrous sulfate, 325 mg, Oral, Daily With Breakfast  flecainide, 50 mg, Oral, Q12H  FLUoxetine, 40 mg, Oral, Daily  hydroxychloroquine, 200 mg, Oral, Q24H  ipratropium-albuterol, 3 mL, Nebulization, 4x Daily - RT  levothyroxine, 25 mcg, Oral, Q AM  methylPREDNISolone sodium succinate, 62.5 mg, Intravenous, Q12H  metoprolol tartrate, 25 mg, Oral, BID  palliative care oral rinse, , Mouth/Throat, 4x Daily  pantoprazole, 40 mg, Oral, BID AC  piperacillin-tazobactam, 3.375 g, Intravenous, Q8H  rivaroxaban, 15 mg, Oral, Daily With Dinner  [Held by provider] sulfamethoxazole-trimethoprim, 2 tablet, Oral, Once per day on Monday Wednesday Friday  trimethoprim-sulfamethoxazole, 280 mg, Intravenous, Q8H  sulfaSALAzine, 500 mg, Oral, Q12H  traMADol, 50 mg, Oral, BID        Continuous Infusions:   PRN Meds:.  senna-docusate sodium **AND** polyethylene glycol **AND** bisacodyl **AND** bisacodyl    Calcium Replacement - Follow Nurse / BPA Driven Protocol    fluticasone    ipratropium-albuterol    Magnesium Standard Dose Replacement - Follow Nurse / BPA Driven Protocol    morphine    nitroglycerin    Phosphorus Replacement - Follow Nurse / BPA Driven Protocol    Potassium Replacement - Follow Nurse / BPA Driven Protocol    sodium chloride    sodium chloride           Assessment & Plan  Assessment & Plan            Active Hospital Problems     Diagnosis   POA    **Acute on chronic hypoxic respiratory failure [J96.21]   Yes    Severe malnutrition [E43]   Yes    Interstitial lung disease [J84.9]   Yes    HTN (hypertension) [I10]   Yes    Chronic respiratory failure with hypoxia [J96.11]   Yes    Physical deconditioning [R53.81]   Yes    Rheumatoid arthritis with positive rheumatoid factor [M05.9]   Yes       Resolved Hospital Problems   No resolved problems to display.         Brief Hospital Course  to date:  Liz Borjas is a 73 y.o. female with past medical history of chronic hypoxic respiratory failure on 6 L nasal cannula, advanced interstitial lung disease, underlying COPD, ROPER cirrhosis, rheumatoid arthritis on chronic prednisone (20 mg daily), HTN, A-fib on Xarelto, CKDIII, with recent hospitalization 1/6 to 1/9 for right upper lobe pneumonia who represented with hypoxia, cough and poor appetite. Was found to have acute on chronic hypoxic respiratory failure secondary to multifocal pneumonia. Pulmonology following outpatient for ILD, they have been consulted here as well. She was started on HFNC initially.     This patient's problems and plans were partially entered by my partner and updated as appropriate by me 01/19/24.     Acute on chronic hypoxic respiratory failure  Multifocal pneumonia  Hx of advanced ILD with UIP pattern  Hx COPD without exacerbation   -Recent admission for RUL pneumonia with progression despite completing oral antibiotics  -Afebrile, WBC 15K on admission, resp PCR negative   -CTA chest with advanced pulmonary fibrosis and small consolidations in RUL and RLL. Reviewed imaging with pulm today. Difficult to see much differences with scan 2 years ago, therefore hard to distinguish if there has been much more inflammation that will respond to steroids.   -Defer anti-fibrotic agent to outpatient  -Continue steroids per Pulm (change to PO prednisone 40mg BID)  -Covering empirically with IV Zosyn given recent hospitalization, dc vanc d/t neg MRSA   screen. Cultures in process --> growing PSA, susceptibilities pending, tobramycin nebs added for double coverage by pulm, will follow-up  -Discussed with pulmonary who recommended the following: added doxy BID IV, IV bactrim daily  -hold for above Bactrim MWF.   -Continue Symbicort/Spiriva/Duo-Nebs.      GOC  -Discussed with patient and she elected to transition to DNR/DNI on 1/18  -Palliative consulted for further discussions; discussed  with patient that there is a risk of continued decline while in the hospital due to the severity of her underlying condition despite aggressive medical treatment     Elevated troponin, type II MI  -Likely secondary to acute hypoxia  -EKG without acute ST-T changes; no chest pain  -Repeat troponin downtrending     Elevated proBNP  Chronic HFpEF  -proBNP 3K on admission  -no evidence of edema on imaging, mild pedal edema on exam  -Echo 1/6/24 with EF 66 to 70%, grade 1 diastolic dysfunction, moderate TR, RVSP >55 mmHg  -Will hold off on diuresing for now. Can consider as needed. Strict Is&Os.     Chronic anemia  -Hgb appears at baseline, no evidence of overt bleeding  -Continue home oral iron supplement. Further workup as outpatient. Would update age-appropriate cancer screenings.     Rheumatoid arthritis  -On outpatient prednisone 20 mg daily, recently discharged on prolonged taper  -Continue home Plaquenil, sulfasalazine and Bentyl/Tramadol for pain.  -Continue steroids as above. Bactrim MWF (held as above).     Paroxysmal atrial fibrillation  -Continue metoprolol, flecainide and Xarelto.     GERD  -Continue PPI twice daily.     Hypothyroidism  -Continue levothyroxine.     Chronic insomnia  -Continue doxepin.     Hypertension  -Continue amlodipine.     Anxiety/depression  -Continue Prozac.     Expected Discharge Location and Transportation: home? Once more stable from respiratory status, PT/OT will need to evaluate  Expected Discharge   Expected Discharge Date: 1/24/2024; Expected Discharge Time:       DVT prophylaxis:  Medical DVT prophylaxis orders are present.      AM-PAC 6 Clicks Score (PT): 12 (01/17/24 0751)     CODE STATUS:       Code Status and Medical Interventions:   Ordered at: 01/18/24 0854     Medical Intervention Limits:     NO intubation (DNI)     Level Of Support Discussed With:     Patient     Code Status (Patient has no pulse and is not breathing):     No CPR (Do Not Attempt to Resuscitate)      Medical Interventions (Patient has pulse or is breathing):     Limited Support         Rosie aJmes MD  01/19/24                    Revision History           Gerardo Robertson MD   Physician  Intensivist     Progress Notes      Signed     Date of Service: 01/19/24 1822  Creation Time: 01/19/24 1822     Signed       Expand All Collapse All    PULMONARY SERVICE  PROGRESS NOTE         Subjective   SUBJECTIVE      We were asked to see Liz Borjas, 73 y.o. female for: Shortness of Breath       Acute on chronic hypoxic respiratory failure    Rheumatoid arthritis with positive rheumatoid factor    Physical deconditioning    Chronic respiratory failure with hypoxia    HTN (hypertension)    Interstitial lung disease    Severe malnutrition        Interval History:   Doing better today.     Son stayed during the night.     Some cough, and sputum production.     Weaning FiO2      Device (Oxygen Therapy): heated, high-flow nasal cannula, humidified (01/19/24 1530): Flow (L/min): 45 (01/19/24 1530). Oxygen Concentration (%): (S) 55 (01/19/24 1530).      Temp  Min: 96.7 °F (35.9 °C)  Max: 98.2 °F (36.8 °C)      The patient's relevant past medical, surgical and social history were reviewed and updated in Epic as appropriate.      History     Last Reviewed by Gerardo Robertson MD on 1/17/2024 at 11:30 AM    Sections Reviewed    Medical, Surgical, Family, Tobacco, Alcohol, Drug Use, Sexual Activity,   Social Documentation    Problem list reviewed by Gerardo Robertson MD on 1/17/2024 at 11:30 AM  Medicines reviewed by Gerardo Robertson MD on 1/17/2024 at 11:29 AM  Allergies reviewed by Gerardo Robertson MD on 1/17/2024 at 11:29 AM              Objective   O      Vitals:  Temp: 98.2 °F (36.8 °C) (01/19/24 1737) Temp  Min: 96.7 °F (35.9 °C)  Max: 98.2 °F (36.8 °C)   BP: 118/70 (01/19/24 1737) BP  Min: 107/66  Max: 133/103   Pulse: 64 (01/19/24 1530) Pulse  Min: 59  Max: 87   Resp: 20 (01/19/24 1737) Resp  Min: 18  Max: 22   SpO2: 97 %  (01/19/24 1530) SpO2  Min: 82 %  Max: 98 %   Device: heated, high-flow nasal cannula, humidified (01/19/24 1530)     Flow Rate: 45 (01/19/24 1530) Flow (L/min)  Min: 45  Max: 55         Telemetry:  Rhythm: normal sinus rhythm (01/19/24 0631)   Constitutional:  No acute distress.   Cardiovascular: RRR.    Respiratory: Normal breath sounds  (+) Rhonchi   Abdominal:  Soft with no tenderness.   Extremities: No Edema   Neurological:             Alert, Oriented, Cooperative.  Best Eye Response: 4-->(E4) spontaneous (01/19/24 0631)  Best Motor Response: 6-->(M6) obeys commands (01/19/24 0631)  Best Verbal Response: 5-->(V5) oriented (01/19/24 0631)  Irina Coma Scale Score: 15 (01/19/24 0631)      Results Reviewed:  Laboratory  Microbiology  Radiology  Pathology     Hematology:         Results from last 7 days   Lab Units 01/19/24 0338 01/18/24 0430 01/17/24  0503   WBC 10*3/mm3 5.37 4.81 6.46   HEMOGLOBIN g/dL 9.0* 9.5* 9.8*   MCV fL 96.0 97.9* 96.8   PLATELETS 10*3/mm3 122* 124* 109*             Results from last 7 days   Lab Units 01/19/24 0338 01/18/24  0430 01/17/24  0503   NEUTROS ABS 10*3/mm3 4.90 4.29 5.06   LYMPHS ABS 10*3/mm3 0.29* 0.31* 0.77   EOS ABS 10*3/mm3 0.00 0.00 0.02      Chemistry:  Estimated Creatinine Clearance: 53.1 mL/min (by C-G formula based on SCr of 0.77 mg/dL).           Results from last 7 days   Lab Units 01/19/24 0338 01/18/24  0430   SODIUM mmol/L 135* 142   POTASSIUM mmol/L 3.9 4.3   CHLORIDE mmol/L 101 105   CO2 mmol/L 25.0 29.0   BUN mg/dL 15 20   CREATININE mg/dL 0.77 0.87   GLUCOSE mg/dL 168* 108*            Results from last 7 days   Lab Units 01/19/24 0338 01/18/24  0430   CALCIUM mg/dL 7.6* 8.2*      Hepatic Panel:         Results from last 7 days   Lab Units 01/19/24  0338 01/18/24  0430 01/17/24  0503   ALBUMIN g/dL 2.3* 2.6* 2.4*   TOTAL PROTEIN g/dL 5.4* 5.6* 5.4*   BILIRUBIN mg/dL 0.2 0.2 0.2   AST (SGOT) U/L 36* 26 30   ALT (SGPT) U/L 25 20 21   ALK PHOS U/L 281* 277*  306*      Biomarkers:           Results from last 7 days   Lab Units 01/18/24  0430 01/17/24  1636 01/17/24  0503 01/16/24  1805 01/16/24  1357   CRP mg/dL 7.86* 9.05*  --   --   --    LACTATE mmol/L  --   --   --  0.8 2.4*   PROCALCITONIN ng/mL 0.08  --  0.16  --  0.11               Lab Results   Component Value Date     RESPCX Light growth (2+) Pseudomonas species (A) 01/18/2024     RESPCX No Normal Respiratory Noris (A) 01/18/2024         COVID-19        Lab Results   Component Value Date     COVID19 Not Detected 01/16/2024     COVID19 Not Detected 01/06/2024     COVID19 Not Detected 01/06/2024     COVID19 Not Detected 01/14/2022      Images:  XR Chest 1 View     Result Date: 1/18/2024  Impression: Again seen are coarse interstitial opacities throughout the lungs, slightly improved from prior examination. Electronically Signed: iRch Godwin MD  1/18/2024 7:12 AM EST  Workstation ID: [id number]      Echo:  Results for orders placed during the hospital encounter of 01/06/24     Adult Transthoracic Echo Complete W/ Cont if Necessary Per Protocol     Interpretation Summary    Left ventricular ejection fraction appears to be 66 - 70%.    Left ventricular wall thickness is consistent with mild septal asymmetric hypertrophy.    Left ventricular diastolic function is consistent with (grade I) impaired relaxation.    The right ventricular cavity is mildly dilated.    The left atrial cavity is mildly dilated.    Left atrial volume is moderately increased.    The right atrial cavity is borderline dilated.    Moderate tricuspid valve regurgitation is present.    Estimated right ventricular systolic pressure from tricuspid regurgitation is markedly elevated (>55 mmHg).        Results: Reviewed.  I reviewed the patient's new laboratory and imaging results.  I independently reviewed the patient's new images.     Medications: Reviewed.           Assessment   A / P      Hospital:          LOS: 3 days            Active Hospital  Problems     Diagnosis   POA    **Acute on chronic hypoxic respiratory failure [J96.21]   Yes    Severe malnutrition [E43]   Yes    Interstitial lung disease [J84.9]   Yes    HTN (hypertension) [I10]   Yes    Chronic respiratory failure with hypoxia [J96.11]   Yes    Physical deconditioning [R53.81]   Yes    Rheumatoid arthritis with positive rheumatoid factor [M05.9]   Yes      Liz is a 73 y.o. female admitted on 1/16/2024 with Acute on chronic hypoxic respiratory failure [J96.21]     Assessment/Management/Treatment Plan:     Progress Notes by Kristyn Haro APRN (07/17/2023 10:00)   Consults by Anais Gomez MD (01/09/2024 15:41)      ILD - associated to Rheumatoid arthritis. R/O acute exacerbation, as they were weaning steroids.  R/O atypical/opportunistic infection.  CT with both GGO and honeycombing.  Previous PFTs 01/16/2023: FVC 1.07 L 40%,  FEV1 1.03 L, 51%,  FEV1 ratio 0.97,  TLC 2.64 L, 62%,  DLCO 27% (Multiple attempts)  PH most likely Group 3, RVSP is markedly elevated, > 55 mmHg per ECHO 01/06/2024  Currently on Methylprednisolone 125 mg/d x 3 days (01/17/24-01/19/24)  Acute/Chronic Respiratory Failure type 2.  Former smoker.  HTN  A Fib per history. Anticoagulation with RIVaroxaban   Elevated ALKP  Hypothyroidism ? Treatment with Levothyroxine         Lab Results   Component Value Date     TSH 0.930 01/15/2022      No history of T2 diabetes           Lab Results   Lab Value Date/Time     HGBA1C <4.20 (L) 01/07/2024 0550     HGBA1C 5.40 11/28/2021 0611                 Code Status and Medical Interventions:   Ordered at: 01/18/24 0854     Medical Intervention Limits:     NO intubation (DNI)     Level Of Support Discussed With:     Patient     Code Status (Patient has no pulse and is not breathing):     No CPR (Do Not Attempt to Resuscitate)     Medical Interventions (Patient has pulse or is breathing):     Limited Support         In brief:     Discussed with patient and family at bedside  (01/17/24)  With her janny FiO2  high risk for complication and need for Invasive Mechanical Ventilation if we do a Bronchoscopy.   Patient does not want to be intubated or be on Invasive Mechanical Ventilation   Need to treat empirically for potential exacerbation of ILD, and/or infections.  Discussed with patient and son 01/19/24   Respiratory culture showing Pseudomonas.  Susceptibility: pending.  F/U Cultures and serologies. Adjust antibiotics based on results and presence of resistance.  Change dose of Piperacillin-Tazobactam to 4.5 g.  Change TMP/SMX IV to PO, continue dose to cover PJP. If clinical course and/or labs suggest no Pneumocystis, then change to prophylaxis dosis.  Transition Solumedrol 62.5 mg BID (3rd day) to Prednisone 40 mg BID, starting tomorrow.  Change Doxy IV to PO. May discontinue soon, depending on lab results. Legionella Urine Ag: Negative.(But only detects serogroup 1, and sensitivity ~70-80%)  PCXR in AM  Palliative Team to help with symptoms control.     I discussed the patient's findings and my recommendations with patient, family, and primary care team     MDM:    Problem(s) High due to: Acute or Chronic illness or injury that may poses a threat to life or bodily function  Data: High due to: Review of prior external records from each unique source, Review of the result(s) of each unique test, Ordering of each unique test, and Discuss management or test interpretation with external physician or NPP (not separately reported)     High     Thank you very much for allowing to participate in the care of Ms. Liz Borjas     [x] Inpatient Pulmonary Consult Service     Copied text in this note has been reviewed and is accurate as of 01/19/24

## 2024-01-25 NOTE — CASE MANAGEMENT/SOCIAL WORK
Case Management Discharge Note                Selected Continued Care - Discharged on 2024 Admission date: 2024 - Discharge disposition:       Destination    No services have been selected for the patient.                Durable Medical Equipment    No services have been selected for the patient.                Dialysis/Infusion    No services have been selected for the patient.                Home Medical Care    No services have been selected for the patient.                Therapy    No services have been selected for the patient.                Community Resources    No services have been selected for the patient.                [facility] [place] [facility]    No services have been selected for the patient.                         Final Discharge Disposition Code: 20 -

## 2024-01-25 NOTE — PAYOR COMM NOTE
"Liz Borjas (Dcsd. Female)     [id number]     Karen Grace, XAVI  Utilization Review  [phone]  [phone]      Fax 2 of 2          Date of Birth   1950    Social Security Number       Address   [address] [place] [address] [address] [place] [address]    Home Phone   [phone]    MRN   [id number]       Episcopalian   [facility]    Marital Status                               Admission Date   24    Admission Type   Emergency    Admitting Provider   Christie Combs MD    Attending Provider       Department, Room/Bed   [facility] 6A, N606/1       Discharge Date   2024    Discharge Disposition       Discharge Destination   Other                              Attending Provider: (none)   Allergies: Moexipril-hydrochlorothiazide, Penicillins, Ace Inhibitors    Isolation: None   Infection: MDR Pseudomonas (24)   Code Status: Prior    Ht: 157.5 cm (62\")   Wt: 44.2 kg (97 lb 8 oz)    Admission Cmt: None   Principal Problem: Acute on chronic hypoxic respiratory failure [J96.21]                   Active Insurance as of 2024       Primary Coverage       Payor Plan Insurance Group Employer/Plan Group    MEDICARE MEDICARE B ONLY        Payor Plan Address Payor Plan Phone Number Payor Plan Fax Number Effective Dates    [address] [address] [phone]  2017 - None Entered    [facility] [id number]         Subscriber Name Subscriber Birth Date Member ID       LIZ BORJAS 1950 [id number]               Secondary Coverage       Payor Plan Insurance Group Employer/Plan Group    [place]        Payor Plan Address Payor Plan Phone Number Payor Plan Fax Number Effective Dates    [address]   2014 - None Entered    [facility] [id number]         Subscriber Name Subscriber Birth Date Member ID       LIZ BORJAS 1950 [id number]                     Emergency Contacts        (Rel.) Home Phone Work Phone Mobile " Phone    Corey Borjas (Son) [phone] -- [phone]    JOSE BORJAS (Daughter) [phone] -- [phone]              Vital Signs (last day) before discharge       Date/Time Temp Temp src Pulse Resp BP Patient Position SpO2    01/24/24 1104 -- -- 88 24 117/102 -- 82    01/24/24 1100 -- -- 109 -- 150/88 -- 84    01/24/24 1058 -- -- -- -- 176/103 -- --    01/24/24 1057 -- -- 100 -- -- -- 87    01/24/24 1054 -- -- 127 -- 178/111 -- 86    01/24/24 1052 -- -- -- -- 97/83 -- --    01/24/24 1051 -- -- 30 -- -- -- 89    01/24/24 1050 -- -- 30 -- 59/35 -- --    01/24/24 1048 -- -- 32 -- 87/58 -- 89    01/24/24 1047 -- -- 34 -- -- -- 90    01/24/24 1045 -- -- 35 24 44/30 -- 89    01/24/24 0818 -- -- 69 -- 148/79 -- --    01/24/24 0807 -- -- 75 22 -- -- 77    01/24/24 0748 -- -- 64 18 -- -- 96    01/24/24 0736 -- -- -- 18 -- -- --    01/24/24 0730 98.3 (36.8) Oral -- 18 -- Lying --    01/24/24 0439 -- -- 65 -- -- -- 91    01/24/24 0438 -- -- 68 -- -- -- 90    01/24/24 0434 -- -- 77 -- -- -- 89    01/24/24 0430 -- -- 80 -- -- -- 84    01/24/24 0428 -- -- 73 -- -- -- 77    01/24/24 0427 -- -- 69 -- -- -- 81    01/24/24 0426 -- -- 69 -- -- -- 93    01/24/24 0414 97.4 (36.3) Oral 66 19 144/66 Lying 90    01/24/24 0010 -- -- 62 18 -- -- 84    01/24/24 0007 97.5 (36.4) Axillary 65 18 102/78 Lying 92    01/23/24 2011 -- -- 65 20 -- -- 95    01/23/24 1900 -- -- 64 -- -- -- 94    01/23/24 1853 -- -- 64 -- -- -- 98    01/23/24 1800 -- -- 58 -- -- -- 98    01/23/24 1700 -- -- 58 -- -- -- 99    01/23/24 1600 -- -- 67 -- -- -- 89    01/23/24 1530 -- -- 63 18 -- -- 91    01/23/24 1500 -- -- 56 -- -- -- 96    01/23/24 1400 -- -- 59 -- -- -- 96    01/23/24 1300 -- -- 62 -- -- -- 90    01/23/24 1239 -- -- 59 18 -- -- 97    01/23/24 1200 -- -- 67 -- -- -- 93    01/23/24 1000 -- -- 57 -- -- -- 92    01/23/24 0900 -- -- 68 -- -- -- 91    01/23/24 0845 -- -- 67 -- 138/61 -- 86    01/23/24 0800 -- -- 59 -- -- -- 93    01/23/24 0719 --  -- 62 18 -- -- 90    01/23/24 0411 97.5 (36.4) Oral 55 18 142/78 Lying 100    01/23/24 0041 97.5 (36.4) Oral 57 18 107/58 Lying 98          Oxygen Therapy (last day) before discharge       Date/Time SpO2 Device (Oxygen Therapy) Flow (L/min) Oxygen Concentration (%) ETCO2 (mmHg)    01/24/24 1104 82 high-flow nasal cannula;nonrebreather mask -- -- --    01/24/24 1100 84 -- -- -- --    01/24/24 1057 87 -- -- -- --    01/24/24 1054 86 high-flow nasal cannula;nonrebreather mask -- -- --    01/24/24 1051 89 -- -- -- --    01/24/24 1048 89 -- -- -- --    01/24/24 1047 90 -- -- -- --    01/24/24 1045 89 high-flow nasal cannula;nonrebreather mask -- -- --    01/24/24 1000 -- high-flow nasal cannula;heated;nonrebreather mask 55 100 --    01/24/24 0807 77 heated;high-flow nasal cannula;humidified -- 100  --    01/24/24 0748 96 heated;high-flow nasal cannula;humidified 55 75  --    01/24/24 0615 -- high-flow nasal cannula;humidified 55 75 --    01/24/24 0439 91 -- -- -- --    01/24/24 0438 90 -- -- -- --    01/24/24 0434 89 -- -- -- --    01/24/24 0430 84 high-flow nasal cannula;humidified 55 75 --    01/24/24 0428 77 -- -- -- --    01/24/24 0427 81 -- -- -- --    01/24/24 0426 93 -- -- -- --    01/24/24 0414 90 nonrebreather mask;humidified;high-flow nasal cannula 55 85 --    01/24/24 0215 -- high-flow nasal cannula;humidified 55 75 --    01/24/24 0015 -- high-flow nasal cannula;humidified 55 80 --    01/24/24 0010 84 high-flow nasal cannula 55 100 --    01/24/24 0007 92 high-flow nasal cannula;humidified 55 -- --    01/23/24 2210 -- high-flow nasal cannula;humidified 55 75 --    01/23/24 2040 -- high-flow nasal cannula;humidified 55 79 --    01/23/24 2011 95 high-flow nasal cannula 55 78 --    01/23/24 1900 94 -- -- -- --    01/23/24 1853 98 heated;high-flow nasal cannula;humidified 55 75 --    01/23/24 1800 98 heated;high-flow nasal cannula;humidified 55 85 --    01/23/24 1700 99 -- -- -- --    01/23/24 1600 89  heated;high-flow nasal cannula;humidified 55 70 --    24 1530 91 heated;high-flow nasal cannula;humidified 55 70 --    24 1500 96 -- -- -- --    24 1400 96 heated;humidified;high-flow nasal cannula 55 70 --    24 1300 90 -- -- -- --    24 1239 97 heated;humidified;high-flow nasal cannula 55 70  --    24 1200 93 heated;humidified;high-flow nasal cannula 55 75 --    24 1000 92 humidified;high-flow nasal cannula;nonrebreather mask 55 75 --    24 0900 91 -- -- -- --    24 0845 86 -- -- -- --    24 0800 93 nonrebreather mask;high-flow nasal cannula;humidified 55 75 --    24 0719 90 heated;humidified;high-flow nasal cannula 55 75  --    24 0607 -- high-flow nasal cannula;humidified 55 79 --    24 0420 -- high-flow nasal cannula;humidified 55 75 --    24 0411 100 -- -- -- --    24 0220 -- high-flow nasal cannula;humidified 55 75 --    24 0041 98 -- -- -- --    24 0020 -- high-flow nasal cannula;humidified 55 80 --                   Discharge Summary      Rosie James MD   Physician  Medicine     Progress Notes      Addendum     Date of Service: 24  Creation Time: 24     Expand All [facility] Medicine Services  PROGRESS NOTE     Patient Name: Liz Borjas  : 1950  MRN: [id number]     Date of Admission: 2024  Primary Care Physician: Valerie Sesay APRN        Subjective   Subjective      CC:  F/u dyspnea     HPI:  Remains on high flow, down to 45L, 55%. Had 2 BM. Reports breathing has improved some. Has loose stools - 2 yesterday and one today. Feels anxious, but wants a less intense medication than benzodiazepines. Patient's son is at bedside.            Objective   Objective      Vital Signs:   Temp:  [97.4 °F (36.3 °C)-98.2 °F (36.8 °C)] 97.4 °F (36.3 °C)  Heart Rate:  [62-71] 65  Resp:  [20-22] 20  BP: (118-150)/() 150/69  Flow  (L/min):  [45] 45     Physical Exam:  Constitutional: No acute distress, awake, alert, elderly, frail, sitting up in bed  HENT: NCAT, mucous membranes moist  Respiratory: Velco crackles diffusely, no wheezing  Cardiovascular: RRR, no murmurs, rubs, or gallops  Gastrointestinal: Normoactive bowel sounds, soft, nontender, nondistended  Musculoskeletal: No bilateral ankle edema  Psychiatric: Appropriate affect, cooperative  Neurologic: PERRL, symmetric facies, speech clear  Skin: No rashes           Results Reviewed:  LAB RESULTS:                Lab 01/20/24 0338 01/19/24 0338 01/18/24  0430 01/17/24  1636 01/17/24  0503 01/16/24  1805 01/16/24  1357   WBC 5.43 5.37 4.81  --  6.46  --  15.80*   HEMOGLOBIN 10.1* 9.0* 9.5*  --  9.8*  --  11.4*   HEMATOCRIT 33.9* 31.4* 32.6*  --  33.4*  --  40.4   PLATELETS 133* 122* 124*  --  109*  --  199   NEUTROS ABS 4.90 4.90 4.29  --  5.06  --  14.74*   IMMATURE GRANS (ABS) 0.09* 0.02 0.02  --  0.02  --  0.09*   LYMPHS ABS 0.29* 0.29* 0.31*  --  0.77  --  0.33*   MONOS ABS 0.15 0.16 0.19  --  0.58  --  0.61   EOS ABS 0.00 0.00 0.00  --  0.02  --  0.01   MCV 93.6 96.0 97.9*  --  96.8  --  97.8*   CRP  --   --  7.86* 9.05*  --   --   --    PROCALCITONIN  --   --  0.08  --  0.16  --  0.11   LACTATE  --   --   --   --   --  0.8 2.4*                  Lab 01/20/24 0338 01/19/24 0338 01/18/24  0430 01/17/24  0503 01/16/24  1357   SODIUM 132* 135* 142 139 141   POTASSIUM 3.9 3.9 4.3 3.9 4.2   CHLORIDE 96* 101 105 104 102   CO2 26.0 25.0 29.0 28.0 30.0*   ANION GAP 10.0 9.0 8.0 7.0 9.0   BUN 12 15 20 22 24*   CREATININE 0.82 0.77 0.87 0.91 1.00   EGFR 76.6 82.6 71.3 67.6 60.4   GLUCOSE 107* 168* 108* 66 104*   CALCIUM 7.9* 7.6* 8.2* 8.0* 9.0                  Lab 01/20/24  0338 01/19/24  0338 01/18/24  0430 01/17/24  0503 01/16/24  1357   TOTAL PROTEIN 5.7* 5.4* 5.6* 5.4* 6.9   ALBUMIN 2.5* 2.3* 2.6* 2.4* 3.2*   GLOBULIN 3.2 3.1 3.0 3.0 3.7   ALT (SGPT) 32 25 20 21 33   AST (SGOT) 44* 36*  26 30 52*   BILIRUBIN 0.2 0.2 0.2 0.2 0.5   ALK PHOS 331* 281* 277* 306* 410*                Lab 01/17/24  0014 01/16/24  2235 01/16/24  1357   PROBNP  --   --  3,161.0*   HSTROP T 14* 15* 21*                      Lab 01/16/24  1449   PH, ARTERIAL 7.358   PCO2, ARTERIAL 54.4*   PO2 .0*   FIO2 100   HCO3 ART 30.6*   BASE EXCESS ART 4.1*   CARBOXYHEMOGLOBIN 0.9      Brief Urine Lab Results         None                Microbiology Results Abnormal         Procedure Component Value - Date/Time     Blood Culture - Blood, Arm, Left [[id number]]  (Normal) Collected: 01/16/24 1807     Lab Status: Preliminary result Specimen: Blood from Arm, Left Updated: 01/19/24 1831       Blood Culture No growth at 3 days     Narrative:       Less than seven (7) mL's of blood was collected.  Insufficient quantity may yield false negative results.     Blood Culture - Blood, Wrist, Right [[id number]]  (Normal) Collected: 01/16/24 1741     Lab Status: Preliminary result Specimen: Blood from Wrist, Right Updated: 01/19/24 1800       Blood Culture No growth at 3 days     Narrative:       Less than seven (7) mL's of blood was collected.  Insufficient quantity may yield false negative results.     Fungus Smear - Sputum, Cough [[id number]] Collected: 01/18/24 0054     Lab Status: Final result Specimen: Sputum from Cough Updated: 01/19/24 1314       Fungal Stain No fungal elements seen     MRSA Screen, PCR (Inpatient) - Swab, Nares [[id number]]  (Normal) Collected: 01/16/24 1800     Lab Status: Final result Specimen: Swab from Nares Updated: 01/17/24 0042       MRSA PCR No MRSA Detected     Narrative:       The negative predictive value of this diagnostic test is high and should only be used to consider de-escalating anti-MRSA therapy. A positive result may indicate colonization with MRSA and must be correlated clinically.     S. Pneumo Ag Urine or CSF - Urine, Straight Cath [[id number]]  (Normal) Collected: 01/16/24 1823     Lab Status: Final  result Specimen: Urine from Straight Cath Updated: 01/17/24 0002       Strep Pneumo Ag Negative     Legionella Antigen, Urine - Urine, Straight Cath [[id number]]  (Normal) Collected: 01/16/24 1823     Lab Status: Final result Specimen: Urine from Straight Cath Updated: 01/17/24 0001       LEGIONELLA ANTIGEN, URINE Negative     COVID PRE-OP / PRE-PROCEDURE SCREENING ORDER (NO ISOLATION) - Swab, Nasopharynx [[id number]]  (Normal) Collected: 01/16/24 1405     Lab Status: Final result Specimen: Swab from Nasopharynx Updated: 01/16/24 1503     Narrative:       The following orders were created for panel order COVID PRE-OP / PRE-PROCEDURE SCREENING ORDER (NO ISOLATION) - Swab, Nasopharynx.  Procedure                               Abnormality         Status                     ---------                               -----------         ------                     Respiratory Panel PCR w/...[[id number]]  Normal              Final result                  Please view results for these tests on the individual orders.     Respiratory Panel PCR w/COVID-19(SARS-CoV-2) ALFREDO/MEGAN/MARIA FERNANDA/PAD/COR/MONIKA In-House, NP Swab in UTM/VTM, 2 HR TAT - Swab, Nasopharynx [[id number]]  (Normal) Collected: 01/16/24 1405     Lab Status: Final result Specimen: Swab from Nasopharynx Updated: 01/16/24 1503       ADENOVIRUS, PCR Not Detected       Coronavirus 229E Not Detected       Coronavirus HKU1 Not Detected       Coronavirus NL63 Not Detected       Coronavirus OC43 Not Detected       COVID19 Not Detected       Human Metapneumovirus Not Detected       Human Rhinovirus/Enterovirus Not Detected       Influenza A PCR Not Detected       Influenza B PCR Not Detected       Parainfluenza Virus 1 Not Detected       Parainfluenza Virus 2 Not Detected       Parainfluenza Virus 3 Not Detected       Parainfluenza Virus 4 Not Detected       RSV, PCR Not Detected       Bordetella pertussis pcr Not Detected       Bordetella parapertussis PCR Not Detected       Chlamydophila  pneumoniae PCR Not Detected       Mycoplasma pneumo by PCR Not Detected     Narrative:       In the setting of a positive respiratory panel with a viral infection PLUS a negative procalcitonin without other underlying concern for bacterial infection, consider observing off antibiotics or discontinuation of antibiotics and continue supportive care. If the respiratory panel is positive for atypical bacterial infection (Bordetella pertussis, Chlamydophila pneumoniae, or Mycoplasma pneumoniae), consider antibiotic de-escalation to target atypical bacterial infection.                Radiology results from the last 24 hours:   No radiology results from the last 24 hrs        Results for orders placed during the hospital encounter of 01/06/24     Adult Transthoracic Echo Complete W/ Cont if Necessary Per Protocol     Interpretation Summary    Left ventricular ejection fraction appears to be 66 - 70%.    Left ventricular wall thickness is consistent with mild septal asymmetric hypertrophy.    Left ventricular diastolic function is consistent with (grade I) impaired relaxation.    The right ventricular cavity is mildly dilated.    The left atrial cavity is mildly dilated.    Left atrial volume is moderately increased.    The right atrial cavity is borderline dilated.    Moderate tricuspid valve regurgitation is present.    Estimated right ventricular systolic pressure from tricuspid regurgitation is markedly elevated (>55 mmHg).        Current medications:  Scheduled Meds:amLODIPine, 5 mg, Oral, Q24H  budesonide-formoterol, 2 puff, Inhalation, BID - RT   And  tiotropium bromide monohydrate, 2 puff, Inhalation, Daily - RT  cetirizine, 10 mg, Oral, Daily  dicyclomine, 10 mg, Oral, TID  doxepin, 50 mg, Oral, Nightly  doxycycline, 100 mg, Oral, Q12H  ferrous sulfate, 325 mg, Oral, Daily With Breakfast  flecainide, 50 mg, Oral, Q12H  FLUoxetine, 40 mg, Oral, Daily  hydroxychloroquine, 200 mg, Oral, Q24H  ipratropium-albuterol, 3  mL, Nebulization, 4x Daily - RT  levothyroxine, 25 mcg, Oral, Q AM  metoprolol tartrate, 25 mg, Oral, BID  palliative care oral rinse, , Mouth/Throat, 4x Daily  pantoprazole, 40 mg, Oral, BID AC  piperacillin-tazobactam, 4.5 g, Intravenous, Q8H  predniSONE, 40 mg, Oral, BID With Meals  rivaroxaban, 15 mg, Oral, Daily With Dinner  [Held by provider] sulfamethoxazole-trimethoprim, 2 tablet, Oral, Once per day on Monday Wednesday Friday  sulfamethoxazole-trimethoprim, 2 tablet, Oral, Q8H  sulfaSALAzine, 500 mg, Oral, Q12H  tobramycin PF, 300 mg, Nebulization, Q12H - RT  traMADol, 50 mg, Oral, BID        Continuous Infusions:   PRN Meds:.  senna-docusate sodium **AND** polyethylene glycol **AND** bisacodyl **AND** bisacodyl    Calcium Replacement - Follow Nurse / BPA Driven Protocol    fluticasone    ipratropium-albuterol    Magnesium Standard Dose Replacement - Follow Nurse / BPA Driven Protocol    morphine    nitroglycerin    Phosphorus Replacement - Follow Nurse / BPA Driven Protocol    Potassium Replacement - Follow Nurse / BPA Driven Protocol    sodium chloride    sodium chloride           Assessment & Plan  Assessment & Plan            Active Hospital Problems     Diagnosis   POA    **Acute on chronic hypoxic respiratory failure [J96.21]   Yes    Severe malnutrition [E43]   Yes    Interstitial lung disease [J84.9]   Yes    HTN (hypertension) [I10]   Yes    Chronic respiratory failure with hypoxia [J96.11]   Yes    Physical deconditioning [R53.81]   Yes    Rheumatoid arthritis with positive rheumatoid factor [M05.9]   Yes       Resolved Hospital Problems   No resolved problems to display.         Brief Hospital Course to date:  Liz Borjas is a 73 y.o. female with past medical history of chronic hypoxic respiratory failure on 6 L nasal cannula, advanced interstitial lung disease, underlying COPD, ROPER cirrhosis, rheumatoid arthritis on chronic prednisone (20 mg daily), HTN, A-fib on Xarelto, CKDIII, with recent  hospitalization 1/6 to 1/9 for right upper lobe pneumonia who represented with hypoxia, cough and poor appetite. Was found to have acute on chronic hypoxic respiratory failure secondary to multifocal pneumonia. CTA chest with advanced pulmonary fibrosis and small consolidations in RUL and RLL. Difficult to see much differences with scan 2 years ago, therefore hard to distinguish if there has been much more inflammation that will respond to steroids. Pulmonology following outpatient for ILD, they have been consulted here as well. She was started on HFNC initially.     This patient's problems and plans were partially entered by my partner and updated as appropriate by me 01/20/24.     Acute on chronic hypoxic respiratory failure  Multifocal Pseudomonas pneumonia  Hx of advanced ILD with UIP pattern  Hx COPD without exacerbation   -Recent admission for RUL pneumonia with progression despite completing oral antibiotics  -Afebrile, WBC 15K on admission, resp PCR negative, MRSA neg (vanc d/c)  -Defer anti-fibrotic agent to outpatient  -Continue steroids per Pulm (changed to PO prednisone 40mg BID on 1/20)  -Covering with Zosyn, inhaled tobramycin for Pseudomonas; susceptibilities appear preliminary and quite resistant. Several susceptibilities are missing including Zosyn, carbapenems.  Discussed with pharmacy and are requesting additional susceptibilities to be done by lab  -Discussed with pulmonary who recommended the following: added doxy BID, IV bactrim daily  -hold for above Bactrim MWF.   -Continue Symbicort/Spiriva/Duo-Nebs.      GOC  -Discussed with patient and she elected to transition to DNR/DNI on 1/18  -Palliative consulted for further discussions; discussed with patient that there is a risk of continued decline while in the hospital due to the severity of her underlying condition despite aggressive medical treatment  -PRN morphine, atarax     Elevated troponin, type II MI  -Likely secondary to acute  hypoxia  -EKG without acute ST-T changes; no chest pain  -Repeat troponin downtrending     Elevated proBNP  Chronic HFpEF  -proBNP 3K on admission  -no evidence of edema on imaging, mild pedal edema on exam  -Echo 1/6/24 with EF 66 to 70%, grade 1 diastolic dysfunction, moderate TR, RVSP >55 mmHg  -Will hold off on diuresing for now. Can consider as needed. Strict Is&Os.     Chronic anemia  -Hgb appears at baseline, no evidence of overt bleeding  -Continue home oral iron supplement. Further workup as outpatient. Would update age-appropriate cancer screenings.     Rheumatoid arthritis  -On outpatient prednisone 20 mg daily, recently discharged on prolonged taper  -Continue home Plaquenil, sulfasalazine and Bentyl/Tramadol for pain.  -Continue steroids as above. Bactrim MWF (held as above).     Paroxysmal atrial fibrillation  -Continue metoprolol, flecainide and Xarelto.     GERD  -Continue PPI twice daily.     Hypothyroidism  -Continue levothyroxine.     Chronic insomnia  -Continue doxepin.     Hypertension  -Continue amlodipine.     Anxiety/depression  -Continue Prozac.     Expected Discharge Location and Transportation: home vs rehab (suspect rehab); Once more stable from respiratory status, PT/OT will need to evaluate  Expected Discharge   Expected Discharge Date: 1/24/2024; Expected Discharge Time:       DVT prophylaxis:  Medical DVT prophylaxis orders are present.      AM-PAC 6 Clicks Score (PT): 11 (01/19/24 1800)     CODE STATUS:       Code Status and Medical Interventions:   Ordered at: 01/18/24 0854     Medical Intervention Limits:     NO intubation (DNI)     Level Of Support Discussed With:     Patient     Code Status (Patient has no pulse and is not breathing):     No CPR (Do Not Attempt to Resuscitate)     Medical Interventions (Patient has pulse or is breathing):     Limited Support         Rosie James MD  01/20/24                    Revision History             Rosie James MD   Physician  Medicine      Progress Notes      Addendum     Date of Service: 24  Creation Time: 24     Expand All [facility] Medicine Services  PROGRESS NOTE     Patient Name: Liz Borjas  : 1950  MRN: [id number]     Date of Admission: 2024  Primary Care Physician: Valerie Sesay APRN        Subjective   Subjective      CC:  F/u dyspnea     HPI:  Remains on high flow, down to 45L, 55%. Had 2 BM. Reports breathing has improved some. Has loose stools - 2 yesterday and one today. Feels anxious, but wants a less intense medication than benzodiazepines. Patient's son is at bedside.            Objective   Objective      Vital Signs:   Temp:  [97.4 °F (36.3 °C)-98.2 °F (36.8 °C)] 97.4 °F (36.3 °C)  Heart Rate:  [62-71] 65  Resp:  [20-22] 20  BP: (118-150)/() 150/69  Flow (L/min):  [45] 45     Physical Exam:  Constitutional: No acute distress, awake, alert, elderly, frail, sitting up in bed  HENT: NCAT, mucous membranes moist  Respiratory: Velco crackles diffusely, no wheezing  Cardiovascular: RRR, no murmurs, rubs, or gallops  Gastrointestinal: Normoactive bowel sounds, soft, nontender, nondistended  Musculoskeletal: No bilateral ankle edema  Psychiatric: Appropriate affect, cooperative  Neurologic: PERRL, symmetric facies, speech clear  Skin: No rashes           Results Reviewed:  LAB RESULTS:                Lab 24  0338 24  0338 24  0430 24  1636 24  0503 24  1805 24  1357   WBC 5.43 5.37 4.81  --  6.46  --  15.80*   HEMOGLOBIN 10.1* 9.0* 9.5*  --  9.8*  --  11.4*   HEMATOCRIT 33.9* 31.4* 32.6*  --  33.4*  --  40.4   PLATELETS 133* 122* 124*  --  109*  --  199   NEUTROS ABS 4.90 4.90 4.29  --  5.06  --  14.74*   IMMATURE GRANS (ABS) 0.09* 0.02 0.02  --  0.02  --  0.09*   LYMPHS ABS 0.29* 0.29* 0.31*  --  0.77  --  0.33*   MONOS ABS 0.15 0.16 0.19  --  0.58  --  0.61   EOS ABS 0.00 0.00 0.00  --  0.02  --  0.01    MCV 93.6 96.0 97.9*  --  96.8  --  97.8*   CRP  --   --  7.86* 9.05*  --   --   --    PROCALCITONIN  --   --  0.08  --  0.16  --  0.11   LACTATE  --   --   --   --   --  0.8 2.4*                  Lab 01/20/24 0338 01/19/24 0338 01/18/24  0430 01/17/24  0503 01/16/24  1357   SODIUM 132* 135* 142 139 141   POTASSIUM 3.9 3.9 4.3 3.9 4.2   CHLORIDE 96* 101 105 104 102   CO2 26.0 25.0 29.0 28.0 30.0*   ANION GAP 10.0 9.0 8.0 7.0 9.0   BUN 12 15 20 22 24*   CREATININE 0.82 0.77 0.87 0.91 1.00   EGFR 76.6 82.6 71.3 67.6 60.4   GLUCOSE 107* 168* 108* 66 104*   CALCIUM 7.9* 7.6* 8.2* 8.0* 9.0                  Lab 01/20/24 0338 01/19/24 0338 01/18/24 0430 01/17/24  0503 01/16/24  1357   TOTAL PROTEIN 5.7* 5.4* 5.6* 5.4* 6.9   ALBUMIN 2.5* 2.3* 2.6* 2.4* 3.2*   GLOBULIN 3.2 3.1 3.0 3.0 3.7   ALT (SGPT) 32 25 20 21 33   AST (SGOT) 44* 36* 26 30 52*   BILIRUBIN 0.2 0.2 0.2 0.2 0.5   ALK PHOS 331* 281* 277* 306* 410*                Lab 01/17/24  0014 01/16/24  2235 01/16/24  1357   PROBNP  --   --  3,161.0*   HSTROP T 14* 15* 21*                      Lab 01/16/24  1449   PH, ARTERIAL 7.358   PCO2, ARTERIAL 54.4*   PO2 .0*   FIO2 100   HCO3 ART 30.6*   BASE EXCESS ART 4.1*   CARBOXYHEMOGLOBIN 0.9      Brief Urine Lab Results         None                Microbiology Results Abnormal         Procedure Component Value - Date/Time     Blood Culture - Blood, Arm, Left [[id number]]  (Normal) Collected: 01/16/24 1807     Lab Status: Preliminary result Specimen: Blood from Arm, Left Updated: 01/19/24 1831       Blood Culture No growth at 3 days     Narrative:       Less than seven (7) mL's of blood was collected.  Insufficient quantity may yield false negative results.     Blood Culture - Blood, Wrist, Right [[id number]]  (Normal) Collected: 01/16/24 1741     Lab Status: Preliminary result Specimen: Blood from Wrist, Right Updated: 01/19/24 1800       Blood Culture No growth at 3 days     Narrative:       Less than seven (7)  mL's of blood was collected.  Insufficient quantity may yield false negative results.     Fungus Smear - Sputum, Cough [[id number]] Collected: 01/18/24 0054     Lab Status: Final result Specimen: Sputum from Cough Updated: 01/19/24 1314       Fungal Stain No fungal elements seen     MRSA Screen, PCR (Inpatient) - Swab, Nares [[id number]]  (Normal) Collected: 01/16/24 1800     Lab Status: Final result Specimen: Swab from Nares Updated: 01/17/24 0042       MRSA PCR No MRSA Detected     Narrative:       The negative predictive value of this diagnostic test is high and should only be used to consider de-escalating anti-MRSA therapy. A positive result may indicate colonization with MRSA and must be correlated clinically.     S. Pneumo Ag Urine or CSF - Urine, Straight Cath [[id number]]  (Normal) Collected: 01/16/24 1823     Lab Status: Final result Specimen: Urine from Straight Cath Updated: 01/17/24 0002       Strep Pneumo Ag Negative     Legionella Antigen, Urine - Urine, Straight Cath [[id number]]  (Normal) Collected: 01/16/24 1823     Lab Status: Final result Specimen: Urine from Straight Cath Updated: 01/17/24 0001       LEGIONELLA ANTIGEN, URINE Negative     COVID PRE-OP / PRE-PROCEDURE SCREENING ORDER (NO ISOLATION) - Swab, Nasopharynx [[id number]]  (Normal) Collected: 01/16/24 1405     Lab Status: Final result Specimen: Swab from Nasopharynx Updated: 01/16/24 1503     Narrative:       The following orders were created for panel order COVID PRE-OP / PRE-PROCEDURE SCREENING ORDER (NO ISOLATION) - Swab, Nasopharynx.  Procedure                               Abnormality         Status                     ---------                               -----------         ------                     Respiratory Panel PCR w/...[[id number]]  Normal              Final result                  Please view results for these tests on the individual orders.     Respiratory Panel PCR w/COVID-19(SARS-CoV-2) ALFREDO/MEGAN/MARIA FERNANDA/PAD/COR/MONIKA In-House, NP  Swab in UTM/VTM, 2 HR TAT - Swab, Nasopharynx [[id number]]  (Normal) Collected: 01/16/24 1405     Lab Status: Final result Specimen: Swab from Nasopharynx Updated: 01/16/24 1503       ADENOVIRUS, PCR Not Detected       Coronavirus 229E Not Detected       Coronavirus HKU1 Not Detected       Coronavirus NL63 Not Detected       Coronavirus OC43 Not Detected       COVID19 Not Detected       Human Metapneumovirus Not Detected       Human Rhinovirus/Enterovirus Not Detected       Influenza A PCR Not Detected       Influenza B PCR Not Detected       Parainfluenza Virus 1 Not Detected       Parainfluenza Virus 2 Not Detected       Parainfluenza Virus 3 Not Detected       Parainfluenza Virus 4 Not Detected       RSV, PCR Not Detected       Bordetella pertussis pcr Not Detected       Bordetella parapertussis PCR Not Detected       Chlamydophila pneumoniae PCR Not Detected       Mycoplasma pneumo by PCR Not Detected     Narrative:       In the setting of a positive respiratory panel with a viral infection PLUS a negative procalcitonin without other underlying concern for bacterial infection, consider observing off antibiotics or discontinuation of antibiotics and continue supportive care. If the respiratory panel is positive for atypical bacterial infection (Bordetella pertussis, Chlamydophila pneumoniae, or Mycoplasma pneumoniae), consider antibiotic de-escalation to target atypical bacterial infection.                Radiology results from the last 24 hours:   No radiology results from the last 24 hrs        Results for orders placed during the hospital encounter of 01/06/24     Adult Transthoracic Echo Complete W/ Cont if Necessary Per Protocol     Interpretation Summary    Left ventricular ejection fraction appears to be 66 - 70%.    Left ventricular wall thickness is consistent with mild septal asymmetric hypertrophy.    Left ventricular diastolic function is consistent with (grade I) impaired relaxation.    The right  ventricular cavity is mildly dilated.    The left atrial cavity is mildly dilated.    Left atrial volume is moderately increased.    The right atrial cavity is borderline dilated.    Moderate tricuspid valve regurgitation is present.    Estimated right ventricular systolic pressure from tricuspid regurgitation is markedly elevated (>55 mmHg).        Current medications:  Scheduled Meds:amLODIPine, 5 mg, Oral, Q24H  budesonide-formoterol, 2 puff, Inhalation, BID - RT   And  tiotropium bromide monohydrate, 2 puff, Inhalation, Daily - RT  cetirizine, 10 mg, Oral, Daily  dicyclomine, 10 mg, Oral, TID  doxepin, 50 mg, Oral, Nightly  doxycycline, 100 mg, Oral, Q12H  ferrous sulfate, 325 mg, Oral, Daily With Breakfast  flecainide, 50 mg, Oral, Q12H  FLUoxetine, 40 mg, Oral, Daily  hydroxychloroquine, 200 mg, Oral, Q24H  ipratropium-albuterol, 3 mL, Nebulization, 4x Daily - RT  levothyroxine, 25 mcg, Oral, Q AM  metoprolol tartrate, 25 mg, Oral, BID  palliative care oral rinse, , Mouth/Throat, 4x Daily  pantoprazole, 40 mg, Oral, BID AC  piperacillin-tazobactam, 4.5 g, Intravenous, Q8H  predniSONE, 40 mg, Oral, BID With Meals  rivaroxaban, 15 mg, Oral, Daily With Dinner  [Held by provider] sulfamethoxazole-trimethoprim, 2 tablet, Oral, Once per day on Monday Wednesday Friday  sulfamethoxazole-trimethoprim, 2 tablet, Oral, Q8H  sulfaSALAzine, 500 mg, Oral, Q12H  tobramycin PF, 300 mg, Nebulization, Q12H - RT  traMADol, 50 mg, Oral, BID        Continuous Infusions:   PRN Meds:.  senna-docusate sodium **AND** polyethylene glycol **AND** bisacodyl **AND** bisacodyl    Calcium Replacement - Follow Nurse / BPA Driven Protocol    fluticasone    ipratropium-albuterol    Magnesium Standard Dose Replacement - Follow Nurse / BPA Driven Protocol    morphine    nitroglycerin    Phosphorus Replacement - Follow Nurse / BPA Driven Protocol    Potassium Replacement - Follow Nurse / BPA Driven Protocol    sodium chloride    sodium chloride            Assessment & Plan  Assessment & Plan            Active Hospital Problems     Diagnosis   POA    **Acute on chronic hypoxic respiratory failure [J96.21]   Yes    Severe malnutrition [E43]   Yes    Interstitial lung disease [J84.9]   Yes    HTN (hypertension) [I10]   Yes    Chronic respiratory failure with hypoxia [J96.11]   Yes    Physical deconditioning [R53.81]   Yes    Rheumatoid arthritis with positive rheumatoid factor [M05.9]   Yes       Resolved Hospital Problems   No resolved problems to display.         Brief Hospital Course to date:  Liz Borjas is a 73 y.o. female with past medical history of chronic hypoxic respiratory failure on 6 L nasal cannula, advanced interstitial lung disease, underlying COPD, ROPER cirrhosis, rheumatoid arthritis on chronic prednisone (20 mg daily), HTN, A-fib on Xarelto, CKDIII, with recent hospitalization 1/6 to 1/9 for right upper lobe pneumonia who represented with hypoxia, cough and poor appetite. Was found to have acute on chronic hypoxic respiratory failure secondary to multifocal pneumonia. CTA chest with advanced pulmonary fibrosis and small consolidations in RUL and RLL. Difficult to see much differences with scan 2 years ago, therefore hard to distinguish if there has been much more inflammation that will respond to steroids. Pulmonology following outpatient for ILD, they have been consulted here as well. She was started on HFNC initially.     This patient's problems and plans were partially entered by my partner and updated as appropriate by me 01/20/24.     Acute on chronic hypoxic respiratory failure  Multifocal Pseudomonas pneumonia  Hx of advanced ILD with UIP pattern  Hx COPD without exacerbation   -Recent admission for RUL pneumonia with progression despite completing oral antibiotics  -Afebrile, WBC 15K on admission, resp PCR negative, MRSA neg (vanc d/c)  -Defer anti-fibrotic agent to outpatient  -Continue steroids per Pulm (changed to PO prednisone  40mg BID on 1/20)  -Covering with Zosyn, inhaled tobramycin for Pseudomonas; susceptibilities appear preliminary and quite resistant. Several susceptibilities are missing including Zosyn, carbapenems.  Discussed with pharmacy and are requesting additional susceptibilities to be done by lab  -Discussed with pulmonary who recommended the following: added doxy BID, IV bactrim daily  -hold for above Bactrim MWF.   -Continue Symbicort/Spiriva/Duo-Nebs.      GOC  -Discussed with patient and she elected to transition to DNR/DNI on 1/18  -Palliative consulted for further discussions; discussed with patient that there is a risk of continued decline while in the hospital due to the severity of her underlying condition despite aggressive medical treatment  -PRN morphine, atarax     Elevated troponin, type II MI  -Likely secondary to acute hypoxia  -EKG without acute ST-T changes; no chest pain  -Repeat troponin downtrending     Elevated proBNP  Chronic HFpEF  -proBNP 3K on admission  -no evidence of edema on imaging, mild pedal edema on exam  -Echo 1/6/24 with EF 66 to 70%, grade 1 diastolic dysfunction, moderate TR, RVSP >55 mmHg  -Will hold off on diuresing for now. Can consider as needed. Strict Is&Os.     Chronic anemia  -Hgb appears at baseline, no evidence of overt bleeding  -Continue home oral iron supplement. Further workup as outpatient. Would update age-appropriate cancer screenings.     Rheumatoid arthritis  -On outpatient prednisone 20 mg daily, recently discharged on prolonged taper  -Continue home Plaquenil, sulfasalazine and Bentyl/Tramadol for pain.  -Continue steroids as above. Bactrim MWF (held as above).     Paroxysmal atrial fibrillation  -Continue metoprolol, flecainide and Xarelto.     GERD  -Continue PPI twice daily.     Hypothyroidism  -Continue levothyroxine.     Chronic insomnia  -Continue doxepin.     Hypertension  -Continue amlodipine.     Anxiety/depression  -Continue Prozac.     Expected Discharge  Location and Transportation: home vs rehab (suspect rehab); Once more stable from respiratory status, PT/OT will need to evaluate  Expected Discharge   Expected Discharge Date: 2024; Expected Discharge Time:       DVT prophylaxis:  Medical DVT prophylaxis orders are present.      AM-PAC 6 Clicks Score (PT): 11 (24 1800)     CODE STATUS:       Code Status and Medical Interventions:   Ordered at: 24 0854     Medical Intervention Limits:     NO intubation (DNI)     Level Of Support Discussed With:     Patient     Code Status (Patient has no pulse and is not breathing):     No CPR (Do Not Attempt to Resuscitate)     Medical Interventions (Patient has pulse or is breathing):     Limited Support         Rosie James MD  24                    Revision History        Christie Combs MD   Physician  Hospitalist     Progress Notes      Signed     Date of Service: 24  Creation Time: 24     Signed       Expand All Collapse [facility] Medicine Services  PROGRESS NOTE     Patient Name: Liz Borjas  : 1950  MRN: [id number]     Date of Admission: 2024  Primary Care Physician: Valerie Sesay APRN        Subjective   Subjective      CC:  Hypoxia f/u     HPI:  Patient on HFNC 100% with cleaning her up in bed today, able to wean throughout the morning  Persistent cough  Son feels it was a good weekend - asking questions as to microbiology for her              Objective   Objective      Vital Signs:   Temp:  [97.6 °F (36.4 °C)-97.7 °F (36.5 °C)] 97.6 °F (36.4 °C)  Heart Rate:  [56-70] 61  Resp:  [18-28] 18  BP: (105-150)/(69-87) 136/85  Flow (L/min):  [40-60] 60     Physical Exam:  Constitutional: No acute distress, awake, alert female sitting up in bed  HENT: NCAT, mucous membranes moist  Respiratory: Diminished air movement w exp wheezes bilaterally, increased wob on HFNC (weaned to 50% while in room)  Cardiovascular: RRR, no murmurs,  rubs, or gallops  Gastrointestinal: Soft, nontender, nondistended  Musculoskeletal: Muscle tone mildly decreased throughout, no joint effusions appreciated  Psychiatric: Appropriate affect, cooperative  Neurologic: Alert and oriented, very agreeable, facial movements symmetric and spontaneous movement of all 4 extremities grossly equal bilaterally, speech clear  Skin: No rashes     Results Reviewed:  LAB RESULTS:                 Lab 01/21/24 0327 01/20/24 0338 01/19/24 0338 01/18/24  0430 01/17/24  1636 01/17/24  0503 01/16/24  1805 01/16/24  1357   WBC 6.14 5.43 5.37 4.81  --  6.46  --  15.80*   HEMOGLOBIN 10.6* 10.1* 9.0* 9.5*  --  9.8*  --  11.4*   HEMATOCRIT 36.5 33.9* 31.4* 32.6*  --  33.4*  --  40.4   PLATELETS 147 133* 122* 124*  --  109*  --  199   NEUTROS ABS 5.43 4.90 4.90 4.29  --  5.06  --  14.74*   IMMATURE GRANS (ABS) 0.05 0.09* 0.02 0.02  --  0.02  --  0.09*   LYMPHS ABS 0.36* 0.29* 0.29* 0.31*  --  0.77  --  0.33*   MONOS ABS 0.30 0.15 0.16 0.19  --  0.58  --  0.61   EOS ABS 0.00 0.00 0.00 0.00  --  0.02  --  0.01   MCV 95.3 93.6 96.0 97.9*  --  96.8  --  97.8*   CRP  --   --   --  7.86* 9.05*  --   --   --    PROCALCITONIN  --   --   --  0.08  --  0.16  --  0.11   LACTATE  --   --   --   --   --   --  0.8 2.4*                   Lab 01/22/24  0438 01/21/24  1744 01/21/24 0327 01/20/24 0338 01/19/24  0338 01/18/24  0430   SODIUM 135*  --  135* 132* 135* 142   POTASSIUM 4.7 4.7 3.4* 3.9 3.9 4.3   CHLORIDE 98  --  97* 96* 101 105   CO2 29.0  --  31.0* 26.0 25.0 29.0   ANION GAP 8.0  --  7.0 10.0 9.0 8.0   BUN 22  --  18 12 15 20   CREATININE 0.99  --  0.99 0.82 0.77 0.87   EGFR 61.1  --  61.1 76.6 82.6 71.3   GLUCOSE 77  --  93 107* 168* 108*   CALCIUM 8.4*  --  8.1* 7.9* 7.6* 8.2*   MAGNESIUM 2.0  --   --   --   --   --                   Lab 01/21/24  0327 01/20/24  0338 01/19/24  0338 01/18/24  0430 01/17/24  0503   TOTAL PROTEIN 5.6* 5.7* 5.4* 5.6* 5.4*   ALBUMIN 2.7* 2.5* 2.3* 2.6* 2.4*    GLOBULIN 2.9 3.2 3.1 3.0 3.0   ALT (SGPT) 27 32 25 20 21   AST (SGOT) 31 44* 36* 26 30   BILIRUBIN 0.2 0.2 0.2 0.2 0.2   ALK PHOS 321* 331* 281* 277* 306*                Lab 01/17/24  0014 01/16/24  2235 01/16/24  1357   PROBNP  --   --  3,161.0*   HSTROP T 14* 15* 21*                      Lab 01/16/24  1449   PH, ARTERIAL 7.358   PCO2, ARTERIAL 54.4*   PO2 .0*   FIO2 100   HCO3 ART 30.6*   BASE EXCESS ART 4.1*   CARBOXYHEMOGLOBIN 0.9      Brief Urine Lab Results         None                Microbiology Results Abnormal         Procedure Component Value - Date/Time     Blood Culture - Blood, Arm, Left [[id number]]  (Normal) Collected: 01/16/24 1807     Lab Status: Final result Specimen: Blood from Arm, Left Updated: 01/21/24 1831       Blood Culture No growth at 5 days     Narrative:       Less than seven (7) mL's of blood was collected.  Insufficient quantity may yield false negative results.     Blood Culture - Blood, Wrist, Right [[id number]]  (Normal) Collected: 01/16/24 1741     Lab Status: Final result Specimen: Blood from Wrist, Right Updated: 01/21/24 1801       Blood Culture No growth at 5 days     Narrative:       Less than seven (7) mL's of blood was collected.  Insufficient quantity may yield false negative results.     Fungus Smear - Sputum, Cough [[id number]] Collected: 01/18/24 0054     Lab Status: Final result Specimen: Sputum from Cough Updated: 01/19/24 1314       Fungal Stain No fungal elements seen     MRSA Screen, PCR (Inpatient) - Swab, Nares [[id number]]  (Normal) Collected: 01/16/24 1800     Lab Status: Final result Specimen: Swab from Nares Updated: 01/17/24 0042       MRSA PCR No MRSA Detected     Narrative:       The negative predictive value of this diagnostic test is high and should only be used to consider de-escalating anti-MRSA therapy. A positive result may indicate colonization with MRSA and must be correlated clinically.     S. Pneumo Ag Urine or CSF - Urine, Straight Cath  [[id number]]  (Normal) Collected: 01/16/24 1823     Lab Status: Final result Specimen: Urine from Straight Cath Updated: 01/17/24 0002       Strep Pneumo Ag Negative     Legionella Antigen, Urine - Urine, Straight Cath [[id number]]  (Normal) Collected: 01/16/24 1823     Lab Status: Final result Specimen: Urine from Straight Cath Updated: 01/17/24 0001       LEGIONELLA ANTIGEN, URINE Negative     COVID PRE-OP / PRE-PROCEDURE SCREENING ORDER (NO ISOLATION) - Swab, Nasopharynx [[id number]]  (Normal) Collected: 01/16/24 1405     Lab Status: Final result Specimen: Swab from Nasopharynx Updated: 01/16/24 1503     Narrative:       The following orders were created for panel order COVID PRE-OP / PRE-PROCEDURE SCREENING ORDER (NO ISOLATION) - Swab, Nasopharynx.  Procedure                               Abnormality         Status                     ---------                               -----------         ------                     Respiratory Panel PCR w/...[[id number]]  Normal              Final result                  Please view results for these tests on the individual orders.     Respiratory Panel PCR w/COVID-19(SARS-CoV-2) ALFREDO/MEGAN/MARIA FERNANDA/PAD/COR/MONIKA In-House, NP Swab in UTM/VTM, 2 HR TAT - Swab, Nasopharynx [[id number]]  (Normal) Collected: 01/16/24 1405     Lab Status: Final result Specimen: Swab from Nasopharynx Updated: 01/16/24 1503       ADENOVIRUS, PCR Not Detected       Coronavirus 229E Not Detected       Coronavirus HKU1 Not Detected       Coronavirus NL63 Not Detected       Coronavirus OC43 Not Detected       COVID19 Not Detected       Human Metapneumovirus Not Detected       Human Rhinovirus/Enterovirus Not Detected       Influenza A PCR Not Detected       Influenza B PCR Not Detected       Parainfluenza Virus 1 Not Detected       Parainfluenza Virus 2 Not Detected       Parainfluenza Virus 3 Not Detected       Parainfluenza Virus 4 Not Detected       RSV, PCR Not Detected       Bordetella pertussis pcr Not  Detected       Bordetella parapertussis PCR Not Detected       Chlamydophila pneumoniae PCR Not Detected       Mycoplasma pneumo by PCR Not Detected     Narrative:       In the setting of a positive respiratory panel with a viral infection PLUS a negative procalcitonin without other underlying concern for bacterial infection, consider observing off antibiotics or discontinuation of antibiotics and continue supportive care. If the respiratory panel is positive for atypical bacterial infection (Bordetella pertussis, Chlamydophila pneumoniae, or Mycoplasma pneumoniae), consider antibiotic de-escalation to target atypical bacterial infection.                  Radiology results from the last 24 hours:   SLP FEES - Fiberoptic Endo Eval Swallow     Result Date: 1/22/2024  This procedure was auto-finalized with no dictation required.     XR Chest 1 View     Result Date: 1/21/2024  XR CHEST 1 VW Date of Exam: 1/21/2024 7:00 AM EST Indication: worsening hypoxia Comparison: January 20, 2024 Findings: The heart appears enlarged, as before. Lung volumes are low. There are prominent interstitial markings with airspace opacities in the right upper lung. Findings appear similar to the recent prior chest radiographs. No significant pleural effusion or pneumothorax is seen. There appears to be a moderate hiatal hernia, as before. Superior subluxation of the humeral heads suggest bilateral chronic rotator cuff tears. There are advanced degenerative changes at the right acromion.      Impression: Impression: 1.Low lung volumes with prominent interstitial markings and airspace opacities in the right upper lung, as before. Findings may represent pneumonia and/or edema. 2.Stable cardiomegaly. Electronically Signed: Pancho Cotton  1/21/2024 9:27 AM EST  Workstation ID: [id number]         Results for orders placed during the hospital encounter of 01/06/24     Adult Transthoracic Echo Complete W/ Cont if Necessary Per Protocol      Interpretation Summary    Left ventricular ejection fraction appears to be 66 - 70%.    Left ventricular wall thickness is consistent with mild septal asymmetric hypertrophy.    Left ventricular diastolic function is consistent with (grade I) impaired relaxation.    The right ventricular cavity is mildly dilated.    The left atrial cavity is mildly dilated.    Left atrial volume is moderately increased.    The right atrial cavity is borderline dilated.    Moderate tricuspid valve regurgitation is present.    Estimated right ventricular systolic pressure from tricuspid regurgitation is markedly elevated (>55 mmHg).        Current medications:  Scheduled Meds:amLODIPine, 5 mg, Oral, Q24H  budesonide-formoterol, 2 puff, Inhalation, BID - RT   And  tiotropium bromide monohydrate, 2 puff, Inhalation, Daily - RT  castor oil-balsam peru, 1 application , Topical, Q12H  cetirizine, 10 mg, Oral, Daily  dicyclomine, 10 mg, Oral, TID  doxepin, 50 mg, Oral, Nightly  doxycycline, 100 mg, Oral, Q12H  ferrous sulfate, 325 mg, Oral, Daily With Breakfast  flecainide, 50 mg, Oral, Q12H  FLUoxetine, 40 mg, Oral, Daily  guaiFENesin, 1,200 mg, Oral, Q12H  hydroxychloroquine, 200 mg, Oral, Q24H  ipratropium-albuterol, 3 mL, Nebulization, 4x Daily - RT  lactobacillus acidophilus, 1 capsule, Oral, Daily  levothyroxine, 25 mcg, Oral, Q AM  meropenem, 1,000 mg, Intravenous, Q12H  metoprolol tartrate, 25 mg, Oral, BID  palliative care oral rinse, , Mouth/Throat, 4x Daily  pantoprazole, 40 mg, Oral, BID AC  predniSONE, 40 mg, Oral, BID With Meals  rivaroxaban, 15 mg, Oral, Daily With Dinner  [Held by provider] sulfamethoxazole-trimethoprim, 2 tablet, Oral, Once per day on Monday Wednesday Friday  sulfamethoxazole-trimethoprim, 2 tablet, Oral, Q8H  sulfaSALAzine, 500 mg, Oral, Q12H  tobramycin PF, 300 mg, Nebulization, Q12H - RT  traMADol, 50 mg, Oral, BID        Continuous Infusions:   PRN Meds:.  senna-docusate sodium **AND** polyethylene  glycol **AND** bisacodyl **AND** bisacodyl    Calcium Replacement - Follow Nurse / BPA Driven Protocol    fluticasone    hydrOXYzine    ipratropium-albuterol    Magnesium Standard Dose Replacement - Follow Nurse / BPA Driven Protocol    morphine    nitroglycerin    Phosphorus Replacement - Follow Nurse / BPA Driven Protocol    Potassium Replacement - Follow Nurse / BPA Driven Protocol    sodium chloride    sodium chloride           Assessment & Plan  Assessment & Plan            Active Hospital Problems     Diagnosis   POA    **Acute on chronic hypoxic respiratory failure [J96.21]   Yes    Severe malnutrition [E43]   Yes    Interstitial lung disease [J84.9]   Yes    HTN (hypertension) [I10]   Yes    Chronic respiratory failure with hypoxia [J96.11]   Yes    Physical deconditioning [R53.81]   Yes    Rheumatoid arthritis with positive rheumatoid factor [M05.9]   Yes       Resolved Hospital Problems   No resolved problems to display.         Brief Hospital Course to date:  Liz Borjas is a 73 y.o. female w advanced ILD on home 6 liters n/c, RA on chronic prednisone, A-fib on xarelto, recent admission 1/6-1/9 for RUL pneumonia who re-presented on 1/16 with acute on chronic hypoxic respiratory failure found to have Pseudomonas multifocal PNA. Currently requiring HFNC, higher FIO2 w any movement     Acute on chronic hypoxic resp failure 2/2 multifocal Pseudomonas PNA  Severe ILD w UIP pattern  -inhaled tobramycin/meropenem per susceptibilities (intermediate to zosyn), started 1/21  -high dose steroid, taper per pulmonology  -on trx dose for PJP wo evidence of such, likely can go back to ppx dosing soon, defer to pulm  -complete 7 days doxycycline  -pulm treatments, appreciate all pulmonary input in very frail patient  -wean HFNC as able, with activity expect will continue to require     RA - on home plaquenil + sulfasalazine. Unclear if role in ILD trx as well v consider holding in severe infection as above. Will  research, continue current plan for now     Type II NSTEMI 2/2 hypoxia above  Chronic CHFpEF withOUT acute exacerbation  Concern for oropharyngeal dysphagia - resolved on repeat testing, suspect related to HFNC + dentures     Paroxysmal atrial fibrillation -Continue metoprolol, flecainide, xarelto  GERD - PPI twice daily.  Hypothyroidism -levothyroxine.  Chronic insomnia -doxepin.  Hypertension -amlodipine.  Anxiety/depression -prozac.  Chronic anemia  Chronically elev alk phos     Expected Discharge Location and Transportation: TBD  Expected Discharge 1/29 (Discharge date is tentative pending patient's medical condition and is subject to change)  Expected Discharge Date: 1/29/2024; Expected Discharge Time:       DVT prophylaxis:  Medical DVT prophylaxis orders are present.      AM-PAC 6 Clicks Score (PT): 13 (01/21/24 2000)     CODE STATUS:       Code Status and Medical Interventions:   Ordered at: 01/18/24 0854     Medical Intervention Limits:     NO intubation (DNI)     Level Of Support Discussed With:     Patient     Code Status (Patient has no pulse and is not breathing):     No CPR (Do Not Attempt to Resuscitate)     Medical Interventions (Patient has pulse or is breathing):     Limited Support         Christie Combs MD  01/22/24                              Kevin Eugene MD   Physician  Infectious Disease     Consults      Signed     Date of Service: 01/23/24 1159  Creation Time: 01/23/24 1159  Consult Orders   Inpatient Infectious Diseases Consult [[id number]] ordered by John Burnette MD at 01/23/24 1031          Signed       [facility]     INFECTIOUS DISEASE CONSULT/INITIAL HOSPITAL VISIT     Liz Vegaot  1950  [id number]     Date of consult: 1/23/2024     Admit date: 1/16/2024     Requesting Provider: No ref. provider found  Evaluating physician: Kevin Eugene MD  Reason for Consultation: Pneumonia, respiratory failure,  immunocompromised host with rheumatoid arthritis, interstitial lung disease  Chief Complaint: Above           Subjective   History of present illness:  Patient is a  73 y.o.  Yr old female With a history of rheumatoid arthritis, essential hypertension, hyperlipidemia, COVID in the past, GERD, atrial fibrillation, DJD, gout, recently hospitalized 1/6 until 1/9 and discharged on oral antibiotics.  Patient completed 2 weeks of antibiotic therapy but then developed worsening respiratory distress and was readmitted to [facility] on 1/16/2024.  The patient's respiratory failure worsened from her baseline of 6 L/min at home.  The patient's sputum from 1/18 has yielded Pseudomonas aeruginosa resistant to ciprofloxacin, intermediate to ceftazidime and cefepime, intermediate to piperacillin/tazobactam, sensitive to meropenem.  BloodLegionella and streptococcal pneumonia antigen negative.  Cultures x 2 from 1/16 negative.  MRSA screen from 1/16 negative.  Respiratory panel PCR from 1/16 negative.  Previous COVID-19 and influenza from 1/6 negative.The patient's antibiotics were changed from piperacillin/tazobactam to meropenem.  I was consulted on 1/23/2024 for further evaluation and treatment.  Patient is a poor historian.  The patient has received linezolid 1/17, piperacillin/tazobactam 1/16 until 1/21, and meropenem 1/21-.     Medical History        Past Medical History:   Diagnosis Date    Arthritis      Atrial fibrillation      Closed right hip fracture      COVID      GERD (gastroesophageal reflux disease)      Gout      Hyperlipidemia      Hypertension      Osteoporosis      Rheumatoid arthritis      Wears dentures       upper    Wears glasses       reading            Surgical History         Past Surgical History:   Procedure Laterality Date    ANKLE SURGERY Right      CHOLECYSTECTOMY        COLONOSCOPY         5 years ago    HIP FRACTURE SURGERY         Right Hip with Pins    LUMBAR DISCECTOMY Left  10/12/2016     Procedure: lumbar MICROdiscectomy LEFT L3-5;  Surgeon: Chris Son MD;  Location: [facility] OR;  Service:     REPLACEMENT TOTAL KNEE         Right Knee    TOE AMPUTATION         right baby toe, left second toe                Pediatric History   Patient Parents    Not on file           Other Topics Concern    Not on file   Social History Narrative    Not on file   Quit smoking in the past, no alcohol or drug use     family history includes Cancer in her father; Heart disease in her mother.           Allergies   Allergen Reactions    Moexipril-Hydrochlorothiazide Anaphylaxis    Penicillins Hives       Tolerates cefazolin, ceftriaxone, Zosyn    Ace Inhibitors Hives              Immunization History   Administered Date(s) Administered    COVID-19 (MODERNA) 1st,2nd,3rd Dose Monovalent 03/03/2021, 03/31/2021    FLUAD TRI 65YR+ 12/27/2021    Flu Vaccine Intradermal Quad 18-64YR 11/08/2007, 09/25/2009    Flu Vaccine Split Quad 11/08/2007, 09/25/2009, 11/22/2013    Fluzone (or Fluarix & Flulaval for VFC) >6mos 11/17/2016    Hep A / Hep B 12/05/2019    Hepatitis B Adult/Adolescent IM 02/09/2017, 03/09/2017, 01/09/2020    Influenza LAIV (Nasal) 10/25/2019    Influenza Quad Vaccine (Inpatient) 10/26/2011    Influenza, Unspecified 10/27/2013, 11/15/2014    Pneumococcal Conjugate 13-Valent (PCV13) 11/17/2016    Pneumococcal Conjugate 20-Valent (PCV20) 03/30/2022    Pneumococcal Polysaccharide (PPSV23) 04/05/2018         Medication:  @Scheduled Meds:amLODIPine, 5 mg, Oral, Q24H  budesonide-formoterol, 2 puff, Inhalation, BID - RT   And  tiotropium bromide monohydrate, 2 puff, Inhalation, Daily - RT  castor oil-balsam peru, 1 application , Topical, Q12H  cetirizine, 10 mg, Oral, Daily  dicyclomine, 10 mg, Oral, TID  doxepin, 50 mg, Oral, Nightly  doxycycline, 100 mg, Oral, Q12H  ferrous sulfate, 325 mg, Oral, Daily With Breakfast  flecainide, 50 mg, Oral, Q12H  FLUoxetine, 40 mg, Oral, Daily  guaiFENesin, 1,200  mg, Oral, Q12H  hydroxychloroquine, 200 mg, Oral, Q24H  ipratropium-albuterol, 3 mL, Nebulization, 4x Daily - RT  lactobacillus acidophilus, 1 capsule, Oral, Daily  levothyroxine, 25 mcg, Oral, Q AM  meropenem, 1,000 mg, Intravenous, Q12H  metoprolol tartrate, 25 mg, Oral, BID  palliative care oral rinse, , Mouth/Throat, 4x Daily  pantoprazole, 40 mg, Oral, BID AC  predniSONE, 40 mg, Oral, BID With Meals  rivaroxaban, 15 mg, Oral, Daily With Dinner  [Held by provider] sulfamethoxazole-trimethoprim, 2 tablet, Oral, Once per day on Monday Wednesday Friday  sulfamethoxazole-trimethoprim, 2 tablet, Oral, Q8H  sulfaSALAzine, 500 mg, Oral, Q12H  tobramycin PF, 300 mg, Nebulization, Q12H - RT  traMADol, 50 mg, Oral, BID        Continuous Infusions:   PRN Meds:.  senna-docusate sodium **AND** polyethylene glycol **AND** bisacodyl **AND** bisacodyl    Calcium Replacement - Follow Nurse / BPA Driven Protocol    fluticasone    hydrOXYzine    ipratropium-albuterol    Magnesium Standard Dose Replacement - Follow Nurse / BPA Driven Protocol    morphine    nitroglycerin    Phosphorus Replacement - Follow Nurse / BPA Driven Protocol    Potassium Replacement - Follow Nurse / BPA Driven Protocol    sodium chloride    sodium chloride      Please refer to the medical record for a full medication list     Review of Systems:     Constitutional-- No Fever, chills or sweats.  Appetite good, and no malaise. No fatigue.  HEENT-- No new vision, hearing or throat complaints.  No epistaxis or oral sores.  Denies odynophagia or dysphagia.  No odynophagia or dysphagia. No headache, photophobia or neck stiffness.  CV-- No chest pain, palpitation or syncope  Resp-- Some SOB/productive cough/no Hemoptysis  GI- No nausea, vomiting, or diarrhea.  No hematochezia, melena, or hematemesis. Denies jaundice or chronic liver disease.  -- No dysuria, hematuria, or flank pain.  Denies hesitancy, urgency or flank pain.  Lymph- no swollen lymph nodes in  "neck/axilla or groin.   Heme- No active bruising or bleeding; no Hx of DVT or PE.  MS-- no swelling or pain in the bones or joints of arms/legs.  No new back pain.  Neuro-- No acute focal weakness or numbness in the arms or legs.  No seizures.  Skin--No rashes or lesions     Physical Exam:   Vital Signs   Temp:  [97.3 °F (36.3 °C)-97.6 °F (36.4 °C)] 97.5 °F (36.4 °C)  Heart Rate:  [55-68] 57  Resp:  [18] 18  BP: (107-142)/(57-85) 138/61     Blood pressure 138/61, pulse 57, temperature 97.5 °F (36.4 °C), temperature source Oral, resp. rate 18, height 157.5 cm (62\"), weight 51.8 kg (114 lb 1.6 oz), SpO2 92%, not currently breastfeeding.  GENERAL: Awake and alert, in moderate distress. Appears older than stated age.  HEENT:  Normocephalic, atraumatic.  Oropharynx without thrush. Dentition in good repair. No cervical adenopathy. No neck masses.  Ears externally normal, Nose externally normal.  EYES: PERRL. No conjunctival injection. No icterus. EOM full.  LYMPHATICS: No lymphadenopathy of the neck or axillary or inguinal regions.   HEART: No murmur, gallop, or pericardial friction rub. Reg rate rhythm, No JVD at 45 degrees.  LUNGS: Bilateral rales. No respiratory distress, no use of accessory muscles.  ABDOMEN: Soft, nontender, nondistended. No appreciable HSM.  Bowel sounds normal.  GENITAL: No external lesions, breasts without masses, back straight, no CVAT, rectal external without lesions.   SKIN: Warm and dry without cutaneous eruptions.  No nodules.    PSYCHIATRIC: Mental status lucid. No confusion.  EXT:  No cellulitic change.  NEURO: Oriented to name, CN 2 to 12 intact, DTR 1 + and symmetric, sensory intact to LT upper and lower extremity, motor 5/5 upper and lower extremity, cerebellar and gait not tested.        Results Review:              I reviewed the patient's new clinical results.  I reviewed the patient's new imaging results and agree with the interpretation.  I reviewed the patient's other test results " "and agree with the interpretation            Results from last 7 days   Lab Units 01/21/24  0327 01/20/24  0338 01/19/24  0338   WBC 10*3/mm3 6.14 5.43 5.37   HEMOGLOBIN g/dL 10.6* 10.1* 9.0*   HEMATOCRIT % 36.5 33.9* 31.4*   PLATELETS 10*3/mm3 147 133* 122*           Results from last 7 days   Lab Units 01/22/24  0438   SODIUM mmol/L 135*   POTASSIUM mmol/L 4.7   CHLORIDE mmol/L 98   CO2 mmol/L 29.0   BUN mg/dL 22   CREATININE mg/dL 0.99   GLUCOSE mg/dL 77   CALCIUM mg/dL 8.4*           Results from last 7 days   Lab Units 01/21/24  0327   ALK PHOS U/L 321*   BILIRUBIN mg/dL 0.2   ALT (SGPT) U/L 27   AST (SGOT) U/L 31               Results from last 7 days   Lab Units 01/18/24  0430   CRP mg/dL 7.86*               Results from last 7 days   Lab Units 01/16/24  1805   LACTATE mmol/L 0.8      Estimated Creatinine Clearance: 42.6 mL/min (by C-G formula based on SCr of 0.99 mg/dL).  Common Labs:         Procalitonin Results:            Lab 01/18/24  0430 01/17/24  0503 01/16/24  1357   PROCALCITONIN 0.08 0.16 0.11      Brief Urine Lab Results         None             No results found for: \"SITE\", \"ALLENTEST\", \"PHART\", \"UBJ3TNW\", \"PO2ART\", \"PYQ2NWN\", \"BASEEXCESS\", \"I2CZCMKC\", \"HGBBG\", \"HCTABG\", \"OXYHEMOGLOBI\", \"METHHGBN\", \"CARBOXYHGB\", \"CO2CT\", \"BAROMETRIC\", \"MODALITY\", \"FIO2\"      Microbiology:        Blood Culture   Date Value Ref Range Status   01/16/2024 No growth at 5 days   Final      No results found for: \"BCIDPCR\", \"CXREFLEX\", \"CSFCX\", \"CULTURETIS\"  No results found for: \"CULTURES\", \"HSVCX\", \"URCX\"  No results found for: \"EYECULTURE\", \"GCCX\", \"HSVCULTURE\", \"LABHSV\"  No results found for: \"LEGIONELLA\", \"MRSACX\", \"MUMPSCX\", \"MYCOPLASCX\"  No results found for: \"NOCARDIACX\", \"STOOLCX\"  No results found for: \"THROATCX\", \"UNSTIMCULT\", \"URINECX\", \"CULTURE\", \"VZVCULTUR\"  No results found for: \"VIRALCULTU\", \"WOUNDCX\"            Blood Culture   Date Value Ref Range Status   01/16/2024 No growth at 5 days   Final          "   Fungus Culture   Date Value Ref Range Status   01/18/2024 No fungus isolated at less than 1 week   Preliminary              Respiratory Culture   Date Value Ref Range Status   01/18/2024 Light growth (2+) Pseudomonas aeruginosa MDRO (A)   Final       Comment:          Multi drug resistant Pseudomonas, patient may be an isolation risk.   01/18/2024 No Normal Respiratory Noris (A)   Final   (     Sputum from 1/18 has yielded Pseudomonas aeruginosa resistant to ciprofloxacin, intermediate to ceftazidime and cefepime, intermediate to piperacillin/tazobactam, sensitive to meropenem.  BloodLegionella and streptococcal pneumonia antigen negative.  Cultures x 2 from 1/16 negative.  MRSA screen from 1/16 negative.  Respiratory panel PCR from 1/16 negative.  Previous COVID-19 and influenza from 1/6 negative.     Radiology:  Imaging Results (Last 72 Hours)         Procedure Component Value Units Date/Time     SLP FEES - Fiberoptic Endo Eval Swallow [[id number]] Resulted: 01/22/24 1436       Updated: 01/22/24 1436     Narrative:       This procedure was auto-finalized with no dictation required.     XR Chest 1 View [[id number]] Collected: 01/21/24 0922       Updated: 01/21/24 0930     Narrative:       XR CHEST 1 VW     Date of Exam: 1/21/2024 7:00 AM EST     Indication: worsening hypoxia     Comparison: January 20, 2024     Findings:  The heart appears enlarged, as before. Lung volumes are low. There are prominent interstitial markings with airspace opacities in the right upper lung. Findings appear similar to the recent prior chest radiographs. No significant pleural effusion or   pneumothorax is seen.     There appears to be a moderate hiatal hernia, as before. Superior subluxation of the humeral heads suggest bilateral chronic rotator cuff tears. There are advanced degenerative changes at the right acromion.        Impression:       Impression:  1.Low lung volumes with prominent interstitial markings and airspace opacities  in the right upper lung, as before. Findings may represent pneumonia and/or edema.  2.Stable cardiomegaly.           Electronically Signed: Pancho Cotton    1/21/2024 9:27 AM EST    Workstation ID: [id number]              1/16/24     IMPRESSION:      - Pneumonia, multidrug-resistant Pseudomonas aeruginosa from 1/18/24, resistant to quinolones, intermediate to cephalosporins, and piperacillin/tazobactam.  This is in the setting of advanced COPD and interstitial lung disease.  Sputum from 1/18 has yielded Pseudomonas aeruginosa resistant to ciprofloxacin, intermediate to ceftazidime and cefepime, intermediate to piperacillin/tazobactam, sensitive to meropenem.  BloodLegionella and streptococcal pneumonia antigen negative.  Cultures x 2 from 1/16 negative.  MRSA screen from 1/16 negative.  Respiratory panel PCR from 1/16 negative.  Previous COVID-19 and influenza from 1/6 negative.  - Acute hypoxic respiratory failure on a baseline of chronic hypoxic respiratory failure on 6 L/min nasal cannula oxygen.  55 L/m on 1/23/24.  - Interstitial lung disease, likely related to autoimmune illness and previous infections along with rheumatoid arthritis.  On high-dose steroids.  Less likely pneumocystis.  - Encephalopathy, toxic and metabolic related to above issues.  - Rheumatoid arthritis, previously on prednisone 20 mg daily, as well as Plaquenil, sulfasalazine, Bentyl and tramadol for pain.    - Paroxysmal atrial fibrillation on metoprolol, flecainide, Xarelto.  - GERD, on PPI, increased risk for aspiration pneumonia.  - Anxiety, depression on Prozac.  - Hyponatremia 135.  - Hypokalemia 3.4.  - Hypocalcemia 8.1.  - Anemia, chronic disease, affects all aspects of care above.     RECOMMENDATIONS:     - Diagnostically, continue to follow patient's physical exam, CBC, CMP, CRP, histoplasma, blastomycosis urine antigens, Aspergillus galactomannan assay, Fungitell, and radiographic studies.  Consider bronchoscopy but may be too high  of a risk and end up on the ventilator, versus open lung biopsy.  Also could consider conservative measures given her comorbidities, as she has very poor prognosis.  - Therapeutically, continue meropenem 1/21-for 14 days and reassess, until approximately 2/5/2024.  Continue trimethoprim/sulfamethoxazole for pneumocystis and if negative for w/u then prevention while on doses of prednisone greater than 20 mg/day.  Plus/min neb tobra.  Doxy x 7 days.  - Supportive care, 55 L/min high flow oxygen.  - Palliative care has been assessing.     I discussed the patient's findings and my recommendations with patient and nursing staff     Thank you for asking me to see Liz Borjas.  Our group would be pleased to follow this patient over the course of their hospitalization and assist with outpatient antimicrobial therapy, as indicated.  Further recommendations depend on the results of the cultures and clinical course.  Increased risk for adverse drug reactions, complications of IV access, readmission, death.  I d/w patient's son.     Kevin Eugene MD  1/23/2024                     Routing History          John Burnette MD   Physician  Pulmonology     Progress Notes      Signed     Date of Service: 01/23/24 1407  Creation Time: 01/23/24 1407     Signed       [facility] All Collapse All    Pulmonary/Critical Care Follow-up      LOS: 7 days   Patient Care Team:  Valerie Sesay APRN as PCP - General (Family Medicine)  Alonzo Marie PA-C as Physician Assistant (Neurosurgery)  Carlos Eduardo Figueredo MD as Consulting Physician (Pain Medicine)  Chris Son MD as Consulting Physician (Neurosurgery)  Keegan Tellez PA-C as Physician Assistant (Physician Assistant)  Kristyn Haro APRN as Nurse Practitioner (Pulmonary Disease)          Chief Complaint   Patient presents with    Shortness of Breath         Subjective   History reviewed and updated in EMR as indicated.     Interval History:      Patient reports she is  feeling overall better.  She remains on high flow nasal cannula oxygen but has been weaned a bit.  No fevers or chills.  No nausea or vomiting.  She is on antibiotics with meropenem and tobramycin nebulizers.  Family is at the bedside.  Cough is a bit productive currently.     History taken from: Patient, staff     PMH/FH/Social History were reviewed and updated appropriately in the electronic medical record.      Review of Systems:               Review of 14 systems was completed with positives and pertinent negatives noted in the subjective section.  All other systems reviewed and are negative.              Objective   Vital Signs  Temp:  [97.3 °F (36.3 °C)-97.6 °F (36.4 °C)] 97.5 °F (36.4 °C)  Heart Rate:  [55-68] 59  Resp:  [18] 18  BP: (107-142)/(57-85) 138/61  01/22 0701 - 01/23 0700  In: 240 [P.O.:240]  Out: 550 [Urine:550]  Body mass index is 20.87 kg/m².  IV drips:      Physical Exam:                Constitutional:    Alert, in no acute distress   Head:    Normocephalic, atraumatic   Eyes:            Lids and lashes normal, conjunctivae and sclerae normal.  PER   ENMT:   Ears appear intact with no abnormalities noted       Lips normal.     Neck:   Trachea midline, no JVD   Lungs/Resp:     Normal effort, symmetric chest rise, no crepitus, moderate rhonchi and wheezes bilaterally.               Heart/CV:    Regular rhythm and normal rate, no murmur   Abdomen/GI:     Soft, nontender, nondistended   :     Deferred   Extremities/MSK:   No clubbing or cyanosis.  1+ bilateral ankle edema.     Pulses:   Pulses palpable and equal bilaterally   Skin:   No bleeding, bruising or rash   Heme/Lymph:   No cervical or supraclavicular adenopathy.   Neurologic:     Psychiatric:      Moves all extremities with no obvious focal motor deficit.  Cranial nerves 2 - 12 grossly intact  Non-agitated, normal affect.     The above physical exam findings were reviewed and reflect my exam findings as of today's exam.   Electronically  signed by:  John Burnette MD  01/23/24  14:07 EST      Results Review:                I reviewed the patient's new clinical results.            Results from last 7 days   Lab Units 01/22/24  0438 01/21/24  1744 01/21/24 0327 01/20/24  0338 01/19/24  0338   SODIUM mmol/L 135*  --  135* 132* 135*   POTASSIUM mmol/L 4.7 4.7 3.4* 3.9 3.9   CHLORIDE mmol/L 98  --  97* 96* 101   CO2 mmol/L 29.0  --  31.0* 26.0 25.0   BUN mg/dL 22  --  18 12 15   CREATININE mg/dL 0.99  --  0.99 0.82 0.77   CALCIUM mg/dL 8.4*  --  8.1* 7.9* 7.6*   BILIRUBIN mg/dL  --   --  0.2 0.2 0.2   ALK PHOS U/L  --   --  321* 331* 281*   ALT (SGPT) U/L  --   --  27 32 25   AST (SGOT) U/L  --   --  31 44* 36*   GLUCOSE mg/dL 77  --  93 107* 168*             Results from last 7 days   Lab Units 01/21/24  0327 01/20/24  0338 01/19/24  0338   WBC 10*3/mm3 6.14 5.43 5.37   HEMOGLOBIN g/dL 10.6* 10.1* 9.0*   HEMATOCRIT % 36.5 33.9* 31.4*   PLATELETS 10*3/mm3 147 133* 122*           Results from last 7 days   Lab Units 01/16/24  1449   PH, ARTERIAL pH units 7.358   PO2 ART mm Hg 337.0*   PCO2, ARTERIAL mm Hg 54.4*   HCO3 ART mmol/L 30.6*           Results from last 7 days   Lab Units 01/22/24  0438   MAGNESIUM mg/dL 2.0         I reviewed the patient's new imaging including images and reports.     CTA chest 1/16/2024 was reviewed and shows severe diffuse interstitial disease/scarring with some likely overlying acute infiltrates in the right upper lobe and right lower lobes at least.  Radiologist's impression follows:     Impression:  No evidence of pulmonary embolism.     No evidence of thoracic aortic aneurysm or dissection.     Findings compatible with advanced pulmonary fibrosis. There are superimposed groundglass attenuation of the lung parenchyma which may be secondary to interstitial pneumonitis. Small consolidations in the right upper and lower lobes are compatible with   multifocal pneumonia.           Medication Review:   amLODIPine, 5 mg,  Oral, Q24H  budesonide-formoterol, 2 puff, Inhalation, BID - RT   And  tiotropium bromide monohydrate, 2 puff, Inhalation, Daily - RT  castor oil-balsam peru, 1 application , Topical, Q12H  cetirizine, 10 mg, Oral, Daily  dicyclomine, 10 mg, Oral, TID  doxepin, 50 mg, Oral, Nightly  doxycycline, 100 mg, Oral, Q12H  ferrous sulfate, 325 mg, Oral, Daily With Breakfast  flecainide, 50 mg, Oral, Q12H  FLUoxetine, 40 mg, Oral, Daily  guaiFENesin, 1,200 mg, Oral, Q12H  hydroxychloroquine, 200 mg, Oral, Q24H  ipratropium-albuterol, 3 mL, Nebulization, 4x Daily - RT  lactobacillus acidophilus, 1 capsule, Oral, Daily  levothyroxine, 25 mcg, Oral, Q AM  meropenem, 1,000 mg, Intravenous, Q12H  metoprolol tartrate, 25 mg, Oral, BID  palliative care oral rinse, , Mouth/Throat, 4x Daily  pantoprazole, 40 mg, Oral, BID AC  predniSONE, 40 mg, Oral, BID With Meals  rivaroxaban, 15 mg, Oral, Daily With Dinner  [Held by provider] sulfamethoxazole-trimethoprim, 2 tablet, Oral, Once per day on Monday Wednesday Friday  sulfamethoxazole-trimethoprim, 2 tablet, Oral, Q8H  sulfaSALAzine, 500 mg, Oral, Q12H  tobramycin PF, 300 mg, Nebulization, Q12H - RT  traMADol, 50 mg, Oral, BID                 Assessment & Plan    Acute on chronic hypoxic respiratory failure    Rheumatoid arthritis with positive rheumatoid factor    Physical deconditioning    Chronic respiratory failure with hypoxia    HTN (hypertension)    Interstitial lung disease    Severe malnutrition     73 y.o. female with history of RA, ILD with honeycombing, COPD, Olivier cirrhosis, history of COVID around Thanksgiving 2023, admitted 1/16/2024 with worsening cough and dyspnea.  She had a recent hospitalization and was discharged 1/10/2024.  Sputum is growing multidrug-resistant Pseudomonas.     She seems to be doing a little bit better.  She has acute on chronic hypoxemic respiratory failure, currently requiring high flow nasal cannula at around 75 % FiO2.     Plan:  1.  For acute  on chronic hypoxemic respiratory failure: Continue supplemental oxygen and wean as tolerated.  2.  For right upper and lower lobe Pseudomonas pneumonia: MDR Pseudomonas growing from sputum culture currently on meropenem and tobramycin nebulized.  3.  For ILD with possible exacerbation: Recommend steroid slow taper.  Currently prednisone 40 mg twice daily.  I would taper over about 6 weeks to 10 mg daily.  4.  Pneumocystis prophylaxis in the setting of high-dose steroids: Bactrim.              Electronically signed by:     John Burnette MD  24  14:07 EST        *. Please note that portions of this note were completed with [employer] voice recognition program.                         Christie Combs MD   Physician  Hospitalist     Progress Notes      Signed     Date of Service: 24  Creation Time: 24     Signed       [facility] Medicine Services  PROGRESS NOTE     Patient Name: Liz Borjas  : 1950  MRN: [id number]     Date of Admission: 2024  Primary Care Physician: Valerie Sesay APRN        Subjective   Subjective      CC:  Hypoxia f/u     HPI:  High oxygen need again today - able to stand with NRB over HFNC for short period  Son reports at baseline sats in 70s after movement at home on 6 liters n/c so is unsurprised by this  Appetite somewhat improved              Objective   Objective      Vital Signs:   Temp:  [97.3 °F (36.3 °C)-97.5 °F (36.4 °C)] 97.5 °F (36.4 °C)  Heart Rate:  [55-68] 67  Resp:  [18] 18  BP: (107-142)/(57-78) 138/61  Flow (L/min):  [55] 55     Physical Exam:  Constitutional: No acute distress, awake, frail alert female sitting up in bed, working with PT  HENT: NCAT, mucous membranes moist  Respiratory: Diminished air movement w exp wheezes bilaterally significant, increased wob on HFNC  Cardiovascular: RRR, no murmurs, rubs, or gallops  Gastrointestinal: Soft, nontender,  nondistended  Musculoskeletal: Muscle tonedecreased throughout, no joint effusions appreciated  Psychiatric: Appropriate affect, cooperative  Neurologic: Alert and oriented, very agreeable, facial movements symmetric and spontaneous movement of all 4 extremities grossly equal bilaterally, speech clear  Skin: No rashes     Results Reviewed:  LAB RESULTS:                Lab 01/21/24 0327 01/20/24 0338 01/19/24 0338 01/18/24  0430 01/17/24  1636 01/17/24  0503 01/16/24  1805   WBC 6.14 5.43 5.37 4.81  --  6.46  --    HEMOGLOBIN 10.6* 10.1* 9.0* 9.5*  --  9.8*  --    HEMATOCRIT 36.5 33.9* 31.4* 32.6*  --  33.4*  --    PLATELETS 147 133* 122* 124*  --  109*  --    NEUTROS ABS 5.43 4.90 4.90 4.29  --  5.06  --    IMMATURE GRANS (ABS) 0.05 0.09* 0.02 0.02  --  0.02  --    LYMPHS ABS 0.36* 0.29* 0.29* 0.31*  --  0.77  --    MONOS ABS 0.30 0.15 0.16 0.19  --  0.58  --    EOS ABS 0.00 0.00 0.00 0.00  --  0.02  --    MCV 95.3 93.6 96.0 97.9*  --  96.8  --    CRP  --   --   --  7.86* 9.05*  --   --    PROCALCITONIN  --   --   --  0.08  --  0.16  --    LACTATE  --   --   --   --   --   --  0.8                   Lab 01/22/24 0438 01/21/24  1744 01/21/24 0327 01/20/24 0338 01/19/24 0338 01/18/24  0430   SODIUM 135*  --  135* 132* 135* 142   POTASSIUM 4.7 4.7 3.4* 3.9 3.9 4.3   CHLORIDE 98  --  97* 96* 101 105   CO2 29.0  --  31.0* 26.0 25.0 29.0   ANION GAP 8.0  --  7.0 10.0 9.0 8.0   BUN 22  --  18 12 15 20   CREATININE 0.99  --  0.99 0.82 0.77 0.87   EGFR 61.1  --  61.1 76.6 82.6 71.3   GLUCOSE 77  --  93 107* 168* 108*   CALCIUM 8.4*  --  8.1* 7.9* 7.6* 8.2*   MAGNESIUM 2.0  --   --   --   --   --                   Lab 01/21/24  0327 01/20/24  0338 01/19/24  0338 01/18/24  0430 01/17/24  0503   TOTAL PROTEIN 5.6* 5.7* 5.4* 5.6* 5.4*   ALBUMIN 2.7* 2.5* 2.3* 2.6* 2.4*   GLOBULIN 2.9 3.2 3.1 3.0 3.0   ALT (SGPT) 27 32 25 20 21   AST (SGOT) 31 44* 36* 26 30   BILIRUBIN 0.2 0.2 0.2 0.2 0.2   ALK PHOS 321* 331* 281* 277* 306*                Lab 01/17/24  0014 01/16/24  2235   HSTROP T 14* 15*                     Brief Urine Lab Results         None                Microbiology Results Abnormal         Procedure Component Value - Date/Time     Fungus Culture - Sputum, Cough [[id number]] Collected: 01/18/24 0054     Lab Status: Preliminary result Specimen: Sputum from Cough Updated: 01/23/24 0115       Fungus Culture No fungus isolated at less than 1 week     Blood Culture - Blood, Arm, Left [[id number]]  (Normal) Collected: 01/16/24 1807     Lab Status: Final result Specimen: Blood from Arm, Left Updated: 01/21/24 1831       Blood Culture No growth at 5 days     Narrative:       Less than seven (7) mL's of blood was collected.  Insufficient quantity may yield false negative results.     Blood Culture - Blood, Wrist, Right [[id number]]  (Normal) Collected: 01/16/24 1741     Lab Status: Final result Specimen: Blood from Wrist, Right Updated: 01/21/24 1801       Blood Culture No growth at 5 days     Narrative:       Less than seven (7) mL's of blood was collected.  Insufficient quantity may yield false negative results.     Fungus Smear - Sputum, Cough [[id number]] Collected: 01/18/24 0054     Lab Status: Final result Specimen: Sputum from Cough Updated: 01/19/24 1314       Fungal Stain No fungal elements seen     MRSA Screen, PCR (Inpatient) - Swab, Nares [[id number]]  (Normal) Collected: 01/16/24 1800     Lab Status: Final result Specimen: Swab from Nares Updated: 01/17/24 0042       MRSA PCR No MRSA Detected     Narrative:       The negative predictive value of this diagnostic test is high and should only be used to consider de-escalating anti-MRSA therapy. A positive result may indicate colonization with MRSA and must be correlated clinically.     S. Pneumo Ag Urine or CSF - Urine, Straight Cath [[id number]]  (Normal) Collected: 01/16/24 1823     Lab Status: Final result Specimen: Urine from Straight Cath Updated: 01/17/24 0002       Strep  Pneumo Ag Negative     Legionella Antigen, Urine - Urine, Straight Cath [[id number]]  (Normal) Collected: 01/16/24 1823     Lab Status: Final result Specimen: Urine from Straight Cath Updated: 01/17/24 0001       LEGIONELLA ANTIGEN, URINE Negative     COVID PRE-OP / PRE-PROCEDURE SCREENING ORDER (NO ISOLATION) - Swab, Nasopharynx [[id number]]  (Normal) Collected: 01/16/24 1405     Lab Status: Final result Specimen: Swab from Nasopharynx Updated: 01/16/24 1503     Narrative:       The following orders were created for panel order COVID PRE-OP / PRE-PROCEDURE SCREENING ORDER (NO ISOLATION) - Swab, Nasopharynx.  Procedure                               Abnormality         Status                     ---------                               -----------         ------                     Respiratory Panel PCR w/...[[id number]]  Normal              Final result                  Please view results for these tests on the individual orders.     Respiratory Panel PCR w/COVID-19(SARS-CoV-2) ALFREDO/MEGAN/MARIA FERNANDA/PAD/COR/MONIKA In-House, NP Swab in UTM/VTM, 2 HR TAT - Swab, Nasopharynx [[id number]]  (Normal) Collected: 01/16/24 1405     Lab Status: Final result Specimen: Swab from Nasopharynx Updated: 01/16/24 1503       ADENOVIRUS, PCR Not Detected       Coronavirus 229E Not Detected       Coronavirus HKU1 Not Detected       Coronavirus NL63 Not Detected       Coronavirus OC43 Not Detected       COVID19 Not Detected       Human Metapneumovirus Not Detected       Human Rhinovirus/Enterovirus Not Detected       Influenza A PCR Not Detected       Influenza B PCR Not Detected       Parainfluenza Virus 1 Not Detected       Parainfluenza Virus 2 Not Detected       Parainfluenza Virus 3 Not Detected       Parainfluenza Virus 4 Not Detected       RSV, PCR Not Detected       Bordetella pertussis pcr Not Detected       Bordetella parapertussis PCR Not Detected       Chlamydophila pneumoniae PCR Not Detected       Mycoplasma pneumo by PCR Not Detected      Narrative:       In the setting of a positive respiratory panel with a viral infection PLUS a negative procalcitonin without other underlying concern for bacterial infection, consider observing off antibiotics or discontinuation of antibiotics and continue supportive care. If the respiratory panel is positive for atypical bacterial infection (Bordetella pertussis, Chlamydophila pneumoniae, or Mycoplasma pneumoniae), consider antibiotic de-escalation to target atypical bacterial infection.                  Radiology results from the last 24 hours:   SLP FEES - Fiberoptic Endo Eval Swallow     Result Date: 1/22/2024  This procedure was auto-finalized with no dictation required.         Results for orders placed during the hospital encounter of 01/06/24     Adult Transthoracic Echo Complete W/ Cont if Necessary Per Protocol     Interpretation Summary    Left ventricular ejection fraction appears to be 66 - 70%.    Left ventricular wall thickness is consistent with mild septal asymmetric hypertrophy.    Left ventricular diastolic function is consistent with (grade I) impaired relaxation.    The right ventricular cavity is mildly dilated.    The left atrial cavity is mildly dilated.    Left atrial volume is moderately increased.    The right atrial cavity is borderline dilated.    Moderate tricuspid valve regurgitation is present.    Estimated right ventricular systolic pressure from tricuspid regurgitation is markedly elevated (>55 mmHg).        Current medications:  Scheduled Meds:amLODIPine, 5 mg, Oral, Q24H  budesonide-formoterol, 2 puff, Inhalation, BID - RT   And  tiotropium bromide monohydrate, 2 puff, Inhalation, Daily - RT  castor oil-balsam peru, 1 application , Topical, Q12H  cetirizine, 10 mg, Oral, Daily  dicyclomine, 10 mg, Oral, TID  doxepin, 50 mg, Oral, Nightly  doxycycline, 100 mg, Oral, Q12H  ferrous sulfate, 325 mg, Oral, Daily With Breakfast  flecainide, 50 mg, Oral, Q12H  FLUoxetine, 40 mg, Oral,  Daily  guaiFENesin, 1,200 mg, Oral, Q12H  hydroxychloroquine, 200 mg, Oral, Q24H  ipratropium-albuterol, 3 mL, Nebulization, 4x Daily - RT  lactobacillus acidophilus, 1 capsule, Oral, Daily  levothyroxine, 25 mcg, Oral, Q AM  meropenem, 1,000 mg, Intravenous, Q12H  metoprolol tartrate, 25 mg, Oral, BID  palliative care oral rinse, , Mouth/Throat, 4x Daily  pantoprazole, 40 mg, Oral, BID AC  predniSONE, 40 mg, Oral, BID With Meals  rivaroxaban, 15 mg, Oral, Daily With Dinner  [Held by provider] sulfamethoxazole-trimethoprim, 2 tablet, Oral, Once per day on Monday Wednesday Friday  sulfamethoxazole-trimethoprim, 2 tablet, Oral, Q8H  sulfaSALAzine, 500 mg, Oral, Q12H  tobramycin PF, 300 mg, Nebulization, Q12H - RT  traMADol, 50 mg, Oral, BID        Continuous Infusions:   PRN Meds:.  senna-docusate sodium **AND** polyethylene glycol **AND** bisacodyl **AND** bisacodyl    Calcium Replacement - Follow Nurse / BPA Driven Protocol    fluticasone    hydrOXYzine    ipratropium-albuterol    Magnesium Standard Dose Replacement - Follow Nurse / BPA Driven Protocol    nitroglycerin    Phosphorus Replacement - Follow Nurse / BPA Driven Protocol    Potassium Replacement - Follow Nurse / BPA Driven Protocol    sodium chloride    sodium chloride           Assessment & Plan  Assessment & Plan            Active Hospital Problems     Diagnosis   POA    **Acute on chronic hypoxic respiratory failure [J96.21]   Yes    Severe malnutrition [E43]   Yes    Interstitial lung disease [J84.9]   Yes    HTN (hypertension) [I10]   Yes    Chronic respiratory failure with hypoxia [J96.11]   Yes    Physical deconditioning [R53.81]   Yes    Rheumatoid arthritis with positive rheumatoid factor [M05.9]   Yes       Resolved Hospital Problems   No resolved problems to display.         Brief Hospital Course to date:  Liz Borjas is a 73 y.o. female w advanced ILD on home 6 liters n/c, RA on chronic prednisone, A-fib on xarelto, recent admission 1/6-1/9  for RUL pneumonia who re-presented on 1/16 with acute on chronic hypoxic respiratory failure found to have Pseudomonas multifocal PNA.   Currently requiring HFNC, higher FIO2 w any movement     Acute on chronic hypoxic resp failure 2/2 multifocal Pseudomonas PNA  Severe ILD w UIP pattern  -inhaled tobramycin/meropenem per susceptibilities (intermediate to zosyn), started 1/21. Duration per ID  -high dose steroid, taper per pulmonology  -on trx dose for PJP until testing completed, then back to ppx dosing  -complete 7 days doxycycline  -pulm treatments, appreciate all pulmonary input in very frail patient  -wean HFNC as able, with activity expect will continue to require as at baseline sats 70's w exertion at home     RA - on home plaquenil + sulfasalazine. Unclear if role in ILD trx as well v consider holding in severe infection as above. Will research, continue current plan for now     Type II NSTEMI 2/2 hypoxia above  Chronic CHFpEF withOUT acute exacerbation  Concern for oropharyngeal dysphagia - resolved on repeat testing, suspect related to HFNC + dentures     Paroxysmal atrial fibrillation -Continue metoprolol, flecainide, xarelto  GERD - PPI twice daily.  Hypothyroidism -levothyroxine.  Chronic insomnia -doxepin.  Hypertension -amlodipine.  Anxiety/depression -prozac.  Chronic anemia  Chronically elev alk phos     Expected Discharge Location and Transportation: TBD  Expected Discharge 1/29 (Discharge date is tentative pending patient's medical condition and is subject to change)  Expected Discharge Date: 1/29/2024; Expected Discharge Time:       DVT prophylaxis:  Medical DVT prophylaxis orders are present.      AM-PAC 6 Clicks Score (PT): 12 (01/23/24 1151)     CODE STATUS:       Code Status and Medical Interventions:   Ordered at: 01/18/24 0854     Medical Intervention Limits:     NO intubation (DNI)     Level Of Support Discussed With:     Patient     Code Status (Patient has no pulse and is not breathing):      No CPR (Do Not Attempt to Resuscitate)     Medical Interventions (Patient has pulse or is breathing):     Limited Support         Christie Combs MD  24                               Christie Combs MD at 24 [address] [place] [address] Medicine Services  DEATH SUMMARY    Patient Name: Liz Borjas  : 1950  MRN: [id number]    Date of Admission: 2024  Date of Death:  1230  Time of Death:  2024    Primary Care Physician: Valerie Sesay APRN    Consults       Date and Time Order Name Status Description    2024 10:32 AM Inpatient Infectious Diseases Consult Completed     2024  8:54 AM Inpatient Palliative Care MD Consult Completed     2024  6:52 PM Inpatient Pulmonology Consult Completed             Summary of Hospital Events   Presenting Problem: acute on chronic hypoxic resp failure 2/2 Pseudomonas PNA    Active Hospital Problems    Diagnosis  POA    **Acute on chronic hypoxic respiratory failure [J96.21]  Yes    Severe malnutrition [E43]  Yes    Interstitial lung disease [J84.9]  Yes    HTN (hypertension) [I10]  Yes    Chronic respiratory failure with hypoxia [J96.11]  Yes    Physical deconditioning [R53.81]  Yes    Rheumatoid arthritis with positive rheumatoid factor [M05.9]  Yes      Resolved Hospital Problems   No resolved problems to display.          Hospital Course:  Liz Borjas was a 73 y.o. female w advanced ILD on home 6 liters n/c, RA on chronic prednisone, A-fib on xarelto, recent admission - for RUL pneumonia who re-presented on  with acute on chronic hypoxic respiratory failure found to have resistant-Pseudomonas multifocal PNA.     Despite appropriate abx treatment and steroids, patient's hypoxia was persistently severe (100% HFNC) and without improvement. Patient was DNR/DNI, palliative was following patient.    On , patient acutely went into unstable heart arrhythmia w what appeared high grade heart block. S/p  atropine x 3, epinephrine to have recovery of heart rhythm and patient consciousness. Decision made for comfort care plan with patient's son bedside. We discuss this last arrhythmogenic process was consequence of patient's severe respiratory failure. Family was all able to be bedside and visiting with her at her time of death later that same day. Able to reminisce on patient's optimistic, full-of-life spirit with family bedside after her passing.    Official Cause of Death and any directly related diagnoses:  Pseudomonas PNA in setting of severe ILD causing acute on chronic hypoxic respiratory failure, high-degree heart block, RA on chronic prednisone, A-fib on xarelto, severe malnutrition      Christie Combs MD  01/24/24            Electronically signed by Christie Combs MD at 01/24/24 1259

## 2024-01-26 LAB
C-ANCA TITR SER IF: ABNORMAL TITER
MYELOPEROXIDASE AB SER IA-ACNC: <0.2 UNITS (ref 0–0.9)
P-ANCA ATYPICAL TITR SER IF: ABNORMAL TITER
P-ANCA TITR SER IF: ABNORMAL TITER
PROTEINASE3 AB SER IA-ACNC: <0.2 UNITS (ref 0–0.9)

## 2024-01-29 LAB — P JIROVECII DNA # SPEC NAA+PROBE: NORMAL {COPIES}/ML

## 2024-01-30 LAB — H CAPSUL AG UR QL IA: NEGATIVE

## 2024-02-07 LAB — FUNGUS WND CULT: ABNORMAL

## 2024-02-12 LAB
QT INTERVAL: 424 MS
QTC INTERVAL: 470 MS

## 2024-02-13 LAB
FUNGUS WND CULT: ABNORMAL
FUNGUS WND CULT: ABNORMAL